# Patient Record
Sex: FEMALE | Race: WHITE | NOT HISPANIC OR LATINO | Employment: OTHER | URBAN - METROPOLITAN AREA
[De-identification: names, ages, dates, MRNs, and addresses within clinical notes are randomized per-mention and may not be internally consistent; named-entity substitution may affect disease eponyms.]

---

## 2017-01-04 ENCOUNTER — ANTICOAGULATION THERAPY VISIT (OUTPATIENT)
Dept: ANTICOAGULATION | Facility: CLINIC | Age: 62
End: 2017-01-04
Payer: COMMERCIAL

## 2017-01-04 DIAGNOSIS — Z79.01 LONG-TERM (CURRENT) USE OF ANTICOAGULANTS: Primary | ICD-10-CM

## 2017-01-04 DIAGNOSIS — Z95.2 H/O AORTIC VALVE REPLACEMENT: ICD-10-CM

## 2017-01-04 DIAGNOSIS — Z95.2 S/P AORTIC VALVE REPLACEMENT: ICD-10-CM

## 2017-01-04 LAB — INR POINT OF CARE: 2.1 (ref 0.86–1.14)

## 2017-01-04 PROCEDURE — 36416 COLLJ CAPILLARY BLOOD SPEC: CPT

## 2017-01-04 PROCEDURE — 99207 ZZC NO CHARGE NURSE ONLY: CPT

## 2017-01-04 PROCEDURE — 85610 PROTHROMBIN TIME: CPT | Mod: QW

## 2017-01-04 RX ORDER — WARFARIN SODIUM 5 MG/1
TABLET ORAL
Qty: 90 TABLET | Refills: 1 | Status: SHIPPED | OUTPATIENT
Start: 2017-01-04 | End: 2017-07-24

## 2017-01-04 NOTE — PROGRESS NOTES
ANTICOAGULATION FOLLOW-UP CLINIC VISIT    Patient Name:  Annabella Bolanos  Date:  1/4/2017  Contact Type:  Face to Face    SUBJECTIVE:     Patient Findings     Positives No Problem Findings           OBJECTIVE    INR PROTIME   Date Value Ref Range Status   01/04/2017 2.1* 0.86 - 1.14 Final       ASSESSMENT / PLAN  INR assessment THER    Recheck INR In: 6 WEEKS    INR Location Clinic      Anticoagulation Summary as of 1/4/2017     INR goal 1.8-2.5   Selected INR 2.1 (1/4/2017)   Maintenance plan 5 mg (5 mg x 1) every day   Full instructions 5 mg every day   Weekly total 35 mg   No change documented Erica Stinson RN   Plan last modified Erica Stinson RN (5/11/2016)   Next INR check 2/15/2017   Priority INR   Target end date     Indications   Long-term (current) use of anticoagulants [Z79.01] [Z79.01]  H/O aortic valve replacement [Z95.2]         Anticoagulation Episode Summary     INR check location     Preferred lab     Send INR reminders to [facility]    Comments       Anticoagulation Care Providers     Provider Role Specialty Phone number    Brook Echavarria MD [facility] Internal Medicine [phone]            See the Encounter Report to view Anticoagulation Flowsheet and Dosing Calendar (Go to Encounters tab in chart review, and find the Anticoagulation Therapy Visit)    Dosage adjustment made based on physician directed care plan.    Erica Stinson RN

## 2017-02-14 ENCOUNTER — TRANSFERRED RECORDS (OUTPATIENT)
Dept: SURGERY | Facility: CLINIC | Age: 62
End: 2017-02-14

## 2017-02-15 ENCOUNTER — ANTICOAGULATION THERAPY VISIT (OUTPATIENT)
Dept: ANTICOAGULATION | Facility: CLINIC | Age: 62
End: 2017-02-15
Payer: COMMERCIAL

## 2017-02-15 DIAGNOSIS — Z95.2 H/O AORTIC VALVE REPLACEMENT: ICD-10-CM

## 2017-02-15 DIAGNOSIS — Z79.01 LONG-TERM (CURRENT) USE OF ANTICOAGULANTS: ICD-10-CM

## 2017-02-15 LAB — INR POINT OF CARE: 1.7 (ref 0.86–1.14)

## 2017-02-15 PROCEDURE — 99207 ZZC NO CHARGE NURSE ONLY: CPT

## 2017-02-15 PROCEDURE — 85610 PROTHROMBIN TIME: CPT | Mod: QW

## 2017-02-15 PROCEDURE — 36416 COLLJ CAPILLARY BLOOD SPEC: CPT

## 2017-02-15 NOTE — MR AVS SNAPSHOT
Annabella TEMPLE Luis Miguel   2/15/2017 8:15 AM   Anticoagulation Therapy Visit    Description:  61 year old female   Provider[facility]  [facility]   Department:  [facility]           INR as of 2/15/2017     Today's INR 1.7!      Anticoagulation Summary as of 2/15/2017     INR goal 1.8-2.5   Today's INR 1.7!   Full instructions 2/15: 10 mg; Otherwise 5 mg every day   Next INR check 3/8/2017    Indications   Long-term (current) use of anticoagulants [Z79.01] [Z79.01]  H/O aortic valve replacement [Z95.2]         Your next Anticoagulation Clinic appointment(s)     Mar 08, 2017  8:15 AM CST   [facility]ticoagulation Visit with [facility]   [facility] ([facility])    [address]  [facility] [address] [phone]              Contact Numbers     [facility] Phone Numbers:  Anticoagulation Clinic Appointments : [phone]  Anticoagulation Nurse: [phone]         February 2017 Details    Sun Mon Tue Wed Thu Fri Sat        1               2               3               4                 5               6               7               8               9               10               11                 12               13               14               15      10 mg   See details      16      5 mg         17      5 mg         18      5 mg           19      5 mg         20      5 mg         21      5 mg         22      5 mg         23      5 mg         24      5 mg         25      5 mg           26      5 mg         27      5 mg         28      5 mg              Date Details   02/15 This INR check               How to take your warfarin dose     To take:  5 mg Take 1 of the 5 mg tablets.    To take:  10 mg Take 2 of the 5 mg tablets.           March 2017 Details    Sun Mon Tue Wed Thu Fri Sat        1      5 mg         2      5 mg         3      5 mg         4      5 mg           5      5 mg         6      5 mg         7      5 mg         8             9               10               11                 12               13               14               15               16               17               18                 19               20               21               22               23               24               25                 26               27               28               29               30               31                 Date Details   No additional details    Date of next INR:  3/8/2017         How to take your warfarin dose     To take:  5 mg Take 1 of the 5 mg tablets.

## 2017-02-15 NOTE — PROGRESS NOTES
ANTICOAGULATION FOLLOW-UP CLINIC VISIT    Patient Name:  Annabella Bolanos  Date:  2/15/2017  Contact Type:  Face to Face    SUBJECTIVE:     Patient Findings     Positives Unexplained INR or factor level change           OBJECTIVE    INR Protime   Date Value Ref Range Status   02/15/2017 1.7 (A) 0.86 - 1.14 Final       ASSESSMENT / PLAN  INR assessment SUB    Recheck INR In: 3 WEEKS    INR Location Clinic      Anticoagulation Summary as of 2/15/2017     INR goal 1.8-2.5   Today's INR 1.7!   Maintenance plan 5 mg (5 mg x 1) every day   Full instructions 2/15: 10 mg; Otherwise 5 mg every day   Weekly total 35 mg   Plan last modified Erica Stinson RN (5/11/2016)   Next INR check 3/8/2017   Priority INR   Target end date     Indications   Long-term (current) use of anticoagulants [Z79.01] [Z79.01]  H/O aortic valve replacement [Z95.2]         Anticoagulation Episode Summary     INR check location     Preferred lab     Send INR reminders to [facility]    Comments       Anticoagulation Care Providers     Provider Role Specialty Phone number    Brook Echavarria MD [facility] Internal Medicine [phone]            See the Encounter Report to view Anticoagulation Flowsheet and Dosing Calendar (Go to Encounters tab in chart review, and find the Anticoagulation Therapy Visit)    Dosage adjustment made based on physician directed care plan.    Erica Stinson RN

## 2017-03-08 ENCOUNTER — ANTICOAGULATION THERAPY VISIT (OUTPATIENT)
Dept: ANTICOAGULATION | Facility: CLINIC | Age: 62
End: 2017-03-08
Payer: COMMERCIAL

## 2017-03-08 DIAGNOSIS — Z79.01 LONG-TERM (CURRENT) USE OF ANTICOAGULANTS: ICD-10-CM

## 2017-03-08 DIAGNOSIS — Z95.2 H/O AORTIC VALVE REPLACEMENT: ICD-10-CM

## 2017-03-08 LAB — INR POINT OF CARE: 1.8 (ref 0.86–1.14)

## 2017-03-08 PROCEDURE — 99207 ZZC NO CHARGE NURSE ONLY: CPT

## 2017-03-08 PROCEDURE — 85610 PROTHROMBIN TIME: CPT | Mod: QW

## 2017-03-08 PROCEDURE — 36416 COLLJ CAPILLARY BLOOD SPEC: CPT

## 2017-03-08 NOTE — MR AVS SNAPSHOT
Annabella TEMPLE Luis Miguel   3/8/2017 8:15 AM   Anticoagulation Therapy Visit    Description:  61 year old female   Provider[facility]  [facility]   Department:  [facility]           INR as of 3/8/2017     Today's INR 1.8      Anticoagulation Summary as of 3/8/2017     INR goal 1.8-2.5   Today's INR 1.8   Full instructions 5 mg every day   Next INR check 4/19/2017    Indications   Long-term (current) use of anticoagulants [Z79.01] [Z79.01]  H/O aortic valve replacement [Z95.2]         Your next Anticoagulation Clinic appointment(s)     Apr 19, 2017  8:15 AM CDT   [facility]ticoagulation Visit with [facility]   [facility]    [address]  [facility] [address] [phone]              Contact Numbers     [facility] Phone Numbers:  Anticoagulation Clinic Appointments : [phone]  Anticoagulation Nurse: [phone]         March 2017 Details    Sun Mon Tue Wed Thu Fri Sat        1               2               3               4                 5               6               7               8      5 mg   See details      9      5 mg         10      5 mg         11      5 mg           12      5 mg         13      5 mg         14      5 mg         15      5 mg         16      5 mg         17      5 mg         18      5 mg           19      5 mg         20      5 mg         21      5 mg         22      5 mg         23      5 mg         24      5 mg         25      5 mg           26      5 mg         27      5 mg         28      5 mg         29      5 mg         30      5 mg         31      5 mg           Date Details   03/08 This INR check               How to take your warfarin dose     To take:  5 mg Take 1 of the 5 mg tablets.           April 2017 Details    Sun Mon Tue Wed Thu Fri Sat           1      5 mg           2      5 mg         3      5 mg         4      5 mg         5      5 mg         6      5 mg         7      5 mg          8      5 mg           9      5 mg         10      5 mg         11      5 mg         12      5 mg         13      5 mg         14      5 mg         15      5 mg           16      5 mg         17      5 mg         18      5 mg         19            20               21               22                 23               24               25               26               27               28               29                 30                      Date Details   No additional details    Date of next INR:  4/19/2017         How to take your warfarin dose     To take:  5 mg Take 1 of the 5 mg tablets.

## 2017-03-08 NOTE — PROGRESS NOTES
ANTICOAGULATION FOLLOW-UP CLINIC VISIT    Patient Name:  Annabella Bolanos  Date:  3/8/2017  Contact Type:  Face to Face    SUBJECTIVE:     Patient Findings     Positives Change in diet/appetite (Pt will try to cut back on her vitamin K foods)           OBJECTIVE    INR Protime   Date Value Ref Range Status   03/08/2017 1.8 (A) 0.86 - 1.14 Final       ASSESSMENT / PLAN  INR assessment THER    Recheck INR In: 6 WEEKS    INR Location Clinic      Anticoagulation Summary as of 3/8/2017     INR goal 1.8-2.5   Today's INR 1.8   Maintenance plan 5 mg (5 mg x 1) every day   Full instructions 5 mg every day   Weekly total 35 mg   No change documented Erica Stinson RN   Plan last modified Erica Stinson RN (5/11/2016)   Next INR check 4/19/2017   Priority INR   Target end date     Indications   Long-term (current) use of anticoagulants [Z79.01] [Z79.01]  H/O aortic valve replacement [Z95.2]         Anticoagulation Episode Summary     INR check location     Preferred lab     Send INR reminders to [facility]    Comments       Anticoagulation Care Providers     Provider Role Specialty Phone number    Brook Echavarria MD Responsible Internal Medicine [phone]            See the Encounter Report to view Anticoagulation Flowsheet and Dosing Calendar (Go to Encounters tab in chart review, and find the Anticoagulation Therapy Visit)    Dosage adjustment made based on physician directed care plan.    Erica Stinson RN

## 2017-04-19 ENCOUNTER — ANTICOAGULATION THERAPY VISIT (OUTPATIENT)
Dept: ANTICOAGULATION | Facility: CLINIC | Age: 62
End: 2017-04-19
Payer: COMMERCIAL

## 2017-04-19 DIAGNOSIS — Z95.2 H/O AORTIC VALVE REPLACEMENT: ICD-10-CM

## 2017-04-19 DIAGNOSIS — Z79.01 LONG-TERM (CURRENT) USE OF ANTICOAGULANTS: ICD-10-CM

## 2017-04-19 LAB — INR POINT OF CARE: 2.2 (ref 0.86–1.14)

## 2017-04-19 PROCEDURE — 36416 COLLJ CAPILLARY BLOOD SPEC: CPT

## 2017-04-19 PROCEDURE — 99207 ZZC NO CHARGE NURSE ONLY: CPT

## 2017-04-19 PROCEDURE — 85610 PROTHROMBIN TIME: CPT | Mod: QW

## 2017-04-19 NOTE — PROGRESS NOTES
ANTICOAGULATION FOLLOW-UP CLINIC VISIT    Patient Name:  Annabella Bolanos  Date:  4/19/2017  Contact Type:  Face to Face    SUBJECTIVE:     Patient Findings     Positives No Problem Findings           OBJECTIVE    INR Protime   Date Value Ref Range Status   04/19/2017 2.2 (A) 0.86 - 1.14 Final       ASSESSMENT / PLAN  INR assessment THER    Recheck INR In: 6 WEEKS    INR Location Clinic      Anticoagulation Summary as of 4/19/2017     INR goal 1.8-2.5   Today's INR 2.2   Maintenance plan 5 mg (5 mg x 1) every day   Full instructions 5 mg every day   Weekly total 35 mg   No change documented Erica Stinson RN   Plan last modified Erica Stinson RN (5/11/2016)   Next INR check 5/31/2017   Priority INR   Target end date     Indications   Long-term (current) use of anticoagulants [Z79.01] [Z79.01]  H/O aortic valve replacement [Z95.2]         Anticoagulation Episode Summary     INR check location     Preferred lab     Send INR reminders to [facility]    Comments       Anticoagulation Care Providers     Provider Role Specialty Phone number    Brook Echavarria MD Responsible Internal Medicine [phone]            See the Encounter Report to view Anticoagulation Flowsheet and Dosing Calendar (Go to Encounters tab in chart review, and find the Anticoagulation Therapy Visit)    Dosage adjustment made based on physician directed care plan.    Erica Stinson RN

## 2017-04-19 NOTE — MR AVS SNAPSHOT
Annabella TEMPLE Luis Miguel   4/19/2017 8:15 AM   Anticoagulation Therapy Visit    Description:  61 year old female   Provider[facility]  [facility]   Department:  [facility]           INR as of 4/19/2017     Today's INR 2.2      Anticoagulation Summary as of 4/19/2017     INR goal 1.8-2.5   Today's INR 2.2   Full instructions 5 mg every day   Next INR check 5/31/2017    Indications   Long-term (current) use of anticoagulants [Z79.01] [Z79.01]  H/O aortic valve replacement [Z95.2]         Your next Anticoagulation Clinic appointment(s)     May 31, 2017  8:15 AM CDT   [facility] with [facility]   [facility]    [address] [facility] [address]  [facility] [address] [phone]              Contact Numbers     [facility] Phone Numbers:  Anticoagulation Clinic Appointments : [phone]  Anticoagulation Nurse: [phone]         April 2017 Details    Sun Mon Tue Wed Thu Fri Sat           1                 2               3               4               5               6               7               8                 9               10               11               12               13               14               15                 16               17               18               19      5 mg   See details      20      5 mg         21      5 mg         22      5 mg           23      5 mg         24      5 mg         25      5 mg         26      5 mg         27      5 mg         28      5 mg         29      5 mg           30      5 mg                Date Details   04/19 This INR check               How to take your warfarin dose     To take:  5 mg Take 1 of the 5 mg tablets.           May 2017 Details    Sun Mon Tue Wed Thu Fri Sat      1      5 mg         2      5 mg         3      5 mg         4      5 mg         5      5 mg         6      5 mg           7      5 mg         8      5 mg         9      5 mg         10      5 mg          11      5 mg         12      5 mg         13      5 mg           14      5 mg         15      5 mg         16      5 mg         17      5 mg         18      5 mg         19      5 mg         20      5 mg           21      5 mg         22      5 mg         23      5 mg         24      5 mg         25      5 mg         26      5 mg         27      5 mg           28      5 mg         29      5 mg         30      5 mg         31                Date Details   No additional details    Date of next INR:  5/31/2017         How to take your warfarin dose     To take:  5 mg Take 1 of the 5 mg tablets.

## 2017-05-22 ENCOUNTER — OFFICE VISIT (OUTPATIENT)
Dept: CARDIOLOGY | Facility: CLINIC | Age: 62
End: 2017-05-22
Attending: INTERNAL MEDICINE
Payer: COMMERCIAL

## 2017-05-22 VITALS
HEIGHT: 64 IN | BODY MASS INDEX: 21.51 KG/M2 | SYSTOLIC BLOOD PRESSURE: 124 MMHG | WEIGHT: 126 LBS | HEART RATE: 60 BPM | DIASTOLIC BLOOD PRESSURE: 84 MMHG

## 2017-05-22 DIAGNOSIS — I10 BENIGN ESSENTIAL HYPERTENSION: ICD-10-CM

## 2017-05-22 DIAGNOSIS — Z95.2 HISTORY OF MECHANICAL AORTIC VALVE REPLACEMENT: Primary | ICD-10-CM

## 2017-05-22 DIAGNOSIS — I10 ESSENTIAL HYPERTENSION, BENIGN: ICD-10-CM

## 2017-05-22 DIAGNOSIS — I10 ESSENTIAL HYPERTENSION: ICD-10-CM

## 2017-05-22 PROCEDURE — 99214 OFFICE O/P EST MOD 30 MIN: CPT | Performed by: INTERNAL MEDICINE

## 2017-05-22 RX ORDER — CARVEDILOL 12.5 MG/1
12.5 TABLET ORAL 2 TIMES DAILY WITH MEALS
Qty: 180 TABLET | Refills: 3 | Status: SHIPPED | OUTPATIENT
Start: 2017-05-22 | End: 2017-05-22

## 2017-05-22 RX ORDER — LISINOPRIL 10 MG/1
TABLET ORAL
Qty: 135 TABLET | Refills: 3 | Status: SHIPPED | OUTPATIENT
Start: 2017-05-22 | End: 2017-09-07

## 2017-05-22 RX ORDER — CARVEDILOL 12.5 MG/1
12.5 TABLET ORAL 2 TIMES DAILY WITH MEALS
Qty: 30 TABLET | Refills: 1 | Status: SHIPPED | OUTPATIENT
Start: 2017-05-22 | End: 2017-08-07

## 2017-05-22 NOTE — LETTER
5/22/2017    Brook Echavarria MD  [address] [place] [address]    RE: Annabella Bolanos       Dear Colleague,    I had the pleasure of seeing Annabella Bolanos in the [facility].    Ms. Bolanos is a very pleasant 61-year-old female with a history of right-sided ductal breast cancer, status post chemo and radiation therapy, bicuspid aortic valve stenosis with mechanical aortic valve replacement and conduit due to ascending aortic aneurysm performed in 2011.  She is here for a followup visit.        We did perform an echocardiogram last year to assess LV function as well as valve function.  She does have a history of Adriamycin use in 2014 with no evidence of cardiotoxicity.  Ms. Bolanos also has a history of high blood pressure and is on antihypertensive therapy.  Over the past year, she has not complained of any cardiac issues, specifically denying any fluttering or palpitations, lightheadedness or chest pain.  She is, however, suffering from significant arthritis in hands and knees, somewhat affecting the quality of her life.  She is still able to work but has difficulty especially at nighttime when getting up to urinate with a lot of stiffness in the knees as well as hands.  She is taking Extra Strength Tylenol at night with some partial relief.  She is wondering if there is any other treatment for her significant arthritis.        Last year we did increase her lisinopril a little bit due to elevated blood pressures.  This has seemingly been effective in reducing her blood pressures to within goal.      PHYSICAL EXAMINATION:   VITAL SIGNS:  On exam today, her blood pressure is 124/84, pulse is 60, weight 126 with a body mass index of 21.   CARDIOVASCULAR:  Tones are regular today, suggesting a normal sinus rhythm.  She has audible click with soft systolic murmur from her mechanical aortic valve.  I do not appreciate a gallop or rub.   LUNGS:  Clear posteriorly.    EXTREMITIES:  She has strong and symmetric pulses in the upper and lower extremities without peripheral edema.     Outpatient Encounter Prescriptions as of 5/22/2017   Medication Sig Dispense Refill     lisinopril (PRINIVIL/ZESTRIL) 10 MG tablet Take 1 1/2 tabs (15 mg) by mouth daily 135 tablet 3     [DISCONTINUED] carvedilol (COREG) 12.5 MG tablet Take 1 tablet (12.5 mg) by mouth 2 times daily (with meals) 30 tablet 1     [DISCONTINUED] warfarin (COUMADIN) 5 MG tablet Take 1 tablet (5 mg) daily, or as instructed. 90 tablet 1     Cholecalciferol (VITAMIN D3 PO) Take by mouth once a week        zolpidem (AMBIEN) 10 MG tablet Take 1 tablet (10 mg) by mouth nightly as needed for sleep (Patient taking differently: Take 5 mg by mouth nightly as needed for sleep ) 30 tablet 0     ASPIRIN EC PO Take 81 mg by mouth daily       anastrozole (ARIMIDEX) 1 MG tablet Take by mouth daily 30 tablet      acetaminophen (TYLENOL) 325 MG tablet Take 1-2 tablets (325-650 mg) by mouth every 6 hours as needed for mild pain 250 tablet 11     Calcium Carbonate (CALCIUM 500 PO) Take 600 mg by mouth daily        [DISCONTINUED] carvedilol (COREG) 12.5 MG tablet Take 1 tablet (12.5 mg) by mouth 2 times daily (with meals) 180 tablet 3     [DISCONTINUED] carvedilol (COREG) 12.5 MG tablet Take 1 tablet (12.5 mg) by mouth 2 times daily (with meals) 180 tablet 3     [DISCONTINUED] lisinopril (PRINIVIL,ZESTRIL) 10 MG tablet Take 1 1/2 tabs (15 mg) by mouth daily 135 tablet 3     [DISCONTINUED] fluticasone (FLONASE) 50 MCG/ACT nasal spray Spray 1-2 sprays into both nostrils daily 48 g 2     albuterol (PROAIR HFA, PROVENTIL HFA, VENTOLIN HFA) 108 (90 BASE) MCG/ACT inhaler Inhale 2 puffs into the lungs every 6 hours as needed for shortness of breath / dyspnea or wheezing Reported on 5/22/2017 3 Inhaler 1     No facility-administered encounter medications on file as of 5/22/2017.       In summary, Ms. Bolanos is a very pleasant 61-year-old female with a  history of right-sided breast cancer, status post chemotherapy and radiation therapy without evidence of cardiotoxicity.  She has bicuspid aortic stenosis and ascending aortic aneurysm with mechanical aortic valve replacement and conduit in 2011, hypertension that is well controlled.  She is currently asymptomatic from a cardiac perspective; however, suffering from significant arthritis.  She is concerned that Arimidex may be contributing to worsened arthritis.  She has a lot of difficulties, especially at nighttime.  She is wondering if there is anything that she can take to reduce her arthritic pain.        KI have recommended that she follow up with her primary care provider for further guidance on this.  I did talk to her about occasional ibuprofen use.  This will increase risk of bleeding, but used rarely may help on her bad days.  Also, we talked about possibly changing Arimidex to another hormone modulation therapy since there is increased risk for arthritis with chronic use.  Tamoxifen was also considered initially, but felt to maybe increase bleeding risks related to warfarin.  We can certainly adjust warfarin as needed if this is a better option for her considering the potential side effects of Arimidex.        I will plan to schedule an echocardiogram for followup and monitoring of her valve and LV function with her next visit next year.  Please feel free to contact me with any questions you have in regards to her care.     Again, thank you for allowing me to participate in the care of your patient.      Sincerely,    Cecilia Guan,      [facility]    cc:      Lucille Felder MD    [facility]   [address]   [place] [address]

## 2017-05-22 NOTE — PROGRESS NOTES
HISTORY OF PRESENT ILLNESS:  Ms. Bolanos is a very pleasant 61-year-old female with a history of right-sided ductal breast cancer, status post chemo and radiation therapy, bicuspid aortic valve stenosis with mechanical aortic valve replacement and conduit due to ascending aortic aneurysm performed in 2011.  She is here for a followup visit.        We did perform an echocardiogram last year to assess LV function as well as valve function.  She does have a history of Adriamycin use in 2014 with no evidence of cardiotoxicity.  Ms. Bolanos also has a history of high blood pressure and is on antihypertensive therapy.  Over the past year, she has not complained of any cardiac issues, specifically denying any fluttering or palpitations, lightheadedness or chest pain.  She is, however, suffering from significant arthritis in hands and knees, somewhat affecting the quality of her life.  She is still able to work but has difficulty especially at nighttime when getting up to urinate with a lot of stiffness in the knees as well as hands.  She is taking Extra Strength Tylenol at night with some partial relief.  She is wondering if there is any other treatment for her significant arthritis.        Last year we did increase her lisinopril a little bit due to elevated blood pressures.  This has seemingly been effective in reducing her blood pressures to within goal.      PHYSICAL EXAMINATION:   VITAL SIGNS:  On exam today, her blood pressure is 124/84, pulse is 60, weight 126 with a body mass index of 21.   CARDIOVASCULAR:  Tones are regular today, suggesting a normal sinus rhythm.  She has audible click with soft systolic murmur from her mechanical aortic valve.  I do not appreciate a gallop or rub.   LUNGS:  Clear posteriorly.   EXTREMITIES:  She has strong and symmetric pulses in the upper and lower extremities without peripheral edema.      In summary, Ms. Bolanos is a very pleasant 61-year-old female with a history of right-sided  breast cancer, status post chemotherapy and radiation therapy without evidence of cardiotoxicity.  She has bicuspid aortic stenosis and ascending aortic aneurysm with mechanical aortic valve replacement and conduit in , hypertension that is well controlled.  She is currently asymptomatic from a cardiac perspective; however, suffering from significant arthritis.  She is concerned that Arimidex may be contributing to worsened arthritis.  She has a lot of difficulties, especially at nighttime.  She is wondering if there is anything that she can take to reduce her arthritic pain.        KI have recommended that she follow up with her primary care provider for further guidance on this.  I did talk to her about occasional ibuprofen use.  This will increase risk of bleeding, but used rarely may help on her bad days.  Also, we talked about possibly changing Arimidex to another hormone modulation therapy since there is increased risk for arthritis with chronic use.  Tamoxifen was also considered initially, but felt to maybe increase bleeding risks related to warfarin.  We can certainly adjust warfarin as needed if this is a better option for her considering the potential side effects of Arimidex.        I will plan to schedule an echocardiogram for followup and monitoring of her valve and LV function with her next visit next year.  Please feel free to contact me with any questions you have in regards to her care.      cc:      Brook Echavarria MD    [facility]   [address]   [place] [address]        Lucille Felder MD    [facility]   [address]   [place] [address]         KHADIJAH HENSON DO             D: 2017 09:21   T: 2017 13:59   MT: [email or web address]      Name:     VAL AGEE   MRN:      [id number]        Account:      [id number]   :      1955           Service Date: 2017      Document: [id number]

## 2017-05-22 NOTE — MR AVS SNAPSHOT
After Visit Summary   5/22/2017    Annabella Bolanos    MRN: [id number]           Patient Information     Date Of Birth          1955        Visit Information        Provider Department      5/22/2017 8:45 AM Cecilia Guan DO [facility]        Today's Diagnoses     History of mechanical aortic valve replacement    -  1    Benign essential hypertension        Essential hypertension        Essential hypertension, benign           Follow-ups after your visit        Additional Services     Follow-Up with Cardiologist                 Your next 10 appointments already scheduled     May 31, 2017  8:15 AM CDT   [facility]    [address]  [facility] [id number] [phone]              Future tests that were ordered for you today     Open Future Orders        Priority Expected Expires Ordered    Echocardiogram Routine 5/22/2018 6/26/2018 5/22/2017    Follow-Up with Cardiologist Routine 5/22/2018 10/4/2018 5/22/2017            Who to contact     If you have questions or need follow up information about today's clinic visit or your schedule please contact [facility] directly at [phone].  Normal or non-critical lab and imaging results will be communicated to you by MyChart, letter or phone within 4 business days after the clinic has received the results. If you do not hear from us within 7 days, please contact the clinic through MyChart or phone. If you have a critical or abnormal lab result, we will notify you by phone as soon as possible.  Submit refill requests through [employer] or call your pharmacy and they will forward the refill request to us. Please allow 3 business days for your refill to be completed.          Additional Information About Your Visit        MyChart Information      "[employer] gives you secure access to your electronic health record. If you see a primary care provider, you can also send messages to your care team and make appointments. If you have questions, please call your primary care clinic.  If you do not have a primary care provider, please call [phone] and they will assist you.        Care EveryWhere ID     This is your Care EveryWhere ID. This could be used by other organizations to access your [facility] medical records  [id number]        Your Vitals Were     Pulse Height BMI (Body Mass Index)             60 1.626 m (5' 4\") 21.63 kg/m2          Blood Pressure from Last 3 Encounters:   05/22/17 124/84   07/20/16 150/89   07/14/16 125/78    Weight from Last 3 Encounters:   05/22/17 57.2 kg (126 lb)   07/20/16 55.8 kg (123 lb)   07/14/16 56.2 kg (123 lb 12.8 oz)              We Performed the Following     Follow-Up with Cardiologist          Today's Medication Changes          These changes are accurate as of: 5/22/17  9:29 AM.  If you have any questions, ask your nurse or doctor.               Start taking these medicines.        Dose/Directions    carvedilol 12.5 MG tablet   Commonly known as:  COREG   Used for:  Essential hypertension   Started by:  Cecilia Guan DO        Dose:  12.5 mg   Take 1 tablet (12.5 mg) by mouth 2 times daily (with meals)   Quantity:  30 tablet   Refills:  1         These medicines have changed or have updated prescriptions.        Dose/Directions    zolpidem 10 MG tablet   Commonly known as:  AMBIEN   This may have changed:  how much to take   Used for:  Insomnia, unspecified type        Dose:  10 mg   Take 1 tablet (10 mg) by mouth nightly as needed for sleep   Quantity:  30 tablet   Refills:  0            Where to get your medicines      These medications were sent to [facility] - [address] [address]     Phone:  [phone]     lisinopril 10 MG " tablet         These medications were sent to [facility] [place], [place] - [address] [facility]  [address] [facility] [address]     Phone:  [phone]     carvedilol 12.5 MG tablet                Primary Care Provider Office Phone # Fax #    Brook Echavarria [phone]       [facility] [address]  [facility] [address]        Thank you!     Thank you for choosing [facility] [place]  for your care. Our goal is always to provide you with excellent care. Hearing back from our patients is one way we can continue to improve our services. Please take a few minutes to complete the written survey that you may receive in the mail after your visit with us. Thank you!             Your Updated Medication List - Protect others around you: Learn how to safely use, store and throw away your medicines at www.disposemymeds.org.          This list is accurate as of: 5/22/17  9:29 AM.  Always use your most recent med list.                   Brand Name Dispense Instructions for use    albuterol 108 (90 BASE) MCG/ACT Inhaler    PROAIR HFA/PROVENTIL HFA/VENTOLIN HFA    3 Inhaler    Inhale 2 puffs into the lungs every 6 hours as needed for shortness of breath / dyspnea or wheezing Reported on 5/22/2017       anastrozole 1 MG tablet    ARIMIDEX    30 tablet    Take by mouth daily       ASPIRIN EC PO      Take 81 mg by mouth daily       CALCIUM 500 PO      Take 600 mg by mouth daily       carvedilol 12.5 MG tablet    COREG    30 tablet    Take 1 tablet (12.5 mg) by mouth 2 times daily (with meals)       lisinopril 10 MG tablet    PRINIVIL/ZESTRIL    135 tablet    Take 1 1/2 tabs (15 mg) by mouth daily       TYLENOL 325 MG tablet   Generic drug:  acetaminophen     250 tablet    Take 1-2 tablets (325-650 mg) by mouth every 6 hours as needed for mild pain       VITAMIN D3 PO      Take by mouth daily       warfarin 5 MG  tablet    COUMADIN    90 tablet    Take 1 tablet (5 mg) daily, or as instructed.       zolpidem 10 MG tablet    AMBIEN    30 tablet    Take 1 tablet (10 mg) by mouth nightly as needed for sleep

## 2017-05-31 ENCOUNTER — ANTICOAGULATION THERAPY VISIT (OUTPATIENT)
Dept: ANTICOAGULATION | Facility: CLINIC | Age: 62
End: 2017-05-31
Payer: COMMERCIAL

## 2017-05-31 DIAGNOSIS — Z95.2 H/O AORTIC VALVE REPLACEMENT: ICD-10-CM

## 2017-05-31 DIAGNOSIS — Z79.01 LONG-TERM (CURRENT) USE OF ANTICOAGULANTS: ICD-10-CM

## 2017-05-31 LAB — INR POINT OF CARE: 2.5 (ref 0.86–1.14)

## 2017-05-31 PROCEDURE — 99207 ZZC NO CHARGE NURSE ONLY: CPT

## 2017-05-31 PROCEDURE — 85610 PROTHROMBIN TIME: CPT | Mod: QW

## 2017-05-31 PROCEDURE — 36416 COLLJ CAPILLARY BLOOD SPEC: CPT

## 2017-05-31 NOTE — MR AVS SNAPSHOT
Annabella TEMPLE Luis Miguel   5/31/2017 8:15 AM   Anticoagulation Therapy Visit    Description:  61 year old female   Provider:  [facility]   Department:  [facility]           INR as of 5/31/2017     Today's INR 2.5      Anticoagulation Summary as of 5/31/2017     INR goal 1.8-2.5   Today's INR 2.5   Full instructions 5 mg every day   Next INR check 7/11/2017    Indications   Long-term (current) use of anticoagulants [Z79.01] [Z79.01]  H/O aortic valve replacement [Z95.2]         Your next Anticoagulation Clinic appointment(s)     Jul 11, 2017  8:30 AM CDT   Anticoagulation Visit with [facility]   [facility]    [address] [facility] [address]  [facility] [address] [phone]              Contact Numbers     [facility] Phone Numbers:  Anticoagulation Clinic Appointments : [phone]  Anticoagulation Nurse: [phone]         May 2017 Details    Sun Mon Tue Wed Thu Fri Sat      1               2               3               4               5               6                 7               8               9               10               11               12               13                 14               15               16               17               18               19               20                 21               22               23               24               25               26               27                 28               29               30               31      5 mg   See details          Date Details   05/31 This INR check               How to take your warfarin dose     To take:  5 mg Take 1 of the 5 mg tablets.           June 2017 Details    Sun Mon Tue Wed Thu Fri Sat         1      5 mg         2      5 mg         3      5 mg           4      5 mg         5      5 mg         6      5 mg         7      5 mg         8      5 mg         9      5 mg         10      5 mg           11      5 mg         12       5 mg         13      5 mg         14      5 mg         15      5 mg         16      5 mg         17      5 mg           18      5 mg         19      5 mg         20      5 mg         21      5 mg         22      5 mg         23      5 mg         24      5 mg           25      5 mg         26      5 mg         27      5 mg         28      5 mg         29      5 mg         30      5 mg           Date Details   No additional details            How to take your warfarin dose     To take:  5 mg Take 1 of the 5 mg tablets.           July 2017 Details    Sun Mon Tue Wed Thu Fri Sat           1      5 mg           2      5 mg         3      5 mg         4      5 mg         5      5 mg         6      5 mg         7      5 mg         8      5 mg           9      5 mg         10      5 mg         11            12               13               14               15                 16               17               18               19               20               21               22                 23               24               25               26               27               28               29                 30               31                     Date Details   No additional details    Date of next INR:  7/11/2017         How to take your warfarin dose     To take:  5 mg Take 1 of the 5 mg tablets.

## 2017-05-31 NOTE — PROGRESS NOTES
ANTICOAGULATION FOLLOW-UP CLINIC VISIT    Patient Name:  Annabella Bolanos  Date:  5/31/2017  Contact Type:  Face to Face    SUBJECTIVE:     Patient Findings     Positives No Problem Findings           OBJECTIVE    INR Protime   Date Value Ref Range Status   05/31/2017 2.5 (A) 0.86 - 1.14 Final       ASSESSMENT / PLAN  INR assessment THER    Recheck INR In: 6 WEEKS    INR Location Clinic      Anticoagulation Summary as of 5/31/2017     INR goal 1.8-2.5   Today's INR 2.5   Maintenance plan 5 mg (5 mg x 1) every day   Full instructions 5 mg every day   Weekly total 35 mg   No change documented Erica Stinson RN   Plan last modified Erica Stinson RN (5/11/2016)   Next INR check 7/11/2017   Priority INR   Target end date     Indications   Long-term (current) use of anticoagulants [Z79.01] [Z79.01]  H/O aortic valve replacement [Z95.2]         Anticoagulation Episode Summary     INR check location     Preferred lab     Send INR reminders to [facility]    Comments       Anticoagulation Care Providers     Provider Role Specialty Phone number    Brook Echavarria MD Responsible Internal Medicine [phone]            See the Encounter Report to view Anticoagulation Flowsheet and Dosing Calendar (Go to Encounters tab in chart review, and find the Anticoagulation Therapy Visit)    Dosage adjustment made based on physician directed care plan.    Erica Stinson RN

## 2017-07-11 ENCOUNTER — ANTICOAGULATION THERAPY VISIT (OUTPATIENT)
Dept: ANTICOAGULATION | Facility: CLINIC | Age: 62
End: 2017-07-11
Payer: COMMERCIAL

## 2017-07-11 DIAGNOSIS — Z79.01 LONG-TERM (CURRENT) USE OF ANTICOAGULANTS: ICD-10-CM

## 2017-07-11 DIAGNOSIS — Z95.2 H/O AORTIC VALVE REPLACEMENT: ICD-10-CM

## 2017-07-11 LAB — INR POINT OF CARE: 2.1 (ref 0.86–1.14)

## 2017-07-11 PROCEDURE — 36416 COLLJ CAPILLARY BLOOD SPEC: CPT

## 2017-07-11 PROCEDURE — 85610 PROTHROMBIN TIME: CPT | Mod: QW

## 2017-07-11 PROCEDURE — 99207 ZZC NO CHARGE NURSE ONLY: CPT

## 2017-07-11 NOTE — MR AVS SNAPSHOT
Annabella TEMPLE Luis Miguel   7/11/2017 8:30 AM   Anticoagulation Therapy Visit    Description:  61 year old female   Provider[facility]  [facility]   Department:  [facility]           INR as of 7/11/2017     Today's INR 2.1      Anticoagulation Summary as of 7/11/2017     INR goal 1.8-2.5   Today's INR 2.1   Full instructions 5 mg every day   Next INR check 8/22/2017    Indications   Long-term (current) use of anticoagulants [Z79.01] [Z79.01]  H/O aortic valve replacement [Z95.2]         Your next Anticoagulation Clinic appointment(s)     Aug 22, 2017  7:45 AM CDT   [facility]    [address]  [facility] [address] [phone]              Contact Numbers     [facility] Phone Numbers:  Anticoagulation Clinic Appointments : [phone]  Anticoagulation Nurse: [phone]         July 2017 Details    Sun Mon Tue Wed Thu Fri Sat           1                 2               3               4               5               6               7               8                 9               10               11      5 mg   See details      12      5 mg         13      5 mg         14      5 mg         15      5 mg           16      5 mg         17      5 mg         18      5 mg         19      5 mg         20      5 mg         21      5 mg         22      5 mg           23      5 mg         24      5 mg         25      5 mg         26      5 mg         27      5 mg         28      5 mg         29      5 mg           30      5 mg         31      5 mg               Date Details   07/11 This INR check               How to take your warfarin dose     To take:  5 mg Take 1 of the 5 mg tablets.           August 2017 Details    Sun Mon Tue Wed Thu Fri Sat       1      5 mg         2      5 mg         3      5 mg         4      5 mg         5      5 mg           6      5 mg         7      5 mg         8       5 mg         9      5 mg         10      5 mg         11      5 mg         12      5 mg           13      5 mg         14      5 mg         15      5 mg         16      5 mg         17      5 mg         18      5 mg         19      5 mg           20      5 mg         21      5 mg         22            23               24               25               26                 27               28               29               30               31                  Date Details   No additional details    Date of next INR:  8/22/2017         How to take your warfarin dose     To take:  5 mg Take 1 of the 5 mg tablets.

## 2017-07-11 NOTE — PROGRESS NOTES
ANTICOAGULATION FOLLOW-UP CLINIC VISIT    Patient Name:  Annabella Bolanos  Date:  7/11/2017  Contact Type:  Face to Face    SUBJECTIVE:        OBJECTIVE    INR Protime   Date Value Ref Range Status   07/11/2017 2.1 (A) 0.86 - 1.14 Final       ASSESSMENT / PLAN  INR assessment THER    Recheck INR In: 6 WEEKS    INR Location Clinic      Anticoagulation Summary as of 7/11/2017     INR goal 1.8-2.5   Today's INR 2.1   Maintenance plan 5 mg (5 mg x 1) every day   Full instructions 5 mg every day   Weekly total 35 mg   No change documented Deborah Aguilera RN   Plan last modified Erica Stinson RN (5/11/2016)   Next INR check 8/22/2017   Priority INR   Target end date     Indications   Long-term (current) use of anticoagulants [Z79.01] [Z79.01]  H/O aortic valve replacement [Z95.2]         Anticoagulation Episode Summary     INR check location     Preferred lab     Send INR reminders to [facility]    Comments       Anticoagulation Care Providers     Provider Role Specialty Phone number    Brook Echavarria MD [facility] Internal Medicine [phone]            See the Encounter Report to view Anticoagulation Flowsheet and Dosing Calendar (Go to Encounters tab in chart review, and find the Anticoagulation Therapy Visit)    Dosage adjustment made based on physician directed care plan.    Deborah Aguilera, RN

## 2017-07-24 ENCOUNTER — OFFICE VISIT (OUTPATIENT)
Dept: INTERNAL MEDICINE | Facility: CLINIC | Age: 62
End: 2017-07-24
Payer: COMMERCIAL

## 2017-07-24 VITALS
OXYGEN SATURATION: 99 % | TEMPERATURE: 98 F | BODY MASS INDEX: 21.56 KG/M2 | SYSTOLIC BLOOD PRESSURE: 136 MMHG | HEART RATE: 64 BPM | DIASTOLIC BLOOD PRESSURE: 80 MMHG | RESPIRATION RATE: 14 BRPM | WEIGHT: 126.3 LBS | HEIGHT: 64 IN

## 2017-07-24 DIAGNOSIS — Z00.00 ROUTINE GENERAL MEDICAL EXAMINATION AT A HEALTH CARE FACILITY: Primary | ICD-10-CM

## 2017-07-24 DIAGNOSIS — C50.911 MALIGNANT NEOPLASM OF RIGHT FEMALE BREAST, UNSPECIFIED ESTROGEN RECEPTOR STATUS, UNSPECIFIED SITE OF BREAST (H): ICD-10-CM

## 2017-07-24 DIAGNOSIS — D12.6 ADENOMATOUS POLYP OF COLON, UNSPECIFIED PART OF COLON: ICD-10-CM

## 2017-07-24 DIAGNOSIS — B00.1 COLD SORE: ICD-10-CM

## 2017-07-24 DIAGNOSIS — H10.022 PINK EYE DISEASE OF LEFT EYE: ICD-10-CM

## 2017-07-24 DIAGNOSIS — I10 HTN, GOAL BELOW 140/90: ICD-10-CM

## 2017-07-24 DIAGNOSIS — E55.9 VITAMIN D DEFICIENCY: ICD-10-CM

## 2017-07-24 DIAGNOSIS — L30.1 DYSHIDROTIC ECZEMA: ICD-10-CM

## 2017-07-24 DIAGNOSIS — Z95.2 S/P AORTIC VALVE REPLACEMENT: ICD-10-CM

## 2017-07-24 DIAGNOSIS — G47.00 INSOMNIA, UNSPECIFIED TYPE: ICD-10-CM

## 2017-07-24 DIAGNOSIS — Z95.2 H/O AORTIC VALVE REPLACEMENT: ICD-10-CM

## 2017-07-24 DIAGNOSIS — N81.11 CYSTOCELE, MIDLINE: ICD-10-CM

## 2017-07-24 LAB
ALBUMIN SERPL-MCNC: 3.6 G/DL (ref 3.4–5)
ALP SERPL-CCNC: 86 U/L (ref 40–150)
ALT SERPL W P-5'-P-CCNC: 23 U/L (ref 0–50)
ANION GAP SERPL CALCULATED.3IONS-SCNC: 4 MMOL/L (ref 3–14)
AST SERPL W P-5'-P-CCNC: 19 U/L (ref 0–45)
BILIRUB SERPL-MCNC: 0.6 MG/DL (ref 0.2–1.3)
BUN SERPL-MCNC: 20 MG/DL (ref 7–30)
CALCIUM SERPL-MCNC: 9 MG/DL (ref 8.5–10.1)
CHLORIDE SERPL-SCNC: 106 MMOL/L (ref 94–109)
CHOLEST SERPL-MCNC: 199 MG/DL
CO2 SERPL-SCNC: 30 MMOL/L (ref 20–32)
CREAT SERPL-MCNC: 0.73 MG/DL (ref 0.52–1.04)
CREAT UR-MCNC: 57 MG/DL
DEPRECATED CALCIDIOL+CALCIFEROL SERPL-MC: 54 UG/L (ref 20–75)
ERYTHROCYTE [DISTWIDTH] IN BLOOD BY AUTOMATED COUNT: 11.3 % (ref 10–15)
GFR SERPL CREATININE-BSD FRML MDRD: 81 ML/MIN/1.7M2
GLUCOSE SERPL-MCNC: 101 MG/DL (ref 70–99)
HCT VFR BLD AUTO: 40.7 % (ref 35–47)
HDLC SERPL-MCNC: 60 MG/DL
HGB BLD-MCNC: 13.3 G/DL (ref 11.7–15.7)
LDLC SERPL CALC-MCNC: 117 MG/DL
MCH RBC QN AUTO: 30.9 PG (ref 26.5–33)
MCHC RBC AUTO-ENTMCNC: 32.7 G/DL (ref 31.5–36.5)
MCV RBC AUTO: 95 FL (ref 78–100)
MICROALBUMIN UR-MCNC: 5 MG/L
MICROALBUMIN/CREAT UR: 9.19 MG/G CR (ref 0–25)
NONHDLC SERPL-MCNC: 139 MG/DL
PLATELET # BLD AUTO: 204 10E9/L (ref 150–450)
POTASSIUM SERPL-SCNC: 4.4 MMOL/L (ref 3.4–5.3)
PROT SERPL-MCNC: 7.1 G/DL (ref 6.8–8.8)
RBC # BLD AUTO: 4.3 10E12/L (ref 3.8–5.2)
SODIUM SERPL-SCNC: 140 MMOL/L (ref 133–144)
T4 FREE SERPL-MCNC: 1.16 NG/DL (ref 0.76–1.46)
TRIGL SERPL-MCNC: 112 MG/DL
TSH SERPL DL<=0.005 MIU/L-ACNC: 0.12 MU/L (ref 0.4–4)
WBC # BLD AUTO: 5 10E9/L (ref 4–11)

## 2017-07-24 PROCEDURE — 84443 ASSAY THYROID STIM HORMONE: CPT | Performed by: INTERNAL MEDICINE

## 2017-07-24 PROCEDURE — 36415 COLL VENOUS BLD VENIPUNCTURE: CPT | Performed by: INTERNAL MEDICINE

## 2017-07-24 PROCEDURE — 99213 OFFICE O/P EST LOW 20 MIN: CPT | Mod: 25 | Performed by: INTERNAL MEDICINE

## 2017-07-24 PROCEDURE — 82306 VITAMIN D 25 HYDROXY: CPT | Performed by: INTERNAL MEDICINE

## 2017-07-24 PROCEDURE — 85027 COMPLETE CBC AUTOMATED: CPT | Performed by: INTERNAL MEDICINE

## 2017-07-24 PROCEDURE — 99396 PREV VISIT EST AGE 40-64: CPT | Performed by: INTERNAL MEDICINE

## 2017-07-24 PROCEDURE — 82043 UR ALBUMIN QUANTITATIVE: CPT | Performed by: INTERNAL MEDICINE

## 2017-07-24 PROCEDURE — 84439 ASSAY OF FREE THYROXINE: CPT | Performed by: INTERNAL MEDICINE

## 2017-07-24 PROCEDURE — 80061 LIPID PANEL: CPT | Performed by: INTERNAL MEDICINE

## 2017-07-24 PROCEDURE — 80053 COMPREHEN METABOLIC PANEL: CPT | Performed by: INTERNAL MEDICINE

## 2017-07-24 RX ORDER — ZOLPIDEM TARTRATE 5 MG/1
5-10 TABLET ORAL
Qty: 60 TABLET | Refills: 0 | Status: SHIPPED | OUTPATIENT
Start: 2017-07-24 | End: 2018-07-26

## 2017-07-24 RX ORDER — ZOLPIDEM TARTRATE 10 MG/1
10 TABLET ORAL
Qty: 30 TABLET | Refills: 0 | Status: CANCELLED | OUTPATIENT
Start: 2017-07-24

## 2017-07-24 RX ORDER — TRIAMCINOLONE ACETONIDE OINTMENT USP, 0.05% 0.5 MG/G
OINTMENT TOPICAL 2 TIMES DAILY PRN
Qty: 17 G | Refills: 0 | Status: SHIPPED | OUTPATIENT
Start: 2017-07-24 | End: 2018-09-27

## 2017-07-24 RX ORDER — VALACYCLOVIR HYDROCHLORIDE 1 G/1
2000 TABLET, FILM COATED ORAL 2 TIMES DAILY
Qty: 4 TABLET | Refills: 3 | Status: SHIPPED | OUTPATIENT
Start: 2017-07-24 | End: 2018-07-26

## 2017-07-24 NOTE — MR AVS SNAPSHOT
After Visit Summary   7/24/2017    Annabella Bolanos    MRN: [id number]           Patient Information     Date Of Birth          1955        Visit Information        Provider Department      7/24/2017 7:20 AM Brook Echavarria MD [facility]        Today's Diagnoses     Routine general medical examination at a health care facility    -  1    Insomnia, unspecified type        Cold sore        Dyshidrotic eczema        Pink eye disease of left eye        Vitamin D deficiency        HTN, goal below 140/90        Malignant neoplasm of right female breast, unspecified estrogen receptor status, unspecified site of breast (H)        H/O aortic valve replacement        Adenomatous polyp of colon, unspecified part of colon          Care Instructions    Plan:  1. Triamcinolone cream- twice a day to the fingers rash as needed for max 2 weeks  2. Valtrex 2 grams and 12 h later again 2 grams for total of 4 grams per episode. Start it as soon as you feel the cold sore is coming on  3. Ofloxacin 1 drop 4 times a day to the L eye  4. Continue the other meds, same doses for now.  5. Labs today     Preventive Health Recommendations  Female Ages 50 - 64    Yearly exam: See your health care provider every year in order to  o Review health changes.   o Discuss preventive care.    o Review your medicines if your doctor has prescribed any.      Get a Pap test every three years (unless you have an abnormal result and your provider advises testing more often).    If you get Pap tests with HPV test, you only need to test every 5 years, unless you have an abnormal result.     You do not need a Pap test if your uterus was removed (hysterectomy) and you have not had cancer.    You should be tested each year for STDs (sexually transmitted diseases) if you're at risk.     Have a mammogram every 1 to 2 years.    Have a colonoscopy at age 50, or have a yearly FIT test (stool test). These exams screen for  "colon cancer.      Have a cholesterol test every 5 years, or more often if advised.    Have a diabetes test (fasting glucose) every three years. If you are at risk for diabetes, you should have this test more often.     If you are at risk for osteoporosis (brittle bone disease), think about having a bone density scan (DEXA).    Shots: Get a flu shot each year. Get a tetanus shot every 10 years.    Nutrition:     Eat at least 5 servings of fruits and vegetables each day.    Eat whole-grain bread, whole-wheat pasta and brown rice instead of white grains and rice.    Talk to your provider about Calcium and Vitamin D.     Lifestyle    Exercise at least 150 minutes a week (30 minutes a day, 5 days a week). This will help you control your weight and prevent disease.    Limit alcohol to one drink per day.    No smoking.     Wear sunscreen to prevent skin cancer.     See your dentist every six months for an exam and cleaning.    See your eye doctor every 1 to 2 years.        HEALTH INSURANCE AND YOUR OUT-OF-POCKET COSTS    How is the physical visit different from an office visit ?    A physical visit is a routine check-up or yearly physical exam. This is sometimes called \"preventive care\".  ( for example, you might have a clinic exam every year or a mammogram every other year). These visits are meant to prevent health problems. They do not include tests or treatments for specific medical issues.     An office visit is a clinic visit to check on a symptoms or to treat a specific concern. This concern may be new or ongoing. Your provider (care team) might order tests or prescribe treatments.     Can I have these services at the same time ?    Yes. If you come in for a physical exam, your provider will want to  talk about any symptoms you are having. This way, we may catch small problems before they become more serious - and you won't have to make another trip to the clinic.     If there is not enough time to talk about your " "symptoms, your provider will ask you to come back.    If you are treated for a medical issue during a physical exam, we must bill your plan for both services. This is a rule set by insurance companies.     If I receive both services, what are my out-of-pocket costs ?    Some plans will pay for both services. Others ( like Medicare) will not. You will need to pay for any service that your plan won't cover.     Even if your plan covers both services, you may still have out-of-pocket costs. Examples:    -- your plan may offer free physical exams. But you may owe a co-pay and other fees for services received as part of an office visit.   -- you plan may require two co-pays. If you have concerns about the second co-pay, please contact your insurance plan.    To find out what your total costs will be, you will need to call your insurance plan. Ask:    -- what does my plan cover ? Find out if your physical visit was covered. What if you also had testing or treatment for a medical concern ?    -- how much do I need to pay ? Ask about co-pays, co-insurance, your deductible and any other out-of-pocket costs.     -- are there limits on what my plan will pay for ? There may be limits on office visits, physical exams and routine tests ( such mammograms, PSA tests and colonoscopies).    About Your Out-Of-Pocket Costs    Out-of-Pocket costs are charges that are not covered by your insurance plan. You will need to pay for them yourself. They may include:    -- Services that your plan will not pay for. Please call your insurance plan to find out what it will cover.     -- A deductible. This is a fixed amount that you pay each year before insurance will pay for services. When you have paid the full amount, then you have \"met\" your deductible. After that, your plan will pay for part or all of your care.     -- Co-pays (co-payments). A co-pay is the amount you must pay at the time of service. It is a flat fee, decided by your insurance " "plan. Your fee may be different for wellness visits and office visits. Your plan will not cover this fee. The fee will not count toward your deductible.    -- Co-insurance. You may need to pay a percent of the costs for all services you receive. This is called co-insurance. After your clinic visit, your plan will bill you for your share of the cost. This amount may count toward your deductible.     Copyright @ [facility]. All rights reserved. [employer] 55340 - REV 11/12  -------------    Be aware that if you had a regular OBGYN appointment in the last 12 months that it might have been submitted to your insurance as the annual physical exam. Most of the insurances do not cover 2 annual exams in a year.                  Follow-ups after your visit        Your next 10 appointments already scheduled     Aug 03, 2017 10:15 AM CDT   MA SCREENING BILATERAL W/ SHABNAM with RHBCMA2   [facility] ([facility])    [address], [address]  [facility] [address] [phone]           Do not use any powder, lotion or deodorant under your arms or on your breast. If you do, we will ask you to remove it before your exam.  Wear comfortable, two-piece clothing.  If you have any allergies, tell your care team.  Bring any previous mammograms from other facilities or have them mailed to the breast center.  Three-dimensional (3D) mammograms are available at [place] locations in [facility], and [place]. [facility] locations include [place] and [facility] in [place].   Benefits of 3D mammograms include: - Improved rate of cancer detection - Decreases your chance of having to go back for more tests, which means fewer: - \"False-positive\" results (This means that there is an abnormal area but it isn't cancer.) - Invasive testing procedures, such as a biopsy or surgery - Can provide clearer images of the breast if you have dense " breast tissue. 3D mammography is an optional exam that anyone can have with a 2D mammogram. It doesn't replace or take the place of a 2D mammogram. 2D mammograms remain an effective screening test for all women.  Not all insurance companies cover the cost of a 3D mammogram. Check with your insurance.            Aug 22, 2017  7:45 AM CDT   Anticoagulation Visit with [facility]   [facility]    [address]  [facility] [address] [phone]              Who to contact     If you have questions or need follow up information about today's clinic visit or your schedule please contact [facility] directly at [phone].  Normal or non-critical lab and imaging results will be communicated to you by [employer], letter or phone within 4 business days after the clinic has received the results. If you do not hear from us within 7 days, please contact the clinic through [employer] or phone. If you have a critical or abnormal lab result, we will notify you by phone as soon as possible.  Submit refill requests through [employer] or call your pharmacy and they will forward the refill request to us. Please allow 3 business days for your refill to be completed.          Additional Information About Your Visit        [employer] Information     [employer] gives you secure access to your electronic health record. If you see a primary care provider, you can also send messages to your care team and make appointments. If you have questions, please call your primary care clinic.  If you do not have a primary care provider, please call [phone] and they will assist you.        Care EveryWhere ID     This is your Care EveryWhere ID. This could be used by other organizations to access your [place] medical records  [id number]        Your Vitals Were     Pulse Temperature Respirations Height Pulse Oximetry Breastfeeding?    64 98  F (36.7  C) (Oral) 14  "5' 4\" (1.626 m) 99% No    BMI (Body Mass Index)                   21.68 kg/m2            Blood Pressure from Last 3 Encounters:   07/24/17 136/80   05/22/17 124/84   07/20/16 150/89    Weight from Last 3 Encounters:   07/24/17 126 lb 4.8 oz (57.3 kg)   05/22/17 126 lb (57.2 kg)   07/20/16 123 lb (55.8 kg)              We Performed the Following     Albumin Random Urine Quantitative     CBC with platelets     Comprehensive metabolic panel     Lipid panel reflex to direct LDL     TSH with free T4 reflex     Vitamin D Deficiency          Today's Medication Changes          These changes are accurate as of: 7/24/17  8:02 AM.  If you have any questions, ask your nurse or doctor.               Start taking these medicines.        Dose/Directions    ofloxacin (patient own, no charge) 0.3 % ophthalmic drops   Commonly known as:  OCUFLOX   Used for:  Pink eye disease of left eye   Started by:  Brook Echavarria MD        Dose:  1 drop   Place 1 drop Into the left eye 4 times daily   Quantity:  5 mL   Refills:  0       triamcinolone acetonide 0.05 % Oint   Used for:  Dyshidrotic eczema   Started by:  Brook Echavarria MD        Externally apply topically 2 times daily as needed   Quantity:  17 g   Refills:  0       valACYclovir 1000 mg tablet   Commonly known as:  VALTREX   Used for:  Cold sore   Started by:  Brook Echavarria MD        Dose:  2000 mg   Take 2 tablets (2,000 mg) by mouth 2 times daily   Quantity:  4 tablet   Refills:  3         These medicines have changed or have updated prescriptions.        Dose/Directions    * zolpidem 10 MG tablet   Commonly known as:  AMBIEN   This may have changed:  how much to take   Used for:  Insomnia, unspecified type   Changed by:  Brook Echavarria MD        Dose:  10 mg   Take 1 tablet (10 mg) by mouth nightly as needed for sleep   Quantity:  30 tablet   Refills:  0       * zolpidem 5 MG tablet   Commonly known as:  AMBIEN "   This may have changed:  You were already taking a medication with the same name, and this prescription was added. Make sure you understand how and when to take each.   Used for:  Insomnia, unspecified type   Changed by:  Brook Echavarria MD        Dose:  5-10 mg   Take 1-2 tablets (5-10 mg) by mouth nightly as needed for sleep   Quantity:  60 tablet   Refills:  0       * Notice:  This list has 2 medication(s) that are the same as other medications prescribed for you. Read the directions carefully, and ask your doctor or other care provider to review them with you.         Where to get your medicines      These medications were sent to [facility] [place] - [address] [facility]  [address] [facility] [address]     Phone:  [phone]     triamcinolone acetonide 0.05 % Oint    valACYclovir 1000 mg tablet         Some of these will need a paper prescription and others can be bought over the counter.  Ask your nurse if you have questions.     Bring a paper prescription for each of these medications     ofloxacin (patient own, no charge) 0.3 % ophthalmic drops    zolpidem 5 MG tablet                Primary Care Provider Office Phone # Fax #    Brook Echavarria -[phone]       [facility] [address] [facility] [address]        Equal Access to Services     CHRIS NAVARRO AH: Hadii aad ku hadasho Soomaali, waaxda luqadaha, qaybta kaalmada adeegyada, waxay myain hayholly tate. So [facility] [phone].    ATENCIÓN: Si habla español, tiene a gamez disposición servicios gratuitos de asistencia lingüística. Llame al [phone].    We comply with applicable federal civil rights laws and [place] laws. We do not discriminate on the basis of race, color, national origin, age, disability sex, sexual orientation or gender identity.            Thank you!     Thank you for choosing [facility]  for  your care. Our goal is always to provide you with excellent care. Hearing back from our patients is one way we can continue to improve our services. Please take a few minutes to complete the written survey that you may receive in the mail after your visit with us. Thank you!             Your Updated Medication List - Protect others around you: Learn how to safely use, store and throw away your medicines at www.disposemymeds.org.          This list is accurate as of: 7/24/17  8:02 AM.  Always use your most recent med list.                   Brand Name Dispense Instructions for use Diagnosis    albuterol 108 (90 BASE) MCG/ACT Inhaler    PROAIR HFA/PROVENTIL HFA/VENTOLIN HFA    3 Inhaler    Inhale 2 puffs into the lungs every 6 hours as needed for shortness of breath / dyspnea or wheezing Reported on 5/22/2017        anastrozole 1 MG tablet    ARIMIDEX    30 tablet    Take by mouth daily        ASPIRIN EC PO      Take 81 mg by mouth daily        CALCIUM 500 PO      Take 600 mg by mouth daily        carvedilol 12.5 MG tablet    COREG    30 tablet    Take 1 tablet (12.5 mg) by mouth 2 times daily (with meals)    Essential hypertension       lisinopril 10 MG tablet    PRINIVIL/ZESTRIL    135 tablet    Take 1 1/2 tabs (15 mg) by mouth daily    Essential hypertension, benign       ofloxacin (patient own, no charge) 0.3 % ophthalmic drops    OCUFLOX    5 mL    Place 1 drop Into the left eye 4 times daily    Pink eye disease of left eye       triamcinolone acetonide 0.05 % Oint     17 g    Externally apply topically 2 times daily as needed    Dyshidrotic eczema       TYLENOL 325 MG tablet   Generic drug:  acetaminophen     250 tablet    Take 1-2 tablets (325-650 mg) by mouth every 6 hours as needed for mild pain        valACYclovir 1000 mg tablet    VALTREX    4 tablet    Take 2 tablets (2,000 mg) by mouth 2 times daily    Cold sore       VITAMIN D3 PO      Take by mouth once a week        warfarin 5 MG tablet    COUMADIN     90 tablet    Take 1 tablet (5 mg) daily, or as instructed.    S/P aortic valve replacement       * zolpidem 10 MG tablet    AMBIEN    30 tablet    Take 1 tablet (10 mg) by mouth nightly as needed for sleep    Insomnia, unspecified type       * zolpidem 5 MG tablet    AMBIEN    60 tablet    Take 1-2 tablets (5-10 mg) by mouth nightly as needed for sleep    Insomnia, unspecified type       * Notice:  This list has 2 medication(s) that are the same as other medications prescribed for you. Read the directions carefully, and ask your doctor or other care provider to review them with you.

## 2017-07-24 NOTE — NURSING NOTE
"Chief Complaint   Patient presents with     Physical       Initial /80 (BP Location: Right leg, Patient Position: Sitting, Cuff Size: Adult Large)  Pulse 64  Temp 98  F (36.7  C) (Oral)  Resp 14  Ht 5' 4\" (1.626 m)  Wt 126 lb 4.8 oz (57.3 kg)  SpO2 99%  Breastfeeding? No  BMI 21.68 kg/m2 Estimated body mass index is 21.68 kg/(m^2) as calculated from the following:    Height as of this encounter: 5' 4\" (1.626 m).    Weight as of this encounter: 126 lb 4.8 oz (57.3 kg).  Medication Reconciliation: complete   Sunshine HARDIN      "

## 2017-07-24 NOTE — PROGRESS NOTES
Dr Jacskon's note    Patient's instructions / PLAN:                                                        Plan:  1. Triamcinolone cream- twice a day to the fingers rash as needed for max 2 weeks  2. Valtrex 2 grams and 12 h later again 2 grams for total of 4 grams per episode. Start it as soon as you feel the cold sore is coming on  3. Ofloxacin 1 drop 4 times a day to the L eye  4. Continue the other meds, same doses for now.          ASSESSMENT & PLAN:                                                          (Z00.00) Routine general medical examination at a health care facility  (primary encounter diagnosis)  Comment:   Plan: Comprehensive metabolic panel, CBC with         platelets, Lipid panel reflex to direct LDL,         Vitamin D Deficiency, TSH with free T4 reflex,         Albumin Random Urine Quantitative            (C50.911) Malignant neoplasm of right female breast, unspecified estrogen receptor status, unspecified site of breast (H)  Comment: Doing well  Plan: Follow-up with oncology    (G47.00) Insomnia, unspecified type  Comment: Chronic  Plan: zolpidem (AMBIEN) 5 MG tablet        When necessary    (B00.1) Cold sore  Comment: We discussed about the new meds, advantages and potential side effects. The patient will read also the info from the pharmacy and call back if questions.   Plan: valACYclovir (VALTREX) 1000 mg tablet            (L30.1) Dyshidrotic eczema  Comment: We discussed about the new meds, advantages and potential side effects. The patient will read also the info from the pharmacy and call back if questions.   Plan: triamcinolone acetonide 0.05 % OINT            (H10.022) Pink eye disease of left eye  Comment:   Plan: ofloxacin, patient own, no charge, (OCUFLOX)         0.3 % ophthalmic drops            (E55.9) Vitamin D deficiency  Comment:   Plan: Vitamin D Deficiency            (I10) HTN, goal below 140/90  Comment:   Plan: Comprehensive metabolic panel, CBC with         platelets,  Lipid panel reflex to direct LDL,         Vitamin D Deficiency, TSH with free T4 reflex,         Albumin Random Urine Quantitative            (Z95.2) H/O aortic valve replacement  Comment:   Plan: Continue Coumadin    (D12.6) Adenomatous polyp of colon, unspecified part of colon  Comment:   Plan: Colonoscopy 2020    (N81.11) Cystocele, midline  Comment:   Plan: I advised her to see OB/GYN       Chief Complaint:                                                        Annual exam  Herpes lesion  Left pinkeye  Vitamin D  Bladder    SUBJECTIVE:                                                    History of present illness     Herpes lesion recurrent on the tip of the nose. Zovirax cream prescribed 9 y ago. The herpes came back. She wants refill. We talked about Valtrex tabs. She thinks it is easier to take.     Rash on the 4th fingers bilat. Start with blisters and then pill.  In the past the dermatologist prescribed a cream, but she does not remember which one.    L pink eye x 3-4 days. No pain. Some crusts in am     Vit D was high last year - needs to be rechecked     Sometimes doesn't empty bladder completely    Chronic insomnia.  She takes the medications when necessary.  She needs refills    ROS:   General: Negative for fever, chills, major weight changes, fatigue  Skin: As above  Eyes: Negative for blurred or double vision  ENT/mouth: Negative for sinuses discomfort, earache, sore throat  Respiratory: Negative for cough, wheezes, chronic lung disease  Cardiovascular: Negative for rest or exertional chest pain, shortness of breath, palpitations, leg edema,   Gastrointestinal: Negative for vomiting, abdominal pain, heartburn, blood in stool, diarrhea, constipation  Genitourinary: Negative for urinary frequency, blood in urine, history of kidney stones  Female: Negative for abnormal vaginal bleeding, vaginal discharge  Neuro: Negative for headaches, numbness, tingling, weakness in arms or legs, history of seizure,  recent syncope  Psychiatry: Negative for depression, anxiety, suicidal thoughts  Endo: Negative for known thyroid disease, diabetes.  Hemato/Lymph: Negative for nodes, easy bleeding, history of DVT, blood transfusion  Musculoskeletal: Negative for joint swelling, back pain      PMHx: - reviewed  Past Medical History:   Diagnosis Date     Adenomatous colon polyp 2015     Aortic stenosis 11    bicuspid AV with severe stenosis - 2011 mechanical AVR with 23 mm [employer] Yordan AV conduit, composite graft placement     Arthritis     knees and hands     Bicuspid aortic valve      Breast cancer (H) 2014    R breast     H/O aortic valve replacement     St Yordan     Heart murmur     Valve repalcement .     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    AAA repair with av23 mm tube graft     PONV (postoperative nausea and vomiting)     n/v morphine vs anesthesia, vomiting x24 hrs     Thrombosis of leg     after  of daughter     Uncomplicated asthma        PSHx: reviewed  Past Surgical History:   Procedure Laterality Date     BIOPSY NODE SENTINEL Right 9/10/2014    Procedure: BIOPSY NODE SENTINEL;  Surgeon: Ila Romano MD;  Location: [facility] OR     CARDIAC SURGERY  2011    aortic valve replacement     ENT SURGERY      tonsillectomy     HRW PORT A CATH       INSERT PORT VASCULAR ACCESS Left 10/22/2014    Procedure: INSERT PORT VASCULAR ACCESS;  Surgeon: Ila Romano MD;  Location: [facility] OR     LUMPECTOMY BREAST WITH SEED LOCALIZATION Right 9/10/2014    Procedure: LUMPECTOMY BREAST WITH SEED LOCALIZATION;  Surgeon: Ila Romano MD;  Location: [facility] OR     ORTHOPEDIC SURGERY      shoulder, thumb surgery     REMOVE PORT VASCULAR ACCESS Left 2015    Procedure: REMOVE PORT VASCULAR ACCESS;  Surgeon: Ila Romano MD;  Location: [facility] OR     REPAIR ANEURYSM  "ASCENDING AORTA  6/23/2011    Procedure:REPAIR ANEURYSM ASCENDING AORTA; Medial Sternotomy, Aortic Valve Replacement, ([employer]. Y[employer] HP Valved Graft, size 23 mm) on pump oxygenation, intraoperative transesophageal cardiogram; Surgeon:JOHN PAUL ULLOA; Location:[facility]     REPLACE VALVE AORTIC  6/23/11    bicuspid AV with severe stenosis - 6/2011 mechanical AVR with 23 mm St Yordan AV conduit, composite graft placement        Soc Hx: No daily alcohol, no smoking  Social History     Social History     Marital status: Single     Spouse name: N/A     Number of children: N/A     Years of education: N/A     Occupational History     Not on file.     Social History Main Topics     Smoking status: Never Smoker     Smokeless tobacco: Never Used     Alcohol use Yes      Comment: 2 drinks per week -      Drug use: No     Sexual activity: Not Currently     Other Topics Concern     Caffeine Concern Yes     1-2 cups caffeine per day     Sleep Concern No     Stress Concern No     Weight Concern No     Special Diet Yes     low fat daily , low red meats     Back Care No     Exercise Yes     walks daily/ 3x a week exercise class     Social History Narrative        Fam Hx: reviewed  Family History   Problem Relation Age of Onset     Hypertension Mother      Thyroid Disease Mother      \"Toxic thyroid\"     Prostate Cancer Father 70     CANCER Father 80     Lung cancer, Not caused from smoking     CEREBROVASCULAR DISEASE Father 80     Hypertension Father      Cardiovascular Brother      Cardiovascular Son      Puentes-Parkinsons White Syndrome     Cardiovascular Daughter      Heart murmur     Breast Cancer Paternal Aunt 80     Cardiovascular Paternal Aunt      Arthritis Brother 40     RA     CANCER Maternal Grandfather      Colon Cancer No family hx of          Screening: reviewed    All: reviewed    Meds: reviewed  Current Outpatient Prescriptions   Medication Sig Dispense Refill     lisinopril (PRINIVIL/ZESTRIL) 10 MG tablet " "Take 1 1/2 tabs (15 mg) by mouth daily 135 tablet 3     carvedilol (COREG) 12.5 MG tablet Take 1 tablet (12.5 mg) by mouth 2 times daily (with meals) 30 tablet 1     warfarin (COUMADIN) 5 MG tablet Take 1 tablet (5 mg) daily, or as instructed. 90 tablet 1     Cholecalciferol (VITAMIN D3 PO) Take by mouth once a week        zolpidem (AMBIEN) 10 MG tablet Take 1 tablet (10 mg) by mouth nightly as needed for sleep (Patient taking differently: Take 5 mg by mouth nightly as needed for sleep ) 30 tablet 0     ASPIRIN EC PO Take 81 mg by mouth daily       anastrozole (ARIMIDEX) 1 MG tablet Take by mouth daily 30 tablet      acetaminophen (TYLENOL) 325 MG tablet Take 1-2 tablets (325-650 mg) by mouth every 6 hours as needed for mild pain 250 tablet 11     Calcium Carbonate (CALCIUM 500 PO) Take 600 mg by mouth daily        albuterol (PROAIR HFA, PROVENTIL HFA, VENTOLIN HFA) 108 (90 BASE) MCG/ACT inhaler Inhale 2 puffs into the lungs every 6 hours as needed for shortness of breath / dyspnea or wheezing Reported on 5/22/2017 3 Inhaler 1       OBJECTIVE:                                                    Physical Exam :  Blood pressure 136/80, pulse 64, temperature 98  F (36.7  C), temperature source Oral, resp. rate 14, height 5' 4\" (1.626 m), weight 126 lb 4.8 oz (57.3 kg), SpO2 99 %, not currently breastfeeding.     NAD, appears comfortable  Skin clear, no rashes  HEENT: PERRLA, EOMI, anicteric sclera, pink conjunctiva, external ears appear normal, bilateral tympanic membranes clinically normal, oropharynx normal color.  Neck: supple, no JVD, no thyroidmegaly  Lymph nodes non palpable in the cervical, supraclavicular axillaries, inguinal areas  Chest: clear to auscultation with good respiratory effort  Cardiac: S1S2, RRR, loud aortic clinic  Abdomen: soft, not tender, not distended, audible bowel sound, no hepatosplenomegaly, no palpable masses, no abdominal bruits  Extremities: no cyanosis, clubbing or edema.   Neuro: A, " Ox3, no focal signs.  Breast exam in supine and erect position: Right breast is status post lumpectomy no skin changes, no tenderness or nodes on palpation. Nipples are erect, no skin lesions, no discharge on pressure.    Pelvic exam: Normal external genitals, normal appearing perineum, normal appearing urethra,  vaginal mucosa pink, no discharge, she has a cystocele with Valsalva        Brook Jackson MD  Internal Medicine        SUBJECTIVE:   CC: Annabella Bolanos is an 61 year old woman who presents for preventive health visit.     Healthy Habits:    Do you get at least three servings of calcium containing foods daily (dairy, green leafy vegetables, etc.)? yes    Amount of exercise or daily activities, outside of work: 4-5 day(s) per week    Problems taking medications regularly No    Medication side effects: No    Have you had an eye exam in the past two years? yes    Do you see a dentist twice per year? yes    Do you have sleep apnea, excessive snoring or daytime drowsiness?no            Today's PHQ-2 Score:   PHQ-2 ( 1999 Pfizer) 7/24/2017 7/14/2016   Q1: Little interest or pleasure in doing things 0 0   Q2: Feeling down, depressed or hopeless 0 0   PHQ-2 Score 0 0         Abuse: Current or Past(Physical, Sexual or Emotional)- No  Do you feel safe in your environment - Yes  Social History   Substance Use Topics     Smoking status: Never Smoker     Smokeless tobacco: Never Used     Alcohol use Yes      Comment: 2 drinks per week -      The patient does not drink >3 drinks per day nor >7 drinks per week.    Reviewed orders with patient.  Reviewed health maintenance and updated orders accordingly - Yes          Pertinent mammograms are reviewed under the imaging tab.  History of abnormal Pap smear:     Reviewed and updated as needed this visit by clinical staff  Tobacco  Allergies  Meds  Med Hx  Surg Hx  Fam Hx  Soc Hx        Reviewed and updated as needed this visit by Provider          COUNSELING:             "reports that she has never smoked. She has never used smokeless tobacco.    Estimated body mass index is 21.68 kg/(m^2) as calculated from the following:    Height as of this encounter: 5' 4\" (1.626 m).    Weight as of this encounter: 126 lb 4.8 oz (57.3 kg).         Counseling Resources:  ATP IV Guidelines  Pooled Cohorts Equation Calculator  Breast Cancer Risk Calculator  FRAX Risk Assessment  ICSI Preventive Guidelines  Dietary Guidelines for Americans, 2010  USDA's MyPlate  ASA Prophylaxis  Lung CA Screening    Brook Echavarria MD  [facility]  "

## 2017-07-24 NOTE — PATIENT INSTRUCTIONS
Plan:  1. Triamcinolone cream- twice a day to the fingers rash as needed for max 2 weeks  2. Valtrex 2 grams and 12 h later again 2 grams for total of 4 grams per episode. Start it as soon as you feel the cold sore is coming on  3. Ofloxacin 1 drop 4 times a day to the L eye  4. Continue the other meds, same doses for now.  5. Labs today     Preventive Health Recommendations  Female Ages 50 - 64    Yearly exam: See your health care provider every year in order to  o Review health changes.   o Discuss preventive care.    o Review your medicines if your doctor has prescribed any.      Get a Pap test every three years (unless you have an abnormal result and your provider advises testing more often).    If you get Pap tests with HPV test, you only need to test every 5 years, unless you have an abnormal result.     You do not need a Pap test if your uterus was removed (hysterectomy) and you have not had cancer.    You should be tested each year for STDs (sexually transmitted diseases) if you're at risk.     Have a mammogram every 1 to 2 years.    Have a colonoscopy at age 50, or have a yearly FIT test (stool test). These exams screen for colon cancer.      Have a cholesterol test every 5 years, or more often if advised.    Have a diabetes test (fasting glucose) every three years. If you are at risk for diabetes, you should have this test more often.     If you are at risk for osteoporosis (brittle bone disease), think about having a bone density scan (DEXA).    Shots: Get a flu shot each year. Get a tetanus shot every 10 years.    Nutrition:     Eat at least 5 servings of fruits and vegetables each day.    Eat whole-grain bread, whole-wheat pasta and brown rice instead of white grains and rice.    Talk to your provider about Calcium and Vitamin D.     Lifestyle    Exercise at least 150 minutes a week (30 minutes a day, 5 days a week). This will help you control your weight and prevent disease.    Limit alcohol to one  "drink per day.    No smoking.     Wear sunscreen to prevent skin cancer.     See your dentist every six months for an exam and cleaning.    See your eye doctor every 1 to 2 years.        HEALTH INSURANCE AND YOUR OUT-OF-POCKET COSTS    How is the physical visit different from an office visit ?    A physical visit is a routine check-up or yearly physical exam. This is sometimes called \"preventive care\".  ( for example, you might have a clinic exam every year or a mammogram every other year). These visits are meant to prevent health problems. They do not include tests or treatments for specific medical issues.     An office visit is a clinic visit to check on a symptoms or to treat a specific concern. This concern may be new or ongoing. Your provider (care team) might order tests or prescribe treatments.     Can I have these services at the same time ?    Yes. If you come in for a physical exam, your provider will want to  talk about any symptoms you are having. This way, we may catch small problems before they become more serious - and you won't have to make another trip to the clinic.     If there is not enough time to talk about your symptoms, your provider will ask you to come back.    If you are treated for a medical issue during a physical exam, we must bill your plan for both services. This is a rule set by insurance companies.     If I receive both services, what are my out-of-pocket costs ?    Some plans will pay for both services. Others ( like Medicare) will not. You will need to pay for any service that your plan won't cover.     Even if your plan covers both services, you may still have out-of-pocket costs. Examples:    -- your plan may offer free physical exams. But you may owe a co-pay and other fees for services received as part of an office visit.   -- you plan may require two co-pays. If you have concerns about the second co-pay, please contact your insurance plan.    To find out what your total " "costs will be, you will need to call your insurance plan. Ask:    -- what does my plan cover ? Find out if your physical visit was covered. What if you also had testing or treatment for a medical concern ?    -- how much do I need to pay ? Ask about co-pays, co-insurance, your deductible and any other out-of-pocket costs.     -- are there limits on what my plan will pay for ? There may be limits on office visits, physical exams and routine tests ( such mammograms, PSA tests and colonoscopies).    About Your Out-Of-Pocket Costs    Out-of-Pocket costs are charges that are not covered by your insurance plan. You will need to pay for them yourself. They may include:    -- Services that your plan will not pay for. Please call your insurance plan to find out what it will cover.     -- A deductible. This is a fixed amount that you pay each year before insurance will pay for services. When you have paid the full amount, then you have \"met\" your deductible. After that, your plan will pay for part or all of your care.     -- Co-pays (co-payments). A co-pay is the amount you must pay at the time of service. It is a flat fee, decided by your insurance plan. Your fee may be different for wellness visits and office visits. Your plan will not cover this fee. The fee will not count toward your deductible.    -- Co-insurance. You may need to pay a percent of the costs for all services you receive. This is called co-insurance. After your clinic visit, your plan will bill you for your share of the cost. This amount may count toward your deductible.     Copyright @ [employer]. All rights reserved. [place] 8[address] - REV 11/12  -------------    Be aware that if you had a regular OBGYN appointment in the last 12 months that it might have been submitted to your insurance as the annual physical exam. Most of the insurances do not cover 2 annual exams in a year.          "

## 2017-07-24 NOTE — TELEPHONE ENCOUNTER
Patient reports changing pharmacies due to change in insurance. Patient reports having 2 weeks left of medications but would need refill before next INR appointment.  Routed to INR.

## 2017-07-25 ENCOUNTER — MYC MEDICAL ADVICE (OUTPATIENT)
Dept: INTERNAL MEDICINE | Facility: CLINIC | Age: 62
End: 2017-07-25

## 2017-07-25 DIAGNOSIS — R79.89 ABNORMAL TSH: ICD-10-CM

## 2017-07-25 DIAGNOSIS — R73.9 BLOOD SUGAR INCREASED: Primary | ICD-10-CM

## 2017-07-26 RX ORDER — WARFARIN SODIUM 5 MG/1
TABLET ORAL
Qty: 90 TABLET | Refills: 1 | Status: SHIPPED | OUTPATIENT
Start: 2017-07-26 | End: 2018-01-15

## 2017-07-26 NOTE — TELEPHONE ENCOUNTER
Warfarin  Prescription approved per [facility] Refill Protocol.     Last Written Prescription Date: 1/4/17  Last Fill Qty: 90, # refills: 1    Last Office Visit with [facility], [facility] or [facility] prescribing provider: 7/24/17         Lab Results   Component Value Date    INR 2.1 (A) 07/11/2017    INR 2.5 (A) 05/31/2017    INR 2.2 (A) 04/19/2017    INR 1.8 (A) 03/08/2017    INR 1.7 (A) 02/15/2017

## 2017-08-03 ENCOUNTER — HOSPITAL ENCOUNTER (OUTPATIENT)
Dept: MAMMOGRAPHY | Facility: CLINIC | Age: 62
Discharge: HOME | End: 2017-08-03
Attending: INTERNAL MEDICINE | Admitting: INTERNAL MEDICINE
Payer: COMMERCIAL

## 2017-08-03 DIAGNOSIS — Z12.31 VISIT FOR SCREENING MAMMOGRAM: ICD-10-CM

## 2017-08-03 PROCEDURE — G0202 SCR MAMMO BI INCL CAD: HCPCS

## 2017-08-03 PROCEDURE — 77063 BREAST TOMOSYNTHESIS BI: CPT

## 2017-08-07 DIAGNOSIS — I10 ESSENTIAL HYPERTENSION: ICD-10-CM

## 2017-08-07 RX ORDER — CARVEDILOL 12.5 MG/1
12.5 TABLET ORAL 2 TIMES DAILY WITH MEALS
Qty: 180 TABLET | Refills: 3 | Status: SHIPPED | OUTPATIENT
Start: 2017-08-07 | End: 2018-07-20

## 2017-08-07 NOTE — TELEPHONE ENCOUNTER
Received refill request for carvedilol 12.5 mg bid. Pt states her insurance plan recently changed. She was last seen by  on 5/22/17, no medication changes on that ov. Contacted patient to let her know medication was e-scripted.

## 2017-08-11 ENCOUNTER — TRANSFERRED RECORDS (OUTPATIENT)
Dept: SURGERY | Facility: CLINIC | Age: 62
End: 2017-08-11

## 2017-08-11 ENCOUNTER — TRANSFERRED RECORDS (OUTPATIENT)
Dept: HEALTH INFORMATION MANAGEMENT | Facility: CLINIC | Age: 62
End: 2017-08-11

## 2017-08-22 ENCOUNTER — ANTICOAGULATION THERAPY VISIT (OUTPATIENT)
Dept: ANTICOAGULATION | Facility: CLINIC | Age: 62
End: 2017-08-22
Payer: COMMERCIAL

## 2017-08-22 DIAGNOSIS — Z95.2 H/O AORTIC VALVE REPLACEMENT: ICD-10-CM

## 2017-08-22 DIAGNOSIS — Z79.01 LONG-TERM (CURRENT) USE OF ANTICOAGULANTS: ICD-10-CM

## 2017-08-22 LAB — INR POINT OF CARE: 1.6 (ref 0.86–1.14)

## 2017-08-22 PROCEDURE — 36416 COLLJ CAPILLARY BLOOD SPEC: CPT

## 2017-08-22 PROCEDURE — 85610 PROTHROMBIN TIME: CPT | Mod: QW

## 2017-08-22 PROCEDURE — 99207 ZZC NO CHARGE NURSE ONLY: CPT

## 2017-08-22 NOTE — MR AVS SNAPSHOT
Annabella WINDY Bolanos   8/22/2017 7:45 AM   Anticoagulation Therapy Visit    Description:  61 year old female   Provider:  [facility]   Department:  [facility]           INR as of 8/22/2017     Today's INR 1.6!      Anticoagulation Summary as of 8/22/2017     INR goal 1.8-2.5   Today's INR 1.6!   Full instructions 8/22: 7.5 mg; Otherwise 5 mg every day   Next INR check 9/5/2017    Indications   Long-term (current) use of anticoagulants [Z79.01] [Z79.01]  H/O aortic valve replacement [Z95.2]         Your next Anticoagulation Clinic appointment(s)     Sep 05, 2017  7:45 AM CDT   Anticoagulation Visit with [facility]   [facility]    [address] [facility] [address]  [facility] [address] [phone]              Contact Numbers     [facility] Phone Numbers:  Anticoagulation Clinic Appointments : [phone]  Anticoagulation Nurse: [phone]         August 2017 Details    Sun Mon Tue Wed Thu Fri Sat       1               2               3               4               5                 6               7               8               9               10               11               12                 13               14               15               16               17               18               19                 20               21               22      7.5 mg   See details      23      5 mg         24      5 mg         25      5 mg         26      5 mg           27      5 mg         28      5 mg         29      5 mg         30      5 mg         31      5 mg            Date Details   08/22 This INR check               How to take your warfarin dose     To take:  5 mg Take 1 of the 5 mg tablets.    To take:  7.5 mg Take 1.5 of the 5 mg tablets.           September 2017 Details    Sun Mon Tue Wed Thu Fri Sat          1      5 mg         2      5 mg           3      5 mg         4      5 mg         5            6                7               8               9                 10               11               12               13               14               15               16                 17               18               19               20               21               22               23                 24               25               26               27               28               29               30                Date Details   No additional details    Date of next INR:  9/5/2017         How to take your warfarin dose     To take:  5 mg Take 1 of the 5 mg tablets.

## 2017-08-22 NOTE — PROGRESS NOTES
ANTICOAGULATION FOLLOW-UP CLINIC VISIT    Patient Name:  Annabella Bolanos  Date:  8/22/2017  Contact Type:  Face to Face    SUBJECTIVE:     Patient Findings     Positives Unexplained INR or factor level change           OBJECTIVE    INR Protime   Date Value Ref Range Status   08/22/2017 1.6 (A) 0.86 - 1.14 Final       ASSESSMENT / PLAN  INR assessment SUB    Recheck INR In: 2 WEEKS    INR Location Clinic      Anticoagulation Summary as of 8/22/2017     INR goal 1.8-2.5   Today's INR 1.6!   Maintenance plan 5 mg (5 mg x 1) every day   Full instructions 8/22: 7.5 mg; Otherwise 5 mg every day   Weekly total 35 mg   Plan last modified Erica Stinson RN (5/11/2016)   Next INR check 9/5/2017   Priority INR   Target end date     Indications   Long-term (current) use of anticoagulants [Z79.01] [Z79.01]  H/O aortic valve replacement [Z95.2]         Anticoagulation Episode Summary     INR check location     Preferred lab     Send INR reminders to [facility]    Comments       Anticoagulation Care Providers     Provider Role Specialty Phone number    Brook Echavarria MD [facility] Internal Medicine [phone]            See the Encounter Report to view Anticoagulation Flowsheet and Dosing Calendar (Go to Encounters tab in chart review, and find the Anticoagulation Therapy Visit)    Dosage adjustment made based on physician directed care plan.    Deborah Aguilera RN

## 2017-09-05 ENCOUNTER — ANTICOAGULATION THERAPY VISIT (OUTPATIENT)
Dept: ANTICOAGULATION | Facility: CLINIC | Age: 62
End: 2017-09-05
Payer: COMMERCIAL

## 2017-09-05 DIAGNOSIS — Z95.2 H/O AORTIC VALVE REPLACEMENT: ICD-10-CM

## 2017-09-05 DIAGNOSIS — Z79.01 LONG-TERM (CURRENT) USE OF ANTICOAGULANTS: ICD-10-CM

## 2017-09-05 LAB — INR POINT OF CARE: 1.7 (ref 0.86–1.14)

## 2017-09-05 PROCEDURE — 36416 COLLJ CAPILLARY BLOOD SPEC: CPT

## 2017-09-05 PROCEDURE — 85610 PROTHROMBIN TIME: CPT | Mod: QW

## 2017-09-05 PROCEDURE — 99207 ZZC NO CHARGE NURSE ONLY: CPT

## 2017-09-05 NOTE — MR AVS SNAPSHOT
Annabella WINDY Bolanos   9/5/2017 7:45 AM   Anticoagulation Therapy Visit    Description:  62 year old female   Provider[facility]  [facility]   Department:  [facility]           INR as of 9/5/2017     Today's INR 1.7!      Anticoagulation Summary as of 9/5/2017     INR goal 1.8-2.5   Today's INR 1.7!   Full instructions 5 mg every day   Next INR check 9/26/2017    Indications   Long-term (current) use of anticoagulants [Z79.01] [Z79.01]  H/O aortic valve replacement [Z95.2]         Your next Anticoagulation Clinic appointment(s)     Sep 26, 2017  8:15 AM CDT   [facility] with [facility]   [facility]    [address]  [facility] [address] [phone]              Contact Numbers     [facility] Phone Numbers:  Anticoagulation Clinic Appointments : [phone]  Anticoagulation Nurse: [phone]         September 2017 Details    Sun Mon Tue Wed Thu Fri Sat          1               2                 3               4               5      5 mg   See details      6      5 mg         7      5 mg         8      5 mg         9      5 mg           10      5 mg         11      5 mg         12      5 mg         13      5 mg         14      5 mg         15      5 mg         16      5 mg           17      5 mg         18      5 mg         19      5 mg         20      5 mg         21      5 mg         22      5 mg         23      5 mg           24      5 mg         25      5 mg         26            27               28               29               30                Date Details   09/05 This INR check       Date of next INR:  9/26/2017         How to take your warfarin dose     To take:  5 mg Take 1 of the 5 mg tablets.

## 2017-09-05 NOTE — PROGRESS NOTES
ANTICOAGULATION FOLLOW-UP CLINIC VISIT    Patient Name:  Annabella Bolanos  Date:  9/5/2017  Contact Type:  Face to Face    SUBJECTIVE:     Patient Findings     Positives No Problem Findings           OBJECTIVE    INR Protime   Date Value Ref Range Status   09/05/2017 1.7 (A) 0.86 - 1.14 Final       ASSESSMENT / PLAN  INR assessment THER    Recheck INR In: 3 WEEKS    INR Location Clinic      Anticoagulation Summary as of 9/5/2017     INR goal 1.8-2.5   Today's INR 1.7!   Maintenance plan 5 mg (5 mg x 1) every day   Full instructions 5 mg every day   Weekly total 35 mg   No change documented Deborah Aguilera, RN   Plan last modified Erica Stinson RN (5/11/2016)   Next INR check 9/26/2017   Priority INR   Target end date     Indications   Long-term (current) use of anticoagulants [Z79.01] [Z79.01]  H/O aortic valve replacement [Z95.2]         Anticoagulation Episode Summary     INR check location     Preferred lab     Send INR reminders to [facility]    Comments       Anticoagulation Care Providers     Provider Role Specialty Phone number    Brook Echavarria MD Responsible Internal Medicine [phone]            See the Encounter Report to view Anticoagulation Flowsheet and Dosing Calendar (Go to Encounters tab in chart review, and find the Anticoagulation Therapy Visit)    Dosage adjustment made based on physician directed care plan.    Deborah Aguilera, RN

## 2017-09-07 DIAGNOSIS — I10 ESSENTIAL HYPERTENSION, BENIGN: ICD-10-CM

## 2017-09-07 RX ORDER — LISINOPRIL 10 MG/1
TABLET ORAL
Qty: 135 TABLET | Refills: 3 | Status: SHIPPED | OUTPATIENT
Start: 2017-09-07 | End: 2018-07-20

## 2017-09-07 NOTE — TELEPHONE ENCOUNTER
Pt called and left message requesting her lisinopril script for 15 mg daily be sent to [facility] specialty pharmacy as her insurance has changed.

## 2017-09-26 ENCOUNTER — ANTICOAGULATION THERAPY VISIT (OUTPATIENT)
Dept: ANTICOAGULATION | Facility: CLINIC | Age: 62
End: 2017-09-26
Payer: COMMERCIAL

## 2017-09-26 DIAGNOSIS — Z95.2 H/O AORTIC VALVE REPLACEMENT: ICD-10-CM

## 2017-09-26 DIAGNOSIS — Z79.01 LONG-TERM (CURRENT) USE OF ANTICOAGULANTS: ICD-10-CM

## 2017-09-26 LAB — INR POINT OF CARE: 1.9 (ref 0.86–1.14)

## 2017-09-26 PROCEDURE — 85610 PROTHROMBIN TIME: CPT | Mod: QW

## 2017-09-26 PROCEDURE — 36416 COLLJ CAPILLARY BLOOD SPEC: CPT

## 2017-09-26 PROCEDURE — 99207 ZZC NO CHARGE NURSE ONLY: CPT

## 2017-09-26 NOTE — MR AVS SNAPSHOT
Annabella TEMPLE Luis Miguel   9/26/2017 8:15 AM   Anticoagulation Therapy Visit    Description:  62 year old female   Provider[facility]  [facility]   Department:  [facility]           INR as of 9/26/2017     Today's INR 1.9      Anticoagulation Summary as of 9/26/2017     INR goal 1.8-2.5   Today's INR 1.9   Full instructions 5 mg every day   Next INR check 11/7/2017    Indications   Long-term (current) use of anticoagulants [Z79.01] [Z79.01]  H/O aortic valve replacement [Z95.2]         Your next Anticoagulation Clinic appointment(s)     Nov 07, 2017  8:15 AM CST   [facility]    [address]  [facility] [address] [phone]              Contact Numbers     [facility] Phone Numbers:  Anticoagulation Clinic Appointments : [phone]  Anticoagulation Nurse: [phone]         September 2017 Details    Sun Mon Tue Wed Thu Fri Sat          1               2                 3               4               5               6               7               8               9                 10               11               12               13               14               15               16                 17               18               19               20               21               22               23                 24               25               26      5 mg   See details      27      5 mg         28      5 mg         29      5 mg         30      5 mg          Date Details   09/26 This INR check               How to take your warfarin dose     To take:  5 mg Take 1 of the 5 mg tablets.           October 2017 Details    Sun Mon Tue Wed Thu Fri Sat     1      5 mg         2      5 mg         3      5 mg         4      5 mg         5      5 mg         6      5 mg         7      5 mg           8      5 mg         9      5 mg         10      5 mg         11      5 mg         12       5 mg         13      5 mg         14      5 mg           15      5 mg         16      5 mg         17      5 mg         18      5 mg         19      5 mg         20      5 mg         21      5 mg           22      5 mg         23      5 mg         24      5 mg         25      5 mg         26      5 mg         27      5 mg         28      5 mg           29      5 mg         30      5 mg         31      5 mg              Date Details   No additional details            How to take your warfarin dose     To take:  5 mg Take 1 of the 5 mg tablets.           November 2017 Details    Sun Mon Tue Wed Thu Fri Sat        1      5 mg         2      5 mg         3      5 mg         4      5 mg           5      5 mg         6      5 mg         7            8               9               10               11                 12               13               14               15               16               17               18                 19               20               21               22               23               24               25                 26               27               28               29               30                  Date Details   No additional details    Date of next INR:  11/7/2017         How to take your warfarin dose     To take:  5 mg Take 1 of the 5 mg tablets.

## 2017-09-26 NOTE — PROGRESS NOTES
ANTICOAGULATION FOLLOW-UP CLINIC VISIT    Patient Name:  Annabella Bolanos  Date:  9/26/2017  Contact Type:  Face to Face    SUBJECTIVE:     Patient Findings     Positives No Problem Findings           OBJECTIVE    INR Protime   Date Value Ref Range Status   09/26/2017 1.9 (A) 0.86 - 1.14 Final       ASSESSMENT / PLAN  INR assessment THER    Recheck INR In: 6 WEEKS    INR Location Clinic      Anticoagulation Summary as of 9/26/2017     INR goal 1.8-2.5   Today's INR 1.9   Maintenance plan 5 mg (5 mg x 1) every day   Full instructions 5 mg every day   Weekly total 35 mg   No change documented Deborah Aguilera, RN   Plan last modified Erica Stinson RN (5/11/2016)   Next INR check 11/7/2017   Priority INR   Target end date     Indications   Long-term (current) use of anticoagulants [Z79.01] [Z79.01]  H/O aortic valve replacement [Z95.2]         Anticoagulation Episode Summary     INR check location     Preferred lab     Send INR reminders to [facility]    Comments       Anticoagulation Care Providers     Provider Role Specialty Phone number    Brook Echavarria MD [facility] Internal Medicine [phone]            See the Encounter Report to view Anticoagulation Flowsheet and Dosing Calendar (Go to Encounters tab in chart review, and find the Anticoagulation Therapy Visit)    Dosage adjustment made based on physician directed care plan.    Deborah Aguilera, RN

## 2017-10-13 ENCOUNTER — ANTICOAGULATION THERAPY VISIT (OUTPATIENT)
Dept: ANTICOAGULATION | Facility: CLINIC | Age: 62
End: 2017-10-13
Payer: COMMERCIAL

## 2017-10-13 DIAGNOSIS — Z79.01 LONG-TERM (CURRENT) USE OF ANTICOAGULANTS: ICD-10-CM

## 2017-10-13 DIAGNOSIS — Z95.2 H/O AORTIC VALVE REPLACEMENT: ICD-10-CM

## 2017-10-13 LAB — INR POINT OF CARE: 2 (ref 0.86–1.14)

## 2017-10-13 PROCEDURE — 36416 COLLJ CAPILLARY BLOOD SPEC: CPT

## 2017-10-13 PROCEDURE — 99207 ZZC NO CHARGE NURSE ONLY: CPT

## 2017-10-13 PROCEDURE — 85610 PROTHROMBIN TIME: CPT | Mod: QW

## 2017-10-13 NOTE — MR AVS SNAPSHOT
Annabella Bolanos   10/13/2017 8:30 AM   Anticoagulation Therapy Visit    Description:  62 year old female   Provider[facility]   Department:  [facility]           INR as of 10/13/2017     Today's INR 2.0      Anticoagulation Summary as of 10/13/2017     INR goal 1.8-2.5   Today's INR 2.0   Full instructions 5 mg every day   Next INR check 11/22/2017    Indications   Long-term (current) use of anticoagulants [Z79.01] [Z79.01]  H/O aortic valve replacement [Z95.2]         Your next Anticoagulation Clinic appointment(s)     Nov 22, 2017  8:15 AM CST   [facility] Visit with [facility]   [facility]    [address]  [facility] [address] [phone]              Contact Numbers     [facility] Phone Numbers:  Anticoagulation Clinic Appointments : [phone]  Anticoagulation Nurse: [phone]         October 2017 Details    Sun Mon Tue Wed Thu Fri Sat     1               2               3               4               5               6               7                 8               9               10               11               12               13      5 mg   See details      14      5 mg           15      5 mg         16      5 mg         17      5 mg         18      5 mg         19      5 mg         20      5 mg         21      5 mg           22      5 mg         23      5 mg         24      5 mg         25      5 mg         26      5 mg         27      5 mg         28      5 mg           29      5 mg         30      5 mg         31      5 mg              Date Details   10/13 This INR check               How to take your warfarin dose     To take:  5 mg Take 1 of the 5 mg tablets.           November 2017 Details    Sun Mon Tue Wed Thu Fri Sat        1      5 mg         2      5 mg         3      5 mg         4      5 mg           5      5 mg         6      5 mg         7      5 mg         8      5  mg         9      5 mg         10      5 mg         11      5 mg           12      5 mg         13      5 mg         14      5 mg         15      5 mg         16      5 mg         17      5 mg         18      5 mg           19      5 mg         20      5 mg         21      5 mg         22            23               24               25                 26               27               28               29               30                  Date Details   No additional details    Date of next INR:  11/22/2017         How to take your warfarin dose     To take:  5 mg Take 1 of the 5 mg tablets.

## 2017-10-13 NOTE — PROGRESS NOTES
ANTICOAGULATION FOLLOW-UP CLINIC VISIT    Patient Name:  Annabella Bolanos  Date:  10/13/2017  Contact Type:  Face to Face    SUBJECTIVE:     Patient Findings     Positives Antibiotic use or infection (Doxycycline started on 10/10/17 for 7 days.)           OBJECTIVE    INR Protime   Date Value Ref Range Status   10/13/2017 2.0 (A) 0.86 - 1.14 Final       ASSESSMENT / PLAN  INR assessment THER    Recheck INR In: 6 WEEKS    INR Location Clinic      Anticoagulation Summary as of 10/13/2017     INR goal 1.8-2.5   Today's INR 2.0   Maintenance plan 5 mg (5 mg x 1) every day   Full instructions 5 mg every day   Weekly total 35 mg   No change documented Erica Stinson RN   Plan last modified Erica Stinson RN (5/11/2016)   Next INR check 11/22/2017   Priority INR   Target end date     Indications   Long-term (current) use of anticoagulants [Z79.01] [Z79.01]  H/O aortic valve replacement [Z95.2]         Anticoagulation Episode Summary     INR check location     Preferred lab     Send INR reminders to [facility]    Comments       Anticoagulation Care Providers     Provider Role Specialty Phone number    Brook Echavarria MD [facility] Internal Medicine [phone]            See the Encounter Report to view Anticoagulation Flowsheet and Dosing Calendar (Go to Encounters tab in chart review, and find the Anticoagulation Therapy Visit)    Dosage adjustment made based on physician directed care plan.    Erica Stinson RN

## 2017-11-10 DIAGNOSIS — R73.9 BLOOD SUGAR INCREASED: ICD-10-CM

## 2017-11-10 DIAGNOSIS — R79.89 ABNORMAL TSH: ICD-10-CM

## 2017-11-10 LAB — HBA1C MFR BLD: 5.3 % (ref 4.3–6)

## 2017-11-10 PROCEDURE — 36415 COLL VENOUS BLD VENIPUNCTURE: CPT | Performed by: INTERNAL MEDICINE

## 2017-11-10 PROCEDURE — 84443 ASSAY THYROID STIM HORMONE: CPT | Performed by: INTERNAL MEDICINE

## 2017-11-10 PROCEDURE — 83036 HEMOGLOBIN GLYCOSYLATED A1C: CPT | Performed by: INTERNAL MEDICINE

## 2017-11-10 PROCEDURE — 84439 ASSAY OF FREE THYROXINE: CPT | Performed by: INTERNAL MEDICINE

## 2017-11-11 LAB
T4 FREE SERPL-MCNC: 1.1 NG/DL (ref 0.76–1.46)
TSH SERPL DL<=0.005 MIU/L-ACNC: 0.12 MU/L (ref 0.4–4)

## 2017-11-14 DIAGNOSIS — E05.90 SUBCLINICAL HYPERTHYROIDISM: Primary | ICD-10-CM

## 2017-11-22 ENCOUNTER — ANTICOAGULATION THERAPY VISIT (OUTPATIENT)
Dept: ANTICOAGULATION | Facility: CLINIC | Age: 62
End: 2017-11-22
Payer: COMMERCIAL

## 2017-11-22 ENCOUNTER — TELEPHONE (OUTPATIENT)
Dept: INTERNAL MEDICINE | Facility: CLINIC | Age: 62
End: 2017-11-22

## 2017-11-22 DIAGNOSIS — Z79.01 LONG-TERM (CURRENT) USE OF ANTICOAGULANTS: ICD-10-CM

## 2017-11-22 DIAGNOSIS — Z95.2 H/O AORTIC VALVE REPLACEMENT: ICD-10-CM

## 2017-11-22 DIAGNOSIS — Z95.2 H/O AORTIC VALVE REPLACEMENT: Primary | ICD-10-CM

## 2017-11-22 LAB — INR POINT OF CARE: 1.7 (ref 0.86–1.14)

## 2017-11-22 PROCEDURE — 85610 PROTHROMBIN TIME: CPT | Mod: QW

## 2017-11-22 PROCEDURE — 36416 COLLJ CAPILLARY BLOOD SPEC: CPT

## 2017-11-22 PROCEDURE — 99207 ZZC NO CHARGE NURSE ONLY: CPT

## 2017-11-22 NOTE — TELEPHONE ENCOUNTER
For insurance purposes, an INR referral is needed.  Please sign order and route message back to [facility].  Erica Stinson RN

## 2017-11-22 NOTE — PROGRESS NOTES
ANTICOAGULATION FOLLOW-UP CLINIC VISIT    Patient Name:  Annabella Bolanos  Date:  11/22/2017  Contact Type:  Face to Face    SUBJECTIVE:     Patient Findings     Positives No Problem Findings (increased dose since pt would like to try to eat a little more greens in her diet.)           OBJECTIVE    INR Protime   Date Value Ref Range Status   11/22/2017 1.7 (A) 0.86 - 1.14 Final       ASSESSMENT / PLAN  INR assessment THER    Recheck INR In: 6 WEEKS    INR Location Clinic      Anticoagulation Summary as of 11/22/2017     INR goal 1.8-2.5   Today's INR 1.7!   Maintenance plan 7.5 mg (5 mg x 1.5) on Wed; 5 mg (5 mg x 1) all other days   Full instructions 7.5 mg on Wed; 5 mg all other days   Weekly total 37.5 mg   Plan last modified Erica Stinson RN (11/22/2017)   Next INR check 1/3/2018   Priority INR   Target end date     Indications   Long-term (current) use of anticoagulants [Z79.01] [Z79.01]  H/O aortic valve replacement [Z95.2]         Anticoagulation Episode Summary     INR check location     Preferred lab     Send [facility]R reminders to [facility]    Comments       Anticoagulation Care Providers     Provider Role Specialty Phone number    Brook Echavarria MD [facility] Internal Medicine [phone]            See the Encounter Report to view Anticoagulation Flowsheet and Dosing Calendar (Go to Encounters tab in chart review, and find the Anticoagulation Therapy Visit)    Dosage adjustment made based on physician directed care plan.    Erica Stinson RN

## 2017-11-22 NOTE — MR AVS SNAPSHOT
Annabella TEMPLE Luis Miguel   11/22/2017 8:15 AM   Anticoagulation Therapy Visit    Description:  62 year old female   Provider[facility]  [facility]   Department:  [facility]           INR as of 11/22/2017     Today's INR 1.7!      Anticoagulation Summary as of 11/22/2017     INR goal 1.8-2.5   Today's INR 1.7!   Full instructions 7.5 mg on Wed; 5 mg all other days   Next INR check 1/3/2018    Indications   Long-term (current) use of anticoagulants [Z79.01] [Z79.01]  H/O aortic valve replacement [Z95.2]         Your next Anticoagulation Clinic appointment(s)     Jan 03, 2018  8:15 AM CST   [facility] with [facility]   [facility]    [address] [facility] [address]  [facility] [address] [phone]              Contact Numbers     [facility] Phone Numbers:  Anticoagulation Clinic Appointments : [phone]  Anticoagulation Nurse: [phone]         November 2017 Details    Sun Mon Tue Wed Thu Fri Sat        1               2               3               4                 5               6               7               8               9               10               11                 12               13               14               15               16               17               18                 19               20               21               22      7.5 mg   See details      23      5 mg         24      5 mg         25      5 mg           26      5 mg         27      5 mg         28      5 mg         29      7.5 mg         30      5 mg            Date Details   11/22 This INR check               How to take your warfarin dose     To take:  5 mg Take 1 of the 5 mg tablets.    To take:  7.5 mg Take 1.5 of the 5 mg tablets.           December 2017 Details    Sun Mon Tue Wed Thu Fri Sat          1      5 mg         2      5 mg           3      5 mg         4      5 mg         5      5 mg         6      7.5 mg         7       5 mg         8      5 mg         9      5 mg           10      5 mg         11      5 mg         12      5 mg         13      7.5 mg         14      5 mg         15      5 mg         16      5 mg           17      5 mg         18      5 mg         19      5 mg         20      7.5 mg         21      5 mg         22      5 mg         23      5 mg           24      5 mg         25      5 mg         26      5 mg         27      7.5 mg         28      5 mg         29      5 mg         30      5 mg           31      5 mg                Date Details   No additional details            How to take your warfarin dose     To take:  5 mg Take 1 of the 5 mg tablets.    To take:  7.5 mg Take 1.5 of the 5 mg tablets.           January 2018 Details    Sun Mon Tue Wed Thu Fri Sat      1      5 mg         2      5 mg         3            4               5               6                 7               8               9               10               11               12               13                 14               15               16               17               18               19               20                 21               22               23               24               25               26               27                 28               29               30               31                   Date Details   No additional details    Date of next INR:  1/3/2018         How to take your warfarin dose     To take:  5 mg Take 1 of the 5 mg tablets.    To take:  7.5 mg Take 1.5 of the 5 mg tablets.

## 2017-12-06 ENCOUNTER — OFFICE VISIT (OUTPATIENT)
Dept: ENDOCRINOLOGY | Facility: CLINIC | Age: 62
End: 2017-12-06
Payer: COMMERCIAL

## 2017-12-06 VITALS
WEIGHT: 127.1 LBS | BODY MASS INDEX: 21.7 KG/M2 | DIASTOLIC BLOOD PRESSURE: 80 MMHG | OXYGEN SATURATION: 98 % | SYSTOLIC BLOOD PRESSURE: 120 MMHG | HEART RATE: 83 BPM | TEMPERATURE: 98.6 F | HEIGHT: 64 IN

## 2017-12-06 DIAGNOSIS — E05.90 SUBCLINICAL HYPERTHYROIDISM: Primary | ICD-10-CM

## 2017-12-06 DIAGNOSIS — M85.80 OSTEOPENIA, UNSPECIFIED LOCATION: Chronic | ICD-10-CM

## 2017-12-06 DIAGNOSIS — Z95.2 H/O AORTIC VALVE REPLACEMENT: ICD-10-CM

## 2017-12-06 DIAGNOSIS — I10 ESSENTIAL HYPERTENSION: ICD-10-CM

## 2017-12-06 LAB — T3FREE SERPL-MCNC: 3.2 PG/ML (ref 2.3–4.2)

## 2017-12-06 PROCEDURE — 99244 OFF/OP CNSLTJ NEW/EST MOD 40: CPT | Performed by: INTERNAL MEDICINE

## 2017-12-06 PROCEDURE — 84439 ASSAY OF FREE THYROXINE: CPT | Performed by: INTERNAL MEDICINE

## 2017-12-06 PROCEDURE — 99000 SPECIMEN HANDLING OFFICE-LAB: CPT | Performed by: INTERNAL MEDICINE

## 2017-12-06 PROCEDURE — 84445 ASSAY OF TSI GLOBULIN: CPT | Mod: 90 | Performed by: INTERNAL MEDICINE

## 2017-12-06 PROCEDURE — 84481 FREE ASSAY (FT-3): CPT | Performed by: INTERNAL MEDICINE

## 2017-12-06 PROCEDURE — 84443 ASSAY THYROID STIM HORMONE: CPT | Performed by: INTERNAL MEDICINE

## 2017-12-06 PROCEDURE — 36415 COLL VENOUS BLD VENIPUNCTURE: CPT | Performed by: INTERNAL MEDICINE

## 2017-12-06 NOTE — NURSING NOTE
"Chief Complaint   Patient presents with     Referral     Dr Jackson subclinical hyperthyroidims        Initial /80 (BP Location: Left arm, Patient Position: Chair, Cuff Size: Adult Regular)  Pulse 83  Temp 98.6  F (37  C) (Oral)  Ht 1.626 m (5' 4\")  Wt 57.7 kg (127 lb 1.6 oz)  SpO2 98%  BMI 21.82 kg/m2 Estimated body mass index is 21.82 kg/(m^2) as calculated from the following:    Height as of this encounter: 1.626 m (5' 4\").    Weight as of this encounter: 57.7 kg (127 lb 1.6 oz).  Medication Reconciliation: complete     ENDOCRINOLOGY INTAKE FORM    Patient Name:  Annabella Bolanos  :  1955    Is patient Diabetic?   No  Does patient have non-diabetic or other endocrine issues?  Yes: subclinical hyperthyroidism     Vitals: /80 (BP Location: Left arm, Patient Position: Chair, Cuff Size: Adult Regular)  Pulse 83  Temp 98.6  F (37  C) (Oral)  Ht 1.626 m (5' 4\")  Wt 57.7 kg (127 lb 1.6 oz)  SpO2 98%  BMI 21.82 kg/m2  BMI= Body mass index is 21.82 kg/(m^2).    Flu vaccine: Yes 10/28/17  Pneumonia vaccine:  Yes: 13 /10-16-15    Smoking and Alcohol use:  Social History   Substance Use Topics     Smoking status: Never Smoker     Smokeless tobacco: Never Used     Alcohol use Yes      Comment: 2 drinks per week -        Staff Signature:  Valencia Woody CMA ([facility])        "

## 2017-12-06 NOTE — PROGRESS NOTES
Name: Annabella Bolanos  Seen at the request of Brook Echavarria for subclinical Hyperthyroidism.    HPI:  Annabella Boalnos is a 62 year old female who presents for the evaluation of subclinical Hyperthyroidism.  Noted since 2016. Plan was for continue to monitor.  Never been on medications.  No h/o arrhythmia  No h/o osteoporosis-- has osteopenia. Not on treatment.    H/o breast cancer and h/o aortic valve replacement and is on long term anticoagulation.  Breast cancer diagnosed in  s/p lumpectomy and chemo and radiation. Took radiation 5 days a week for 1 month.    Feeling good.  Has good energy.  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  H/o arthritis.    Weight is stable.  Eating healthy.    History of radiation exposure: YES. As above  History of thyroid dysfunction: not to her knowledge  Palpitations:  No  Changes to hair or skin: No  Diarrhea/Constipation:No  Changes in menses: s/p menopause  Changes in vision:No  Diplopia/Blurryness:No  Dysphagia or Shortness of breath:No  Tremors:No  Difficulty sleeping:Yes: most of the time  Changes in weight: No  Lithium/Amiodarone: No  URI: No  CT Scans:No  Mother had hyperthyroidism s/p DOE and was on levothyroxine.    PMH/PSH:  Past Medical History:   Diagnosis Date     Adenomatous colon polyp 2015     Aortic stenosis 11    bicuspid AV with severe stenosis - 2011 mechanical AVR with 23 mm St Yordan AV conduit, composite graft placement     Arthritis     knees and hands     Bicuspid aortic valve      Breast cancer (H) 2014    R breast     H/O aortic valve replacement     St Yordan     Heart murmur     Valve repalcement .     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    AAA repair with av23 mm tube graft     PONV (postoperative nausea and vomiting)     n/v morphine vs anesthesia, vomiting x24 hrs     Thrombosis of leg     after  of daughter  "    Uncomplicated asthma      Past Surgical History:   Procedure Laterality Date     BIOPSY NODE SENTINEL Right 9/10/2014    Procedure: BIOPSY NODE SENTINEL;  Surgeon: Ila Romano MD;  Location: [facility] OR     CARDIAC SURGERY  6/2011    aortic valve replacement     ENT SURGERY      tonsillectomy     HRW PORT A CATH       INSERT PORT VASCULAR ACCESS Left 10/22/2014    Procedure: INSERT PORT VASCULAR ACCESS;  Surgeon: Ila Romano MD;  Location: [facility] OR     LUMPECTOMY BREAST WITH SEED LOCALIZATION Right 9/10/2014    Procedure: LUMPECTOMY BREAST WITH SEED LOCALIZATION;  Surgeon: Ila Romano MD;  Location: [facility] OR     ORTHOPEDIC SURGERY      shoulder, thumb surgery     REMOVE PORT VASCULAR ACCESS Left 4/8/2015    Procedure: REMOVE PORT VASCULAR ACCESS;  Surgeon: Ila Romano MD;  Location: [facility] OR     REPAIR ANEURYSM ASCENDING AORTA  6/23/2011    Procedure:REPAIR ANEURYSM ASCENDING AORTA; Medial Sternotomy, Aortic Valve Replacement, (St. Yordan [employer] HP Valved Graft, size 23 mm) on pump oxygenation, intraoperative transesophageal cardiogram; Surgeon:JOHN PAUL ULLOA; Location:[facility]     REPLACE VALVE AORTIC  6/23/11    bicuspid AV with severe stenosis - 6/2011 mechanical AVR with 23 mm St Yordan AV conduit, composite graft placement     Family Hx:  Family History   Problem Relation Age of Onset     Hypertension Mother      Thyroid Disease Mother      \"Toxic thyroid\"     Prostate Cancer Father 70     CANCER Father 80     Lung cancer, Not caused from smoking     CEREBROVASCULAR DISEASE Father 80     Hypertension Father      Cardiovascular Brother      Cardiovascular Son      Puentes-Parkinsons White Syndrome     Cardiovascular Daughter      Heart murmur     Breast Cancer Paternal Aunt 80     Cardiovascular Paternal Aunt      Arthritis Brother 40     RA     CANCER Maternal Grandfather      Colon Cancer No family hx of      Thyroid disease: as above          Social Hx:  Social " "History     Social History     Marital status: Single     Spouse name: N/A     Number of children: N/A     Years of education: N/A     Occupational History     Not on file.     Social History Main Topics     Smoking status: Never Smoker     Smokeless tobacco: Never Used     Alcohol use Yes      Comment: 2 drinks per week -      Drug use: No     Sexual activity: Not Currently     Other Topics Concern     Caffeine Concern Yes     1-2 cups caffeine per day     Sleep Concern No     Stress Concern No     Weight Concern No     Special Diet Yes     low fat daily , low red meats     Back Care No     Exercise Yes     walks daily/ 3x a week exercise class     Social History Narrative          MEDICATIONS:  has a current medication list which includes the following prescription(s): lisinopril, carvedilol, warfarin, zolpidem, cholecalciferol, aspirin, anastrozole, acetaminophen, calcium-magnesium-vitamin d, valacyclovir, triamcinolone acetonide, and albuterol.    ROS   ROS: 10 point ROS neg other than the symptoms noted above in the HPI.    Physical Exam   VS: /80 (BP Location: Left arm, Patient Position: Chair, Cuff Size: Adult Regular)  Pulse 83  Temp 98.6  F (37  C) (Oral)  Ht 1.626 m (5' 4\")  Wt 57.7 kg (127 lb 1.6 oz)  SpO2 98%  BMI 21.82 kg/m2  GENERAL: AXOX3, NAD, well dressed, answering questions appropriately, appears stated age.  HEENT: No exopthalmous, no proptosis, EOMI, no lig lag, no retraction  NECK: Thyroid normal in size, non tender, no nodules were palpated.  CV: RRR, no rubs, gallops, no murmurs  LUNGS: CTAB, no wheezes, rales, or ronchi  ABDOMEN: +BS  EXTREMITIES: no edema, +pulses, no rashes, no lesions, no pretibial edema  NEUROLOGY: CN grossly intact, + DTR upper and lower extremity, no tremors  MSK: grossly intact  SKIN: no rashes, no lesions    LABS:  TFTs:  ENDO THYROID LABS-[facility] Latest Ref Rng & Units 11/10/2017 7/24/2017   TSH 0.40 - 4.00 mU/L 0.12 (L) 0.12 (L)   T4 FREE 0.76 - 1.46 ng/dL " 1.10 1.16     ENDO THYROID LABS-[facility] Latest Ref Rng & Units 7/14/2016   TSH 0.40 - 4.00 mU/L 0.24 (L)   T4 FREE 0.76 - 1.46 ng/dL 1.22       CBC:  Lab Results   Component Value Date    WBC 5.0 07/24/2017     Lab Results   Component Value Date    RBC 4.30 07/24/2017     Lab Results   Component Value Date    HGB 13.3 07/24/2017     Lab Results   Component Value Date    HCT 40.7 07/24/2017     No components found for: MCT  Lab Results   Component Value Date    MCV 95 07/24/2017     Lab Results   Component Value Date    MCH 30.9 07/24/2017     Lab Results   Component Value Date    MCHC 32.7 07/24/2017     Lab Results   Component Value Date    RDW 11.3 07/24/2017     Lab Results   Component Value Date     07/24/2017         LFTs:  Liver Function Studies -   Recent Labs   Lab Test  07/24/17   0812   PROTTOTAL  7.1   ALBUMIN  3.6   BILITOTAL  0.6   ALKPHOS  86   AST  19   ALT  23         All pertinent notes, labs, and images personally reviewed by me.     A/P  Ms.Carol WINDY Bolanos is a 62 year old here for the evaluation of hyperthyroidism.    Subclinical hyperthyroidism:  Differential for hyperthyroidism includes:  Graves' disease, thyroiditis, or autonomous hyperfunctioning nodule.  An uptake and scan of her thyroid gland will help differentiate the diagnosis.  In Graves' disease her uptake will be homogeneous and increased, in thyroiditis her uptake will be low, and in an autonomous hyperfunctioing nodule the uptake will be increased in a focal area.    TSH remained suppressed since 2016 and along with normal free T4  Clinically looks euthyroid  Her risk factors include history of osteopenia (not on treatment) and history of aortic wall replacement.  I discussed subclinical hyperthyroidism in general as well as indications for treatment  Given history of osteopenia and other risk factor treatment can be considered but before that we'll screen for Graves' disease and would also get thyroid ultrasound (history of  radiation for breast cancer)  Based on that can consider NM thyroid scan.  Follow-up after above.    Thyroid-stimulating immunoglobulins (TSI) can be detected in the majority of patients (77.8%) with Graves  disease.  It can be used to predict relapse or remission when using PTU/methimazole or radioiodine.  These assays have also been advocated for use in patients with subclinical Graves  hyperthyroidism or patients with unilateral ophthalmopathy.    Thyrotoxicosis associated with subacute thyroiditis is usually mild and transient.  The   patient lacks the physical findings of long-standing thyrotoxicosis. The goiter is typically  painful and low or absent 131I uptake.  Usually the erythrocyte sedimentation rate (ESR) and CRP are greatly elevated, and the leukocyte count may also be increased. Antibody titers are low or negative.    Plan: US Thyroid, T4 free, T3 Free, TSH, Thyroid         Follow-up:  4 weeks    Merle Baxter MD  Endocrinology  [facility]  CC: Brook Echavarria     More than 50% of face to face time spent with Ms. Bolanos on counseling / coordinating her care.    All questions were answered.  The patient indicates understanding of the above issues and agrees with the plan set forth.     Addendum to above note and clinic visit:    Labs reviewed.    See result note/telephone encounter.

## 2017-12-06 NOTE — MR AVS SNAPSHOT
After Visit Summary   2017    Annabella Bolanos    MRN: [id number]           Patient Information     Date Of Birth          1955        Visit Information        Provider Department      2017 9:30 AM Merle Baxter MD [facility]        Today's Diagnoses     Subclinical hyperthyroidism    -  1    Osteopenia, unspecified location        H/O aortic valve replacement        Essential hypertension          Care Instructions    [facility] & [place] locations   Dr Baxter, Endocrinology Department      [facility]   [address]  [place] [address]  Appointment [phone]  Fax: [phone]  [place]: Monday and Tuesday         [facility]   [address]  [place] [address]  Appointment [phone]  Fax: [phone]  [place]: Wednesday and Thursday            Labs today  Follow up with radiology for thyroid ultrasound  Follow up in 4 weeks     [facility] radiology scheduleing  [phone]   [facility] Radiology scheduling  [phone]     Please call and schedule the recommended test as discussed in clinic visit. These are the numbers to call.              Follow-ups after your visit        Your next 10 appointments already scheduled     2018  8:15 AM CST   Anticoagulation Visit with [facility] ANTICOAGULATION CLINIC   [facility] ([facility])    [address]  [facility] [address] [phone]              Future tests that were ordered for you today     Open Future Orders        Priority Expected Expires Ordered    US Thyroid Routine  2018            Who to contact     If you have questions or need follow up information about today's clinic visit or your schedule please contact [facility] directly at [phone].  Normal or non-critical lab and imaging  "results will be communicated to you by MyChart, letter or phone within 4 business days after the clinic has received the results. If you do not hear from us within 7 days, please contact the clinic through jslyhl or phone. If you have a critical or abnormal lab result, we will notify you by phone as soon as possible.  Submit refill requests through jslyhl or call your pharmacy and they will forward the refill request to us. Please allow 3 business days for your refill to be completed.          Additional Information About Your Visit        jslyhl Information     jslyhl gives you secure access to your electronic health record. If you see a primary care provider, you can also send messages to your care team and make appointments. If you have questions, please call your primary care clinic.  If you do not have a primary care provider, please call [phone] and they will assist you.        Care EveryWhere ID     This is your Care EveryWhere ID. This could be used by other organizations to access your [place] medical records  [id number]        Your Vitals Were     Pulse Temperature Height Pulse Oximetry BMI (Body Mass Index)       83 98.6  F (37  C) (Oral) 1.626 m (5' 4\") 98% 21.82 kg/m2        Blood Pressure from Last 3 Encounters:   12/06/17 120/80   07/24/17 136/80   05/22/17 124/84    Weight from Last 3 Encounters:   12/06/17 57.7 kg (127 lb 1.6 oz)   07/24/17 57.3 kg (126 lb 4.8 oz)   05/22/17 57.2 kg (126 lb)              We Performed the Following     T3 Free     T4 free     Thyroid stimulating immunoglobulin     TSH          Today's Medication Changes          These changes are accurate as of: 12/6/17 10:08 AM.  If you have any questions, ask your nurse or doctor.               These medicines have changed or have updated prescriptions.        Dose/Directions    zolpidem 5 MG tablet   Commonly known as:  AMBIEN   This may have changed:  Another medication with the same name was removed. Continue taking " this medication, and follow the directions you see here.   Used for:  Insomnia, unspecified type   Changed by:  Brook Echavarria MD        Dose:  5-10 mg   Take 1-2 tablets (5-10 mg) by mouth nightly as needed for sleep   Quantity:  60 tablet   Refills:  0                Primary Care Provider Office Phone # Fax #    Brook Echavarria [phone]       [address] [facility] [address]        Equal Access to Services     [facility]: Hadii aad ku hadasho Soomaali, waaxda luqadaha, qaybta kaalmada adeegyada, waxay idiin hayaan adeeg kheliel romero . So [facility] [phone].    ATENCIÓN: Si habla español, tiene a gamez disposición servicios gratuitos de asistencia lingüística. [facility] [phone].    We comply with applicable federal civil rights laws and [place] laws. We do not discriminate on the basis of race, color, national origin, age, disability, sex, sexual orientation, or gender identity.            Thank you!     Thank you for choosing [facility]  for your care. Our goal is always to provide you with excellent care. Hearing back from our patients is one way we can continue to improve our services. Please take a few minutes to complete the written survey that you may receive in the mail after your visit with us. Thank you!             Your Updated Medication List - Protect others around you: Learn how to safely use, store and throw away your medicines at www.disposemymeds.org.          This list is accurate as of: 12/6/17 10:08 AM.  Always use your most recent med list.                   Brand Name Dispense Instructions for use Diagnosis    albuterol 108 (90 BASE) MCG/ACT Inhaler    PROAIR HFA/PROVENTIL HFA/VENTOLIN HFA    3 Inhaler    Inhale 2 puffs into the lungs every 6 hours as needed for shortness of breath / dyspnea or wheezing Reported on 5/22/2017        anastrozole 1 MG tablet    ARIMIDEX    30 tablet    Take by mouth daily         ASPIRIN EC PO      Take 81 mg by mouth daily        CALCIUM 500 PO      Take 600 mg by mouth daily        carvedilol 12.5 MG tablet    COREG    180 tablet    Take 1 tablet (12.5 mg) by mouth 2 times daily (with meals)    Essential hypertension       lisinopril 10 MG tablet    PRINIVIL/ZESTRIL    135 tablet    Take 1 1/2 tabs (15 mg) by mouth daily    Essential hypertension, benign       triamcinolone acetonide 0.05 % Oint     17 g    Externally apply topically 2 times daily as needed    Dyshidrotic eczema       TYLENOL 325 MG tablet   Generic drug:  acetaminophen     250 tablet    Take 1-2 tablets (325-650 mg) by mouth every 6 hours as needed for mild pain        valACYclovir 1000 mg tablet    VALTREX    4 tablet    Take 2 tablets (2,000 mg) by mouth 2 times daily    Cold sore       VITAMIN D3 PO      Take by mouth once a week        warfarin 5 MG tablet    COUMADIN    90 tablet    Take 1 tablet (5 mg) daily, or as instructed.    S/P aortic valve replacement       zolpidem 5 MG tablet    AMBIEN    60 tablet    Take 1-2 tablets (5-10 mg) by mouth nightly as needed for sleep    Insomnia, unspecified type

## 2017-12-06 NOTE — PATIENT INSTRUCTIONS
[facility]   Dr Baxter, Endocrinology Department      [facility]   [address]  [place] [address]  Appointment [phone]  Fax: [phone]  [place]: Monday and Tuesday         [facility]   [address]  [place] [address]  Appointment [phone]  Fax: [phone]  [place]: Wednesday and Thursday            Labs today  Follow up with radiology for thyroid ultrasound  Follow up in 4 weeks     [facility] radiology scheduleing  [phone]   [facility] Radiology scheduling  [phone]     Please call and schedule the recommended test as discussed in clinic visit. These are the numbers to call.

## 2017-12-07 LAB
T4 FREE SERPL-MCNC: 1.16 NG/DL (ref 0.76–1.46)
TSH SERPL DL<=0.005 MIU/L-ACNC: 0.18 MU/L (ref 0.4–4)

## 2017-12-08 LAB — TSI SER-ACNC: <1 TSI INDEX

## 2017-12-15 ENCOUNTER — HOSPITAL ENCOUNTER (OUTPATIENT)
Dept: ULTRASOUND IMAGING | Facility: CLINIC | Age: 62
Discharge: HOME | End: 2017-12-15
Attending: INTERNAL MEDICINE | Admitting: INTERNAL MEDICINE
Payer: COMMERCIAL

## 2017-12-15 ENCOUNTER — ANTICOAGULATION THERAPY VISIT (OUTPATIENT)
Dept: ANTICOAGULATION | Facility: CLINIC | Age: 62
End: 2017-12-15
Payer: COMMERCIAL

## 2017-12-15 DIAGNOSIS — Z95.2 H/O AORTIC VALVE REPLACEMENT: ICD-10-CM

## 2017-12-15 DIAGNOSIS — Z79.01 LONG-TERM (CURRENT) USE OF ANTICOAGULANTS: ICD-10-CM

## 2017-12-15 DIAGNOSIS — E05.90 SUBCLINICAL HYPERTHYROIDISM: ICD-10-CM

## 2017-12-15 PROCEDURE — 76536 US EXAM OF HEAD AND NECK: CPT

## 2017-12-15 PROCEDURE — 99207 ZZC NO CHARGE NURSE ONLY: CPT | Performed by: INTERNAL MEDICINE

## 2017-12-15 NOTE — MR AVS SNAPSHOT
Annabella TEMPLE Luis Miguel   12/15/2017   Anticoagulation Therapy Visit    Description:  62 year old female   Provider:  Brook Echavarria MD   Department:  Ri Anti Coagulation           INR as of 12/15/2017     Today's INR No new INR was available at the time of this encounter.      Anticoagulation Summary as of 12/15/2017     INR goal 1.8-2.5   Today's INR No new INR was available at the time of this encounter.   Full instructions 7.5 mg on Wed; 5 mg all other days   Next INR check 1/3/2018    Indications   Long-term (current) use of anticoagulants [Z79.01] [Z79.01]  H/O aortic valve replacement [Z95.2]         Your next Anticoagulation Clinic appointment(s)     Jan 03, 2018  8:15 AM CST   Anticoagulation Visit with [facility]ULATION CLINIC   [facility]    [address]  [facility] [address] [phone]              Contact Numbers     [facility] Phone Numbers:  Anticoagulation Clinic Appointments : [phone]  Anticoagulation Nurse: [phone]         December 2017 Details    Sun Mon Tue Wed Thu Fri Sat          1               2                 3               4               5               6               7               8               9                 10               11               12               13               14               15      5 mg   See details      16      5 mg           17      5 mg         18      5 mg         19      5 mg         20      7.5 mg         21      5 mg         22      5 mg         23      5 mg           24      5 mg         25      5 mg         26      5 mg         27      7.5 mg         28      5 mg         29      5 mg         30      5 mg           31      5 mg                Date Details   12/15 This INR check               How to take your warfarin dose     To take:  5 mg Take 1 of the 5 mg tablets.    To take:  7.5 mg Take 1.5 of the 5 mg tablets.           January 2018 Details    Sun Mon  Tue Wed Thu Fri Sat      1      5 mg         2      5 mg         3            4               5               6                 7               8               9               10               11               12               13                 14               15               16               17               18               19               20                 21               22               23               24               25               26               27                 28               29               30               31                   Date Details   No additional details    Date of next INR:  1/3/2018         How to take your warfarin dose     To take:  5 mg Take 1 of the 5 mg tablets.    To take:  7.5 mg Take 1.5 of the 5 mg tablets.

## 2017-12-15 NOTE — PROGRESS NOTES
ANTICOAGULATION FOLLOW-UP CLINIC VISIT    Patient Name:  Annabella Bolanos  Date:  12/15/2017  Contact Type:  Telephone/ discussed dosing with patient via phone.    SUBJECTIVE:     Patient Findings     Positives Change in diet/appetite (Pt ate more green veggies on Wed, Th and will continue to do so over the next couple days.), Med error (pt accidently took 15 mg on Wednesday instead of 7.5.  She then skipped her dose on Thursday after realizing the error.)           OBJECTIVE    INR Protime   Date Value Ref Range Status   11/22/2017 1.7 (A) 0.86 - 1.14 Final       ASSESSMENT / PLAN  No question data found.  Anticoagulation Summary as of 12/15/2017     INR goal 1.8-2.5   Today's INR No new INR was available at the time of this encounter.   Maintenance plan 7.5 mg (5 mg x 1.5) on Wed; 5 mg (5 mg x 1) all other days   Full instructions 7.5 mg on Wed; 5 mg all other days   Weekly total 37.5 mg   Plan last modified Erica Stinson RN (11/22/2017)   Next INR check 1/3/2018   Priority INR   Target end date     Indications   Long-term (current) use of anticoagulants [Z79.01] [Z79.01]  H/O aortic valve replacement [Z95.2]         Anticoagulation Episode Summary     INR check location     Preferred lab     Send INR reminders to [facility]    Comments       Anticoagulation Care Providers     Provider Role Specialty Phone number    Brook Echavarria MD [facility] Internal Medicine [phone]            See the Encounter Report to view Anticoagulation Flowsheet and Dosing Calendar (Go to Encounters tab in chart review, and find the Anticoagulation Therapy Visit)    Dosage adjustment made based on physician directed care plan.    Erica Stinson RN

## 2017-12-22 ENCOUNTER — TELEPHONE (OUTPATIENT)
Dept: INTERNAL MEDICINE | Facility: CLINIC | Age: 62
End: 2017-12-22

## 2017-12-22 NOTE — TELEPHONE ENCOUNTER
Pt calls, asks if thyroid u/s results are available. Informed pt Dr. Baxter has not entered any results yet. Offered to send message to her to review next week, pt states she has an appt next week and will wait until then.

## 2017-12-28 ENCOUNTER — OFFICE VISIT (OUTPATIENT)
Dept: ENDOCRINOLOGY | Facility: CLINIC | Age: 62
End: 2017-12-28
Payer: COMMERCIAL

## 2017-12-28 VITALS
HEIGHT: 64 IN | DIASTOLIC BLOOD PRESSURE: 80 MMHG | WEIGHT: 129 LBS | BODY MASS INDEX: 22.02 KG/M2 | OXYGEN SATURATION: 99 % | TEMPERATURE: 97.9 F | HEART RATE: 75 BPM | SYSTOLIC BLOOD PRESSURE: 140 MMHG

## 2017-12-28 DIAGNOSIS — E05.90 SUBCLINICAL HYPERTHYROIDISM: Primary | ICD-10-CM

## 2017-12-28 PROCEDURE — 99214 OFFICE O/P EST MOD 30 MIN: CPT | Performed by: INTERNAL MEDICINE

## 2017-12-28 NOTE — PATIENT INSTRUCTIONS
[facility]   Dr Baxter, Endocrinology Department      [facility]   [address]  [place] [address]  Appointment [phone]  Fax: [phone]  [place]: Monday and Tuesday         [place] [address]  [place] [address]  Appointment [phone]  Fax: [phone]  [place]: Wednesday and Thursday            Plan to continue to monitor thyroid labs  Labs in 3 months ( thyroid labs)- 2018  Please make a lab appointment for blood work and follow up clinic appointment in 1 week after that to discuss results.    Watch for symptoms that we discussed and in that case get labs sooner.

## 2017-12-28 NOTE — NURSING NOTE
"Chief Complaint   Patient presents with     RECHECK     follow up hyperthyroidism        Initial /80 (BP Location: Right arm, Patient Position: Chair, Cuff Size: Adult Regular)  Pulse 75  Temp 97.9  F (36.6  C) (Oral)  Ht 1.626 m (5' 4\")  Wt 58.5 kg (129 lb)  SpO2 99%  BMI 22.14 kg/m2 Estimated body mass index is 22.14 kg/(m^2) as calculated from the following:    Height as of this encounter: 1.626 m (5' 4\").    Weight as of this encounter: 58.5 kg (129 lb).  Medication Reconciliation: complete     ENDOCRINOLOGY INTAKE FORM    Patient Name:  Annabella Bolanos  :  1955    Is patient Diabetic?   No  Does patient have non-diabetic or other endocrine issues?  Yes: hyperthyroidism     Vitals: /80 (BP Location: Right arm, Patient Position: Chair, Cuff Size: Adult Regular)  Pulse 75  Temp 97.9  F (36.6  C) (Oral)  Ht 1.626 m (5' 4\")  Wt 58.5 kg (129 lb)  SpO2 99%  BMI 22.14 kg/m2  BMI= Body mass index is 22.14 kg/(m^2).    Flu vaccine:  Yes: 10/28/17  Pneumonia vaccine:  Yes: -10/16/15    Smoking and Alcohol use:  Social History   Substance Use Topics     Smoking status: Never Smoker     Smokeless tobacco: Never Used     Alcohol use Yes      Comment: 2 drinks per week -      Staff Signature:  Valencia Woody CMA ([facility])        "

## 2017-12-28 NOTE — MR AVS SNAPSHOT
After Visit Summary   2017    Annabella Bolanos    MRN: [id number]           Patient Information     Date Of Birth          1955        Visit Information        Provider Department      2017 11:00 AM Merle Baxter MD [facility]        Today's Diagnoses     Subclinical hyperthyroidism    -  1      Care Instructions    [facility] & [place] locations   Dr Baxter, Endocrinology Department      [facility]   [address]  [place] [address]  Appointment [phone]  Fax: [phone]  [place]: Monday and Tuesday         [facility]   [address]  [place] [address]  Appointment [phone]  Fax: [phone]  [place]: Wednesday and Thursday            Plan to continue to monitor thyroid labs  Labs in 3 months ( thyroid labs)- 2018  Please make a lab appointment for blood work and follow up clinic appointment in 1 week after that to discuss results.    Watch for symptoms that we discussed and in that case get labs sooner.            Follow-ups after your visit        Your next 10 appointments already scheduled     2018  8:15 AM CST   Anticoagulation Visit with [facility]   [facility] ([facility])    [address]  [facility] [address] [phone]              Future tests that were ordered for you today     Open Future Orders        Priority Expected Expires Ordered    T3 Free Routine  10/24/2018 2017    T4 free Routine  10/24/2018 2017    TSH Routine  10/24/2018 2017            Who to contact     If you have questions or need follow up information about today's clinic visit or your schedule please contact [facility] directly at [phone].  Normal or non-critical lab and imaging results will be communicated to you by Shad  "letter or phone within 4 business days after the clinic has received the results. If you do not hear from us within 7 days, please contact the clinic through [employer] or phone. If you have a critical or abnormal lab result, we will notify you by phone as soon as possible.  Submit refill requests through [employer] or call your pharmacy and they will forward the refill request to us. Please allow 3 business days for your refill to be completed.          Additional Information About Your Visit        [employer] Information     [employer] gives you secure access to your electronic health record. If you see a primary care provider, you can also send messages to your care team and make appointments. If you have questions, please call your primary care clinic.  If you do not have a primary care provider, please call [phone] and they will assist you.        Care EveryWhere ID     This is your Care EveryWhere ID. This could be used by other organizations to access your [place] medical records  [id number]        Your Vitals Were     Pulse Temperature Height Pulse Oximetry BMI (Body Mass Index)       75 97.9  F (36.6  C) (Oral) 1.626 m (5' 4\") 99% 22.14 kg/m2        Blood Pressure from Last 3 Encounters:   12/28/17 140/80   12/06/17 120/80   07/24/17 136/80    Weight from Last 3 Encounters:   12/28/17 58.5 kg (129 lb)   12/06/17 57.7 kg (127 lb 1.6 oz)   07/24/17 57.3 kg (126 lb 4.8 oz)               Primary Care Provider Office Phone # Fax #    Brook Marla Echavarria [phone]       [address] [facility] [address]        Equal Access to Services     [facility]: Hadii gaye Nolen, waaxda luqadaha, qaybta kaallaura muro . So [facility] [phone].    ATENCIÓN: Si habla español, tiene a gamez disposición servicios gratuitos de asistencia lingüística. Llame al [phone].    We comply with applicable federal civil rights laws and [place] " laws. We do not discriminate on the basis of race, color, national origin, age, disability, sex, sexual orientation, or gender identity.            Thank you!     Thank you for choosing [facility]  for your care. Our goal is always to provide you with excellent care. Hearing back from our patients is one way we can continue to improve our services. Please take a few minutes to complete the written survey that you may receive in the mail after your visit with us. Thank you!             Your Updated Medication List - Protect others around you: Learn how to safely use, store and throw away your medicines at www.disposemymeds.org.          This list is accurate as of: 12/28/17 11:34 AM.  Always use your most recent med list.                   Brand Name Dispense Instructions for use Diagnosis    albuterol 108 (90 BASE) MCG/ACT Inhaler    PROAIR HFA/PROVENTIL HFA/VENTOLIN HFA    3 Inhaler    Inhale 2 puffs into the lungs every 6 hours as needed for shortness of breath / dyspnea or wheezing Reported on 5/22/2017        anastrozole 1 MG tablet    ARIMIDEX    30 tablet    Take by mouth daily        ASPIRIN EC PO      Take 81 mg by mouth daily        CALCIUM 500 PO      Take 600 mg by mouth daily        carvedilol 12.5 MG tablet    COREG    180 tablet    Take 1 tablet (12.5 mg) by mouth 2 times daily (with meals)    Essential hypertension       lisinopril 10 MG tablet    PRINIVIL/ZESTRIL    135 tablet    Take 1 1/2 tabs (15 mg) by mouth daily    Essential hypertension, benign       triamcinolone acetonide 0.05 % Oint     17 g    Externally apply topically 2 times daily as needed    Dyshidrotic eczema       TYLENOL 325 MG tablet   Generic drug:  acetaminophen     250 tablet    Take 1-2 tablets (325-650 mg) by mouth every 6 hours as needed for mild pain        valACYclovir 1000 mg tablet    VALTREX    4 tablet    Take 2 tablets (2,000 mg) by mouth 2 times daily    Cold sore       VITAMIN D3 PO      Take by  mouth once a week        warfarin 5 MG tablet    COUMADIN    90 tablet    Take 1 tablet (5 mg) daily, or as instructed.    S/P aortic valve replacement       zolpidem 5 MG tablet    AMBIEN    60 tablet    Take 1-2 tablets (5-10 mg) by mouth nightly as needed for sleep    Insomnia, unspecified type

## 2017-12-28 NOTE — PROGRESS NOTES
Name: Annabella Bolanos  Seen at the request of Brook Echavarria for subclinical Hyperthyroidism.    HPI:  Annabella Bolanos is a 62 year old female who presents for the evaluation of subclinical Hyperthyroidism.  Noted since 7/2016. Plan was for continue to monitor.  Never been on medications.  No h/o arrhythmia  No h/o osteoporosis-- has osteopenia. Not on treatment.  TSI neg  US thyroid ( h/o radiation)- no discrete nodules.      H/o breast cancer and h/o aortic valve replacement and is on long term anticoagulation.  Breast cancer diagnosed in 2014 s/p lumpectomy and chemo and radiation. Took radiation 5 days a week for 1 month.    Feeling good.  Has good energy.  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  H/o arthritis.    Weight is stable.  Eating healthy.    History of radiation exposure: YES. As above  History of thyroid dysfunction: not to her knowledge. No FH of thyroid cancer.  Palpitations:  No  Changes to hair or skin: No  Diarrhea/Constipation:No  Changes in menses: s/p menopause  Changes in vision:No  Diplopia/Blurryness:No  Dysphagia or Shortness of breath:No  Tremors:No  Difficulty sleeping:Yes: most of the time  Changes in weight: No  Lithium/Amiodarone: No  URI: No  CT Scans:No  Mother had hyperthyroidism s/p DOE and was on levothyroxine.    PMH/PSH:  Past Medical History:   Diagnosis Date     Adenomatous colon polyp 8/2015     Aortic stenosis 6/23/11    bicuspid AV with severe stenosis - 6/2011 mechanical AVR with 23 mm St Yordan AV conduit, composite graft placement     Arthritis     knees and hands     Bicuspid aortic valve      Breast cancer (H) 7/2014    R breast     H/O aortic valve replacement     St Yordan     Heart murmur     Valve repalcement 2011.     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    AAA repair with av23 mm tube graft     PONV (postoperative nausea and vomiting)     n/v  "morphine vs anesthesia, vomiting x24 hrs     Subclinical hyperthyroidism 2017     Thrombosis of leg     after  of daughter     Uncomplicated asthma      Past Surgical History:   Procedure Laterality Date     BIOPSY NODE SENTINEL Right 9/10/2014    Procedure: BIOPSY NODE SENTINEL;  Surgeon: Ila Romano MD;  Location: [facility] OR     CARDIAC SURGERY  2011    aortic valve replacement     ENT SURGERY      tonsillectomy     HRW PORT A CATH       INSERT PORT VASCULAR ACCESS Left 10/22/2014    Procedure: INSERT PORT VASCULAR ACCESS;  Surgeon: Ila Romano MD;  Location: [facility] OR     LUMPECTOMY BREAST WITH SEED LOCALIZATION Right 9/10/2014    Procedure: LUMPECTOMY BREAST WITH SEED LOCALIZATION;  Surgeon: Ila Romano MD;  Location: [facility] OR     ORTHOPEDIC SURGERY      shoulder, thumb surgery     REMOVE PORT VASCULAR ACCESS Left 2015    Procedure: REMOVE PORT VASCULAR ACCESS;  Surgeon: Ila Romano MD;  Location: [facility] OR     REPAIR ANEURYSM ASCENDING AORTA  2011    Procedure:REPAIR ANEURYSM ASCENDING AORTA; Medial Sternotomy, Aortic Valve Replacement, (St. Yordan [employer] HP Valved Graft, size 23 mm) on pump oxygenation, intraoperative transesophageal cardiogram; Surgeon:JOHN PAUL ULLOA; Location:[facility] OR     REPLACE VALVE AORTIC  11    bicuspid AV with severe stenosis - 2011 mechanical AVR with 23 mm St Yordan AV conduit, composite graft placement     Family Hx:  Family History   Problem Relation Age of Onset     Hypertension Mother      Thyroid Disease Mother      \"Toxic thyroid\"     Prostate Cancer Father 70     CANCER Father 80     Lung cancer, Not caused from smoking     CEREBROVASCULAR DISEASE Father 80     Hypertension Father      Cardiovascular Brother      Cardiovascular Son      Puentes-Parkinsons White Syndrome     Cardiovascular Daughter      Heart murmur     Breast Cancer Paternal Aunt 80     Cardiovascular Paternal Aunt      Arthritis Brother 40    " " RA     CANCER Maternal Grandfather      Colon Cancer No family hx of      Thyroid disease: as above          Social Hx:  Social History     Social History     Marital status: Single     Spouse name: N/A     Number of children: N/A     Years of education: N/A     Occupational History     Not on file.     Social History Main Topics     Smoking status: Never Smoker     Smokeless tobacco: Never Used     Alcohol use Yes      Comment: 2 drinks per week -      Drug use: No     Sexual activity: Not Currently     Other Topics Concern     Caffeine Concern Yes     1-2 cups caffeine per day     Sleep Concern No     Stress Concern No     Weight Concern No     Special Diet Yes     low fat daily , low red meats     Back Care No     Exercise Yes     walks daily/ 3x a week exercise class     Social History Narrative          MEDICATIONS:  has a current medication list which includes the following prescription(s): lisinopril, carvedilol, warfarin, zolpidem, cholecalciferol, aspirin, anastrozole, acetaminophen, calcium-magnesium-vitamin d, valacyclovir, triamcinolone acetonide, and albuterol.    ROS   ROS: 10 point ROS neg other than the symptoms noted above in the HPI.    Physical Exam   VS: /80 (BP Location: Right arm, Patient Position: Chair, Cuff Size: Adult Regular)  Pulse 75  Temp 97.9  F (36.6  C) (Oral)  Ht 1.626 m (5' 4\")  Wt 58.5 kg (129 lb)  SpO2 99%  BMI 22.14 kg/m2  GENERAL: AXOX3, NAD, well dressed, answering questions appropriately, appears stated age.  HEENT: No exopthalmous, no proptosis, EOMI, no lig lag, no retraction  NECK: Thyroid normal in size, non tender, no nodules were palpated.  CV: RRR  LUNGS: CTAB  ABDOMEN: +BS  NEUROLOGY: CN grossly intact, no tremors  PSYCH: normal affect and mood      LABS:  TFTs:  ENDO THYROID LABS-[facility] Latest Ref Rng & Units 12/6/2017   TSH 0.40 - 4.00 mU/L 0.18 (L)   T4 FREE 0.76 - 1.46 ng/dL 1.16   FREE T3 2.3 - 4.2 pg/mL 3.2   THYROID STIM IMMUNOG <=1.3 TSI index <1.0 "     ENDO THYROID LABS-[facility] Latest Ref Rng & Units 11/10/2017 7/24/2017   TSH 0.40 - 4.00 mU/L 0.12 (L) 0.12 (L)   T4 FREE 0.76 - 1.46 ng/dL 1.10 1.16     ENDO THYROID LABS-[facility] Latest Ref Rng & Units 7/14/2016   TSH 0.40 - 4.00 mU/L 0.24 (L)   T4 FREE 0.76 - 1.46 ng/dL 1.22       CBC:  Lab Results   Component Value Date    WBC 5.0 07/24/2017     Lab Results   Component Value Date    RBC 4.30 07/24/2017     Lab Results   Component Value Date    HGB 13.3 07/24/2017     Lab Results   Component Value Date    HCT 40.7 07/24/2017     No components found for: MCT  Lab Results   Component Value Date    MCV 95 07/24/2017     Lab Results   Component Value Date    MCH 30.9 07/24/2017     Lab Results   Component Value Date    MCHC 32.7 07/24/2017     Lab Results   Component Value Date    RDW 11.3 07/24/2017     Lab Results   Component Value Date     07/24/2017         LFTs:  Liver Function Studies -   Recent Labs   Lab Test  07/24/17   0812   PROTTOTAL  7.1   ALBUMIN  3.6   BILITOTAL  0.6   ALKPHOS  86   AST  19   ALT  23     ULTRASOUND THYROID December 15, 2017 9:24 AM   HISTORY: Subclinical hyperthyroidism.   FINDINGS: Thyroid ultrasound demonstrates an enlarged thyroid gland. The right lobe measures 5.4 x 2.0 x 1.8 cm. The left lobe measures 3.8 x 1.6 x 1.3 cm. The isthmus is normal in thickness. Thyroid parenchyma is heterogeneous in echotexture. Increased color Doppler flow is noted throughout the thyroid gland. Thyroid nodules as follows: Right Lobe: None. Isthmus: None. Left Lobe: None.   IMPRESSION: Enlarged heterogeneous thyroid gland without evidence of a discrete thyroid nodule. Increased color Doppler flow suggests underlying thyroiditis. Correlate with thyroid function test and nuclear medicine thyroid scan as needed. MALLORIE VALLADARES MD      All pertinent notes, labs, and images personally reviewed by me.     A/P  Ms.Carol WINDY Bolanos is a 62 year old here for the evaluation of hyperthyroidism.    Subclinical  hyperthyroidism:   TSH remained suppressed since 2016 and along with normal free T4  No h/o arrhythmia  No h/o osteoporosis-- has osteopenia. Not on treatment.  TSI neg  US thyroid ( h/o radiation)- no discrete nodules.  Clinically looks euthyroid  No major complaints  Clinically looks euthyroid  I discussed subclinical hyperthyroidism in general with pt.  Plan to continue to monitor as there are no major risk factors and clinically looks euthyroid  Discussed s/s of hyperthyroidism to watch for and gets labs sooner in that case.  She will have her DEXA scan done with oncology and she will get the records in next visit.    Follow-up:  6 months    Merle Baxter MD  Endocrinology  [facility]  CC: Brook Echavarria     More than 50% of face to face time spent with Ms. Bolanos on counseling / coordinating her care.    All questions were answered.  The patient indicates understanding of the above issues and agrees with the plan set forth.

## 2018-01-03 ENCOUNTER — ANTICOAGULATION THERAPY VISIT (OUTPATIENT)
Dept: ANTICOAGULATION | Facility: CLINIC | Age: 63
End: 2018-01-03
Payer: COMMERCIAL

## 2018-01-03 DIAGNOSIS — Z79.01 LONG-TERM (CURRENT) USE OF ANTICOAGULANTS: ICD-10-CM

## 2018-01-03 DIAGNOSIS — Z95.2 H/O AORTIC VALVE REPLACEMENT: ICD-10-CM

## 2018-01-03 LAB — INR POINT OF CARE: 2.5 (ref 0.86–1.14)

## 2018-01-03 PROCEDURE — 36416 COLLJ CAPILLARY BLOOD SPEC: CPT

## 2018-01-03 PROCEDURE — 85610 PROTHROMBIN TIME: CPT | Mod: QW

## 2018-01-03 PROCEDURE — 99207 ZZC NO CHARGE NURSE ONLY: CPT

## 2018-01-03 NOTE — PROGRESS NOTES
ANTICOAGULATION FOLLOW-UP CLINIC VISIT    Patient Name:  Annabella Bolanos  Date:  1/3/2018  Contact Type:  Face to Face    SUBJECTIVE:     Patient Findings     Positives No Problem Findings           OBJECTIVE    INR Protime   Date Value Ref Range Status   01/03/2018 2.5 (A) 0.86 - 1.14 Final       ASSESSMENT / PLAN  INR assessment THER    Recheck INR In: 6 WEEKS    INR Location Clinic      Anticoagulation Summary as of 1/3/2018     INR goal 1.8-2.5   Today's INR 2.5   Maintenance plan 7.5 mg (5 mg x 1.5) on Wed; 5 mg (5 mg x 1) all other days   Full instructions 7.5 mg on Wed; 5 mg all other days   Weekly total 37.5 mg   Plan last modified Erica Stinson RN (11/22/2017)   Next INR check 2/14/2018   Priority INR   Target end date     Indications   Long-term (current) use of anticoagulants [Z79.01] [Z79.01]  H/O aortic valve replacement [Z95.2]         Anticoagulation Episode Summary     INR check location     Preferred lab     Send INR reminders to [facility]    Comments       Anticoagulation Care Providers     Provider Role Specialty Phone number    Brook Echavarria MD [facility] Internal Medicine [phone]            See the Encounter Report to view Anticoagulation Flowsheet and Dosing Calendar (Go to Encounters tab in chart review, and find the Anticoagulation Therapy Visit)    Dosage adjustment made based on physician directed care plan.    Erica Stinson RN

## 2018-01-03 NOTE — MR AVS SNAPSHOT
Annabella TEMPLE Luis Miguel   1/3/2018 8:15 AM   Anticoagulation Therapy Visit    Description:  62 year old female   Provider:  [facility]   Department:  [facility] Anti Coagulation           INR as of 1/3/2018     Today's INR 2.5      Anticoagulation Summary as of 1/3/2018     INR goal 1.8-2.5   Today's INR 2.5   Full instructions 7.5 mg on Wed; 5 mg all other days   Next INR check 2/14/2018    Indications   Long-term (current) use of anticoagulants [Z79.01] [Z79.01]  H/O aortic valve replacement [Z95.2]         Your next Anticoagulation Clinic appointment(s)     Feb 14, 2018  8:15 AM CST   Anticoagulation Visit with [facility]   [facility]    [address] [facility] [address]  [facility] [address] [phone]              Contact Numbers     [facility] Phone Numbers:  Anticoagulation Clinic Appointments : [phone]  Anticoagulation Nurse: [phone]         January 2018 Details    Sun Mon Tue Wed Thu Fri Sat      1               2               3      7.5 mg   See details      4      5 mg         5      5 mg         6      5 mg           7      5 mg         8      5 mg         9      5 mg         10      7.5 mg         11      5 mg         12      5 mg         13      5 mg           14      5 mg         15      5 mg         16      5 mg         17      7.5 mg         18      5 mg         19      5 mg         20      5 mg           21      5 mg         22      5 mg         23      5 mg         24      7.5 mg         25      5 mg         26      5 mg         27      5 mg           28      5 mg         29      5 mg         30      5 mg         31      7.5 mg             Date Details   01/03 This INR check               How to take your warfarin dose     To take:  5 mg Take 1 of the 5 mg tablets.    To take:  7.5 mg Take 1.5 of the 5 mg tablets.           February 2018 Details    Sun Mon Tue Wed Thu Fri Sat         1      5 mg         2      5 mg          3      5 mg           4      5 mg         5      5 mg         6      5 mg         7      7.5 mg         8      5 mg         9      5 mg         10      5 mg           11      5 mg         12      5 mg         13      5 mg         14            15               16               17                 18               19               20               21               22               23               24                 25               26               27               28                   Date Details   No additional details    Date of next INR:  2/14/2018         How to take your warfarin dose     To take:  5 mg Take 1 of the 5 mg tablets.    To take:  7.5 mg Take 1.5 of the 5 mg tablets.

## 2018-01-15 DIAGNOSIS — Z95.2 S/P AORTIC VALVE REPLACEMENT: ICD-10-CM

## 2018-01-15 RX ORDER — WARFARIN SODIUM 5 MG/1
TABLET ORAL
Qty: 100 TABLET | Refills: 1 | Status: SHIPPED | OUTPATIENT
Start: 2018-01-15 | End: 2018-07-20

## 2018-01-15 NOTE — TELEPHONE ENCOUNTER
"Requested Prescriptions   Pending Prescriptions Disp Refills     JANTOVEN 5 MG tablet [Pharmacy Med Name: JANTOVEN TAB 5MG] 90 tablet 1     Sig: TAKE 1 TABLET DAILY OR AS  INSTRUCTED    Vitamin K Antagonists Failed    1/15/2018  3:28 AM       Failed - INR is within goal in the past 6 weeks    Confirm INR is within goal in the past 6 weeks.     Recent Labs   Lab Test 01/03/18   INR  2.5*                      Passed - Recent or future visit with authorizing provider's specialty    Patient had office visit in the last year or has a visit in the next 30 days with authorizing provider.  See \"Patient Info\" tab in inbasket, or \"Choose Columns\" in Meds & Orders section of the refill encounter.              Passed - Patient is 18 years of age or older       Passed - Patient is not pregnant       Passed - No positive pregnancy on file in past 12 months        Pt is managed by the INR clinic.  Last o/v 7/24/17.  Prescription approved per [facility] Refill Protocol.  Erica Stinson RN    "

## 2018-02-09 ENCOUNTER — TRANSFERRED RECORDS (OUTPATIENT)
Dept: SURGERY | Facility: CLINIC | Age: 63
End: 2018-02-09

## 2018-02-09 ENCOUNTER — TRANSFERRED RECORDS (OUTPATIENT)
Dept: HEALTH INFORMATION MANAGEMENT | Facility: CLINIC | Age: 63
End: 2018-02-09

## 2018-02-14 ENCOUNTER — ANTICOAGULATION THERAPY VISIT (OUTPATIENT)
Dept: ANTICOAGULATION | Facility: CLINIC | Age: 63
End: 2018-02-14
Payer: COMMERCIAL

## 2018-02-14 DIAGNOSIS — Z95.2 H/O AORTIC VALVE REPLACEMENT: ICD-10-CM

## 2018-02-14 DIAGNOSIS — Z79.01 LONG-TERM (CURRENT) USE OF ANTICOAGULANTS: ICD-10-CM

## 2018-02-14 LAB — INR POINT OF CARE: 2.8 (ref 0.86–1.14)

## 2018-02-14 PROCEDURE — 36416 COLLJ CAPILLARY BLOOD SPEC: CPT

## 2018-02-14 PROCEDURE — 85610 PROTHROMBIN TIME: CPT | Mod: QW

## 2018-02-14 PROCEDURE — 99207 ZZC NO CHARGE NURSE ONLY: CPT

## 2018-02-14 NOTE — MR AVS SNAPSHOT
Annabella ETMPLE Luis Miguel   2/14/2018 8:15 AM   Anticoagulation Therapy Visit    Description:  62 year old female   Provider:  [facility]   Department:  [facility] Anti Coagulation           INR as of 2/14/2018     Today's INR 2.8!      Anticoagulation Summary as of 2/14/2018     INR goal 1.8-2.5   Today's INR 2.8!   Full instructions 7.5 mg on Wed; 5 mg all other days   Next INR check 3/28/2018    Indications   Long-term (current) use of anticoagulants [Z79.01] [Z79.01]  H/O aortic valve replacement [Z95.2]         Your next Anticoagulation Clinic appointment(s)     Mar 28, 2018  8:15 AM CDT   [facility] with [facility]    [address] [facility] [address]  [facility] [address] [phone]              Contact Numbers     [facility] Phone Numbers:  Anticoagulation Clinic Appointments : [phone]  Anticoagulation Nurse: [phone]         February 2018 Details    Sun Mon Tue Wed Thu Fri Sat         1               2               3                 4               5               6               7               8               9               10                 11               12               13               14      7.5 mg   See details      15      5 mg         16      5 mg         17      5 mg           18      5 mg         19      5 mg         20      5 mg         21      7.5 mg         22      5 mg         23      5 mg         24      5 mg           25      5 mg         26      5 mg         27      5 mg         28      7.5 mg             Date Details   02/14 This INR check               How to take your warfarin dose     To take:  5 mg Take 1 of the 5 mg tablets.    To take:  7.5 mg Take 1.5 of the 5 mg tablets.           March 2018 Details    Sun Mon Tue Wed Thu Fri Sat         1      5 mg         2      5 mg         3      5 mg           4      5 mg         5      5 mg         6      5 mg         7      7.5 mg          8      5 mg         9      5 mg         10      5 mg           11      5 mg         12      5 mg         13      5 mg         14      7.5 mg         15      5 mg         16      5 mg         17      5 mg           18      5 mg         19      5 mg         20      5 mg         21      7.5 mg         22      5 mg         23      5 mg         24      5 mg           25      5 mg         26      5 mg         27      5 mg         28            29               30               31                Date Details   No additional details    Date of next INR:  3/28/2018         How to take your warfarin dose     To take:  5 mg Take 1 of the 5 mg tablets.    To take:  7.5 mg Take 1.5 of the 5 mg tablets.

## 2018-02-14 NOTE — PROGRESS NOTES
ANTICOAGULATION FOLLOW-UP CLINIC VISIT    Patient Name:  Annabella Bolanos  Date:  2/14/2018  Contact Type:  Face to Face    SUBJECTIVE:     Patient Findings     Positives Change in diet/appetite (Pt may not have had as much vitamin K foods recently.  She would prefer to increase her green veggies instead of change the warfarin dose.), Med error (Pt accidently took 5 mg on Wednesday last week and 7.5 mg on Friday.)           OBJECTIVE    INR Protime   Date Value Ref Range Status   02/14/2018 2.8 (A) 0.86 - 1.14 Final       ASSESSMENT / PLAN  INR assessment SUPRA    Recheck INR In: 6 WEEKS    INR Location Clinic      Anticoagulation Summary as of 2/14/2018     INR goal 1.8-2.5   Today's INR 2.8!   Maintenance plan 7.5 mg (5 mg x 1.5) on Wed; 5 mg (5 mg x 1) all other days   Full instructions 7.5 mg on Wed; 5 mg all other days   Weekly total 37.5 mg   Plan last modified Erica Stinson RN (11/22/2017)   Next INR check    Priority INR   Target end date     Indications   Long-term (current) use of anticoagulants [Z79.01] [Z79.01]  H/O aortic valve replacement [Z95.2]         Anticoagulation Episode Summary     INR check location     Preferred lab     Send INR reminders to [facility]    Comments       Anticoagulation Care Providers     Provider Role Specialty Phone number    Brook Echavarria MD [facility] Internal Medicine [phone]            See the Encounter Report to view Anticoagulation Flowsheet and Dosing Calendar (Go to Encounters tab in chart review, and find the Anticoagulation Therapy Visit)    Dosage adjustment made based on physician directed care plan.    Erica Stinson RN

## 2018-03-18 ENCOUNTER — NURSE TRIAGE (OUTPATIENT)
Dept: NURSING | Facility: CLINIC | Age: 63
End: 2018-03-18

## 2018-03-18 NOTE — TELEPHONE ENCOUNTER
"  Reason for Disposition    [1] Fever > 101 F (38.3 C) AND [2] age > 60     \"How soon do you need Tamiflu if you have the flu\"?  Manuel developed a cough on Friday, yesterday she developed body aches, headache, fatigue and fever.    Discussed that Tamiflu should be given within 48 hours after first signs of symptoms.  Although manuel does understand recommendation to be seen today in , she seems unsure if she will go in.    Additional Information    Negative: Severe difficulty breathing (e.g., struggling for each breath, speaks in single words)    Negative: Bluish lips, tongue, or face now    Negative: Shock suspected (e.g., cold/pale/clammy skin, too weak to stand, low BP, rapid pulse)    Negative: Sounds like a life-threatening emergency to the triager    Negative: Severe sore throat    Negative: [1] Doesn't match the criteria for Influenza AND [2] sounds like a cold    Negative: Influenza vaccine reaction is suspected    Negative: Chest pain  (Exception: MILD central chest pain, present only when coughing)    Negative: [1] Headache AND [2] stiff neck (can't touch chin to chest)    Negative: Fever > 104 F (40 C)    Negative: [1] Difficulty breathing AND [2] not severe AND [3] not from stuffy nose (e.g., not relieved by cleaning out the nose)    Negative: Patient sounds very sick or weak to the triager    Protocols used: INFLUENZA - SEASONAL-ADULT-      Maddy Chand RN  [facility]      "

## 2018-03-19 ENCOUNTER — TELEPHONE (OUTPATIENT)
Dept: INTERNAL MEDICINE | Facility: CLINIC | Age: 63
End: 2018-03-19

## 2018-03-19 ENCOUNTER — E-VISIT (OUTPATIENT)
Dept: INTERNAL MEDICINE | Facility: CLINIC | Age: 63
End: 2018-03-19
Payer: COMMERCIAL

## 2018-03-19 DIAGNOSIS — Z20.828 EXPOSURE TO THE FLU: ICD-10-CM

## 2018-03-19 DIAGNOSIS — B34.9 VIRAL SYNDROME: Primary | ICD-10-CM

## 2018-03-19 PROCEDURE — 99444 ZZC PHYSICIAN ONLINE EVALUATION & MANAGEMENT SERVICE: CPT | Performed by: INTERNAL MEDICINE

## 2018-03-19 RX ORDER — OSELTAMIVIR PHOSPHATE 75 MG/1
75 CAPSULE ORAL 2 TIMES DAILY
Qty: 10 CAPSULE | Refills: 0 | Status: SHIPPED | OUTPATIENT
Start: 2018-03-19 | End: 2018-06-05

## 2018-03-19 NOTE — TELEPHONE ENCOUNTER
Annabella Bolanos is a 62 year old female who calls with what she believes may be influenza and requests Tamiflu rx.    NURSING ASSESSMENT:  Description:  Fever, chills, HA, productive cough of yellow phlegm.  Initially had body aches, gone now.  Temp 100.2-101.8  Onset/duration:  3/17/18 afternoon  Precip. factors:  Teaches   Associated symptoms:  none  Improves/worsens symptoms:  Tylenol  Pain scale (0-10)   0/10  LMP/preg/breast feeding:  NA  Last exam/Treatment:  7/24/17  Allergies:   Allergies   Allergen Reactions     Morphine Sulfate Nausea and Vomiting       MEDICATIONS:   Taking medication(s) as prescribed? Yes  Taking over the counter medication(s?) Yes  Any medication side effects? No significant side effects    Any barriers to taking medication(s) as prescribed?  No  Medication(s) improving/managing symptoms?  below  Medication reconciliation completed: Tylenol lowers fever temporarily      NURSING PLAN: Routed to provider Yes    RECOMMENDED DISPOSITION:  Home care advice - rest, fluids, alternate acetaminophen and ibuprofen.  Will comply with recommendation: Yes  If further questions/concerns or if symptoms do not improve, worsen or new symptoms develop, call your PCP or [facility] as soon as possible.      Guideline used:  Telephone Triage Protocols for Nurses, Third Edition, Wendy Baca RN

## 2018-03-28 ENCOUNTER — ANTICOAGULATION THERAPY VISIT (OUTPATIENT)
Dept: ANTICOAGULATION | Facility: CLINIC | Age: 63
End: 2018-03-28
Payer: COMMERCIAL

## 2018-03-28 DIAGNOSIS — Z79.01 LONG-TERM (CURRENT) USE OF ANTICOAGULANTS: ICD-10-CM

## 2018-03-28 DIAGNOSIS — Z95.2 H/O AORTIC VALVE REPLACEMENT: ICD-10-CM

## 2018-03-28 LAB — INR POINT OF CARE: 2.4 (ref 0.86–1.14)

## 2018-03-28 PROCEDURE — 85610 PROTHROMBIN TIME: CPT | Mod: QW

## 2018-03-28 PROCEDURE — 99207 ZZC NO CHARGE NURSE ONLY: CPT

## 2018-03-28 PROCEDURE — 36416 COLLJ CAPILLARY BLOOD SPEC: CPT

## 2018-03-28 NOTE — PROGRESS NOTES
ANTICOAGULATION FOLLOW-UP CLINIC VISIT    Patient Name:  Annabella Bolanos  Date:  3/28/2018  Contact Type:  Face to Face    SUBJECTIVE:     Patient Findings     Positives Change in medications (Pt reports taking Tamiflu d/t having the flu, pt states last dose was 3/23/18), No Problem Findings           OBJECTIVE    INR Protime   Date Value Ref Range Status   03/28/2018 2.4 (A) 0.86 - 1.14 Final       ASSESSMENT / PLAN  INR assessment THER    Recheck INR In: 6 WEEKS    INR Location Clinic      Anticoagulation Summary as of 3/28/2018     INR goal 1.8-2.5   Today's INR 2.4   Maintenance plan 7.5 mg (5 mg x 1.5) on Wed; 5 mg (5 mg x 1) all other days   Full instructions 7.5 mg on Wed; 5 mg all other days   Weekly total 37.5 mg   No change documented Deborah Aguilera RN   Plan last modified Erica Stinson RN (11/22/2017)   Next INR check 5/9/2018   Priority INR   Target end date     Indications   Long-term (current) use of anticoagulants [Z79.01] [Z79.01]  H/O aortic valve replacement [Z95.2]         Anticoagulation Episode Summary     INR check location     Preferred lab     Send INR reminders to [facility]    Comments       Anticoagulation Care Providers     Provider Role Specialty Phone number    Brook Echavarria MD [facility] Internal Medicine [phone]            See the Encounter Report to view Anticoagulation Flowsheet and Dosing Calendar (Go to Encounters tab in chart review, and find the Anticoagulation Therapy Visit)    Dosage adjustment made based on physician directed care plan.    Deborah Aguilera, RN

## 2018-03-28 NOTE — MR AVS SNAPSHOT
Annabella TEMPLE Luis Miguel   3/28/2018 8:15 AM   Anticoagulation Therapy Visit    Description:  62 year old female   Provider:  [facility]   Department:  [facility]           INR as of 3/28/2018     Today's INR 2.4      Anticoagulation Summary as of 3/28/2018     INR goal 1.8-2.5   Today's INR 2.4   Full instructions 7.5 mg on Wed; 5 mg all other days   Next INR check 5/9/2018    Indications   Long-term (current) use of anticoagulants [Z79.01] [Z79.01]  H/O aortic valve replacement [Z95.2]         Your next Anticoagulation Clinic appointment(s)     May 09, 2018  8:15 AM CDT   [facility]ticoagulation Visit with [facility]   [facility]    [address] [facility] [address]  [facility] [address] [phone]              Contact Numbers     [facility] Phone Numbers:  Anticoagulation Clinic Appointments : [phone]  Anticoagulation Nurse: [phone]         March 2018 Details    Sun Mon Tue Wed Thu Fri Sat         1               2               3                 4               5               6               7               8               9               10                 11               12               13               14               15               16               17                 18               19               20               21               22               23               24                 25               26               27               28      7.5 mg   See details      29      5 mg         30      5 mg         31      5 mg          Date Details   03/28 This INR check               How to take your warfarin dose     To take:  5 mg Take 1 of the 5 mg tablets.    To take:  7.5 mg Take 1.5 of the 5 mg tablets.           April 2018 Details    Sun Mon Tue Wed Thu Fri Sat     1      5 mg         2      5 mg         3      5 mg         4      7.5 mg         5      5 mg         6      5 mg         7      5 mg           8       5 mg         9      5 mg         10      5 mg         11      7.5 mg         12      5 mg         13      5 mg         14      5 mg           15      5 mg         16      5 mg         17      5 mg         18      7.5 mg         19      5 mg         20      5 mg         21      5 mg           22      5 mg         23      5 mg         24      5 mg         25      7.5 mg         26      5 mg         27      5 mg         28      5 mg           29      5 mg         30      5 mg               Date Details   No additional details            How to take your warfarin dose     To take:  5 mg Take 1 of the 5 mg tablets.    To take:  7.5 mg Take 1.5 of the 5 mg tablets.           May 2018 Details    Sun Mon Tue Wed Thu Fri Sat       1      5 mg         2      7.5 mg         3      5 mg         4      5 mg         5      5 mg           6      5 mg         7      5 mg         8      5 mg         9            10               11               12                 13               14               15               16               17               18               19                 20               21               22               23               24               25               26                 27               28               29               30               31                  Date Details   No additional details    Date of next INR:  5/9/2018         How to take your warfarin dose     To take:  5 mg Take 1 of the 5 mg tablets.    To take:  7.5 mg Take 1.5 of the 5 mg tablets.

## 2018-05-09 ENCOUNTER — ANTICOAGULATION THERAPY VISIT (OUTPATIENT)
Dept: ANTICOAGULATION | Facility: CLINIC | Age: 63
End: 2018-05-09
Payer: COMMERCIAL

## 2018-05-09 DIAGNOSIS — Z95.2 H/O AORTIC VALVE REPLACEMENT: ICD-10-CM

## 2018-05-09 DIAGNOSIS — Z79.01 LONG-TERM (CURRENT) USE OF ANTICOAGULANTS: ICD-10-CM

## 2018-05-09 LAB — INR POINT OF CARE: 2.3 (ref 0.86–1.14)

## 2018-05-09 PROCEDURE — 85610 PROTHROMBIN TIME: CPT | Mod: QW

## 2018-05-09 PROCEDURE — 36416 COLLJ CAPILLARY BLOOD SPEC: CPT

## 2018-05-09 PROCEDURE — 99207 ZZC NO CHARGE NURSE ONLY: CPT

## 2018-05-09 NOTE — MR AVS SNAPSHOT
Annabella TEMPLE Luis Miguel   5/9/2018 8:15 AM   Anticoagulation Therapy Visit    Description:  62 year old female   Provider[facility]  [facility]   Department:  [facility]           INR as of 5/9/2018     Today's INR 2.3      Anticoagulation Summary as of 5/9/2018     INR goal 1.8-2.5   Today's INR 2.3   Full instructions 7.5 mg on Wed; 5 mg all other days   Next INR check 6/21/2018    Indications   Long-term (current) use of anticoagulants [Z79.01] [Z79.01]  H/O aortic valve replacement [Z95.2]         Your next Anticoagulation Clinic appointment(s)     Jun 21, 2018  8:00 AM CDT   [facility] Visit with [facility]   [facility]    [address] [facility] [address]  [facility] [address] [phone]              Contact Numbers     [facility] Phone Numbers:  Anticoagulation Clinic Appointments : [phone]  Anticoagulation Nurse: [phone]         May 2018 Details    Sun Mon Tue Wed Thu Fri Sat       1               2               3               4               5                 6               7               8               9      7.5 mg   See details      10      5 mg         11      5 mg         12      5 mg           13      5 mg         14      5 mg         15      5 mg         16      7.5 mg         17      5 mg         18      5 mg         19      5 mg           20      5 mg         21      5 mg         22      5 mg         23      7.5 mg         24      5 mg         25      5 mg         26      5 mg           27      5 mg         28      5 mg         29      5 mg         30      7.5 mg         31      5 mg            Date Details   05/09 This INR check               How to take your warfarin dose     To take:  5 mg Take 1 of the 5 mg tablets.    To take:  7.5 mg Take 1.5 of the 5 mg tablets.           June 2018 Details    Sun Mon Tue Wed Thu Fri Sat          1      5 mg         2      5 mg           3      5 mg         4       5 mg         5      5 mg         6      7.5 mg         7      5 mg         8      5 mg         9      5 mg           10      5 mg         11      5 mg         12      5 mg         13      7.5 mg         14      5 mg         15      5 mg         16      5 mg           17      5 mg         18      5 mg         19      5 mg         20      7.5 mg         21            22               23                 24               25               26               27               28               29               30                Date Details   No additional details    Date of next INR:  6/21/2018         How to take your warfarin dose     To take:  5 mg Take 1 of the 5 mg tablets.    To take:  7.5 mg Take 1.5 of the 5 mg tablets.

## 2018-05-09 NOTE — PROGRESS NOTES
ANTICOAGULATION FOLLOW-UP CLINIC VISIT    Patient Name:  Annabella Bolanos  Date:  5/9/2018  Contact Type:  Face to Face    SUBJECTIVE:     Patient Findings     Positives Change in medications (pt has been using biofreeze and aspercream for knee pain.), Change in diet/appetite (eating more spinach due to E-coli outbreak with luda lettuce.), Activity level change (pt has been more active with yard work.  Knees have been hurting more.)           OBJECTIVE    INR Protime   Date Value Ref Range Status   05/09/2018 2.3 (A) 0.86 - 1.14 Final       ASSESSMENT / PLAN  INR assessment THER    Recheck INR In: 6 WEEKS    INR Location Clinic      Anticoagulation Summary as of 5/9/2018     INR goal 1.8-2.5   Today's INR 2.3   Maintenance plan 7.5 mg (5 mg x 1.5) on Wed; 5 mg (5 mg x 1) all other days   Full instructions 7.5 mg on Wed; 5 mg all other days   Weekly total 37.5 mg   No change documented Erica Stinson RN   Plan last modified Erica Stinson RN (11/22/2017)   Next INR check 6/21/2018   Priority INR   Target end date     Indications   Long-term (current) use of anticoagulants [Z79.01] [Z79.01]  H/O aortic valve replacement [Z95.2]         Anticoagulation Episode Summary     INR check location     Preferred lab     Send INR reminders to [facility]    Comments       Anticoagulation Care Providers     Provider Role Specialty Phone number    Brook Echavarria MD [facility] Internal Medicine [phone]            See the Encounter Report to view Anticoagulation Flowsheet and Dosing Calendar (Go to Encounters tab in chart review, and find the Anticoagulation Therapy Visit)    Dosage adjustment made based on physician directed care plan.    Erica Stinson RN

## 2018-05-21 ENCOUNTER — TELEPHONE (OUTPATIENT)
Dept: CARDIOLOGY | Facility: CLINIC | Age: 63
End: 2018-05-21

## 2018-05-21 NOTE — TELEPHONE ENCOUNTER
Received a VM from pt stating she needs a prior authorization for her echo this year per her new insurance company. Called to Imaging PA team, spoke with Laina. Per Laina, PA team will start working on this and a financial counselor should call pt prior to the appointment if they are unable to obtain the PA. Called back to pt and communicated this her, pt verbalized understanding. Pt stated no further questions at this time.

## 2018-05-22 PROBLEM — Q23.81 AORTIC STENOSIS WITH BICUSPID VALVE: Status: ACTIVE | Noted: 2018-05-22

## 2018-05-22 PROBLEM — Z98.890 H/O AORTIC ANEURYSM REPAIR: Status: ACTIVE | Noted: 2018-05-22

## 2018-05-22 PROBLEM — Q23.0 AORTIC STENOSIS WITH BICUSPID VALVE: Status: ACTIVE | Noted: 2018-05-22

## 2018-05-22 PROBLEM — I35.0 AORTIC STENOSIS WITH BICUSPID VALVE: Status: ACTIVE | Noted: 2018-05-22

## 2018-05-22 PROBLEM — Z86.79 H/O AORTIC ANEURYSM REPAIR: Status: ACTIVE | Noted: 2018-05-22

## 2018-05-24 NOTE — TELEPHONE ENCOUNTER
Received call from Laina Aguilera stating that the prior auth for this pt's Echo has been denied. Then spoke to pt about the denial and she provided her case #[id number] and the number to do a reconsideration for her Echo prior authorization. Spoke with a nursing representative and was able to get pt's Echo PA approved.     Prior Authorization #[id number] (valid May 23- July 22, 2018)     Spoke with pt about the approval and transferred her to scheduling to get her appt and Echo rescheduled.

## 2018-06-05 ENCOUNTER — OFFICE VISIT (OUTPATIENT)
Dept: INTERNAL MEDICINE | Facility: CLINIC | Age: 63
End: 2018-06-05
Payer: COMMERCIAL

## 2018-06-05 VITALS
HEIGHT: 64 IN | SYSTOLIC BLOOD PRESSURE: 130 MMHG | DIASTOLIC BLOOD PRESSURE: 88 MMHG | BODY MASS INDEX: 21.51 KG/M2 | TEMPERATURE: 98.4 F | HEART RATE: 74 BPM | RESPIRATION RATE: 16 BRPM | OXYGEN SATURATION: 98 % | WEIGHT: 126 LBS
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
## 2018-06-05 DIAGNOSIS — E05.90 SUBCLINICAL HYPERTHYROIDISM: ICD-10-CM

## 2018-06-05 LAB — T3FREE SERPL-MCNC: 3.4 PG/ML (ref 2.3–4.2)

## 2018-06-05 PROCEDURE — 84439 ASSAY OF FREE THYROXINE: CPT | Performed by: PHYSICIAN ASSISTANT

## 2018-06-05 PROCEDURE — 36415 COLL VENOUS BLD VENIPUNCTURE: CPT | Performed by: PHYSICIAN ASSISTANT

## 2018-06-05 PROCEDURE — 84481 FREE ASSAY (FT-3): CPT | Performed by: PHYSICIAN ASSISTANT

## 2018-06-05 PROCEDURE — 99213 OFFICE O/P EST LOW 20 MIN: CPT | Performed by: PHYSICIAN ASSISTANT

## 2018-06-05 PROCEDURE — 84443 ASSAY THYROID STIM HORMONE: CPT | Performed by: PHYSICIAN ASSISTANT

## 2018-06-05 PROCEDURE — 86618 LYME DISEASE ANTIBODY: CPT | Performed by: PHYSICIAN ASSISTANT

## 2018-06-05 NOTE — PROGRESS NOTES
"  SUBJECTIVE:   Annabella Bolanos is a 62 year old female who presents to clinic today for the following health issues:    Rash x 4 days from tick bite back of right upper leg noted 2 weeks ago.  (BP's at home last night 110/72 & 125/79.)    Patient is here in clinic due to a tick bite. She reports that about 2 weeks ago she found a tick dug in to her right thigh, she was able to remove the tick and is insure if it was a wood or deer tick. She has not had fevers, chills or body aches. She has not had new arthritic symptoms. She is unsure how long the tick was attached. She came to be checked for Lyme's because over the last 2 weeks she has developed a small area of redness surrounding the bite site. No other skin rashes or lesions.   -------------------------------------    Problem list and histories reviewed & adjusted, as indicated.  Additional history: as documented    BP Readings from Last 3 Encounters:   06/05/18 130/88   12/28/17 140/80   12/06/17 120/80    Wt Readings from Last 3 Encounters:   06/05/18 126 lb (57.2 kg)   12/28/17 129 lb (58.5 kg)   12/06/17 127 lb 1.6 oz (57.7 kg)         Reviewed and updated as needed this visit by clinical staff  Tobacco  Allergies  Meds  Med Hx  Surg Hx  Fam Hx  Soc Hx      Reviewed and updated as needed this visit by Provider       ROS:  Constitutional, HEENT, cardiovascular, pulmonary, gi and gu systems are negative, except as otherwise noted.    OBJECTIVE:     /88 (BP Location: Left arm, Patient Position: Chair, Cuff Size: Adult Regular)  Pulse 74  Temp 98.4  F (36.9  C) (Oral)  Resp 16  Ht 5' 4\" (1.626 m)  Wt 126 lb (57.2 kg)  SpO2 98%  Breastfeeding? No  BMI 21.63 kg/m2  Body mass index is 21.63 kg/(m^2).  GENERAL: healthy, alert and no distress  MS: no gross musculoskeletal defects noted, no edema  SKIN: there is a small red area where tick was removed from the posterior upper right leg, there is a small amount of erythema surrounding the bite site. No " evidence of retained tick, no drainage or streaking redness.     Diagnostic Test Results:  Lyme testing-pending    ASSESSMENT/PLAN:       ICD-10-CM    1. Tick bite, initial encounter W57.XXXA Lyme Disease Janett with reflex to WB Serum   2. Subclinical hyperthyroidism E05.90 T3 Free     T4 free     TSH       Skin changes are most consistent with local inflammation. I will go ahead and check labs for Lyme's and have her follow up if any new or worsening symptoms.   See Patient Instructions    Pia Rebolledo PA-C  [facility]

## 2018-06-05 NOTE — PATIENT INSTRUCTIONS
Tick Bite (No Antibiotics)    You have been bitten by a tick. Ticks are small arachnids that feed on the blood of rodents, rabbits, birds, deer, dogs, and people. A tick bite may cause a reaction like a spider bite. You may have redness, itching, and slight swelling at the site. Sometimes you may have no reaction where the tick bit you.  Most tick bites are harmless. But some ticks carry diseases, such as Lyme disease or [place] Mountain spotted fever. These can be passed to people at the time of the bite. Lyme disease is of greatest concern. Right now you have no symptoms of Lyme disease or other serious reaction to the bite. It is important to watch for the warning signs, which could appear weeks to months after the tick bite.  Home care  The following will help you care for your bite at home:    If itching is a problem, avoid tight clothing and anything that heats up your skin. This includes hot showers or baths and direct sunlight. This will tend to make the itching worse.    An ice pack will reduce local areas of redness and itching. Make your own ice pack by putting ice cubes in a zip-top plastic bag and wrapping it in a thin towel. Creams containing benzocaine will reduce itching. These creams are available over the counter.    You can use an antihistamine with diphenhydramine if your doctor did not give you another antihistamine. This medicine may be used to reduce itching if large areas of the skin are involved. It is available at drugstores and groceries. If symptoms continue, talk with your doctor or pharmacist about over-the-counter medicines.  Follow-up care  Follow up with your healthcare provider, or as advised.  When to seek medical advice  Call your healthcare provider right away if any of these occur:  Signs of local infection. Watch for these during the next few days.    Increasing redness around the bite site    Increased pain or swelling    Fever over 100.4 F (38.0 C), or as directed by your  healthcare provider    Fluid draining from the bite area  Signs of tick-related disease. Watch for these during the next few weeks to months.    Circular, red, ring-like rash appears at the bite area within 1 to 3 weeks    A non-itchy red rash develops on your wrists or ankles and spreads. It may become purple (petechiae).    Red eyes    Tiredness, fever or chills, nausea or vomiting    Neck pain or stiffness, headache, or confusion    Muscle or bone aches    Irregular or rapid heartbeat    Joint pain or swelling, especially in the knee    Numbness, tingling, or weakness in the arms or legs    Weakness on one side of the face  Date Last Reviewed: 10/1/2016    4299-5959 [employer]. [address] [place] [address], [place] [address]. All rights reserved. This information is not intended as a substitute for professional medical care. Always follow your healthcare professional's instructions.

## 2018-06-05 NOTE — MR AVS SNAPSHOT
After Visit Summary   6/5/2018    Annabella Bolanos    MRN: [id number]           Patient Information     Date Of Birth          1955        Visit Information        Provider Department      6/5/2018 9:00 AM Pia Rebolledo PA-C [facility]        Today's Diagnoses     Tick bite, initial encounter    -  1      Care Instructions      Tick Bite (No Antibiotics)    You have been bitten by a tick. Ticks are small arachnids that feed on the blood of rodents, rabbits, birds, deer, dogs, and people. A tick bite may cause a reaction like a spider bite. You may have redness, itching, and slight swelling at the site. Sometimes you may have no reaction where the tick bit you.  Most tick bites are harmless. But some ticks carry diseases, such as Lyme disease or Jose Miguel Mountain spotted fever. These can be passed to people at the time of the bite. Lyme disease is of greatest concern. Right now you have no symptoms of Lyme disease or other serious reaction to the bite. It is important to watch for the warning signs, which could appear weeks to months after the tick bite.  Home care  The following will help you care for your bite at home:    If itching is a problem, avoid tight clothing and anything that heats up your skin. This includes hot showers or baths and direct sunlight. This will tend to make the itching worse.    An ice pack will reduce local areas of redness and itching. Make your own ice pack by putting ice cubes in a zip-top plastic bag and wrapping it in a thin towel. Creams containing benzocaine will reduce itching. These creams are available over the counter.    You can use an antihistamine with diphenhydramine if your doctor did not give you another antihistamine. This medicine may be used to reduce itching if large areas of the skin are involved. It is available at drugstores and groceries. If symptoms continue, talk with your doctor or pharmacist about over-the-counter  medicines.  Follow-up care  Follow up with your healthcare provider, or as advised.  When to seek medical advice  Call your healthcare provider right away if any of these occur:  Signs of local infection. Watch for these during the next few days.    Increasing redness around the bite site    Increased pain or swelling    Fever over 100.4 F (38.0 C), or as directed by your healthcare provider    Fluid draining from the bite area  Signs of tick-related disease. Watch for these during the next few weeks to months.    Circular, red, ring-like rash appears at the bite area within 1 to 3 weeks    A non-itchy red rash develops on your wrists or ankles and spreads. It may become purple (petechiae).    Red eyes    Tiredness, fever or chills, nausea or vomiting    Neck pain or stiffness, headache, or confusion    Muscle or bone aches    Irregular or rapid heartbeat    Joint pain or swelling, especially in the knee    Numbness, tingling, or weakness in the arms or legs    Weakness on one side of the face  Date Last Reviewed: 10/1/2016    1226-6850 [employer]. [address] [place] [address]. All rights reserved. This information is not intended as a substitute for professional medical care. Always follow your healthcare professional's instructions.                Follow-ups after your visit        Follow-up notes from your care team     Return if symptoms worsen or fail to improve.      Your next 10 appointments already scheduled     Remi 15, 2018  7:30 AM CDT   Ech Complete with [id number]   [facility] ([facility])    [address] [address] [address] [phone]           1. Please bring or wear a comfortable two-piece outfit. 2. You may eat, drink and take your normal medicines. 3. For any questions that cannot be answered, please contact the ordering physician ***Please check-in at the [place] Registration Office located in  [address] in the [facility] building. When you are finished registering, please go to [address] and have a seat. The technician will call your name for the test.            Jun 21, 2018  8:00 AM CDT   Anticoagulation Visit with [facility]   [facility]    [address]  [facility] [id number] [phone]            Jun 21, 2018 11:45 AM CDT   Return Visit with Cecilia Guan DO   [facility] ([facility])    [address] [place] [address] [address]  [facility] [address] [phone] OPT 2              Who to contact     If you have questions or need follow up information about today's clinic visit or your schedule please contact [facility] directly at [phone].  Normal or non-critical lab and imaging results will be communicated to you by MyChart, letter or phone within 4 business days after the clinic has received the results. If you do not hear from us within 7 days, please contact the clinic through MyChart or phone. If you have a critical or abnormal lab result, we will notify you by phone as soon as possible.  Submit refill requests through [employer] or call your pharmacy and they will forward the refill request to us. Please allow 3 business days for your refill to be completed.          Additional Information About Your Visit        MyChart Information     [employer] gives you secure access to your electronic health record. If you see a primary care provider, you can also send messages to your care team and make appointments. If you have questions, please call your primary care clinic.  If you do not have a primary care provider, please call [phone] and they will assist you.        Care EveryWhere ID     This is your Care EveryWhere ID. This could be used by other organizations to access your [place] medical records  [id number]        Your Vitals  "Were     Pulse Temperature Respirations Height Pulse Oximetry Breastfeeding?    74 98.4  F (36.9  C) (Oral) 16 5' 4\" (1.626 m) 98% No    BMI (Body Mass Index)                   21.63 kg/m2            Blood Pressure from Last 3 Encounters:   06/05/18 130/88   12/28/17 140/80   12/06/17 120/80    Weight from Last 3 Encounters:   06/05/18 126 lb (57.2 kg)   12/28/17 129 lb (58.5 kg)   12/06/17 127 lb 1.6 oz (57.7 kg)              We Performed the Following     Lyme Disease Janett with reflex to WB Serum        Primary Care Provider Office Phone # Fax #    Brook Echavarria [phone]       [address]  [facility] [address]        Equal Access to Services     [facility]: Hadii aad ku hadasho Soomaali, waaxda luqadaha, qaybta kaalmada adeegyada, laura roquein hayaan francesca romero . So [facility] [phone].    ATENCIÓN: Si habla español, tiene a gamez disposición servicios gratuitos de asistencia lingüística. Llame al [phone].    We comply with applicable federal civil rights laws and [place] laws. We do not discriminate on the basis of race, color, national origin, age, disability, sex, sexual orientation, or gender identity.            Thank you!     Thank you for choosing [facility]  for your care. Our goal is always to provide you with excellent care. Hearing back from our patients is one way we can continue to improve our services. Please take a few minutes to complete the written survey that you may receive in the mail after your visit with us. Thank you!             Your Updated Medication List - Protect others around you: Learn how to safely use, store and throw away your medicines at www.disposemymeds.org.          This list is accurate as of 6/5/18  9:12 AM.  Always use your most recent med list.                   Brand Name Dispense Instructions for use Diagnosis    anastrozole 1 MG tablet    ARIMIDEX    30 tablet    Take by mouth daily        " ASPIRIN EC PO      Take 81 mg by mouth daily        CALCIUM 500 PO      Take 600 mg by mouth daily        carvedilol 12.5 MG tablet    COREG    180 tablet    Take 1 tablet (12.5 mg) by mouth 2 times daily (with meals)    Essential hypertension       lisinopril 10 MG tablet    PRINIVIL/ZESTRIL    135 tablet    Take 1 1/2 tabs (15 mg) by mouth daily    Essential hypertension, benign       triamcinolone acetonide 0.05 % Oint     17 g    Externally apply topically 2 times daily as needed    Dyshidrotic eczema       TYLENOL 325 MG tablet   Generic drug:  acetaminophen     250 tablet    Take 1-2 tablets (325-650 mg) by mouth every 6 hours as needed for mild pain        valACYclovir 1000 mg tablet    VALTREX    4 tablet    Take 2 tablets (2,000 mg) by mouth 2 times daily    Cold sore       VITAMIN D3 PO      Take by mouth once a week        warfarin 5 MG tablet    JANTOVEN    100 tablet    Take one tablet (5 mg) daily except one and a half tablets (7.5 mg) on Wednesdays or as directed by the INR Clinic.    S/P aortic valve replacement       zolpidem 5 MG tablet    AMBIEN    60 tablet    Take 1-2 tablets (5-10 mg) by mouth nightly as needed for sleep    Insomnia, unspecified type

## 2018-06-06 LAB
B BURGDOR IGG+IGM SER QL: 0.1 (ref 0–0.89)
T4 FREE SERPL-MCNC: 1.27 NG/DL (ref 0.76–1.46)
TSH SERPL DL<=0.005 MIU/L-ACNC: 0.05 MU/L (ref 0.4–4)

## 2018-06-15 ENCOUNTER — HOSPITAL ENCOUNTER (OUTPATIENT)
Dept: CARDIOLOGY | Facility: CLINIC | Age: 63
Discharge: HOME | End: 2018-06-15
Admitting: INTERNAL MEDICINE
Payer: COMMERCIAL

## 2018-06-15 DIAGNOSIS — Z95.2 HISTORY OF MECHANICAL AORTIC VALVE REPLACEMENT: ICD-10-CM

## 2018-06-15 DIAGNOSIS — I10 ESSENTIAL HYPERTENSION, BENIGN: ICD-10-CM

## 2018-06-15 DIAGNOSIS — I10 ESSENTIAL HYPERTENSION: ICD-10-CM

## 2018-06-15 DIAGNOSIS — I10 BENIGN ESSENTIAL HYPERTENSION: ICD-10-CM

## 2018-06-15 PROCEDURE — 93306 TTE W/DOPPLER COMPLETE: CPT | Mod: 26 | Performed by: INTERNAL MEDICINE

## 2018-06-15 PROCEDURE — 93306 TTE W/DOPPLER COMPLETE: CPT

## 2018-06-28 ENCOUNTER — ANTICOAGULATION THERAPY VISIT (OUTPATIENT)
Dept: ANTICOAGULATION | Facility: CLINIC | Age: 63
End: 2018-06-28
Payer: COMMERCIAL

## 2018-06-28 ENCOUNTER — OFFICE VISIT (OUTPATIENT)
Dept: CARDIOLOGY | Facility: CLINIC | Age: 63
End: 2018-06-28
Payer: COMMERCIAL

## 2018-06-28 VITALS
HEIGHT: 64 IN | BODY MASS INDEX: 21.66 KG/M2 | DIASTOLIC BLOOD PRESSURE: 88 MMHG | HEART RATE: 60 BPM | WEIGHT: 126.9 LBS | SYSTOLIC BLOOD PRESSURE: 144 MMHG

## 2018-06-28 DIAGNOSIS — I10 ESSENTIAL HYPERTENSION: ICD-10-CM

## 2018-06-28 DIAGNOSIS — I10 ESSENTIAL HYPERTENSION, BENIGN: ICD-10-CM

## 2018-06-28 DIAGNOSIS — Z79.01 LONG-TERM (CURRENT) USE OF ANTICOAGULANTS: ICD-10-CM

## 2018-06-28 DIAGNOSIS — Z95.2 HISTORY OF MECHANICAL AORTIC VALVE REPLACEMENT: ICD-10-CM

## 2018-06-28 DIAGNOSIS — Z95.2 H/O AORTIC VALVE REPLACEMENT: ICD-10-CM

## 2018-06-28 DIAGNOSIS — I10 BENIGN ESSENTIAL HYPERTENSION: ICD-10-CM

## 2018-06-28 LAB — INR POINT OF CARE: 1.9 (ref 0.86–1.14)

## 2018-06-28 PROCEDURE — 85610 PROTHROMBIN TIME: CPT | Mod: QW

## 2018-06-28 PROCEDURE — 99214 OFFICE O/P EST MOD 30 MIN: CPT | Performed by: INTERNAL MEDICINE

## 2018-06-28 PROCEDURE — 99207 ZZC NO CHARGE NURSE ONLY: CPT

## 2018-06-28 PROCEDURE — 36416 COLLJ CAPILLARY BLOOD SPEC: CPT

## 2018-06-28 NOTE — MR AVS SNAPSHOT
Annabella TEMPLE Luis Miguel   6/28/2018 7:30 AM   Anticoagulation Therapy Visit    Description:  62 year old female   Provider:  [facility]   Department:  [facility] Anti Coagulation           INR as of 6/28/2018     Today's INR 1.9      Anticoagulation Summary as of 6/28/2018     INR goal 1.8-2.5   Today's INR 1.9   Full warfarin instructions 7.5 mg on Wed; 5 mg all other days   Next INR check 8/9/2018    Indications   Long-term (current) use of anticoagulants [Z79.01] [Z79.01]  H/O aortic valve replacement [Z95.2]         Your next Anticoagulation Clinic appointment(s)     Aug 09, 2018  8:15 AM CDT   Anticoagulation Visit with [facility]   [facility]    [address] [facility] [address]  [facility] [address] [phone]              Contact Numbers     [facility] Phone Numbers:  Anticoagulation Clinic Appointments : [phone]  Anticoagulation Nurse: [phone]         June 2018 Details    Sun Mon Tue Wed Thu Fri Sat          1               2                 3               4               5               6               7               8               9                 10               11               12               13               14               15               16                 17               18               19               20               21               22               23                 24               25               26               27               28      5 mg   See details      29      5 mg         30      5 mg          Date Details   06/28 This INR check               How to take your warfarin dose     To take:  5 mg Take 1 of the 5 mg tablets.           July 2018 Details    Sun Mon Tue Wed Thu Fri Sat     1      5 mg         2      5 mg         3      5 mg         4      7.5 mg         5      5 mg         6      5 mg         7      5 mg           8      5 mg         9      5 mg         10      5 mg         11      7.5  mg         12      5 mg         13      5 mg         14      5 mg           15      5 mg         16      5 mg         17      5 mg         18      7.5 mg         19      5 mg         20      5 mg         21      5 mg           22      5 mg         23      5 mg         24      5 mg         25      7.5 mg         26      5 mg         27      5 mg         28      5 mg           29      5 mg         30      5 mg         31      5 mg              Date Details   No additional details            How to take your warfarin dose     To take:  5 mg Take 1 of the 5 mg tablets.    To take:  7.5 mg Take 1.5 of the 5 mg tablets.           August 2018 Details    Sun Mon Tue Wed Thu Fri Sat        1      7.5 mg         2      5 mg         3      5 mg         4      5 mg           5      5 mg         6      5 mg         7      5 mg         8      7.5 mg         9            10               11                 12               13               14               15               16               17               18                 19               20               21               22               23               24               25                 26               27               28               29               30               31                 Date Details   No additional details    Date of next INR:  8/9/2018         How to take your warfarin dose     To take:  5 mg Take 1 of the 5 mg tablets.    To take:  7.5 mg Take 1.5 of the 5 mg tablets.

## 2018-06-28 NOTE — LETTER
6/28/2018      Brook Echavarria MD  [address] [facility] [address]      RE: Annabella Bolanos       Dear Colleague,    I had the pleasure of seeing Annabella Bolanos in the [facility].    Service Date: 06/28/2018      REFERRING PHYSICIAN:  Dr. Echavarria.      HISTORY OF PRESENT ILLNESS:  Ms. Bolanos is a very pleasant 62-year-old female with a history of bicuspid aortic stenosis, status post mechanical mitral valve replacement in 2011.  She did have an ascending aortic aneurysm with a conduit replacement as well.  She is here for annual followup visit.  She also has a history of Adriamycin exposure back in 2014 due to breast cancer.  She underwent both chemotherapy and radiation therapy and is on hormonal modulation currently.  Her last note with her oncologist suggests remission, and she does continue with the hormonal modulation without difficulty.  She did have some elevated blood pressures recently and therefore decided to monitor this on her own.  She also brought in her blood pressure monitor today to check its accuracy.  It seems she does have an element of white-coat hypertension.  When she came in for her echocardiogram, her blood pressure was elevated and her blood pressure today was a little bit elevated, but when she checks it at home it typically runs between 110-120 systolic over 70s-80s diastolic and never higher than this.  She is feeling well, exercising on a regular basis and she has no symptoms.  Her echocardiogram she had some questions about.  It suggested moderate concentric left ventricular hypertrophy, but when I actually look at the measurements of the septal wall and posterior wall, these are actually within normal limits, so I think this may have been an error on the reader's part.  The LV systolic function is normal with an EF of 60%-65%.  Her RV function is normal.  She does have mild to moderate tricuspid insufficiency; however, again her  RV function and pulmonary pressures are estimated normal.  Her mechanical valve appears well seated.  She has had trace to mild aortic regurgitation since replacement.  This is stable and her gradients look stable as well.  I think she felt more comfortable after we have reviewed this today.      PHYSICAL EXAMINATION:   VITAL SIGNS:  Her blood pressure again is mildly elevated at 144/88, pulse is 60, weight 126 and body mass index of 21.   NECK:  Carotid upstrokes seem brisk without bruit.   CARDIOVASCULAR:  Tones demonstrate a systolic ejection murmur with an audible crisp click of her mechanical valve.  I do not appreciate a gallop or rub.   LUNGS:  Clear posteriorly.   EXTREMITIES:  She has no peripheral edema.        Lastly, she is being followed by Endocrinology for hyperthyroidism.  She does not seem to be symptomatic at this time, but her TSH does continue to drop.  Last checked on  was 0.05.  She does have followup with this.      ASSESSMENT AND PLAN:  In summary, Ms. Bolanos is a very pleasant 62-year-old female with a history of bicuspid aortic stenosis, status post mechanical mitral valve replacement and ascending aortic conduit.  She is stable from a cardiac perspective and asymptomatic.  Her echocardiogram shows stable function of her valve and heart function.  I am going to review the concentric hypertrophy.  I do believe the measurements are within normal range and do not see evidence of this.  I will be happy to continue to follow her on an annual basis or as needed.  Please feel free to contact me with any questions you have in regards to her care.      cc:   Brook Echavarria MD    [facility]    [address] [place], [address]    [place]  [address]         KHADIJAH HENSON DO             D: 2018   T: 2018   MT: RADHA      Name:     VAL BOLANOS   MRN:      [id number]        Account:      [id number]   :      1955           Service Date: 2018       Document: [id number]         Outpatient Encounter Prescriptions as of 6/28/2018   Medication Sig Dispense Refill     acetaminophen (TYLENOL) 325 MG tablet Take 1-2 tablets (325-650 mg) by mouth every 6 hours as needed for mild pain 250 tablet 11     anastrozole (ARIMIDEX) 1 MG tablet Take by mouth daily 30 tablet      ASPIRIN EC PO Take 81 mg by mouth daily       Calcium Carbonate (CALCIUM 500 PO) Take 600 mg by mouth daily        carvedilol (COREG) 12.5 MG tablet Take 1 tablet (12.5 mg) by mouth 2 times daily (with meals) 180 tablet 3     Cholecalciferol (VITAMIN D3 PO) Take by mouth every other day        lisinopril (PRINIVIL/ZESTRIL) 10 MG tablet Take 1 1/2 tabs (15 mg) by mouth daily 135 tablet 3     valACYclovir (VALTREX) 1000 mg tablet Take 2 tablets (2,000 mg) by mouth 2 times daily (Patient taking differently: Take 2,000 mg by mouth as needed ) 4 tablet 3     warfarin (JANTOVEN) 5 MG tablet Take one tablet (5 mg) daily except one and a half tablets (7.5 mg) on Wednesdays or as directed by the INR Clinic. 100 tablet 1     zolpidem (AMBIEN) 5 MG tablet Take 1-2 tablets (5-10 mg) by mouth nightly as needed for sleep 60 tablet 0     triamcinolone acetonide 0.05 % OINT Externally apply topically 2 times daily as needed (Patient not taking: Reported on 6/28/2018) 17 g 0     No facility-administered encounter medications on file as of 6/28/2018.        Again, thank you for allowing me to participate in the care of your patient.      Sincerely,    Cecilia Guan,      [facility]

## 2018-06-28 NOTE — PROGRESS NOTES
Service Date: 06/28/2018      REFERRING PHYSICIAN:  Dr. Echavarria.      HISTORY OF PRESENT ILLNESS:  Ms. Bolanos is a very pleasant 62-year-old female with a history of bicuspid aortic stenosis, status post mechanical mitral valve replacement in 2011.  She did have an ascending aortic aneurysm with a conduit replacement as well.  She is here for annual followup visit.  She also has a history of Adriamycin exposure back in 2014 due to breast cancer.  She underwent both chemotherapy and radiation therapy and is on hormonal modulation currently.  Her last note with her oncologist suggests remission, and she does continue with the hormonal modulation without difficulty.  She did have some elevated blood pressures recently and therefore decided to monitor this on her own.  She also brought in her blood pressure monitor today to check its accuracy.  It seems she does have an element of white-coat hypertension.  When she came in for her echocardiogram, her blood pressure was elevated and her blood pressure today was a little bit elevated, but when she checks it at home it typically runs between 110-120 systolic over 70s-80s diastolic and never higher than this.  She is feeling well, exercising on a regular basis and she has no symptoms.  Her echocardiogram she had some questions about.  It suggested moderate concentric left ventricular hypertrophy, but when I actually look at the measurements of the septal wall and posterior wall, these are actually within normal limits, so I think this may have been an error on the reader's part.  The LV systolic function is normal with an EF of 60%-65%.  Her RV function is normal.  She does have mild to moderate tricuspid insufficiency; however, again her RV function and pulmonary pressures are estimated normal.  Her mechanical valve appears well seated.  She has had trace to mild aortic regurgitation since replacement.  This is stable and her gradients look stable as well.  I think  she felt more comfortable after we have reviewed this today.      PHYSICAL EXAMINATION:   VITAL SIGNS:  Her blood pressure again is mildly elevated at 144/88, pulse is 60, weight 126 and body mass index of 21.   NECK:  Carotid upstrokes seem brisk without bruit.   CARDIOVASCULAR:  Tones demonstrate a systolic ejection murmur with an audible crisp click of her mechanical valve.  I do not appreciate a gallop or rub.   LUNGS:  Clear posteriorly.   EXTREMITIES:  She has no peripheral edema.        Lastly, she is being followed by Endocrinology for hyperthyroidism.  She does not seem to be symptomatic at this time, but her TSH does continue to drop.  Last checked on  was 0.05.  She does have followup with this.      ASSESSMENT AND PLAN:  In summary, Ms. Bolanos is a very pleasant 62-year-old female with a history of bicuspid aortic stenosis, status post mechanical mitral valve replacement and ascending aortic conduit.  She is stable from a cardiac perspective and asymptomatic.  Her echocardiogram shows stable function of her valve and heart function.  I am going to review the concentric hypertrophy.  I do believe the measurements are within normal range and do not see evidence of this.  I will be happy to continue to follow her on an annual basis or as needed.  Please feel free to contact me with any questions you have in regards to her care.      cc:   Brook Echavarria MD    [facility]    [address], [address]    [place] [address]         KHADIJAH HENSON DO             D: 2018   T: 2018   MT: RADHA      Name:     VAL BOLANOS   MRN:      [id number]        Account:      [id number]   :      1955           Service Date: 2018      Document: [id number]

## 2018-06-28 NOTE — MR AVS SNAPSHOT
After Visit Summary   6/28/2018    Annablela Bolanos    MRN: [id number]           Patient Information     Date Of Birth          1955        Visit Information        Provider Department      6/28/2018 8:45 AM Cecilia Guan DO [facility]        Today's Diagnoses     Benign essential hypertension        Essential hypertension        Essential hypertension, benign        History of mechanical aortic valve replacement           Follow-ups after your visit        Your next 10 appointments already scheduled     Jul 19, 2018  1:00 PM CDT   Return Visit with Merle Baxter MD   [facility] ([facility])    [address] [facility] [address]  [facility] [id number] [phone]            Jul 23, 2018 10:00 AM CDT   DX HIP/PELVIS/SPINE with RIDX1   [facility] ([facility])    [address]  [address]  [facility] [address]              Please do not take any of the following 24 hours prior to the day of your exam: vitamins, calcium tablets, antacids.  If possible, please wear clothes without metal (snaps, zippers). A sweatsuit works well.            Jul 26, 2018  8:00 AM CDT   PHYSICAL with Brook Echavarria MD   [facility] ([facility])    [address] [facility] [id number] [phone]            Aug 06, 2018  1:00 PM CDT   MA SCREENING BILATERAL W/ SHABNAM with RHBCMA2   [facility] ([facility])    [address]  [facility] [id number] [phone]           Three-dimensional (3D) mammograms are available at [facility] in [place], [place], [place], [place], [facility], [place], [place], and [place]. -Health locations include [place] and [facility] in [place]. Benefits of 3D mammograms include: - Improved rate of cancer  "detection - Decreases your chance of having to go back for more tests, which means fewer: - \"False-positive\" results (This means that there is an abnormal area but it isn't cancer.) - Invasive testing procedures, such as a biopsy or surgery - Can provide clearer images of the breast if you have dense breast tissue. 3D mammography is an optional exam that anyone can have with a 2D mammogram. It doesn't replace or take the place of a 2D mammogram. 2D mammograms remain an effective screening test for all women.  Not all insurance companies cover the cost of a 3D mammogram. Check with your insurance.            Aug 09, 2018  8:15 AM CDT   Anticoagulation Visit with [facility]   [facility]    [address] [facility] [address]  [facility] [address] [phone]              Who to contact     If you have questions or need follow up information about today's clinic visit or your schedule please contact [facility] directly at [phone].  Normal or non-critical lab and imaging results will be communicated to you by MyChart, letter or phone within 4 business days after the clinic has received the results. If you do not hear from us within 7 days, please contact the clinic through [employer] or phone. If you have a critical or abnormal lab result, we will notify you by phone as soon as possible.  Submit refill requests through [employer] or call your pharmacy and they will forward the refill request to us. Please allow 3 business days for your refill to be completed.          Additional Information About Your Visit        [employer] Information     [employer] gives you secure access to your electronic health record. If you see a primary care provider, you can also send messages to your care team and make appointments. If you have questions, please call your primary care clinic.  If you do not have a primary care provider, please " "call [phone] and they will assist you.        Care EveryWhere ID     This is your Care EveryWhere ID. This could be used by other organizations to access your [place] medical records  [id number]        Your Vitals Were     Pulse Height BMI (Body Mass Index)             60 1.626 m (5' 4\") 21.78 kg/m2          Blood Pressure from Last 3 Encounters:   06/28/18 144/88   06/05/18 130/88   12/28/17 140/80    Weight from Last 3 Encounters:   06/28/18 57.6 kg (126 lb 14.4 oz)   06/05/18 57.2 kg (126 lb)   12/28/17 58.5 kg (129 lb)              We Performed the Following     Follow-Up with Cardiologist          Today's Medication Changes          These changes are accurate as of 6/28/18  9:20 AM.  If you have any questions, ask your nurse or doctor.               These medicines have changed or have updated prescriptions.        Dose/Directions    valACYclovir 1000 mg tablet   Commonly known as:  VALTREX   This may have changed:    - when to take this  - reasons to take this   Used for:  Cold sore        Dose:  2000 mg   Take 2 tablets (2,000 mg) by mouth 2 times daily   Quantity:  4 tablet   Refills:  3                Primary Care Provider Office Phone # Fax #    Brook Marla Echavarria [phone]       [address] [facility] [address]        Equal Access to Services     CHRIS NAVARRO AH: Hadii gaye simpsono Sorosalie, waaxda luqadaha, qaybta kaalmada eneida, laura romero . So [facility] [phone].    ATENCIÓN: Si habla español, tiene a gamez disposición servicios gratuitos de asistencia lingüística. Angel al [phone].    We comply with applicable federal civil rights laws and [place] laws. We do not discriminate on the basis of race, color, national origin, age, disability, sex, sexual orientation, or gender identity.            Thank you!     Thank you for choosing [facility]  for your care. Our goal is always " to provide you with excellent care. Hearing back from our patients is one way we can continue to improve our services. Please take a few minutes to complete the written survey that you may receive in the mail after your visit with us. Thank you!             Your Updated Medication List - Protect others around you: Learn how to safely use, store and throw away your medicines at www.disposemymeds.org.          This list is accurate as of 6/28/18  9:20 AM.  Always use your most recent med list.                   Brand Name Dispense Instructions for use Diagnosis    anastrozole 1 MG tablet    ARIMIDEX    30 tablet    Take by mouth daily        ASPIRIN EC PO      Take 81 mg by mouth daily        CALCIUM 500 PO      Take 600 mg by mouth daily        carvedilol 12.5 MG tablet    COREG    180 tablet    Take 1 tablet (12.5 mg) by mouth 2 times daily (with meals)    Essential hypertension       lisinopril 10 MG tablet    PRINIVIL/ZESTRIL    135 tablet    Take 1 1/2 tabs (15 mg) by mouth daily    Essential hypertension, benign       triamcinolone acetonide 0.05 % Oint     17 g    Externally apply topically 2 times daily as needed    Dyshidrotic eczema       TYLENOL 325 MG tablet   Generic drug:  acetaminophen     250 tablet    Take 1-2 tablets (325-650 mg) by mouth every 6 hours as needed for mild pain        valACYclovir 1000 mg tablet    VALTREX    4 tablet    Take 2 tablets (2,000 mg) by mouth 2 times daily    Cold sore       VITAMIN D3 PO      Take by mouth every other day        warfarin 5 MG tablet    JANTOVEN    100 tablet    Take one tablet (5 mg) daily except one and a half tablets (7.5 mg) on Wednesdays or as directed by the INR Clinic.    S/P aortic valve replacement       zolpidem 5 MG tablet    AMBIEN    60 tablet    Take 1-2 tablets (5-10 mg) by mouth nightly as needed for sleep    Insomnia, unspecified type

## 2018-06-28 NOTE — LETTER
6/28/2018    Brook Echavarria MD  [address] [facility] [address]    RE: Annabella Bolanos       Dear Colleague,    I had the pleasure of seeing Annabella Bolanos in the [facility].    HPI and Plan:   See dictation    No orders of the defined types were placed in this encounter.      No orders of the defined types were placed in this encounter.      There are no discontinued medications.      Encounter Diagnoses   Name Primary?     Benign essential hypertension      Essential hypertension      Essential hypertension, benign      History of mechanical aortic valve replacement        CURRENT MEDICATIONS:  Current Outpatient Prescriptions   Medication Sig Dispense Refill     acetaminophen (TYLENOL) 325 MG tablet Take 1-2 tablets (325-650 mg) by mouth every 6 hours as needed for mild pain 250 tablet 11     anastrozole (ARIMIDEX) 1 MG tablet Take by mouth daily 30 tablet      ASPIRIN EC PO Take 81 mg by mouth daily       Calcium Carbonate (CALCIUM 500 PO) Take 600 mg by mouth daily        carvedilol (COREG) 12.5 MG tablet Take 1 tablet (12.5 mg) by mouth 2 times daily (with meals) 180 tablet 3     Cholecalciferol (VITAMIN D3 PO) Take by mouth every other day        lisinopril (PRINIVIL/ZESTRIL) 10 MG tablet Take 1 1/2 tabs (15 mg) by mouth daily 135 tablet 3     valACYclovir (VALTREX) 1000 mg tablet Take 2 tablets (2,000 mg) by mouth 2 times daily (Patient taking differently: Take 2,000 mg by mouth as needed ) 4 tablet 3     warfarin (JANTOVEN) 5 MG tablet Take one tablet (5 mg) daily except one and a half tablets (7.5 mg) on Wednesdays or as directed by the INR Clinic. 100 tablet 1     zolpidem (AMBIEN) 5 MG tablet Take 1-2 tablets (5-10 mg) by mouth nightly as needed for sleep 60 tablet 0     triamcinolone acetonide 0.05 % OINT Externally apply topically 2 times daily as needed (Patient not taking: Reported on 6/28/2018) 17 g 0       ALLERGIES     Allergies   Allergen  Reactions     Morphine Sulfate Nausea and Vomiting       PAST MEDICAL HISTORY:  Past Medical History:   Diagnosis Date     Adenomatous colon polyp 2015     Aortic stenosis 11    bicuspid AV with severe stenosis - 2011 mechanical AVR with 23 mm St Yordan AV conduit, composite graft placement     Arthritis     knees and hands     Bicuspid aortic valve      Breast cancer (H) 2014    R breast     H/O aortic valve replacement     St Yordan     Heart murmur     Valve repalcement .     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    AAA repair with av23 mm tube graft     PONV (postoperative nausea and vomiting)     n/v morphine vs anesthesia, vomiting x24 hrs     Subclinical hyperthyroidism 2017     Thrombosis of leg     after  of daughter     Uncomplicated asthma        PAST SURGICAL HISTORY:  Past Surgical History:   Procedure Laterality Date     BIOPSY NODE SENTINEL Right 9/10/2014    Procedure: BIOPSY NODE SENTINEL;  Surgeon: Ila Romano MD;  Location: [facility] OR     CARDIAC SURGERY  2011    aortic valve replacement     ENT SURGERY      tonsillectomy     HRW PORT A CATH       INSERT PORT VASCULAR ACCESS Left 10/22/2014    Procedure: INSERT PORT VASCULAR ACCESS;  Surgeon: Ila Rmoano MD;  Location: [facility] OR     LUMPECTOMY BREAST WITH SEED LOCALIZATION Right 9/10/2014    Procedure: LUMPECTOMY BREAST WITH SEED LOCALIZATION;  Surgeon: Ila Romano MD;  Location: [facility] OR     ORTHOPEDIC SURGERY      shoulder, thumb surgery     REMOVE PORT VASCULAR ACCESS Left 2015    Procedure: REMOVE PORT VASCULAR ACCESS;  Surgeon: Ila Romano MD;  Location: [facility] OR     REPAIR ANEURYSM ASCENDING AORTA  2011    Procedure:REPAIR ANEURYSM ASCENDING AORTA; Medial Sternotomy, Aortic Valve Replacement, ([employer] Yordan [employer] Masters HP Valved Graft, size 23  "mm) on pump oxygenation, intraoperative transesophageal cardiogram; Surgeon:JOHN PAUL ULLOA; [facility]     REPLACE VALVE AORTIC  6/23/11    bicuspid AV with severe stenosis - 6/2011 mechanical AVR with 23 mm St Yordan AV conduit, composite graft placement       FAMILY HISTORY:  Family History   Problem Relation Age of Onset     Hypertension Mother      Thyroid Disease Mother      \"Toxic thyroid\"     Prostate Cancer Father 70     Cancer Father 80     Lung cancer, Not caused from smoking     Cerebrovascular Disease Father 80     Hypertension Father      Cardiovascular Brother      half brother      Cardiovascular Son      Puentes-Parkinsons White Syndrome     Cardiovascular Daughter      Heart murmur     Breast Cancer Paternal Aunt 80     Cardiovascular Paternal Aunt      Arthritis Brother 40     RA - half brother      Cancer Maternal Grandfather      Colon Cancer No family hx of        SOCIAL HISTORY:  Social History     Social History     Marital status: Single     Spouse name: N/A     Number of children: N/A     Years of education: N/A     Social History Main Topics     Smoking status: Never Smoker     Smokeless tobacco: Never Used     Alcohol use Yes      Comment: 2 drinks per week -      Drug use: No     Sexual activity: Not Currently     Other Topics Concern     Caffeine Concern Yes     1-2 cups caffeine per day     Sleep Concern No     Stress Concern No     Weight Concern No     Special Diet Yes     low fat daily , low red meats     Back Care No     Exercise Yes     walks daily/ 3x a week exercise class     Social History Narrative       Review of Systems:  Skin:  Negative       Eyes:  Positive for glasses    ENT:  Positive for postnasal drainage;hearing loss minimal hearing loss ,    Respiratory:  Negative       Cardiovascular:  Negative      Gastroenterology: Negative      Genitourinary:  Positive for nocturia 1-2 times per pm -  prolapsed bladder   Musculoskeletal:  Positive for arthritis;joint " "pain;joint stiffness;foot pain hands, knees, and big toe on both feet -  and now shoulders , bunions and arthritis    Neurologic:  Negative   rare   Psychiatric:  Positive for sleep disturbances off and on sleep issues   Heme/Lymph/Imm:  Positive for allergies;easy bruising RX allergy ,  on Jantoven   Endocrine:  Positive for thyroid disorder seeing endrocrinologist     Physical Exam:  Vitals: /88 (BP Location: Right arm, Patient Position: Sitting, Cuff Size: Adult Regular)  Pulse 60  Ht 1.626 m (5' 4\")  Wt 57.6 kg (126 lb 14.4 oz)  BMI 21.78 kg/m2    Constitutional:  cooperative, alert and oriented, well developed, well nourished, in no acute distress        Skin:  warm and dry to the touch     exfoliation of finger tips with mild acrocyanosis    Head:  normocephalic        Eyes:  pupils equal and round        Lymph:      ENT:  no pallor or cyanosis        Neck:  JVP normal        Respiratory:  clear to auscultation;normal symmetry         Cardiac: regular rhythm     crisp prosthetic valve sounds grade 2;systolic ejection murmur     audible click  pulses full and equal                                        GI:  abdomen soft;no bruits        Extremities and Muscular Skeletal:  no edema         swollen joints and mild deformities to phalanges related to arthritic changes    Neurological:  no gross motor deficits        Psych:  Alert and Oriented x 3          CC  Cecilia Guan DO  [address]  [place] [address]                    Thank you for allowing me to participate in the care of your patient.      Sincerely,     Cecilia Guan DO     [facility]    cc:   Cecilia Guan DO  [address]  [place] [address]        "

## 2018-06-28 NOTE — PROGRESS NOTES
ANTICOAGULATION FOLLOW-UP CLINIC VISIT    Patient Name:  Annabella Bolanos  Date:  6/28/2018  Contact Type:  Face to Face    SUBJECTIVE:     Patient Findings     Positives No Problem Findings           OBJECTIVE    INR Protime   Date Value Ref Range Status   06/28/2018 1.9 (A) 0.86 - 1.14 Final       ASSESSMENT / PLAN  INR assessment THER    Recheck INR In: 6 WEEKS    INR Location Clinic      Anticoagulation Summary as of 6/28/2018     INR goal 1.8-2.5   Today's INR 1.9   Warfarin maintenance plan 7.5 mg (5 mg x 1.5) on Wed; 5 mg (5 mg x 1) all other days   Full warfarin instructions 7.5 mg on Wed; 5 mg all other days   Weekly warfarin total 37.5 mg   No change documented Deborah Aguilera RN   Plan last modified Erica Stinson RN (11/22/2017)   Next INR check 8/9/2018   Priority INR   Target end date     Indications   Long-term (current) use of anticoagulants [Z79.01] [Z79.01]  H/O aortic valve replacement [Z95.2]         Anticoagulation Episode Summary     INR check location     Preferred lab     Send INR reminders to [facility]    Comments       Anticoagulation Care Providers     Provider Role Specialty Phone number    Brook Echavarria MD [facility] Internal Medicine [phone]            See the Encounter Report to view Anticoagulation Flowsheet and Dosing Calendar (Go to Encounters tab in chart review, and find the Anticoagulation Therapy Visit)    Dosage adjustment made based on physician directed care plan.    Deborah Aguilera, RN

## 2018-06-28 NOTE — PROGRESS NOTES
HPI and Plan:   See dictation    No orders of the defined types were placed in this encounter.      No orders of the defined types were placed in this encounter.      There are no discontinued medications.      Encounter Diagnoses   Name Primary?     Benign essential hypertension      Essential hypertension      Essential hypertension, benign      History of mechanical aortic valve replacement        CURRENT MEDICATIONS:  Current Outpatient Prescriptions   Medication Sig Dispense Refill     acetaminophen (TYLENOL) 325 MG tablet Take 1-2 tablets (325-650 mg) by mouth every 6 hours as needed for mild pain 250 tablet 11     anastrozole (ARIMIDEX) 1 MG tablet Take by mouth daily 30 tablet      ASPIRIN EC PO Take 81 mg by mouth daily       Calcium Carbonate (CALCIUM 500 PO) Take 600 mg by mouth daily        carvedilol (COREG) 12.5 MG tablet Take 1 tablet (12.5 mg) by mouth 2 times daily (with meals) 180 tablet 3     Cholecalciferol (VITAMIN D3 PO) Take by mouth every other day        lisinopril (PRINIVIL/ZESTRIL) 10 MG tablet Take 1 1/2 tabs (15 mg) by mouth daily 135 tablet 3     valACYclovir (VALTREX) 1000 mg tablet Take 2 tablets (2,000 mg) by mouth 2 times daily (Patient taking differently: Take 2,000 mg by mouth as needed ) 4 tablet 3     warfarin (JANTOVEN) 5 MG tablet Take one tablet (5 mg) daily except one and a half tablets (7.5 mg) on Wednesdays or as directed by the INR Clinic. 100 tablet 1     zolpidem (AMBIEN) 5 MG tablet Take 1-2 tablets (5-10 mg) by mouth nightly as needed for sleep 60 tablet 0     triamcinolone acetonide 0.05 % OINT Externally apply topically 2 times daily as needed (Patient not taking: Reported on 6/28/2018) 17 g 0       ALLERGIES     Allergies   Allergen Reactions     Morphine Sulfate Nausea and Vomiting       PAST MEDICAL HISTORY:  Past Medical History:   Diagnosis Date     Adenomatous colon polyp 8/2015     Aortic stenosis 6/23/11    bicuspid AV with severe stenosis - 6/2011  mechanical AVR with 23 mm St Yordan AV conduit, composite graft placement     Arthritis     knees and hands     Bicuspid aortic valve      Breast cancer (H) 2014    R breast     H/O aortic valve replacement     St Yordan     Heart murmur     Valve repalcement .     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    AAA repair with av23 mm tube graft     PONV (postoperative nausea and vomiting)     n/v morphine vs anesthesia, vomiting x24 hrs     Subclinical hyperthyroidism 2017     Thrombosis of leg     after  of daughter     Uncomplicated asthma        PAST SURGICAL HISTORY:  Past Surgical History:   Procedure Laterality Date     BIOPSY NODE SENTINEL Right 9/10/2014    Procedure: BIOPSY NODE SENTINEL;  Surgeon: Ila Romano MD;  Location: [facility] OR     CARDIAC SURGERY  2011    aortic valve replacement     ENT SURGERY      tonsillectomy     HRW PORT A CATH       INSERT PORT VASCULAR ACCESS Left 10/22/2014    Procedure: INSERT PORT VASCULAR ACCESS;  Surgeon: Ila Romano MD;  Location: [facility] OR     LUMPECTOMY BREAST WITH SEED LOCALIZATION Right 9/10/2014    Procedure: LUMPECTOMY BREAST WITH SEED LOCALIZATION;  Surgeon: Ila Romano MD;  Location: [facility] OR     ORTHOPEDIC SURGERY      shoulder, thumb surgery     REMOVE PORT VASCULAR ACCESS Left 2015    Procedure: REMOVE PORT VASCULAR ACCESS;  Surgeon: Ila Romano MD;  Location: [facility] OR     REPAIR ANEURYSM ASCENDING AORTA  2011    Procedure:REPAIR ANEURYSM ASCENDING AORTA; Medial Sternotomy, Aortic Valve Replacement, ([employer]. Y[employer] Medical Masters HP Valved Graft, size 23 mm) on pump oxygenation, intraoperative transesophageal cardiogram; Surgeon:JOHN PAUL ULLOA; [facility]     REPLACE VALVE AORTIC  11    bicuspid AV with severe stenosis - 2011 mechanical AVR with 23 mm St Yordan  "AV conduit, composite graft placement       FAMILY HISTORY:  Family History   Problem Relation Age of Onset     Hypertension Mother      Thyroid Disease Mother      \"Toxic thyroid\"     Prostate Cancer Father 70     Cancer Father 80     Lung cancer, Not caused from smoking     Cerebrovascular Disease Father 80     Hypertension Father      Cardiovascular Brother      half brother      Cardiovascular Son      Puentes-Parkinsons White Syndrome     Cardiovascular Daughter      Heart murmur     Breast Cancer Paternal Aunt 80     Cardiovascular Paternal Aunt      Arthritis Brother 40     RA - half brother      Cancer Maternal Grandfather      Colon Cancer No family hx of        SOCIAL HISTORY:  Social History     Social History     Marital status: Single     Spouse name: N/A     Number of children: N/A     Years of education: N/A     Social History Main Topics     Smoking status: Never Smoker     Smokeless tobacco: Never Used     Alcohol use Yes      Comment: 2 drinks per week -      Drug use: No     Sexual activity: Not Currently     Other Topics Concern     Caffeine Concern Yes     1-2 cups caffeine per day     Sleep Concern No     Stress Concern No     Weight Concern No     Special Diet Yes     low fat daily , low red meats     Back Care No     Exercise Yes     walks daily/ 3x a week exercise class     Social History Narrative       Review of Systems:  Skin:  Negative       Eyes:  Positive for glasses    ENT:  Positive for postnasal drainage;hearing loss minimal hearing loss ,    Respiratory:  Negative       Cardiovascular:  Negative      Gastroenterology: Negative      Genitourinary:  Positive for nocturia 1-2 times per pm -  prolapsed bladder   Musculoskeletal:  Positive for arthritis;joint pain;joint stiffness;foot pain hands, knees, and big toe on both feet -  and now shoulders , bunions and arthritis    Neurologic:  Negative   rare   Psychiatric:  Positive for sleep disturbances off and on sleep issues " "  Heme/Lymph/Imm:  Positive for allergies;easy bruising RX allergy ,  on Jantoven   Endocrine:  Positive for thyroid disorder seeing endrocrinologist     Physical Exam:  Vitals: /88 (BP Location: Right arm, Patient Position: Sitting, Cuff Size: Adult Regular)  Pulse 60  Ht 1.626 m (5' 4\")  Wt 57.6 kg (126 lb 14.4 oz)  BMI 21.78 kg/m2    Constitutional:  cooperative, alert and oriented, well developed, well nourished, in no acute distress        Skin:  warm and dry to the touch     exfoliation of finger tips with mild acrocyanosis    Head:  normocephalic        Eyes:  pupils equal and round        Lymph:      ENT:  no pallor or cyanosis        Neck:  JVP normal        Respiratory:  clear to auscultation;normal symmetry         Cardiac: regular rhythm     crisp prosthetic valve sounds grade 2;systolic ejection murmur     audible click  pulses full and equal                                        GI:  abdomen soft;no bruits        Extremities and Muscular Skeletal:  no edema         swollen joints and mild deformities to phalanges related to arthritic changes    Neurological:  no gross motor deficits        Psych:  Alert and Oriented x 3          CC  Cecilia Darlene Guan, DO  [address]  [place] [address]                  "

## 2018-07-03 DIAGNOSIS — Z95.2 S/P AORTIC VALVE REPLACEMENT: ICD-10-CM

## 2018-07-05 RX ORDER — WARFARIN SODIUM 5 MG/1
TABLET ORAL
Qty: 100 TABLET | Refills: 1 | Status: SHIPPED | OUTPATIENT
Start: 2018-07-05 | End: 2018-09-27

## 2018-07-05 NOTE — TELEPHONE ENCOUNTER
"Requested Prescriptions   Pending Prescriptions Disp Refills     JANTOVEN 5 MG tablet [Pharmacy Med Name: JANTOVEN TAB 5MG] 100 tablet 1    Last Written Prescription Date:  Not on meds list  Last Fill Quantity: n/a,  # refills: n/a   Last office visit: 6/5/2018 with prescribing provider:     Future Office Visit:   Next 5 appointments (look out 90 days)     Jul 19, 2018  1:00 PM CDT   [facility]   [facility]    [address]  [facility] [address]   [phone]            Jul 26, 2018  8:00 AM CDT   PHYSICAL with Brook Echavarria MD   [facility]    [address]  [facility] [id number] [phone]                Sig: FOR DIRECTIONS ON HOW TO   TAKE THIS MEDICINE, READ   THE ENCLOSED MEDICATION    INFORMATION FORM    Vitamin K Antagonists Failed    7/3/2018  7:08 PM       Failed - INR is within goal in the past 6 weeks    Confirm INR is within goal in the past 6 weeks.     Recent Labs   Lab Test 06/28/18   INR  1.9*                      Passed - Recent (12 mo) or future (30 days) visit within the authorizing provider's specialty    Patient had office visit in the last 12 months or has a visit in the next 30 days with authorizing provider or within the authorizing provider's specialty.  See \"Patient Info\" tab in inbasket, or \"Choose Columns\" in Meds & Orders section of the refill encounter.           Passed - Patient is 18 years of age or older       Passed - Patient is not pregnant       Passed - No positive pregnancy on file in past 12 months        "

## 2018-07-05 NOTE — TELEPHONE ENCOUNTER
Warfarin 5 mg      Last Written Prescription Date:  1/15/18  Last Fill Quantity: 100,   # refills: 1  Last Office Visit: 7/24/17  Prescription approved per [facility] Refill Protocol.  Deborah Aguilera RN

## 2018-07-19 ENCOUNTER — OFFICE VISIT (OUTPATIENT)
Dept: ENDOCRINOLOGY | Facility: CLINIC | Age: 63
End: 2018-07-19
Payer: COMMERCIAL

## 2018-07-19 VITALS
HEART RATE: 82 BPM | WEIGHT: 124.6 LBS | OXYGEN SATURATION: 97 % | SYSTOLIC BLOOD PRESSURE: 120 MMHG | BODY MASS INDEX: 21.27 KG/M2 | HEIGHT: 64 IN | TEMPERATURE: 98.4 F | DIASTOLIC BLOOD PRESSURE: 80 MMHG | RESPIRATION RATE: 16 BRPM

## 2018-07-19 DIAGNOSIS — E05.90 SUBCLINICAL HYPERTHYROIDISM: Primary | ICD-10-CM

## 2018-07-19 PROCEDURE — 99214 OFFICE O/P EST MOD 30 MIN: CPT | Performed by: INTERNAL MEDICINE

## 2018-07-19 RX ORDER — METHIMAZOLE 5 MG/1
5 TABLET ORAL DAILY
Qty: 30 TABLET | Refills: 4 | Status: SHIPPED | OUTPATIENT
Start: 2018-07-19 | End: 2018-09-27

## 2018-07-19 NOTE — PATIENT INSTRUCTIONS
[facility]   Dr Baxter, Endocrinology Department      [facility]   [address]  [place] [address]  Appointment [phone]  Fax: [phone]  [place]: Monday and Tuesday         [facility]   [address]  [place] [address]  Appointment [phone]  Fax: [phone]  [place]: Wednesday and Thursday            Start methimazole 5 mg/day  Labs in 4-6 weeks  Please make a lab appointment for blood work and follow up clinic appointment in 1 week after that to discuss results.    Discussed possible side effects of methimazole including liver dysfunction and agranulocytosis. Discussed to watch for s/s like jaundice, abdominal pain and skin rash. In case of prolonged unresolving fever, sore throat and infections, advise to seek medical care.    Call if:     Signs or symptoms of infection. These include a fever of 100.5 degrees or higher, chills, severe sore throat, ear or sinus pain, cough, increased sputum or change in color, painful urination, mouth sores.   Severe nausea or vomiting.   Joint pain or swelling.   Not able to eat.   Unusual bruising or bleeding.   Dark urine or yellow skin or eyes.

## 2018-07-19 NOTE — PROGRESS NOTES
Name: Annabella Bolanos  Seen for follow-up of subclinical hyperthyroidism.    HPI:  Annabella Bolanos is a 62 year old female who presents for the evaluation of subclinical Hyperthyroidism.  Noted since 7/2016. Plan was for continue to monitor.  Never been on medications.  No h/o arrhythmia  No h/o osteoporosis-- has osteopenia. Not on treatment.  TSI neg  US thyroid ( h/o radiation)- no discrete nodules.      H/o breast cancer and h/o aortic valve replacement and is on long term anticoagulation.  Breast cancer diagnosed in 2014 s/p lumpectomy and chemo and radiation. Took radiation 5 days a week for 1 month.  DEXA scan showing osteopenia.  She is also on aromatase inhibitor as a part of treatment for breast cancer.  She is not on medication for osteoporosis/osteopenia at this time.      Feeling good.  Has good energy.  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  H/o arthritis.    Weight is stable.  Eating healthy.    History of radiation exposure: YES. As above  History of thyroid dysfunction: not to her knowledge. No FH of thyroid cancer.  Palpitations:  No  Changes to hair or skin: No  Diarrhea/Constipation:No  Changes in menses: s/p menopause  Changes in vision:No  Diplopia/Blurryness:No  Dysphagia or Shortness of breath:No  Tremors:No  Difficulty sleeping:Yes: most of the time  Changes in weight: No  Lithium/Amiodarone: No  URI: No  CT Scans:No  Mother had hyperthyroidism s/p DOE and was on levothyroxine.    PMH/PSH:  Past Medical History:   Diagnosis Date     Adenomatous colon polyp 8/2015     Aortic stenosis 6/23/11    bicuspid AV with severe stenosis - 6/2011 mechanical AVR with 23 mm St Yordan AV conduit, composite graft placement     Arthritis     knees and hands     Bicuspid aortic valve      Breast cancer (H) 7/2014    R breast     H/O aortic valve replacement     St Yordan     Heart murmur     Valve repalcement 2011.     History of blood transfusion     Unsure with Aortic valve replacement     HTN, goal below 140/90      Hx of repair of  "dissecting aneurysm of ascending thoracic aorta 11    AAA repair with av23 mm tube graft     PONV (postoperative nausea and vomiting)     n/v morphine vs anesthesia, vomiting x24 hrs     Subclinical hyperthyroidism 2017     Thrombosis of leg     after  of daughter     Uncomplicated asthma      Past Surgical History:   Procedure Laterality Date     BIOPSY NODE SENTINEL Right 9/10/2014    Procedure: BIOPSY NODE SENTINEL;  Surgeon: Ila Romano MD;  Location: [facility] OR     CARDIAC SURGERY  2011    aortic valve replacement     ENT SURGERY      tonsillectomy     HRW PORT A CATH       INSERT PORT VASCULAR ACCESS Left 10/22/2014    Procedure: INSERT PORT VASCULAR ACCESS;  Surgeon: Ila Romano MD;  Location: [facility] OR     LUMPECTOMY BREAST WITH SEED LOCALIZATION Right 9/10/2014    Procedure: LUMPECTOMY BREAST WITH SEED LOCALIZATION;  Surgeon: Ila Romano MD;  Location: [facility] OR     ORTHOPEDIC SURGERY      shoulder, thumb surgery     REMOVE PORT VASCULAR ACCESS Left 2015    Procedure: REMOVE PORT VASCULAR ACCESS;  Surgeon: lIa Romano MD;  Location: [facility] OR     REPAIR ANEURYSM ASCENDING AORTA  2011    Procedure:REPAIR ANEURYSM ASCENDING AORTA; Medial Sternotomy, Aortic Valve Replacement, (St. Yordan [employer] HP Valved Graft, size 23 mm) on pump oxygenation, intraoperative transesophageal cardiogram; Surgeon:JOHN PAUL ULLOA; Location:[facility] OR     REPLACE VALVE AORTIC  11    bicuspid AV with severe stenosis - 2011 mechanical AVR with 23 mm St Yordan AV conduit, composite graft placement     Family Hx:  Family History   Problem Relation Age of Onset     Hypertension Mother      Thyroid Disease Mother      \"Toxic thyroid\"     Prostate Cancer Father 70     Cancer Father 80     Lung cancer, Not caused from smoking     Cerebrovascular Disease Father 80     Hypertension Father      Cardiovascular Brother      half brother      Cardiovascular Son      " "Puentes-Parkinsons White Syndrome     Cardiovascular Daughter      Heart murmur     Breast Cancer Paternal Aunt 80     Cardiovascular Paternal Aunt      Arthritis Brother 40     RA - half brother      Cancer Maternal Grandfather      Colon Cancer No family hx of      Thyroid disease: as above          Social Hx:  Social History     Social History     Marital status: Single     Spouse name: N/A     Number of children: N/A     Years of education: N/A     Occupational History     Not on file.     Social History Main Topics     Smoking status: Never Smoker     Smokeless tobacco: Never Used     Alcohol use Yes      Comment: 2 drinks per week -      Drug use: No     Sexual activity: Not Currently     Other Topics Concern     Caffeine Concern Yes     1-2 cups caffeine per day     Sleep Concern No     Stress Concern No     Weight Concern No     Special Diet Yes     low fat daily , low red meats     Back Care No     Exercise Yes     walks daily/ 3x a week exercise class     Social History Narrative          MEDICATIONS:  has a current medication list which includes the following prescription(s): acetaminophen, anastrozole, aspirin, calcium-magnesium-vitamin d, carvedilol, cholecalciferol, jantoven, lisinopril, methimazole, valacyclovir, zolpidem, triamcinolone acetonide, and warfarin.    ROS   ROS: 10 point ROS neg other than the symptoms noted above in the HPI.    Physical Exam   VS: /80 (BP Location: Right arm, Patient Position: Chair, Cuff Size: Adult Large)  Pulse 82  Temp 98.4  F (36.9  C) (Oral)  Resp 16  Ht 1.626 m (5' 4\")  Wt 56.5 kg (124 lb 9.6 oz)  SpO2 97%  BMI 21.39 kg/m2  GENERAL: AXOX3, NAD, well dressed, answering questions appropriately, appears stated age.  HEENT: No exopthalmous, no proptosis, EOMI, no lig lag, no retraction  NECK: Thyroid normal in size, non tender, no nodules were palpated.  CV: RRR  LUNGS: CTAB  ABDOMEN: +BS  NEUROLOGY: CN grossly intact, no tremors  PSYCH: normal affect and " mood      LABS:  TFTs:  ENDO THYROID LABS-[facility] Latest Ref Rng & Units 6/5/2018   TSH 0.40 - 4.00 mU/L 0.05 (L)   T4 FREE 0.76 - 1.46 ng/dL 1.27   FREE T3 2.3 - 4.2 pg/mL 3.4     ENDO THYROID LABS-[facility] Latest Ref Rng & Units 12/6/2017   TSH 0.40 - 4.00 mU/L 0.18 (L)   T4 FREE 0.76 - 1.46 ng/dL 1.16   FREE T3 2.3 - 4.2 pg/mL 3.2   THYROID STIM IMMUNOG <=1.3 TSI index <1.0     ENDO THYROID LABS-[facility] Latest Ref Rng & Units 11/10/2017 7/24/2017   TSH 0.40 - 4.00 mU/L 0.12 (L) 0.12 (L)   T4 FREE 0.76 - 1.46 ng/dL 1.10 1.16     ENDO THYROID LABS-[facility] Latest Ref Rng & Units 7/14/2016   TSH 0.40 - 4.00 mU/L 0.24 (L)   T4 FREE 0.76 - 1.46 ng/dL 1.22       CBC:  Lab Results   Component Value Date    WBC 5.0 07/24/2017     Lab Results   Component Value Date    RBC 4.30 07/24/2017     Lab Results   Component Value Date    HGB 13.3 07/24/2017     Lab Results   Component Value Date    HCT 40.7 07/24/2017     No components found for: MCT  Lab Results   Component Value Date    MCV 95 07/24/2017     Lab Results   Component Value Date    MCH 30.9 07/24/2017     Lab Results   Component Value Date    MCHC 32.7 07/24/2017     Lab Results   Component Value Date    RDW 11.3 07/24/2017     Lab Results   Component Value Date     07/24/2017         LFTs:  Liver Function Studies -   Recent Labs   Lab Test  07/24/17   0812   PROTTOTAL  7.1   ALBUMIN  3.6   BILITOTAL  0.6   ALKPHOS  86   AST  19   ALT  23     ULTRASOUND THYROID December 15, 2017 9:24 AM   HISTORY: Subclinical hyperthyroidism.   FINDINGS: Thyroid ultrasound demonstrates an enlarged thyroid gland. The right lobe measures 5.4 x 2.0 x 1.8 cm. The left lobe measures 3.8 x 1.6 x 1.3 cm. The isthmus is normal in thickness. Thyroid parenchyma is heterogeneous in echotexture. Increased color Doppler flow is noted throughout the thyroid gland. Thyroid nodules as follows: Right Lobe: None. Isthmus: None. Left Lobe: None.   IMPRESSION: Enlarged heterogeneous thyroid gland without  evidence of a discrete thyroid nodule. Increased color Doppler flow suggests underlying thyroiditis. Correlate with thyroid function test and nuclear medicine thyroid scan as needed. MALLORIE VALLADARES MD      All pertinent notes, labs, and images personally reviewed by me.     A/P  Ms.Carol WINDY Bolanos is a 62 year old here for the evaluation of hyperthyroidism.    Subclinical hyperthyroidism:   TSH remained suppressed since 2016 and along with normal free T4  No h/o arrhythmia  No h/o osteoporosis-- has osteopenia. Not on treatment.  She is on aromatase inhibitor for breast cancer.  TSI neg  US thyroid ( h/o radiation)- no discrete nodules.  Clinically looks euthyroid.  No major complaints.  I discussed subclinical hyperthyroidism in general with pt.    As there is further decline in TSH along with risk for low bone density with aromatase inhibitor (with with prior history of osteopenia) I recommend starting methimazole 5 mg per day.  Labs in 4-6 weeks.    Off note she is on anticoagulant and may need dose adjustment to get INR at target levels ( h/o aortic valve replacement)  Baseline LFT and WBC normal.  She will have her DEXA scan done with oncology and she will get the records in next visit.    Discussed indications, risks and benefits of all medications prescribed, and answered questions to patient's satisfaction.  The patient indicates understanding of these issues and agrees with the plan.    Discussed possible side effects of methimazole including liver dysfunction and agranulocytosis. Discussed to watch for s/s like jaundice, abdominal pain and skin rash. In case of prolonged unresolving fever, sore throat and infections, advise to seek medical care.    Call if:     Signs or symptoms of infection. These include a fever of 100.5 degrees or higher, chills, severe sore throat, ear or sinus pain, cough, increased sputum or change in color, painful urination, mouth sores.   Severe nausea or vomiting.   Joint pain or  swelling.   Not able to eat.   Unusual bruising or bleeding.   Dark urine or yellow skin or eyes.      Follow-up:  4-6 weeks    Merle Baxter MD  Endocrinology  [facility]  CC: Brook Echavarria     More than 50% of face to face time spent with Ms. Bolanos on counseling / coordinating her care.    All questions were answered.  The patient indicates understanding of the above issues and agrees with the plan set forth.

## 2018-07-19 NOTE — MR AVS SNAPSHOT
After Visit Summary   2018    Annabella Bolanos    MRN: [id number]           Patient Information     Date Of Birth          1955        Visit Information        Provider Department      2018 1:00 PM Merle Baxter MD [facility]        Today's Diagnoses     Subclinical hyperthyroidism    -  1      Care Instructions    [facility] & [place] locations   Dr Baxter, Endocrinology Department      [facility]   [address]  [place] [address]  Appointment [phone]  Fax: [phone]  [place]: Monday and Tuesday         [facility]   [address]  [place] [address]  Appointment [phone]  Fax: [phone]  [place]: Wednesday and Thursday            Start methimazole 5 mg/day  Labs in 4-6 weeks  Please make a lab appointment for blood work and follow up clinic appointment in 1 week after that to discuss results.    Discussed possible side effects of methimazole including liver dysfunction and agranulocytosis. Discussed to watch for s/s like jaundice, abdominal pain and skin rash. In case of prolonged unresolving fever, sore throat and infections, advise to seek medical care.    Call if:     Signs or symptoms of infection. These include a fever of 100.5 degrees or higher, chills, severe sore throat, ear or sinus pain, cough, increased sputum or change in color, painful urination, mouth sores.   Severe nausea or vomiting.   Joint pain or swelling.   Not able to eat.   Unusual bruising or bleeding.   Dark urine or yellow skin or eyes.              Follow-ups after your visit        Your next 10 appointments already scheduled     2018 10:00 AM CDT   DX HIP/PELVIS/SPINE with RIDX1   [facility] ([facility])    [address]  [address]  [facility] [address] [phone]           Please do not  "take any of the following 24 hours prior to the day of your exam: vitamins, calcium tablets, antacids.  If possible, please wear clothes without metal (snaps, zippers). A sweatsuit works well.            Jul 26, 2018  8:00 AM CDT   PHYSICAL with Brook Echavarria MD   [facility]    [address]  [facility] [id number] [phone]            Aug 06, 2018  1:00 PM CDT   MA SCREENING BILATERAL W/ SHABNAM with [id number]   [facility] ([facility])    [address]  [facility] [id number] [phone]           Three-dimensional (3D) mammograms are available at [place] locations in [place], [place], [place], [place], [facility], [place], [place], and [place]. [facility] locations include [place] and [facility] [place] in [place]. Benefits of 3D mammograms include: - Improved rate of cancer detection - Decreases your chance of having to go back for more tests, which means fewer: - \"False-positive\" results (This means that there is an abnormal area but it isn't cancer.) - Invasive testing procedures, such as a biopsy or surgery - Can provide clearer images of the breast if you have dense breast tissue. 3D mammography is an optional exam that anyone can have with a 2D mammogram. It doesn't replace or take the place of a 2D mammogram. 2D mammograms remain an effective screening test for all women.  Not all insurance companies cover the cost of a 3D mammogram. Check with your insurance.            Aug 09, 2018  8:15 AM CDT   Anticoagulation Visit with [facility]   [facility]    [address]  [facility] [id number] [phone]              Future tests that were ordered for you today     Open Future Orders        Priority Expected Expires Ordered    T4 free Routine  5/15/2019 7/19/2018    T3 Free Routine  " "5/15/2019 7/19/2018    TSH Routine  5/15/2019 7/19/2018    WBC count Routine  5/15/2019 7/19/2018    Hepatic panel Routine  5/15/2019 7/19/2018            Who to contact     If you have questions or need follow up information about today's clinic visit or your schedule please contact [facility] directly at [phone].  Normal or non-critical lab and imaging results will be communicated to you by Birdbackhart, letter or phone within 4 business days after the clinic has received the results. If you do not hear from us within 7 days, please contact the clinic through [employer] or phone. If you have a critical or abnormal lab result, we will notify you by phone as soon as possible.  Submit refill requests through [employer] or call your pharmacy and they will forward the refill request to us. Please allow 3 business days for your refill to be completed.          Additional Information About Your Visit        Birdback[employer]Coolio Information     [employer] gives you secure access to your electronic health record. If you see a primary care provider, you can also send messages to your care team and make appointments. If you have questions, please call your primary care clinic.  If you do not have a primary care provider, please call [phone] and they will assist you.        Care EveryWhere ID     This is your Care EveryWhere ID. This could be used by other organizations to access your [place] medical records  [id number]        Your Vitals Were     Pulse Temperature Respirations Height Pulse Oximetry BMI (Body Mass Index)    82 98.4  F (36.9  C) (Oral) 16 1.626 m (5' 4\") 97% 21.39 kg/m2       Blood Pressure from Last 3 Encounters:   07/19/18 120/80   06/28/18 144/88   06/05/18 130/88    Weight from Last 3 Encounters:   07/19/18 56.5 kg (124 lb 9.6 oz)   06/28/18 57.6 kg (126 lb 14.4 oz)   06/05/18 57.2 kg (126 lb)                 Today's Medication Changes          These changes are accurate as of 7/19/18  1:28 PM.  If you " have any questions, ask your nurse or doctor.               Start taking these medicines.        Dose/Directions    methimazole 5 MG tablet   Commonly known as:  TAPAZOLE   Used for:  Subclinical hyperthyroidism   Started by:  Merle Baxter MD        Dose:  5 mg   Take 1 tablet (5 mg) by mouth daily   Quantity:  30 tablet   Refills:  4         These medicines have changed or have updated prescriptions.        Dose/Directions    valACYclovir 1000 mg tablet   Commonly known as:  VALTREX   This may have changed:    - when to take this  - reasons to take this   Used for:  Cold sore        Dose:  2000 mg   Take 2 tablets (2,000 mg) by mouth 2 times daily   Quantity:  4 tablet   Refills:  3            Where to get your medicines      These medications were sent to [facility] [place], [place] - [address]  [address] [address]     Phone:  [phone]     methimazole 5 MG tablet                Primary Care Provider Office Phone # Fax #    Brook Echavarria [phone]       [address]  [facility] [address]        Equal Access to Services     [facility] : Hadii gaye roberts hadasho Sorosalie, waaxda luqadaha, qaybta kaalmada adechristian, laura tate. So [facility] [phone].    ATENCIÓN: Si habla español, tiene a gamez disposición servicios gratuitos de asistencia lingüística. [facility] [phone].    We comply with applicable federal civil rights laws and [place] laws. We do not discriminate on the basis of race, color, national origin, age, disability, sex, sexual orientation, or gender identity.            Thank you!     Thank you for choosing [facility]  for your care. Our goal is always to provide you with excellent care. Hearing back from our patients is one way we can continue to improve our services. Please take a few minutes to complete the written survey that you may receive in the  mail after your visit with us. Thank you!             Your Updated Medication List - Protect others around you: Learn how to safely use, store and throw away your medicines at www.disposemymeds.org.          This list is accurate as of 7/19/18  1:28 PM.  Always use your most recent med list.                   Brand Name Dispense Instructions for use Diagnosis    anastrozole 1 MG tablet    ARIMIDEX    30 tablet    Take by mouth daily        ASPIRIN EC PO      Take 81 mg by mouth daily        CALCIUM 500 PO      Take 600 mg by mouth daily        carvedilol 12.5 MG tablet    COREG    180 tablet    Take 1 tablet (12.5 mg) by mouth 2 times daily (with meals)    Essential hypertension       lisinopril 10 MG tablet    PRINIVIL/ZESTRIL    135 tablet    Take 1 1/2 tabs (15 mg) by mouth daily    Essential hypertension, benign       methimazole 5 MG tablet    TAPAZOLE    30 tablet    Take 1 tablet (5 mg) by mouth daily    Subclinical hyperthyroidism       triamcinolone acetonide 0.05 % Oint     17 g    Externally apply topically 2 times daily as needed    Dyshidrotic eczema       TYLENOL 325 MG tablet   Generic drug:  acetaminophen     250 tablet    Take 1-2 tablets (325-650 mg) by mouth every 6 hours as needed for mild pain        valACYclovir 1000 mg tablet    VALTREX    4 tablet    Take 2 tablets (2,000 mg) by mouth 2 times daily    Cold sore       VITAMIN D3 PO      Take by mouth every other day        * warfarin 5 MG tablet    JANTOVEN    100 tablet    Take one tablet (5 mg) daily except one and a half tablets (7.5 mg) on Wednesdays or as directed by the INR Clinic.    S/P aortic valve replacement       * JANTOVEN 5 MG tablet   Generic drug:  warfarin     100 tablet    FOR DIRECTIONS ON HOW TO   TAKE THIS MEDICINE, READ   THE ENCLOSED MEDICATION    INFORMATION FORM    S/P aortic valve replacement       zolpidem 5 MG tablet    AMBIEN    60 tablet    Take 1-2 tablets (5-10 mg) by mouth nightly as needed for sleep     Insomnia, unspecified type       * Notice:  This list has 2 medication(s) that are the same as other medications prescribed for you. Read the directions carefully, and ask your doctor or other care provider to review them with you.

## 2018-07-19 NOTE — LETTER
7/19/2018         RE: Annabella Bolanos  [address] [place] [address]        Dear Colleague,    Thank you for referring your patient, Annabella Bolanos, to the [facility]. Please see a copy of my visit note below.    Name: Annabella Bolanos  Seen for follow-up of subclinical hyperthyroidism.    HPI:  Annabella Bolanos is a 62 year old female who presents for the evaluation of subclinical Hyperthyroidism.  Noted since 7/2016. Plan was for continue to monitor.  Never been on medications.  No h/o arrhythmia  No h/o osteoporosis-- has osteopenia. Not on treatment.  TSI neg  US thyroid ( h/o radiation)- no discrete nodules.      H/o breast cancer and h/o aortic valve replacement and is on long term anticoagulation.  Breast cancer diagnosed in 2014 s/p lumpectomy and chemo and radiation. Took radiation 5 days a week for 1 month.  DEXA scan showing osteopenia.  She is also on aromatase inhibitor as a part of treatment for breast cancer.  She is not on medication for osteoporosis/osteopenia at this time.      Feeling good.  Has good energy.  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  H/o arthritis.    Weight is stable.  Eating healthy.    History of radiation exposure: YES. As above  History of thyroid dysfunction: not to her knowledge. No FH of thyroid cancer.  Palpitations:  No  Changes to hair or skin: No  Diarrhea/Constipation:No  Changes in menses: s/p menopause  Changes in vision:No  Diplopia/Blurryness:No  Dysphagia or Shortness of breath:No  Tremors:No  Difficulty sleeping:Yes: most of the time  Changes in weight: No  Lithium/Amiodarone: No  URI: No  CT Scans:No  Mother had hyperthyroidism s/p DOE and was on levothyroxine.    PMH/PSH:  Past Medical History:   Diagnosis Date     Adenomatous colon polyp 8/2015     Aortic stenosis 6/23/11    bicuspid AV with severe stenosis - 6/2011 mechanical AVR with 23 mm St Yordan AV conduit, composite graft placement     Arthritis     knees and hands     Bicuspid aortic valve      Breast  "cancer (H) 2014    R breast     H/O aortic valve replacement     St Yordan     Heart murmur     Valve repalcement .     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    AAA repair with av23 mm tube graft     PONV (postoperative nausea and vomiting)     n/v morphine vs anesthesia, vomiting x24 hrs     Subclinical hyperthyroidism 2017     Thrombosis of leg     after  of daughter     Uncomplicated asthma      Past Surgical History:   Procedure Laterality Date     BIOPSY NODE SENTINEL Right 9/10/2014    Procedure: BIOPSY NODE SENTINEL;  Surgeon: Ila Romano MD;  Location: [facility] OR     CARDIAC SURGERY  2011    aortic valve replacement     ENT SURGERY      tonsillectomy     HRW PORT A CATH       INSERT PORT VASCULAR ACCESS Left 10/22/2014    Procedure: INSERT PORT VASCULAR ACCESS;  Surgeon: Ila Romano MD;  Location: [facility] OR     LUMPECTOMY BREAST WITH SEED LOCALIZATION Right 9/10/2014    Procedure: LUMPECTOMY BREAST WITH SEED LOCALIZATION;  Surgeon: Ila Romano MD;  Location: [facility] OR     ORTHOPEDIC SURGERY      shoulder, thumb surgery     REMOVE PORT VASCULAR ACCESS Left 2015    Procedure: REMOVE PORT VASCULAR ACCESS;  Surgeon: Ila Romano MD;  Location: [facility] OR     REPAIR ANEURYSM ASCENDING AORTA  2011    Procedure:REPAIR ANEURYSM ASCENDING AORTA; Medial Sternotomy, Aortic Valve Replacement, ([employer]. Yordan [employer] HP Valved Graft, size 23 mm) on pump oxygenation, intraoperative transesophageal cardiogram; Surgeon:JOHN PAUL ULLOA; Location:[facility] OR     REPLACE VALVE AORTIC  11    bicuspid AV with severe stenosis - 2011 mechanical AVR with 23 mm St Yordan AV conduit, composite graft placement     Family Hx:  Family History   Problem Relation Age of Onset     Hypertension Mother      Thyroid Disease Mother      \"Toxic " "thyroid\"     Prostate Cancer Father 70     Cancer Father 80     Lung cancer, Not caused from smoking     Cerebrovascular Disease Father 80     Hypertension Father      Cardiovascular Brother      half brother      Cardiovascular Son      Puentes-Parkinsons White Syndrome     Cardiovascular Daughter      Heart murmur     Breast Cancer Paternal Aunt 80     Cardiovascular Paternal Aunt      Arthritis Brother 40     RA - half brother      Cancer Maternal Grandfather      Colon Cancer No family hx of      Thyroid disease: as above          Social Hx:  Social History     Social History     Marital status: Single     Spouse name: N/A     Number of children: N/A     Years of education: N/A     Occupational History     Not on file.     Social History Main Topics     Smoking status: Never Smoker     Smokeless tobacco: Never Used     Alcohol use Yes      Comment: 2 drinks per week -      Drug use: No     Sexual activity: Not Currently     Other Topics Concern     Caffeine Concern Yes     1-2 cups caffeine per day     Sleep Concern No     Stress Concern No     Weight Concern No     Special Diet Yes     low fat daily , low red meats     Back Care No     Exercise Yes     walks daily/ 3x a week exercise class     Social History Narrative          MEDICATIONS:  has a current medication list which includes the following prescription(s): acetaminophen, anastrozole, aspirin, calcium-magnesium-vitamin d, carvedilol, cholecalciferol, jantoven, lisinopril, methimazole, valacyclovir, zolpidem, triamcinolone acetonide, and warfarin.    ROS   ROS: 10 point ROS neg other than the symptoms noted above in the HPI.    Physical Exam   VS: /80 (BP Location: Right arm, Patient Position: Chair, Cuff Size: Adult Large)  Pulse 82  Temp 98.4  F (36.9  C) (Oral)  Resp 16  Ht 1.626 m (5' 4\")  Wt 56.5 kg (124 lb 9.6 oz)  SpO2 97%  BMI 21.39 kg/m2  GENERAL: AXOX3, NAD, well dressed, answering questions appropriately, appears stated age.  HEENT: " No exopthalmous, no proptosis, EOMI, no lig lag, no retraction  NECK: Thyroid normal in size, non tender, no nodules were palpated.  CV: RRR  LUNGS: CTAB  ABDOMEN: +BS  NEUROLOGY: CN grossly intact, no tremors  PSYCH: normal affect and mood      LABS:  [facility]:  [facility]   TSH 0.40 - 4.00 mU/L 0.05 (L)   T4 FREE 0.76 - 1.46 ng/dL 1.27   FREE T3 2.3 - 4.2 pg/mL 3.4     END[facility] THYROID LABS[facility] Latest Ref Rng & Units 12/6/2017   TSH 0.40 - 4.00 mU/L 0.18 (L)   T4 FREE 0.76 - 1.46 ng/dL 1.16   FREE T3 2.3 - 4.2 pg/mL 3.2   THYROID STIM IMMUNOG <=1.3 TSI index <1.0     ENDO THYROID LABS-[facility] Latest Ref Rng & Units 11/10/2017 7/24/2017   TSH 0.40 - 4.00 mU/L 0.12 (L) 0.12 (L)   T4 FREE 0.76 - 1.46 ng/dL 1.10 1.16     ENDO THYROID LABS-[facility] Latest Ref Rng & Units 7/14/2016   TSH 0.40 - 4.00 mU/L 0.24 (L)   T4 FREE 0.76 - 1.46 ng/dL 1.22       CBC:  Lab Results   Component Value Date    WBC 5.0 07/24/2017     Lab Results   Component Value Date    RBC 4.30 07/24/2017     Lab Results   Component Value Date    HGB 13.3 07/24/2017     Lab Results   Component Value Date    HCT 40.7 07/24/2017     No components found for: MCT  Lab Results   Component Value Date    MCV 95 07/24/2017     Lab Results   Component Value Date    MCH 30.9 07/24/2017     Lab Results   Component Value Date    MCHC 32.7 07/24/2017     Lab Results   Component Value Date    RDW 11.3 07/24/2017     Lab Results   Component Value Date     07/24/2017         LFTs:  Liver Function Studies -   Recent Labs   Lab Test  07/24/17   0812   PROTTOTAL  7.1   ALBUMIN  3.6   BILITOTAL  0.6   ALKPHOS  86   AST  19   ALT  23     ULTRASOUND THYROID December 15, 2017 9:24 AM   HISTORY: Subclinical hyperthyroidism.   FINDINGS: Thyroid ultrasound demonstrates an enlarged thyroid gland. The right lobe measures 5.4 x 2.0 x 1.8 cm. The left lobe measures 3.8 x 1.6 x 1.3 cm. The isthmus is normal in thickness. Thyroid parenchyma is  heterogeneous in echotexture. Increased color Doppler flow is noted throughout the thyroid gland. Thyroid nodules as follows: Right Lobe: None. Isthmus: None. Left Lobe: None.   IMPRESSION: Enlarged heterogeneous thyroid gland without evidence of a discrete thyroid nodule. Increased color Doppler flow suggests underlying thyroiditis. Correlate with thyroid function test and nuclear medicine thyroid scan as needed. MALLORIE VALLADARES MD      All pertinent notes, labs, and images personally reviewed by me.     A/P  Ms.Carol WINDY Bolanos is a 62 year old here for the evaluation of hyperthyroidism.    Subclinical hyperthyroidism:   TSH remained suppressed since 2016 and along with normal free T4  No h/o arrhythmia  No h/o osteoporosis-- has osteopenia. Not on treatment.  She is on aromatase inhibitor for breast cancer.  TSI neg  US thyroid ( h/o radiation)- no discrete nodules.  Clinically looks euthyroid.  No major complaints.  I discussed subclinical hyperthyroidism in general with pt.    As there is further decline in TSH along with risk for low bone density with aromatase inhibitor (with with prior history of osteopenia) I recommend starting methimazole 5 mg per day.  Labs in 4-6 weeks.    Off note she is on anticoagulant and may need dose adjustment to get INR at target levels ( h/o aortic valve replacement)  Baseline LFT and WBC normal.  She will have her DEXA scan done with oncology and she will get the records in next visit.    Discussed indications, risks and benefits of all medications prescribed, and answered questions to patient's satisfaction.  The patient indicates understanding of these issues and agrees with the plan.    Discussed possible side effects of methimazole including liver dysfunction and agranulocytosis. Discussed to watch for s/s like jaundice, abdominal pain and skin rash. In case of prolonged unresolving fever, sore throat and infections, advise to seek medical care.    Call if:     Signs or symptoms of  infection. These include a fever of 100.5 degrees or higher, chills, severe sore throat, ear or sinus pain, cough, increased sputum or change in color, painful urination, mouth sores.   Severe nausea or vomiting.   Joint pain or swelling.   Not able to eat.   Unusual bruising or bleeding.   Dark urine or yellow skin or eyes.      Follow-up:  4-6 weeks    Merle Baxter MD  [facility]  CC: Brook Echavarria     More than 50% of face to face time spent with Ms. Bolanos on counseling / coordinating her care.    All questions were answered.  The patient indicates understanding of the above issues and agrees with the plan set forth.           Again, thank you for allowing me to participate in the care of your patient.        Sincerely,        Merle Baxter MD

## 2018-07-20 DIAGNOSIS — Z95.2 S/P AORTIC VALVE REPLACEMENT: ICD-10-CM

## 2018-07-20 DIAGNOSIS — I10 ESSENTIAL HYPERTENSION, BENIGN: ICD-10-CM

## 2018-07-20 DIAGNOSIS — I10 ESSENTIAL HYPERTENSION: ICD-10-CM

## 2018-07-20 RX ORDER — CARVEDILOL 12.5 MG/1
12.5 TABLET ORAL 2 TIMES DAILY WITH MEALS
Qty: 180 TABLET | Refills: 3 | Status: SHIPPED | OUTPATIENT
Start: 2018-07-20 | End: 2019-06-27

## 2018-07-20 RX ORDER — LISINOPRIL 10 MG/1
TABLET ORAL
Qty: 135 TABLET | Refills: 3 | Status: SHIPPED | OUTPATIENT
Start: 2018-07-20 | End: 2019-05-20

## 2018-07-20 RX ORDER — WARFARIN SODIUM 5 MG/1
TABLET ORAL
Qty: 100 TABLET | Refills: 1 | Status: SHIPPED | OUTPATIENT
Start: 2018-07-20 | End: 2019-01-09

## 2018-07-20 NOTE — TELEPHONE ENCOUNTER
Patient requests refill of warfarin.    Next 5 appointments (look out 90 days)     Jul 26, 2018  8:00 AM CDT   PHYSICAL with Brook Echavarria MD   [facility]    [address]  [facility] [place] [id number] [phone]            Sep 27, 2018  8:00 AM CDT   Return Visit with Merle Baxter MD   [facility] ([facility]    [address]  [place] [address] [phone]                Warfarin dosing is managed by INR Clinic.  Prescription approved per [facility] Refill Protocol.  Erica Stinson RN

## 2018-07-20 NOTE — TELEPHONE ENCOUNTER
Received VM from pt stating that her insurance has changed and she has a new mail order pharmacy, requested that her prescriptions be sent there. Pt seen in clinic by Dr. Guan on 6/28/18 and is follow up annually. Called back and spoke with pt, confirmed pharmacy, rx sent for carvedilol and lisinopril.

## 2018-07-23 ENCOUNTER — RADIANT APPOINTMENT (OUTPATIENT)
Dept: BONE DENSITY | Facility: CLINIC | Age: 63
End: 2018-07-23
Payer: COMMERCIAL

## 2018-07-23 DIAGNOSIS — M85.88 OSTEOPENIA OF SPINE: ICD-10-CM

## 2018-07-23 PROCEDURE — 77080 DXA BONE DENSITY AXIAL: CPT | Performed by: INTERNAL MEDICINE

## 2018-07-26 ENCOUNTER — ANTICOAGULATION THERAPY VISIT (OUTPATIENT)
Dept: ANTICOAGULATION | Facility: CLINIC | Age: 63
End: 2018-07-26
Payer: COMMERCIAL

## 2018-07-26 ENCOUNTER — OFFICE VISIT (OUTPATIENT)
Dept: INTERNAL MEDICINE | Facility: CLINIC | Age: 63
End: 2018-07-26
Payer: COMMERCIAL

## 2018-07-26 VITALS
DIASTOLIC BLOOD PRESSURE: 75 MMHG | SYSTOLIC BLOOD PRESSURE: 120 MMHG | HEIGHT: 64 IN | RESPIRATION RATE: 22 BRPM | TEMPERATURE: 98.5 F | HEART RATE: 76 BPM | OXYGEN SATURATION: 98 % | BODY MASS INDEX: 21.02 KG/M2 | WEIGHT: 123.1 LBS

## 2018-07-26 DIAGNOSIS — G47.00 INSOMNIA, UNSPECIFIED TYPE: ICD-10-CM

## 2018-07-26 DIAGNOSIS — Z17.0 MALIGNANT NEOPLASM OF UPPER-OUTER QUADRANT OF RIGHT BREAST IN FEMALE, ESTROGEN RECEPTOR POSITIVE (H): ICD-10-CM

## 2018-07-26 DIAGNOSIS — Z23 NEED FOR VACCINATION: ICD-10-CM

## 2018-07-26 DIAGNOSIS — Z86.79 H/O AORTIC ANEURYSM REPAIR: ICD-10-CM

## 2018-07-26 DIAGNOSIS — Z00.00 ROUTINE GENERAL MEDICAL EXAMINATION AT A HEALTH CARE FACILITY: Primary | ICD-10-CM

## 2018-07-26 DIAGNOSIS — Z95.2 H/O AORTIC VALVE REPLACEMENT: ICD-10-CM

## 2018-07-26 DIAGNOSIS — D12.6 ADENOMATOUS POLYP OF COLON, UNSPECIFIED PART OF COLON: ICD-10-CM

## 2018-07-26 DIAGNOSIS — B00.1 COLD SORE: ICD-10-CM

## 2018-07-26 DIAGNOSIS — C50.411 MALIGNANT NEOPLASM OF UPPER-OUTER QUADRANT OF RIGHT BREAST IN FEMALE, ESTROGEN RECEPTOR POSITIVE (H): ICD-10-CM

## 2018-07-26 DIAGNOSIS — E67.3 HIGH VITAMIN D LEVEL: ICD-10-CM

## 2018-07-26 DIAGNOSIS — M85.80 OSTEOPENIA, UNSPECIFIED LOCATION: Chronic | ICD-10-CM

## 2018-07-26 DIAGNOSIS — Z98.890 H/O AORTIC ANEURYSM REPAIR: ICD-10-CM

## 2018-07-26 DIAGNOSIS — E05.90 SUBCLINICAL HYPERTHYROIDISM: ICD-10-CM

## 2018-07-26 DIAGNOSIS — Z79.01 LONG-TERM (CURRENT) USE OF ANTICOAGULANTS: ICD-10-CM

## 2018-07-26 LAB
ALBUMIN SERPL-MCNC: 3.7 G/DL (ref 3.4–5)
ALP SERPL-CCNC: 86 U/L (ref 40–150)
ALT SERPL W P-5'-P-CCNC: 21 U/L (ref 0–50)
ANION GAP SERPL CALCULATED.3IONS-SCNC: <1 MMOL/L (ref 3–14)
AST SERPL W P-5'-P-CCNC: 15 U/L (ref 0–45)
BILIRUB SERPL-MCNC: 0.8 MG/DL (ref 0.2–1.3)
BUN SERPL-MCNC: 18 MG/DL (ref 7–30)
CALCIUM SERPL-MCNC: 9.5 MG/DL (ref 8.5–10.1)
CHLORIDE SERPL-SCNC: 106 MMOL/L (ref 94–109)
CHOLEST SERPL-MCNC: 199 MG/DL
CO2 SERPL-SCNC: 35 MMOL/L (ref 20–32)
CREAT SERPL-MCNC: 0.66 MG/DL (ref 0.52–1.04)
ERYTHROCYTE [DISTWIDTH] IN BLOOD BY AUTOMATED COUNT: 11.3 % (ref 10–15)
GFR SERPL CREATININE-BSD FRML MDRD: >90 ML/MIN/1.7M2
GLUCOSE SERPL-MCNC: 98 MG/DL (ref 70–99)
HCT VFR BLD AUTO: 42.4 % (ref 35–47)
HDLC SERPL-MCNC: 57 MG/DL
HGB BLD-MCNC: 13.8 G/DL (ref 11.7–15.7)
INR POINT OF CARE: 2.2 (ref 0.86–1.14)
LDLC SERPL CALC-MCNC: 126 MG/DL
MCH RBC QN AUTO: 30.9 PG (ref 26.5–33)
MCHC RBC AUTO-ENTMCNC: 32.5 G/DL (ref 31.5–36.5)
MCV RBC AUTO: 95 FL (ref 78–100)
NONHDLC SERPL-MCNC: 142 MG/DL
PLATELET # BLD AUTO: 234 10E9/L (ref 150–450)
POTASSIUM SERPL-SCNC: 4.7 MMOL/L (ref 3.4–5.3)
PROT SERPL-MCNC: 7.2 G/DL (ref 6.8–8.8)
PTH-INTACT SERPL-MCNC: 26 PG/ML (ref 18–80)
RBC # BLD AUTO: 4.47 10E12/L (ref 3.8–5.2)
SODIUM SERPL-SCNC: 141 MMOL/L (ref 133–144)
TRIGL SERPL-MCNC: 81 MG/DL
WBC # BLD AUTO: 5.3 10E9/L (ref 4–11)

## 2018-07-26 PROCEDURE — 83970 ASSAY OF PARATHORMONE: CPT | Performed by: INTERNAL MEDICINE

## 2018-07-26 PROCEDURE — 80053 COMPREHEN METABOLIC PANEL: CPT | Performed by: INTERNAL MEDICINE

## 2018-07-26 PROCEDURE — 36416 COLLJ CAPILLARY BLOOD SPEC: CPT

## 2018-07-26 PROCEDURE — 85027 COMPLETE CBC AUTOMATED: CPT | Performed by: INTERNAL MEDICINE

## 2018-07-26 PROCEDURE — 90732 PPSV23 VACC 2 YRS+ SUBQ/IM: CPT | Performed by: INTERNAL MEDICINE

## 2018-07-26 PROCEDURE — 90471 IMMUNIZATION ADMIN: CPT | Performed by: INTERNAL MEDICINE

## 2018-07-26 PROCEDURE — 82306 VITAMIN D 25 HYDROXY: CPT | Performed by: INTERNAL MEDICINE

## 2018-07-26 PROCEDURE — 80061 LIPID PANEL: CPT | Performed by: INTERNAL MEDICINE

## 2018-07-26 PROCEDURE — 99207 ZZC NO CHARGE NURSE ONLY: CPT

## 2018-07-26 PROCEDURE — 99396 PREV VISIT EST AGE 40-64: CPT | Performed by: INTERNAL MEDICINE

## 2018-07-26 PROCEDURE — 85610 PROTHROMBIN TIME: CPT | Mod: QW

## 2018-07-26 RX ORDER — ZOLPIDEM TARTRATE 5 MG/1
5-10 TABLET ORAL
Qty: 60 TABLET | Refills: 0 | Status: SHIPPED | OUTPATIENT
Start: 2018-07-26 | End: 2018-09-27

## 2018-07-26 RX ORDER — VALACYCLOVIR HYDROCHLORIDE 1 G/1
2000 TABLET, FILM COATED ORAL 2 TIMES DAILY
Qty: 4 TABLET | Refills: 3 | Status: SHIPPED | OUTPATIENT
Start: 2018-07-26 | End: 2018-08-23

## 2018-07-26 NOTE — PATIENT INSTRUCTIONS
Plan:  1. Labs today   2. Continue same meds, same doses for now   3. The following vaccines are recommended for you.   Some insurances cover better if you have these vaccines at the pharmacy:  -- Pneumovax 23 ( different pneumonia vaccine )  -- Shingerix vaccine - the newest vaccine for shingles       Preventive Health Recommendations  Female Ages 50 - 64    Yearly exam: See your health care provider every year in order to  o Review health changes.   o Discuss preventive care.    o Review your medicines if your doctor has prescribed any.      Get a Pap test every three years (unless you have an abnormal result and your provider advises testing more often).    If you get Pap tests with HPV test, you only need to test every 5 years, unless you have an abnormal result.     You do not need a Pap test if your uterus was removed (hysterectomy) and you have not had cancer.    You should be tested each year for STDs (sexually transmitted diseases) if you're at risk.     Have a mammogram every 1 to 2 years.    Have a colonoscopy at age 50, or have a yearly FIT test (stool test). These exams screen for colon cancer.      Have a cholesterol test every 5 years, or more often if advised.    Have a diabetes test (fasting glucose) every three years. If you are at risk for diabetes, you should have this test more often.     If you are at risk for osteoporosis (brittle bone disease), think about having a bone density scan (DEXA).    Shots: Get a flu shot each year. Get a tetanus shot every 10 years.    Nutrition:     Eat at least 5 servings of fruits and vegetables each day.    Eat whole-grain bread, whole-wheat pasta and brown rice instead of white grains and rice.    Get adequate Calcium and Vitamin D.     Lifestyle    Exercise at least 150 minutes a week (30 minutes a day, 5 days a week). This will help you control your weight and prevent disease.    Limit alcohol to one drink per day.    No smoking.     Wear sunscreen to  prevent skin cancer.     See your dentist every six months for an exam and cleaning.    See your eye doctor every 1 to 2 years.

## 2018-07-26 NOTE — PROGRESS NOTES
ANTICOAGULATION FOLLOW-UP CLINIC VISIT    Patient Name:  Annabella Bolanos  Date:  7/26/2018  Contact Type:  Face to Face    SUBJECTIVE:     Patient Findings     Positives Change in medications (pt was started on Tapazole one week ago (can decrease INR))           OBJECTIVE    INR Protime   Date Value Ref Range Status   07/26/2018 2.2 (A) 0.86 - 1.14 Final       ASSESSMENT / PLAN  INR assessment THER    Recheck INR In: 6 WEEKS    INR Location Clinic      Anticoagulation Summary as of 7/26/2018     INR goal 1.8-2.5   Today's INR 2.2   Warfarin maintenance plan 7.5 mg (5 mg x 1.5) on Wed; 5 mg (5 mg x 1) all other days   Full warfarin instructions 7.5 mg on Wed; 5 mg all other days   Weekly warfarin total 37.5 mg   No change documented Erica Stinson RN   Plan last modified Erica Stinson RN (11/22/2017)   Next INR check 9/6/2018   Priority INR   Target end date     Indications   Long-term (current) use of anticoagulants [Z79.01] [Z79.01]  H/O aortic valve replacement [Z95.2]         Anticoagulation Episode Summary     INR check location     Preferred lab     Send INR reminders to [facility]    Comments       Anticoagulation Care Providers     Provider Role Specialty Phone number    Brook Echavarria MD [facility] Internal Medicine [phone]            See the Encounter Report to view Anticoagulation Flowsheet and Dosing Calendar (Go to Encounters tab in chart review, and find the Anticoagulation Therapy Visit)    Dosage adjustment made based on physician directed care plan.    Erica Stinson RN

## 2018-07-26 NOTE — NURSING NOTE
Screening Questionnaire for Adult Immunization    Are you sick today?   No   Do you have allergies to medications, food, a vaccine component or latex?   No   Have you ever had a serious reaction after receiving a vaccination?   No   Do you have a long-term health problem with heart disease, lung disease, asthma, kidney disease, metabolic disease (e.g. diabetes), anemia, or other blood disorder?   No   Do you have cancer, leukemia, HIV/AIDS, or any other immune system problem?   No   In the past 3 months, have you taken medications that affect  your immune system, such as prednisone, other steroids, or anticancer drugs; drugs for the treatment of rheumatoid arthritis, Crohn s disease, or psoriasis; or have you had radiation treatments?   No   Have you had a seizure, or a brain or other nervous system problem?   No   During the past year, have you received a transfusion of blood or blood     products, or been given immune (gamma) globulin or antiviral drug?   No   For women: Are you pregnant or is there a chance you could become        pregnant during the next month?   No   Have you received any vaccinations in the past 4 weeks?   No     Immunization questionnaire answers were all negative.        Per orders of Dr. Jackson, injection of Pneumovax 23 given by Irsih Díaz. Patient instructed to remain in clinic for 15 minutes afterwards, and to report any adverse reaction to me immediately.       Screening performed by Irish Díaz on 7/26/2018 at 8:44 AM.    Prior to injection verified patient identity using patient's name and date of birth.  Due to injection administration, patient instructed to remain in clinic for 15 minutes  afterwards, and to report any adverse reaction to me immediately.

## 2018-07-26 NOTE — MR AVS SNAPSHOT
After Visit Summary   7/26/2018    Annabella Bolanos    MRN: [id number]           Patient Information     Date Of Birth          1955        Visit Information        Provider Department      7/26/2018 8:00 AM [facility]        Today's Diagnoses     Routine general medical examination at a health care facility    -  1    Malignant neoplasm of upper-outer quadrant of right breast in female, estrogen receptor positive (H)        H/O aortic aneurysm repair        Osteopenia, unspecified location        Adenomatous polyp of colon,needs colonoscopy 2020        Subclinical hyperthyroidism        High vitamin D level        Cold sore        Insomnia, unspecified type          Care Instructions    Plan:  1. Labs today   2. Continue same meds, same doses for now   3. The following vaccines are recommended for you.   Some insurances cover better if you have these vaccines at the pharmacy:  -- Pneumovax 23 ( different pneumonia vaccine )  -- Shingerix vaccine - the newest vaccine for shingles       Preventive Health Recommendations  Female Ages 50 - 64    Yearly exam: See your health care provider every year in order to  o Review health changes.   o Discuss preventive care.    o Review your medicines if your doctor has prescribed any.      Get a Pap test every three years (unless you have an abnormal result and your provider advises testing more often).    If you get Pap tests with HPV test, you only need to test every 5 years, unless you have an abnormal result.     You do not need a Pap test if your uterus was removed (hysterectomy) and you have not had cancer.    You should be tested each year for STDs (sexually transmitted diseases) if you're at risk.     Have a mammogram every 1 to 2 years.    Have a colonoscopy at age 50, or have a yearly FIT test (stool test). These exams screen for colon cancer.      Have a cholesterol test every 5 years, or more often if  "advised.    Have a diabetes test (fasting glucose) every three years. If you are at risk for diabetes, you should have this test more often.     If you are at risk for osteoporosis (brittle bone disease), think about having a bone density scan (DEXA).    Shots: Get a flu shot each year. Get a tetanus shot every 10 years.    Nutrition:     Eat at least 5 servings of fruits and vegetables each day.    Eat whole-grain bread, whole-wheat pasta and brown rice instead of white grains and rice.    Get adequate Calcium and Vitamin D.     Lifestyle    Exercise at least 150 minutes a week (30 minutes a day, 5 days a week). This will help you control your weight and prevent disease.    Limit alcohol to one drink per day.    No smoking.     Wear sunscreen to prevent skin cancer.     See your dentist every six months for an exam and cleaning.    See your eye doctor every 1 to 2 years.            Follow-ups after your visit        Your next 10 appointments already scheduled     Jul 26, 2018  8:45 AM CDT   Anticoagulation Visit with [facility]   [facility] ([facility])    [address]  [facility] [address]   [phone]            Aug 06, 2018  1:00 PM CDT   MA SCREENING BILATERAL W/ SHABNAM with RHBCMA2   [facility] ([facility])    [address]  [facility] [address]   [phone]           Three-dimensional (3D) mammograms are available at [facility] in [place], [facility], [facility], [place], [place], and [place]. -Health locations include [place] and [facility] in [place]. Benefits of 3D mammograms include: - Improved rate of cancer detection - Decreases your chance of having to go back for more tests, which means fewer: - \"False-positive\" results (This means that there is an abnormal area but it isn't cancer.) - Invasive testing procedures, such as a biopsy " or surgery - Can provide clearer images of the breast if you have dense breast tissue. 3D mammography is an optional exam that anyone can have with a 2D mammogram. It doesn't replace or take the place of a 2D mammogram. 2D mammograms remain an effective screening test for all women.  Not all insurance companies cover the cost of a 3D mammogram. Check with your insurance.            Aug 09, 2018  8:15 AM CDT   Anticoagulation Visit with [facility] CL[facility]   [facility]    [address]  [facility] [id number] [phone]            Aug 24, 2018  8:15 AM CDT   LAB with [facility] LAB   [facility] ([facility])    [address]  [facility] [id number] [phone]           Please do not eat 10-12 hours before your appointment if you are coming in fasting for labs on lipids, cholesterol, or glucose (sugar). This does not apply to pregnant women. Water, hot tea and black coffee (with nothing added) are okay. Do not drink other fluids, diet soda or chew gum.            Sep 27, 2018  8:00 AM CDT   Return Visit with Merle Baxter MD   [facility] ([facility])    [address]  [facility] [address] [phone]              Who to contact     If you have questions or need follow up information about today's clinic visit or your schedule please contact [facility] directly at [phone].  Normal or non-critical lab and imaging results will be communicated to you by MyChart, letter or phone within 4 business days after the clinic has received the results. If you do not hear from us within 7 days, please contact the clinic through [employer] or phone. If you have a critical or abnormal lab result, we will notify you by phone as soon as possible.  Submit refill requests through [employer] or call your pharmacy and they will forward the refill  "request to us. Please allow 3 business days for your refill to be completed.          Additional Information About Your Visit        Mybandstockhart Information     [employer] gives you secure access to your electronic health record. If you see a primary care provider, you can also send messages to your care team and make appointments. If you have questions, please call your primary care clinic.  If you do not have a primary care provider, please call [phone] and they will assist you.        Care EveryWhere ID     This is your Care EveryWhere ID. This could be used by other organizations to access your [facility] medical records  [id number]        Your Vitals Were     Pulse Temperature Respirations Height Pulse Oximetry BMI (Body Mass Index)    76 98.5  F (36.9  C) (Oral) 22 5' 4\" (1.626 m) 98% 21.13 kg/m2       Blood Pressure from Last 3 Encounters:   07/26/18 140/86   07/19/18 120/80   06/28/18 144/88    Weight from Last 3 Encounters:   07/26/18 123 lb 1.6 oz (55.8 kg)   07/19/18 124 lb 9.6 oz (56.5 kg)   06/28/18 126 lb 14.4 oz (57.6 kg)              We Performed the Following     CBC with platelets     Comprehensive metabolic panel     Lipid panel reflex to direct LDL Fasting     Parathyroid Hormone Intact     Vitamin D Deficiency          Today's Medication Changes          These changes are accurate as of 7/26/18  8:27 AM.  If you have any questions, ask your nurse or doctor.               These medicines have changed or have updated prescriptions.        Dose/Directions    valACYclovir 1000 mg tablet   Commonly known as:  VALTREX   This may have changed:    - when to take this  - reasons to take this   Used for:  Cold sore        Dose:  2000 mg   Take 2 tablets (2,000 mg) by mouth 2 times daily   Quantity:  4 tablet   Refills:  3            Where to get your medicines      These medications were sent to [facility] - [place], [place] - [address] [facility]  [address], [place] [address]     Phone:  [phone]     valACYclovir 1000 mg tablet         Some of these will need a paper prescription and others can be bought over the counter.  Ask your nurse if you have questions.     Bring a paper prescription for each of these medications     zolpidem 5 MG tablet                Primary Care Provider Office Phone # Fax #    Brook Echavarria [phone]       [address]  [facility] [address]        Equal Access to Services     CHRIS NAVARRO : Hadii aad ku hadasho Soomaali, waaxda luqadaha, qaybta kaalmada adeegyada, waxrome roquein hayaan francesca romero . So [facility] [phone].    ATENCIÓN: Si habla español, tiene a gamez disposición servicios gratuitos de asistencia lingüística. [facility] [phone].    We comply with applicable federal civil rights laws and [place] laws. We do not discriminate on the basis of race, color, national origin, age, disability, sex, sexual orientation, or gender identity.            Thank you!     Thank you for choosing [facility]  for your care. Our goal is always to provide you with excellent care. Hearing back from our patients is one way we can continue to improve our services. Please take a few minutes to complete the written survey that you may receive in the mail after your visit with us. Thank you!             Your Updated Medication List - Protect others around you: Learn how to safely use, store and throw away your medicines at www.disposemymeds.org.          This list is accurate as of 7/26/18  8:27 AM.  Always use your most recent med list.                   Brand Name Dispense Instructions for use Diagnosis    anastrozole 1 MG tablet    ARIMIDEX    30 tablet    Take by mouth daily        ASPIRIN EC PO      Take 81 mg by mouth daily        CALCIUM 500 PO      Take 600 mg by mouth daily        carvedilol 12.5 MG tablet    COREG    180 tablet    Take 1 tablet (12.5 mg) by mouth  2 times daily (with meals)    Essential hypertension       * JANTOVEN 5 MG tablet   Generic drug:  warfarin     100 tablet    FOR DIRECTIONS ON HOW TO   TAKE THIS MEDICINE, READ   THE ENCLOSED MEDICATION    INFORMATION FORM    S/P aortic valve replacement       * warfarin 5 MG tablet    JANTOVEN    100 tablet    Take one tablet (5 mg) daily except one and a half tablets (7.5 mg) on Wednesdays or as directed by the INR Clinic.    S/P aortic valve replacement       lisinopril 10 MG tablet    PRINIVIL/ZESTRIL    135 tablet    Take 1 1/2 tabs (15 mg) by mouth daily    Essential hypertension, benign       methimazole 5 MG tablet    TAPAZOLE    30 tablet    Take 1 tablet (5 mg) by mouth daily    Subclinical hyperthyroidism       triamcinolone acetonide 0.05 % Oint     17 g    Externally apply topically 2 times daily as needed    Dyshidrotic eczema       TYLENOL 325 MG tablet   Generic drug:  acetaminophen     250 tablet    Take 1-2 tablets (325-650 mg) by mouth every 6 hours as needed for mild pain        valACYclovir 1000 mg tablet    VALTREX    4 tablet    Take 2 tablets (2,000 mg) by mouth 2 times daily    Cold sore       VITAMIN D3 PO      Take by mouth every other day        zolpidem 5 MG tablet    AMBIEN    60 tablet    Take 1-2 tablets (5-10 mg) by mouth nightly as needed for sleep    Insomnia, unspecified type       * Notice:  This list has 2 medication(s) that are the same as other medications prescribed for you. Read the directions carefully, and ask your doctor or other care provider to review them with you.

## 2018-07-26 NOTE — PROGRESS NOTES
Dr Jackson's note    Patient's instructions / PLAN:                                                        Plan:  1. Labs today   2. Continue same meds, same doses for now   3. The following vaccines are recommended for you.   Some insurances cover better if you have these vaccines at the pharmacy:  -- Pneumovax 23 ( different pneumonia vaccine )  -- Shingerix vaccine - the newest vaccine for shingles       ASSESSMENT & PLAN:                                                      (Z00.00) Routine general medical examination at a health care facility  (primary encounter diagnosis)  Comment:   Plan: CBC with platelets, Comprehensive metabolic         panel, Parathyroid Hormone Intact, Lipid panel         reflex to direct LDL Fasting, CANCELED: TSH         with free T4 reflex            (C50.411,  Z17.0) Malignant neoplasm of upper-outer quadrant of right breast in female, estrogen receptor positive (H)  Comment: Stable  Plan: Follow-up with oncology    (Z98.890,  Z86.79) H/O aortic aneurysm repair  Comment: Stable  Plan: Follow-up with cardiology    (M85.80) Osteopenia, unspecified location  Comment:   Plan: Parathyroid Hormone Intact            (D12.6) Adenomatous polyp of colon,needs colonoscopy 2020  Comment:   Plan:     (E05.90) Subclinical hyperthyroidism  Comment:   Plan: Follow-up with Endocrinology    (E67.3) High vitamin D level  Comment:   Plan: Vitamin D Deficiency            (B00.1) Cold sore  Comment:   Plan: valACYclovir (VALTREX) 1000 mg tablet            (G47.00) Insomnia, unspecified type  Comment:   Plan: zolpidem (AMBIEN) 5 MG tablet               Chief Complaint:                                                        Annual exam    SUBJECTIVE:                                                    History of present illness     OA  -- knees   -- interested in medical marijuana   -- can't take nsaids       ROS:   General: Negative for fever, chills, major weight changes, fatigue  Skin: Negative for  rashes, abnormal spots  Eyes: Negative for blurred or double vision  ENT/mouth: Negative for sinuses discomfort, earache, sore throat  Respiratory: Negative for cough, wheezes, chronic lung disease  Cardiovascular: Negative for rest or exertional chest pain, shortness of breath, palpitations, leg edema,   Gastrointestinal: Negative for vomiting, abdominal pain, heartburn, blood in stool, diarrhea, constipation  Genitourinary: Negative for urinary frequency, blood in urine, history of kidney stones  Female: Negative for abnormal vaginal bleeding, vaginal discharge  Neuro: Negative for headaches, numbness, tingling, weakness in arms or legs, history of seizure, recent syncope  Psychiatry: Negative for depression, anxiety, suicidal thoughts  Endo: Negative for known thyroid disease, diabetes.  Hemato/Lymph: Negative for nodes, easy bleeding, history of DVT, blood transfusion  Musculoskeletal: Negative for joint swelling, back pain      PMHx: - reviewed  Past Medical History:   Diagnosis Date     Adenomatous colon polyp 2015     Aortic stenosis 11    bicuspid AV with severe stenosis - 2011 mechanical AVR with 23 mm St Yordan AV conduit, composite graft placement     Arthritis     knees and hands     Bicuspid aortic valve      Breast cancer (H) 2014    R breast     H/O aortic valve replacement     St Yordan     Heart murmur     Valve repalcement .     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    AAA repair with av23 mm tube graft     PONV (postoperative nausea and vomiting)     n/v morphine vs anesthesia, vomiting x24 hrs     Subclinical hyperthyroidism 2017     Thrombosis of leg     after  of daughter     Uncomplicated asthma        PSHx: reviewed  Past Surgical History:   Procedure Laterality Date     BIOPSY NODE SENTINEL Right 9/10/2014    Procedure: BIOPSY NODE  SENTINEL;  Surgeon: Ila Romano MD;  Location: [facility] OR     CARDIAC SURGERY  6/2011    aortic valve replacement     ENT SURGERY      tonsillectomy     HRW PORT A CATH       INSERT PORT VASCULAR ACCESS Left 10/22/2014    Procedure: INSERT PORT VASCULAR ACCESS;  Surgeon: Ila Romano MD;  Location: [facility] OR     LUMPECTOMY BREAST WITH SEED LOCALIZATION Right 9/10/2014    Procedure: LUMPECTOMY BREAST WITH SEED LOCALIZATION;  Surgeon: Ila Romano MD;  Location: [facility] OR     ORTHOPEDIC SURGERY      shoulder, thumb surgery     REMOVE PORT VASCULAR ACCESS Left 4/8/2015    Procedure: REMOVE PORT VASCULAR ACCESS;  Surgeon: Ila Romano MD;  Location: [facility] OR     REPAIR ANEURYSM ASCENDING AORTA  6/23/2011    Procedure:REPAIR ANEURYSM ASCENDING AORTA; Medial Sternotomy, Aortic Valve Replacement, ([employer]. Yordan [employer] HP Valved Graft, size 23 mm) on pump oxygenation, intraoperative transesophageal cardiogram; Surgeon:JOHN PAUL ULLOA; Location:[facility] OR     REPLACE VALVE AORTIC  6/23/11    bicuspid AV with severe stenosis - 6/2011 mechanical AVR with 23 mm St Yordan AV conduit, composite graft placement        Soc Hx: No daily alcohol, no smoking  Social History     Social History     Marital status: Single     Spouse name: N/A     Number of children: N/A     Years of education: N/A     Occupational History     Not on file.     Social History Main Topics     Smoking status: Never Smoker     Smokeless tobacco: Never Used     Alcohol use Yes      Comment: 2 drinks per week -      Drug use: No     Sexual activity: Not Currently     Other Topics Concern     Caffeine Concern Yes     1-2 cups caffeine per day     Sleep Concern No     Stress Concern No     Weight Concern No     Special Diet Yes     low fat daily , low red meats     Back Care No     Exercise Yes     walks daily/ 3x a week exercise class     Social History Narrative        Fam Hx: reviewed  Family History   Problem Relation Age of  "Onset     Hypertension Mother      Thyroid Disease Mother      \"Toxic thyroid\"     Prostate Cancer Father 70     Cancer Father 80     Lung cancer, Not caused from smoking     Cerebrovascular Disease Father 80     Hypertension Father      Cardiovascular Brother      half brother      Cardiovascular Son      Puentes-Parkinsons White Syndrome     Cardiovascular Daughter      Heart murmur     Breast Cancer Paternal Aunt 80     Cardiovascular Paternal Aunt      Arthritis Brother 40     RA - half brother      Cancer Maternal Grandfather      Colon Cancer No family hx of          Screening: reviewed      All: reviewed    Meds: reviewed  Current Outpatient Prescriptions   Medication Sig Dispense Refill     acetaminophen (TYLENOL) 325 MG tablet Take 1-2 tablets (325-650 mg) by mouth every 6 hours as needed for mild pain 250 tablet 11     anastrozole (ARIMIDEX) 1 MG tablet Take by mouth daily 30 tablet      ASPIRIN EC PO Take 81 mg by mouth daily       Calcium Carbonate (CALCIUM 500 PO) Take 600 mg by mouth daily        carvedilol (COREG) 12.5 MG tablet Take 1 tablet (12.5 mg) by mouth 2 times daily (with meals) 180 tablet 3     Cholecalciferol (VITAMIN D3 PO) Take by mouth every other day        JANTOVEN 5 MG tablet FOR DIRECTIONS ON HOW TO   TAKE THIS MEDICINE, READ   THE ENCLOSED MEDICATION    INFORMATION FORM 100 tablet 1     lisinopril (PRINIVIL/ZESTRIL) 10 MG tablet Take 1 1/2 tabs (15 mg) by mouth daily 135 tablet 3     methimazole (TAPAZOLE) 5 MG tablet Take 1 tablet (5 mg) by mouth daily 30 tablet 4     triamcinolone acetonide 0.05 % OINT Externally apply topically 2 times daily as needed 17 g 0     valACYclovir (VALTREX) 1000 mg tablet Take 2 tablets (2,000 mg) by mouth 2 times daily (Patient taking differently: Take 2,000 mg by mouth as needed ) 4 tablet 3     warfarin (JANTOVEN) 5 MG tablet Take one tablet (5 mg) daily except one and a half tablets (7.5 mg) on Wednesdays or as directed by the INR Clinic. 100 tablet " "1     zolpidem (AMBIEN) 5 MG tablet Take 1-2 tablets (5-10 mg) by mouth nightly as needed for sleep 60 tablet 0       OBJECTIVE:                                                    Physical Exam :  Blood pressure 140/86, pulse 76, temperature 98.5  F (36.9  C), temperature source Oral, resp. rate 22, height 5' 4\" (1.626 m), weight 123 lb 1.6 oz (55.8 kg), SpO2 98 %, not currently breastfeeding.     NAD, appears comfortable  Skin clear, no rashes  HEENT: PERRLA, EOMI, anicteric sclera, pink conjunctiva, external ears appear normal, bilateral tympanic membranes clinically normal, oropharynx normal color.  Neck: supple, no JVD,  no thyroidmegaly  Lymph nodes non palpable in the cervical, supraclavicular axillaries, inguinal areas  Chest: clear to auscultation with good respiratory effort  Cardiac: S1S2, RRR, Ao click  Abdomen: soft, not tender, not distended, audible bowel sound, no hepatosplenomegaly, no palpable masses, no abdominal bruits  Extremities: no cyanosis, clubbing or edema.   Neuro: A, Ox3, no focal signs.  Breast exam in supine and erect position: they are symmetrical, postsurgical scar on the right breast, no skin changes, no tenderness or nodes on palpation. Nipples are erect, no skin lesions, no discharge on pressure.    Pelvic exam: deferred, no symptoms, no hx of abnormal pap         Brook Jackson MD  Internal Medicine          SUBJECTIVE:   CC: Annabella Bolanos is an 62 year old woman who presents for preventive health visit.     Answers for HPI/ROS submitted by the patient on 7/26/2018   Annual Exam:  Getting at least 3 servings of Calcium per day:: Yes  Bi-annual eye exam:: Yes  Dental care twice a year:: Yes  Sleep apnea or symptoms of sleep apnea:: None  Diet:: Low salt, Carbohydrate counting  Frequency of exercise:: 4-5 days/week  Taking medications regularly:: Yes  Medication side effects:: Other  Additional concerns today:: YES  PHQ-2 Score: 0  Duration of exercise:: 30-45 minutes        She has " "questions about her arthritis pain.    Today's PHQ-2 Score:   PHQ-2 ( 1999 Pf[employer]) 7/26/2018 7/26/2018   Q1: Little interest or pleasure in doing things 0 0   Q2: Feeling down, depressed or hopeless 0 0   PHQ-2 Score 0 0   Q1: Little interest or pleasure in doing things Not at all -   Q2: Feeling down, depressed or hopeless Not at all -   PHQ-2 Score 0 -       Abuse: Current or Past(Physical, Sexual or Emotional)- No  Do you feel safe in your environment - Yes    Social History   Substance Use Topics     Smoking status: Never Smoker     Smokeless tobacco: Never Used     Alcohol use Yes      Comment: 2 drinks per week -      If you drink alcohol do you typically have >3 drinks per day or >7 drinks per week? Yes - AUDIT SCORE:     No flowsheet data found.                     Reviewed orders with patient.  Reviewed health maintenance and updated orders accordingly - Yes        Mammogram is scheduled for 8/6/18.    Pertinent mammograms are reviewed under the imaging tab.  History of abnormal Pap smear: NO - age 30- 65 PAP every 3 years recommended  PAP / HPV Latest Ref Rng & Units 7/14/2016   PAP - NIL   HPV 16 DNA NEG Negative   HPV 18 DNA NEG Negative   OTHER HR HPV NEG Negative     Reviewed and updated as needed this visit by clinical staff  Tobacco  Allergies  Meds  Med Hx  Surg Hx  Fam Hx  Soc Hx        Reviewed and updated as needed this visit by Provider            ROS:      OBJECTIVE:   /86 (BP Location: Right arm, Patient Position: Sitting, Cuff Size: Adult Regular)  Pulse 76  Temp 98.5  F (36.9  C) (Oral)  Resp 22  Ht 5' 4\" (1.626 m)  Wt 123 lb 1.6 oz (55.8 kg)  SpO2 98%  BMI 21.13 kg/m2    COUNSELING:   Reviewed preventive health counseling, as reflected in patient instructions       Regular exercise       Healthy diet/nutrition    BP Readings from Last 1 Encounters:   07/26/18 140/86     Estimated body mass index is 21.13 kg/(m^2) as calculated from the following:    Height as of this " "encounter: 5' 4\" (1.626 m).    Weight as of this encounter: 123 lb 1.6 oz (55.8 kg).           reports that she has never smoked. She has never used smokeless tobacco.      Counseling Resources:  ATP IV Guidelines  Pooled Cohorts Equation Calculator  Breast Cancer Risk Calculator  FRAX Risk Assessment  ICSI Preventive Guidelines  Dietary Guidelines for Americans, 2010  USDA's MyPlate  ASA Prophylaxis  Lung CA Screening    Brook Echavarria MD  [facility]  "

## 2018-07-26 NOTE — MR AVS SNAPSHOT
Annabella TEMPLE Luis Miguel   7/26/2018 8:45 AM   Anticoagulation Therapy Visit    Description:  62 year old female   Provider:  [facility]   Department:  [facility]           INR as of 7/26/2018     Today's INR 2.2      Anticoagulation Summary as of 7/26/2018     INR goal 1.8-2.5   Today's INR 2.2   Full warfarin instructions 7.5 mg on Wed; 5 mg all other days   Next INR check 9/6/2018    Indications   Long-term (current) use of anticoagulants [Z79.01] [Z79.01]  H/O aortic valve replacement [Z95.2]         Your next Anticoagulation Clinic appointment(s)     Sep 06, 2018  8:00 AM CDT   [facility]    [address] [facility] [address]  [facility] [address] [phone]              Contact Numbers     [facility] Phone Numbers:  Anticoagulation Clinic Appointments : [phone]  Anticoagulation Nurse: [phone]         July 2018 Details    Sun Mon Tue Wed Thu Fri Sat     1               2               3               4               5               6               7                 8               9               10               11               12               13               14                 15               16               17               18               19               20               21                 22               23               24               25               26      5 mg   See details      27      5 mg         28      5 mg           29      5 mg         30      5 mg         31      5 mg              Date Details   07/26 This INR check               How to take your warfarin dose     To take:  5 mg Take 1 of the 5 mg tablets.           August 2018 Details    Sun Mon Tue Wed Thu Fri Sat        1      7.5 mg         2      5 mg         3      5 mg         4      5 mg           5      5 mg         6      5 mg         7      5 mg         8      7.5 mg         9      5 mg          10      5 mg         11      5 mg           12      5 mg         13      5 mg         14      5 mg         15      7.5 mg         16      5 mg         17      5 mg         18      5 mg           19      5 mg         20      5 mg         21      5 mg         22      7.5 mg         23      5 mg         24      5 mg         25      5 mg           26      5 mg         27      5 mg         28      5 mg         29      7.5 mg         30      5 mg         31      5 mg           Date Details   No additional details            How to take your warfarin dose     To take:  5 mg Take 1 of the 5 mg tablets.    To take:  7.5 mg Take 1.5 of the 5 mg tablets.           September 2018 Details    Sun Mon Tue Wed Thu Fri Sat           1      5 mg           2      5 mg         3      5 mg         4      5 mg         5      7.5 mg         6            7               8                 9               10               11               12               13               14               15                 16               17               18               19               20               21               22                 23               24               25               26               27               28               29                 30                      Date Details   No additional details    Date of next INR:  9/6/2018         How to take your warfarin dose     To take:  5 mg Take 1 of the 5 mg tablets.    To take:  7.5 mg Take 1.5 of the 5 mg tablets.

## 2018-07-27 LAB — DEPRECATED CALCIDIOL+CALCIFEROL SERPL-MC: 55 UG/L (ref 20–75)

## 2018-08-06 ENCOUNTER — HOSPITAL ENCOUNTER (OUTPATIENT)
Dept: MAMMOGRAPHY | Facility: CLINIC | Age: 63
Discharge: HOME | End: 2018-08-06
Attending: INTERNAL MEDICINE | Admitting: INTERNAL MEDICINE
Payer: COMMERCIAL

## 2018-08-06 DIAGNOSIS — Z12.31 VISIT FOR SCREENING MAMMOGRAM: ICD-10-CM

## 2018-08-06 PROCEDURE — 77063 BREAST TOMOSYNTHESIS BI: CPT

## 2018-08-10 ENCOUNTER — TRANSFERRED RECORDS (OUTPATIENT)
Dept: HEALTH INFORMATION MANAGEMENT | Facility: CLINIC | Age: 63
End: 2018-08-10

## 2018-08-10 ENCOUNTER — TRANSFERRED RECORDS (OUTPATIENT)
Dept: SURGERY | Facility: CLINIC | Age: 63
End: 2018-08-10

## 2018-08-23 DIAGNOSIS — B00.1 COLD SORE: ICD-10-CM

## 2018-08-23 RX ORDER — VALACYCLOVIR HYDROCHLORIDE 1 G/1
2000 TABLET, FILM COATED ORAL 2 TIMES DAILY
Qty: 4 TABLET | Refills: 3 | Status: SHIPPED | OUTPATIENT
Start: 2018-08-23 | End: 2019-07-18

## 2018-08-23 NOTE — TELEPHONE ENCOUNTER
Patient seen last month, wanted Valacyclovir rx sent to local pharmacy, not mail order pharmacy.  Sent new rx to preferred pharmacy.  GLENDA Baca R.N.

## 2018-09-06 ENCOUNTER — ANTICOAGULATION THERAPY VISIT (OUTPATIENT)
Dept: ANTICOAGULATION | Facility: CLINIC | Age: 63
End: 2018-09-06
Payer: COMMERCIAL

## 2018-09-06 DIAGNOSIS — Z79.01 LONG-TERM (CURRENT) USE OF ANTICOAGULANTS: ICD-10-CM

## 2018-09-06 DIAGNOSIS — E05.90 SUBCLINICAL HYPERTHYROIDISM: ICD-10-CM

## 2018-09-06 DIAGNOSIS — Z95.2 H/O AORTIC VALVE REPLACEMENT: ICD-10-CM

## 2018-09-06 LAB
ALBUMIN SERPL-MCNC: 3.6 G/DL (ref 3.4–5)
ALP SERPL-CCNC: 87 U/L (ref 40–150)
ALT SERPL W P-5'-P-CCNC: 21 U/L (ref 0–50)
AST SERPL W P-5'-P-CCNC: 10 U/L (ref 0–45)
BILIRUB DIRECT SERPL-MCNC: 0.1 MG/DL (ref 0–0.2)
BILIRUB SERPL-MCNC: 0.6 MG/DL (ref 0.2–1.3)
INR POINT OF CARE: 2 (ref 0.86–1.14)
PROT SERPL-MCNC: 6.8 G/DL (ref 6.8–8.8)
T3FREE SERPL-MCNC: 2.8 PG/ML (ref 2.3–4.2)
T4 FREE SERPL-MCNC: 0.89 NG/DL (ref 0.76–1.46)
TSH SERPL DL<=0.005 MIU/L-ACNC: 1.1 MU/L (ref 0.4–4)
WBC # BLD AUTO: 5.5 10E9/L (ref 4–11)

## 2018-09-06 PROCEDURE — 85048 AUTOMATED LEUKOCYTE COUNT: CPT | Performed by: INTERNAL MEDICINE

## 2018-09-06 PROCEDURE — 84439 ASSAY OF FREE THYROXINE: CPT | Performed by: INTERNAL MEDICINE

## 2018-09-06 PROCEDURE — 84481 FREE ASSAY (FT-3): CPT | Performed by: INTERNAL MEDICINE

## 2018-09-06 PROCEDURE — 36416 COLLJ CAPILLARY BLOOD SPEC: CPT

## 2018-09-06 PROCEDURE — 80076 HEPATIC FUNCTION PANEL: CPT | Performed by: INTERNAL MEDICINE

## 2018-09-06 PROCEDURE — 84443 ASSAY THYROID STIM HORMONE: CPT | Performed by: INTERNAL MEDICINE

## 2018-09-06 PROCEDURE — 85610 PROTHROMBIN TIME: CPT | Mod: QW

## 2018-09-06 PROCEDURE — 99207 ZZC NO CHARGE NURSE ONLY: CPT

## 2018-09-06 NOTE — PROGRESS NOTES
ANTICOAGULATION FOLLOW-UP CLINIC VISIT    Patient Name:  Annabella Bolanos  Date:  9/6/2018  Contact Type:  Face to Face    SUBJECTIVE:     Patient Findings     Positives No Problem Findings           OBJECTIVE    INR Protime   Date Value Ref Range Status   09/06/2018 2.0 (A) 0.86 - 1.14 Final       ASSESSMENT / PLAN  INR assessment THER    Recheck INR In: 6 WEEKS    INR Location Clinic      Anticoagulation Summary as of 9/6/2018     INR goal 1.8-2.5   Today's INR 2.0   Warfarin maintenance plan 7.5 mg (5 mg x 1.5) on Wed; 5 mg (5 mg x 1) all other days   Full warfarin instructions 7.5 mg on Wed; 5 mg all other days   Weekly warfarin total 37.5 mg   No change documented Deborah Aguilera RN   Plan last modified Erica Stinson RN (11/22/2017)   Next INR check 10/18/2018   Priority INR   Target end date     Indications   Long-term (current) use of anticoagulants [Z79.01] [Z79.01]  H/O aortic valve replacement [Z95.2]         Anticoagulation Episode Summary     INR check location     Preferred lab     Send INR reminders to [facility]    Comments       Anticoagulation Care Providers     Provider Role Specialty Phone number    Brook Echavarria MD [facility] Internal Medicine [phone]            See the Encounter Report to view Anticoagulation Flowsheet and Dosing Calendar (Go to Encounters tab in chart review, and find the Anticoagulation Therapy Visit)    Dosage adjustment made based on physician directed care plan.    Deborah Aguilera, RN

## 2018-09-06 NOTE — MR AVS SNAPSHOT
Annabella TEMPLE Luis Miguel   9/6/2018 8:00 AM   Anticoagulation Therapy Visit    Description:  63 year old female   Provider[facility]  [facility]   Department:  [facility]           INR as of 9/6/2018     Today's INR 2.0      Anticoagulation Summary as of 9/6/2018     INR goal 1.8-2.5   Today's INR 2.0   Full warfarin instructions 7.5 mg on Wed; 5 mg all other days   Next INR check 10/18/2018    Indications   Long-term (current) use of anticoagulants [Z79.01] [Z79.01]  H/O aortic valve replacement [Z95.2]         Your next Anticoagulation Clinic appointment(s)     Oct 17, 2018  8:15 AM CDT   [facility]agulation Visit with [facility]   [facility] ([facility]    [address] [facility] [address]  [facility] [address] [phone]              Contact Numbers     [facility] Phone Numbers:  Anticoagulation Clinic Appointments : [phone]  Anticoagulation Nurse: [phone]         September 2018 Details    Sun Mon Tue Wed Thu Fri Sat           1                 2               3               4               5               6      5 mg   See details      7      5 mg         8      5 mg           9      5 mg         10      5 mg         11      5 mg         12      7.5 mg         13      5 mg         14      5 mg         15      5 mg           16      5 mg         17      5 mg         18      5 mg         19      7.5 mg         20      5 mg         21      5 mg         22      5 mg           23      5 mg         24      5 mg         25      5 mg         26      7.5 mg         27      5 mg         28      5 mg         29      5 mg           30      5 mg                Date Details   09/06 This INR check               How to take your warfarin dose     To take:  5 mg Take 1 of the 5 mg tablets.    To take:  7.5 mg Take 1.5 of the 5 mg tablets.           October 2018 Details    Sun Mon Tue Wed Thu Fri Sat      1      5 mg         2      5 mg         3      7.5 mg          4      5 mg         5      5 mg         6      5 mg           7      5 mg         8      5 mg         9      5 mg         10      7.5 mg         11      5 mg         12      5 mg         13      5 mg           14      5 mg         15      5 mg         16      5 mg         17      7.5 mg         18            19               20                 21               22               23               24               25               26               27                 28               29               30               31                   Date Details   No additional details    Date of next INR:  10/18/2018         How to take your warfarin dose     To take:  5 mg Take 1 of the 5 mg tablets.    To take:  7.5 mg Take 1.5 of the 5 mg tablets.

## 2018-09-27 ENCOUNTER — OFFICE VISIT (OUTPATIENT)
Dept: ENDOCRINOLOGY | Facility: CLINIC | Age: 63
End: 2018-09-27
Payer: COMMERCIAL

## 2018-09-27 VITALS
SYSTOLIC BLOOD PRESSURE: 144 MMHG | HEIGHT: 64 IN | DIASTOLIC BLOOD PRESSURE: 86 MMHG | BODY MASS INDEX: 21.61 KG/M2 | WEIGHT: 126.6 LBS | HEART RATE: 79 BPM | OXYGEN SATURATION: 99 % | TEMPERATURE: 98 F

## 2018-09-27 DIAGNOSIS — C50.911 MALIGNANT NEOPLASM OF RIGHT FEMALE BREAST, UNSPECIFIED ESTROGEN RECEPTOR STATUS, UNSPECIFIED SITE OF BREAST (H): Primary | ICD-10-CM

## 2018-09-27 DIAGNOSIS — M85.80 OSTEOPENIA, UNSPECIFIED LOCATION: Chronic | ICD-10-CM

## 2018-09-27 DIAGNOSIS — E05.90 SUBCLINICAL HYPERTHYROIDISM: ICD-10-CM

## 2018-09-27 PROCEDURE — 99214 OFFICE O/P EST MOD 30 MIN: CPT | Performed by: INTERNAL MEDICINE

## 2018-09-27 RX ORDER — ZOLPIDEM TARTRATE 5 MG/1
2.5 TABLET ORAL
Qty: 60 TABLET | Refills: 0 | COMMUNITY
End: 2019-07-18

## 2018-09-27 RX ORDER — METHIMAZOLE 5 MG/1
2.5 TABLET ORAL DAILY
Qty: 30 TABLET | Refills: 1 | Status: SHIPPED | OUTPATIENT
Start: 2018-09-27 | End: 2019-02-20

## 2018-09-27 NOTE — PROGRESS NOTES
Name: Annabella Bolanos  Seen for follow-up of subclinical hyperthyroidism.    HPI:  Annabella Bolanos is a 63 year old female who presents for the evaluation of subclinical Hyperthyroidism.  Noted since 7/2016. Plan was for continue to monitor.  Never been on medications.  No h/o arrhythmia  No h/o osteoporosis-- has osteopenia. Not on treatment.  TSI neg  US thyroid ( h/o radiation)- no discrete nodules.      H/o breast cancer and h/o aortic valve replacement and is on long term anticoagulation.  Breast cancer diagnosed in 2014 s/p lumpectomy and chemo and radiation. Took radiation 5 days a week for 1 month.  DEXA 2018 scan showing osteopenia and appears stable.  She is not on medication for osteoporosis/osteopenia at this time.   She is also on aromatase inhibitor as a part of treatment for breast cancer.     Feeling good.  Has good energy.  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  H/o arthritis.    Weight is stable.  Wt Readings from Last 2 Encounters:   09/27/18 57.4 kg (126 lb 9.6 oz)   07/26/18 55.8 kg (123 lb 1.6 oz)       Eating healthy.    History of radiation exposure: YES. As above  History of thyroid dysfunction: not to her knowledge. No FH of thyroid cancer.  Palpitations:  No  Changes to hair or skin: No  Diarrhea/Constipation:No  Changes in menses: s/p menopause  Changes in vision:No  Diplopia/Blurryness:No  Dysphagia or Shortness of breath:No  Tremors:No  Difficulty sleeping:Yes: most of the time  Changes in weight: No  Lithium/Amiodarone: No  URI: No  CT Scans:No  Mother had hyperthyroidism s/p DOE and was on levothyroxine.    PMH/PSH:  Past Medical History:   Diagnosis Date     Adenomatous colon polyp 8/2015     Aortic stenosis 6/23/11    bicuspid AV with severe stenosis - 6/2011 mechanical AVR with 23 mm St Yordan AV conduit, composite graft placement     Arthritis     knees and hands     Bicuspid aortic valve      Breast cancer (H) 7/2014    R breast     H/O aortic valve replacement     St Yordan     Heart murmur     Valve  "repalcement .     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    AAA repair with av23 mm tube graft     PONV (postoperative nausea and vomiting)     n/v morphine vs anesthesia, vomiting x24 hrs     Subclinical hyperthyroidism 2017     Thrombosis of leg     after  of daughter     Uncomplicated asthma      Past Surgical History:   Procedure Laterality Date     BIOPSY NODE SENTINEL Right 9/10/2014    Procedure: BIOPSY NODE SENTINEL;  Surgeon: Ila Romano MD;  Location: [facility] OR     CARDIAC SURGERY  2011    aortic valve replacement     ENT SURGERY      tonsillectomy     HRW PORT A CATH       INSERT PORT VASCULAR ACCESS Left 10/22/2014    Procedure: INSERT PORT VASCULAR ACCESS;  Surgeon: Ila Romano MD;  Location: [facility] OR     LUMPECTOMY BREAST WITH SEED LOCALIZATION Right 9/10/2014    Procedure: LUMPECTOMY BREAST WITH SEED LOCALIZATION;  Surgeon: Ila Romano MD;  Location: [facility] OR     ORTHOPEDIC SURGERY      shoulder, thumb surgery     REMOVE PORT VASCULAR ACCESS Left 2015    Procedure: REMOVE PORT VASCULAR ACCESS;  Surgeon: Ila Romano MD;  Location: [facility] OR     REPAIR ANEURYSM ASCENDING AORTA  2011    Procedure:REPAIR ANEURYSM ASCENDING AORTA; Medial Sternotomy, Aortic Valve Replacement, ([employer]. Y[employer] HP Valved Graft, size 23 mm) on pump oxygenation, intraoperative transesophageal cardiogram; Surgeon:JOHN PAUL ULLOA; Location:[facility]     REPLACE VALVE AORTIC  11    bicuspid AV with severe stenosis - 2011 mechanical AVR with 23 mm St Yordan AV conduit, composite graft placement     Family Hx:  Family History   Problem Relation Age of Onset     Hypertension Mother      Thyroid Disease Mother      \"Toxic thyroid\"     Prostate Cancer Father 70     Cancer Father 80     Lung cancer, Not caused from smoking     " "Cerebrovascular Disease Father 80     Hypertension Father      Cardiovascular Brother      half brother      Cardiovascular Son      Puentes-Parkinsons White Syndrome     Cardiovascular Daughter      Heart murmur     Breast Cancer Paternal Aunt 80     Cardiovascular Paternal Aunt      Arthritis Brother 40     RA - half brother      Cancer Maternal Grandfather      Colon Cancer No family hx of      Thyroid disease: as above          Social Hx:  Social History     Social History     Marital status: Single     Spouse name: N/A     Number of children: N/A     Years of education: N/A     Occupational History     Not on file.     Social History Main Topics     Smoking status: Never Smoker     Smokeless tobacco: Never Used     Alcohol use Yes      Comment: 2 drinks per week -      Drug use: No     Sexual activity: Not Currently     Other Topics Concern     Caffeine Concern Yes     1-2 cups caffeine per day     Sleep Concern No     Stress Concern No     Weight Concern No     Special Diet Yes     low fat daily , low red meats     Back Care No     Exercise Yes     walks daily/ 3x a week exercise class     Social History Narrative          MEDICATIONS:  has a current medication list which includes the following prescription(s): acetaminophen, anastrozole, aspirin, calcium-magnesium-vitamin d, carvedilol, cholecalciferol, lisinopril, methimazole, valacyclovir, warfarin, and zolpidem.    ROS   ROS: 10 point ROS neg other than the symptoms noted above in the HPI.    Physical Exam   VS: /86 (BP Location: Left arm, Patient Position: Chair, Cuff Size: Adult Regular)  Pulse 79  Temp 98  F (36.7  C) (Oral)  Ht 1.626 m (5' 4\")  Wt 57.4 kg (126 lb 9.6 oz)  SpO2 99%  Breastfeeding? No  BMI 21.73 kg/m2  GENERAL: AXOX3, NAD, well dressed, answering questions appropriately, appears stated age.  HEENT: No exopthalmous, no proptosis, EOMI, no lig lag, no retraction  NECK: Thyroid normal in size, non tender, no nodules were " palpated.  CV: RRR  LUNGS: CTAB  ABDOMEN: +BS  NEUROLOGY: CN grossly intact, no tremors  PSYCH: normal affect and mood      LABS:  TFTs:  ENDO THYROID LABS-[facility] Latest Ref Rng & Units 9/6/2018   TSH 0.40 - 4.00 mU/L 1.10   T4 FREE 0.76 - 1.46 ng/dL 0.89   FREE T3 2.3 - 4.2 pg/mL 2.8     ENDO THYROID LABS-[facility] Latest Ref Rng & Units 6/5/2018   TSH 0.40 - 4.00 mU/L 0.05 (L)   T4 FREE 0.76 - 1.46 ng/dL 1.27   FREE T3 2.3 - 4.2 pg/mL 3.4     ENDO THYROID LABS-[facility] Latest Ref Rng & Units 12/6/2017   TSH 0.40 - 4.00 mU/L 0.18 (L)   T4 FREE 0.76 - 1.46 ng/dL 1.16   FREE T3 2.3 - 4.2 pg/mL 3.2   THYROID STIM IMMUNOG <=1.3 TSI index <1.0     ENDO THYROID LABS-[facility] Latest Ref Rng & Units 11/10/2017 7/24/2017   TSH 0.40 - 4.00 mU/L 0.12 (L) 0.12 (L)   T4 FREE 0.76 - 1.46 ng/dL 1.10 1.16     ENDO THYROID LABS-[facility] Latest Ref Rng & Units 7/14/2016   TSH 0.40 - 4.00 mU/L 0.24 (L)   T4 FREE 0.76 - 1.46 ng/dL 1.22       CBC:  Lab Results   Component Value Date    WBC 5.0 07/24/2017     Lab Results   Component Value Date    RBC 4.30 07/24/2017     Lab Results   Component Value Date    HGB 13.3 07/24/2017     Lab Results   Component Value Date    HCT 40.7 07/24/2017     No components found for: MCT  Lab Results   Component Value Date    MCV 95 07/24/2017     Lab Results   Component Value Date    MCH 30.9 07/24/2017     Lab Results   Component Value Date    MCHC 32.7 07/24/2017     Lab Results   Component Value Date    RDW 11.3 07/24/2017     Lab Results   Component Value Date     07/24/2017         LFTs:  Liver Function Studies -   Recent Labs   Lab Test  07/24/17   0812   PROTTOTAL  7.1   ALBUMIN  3.6   BILITOTAL  0.6   ALKPHOS  86   AST  19   ALT  23     ULTRASOUND THYROID December 15, 2017 9:24 AM   HISTORY: Subclinical hyperthyroidism.   FINDINGS: Thyroid ultrasound demonstrates an enlarged thyroid gland. The right lobe measures 5.4 x 2.0 x 1.8 cm. The left lobe measures 3.8 x 1.6 x 1.3 cm. The isthmus is normal in thickness.  Thyroid parenchyma is heterogeneous in echotexture. Increased color Doppler flow is noted throughout the thyroid gland. Thyroid nodules as follows: Right Lobe: None. Isthmus: None. Left Lobe: None.   IMPRESSION: Enlarged heterogeneous thyroid gland without evidence of a discrete thyroid nodule. Increased color Doppler flow suggests underlying thyroiditis. Correlate with thyroid function test and nuclear medicine thyroid scan as needed.    All pertinent notes, labs, and images personally reviewed by me.     A/P  Ms.Carol WINDY Bolanos is a 62 year old here for the evaluation of hyperthyroidism.    Subclinical hyperthyroidism (TSI neg):   TSH remained suppressed since 2016 and along with normal free T4  No h/o arrhythmia  No h/o osteoporosis-- has osteopenia. Not on treatment.  She is on aromatase inhibitor for breast cancer.  TSI neg  US thyroid ( h/o radiation)- no discrete nodules.  Clinically looks euthyroid.  No major complaints.  As there is further decline in TSH along with risk for low bone density with aromatase inhibitor (with with prior history of osteopenia) was started on methimazole 5 mg 7/2018  F/u labs WNL.  Plan:  Decrease methimazole to 2.5 mg per day (9/27/2018)  Labs in 6 weeks.  Please make a lab appointment for blood work and follow up clinic appointment in 1 week after that to discuss results.    Off note she is on anticoagulant and may need dose adjustment to get INR at target levels ( h/o aortic valve replacement)  Baseline LFT and WBC normal.  Discussed diagnosis, pathophysiology, management and treatment options of condition with pt.    Discussed indications, risks and benefits of all medications prescribed, and answered questions to patient's satisfaction.  The patient indicates understanding of these issues and agrees with the plan.    Discussed possible side effects of methimazole including liver dysfunction and agranulocytosis. Discussed to watch for s/s like jaundice, abdominal pain and skin  rash. In case of prolonged unresolving fever, sore throat and infections, advise to seek medical care.    Call if:     Signs or symptoms of infection. These include a fever of 100.5 degrees or higher, chills, severe sore throat, ear or sinus pain, cough, increased sputum or change in color, painful urination, mouth sores.   Severe nausea or vomiting.   Joint pain or swelling.   Not able to eat.   Unusual bruising or bleeding.   Dark urine or yellow skin or eyes.      Follow-up:  4-6 weeks    Merle Baxter MD  Endocrinology  [facility]  CC: Brook Echavarria     More than 50% of face to face time spent with Ms. Bolanos on counseling / coordinating her care.    All questions were answered.  The patient indicates understanding of the above issues and agrees with the plan set forth.

## 2018-09-27 NOTE — LETTER
9/27/2018         RE: Annabella Bolanos  [address] [place] [address]        Dear Colleague,    Thank you for referring your patient, Annabella Bolanos, to the [facility]. Please see a copy of my visit note below.    Name: Annabella Bolanos  Seen for follow-up of subclinical hyperthyroidism.    HPI:  Annabella Bolanos is a 63 year old female who presents for the evaluation of subclinical Hyperthyroidism.  Noted since 7/2016. Plan was for continue to monitor.  Never been on medications.  No h/o arrhythmia  No h/o osteoporosis-- has osteopenia. Not on treatment.  TSI neg  US thyroid ( h/o radiation)- no discrete nodules.      H/o breast cancer and h/o aortic valve replacement and is on long term anticoagulation.  Breast cancer diagnosed in 2014 s/p lumpectomy and chemo and radiation. Took radiation 5 days a week for 1 month.  DEXA 2018 scan showing osteopenia and appears stable.  She is not on medication for osteoporosis/osteopenia at this time.   She is also on aromatase inhibitor as a part of treatment for breast cancer.     Feeling good.  Has good energy.  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  H/o arthritis.    Weight is stable.  Wt Readings from Last 2 Encounters:   09/27/18 57.4 kg (126 lb 9.6 oz)   07/26/18 55.8 kg (123 lb 1.6 oz)       Eating healthy.    History of radiation exposure: YES. As above  History of thyroid dysfunction: not to her knowledge. No FH of thyroid cancer.  Palpitations:  No  Changes to hair or skin: No  Diarrhea/Constipation:No  Changes in menses: s/p menopause  Changes in vision:No  Diplopia/Blurryness:No  Dysphagia or Shortness of breath:No  Tremors:No  Difficulty sleeping:Yes: most of the time  Changes in weight: No  Lithium/Amiodarone: No  URI: No  CT Scans:No  Mother had hyperthyroidism s/p DOE and was on levothyroxine.    PMH/PSH:  Past Medical History:   Diagnosis Date     Adenomatous colon polyp 8/2015     Aortic stenosis 6/23/11    bicuspid AV with severe stenosis - 6/2011 mechanical  AVR with 23 mm St Yordan AV conduit, composite graft placement     Arthritis     knees and hands     Bicuspid aortic valve      Breast cancer (H) 2014    R breast     H/O aortic valve replacement     St Yordan     Heart murmur     Valve repalcement .     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    AAA repair with av23 mm tube graft     PONV (postoperative nausea and vomiting)     n/v morphine vs anesthesia, vomiting x24 hrs     Subclinical hyperthyroidism 2017     Thrombosis of leg     after  of daughter     Uncomplicated asthma      Past Surgical History:   Procedure Laterality Date     BIOPSY NODE SENTINEL Right 9/10/2014    Procedure: BIOPSY NODE SENTINEL;  Surgeon: Ila Romano MD;  Location: [facility] OR     CARDIAC SURGERY  2011    aortic valve replacement     ENT SURGERY      tonsillectomy     HRW PORT A CATH       INSERT PORT VASCULAR ACCESS Left 10/22/2014    Procedure: INSERT PORT VASCULAR ACCESS;  Surgeon: Ila Romano MD;  Location: [facility] OR     LUMPECTOMY BREAST WITH SEED LOCALIZATION Right 9/10/2014    Procedure: LUMPECTOMY BREAST WITH SEED LOCALIZATION;  Surgeon: Ila Romano MD;  Location: [facility] OR     ORTHOPEDIC SURGERY      shoulder, thumb surgery     REMOVE PORT VASCULAR ACCESS Left 2015    Procedure: REMOVE PORT VASCULAR ACCESS;  Surgeon: Ila Romano MD;  Location: [facility] OR     REPAIR ANEURYSM ASCENDING AORTA  2011    Procedure:REPAIR ANEURYSM ASCENDING AORTA; Medial Sternotomy, Aortic Valve Replacement, (St. Yordan [employer] HP Valved Graft, size 23 mm) on pump oxygenation, intraoperative transesophageal cardiogram; Surgeon:JOHN PAUL ULLOA; Location:[facility] OR     REPLACE VALVE AORTIC  11    bicuspid AV with severe stenosis - 2011 mechanical AVR with 23 mm St Yordan AV conduit, composite graft placement  "    Family Hx:  Family History   Problem Relation Age of Onset     Hypertension Mother      Thyroid Disease Mother      \"Toxic thyroid\"     Prostate Cancer Father 70     Cancer Father 80     Lung cancer, Not caused from smoking     Cerebrovascular Disease Father 80     Hypertension Father      Cardiovascular Brother      half brother      Cardiovascular Son      Puentes-Parkinsons White Syndrome     Cardiovascular Daughter      Heart murmur     Breast Cancer Paternal Aunt 80     Cardiovascular Paternal Aunt      Arthritis Brother 40     RA - half brother      Cancer Maternal Grandfather      Colon Cancer No family hx of      Thyroid disease: as above          Social Hx:  Social History     Social History     Marital status: Single     Spouse name: N/A     Number of children: N/A     Years of education: N/A     Occupational History     Not on file.     Social History Main Topics     Smoking status: Never Smoker     Smokeless tobacco: Never Used     Alcohol use Yes      Comment: 2 drinks per week -      Drug use: No     Sexual activity: Not Currently     Other Topics Concern     Caffeine Concern Yes     1-2 cups caffeine per day     Sleep Concern No     Stress Concern No     Weight Concern No     Special Diet Yes     low fat daily , low red meats     Back Care No     Exercise Yes     walks daily/ 3x a week exercise class     Social History Narrative          MEDICATIONS:  has a current medication list which includes the following prescription(s): acetaminophen, anastrozole, aspirin, calcium-magnesium-vitamin d, carvedilol, cholecalciferol, lisinopril, methimazole, valacyclovir, warfarin, and zolpidem.    ROS   ROS: 10 point ROS neg other than the symptoms noted above in the HPI.    Physical Exam   VS: /86 (BP Location: Left arm, Patient Position: Chair, Cuff Size: Adult Regular)  Pulse 79  Temp 98  F (36.7  C) (Oral)  Ht 1.626 m (5' 4\")  Wt 57.4 kg (126 lb 9.6 oz)  SpO2 99%  Breastfeeding? No  BMI 21.73 " kg/m2  GENERAL: AXOX3, NAD, well dressed, answering questions appropriately, appears stated age.  HEENT: No exopthalmous, no proptosis, EOMI, no lig lag, no retraction  NECK: Thyroid normal in size, non tender, no nodules were palpated.  CV: RRR  LUNGS: CTAB  ABDOMEN: +BS  NEUROLOGY: CN grossly intact, no tremors  PSYCH: normal affect and mood      LABS:  TFTs:  ENDO THYROID LABS-[facility] Latest Ref Rng & Units 9/6/2018   TSH 0.40 - 4.00 mU/L 1.10   T4 FREE 0.76 - 1.46 ng/dL 0.89   FREE T3 2.3 - 4.2 pg/mL 2.8     ENDO THYROID LABS-[facility] Latest Ref Rng & Units 6/5/2018   TSH 0.40 - 4.00 mU/L 0.05 (L)   T4 FREE 0.76 - 1.46 ng/dL 1.27   FREE T3 2.3 - 4.2 pg/mL 3.4     ENDO THYROID LABS-[facility] Latest Ref Rng & Units 12/6/2017   TSH 0.40 - 4.00 mU/L 0.18 (L)   T4 FREE 0.76 - 1.46 ng/dL 1.16   FREE T3 2.3 - 4.2 pg/mL 3.2   THYROID STIM IMMUNOG <=1.3 TSI index <1.0     ENDO THYROID LABS-[facility] Latest Ref Rng & Units 11/10/2017 7/24/2017   TSH 0.40 - 4.00 mU/L 0.12 (L) 0.12 (L)   T4 FREE 0.76 - 1.46 ng/dL 1.10 1.16     ENDO THYROID LABS-[facility] Latest Ref Rng & Units 7/14/2016   TSH 0.40 - 4.00 mU/L 0.24 (L)   T4 FREE 0.76 - 1.46 ng/dL 1.22       CBC:  Lab Results   Component Value Date    WBC 5.0 07/24/2017     Lab Results   Component Value Date    RBC 4.30 07/24/2017     Lab Results   Component Value Date    HGB 13.3 07/24/2017     Lab Results   Component Value Date    HCT 40.7 07/24/2017     No components found for: MCT  Lab Results   Component Value Date    MCV 95 07/24/2017     Lab Results   Component Value Date    MCH 30.9 07/24/2017     Lab Results   Component Value Date    MCHC 32.7 07/24/2017     Lab Results   Component Value Date    RDW 11.3 07/24/2017     Lab Results   Component Value Date     07/24/2017         LFTs:  Liver Function Studies -   Recent Labs   Lab Test  07/24/17   0812   PROTTOTAL  7.1   ALBUMIN  3.6   BILITOTAL  0.6   ALKPHOS  86   AST  19   ALT  23     ULTRASOUND THYROID December 15, 2017 9:24 AM    HISTORY: Subclinical hyperthyroidism.   FINDINGS: Thyroid ultrasound demonstrates an enlarged thyroid gland. The right lobe measures 5.4 x 2.0 x 1.8 cm. The left lobe measures 3.8 x 1.6 x 1.3 cm. The isthmus is normal in thickness. Thyroid parenchyma is heterogeneous in echotexture. Increased color Doppler flow is noted throughout the thyroid gland. Thyroid nodules as follows: Right Lobe: None. Isthmus: None. Left Lobe: None.   IMPRESSION: Enlarged heterogeneous thyroid gland without evidence of a discrete thyroid nodule. Increased color Doppler flow suggests underlying thyroiditis. Correlate with thyroid function test and nuclear medicine thyroid scan as needed.    All pertinent notes, labs, and images personally reviewed by me.     A/P  Ms.Carol WINDY Bolanos is a 62 year old here for the evaluation of hyperthyroidism.    Subclinical hyperthyroidism (TSI neg):   TSH remained suppressed since 2016 and along with normal free T4  No h/o arrhythmia  No h/o osteoporosis-- has osteopenia. Not on treatment.  She is on aromatase inhibitor for breast cancer.  TSI neg  US thyroid ( h/o radiation)- no discrete nodules.  Clinically looks euthyroid.  No major complaints.  As there is further decline in TSH along with risk for low bone density with aromatase inhibitor (with with prior history of osteopenia) was started on methimazole 5 mg 7/2018  F/u labs WNL.  Plan:  Decrease methimazole to 2.5 mg per day (9/27/2018)  Labs in 6 weeks.  Please make a lab appointment for blood work and follow up clinic appointment in 1 week after that to discuss results.    Off note she is on anticoagulant and may need dose adjustment to get INR at target levels ( h/o aortic valve replacement)  Baseline LFT and WBC normal.  Discussed diagnosis, pathophysiology, management and treatment options of condition with pt.    Discussed indications, risks and benefits of all medications prescribed, and answered questions to patient's satisfaction.  The  patient indicates understanding of these issues and agrees with the plan.    Discussed possible side effects of methimazole including liver dysfunction and agranulocytosis. Discussed to watch for s/s like jaundice, abdominal pain and skin rash. In case of prolonged unresolving fever, sore throat and infections, advise to seek medical care.    Call if:     Signs or symptoms of infection. These include a fever of 100.5 degrees or higher, chills, severe sore throat, ear or sinus pain, cough, increased sputum or change in color, painful urination, mouth sores.   Severe nausea or vomiting.   Joint pain or swelling.   Not able to eat.   Unusual bruising or bleeding.   Dark urine or yellow skin or eyes.      Follow-up:  4-6 weeks    Merle Baxter MD  [facility]  CC: Brook Echavarria     More than 50% of face to face time spent with Ms. Bolanos on counseling / coordinating her care.    All questions were answered.  The patient indicates understanding of the above issues and agrees with the plan set forth.           Again, thank you for allowing me to participate in the care of your patient.        Sincerely,        Merle Baxter MD

## 2018-09-27 NOTE — PATIENT INSTRUCTIONS
[facility]   Dr Baxter, Endocrinology Department      [facility]   [address]  [place] [address]  Appointment [phone]  Fax: [phone]  [place]: Monday and Tuesday         [facility]   [address]  [place] [address]  Appointment [phone]  Fax: [phone]  [place]: Wednesday and Thursday            Decrease methimazole 2.5 mg/day  Labs in 6 weeks.  Please make a lab appointment for blood work and follow up clinic appointment in 1 week after that to discuss results.    Discussed possible side effects of methimazole including liver dysfunction and agranulocytosis. Discussed to watch for s/s like jaundice, abdominal pain and skin rash. In case of prolonged unresolving fever, sore throat and infections, advise to seek medical care.    Call if:     Signs or symptoms of infection. These include a fever of 100.5 degrees or higher, chills, severe sore throat, ear or sinus pain, cough, increased sputum or change in color, painful urination, mouth sores.   Severe nausea or vomiting.   Joint pain or swelling.   Not able to eat.   Unusual bruising or bleeding.   Dark urine or yellow skin or eyes.

## 2018-09-27 NOTE — NURSING NOTE
"ENDOCRINOLOGY INTAKE FORM    Patient Name:  Annabella Bolanos  :  1955    Is patient Diabetic?   No  Does patient have non-diabetic or other endocrine issues?  Yes: subclinical hyperthyroidism, osteopenia    Vitals: /86 (BP Location: Left arm, Patient Position: Chair, Cuff Size: Adult Regular)  Pulse 79  Temp 98  F (36.7  C) (Oral)  Ht 1.626 m (5' 4\")  Wt 57.4 kg (126 lb 9.6 oz)  SpO2 99%  Breastfeeding? No  BMI 21.73 kg/m2  BMI= Body mass index is 21.73 kg/(m^2).    Flu vaccine:  No  Pneumonia vaccine:  Yes: both    Smoking and Alcohol use:  Social History   Substance Use Topics     Smoking status: Never Smoker     Smokeless tobacco: Never Used     Alcohol use Yes      Comment: 2 drinks per week -        Georgia Haney CMA    "

## 2018-09-27 NOTE — MR AVS SNAPSHOT
After Visit Summary   2018    Annabella Bolanos    MRN: [id number]           Patient Information     Date Of Birth          1955        Visit Information        Provider Department      2018 8:00 AM Merle Baxter MD [facility]        Today's Diagnoses     Malignant neoplasm of right female breast, unspecified estrogen receptor status, unspecified site of breast (H)    -  1    Subclinical hyperthyroidism        Osteopenia, unspecified location          Care Instructions    [facility]   Dr Baxter, Endocrinology Department      [facility]   [address]  [place] [address]  Appointment [phone]  Fax: [phone]  [place]: Monday and Tuesday         [place] [address] [facility] [address]  [place] [address]  Appointment [phone]  Fax: [phone]  [place]: Wednesday and Thursday            Decrease methimazole 2.5 mg/day  Labs in 6 weeks.  Please make a lab appointment for blood work and follow up clinic appointment in 1 week after that to discuss results.    Discussed possible side effects of methimazole including liver dysfunction and agranulocytosis. Discussed to watch for s/s like jaundice, abdominal pain and skin rash. In case of prolonged unresolving fever, sore throat and infections, advise to seek medical care.    Call if:     Signs or symptoms of infection. These include a fever of 100.5 degrees or higher, chills, severe sore throat, ear or sinus pain, cough, increased sputum or change in color, painful urination, mouth sores.   Severe nausea or vomiting.   Joint pain or swelling.   Not able to eat.   Unusual bruising or bleeding.   Dark urine or yellow skin or eyes.              Follow-ups after your visit        Your next 10 appointments already scheduled     Oct 17, 2018  8:15 AM CDT   Anticoagulation Visit with [place]  "[facility] CLINIC   [facility]    [address]  [facility] [address] [phone]              Future tests that were ordered for you today     Open Future Orders        Priority Expected Expires Ordered    Hepatic panel Routine  7/24/2019 9/27/2018    T3 Free Routine  7/24/2019 9/27/2018    T4 free Routine  7/24/2019 9/27/2018    TSH Routine  7/24/2019 9/27/2018    WBC count Routine  7/24/2019 9/27/2018            Who to contact     If you have questions or need follow up information about today's clinic visit or your schedule please contact [facility] directly at [phone].  Normal or non-critical lab and imaging results will be communicated to you by Scard[employer]art, letter or phone within 4 business days after the clinic has received the results. If you do not hear from us within 7 days, please contact the clinic through [employer] or phone. If you have a critical or abnormal lab result, we will notify you by phone as soon as possible.  Submit refill requests through [employer] or call your pharmacy and they will forward the refill request to us. Please allow 3 business days for your refill to be completed.          Additional Information About Your Visit        [employer] Information     [employer] gives you secure access to your electronic health record. If you see a primary care provider, you can also send messages to your care team and make appointments. If you have questions, please call your primary care clinic.  If you do not have a primary care provider, please call [phone] and they will assist you.        Care EveryWhere ID     This is your Care EveryWhere ID. This could be used by other organizations to access your [facility] medical records  [id number]        Your Vitals Were     Pulse Temperature Height Pulse Oximetry Breastfeeding? BMI (Body Mass Index)    79 98  F (36.7  C) (Oral) 1.626 m (5' 4\") 99% No 21.73 kg/m2       " Blood Pressure from Last 3 Encounters:   09/27/18 144/86   07/26/18 120/75   07/19/18 120/80    Weight from Last 3 Encounters:   09/27/18 57.4 kg (126 lb 9.6 oz)   07/26/18 55.8 kg (123 lb 1.6 oz)   07/19/18 56.5 kg (124 lb 9.6 oz)                 Today's Medication Changes          These changes are accurate as of 9/27/18  8:18 AM.  If you have any questions, ask your nurse or doctor.               These medicines have changed or have updated prescriptions.        Dose/Directions    AMBIEN 5 MG tablet   This may have changed:  how much to take   Generic drug:  zolpidem   Changed by:  Merle Baxter MD        Dose:  2.5 mg   Take 0.5 tablets (2.5 mg) by mouth nightly as needed for sleep   Quantity:  60 tablet   Refills:  0       cholecalciferol 1000 UNIT tablet   Commonly known as:  vitamin D3   This may have changed:    - medication strength  - how much to take  - when to take this   Changed by:  Merle Baxter MD        Dose:  1000 Units   Take 1 tablet (1,000 Units) by mouth daily   Quantity:  30 tablet   Refills:  0       methimazole 5 MG tablet   Commonly known as:  TAPAZOLE   This may have changed:  how much to take   Used for:  Subclinical hyperthyroidism   Changed by:  Merle Baxter MD        Dose:  2.5 mg   Take 0.5 tablets (2.5 mg) by mouth daily   Quantity:  30 tablet   Refills:  1       warfarin 5 MG tablet   Commonly known as:  JANTOVEN   This may have changed:  Another medication with the same name was removed. Continue taking this medication, and follow the directions you see here.   Used for:  S/P aortic valve replacement   Changed by:  Merle Baxter MD        Take one tablet (5 mg) daily except one and a half tablets (7.5 mg) on Wednesdays or as directed by the INR Clinic.   Quantity:  100 tablet   Refills:  1         Stop taking these medicines if you haven't already. Please contact your care team if you have questions.     triamcinolone acetonide 0.05 % Oint   Stopped  by:  Merle Baxter MD                Where to get your medicines      These medications were sent to [facility] [place], [place] - [address] [address]  [address] [address] [address]     Phone:  [phone]     methimazole 5 MG tablet                Primary Care Provider Office Phone # Fax #    Brook Marla Echavarria -[phone]       [address] [facility] [address]        Equal Access to Services     NADIA NAVARRO : Hadii aad ku hadasho Soomaali, waaxda luqadaha, qaybta kaalmada adeegyada, waxay idiin hayaan adeeg kharash lakim . So [facility] [phone].    ATENCIÓN: Si habla español, tiene a gamez disposición servicios gratuitos de asistencia lingüística. Ll[facility] [phone].    We comply with applicable federal civil rights laws and [place] laws. We do not discriminate on the basis of race, color, national origin, age, disability, sex, sexual orientation, or gender identity.            Thank you!     Thank you for choosing [facility]  for your care. Our goal is always to provide you with excellent care. Hearing back from our patients is one way we can continue to improve our services. Please take a few minutes to complete the written survey that you may receive in the mail after your visit with us. Thank you!             Your Updated Medication List - Protect others around you: Learn how to safely use, store and throw away your medicines at www.disposemymeds.org.          This list is accurate as of 9/27/18  8:18 AM.  Always use your most recent med list.                   Brand Name Dispense Instructions for use Diagnosis    AMBIEN 5 MG tablet   Generic drug:  zolpidem     60 tablet    Take 0.5 tablets (2.5 mg) by mouth nightly as needed for sleep        anastrozole 1 MG tablet    ARIMIDEX    30 tablet    Take by mouth daily        ASPIRIN EC PO      Take 81 mg by mouth daily        CALCIUM 500 PO      Take 600 mg by  mouth daily        carvedilol 12.5 MG tablet    COREG    180 tablet    Take 1 tablet (12.5 mg) by mouth 2 times daily (with meals)    Essential hypertension       cholecalciferol 1000 UNIT tablet    vitamin D3    30 tablet    Take 1 tablet (1,000 Units) by mouth daily        lisinopril 10 MG tablet    PRINIVIL/ZESTRIL    135 tablet    Take 1 1/2 tabs (15 mg) by mouth daily    Essential hypertension, benign       methimazole 5 MG tablet    TAPAZOLE    30 tablet    Take 0.5 tablets (2.5 mg) by mouth daily    Subclinical hyperthyroidism       TYLENOL 325 MG tablet   Generic drug:  acetaminophen     250 tablet    Take 1-2 tablets (325-650 mg) by mouth every 6 hours as needed for mild pain        valACYclovir 1000 mg tablet    VALTREX    4 tablet    Take 2 tablets (2,000 mg) by mouth 2 times daily    Cold sore       warfarin 5 MG tablet    JANTOVEN    100 tablet    Take one tablet (5 mg) daily except one and a half tablets (7.5 mg) on Wednesdays or as directed by the INR Clinic.    S/P aortic valve replacement

## 2018-10-17 ENCOUNTER — ANTICOAGULATION THERAPY VISIT (OUTPATIENT)
Dept: ANTICOAGULATION | Facility: CLINIC | Age: 63
End: 2018-10-17
Payer: COMMERCIAL

## 2018-10-17 DIAGNOSIS — Z95.2 H/O AORTIC VALVE REPLACEMENT: ICD-10-CM

## 2018-10-17 LAB — INR POINT OF CARE: 1.8 (ref 0.86–1.14)

## 2018-10-17 PROCEDURE — 85610 PROTHROMBIN TIME: CPT | Mod: QW

## 2018-10-17 PROCEDURE — 99207 ZZC NO CHARGE NURSE ONLY: CPT

## 2018-10-17 PROCEDURE — 36416 COLLJ CAPILLARY BLOOD SPEC: CPT

## 2018-10-17 NOTE — MR AVS SNAPSHOT
Annabella WINDY Bolanos   10/17/2018 8:15 AM   Anticoagulation Therapy Visit    Description:  63 year old female   Provider[facility]  [facility]   Department:  [facility]           INR as of 10/17/2018     Today's INR 1.8      Anticoagulation Summary as of 10/17/2018     INR goal 1.8-2.5   Today's INR 1.8   Full warfarin instructions 7.5 mg on Wed; 5 mg all other days   Next INR check 11/28/2018    Indications   Long-term (current) use of anticoagulants [Z79.01] [Z79.01]  H/O aortic valve replacement [Z95.2]         Your next Anticoagulation Clinic appointment(s)     Nov 28, 2018  8:15 AM CST   [facility]agulation Visit with [facility]   [facility] ([facility]    [address] [facility] [address]  [facility] [address] [phone]              Contact Numbers     [facility] Phone Numbers:  Anticoagulation Clinic Appointments : [phone]  Anticoagulation Nurse: [phone]         October 2018 Details    Sun Mon Tue Wed Thu Fri Sat      1               2               3               4               5               6                 7               8               9               10               11               12               13                 14               15               16               17      7.5 mg   See details      18      5 mg         19      5 mg         20      5 mg           21      5 mg         22      5 mg         23      5 mg         24      7.5 mg         25      5 mg         26      5 mg         27      5 mg           28      5 mg         29      5 mg         30      5 mg         31      7.5 mg             Date Details   10/17 This INR check               How to take your warfarin dose     To take:  5 mg Take 1 of the 5 mg tablets.    To take:  7.5 mg Take 1.5 of the 5 mg tablets.           November 2018 Details    Sun Mon Tue Wed Thu Fri Sat         1      5 mg         2      5 mg         3      5 mg           4      5 mg          5      5 mg         6      5 mg         7      7.5 mg         8      5 mg         9      5 mg         10      5 mg           11      5 mg         12      5 mg         13      5 mg         14      7.5 mg         15      5 mg         16      5 mg         17      5 mg           18      5 mg         19      5 mg         20      5 mg         21      7.5 mg         22      5 mg         23      5 mg         24      5 mg           25      5 mg         26      5 mg         27      5 mg         28            29               30                 Date Details   No additional details    Date of next INR:  11/28/2018         How to take your warfarin dose     To take:  5 mg Take 1 of the 5 mg tablets.    To take:  7.5 mg Take 1.5 of the 5 mg tablets.

## 2018-10-17 NOTE — PROGRESS NOTES
ANTICOAGULATION FOLLOW-UP CLINIC VISIT    Patient Name:  Annabella Bolanos  Date:  10/17/2018  Contact Type:  Face to Face    SUBJECTIVE:     Patient Findings     Positives Change in medications (Methimazole dose decreased.  Methimazole can decrease warfarin effectiveness, so INR may increase since she is not taking as much of the Methimazole.)           OBJECTIVE    INR Protime   Date Value Ref Range Status   10/17/2018 1.8 (A) 0.86 - 1.14 Final       ASSESSMENT / PLAN  INR assessment THER    Recheck INR In: 6 WEEKS    INR Location Clinic      Anticoagulation Summary as of 10/17/2018     INR goal 1.8-2.5   Today's INR 1.8   Warfarin maintenance plan 7.5 mg (5 mg x 1.5) on Wed; 5 mg (5 mg x 1) all other days   Full warfarin instructions 7.5 mg on Wed; 5 mg all other days   Weekly warfarin total 37.5 mg   No change documented Erica Stinson RN   Plan last modified Erica Stinson RN (11/22/2017)   Next INR check 11/28/2018   Priority INR   Target end date     Indications   Long-term (current) use of anticoagulants [Z79.01] [Z79.01]  H/O aortic valve replacement [Z95.2]         Anticoagulation Episode Summary     INR check location     Preferred lab     Send INR reminders to [facility]    Comments       Anticoagulation Care Providers     Provider Role Specialty Phone number    Brook Echavarria MD [facility] Internal Medicine [phone]            See the Encounter Report to view Anticoagulation Flowsheet and Dosing Calendar (Go to Encounters tab in chart review, and find the Anticoagulation Therapy Visit)    Dosage adjustment made based on physician directed care plan.    Erica Stinson RN

## 2018-11-08 DIAGNOSIS — E05.90 SUBCLINICAL HYPERTHYROIDISM: ICD-10-CM

## 2018-11-08 LAB
ALBUMIN SERPL-MCNC: 3.6 G/DL (ref 3.4–5)
ALP SERPL-CCNC: 91 U/L (ref 40–150)
ALT SERPL W P-5'-P-CCNC: 21 U/L (ref 0–50)
AST SERPL W P-5'-P-CCNC: 19 U/L (ref 0–45)
BILIRUB DIRECT SERPL-MCNC: 0.1 MG/DL (ref 0–0.2)
BILIRUB SERPL-MCNC: 0.4 MG/DL (ref 0.2–1.3)
PROT SERPL-MCNC: 7.2 G/DL (ref 6.8–8.8)
T3FREE SERPL-MCNC: 2.9 PG/ML (ref 2.3–4.2)
T4 FREE SERPL-MCNC: 1.02 NG/DL (ref 0.76–1.46)
TSH SERPL DL<=0.005 MIU/L-ACNC: 1.02 MU/L (ref 0.4–4)
WBC # BLD AUTO: 4.7 10E9/L (ref 4–11)

## 2018-11-08 PROCEDURE — 84481 FREE ASSAY (FT-3): CPT | Performed by: INTERNAL MEDICINE

## 2018-11-08 PROCEDURE — 85048 AUTOMATED LEUKOCYTE COUNT: CPT | Performed by: INTERNAL MEDICINE

## 2018-11-08 PROCEDURE — 84443 ASSAY THYROID STIM HORMONE: CPT | Performed by: INTERNAL MEDICINE

## 2018-11-08 PROCEDURE — 80076 HEPATIC FUNCTION PANEL: CPT | Performed by: INTERNAL MEDICINE

## 2018-11-08 PROCEDURE — 36415 COLL VENOUS BLD VENIPUNCTURE: CPT | Performed by: INTERNAL MEDICINE

## 2018-11-08 PROCEDURE — 84439 ASSAY OF FREE THYROXINE: CPT | Performed by: INTERNAL MEDICINE

## 2018-11-13 ENCOUNTER — TELEPHONE (OUTPATIENT)
Dept: ENDOCRINOLOGY | Facility: CLINIC | Age: 63
End: 2018-11-13

## 2018-11-13 NOTE — TELEPHONE ENCOUNTER
As long as patient is feeling fine-okay to cancel appointment.  Please put that spot on hold.  Please inform patient.

## 2018-11-13 NOTE — TELEPHONE ENCOUNTER
"Patient has an appt scheduled to come in for endocrinology appt this Thursday 11/15 at 9:00 a.m.  She would like to cancel it as she doesn't see the need to keep it.  She just had her labs done, received results and all are \"within acceptable range\".  Asking if she really needs to keep appt?  If MD insists on an appt then patient would like to know if a phone visit could be arranged instead.  GLENDA Baca R.N.    "

## 2018-11-28 ENCOUNTER — TELEPHONE (OUTPATIENT)
Dept: ANTICOAGULATION | Facility: CLINIC | Age: 63
End: 2018-11-28

## 2018-11-28 ENCOUNTER — ANTICOAGULATION THERAPY VISIT (OUTPATIENT)
Dept: ANTICOAGULATION | Facility: CLINIC | Age: 63
End: 2018-11-28
Payer: COMMERCIAL

## 2018-11-28 DIAGNOSIS — Z95.2 H/O AORTIC VALVE REPLACEMENT: ICD-10-CM

## 2018-11-28 DIAGNOSIS — Z95.2 H/O AORTIC VALVE REPLACEMENT: Primary | ICD-10-CM

## 2018-11-28 LAB — INR POINT OF CARE: 1.7 (ref 0.86–1.14)

## 2018-11-28 PROCEDURE — 99207 ZZC NO CHARGE NURSE ONLY: CPT

## 2018-11-28 PROCEDURE — 85610 PROTHROMBIN TIME: CPT | Mod: QW

## 2018-11-28 PROCEDURE — 36416 COLLJ CAPILLARY BLOOD SPEC: CPT

## 2018-11-28 NOTE — PROGRESS NOTES
ANTICOAGULATION FOLLOW-UP CLINIC VISIT    Patient Name:  Annabella Bolanos  Date:  11/28/2018  Contact Type:  Face to Face    SUBJECTIVE:     Patient Findings     Positives Unexplained INR or factor level change    Comments Pt denies any changes or missed warfarin doses since last INR visit. Pt denies any bleeding or bruising problems.              OBJECTIVE    INR Protime   Date Value Ref Range Status   11/28/2018 1.7 (A) 0.86 - 1.14 Final       ASSESSMENT / PLAN  INR assessment SUB    Recheck INR In: 2 WEEKS    INR Location Clinic      Anticoagulation Summary as of 11/28/2018     INR goal 1.8-2.5   Today's INR 1.7!   Warfarin maintenance plan 7.5 mg (5 mg x 1.5) on Wed; 5 mg (5 mg x 1) all other days   Full warfarin instructions 11/29: 7.5 mg; Otherwise 7.5 mg on Wed; 5 mg all other days   Weekly warfarin total 37.5 mg   Plan last modified Erica Stinson RN (11/22/2017)   Next INR check 12/12/2018   Priority INR   Target end date     Indications   Long-term (current) use of anticoagulants [Z79.01] [Z79.01]  H/O aortic valve replacement [Z95.2]         Anticoagulation Episode Summary     INR check location     Preferred lab     Send INR reminders to [facility]    Comments       Anticoagulation Care Providers     Provider Role Specialty Phone number    Brook Echavarria MD [facility] Internal Medicine [phone]            See the Encounter Report to view Anticoagulation Flowsheet and Dosing Calendar (Go to Encounters tab in chart review, and find the Anticoagulation Therapy Visit)    Dosage adjustment made based on physician directed care plan.    Deborah Aguilera RN

## 2018-11-28 NOTE — MR AVS SNAPSHOT
Annabella TEMPLE Luis Miguel   11/28/2018 8:15 AM   Anticoagulation Therapy Visit    Description:  63 year old female   Provider[facility]   Department:  [facility]           INR as of 11/28/2018     Today's INR 1.7!      Anticoagulation Summary as of 11/28/2018     INR goal 1.8-2.5   Today's INR 1.7!   Full warfarin instructions 11/29: 7.5 mg; Otherwise 7.5 mg on Wed; 5 mg all other days   Next INR check 12/12/2018    Indications   Long-term (current) use of anticoagulants [Z79.01] [Z79.01]  H/O aortic valve replacement [Z95.2]         Your next Anticoagulation Clinic appointment(s)     Dec 12, 2018  8:00 AM CST   [facility] with [facility]   [facility]    [address] [facility] [address]  [facility] [id number] [phone]              Contact Numbers     [facility] Phone Numbers:  Anticoagulation Clinic Appointments : [phone]  Anticoagulation Nurse: [phone]         November 2018 Details    Sun Mon Tue Wed Thu Fri Sat         1               2               3                 4               5               6               7               8               9               10                 11               12               13               14               15               16               17                 18               19               20               21               22               23               24                 25               26               27               28      7.5 mg   See details      29      7.5 mg         30      5 mg           Date Details   11/28 This INR check               How to take your warfarin dose     To take:  5 mg Take 1 of the 5 mg tablets.    To take:  7.5 mg Take 1.5 of the 5 mg tablets.           December 2018 Details    Sun Mon Tue Wed Thu Fri Sat           1      5 mg           2      5 mg         3      5 mg         4      5 mg         5      7.5 mg         6      5 mg          7      5 mg         8      5 mg           9      5 mg         10      5 mg         11      5 mg         12            13               14               15                 16               17               18               19               20               21               22                 23               24               25               26               27               28               29                 30               31                     Date Details   No additional details    Date of next INR:  12/12/2018         How to take your warfarin dose     To take:  5 mg Take 1 of the 5 mg tablets.    To take:  7.5 mg Take 1.5 of the 5 mg tablets.

## 2018-11-28 NOTE — TELEPHONE ENCOUNTER
For insurance purposes, an annual INR referral is required. Please complete and sign the order then route back to [facility]. Deborah Aguilera RN

## 2018-12-12 ENCOUNTER — ANTICOAGULATION THERAPY VISIT (OUTPATIENT)
Dept: ANTICOAGULATION | Facility: CLINIC | Age: 63
End: 2018-12-12
Payer: COMMERCIAL

## 2018-12-12 DIAGNOSIS — Z95.2 H/O AORTIC VALVE REPLACEMENT: ICD-10-CM

## 2018-12-12 LAB — INR POINT OF CARE: 1.9 (ref 0.86–1.14)

## 2018-12-12 PROCEDURE — 85610 PROTHROMBIN TIME: CPT | Mod: QW

## 2018-12-12 PROCEDURE — 99207 ZZC NO CHARGE NURSE ONLY: CPT

## 2018-12-12 PROCEDURE — 36416 COLLJ CAPILLARY BLOOD SPEC: CPT

## 2018-12-12 NOTE — PROGRESS NOTES
ANTICOAGULATION FOLLOW-UP CLINIC VISIT    Patient Name:  Annabella Bolanos  Date:  2018  Contact Type:  Face to Face    SUBJECTIVE:     Patient Findings     Positives:   Change in diet/appetite (Patient has been cutting back on greens.  Increased maintenance dose slightly so patient is able to try eating more greens)           OBJECTIVE    INR Protime   Date Value Ref Range Status   2018 1.9 (A) 0.86 - 1.14 Final       ASSESSMENT / PLAN  INR assessment THER    Recheck INR In: 3 WEEKS    INR Location Clinic      Anticoagulation Summary  As of 2018    INR goal:   1.8-2.5   TTR:   75.4 % (2.6 y)   INR used for dosin.9 (2018)   Warfarin maintenance plan:   7.5 mg (5 mg x 1.5) every Wed, Sat; 5 mg (5 mg x 1) all other days   Full warfarin instructions:   7.5 mg every Wed, Sat; 5 mg all other days   Weekly warfarin total:   40 mg   Plan last modified:   Erica Stinson RN (2018)   Next INR check:   2019   Priority:   INR   Target end date:       Indications    Long-term (current) use of anticoagulants [Z79.01] [Z79.01]  H/O aortic valve replacement [Z95.2]             Anticoagulation Episode Summary     INR check location:       Preferred lab:       Send INR reminders to:   [facility]    Comments:         Anticoagulation Care Providers     Provider Role Specialty Phone number    Brook Echavarria MD [facility] Internal Medicine [phone]            See the Encounter Report to view Anticoagulation Flowsheet and Dosing Calendar (Go to Encounters tab in chart review, and find the Anticoagulation Therapy Visit)    Dosage adjustment made based on physician directed care plan.    Erica Stinson RN

## 2019-01-02 ENCOUNTER — ANTICOAGULATION THERAPY VISIT (OUTPATIENT)
Dept: ANTICOAGULATION | Facility: CLINIC | Age: 64
End: 2019-01-02
Payer: COMMERCIAL

## 2019-01-02 DIAGNOSIS — Z95.2 H/O AORTIC VALVE REPLACEMENT: ICD-10-CM

## 2019-01-02 LAB — INR POINT OF CARE: 2.1 (ref 0.86–1.14)

## 2019-01-02 PROCEDURE — 85610 PROTHROMBIN TIME: CPT | Mod: QW

## 2019-01-02 PROCEDURE — 36416 COLLJ CAPILLARY BLOOD SPEC: CPT

## 2019-01-02 PROCEDURE — 99207 ZZC NO CHARGE NURSE ONLY: CPT

## 2019-01-02 NOTE — PROGRESS NOTES
ANTICOAGULATION FOLLOW-UP CLINIC VISIT    Patient Name:  Annabella Bolanos  Date:  2019  Contact Type:  Face to Face    SUBJECTIVE:     Patient Findings     Positives:   No Problem Findings    Comments:   Patient denies any changes in diet or medications since last INR visit.  Denies any bleeding or bruising problems.             OBJECTIVE    INR Protime   Date Value Ref Range Status   2019 2.1 (A) 0.86 - 1.14 Final       ASSESSMENT / PLAN  INR assessment THER    Recheck INR In: 5 WEEKS    INR Location Clinic      Anticoagulation Summary  As of 2019    INR goal:   1.8-2.5   TTR:   75.9 % (2.7 y)   INR used for dosin.1 (2019)   Warfarin maintenance plan:   7.5 mg (5 mg x 1.5) every Wed, Sat; 5 mg (5 mg x 1) all other days   Full warfarin instructions:   7.5 mg every Wed, Sat; 5 mg all other days   Weekly warfarin total:   40 mg   No change documented:   Erica Stinson RN   Plan last modified:   Erica Stinson RN (2018)   Next INR check:   2019   Priority:   INR   Target end date:       Indications    Long-term (current) use of anticoagulants [Z79.01] [Z79.01]  H/O aortic valve replacement [Z95.2]             Anticoagulation Episode Summary     INR check location:       Preferred lab:       Send INR reminders to:   [facility]    Comments:         Anticoagulation Care Providers     Provider Role Specialty Phone number    Brook Echavarria MD [facility] Internal Medicine [phone]            See the Encounter Report to view Anticoagulation Flowsheet and Dosing Calendar (Go to Encounters tab in chart review, and find the Anticoagulation Therapy Visit)    Dosage adjustment made based on physician directed care plan.    Erica Stinson RN

## 2019-01-08 DIAGNOSIS — Z95.2 S/P AORTIC VALVE REPLACEMENT: ICD-10-CM

## 2019-01-08 NOTE — TELEPHONE ENCOUNTER
"Requested Prescriptions   Pending Prescriptions Disp Refills     warfarin (JANTOVEN) 5 MG tablet  Last Written Prescription Date:  7/20/18  Last Fill Quantity: 100,  # refills: 1   Last office visit: 7/26/2018 with prescribing provider:  Renetta   Future Office Visit:     100 tablet 1     Sig: Take one tablet (5 mg) daily except one and a half tablets (7.5 mg) on Wednesdays or as directed by the INR Clinic.    Vitamin K Antagonists Failed - 1/8/2019  1:08 PM       Failed - INR is within goal in the past 6 weeks    Confirm INR is within goal in the past 6 weeks.     Recent Labs   Lab Test 01/02/19   INR 2.1*                      Passed - Recent (12 mo) or future (30 days) visit within the authorizing provider's specialty    Patient had office visit in the last 12 months or has a visit in the next 30 days with authorizing provider or within the authorizing provider's specialty.  See \"Patient Info\" tab in inbasket, or \"Choose Columns\" in Meds & Orders section of the refill encounter.             Passed - Medication is active on med list       Passed - Patient is 18 years of age or older       Passed - Patient is not pregnant       Passed - No positive pregnancy on file in past 12 months          "

## 2019-01-09 RX ORDER — WARFARIN SODIUM 5 MG/1
TABLET ORAL
Qty: 100 TABLET | Refills: 1 | Status: SHIPPED | OUTPATIENT
Start: 2019-01-09 | End: 2019-06-30

## 2019-02-06 ENCOUNTER — ANTICOAGULATION THERAPY VISIT (OUTPATIENT)
Dept: ANTICOAGULATION | Facility: CLINIC | Age: 64
End: 2019-02-06
Payer: COMMERCIAL

## 2019-02-06 DIAGNOSIS — Z95.2 H/O AORTIC VALVE REPLACEMENT: ICD-10-CM

## 2019-02-06 LAB — INR POINT OF CARE: 2.3 (ref 0.86–1.14)

## 2019-02-06 PROCEDURE — 99207 ZZC NO CHARGE NURSE ONLY: CPT

## 2019-02-06 PROCEDURE — 36416 COLLJ CAPILLARY BLOOD SPEC: CPT

## 2019-02-06 PROCEDURE — 85610 PROTHROMBIN TIME: CPT | Mod: QW

## 2019-02-06 NOTE — PROGRESS NOTES
ANTICOAGULATION FOLLOW-UP CLINIC VISIT    Patient Name:  Annabella Bolanos  Date:  2019  Contact Type:  Face to Face    SUBJECTIVE:     Patient Findings     Positives:   No Problem Findings           OBJECTIVE    INR Protime   Date Value Ref Range Status   2019 2.3 (A) 0.86 - 1.14 Final       ASSESSMENT / PLAN  INR assessment THER    Recheck INR In: 6 WEEKS    INR Location Clinic      Anticoagulation Summary  As of 2019    INR goal:   1.8-2.5   TTR:   76.7 % (2.8 y)   INR used for dosin.3 (2019)   Warfarin maintenance plan:   7.5 mg (5 mg x 1.5) every Wed, Sat; 5 mg (5 mg x 1) all other days   Full warfarin instructions:   7.5 mg every Wed, Sat; 5 mg all other days   Weekly warfarin total:   40 mg   No change documented:   Erica Stinson RN   Plan last modified:   Erica Stinson RN (2018)   Next INR check:   3/20/2019   Priority:   INR   Target end date:       Indications    Long-term (current) use of anticoagulants [Z79.01] [Z79.01]  H/O aortic valve replacement [Z95.2]             Anticoagulation Episode Summary     INR check location:       Preferred lab:       Send INR reminders to:   [facility]    Comments:         Anticoagulation Care Providers     Provider Role Specialty Phone number    Brook Echavarria MD [facility] Internal Medicine [phone]            See the Encounter Report to view Anticoagulation Flowsheet and Dosing Calendar (Go to Encounters tab in chart review, and find the Anticoagulation Therapy Visit)    Dosage adjustment made based on physician directed care plan.    Erica Stinson RN

## 2019-02-12 ENCOUNTER — TRANSFERRED RECORDS (OUTPATIENT)
Dept: SURGERY | Facility: CLINIC | Age: 64
End: 2019-02-12

## 2019-02-12 ENCOUNTER — TRANSFERRED RECORDS (OUTPATIENT)
Dept: HEALTH INFORMATION MANAGEMENT | Facility: CLINIC | Age: 64
End: 2019-02-12

## 2019-02-18 DIAGNOSIS — E05.90 SUBCLINICAL HYPERTHYROIDISM: ICD-10-CM

## 2019-02-18 NOTE — LETTER
[facility]  [address], [address]  [place], [place]  [address]                                            TEL:[phone]  FAX:[phone]      Annabella Bolanos  [address] [place] [address]      February 20, 2019    Dear Annabella       This letter is being sent on behalf of Dr. Baxter. It is to remind you that your provider expected you to return for a follow up clinic visit. Please make a lab only appointment, and then follow up with a clinic visit one week afterwards to review those results. If this is not done, it may result in your provider not being able to refill your medications.     You may call our office at [phone] ([place]) or [phone] ([place]) to schedule an appointment. You may also see Valentina Jackson in [place] at [phone]    If you have already scheduled these appointments, please disregard this notice.      Sincerely,    [facility],  Dr. Merle Baxter

## 2019-02-18 NOTE — TELEPHONE ENCOUNTER
"Requested Prescriptions   Pending Prescriptions Disp Refills     methimazole (TAPAZOLE) 5 MG tablet    Last Written Prescription Date:  9/27/2018  Last Fill Quantity: 30,  # refills: 1   Last office visit: 9/27/2018 with prescribing provider: Brook Echavarria     Future Office Visit:     30 tablet 1     Sig: Take 0.5 tablets (2.5 mg) by mouth daily    Anti-Thyroid Agents Protocol Failed - 2/18/2019  8:08 AM       Failed - Refills for this medication group require provider approval       Passed - CBC is on file within the past 12 months    Recent Labs   Lab Test 11/08/18  0803  07/26/18  0909   WBC 4.7   < > 5.3   RBC  --   --  4.47   HGB  --   --  13.8   HCT  --   --  42.4   PLT  --   --  234    < > = values in this interval not displayed.                Passed - Recent (12 mo) or future (30 days) visit within the authorizing provider's specialty    Patient had office visit in the last 12 months or has a visit in the next 30 days with authorizing provider or within the authorizing provider's specialty.  See \"Patient Info\" tab in inbasket, or \"Choose Columns\" in Meds & Orders section of the refill encounter.             Passed - Medication is active on the patient's med list       Passed - Patient is age 18 or older       Passed - Normal TSH in past 12 months    Recent Labs   Lab Test 11/08/18  0803   TSH 1.02             Passed - No active pregnancy on record       Passed - No positive pregnancy test in past 12 months          "

## 2019-02-20 RX ORDER — METHIMAZOLE 5 MG/1
2.5 TABLET ORAL DAILY
Qty: 30 TABLET | Refills: 1 | Status: SHIPPED | OUTPATIENT
Start: 2019-02-20 | End: 2019-05-16

## 2019-02-27 ENCOUNTER — TELEPHONE (OUTPATIENT)
Dept: ENDOCRINOLOGY | Facility: CLINIC | Age: 64
End: 2019-02-27

## 2019-02-27 DIAGNOSIS — E05.90 SUBCLINICAL HYPERTHYROIDISM: Primary | ICD-10-CM

## 2019-02-27 NOTE — TELEPHONE ENCOUNTER
Reason for Call: Request for an order or referral:    Order or referral being requested: Labs    Date needed: before my next appointment    Has the patient been seen by the PCP for this problem? YES    Additional comments: Pt has lab appt 05/06/2019 @8:15 am and appt with Sahil 08/16/2019 @8:30am    Phone number Patient can be reached at:  Cell number on file:    Telephone Information:   Mobile [phone]       Best Time:  Any    Can we leave a detailed message on this number?  Not Applicable    Call taken on 2/27/2019 at 8:58 AM by Alvin Washington

## 2019-03-20 ENCOUNTER — ANTICOAGULATION THERAPY VISIT (OUTPATIENT)
Dept: ANTICOAGULATION | Facility: CLINIC | Age: 64
End: 2019-03-20
Payer: COMMERCIAL

## 2019-03-20 DIAGNOSIS — Z95.2 H/O AORTIC VALVE REPLACEMENT: ICD-10-CM

## 2019-03-20 LAB — INR POINT OF CARE: 2.3 (ref 0.86–1.14)

## 2019-03-20 PROCEDURE — 99207 ZZC NO CHARGE NURSE ONLY: CPT

## 2019-03-20 PROCEDURE — 36416 COLLJ CAPILLARY BLOOD SPEC: CPT

## 2019-03-20 PROCEDURE — 85610 PROTHROMBIN TIME: CPT | Mod: QW

## 2019-03-20 NOTE — PROGRESS NOTES
ANTICOAGULATION FOLLOW-UP CLINIC VISIT    Patient Name:  Annabella Bolanos  Date:  3/20/2019  Contact Type:  Face to Face    SUBJECTIVE:     Patient Findings     Comments:   The patient was assessed for diet, medication, and activity level changes, missed or extra doses, bruising or bleeding, with no problem findings.             OBJECTIVE    INR Protime   Date Value Ref Range Status   2019 2.3 (A) 0.86 - 1.14 Final       ASSESSMENT / PLAN  INR assessment THER    Recheck INR In: 6 WEEKS    INR Location Clinic      Anticoagulation Summary  As of 3/20/2019    INR goal:   1.8-2.5   TTR:   77.7 % (2.9 y)   INR used for dosin.3 (3/20/2019)   Warfarin maintenance plan:   7.5 mg (5 mg x 1.5) every Wed, Sat; 5 mg (5 mg x 1) all other days   Full warfarin instructions:   7.5 mg every Wed, Sat; 5 mg all other days   Weekly warfarin total:   40 mg   No change documented:   Erica Stinson RN   Plan last modified:   Erica Stinson RN (2018)   Next INR check:   2019   Priority:   INR   Target end date:       Indications    Long-term (current) use of anticoagulants [Z79.01] [Z79.01]  H/O aortic valve replacement [Z95.2]             Anticoagulation Episode Summary     INR check location:       Preferred lab:       Send INR reminders to:   [facility]    Comments:         Anticoagulation Care Providers     Provider Role Specialty Phone number    Brook Echavarria MD [facility] Internal Medicine [phone]            See the Encounter Report to view Anticoagulation Flowsheet and Dosing Calendar (Go to Encounters tab in chart review, and find the Anticoagulation Therapy Visit)    Dosage adjustment made based on physician directed care plan.    Erica Stinson RN

## 2019-04-30 LAB — INR PPP: 2.3 (ref 0.8–1.1)

## 2019-05-01 ENCOUNTER — TELEPHONE (OUTPATIENT)
Dept: ANTICOAGULATION | Facility: CLINIC | Age: 64
End: 2019-05-01

## 2019-05-01 ENCOUNTER — ANTICOAGULATION THERAPY VISIT (OUTPATIENT)
Dept: ANTICOAGULATION | Facility: CLINIC | Age: 64
End: 2019-05-01

## 2019-05-01 DIAGNOSIS — Z95.2 H/O AORTIC VALVE REPLACEMENT: ICD-10-CM

## 2019-05-01 PROCEDURE — 99207 ZZC NO CHARGE NURSE ONLY: CPT | Performed by: INTERNAL MEDICINE

## 2019-05-01 NOTE — PROGRESS NOTES
ANTICOAGULATION FOLLOW-UP CLINIC VISIT    Patient Name:  Annabella Bolanos  Date:  2019  Contact Type:  Telephone/ discussed dosing with patient    SUBJECTIVE:     Patient Findings     Positives:   Other complaints    Comments:   She was seen at the [facility] yesterday due to right calf pain and swelling.  Patient was not given a definitive diagnosis.  She either has a superficial blood clot or an infection.  She was advised by  to use warm compresses for 1-2 days and if this did not help, she was given an antibiotic to try.  She will call INR clinic with update since the antibiotic she was prescribed can increase the INR.       INR yesterday was 2.3 which was stable in comparison to her other INRs.  Her current goal is 1.8-2.5 as recommended by Dr. Guan (cardiology).  If this is a blood clot it makes me question if her goal range should be higher.  Telephone encounter also sent to PCP as an FYI at this time.             OBJECTIVE    INR   Date Value Ref Range Status   2019 2.3 (A) 0.8 - 1.1 Final       ASSESSMENT / PLAN  INR assessment THER    Recheck INR In: 6 WEEKS will determine next INR check based on symptoms over the next couple days.  May need to check sooner if INR goal range is adjusted or if antibotics are started.   INR Location Outside lab      Anticoagulation Summary  As of 2019    INR goal:   1.8-2.5   TTR:   78.5 % (3 y)   INR used for dosin.3 (2019)   Warfarin maintenance plan:   7.5 mg (5 mg x 1.5) every Wed, Sat; 5 mg (5 mg x 1) all other days   Full warfarin instructions:   7.5 mg every Wed, Sat; 5 mg all other days   Weekly warfarin total:   40 mg   Plan last modified:   Erica Stinson RN (2018)   Next INR check:   2019   Priority:   INR   Target end date:       Indications    Long-term (current) use of anticoagulants [Z79.01] [Z79.01]  H/O aortic valve replacement [Z95.2]             Anticoagulation Episode Summary     INR check location:        Preferred lab:       Send INR reminders to:   [facility]    Comments:         Anticoagulation Care Providers     Provider Role Specialty Phone number    Brook Echavarria MD [facility] Internal Medicine [phone]            See the Encounter Report to view Anticoagulation Flowsheet and Dosing Calendar (Go to Encounters tab in chart review, and find the Anticoagulation Therapy Visit)    Dosage adjustment made based on physician directed care plan.    Erica Stinson RN

## 2019-05-01 NOTE — TELEPHONE ENCOUNTER
Spoke with patient.  She was seen at the [facility] yesterday due to right calf pain and swelling.  Patient was not given a definitive diagnosis.  She either has a superficial blood clot or an infection.  She was advised by  to use warm compresses for 1-2 days and if this did not help, she was given an antibiotic to try.  She will call INR clinic with update since the antibiotic she was prescribed can increase the INR.      INR yesterday was 2.3 which was stable in comparison to her other INRs.  Her current goal is 1.8-2.5 as recommended by Dr. Guan (cardiology).  If this is a blood clot it makes me question if her goal range should be higher.  Advised patient to continue same warfarin dose at this time (5 mg daily except 7.5 mg Wed, Sat) and call with update on Th or Fri.  Patient agreed with plan of care.      Message sent to PCP as an FYI.  Please keep this encounter open so updates can be made on this.  Erica Stinson RN

## 2019-05-03 ENCOUNTER — ANTICOAGULATION THERAPY VISIT (OUTPATIENT)
Dept: ANTICOAGULATION | Facility: CLINIC | Age: 64
End: 2019-05-03
Payer: COMMERCIAL

## 2019-05-03 DIAGNOSIS — Z95.2 H/O AORTIC VALVE REPLACEMENT: ICD-10-CM

## 2019-05-03 PROCEDURE — 99207 ZZC NO CHARGE NURSE ONLY: CPT | Performed by: INTERNAL MEDICINE

## 2019-05-03 NOTE — PROGRESS NOTES
ANTICOAGULATION FOLLOW-UP CLINIC VISIT    Patient Name:  Annabella Bolanos  Date:  5/3/2019  Contact Type:  Telephone/ discussed warfarin dosing and next INR check with patient.    SUBJECTIVE:     Patient Findings     Positives:   Change in medications (starting Keflex today for 5 days (can increase INR))    Comments:   She continues to have tenderness in the right calf and the lump has not gone away.  She used warm compresses and heating pad about 7 times in the last 2 days.  She will start Keflex today and will take this for 5 days.  Keflex can increase INR.  Advised patient to lower her warfarin dose from 7.5 mg to 5 mg tomorrow and increase her vitamin K intake since she enjoys eating green veggies.            OBJECTIVE    INR   Date Value Ref Range Status   04/30/2019 2.3 (A) 0.8 - 1.1 Final       ASSESSMENT / PLAN  INR assessment THER    Recheck INR In: 1 WEEK    INR Location Outside lab      Anticoagulation Summary  As of 5/3/2019    INR goal:   1.8-2.5   TTR:   78.5 % (3 y)   INR used for dosing:   No new INR was available at the time of this encounter.   Warfarin maintenance plan:   7.5 mg (5 mg x 1.5) every Wed, Sat; 5 mg (5 mg x 1) all other days   Full warfarin instructions:   5/4: 5 mg; Otherwise 7.5 mg every Wed, Sat; 5 mg all other days   Weekly warfarin total:   40 mg   Plan last modified:   Erica Stinson RN (12/12/2018)   Next INR check:   5/10/2019   Priority:   INR   Target end date:       Indications    Long-term (current) use of anticoagulants [Z79.01] [Z79.01]  H/O aortic valve replacement [Z95.2]             Anticoagulation Episode Summary     INR check location:       Preferred lab:       Send INR reminders to:   [facility]    Comments:         Anticoagulation Care Providers     Provider Role Specialty Phone number    Brook Echavarria MD [facility] Internal Medicine [phone]            See the Encounter Report to view Anticoagulation Flowsheet and Dosing Calendar (Go to Encounters  tab in chart review, and find the Anticoagulation Therapy Visit)    Dosage adjustment made based on physician directed care plan.    Erica Stinson RN

## 2019-05-03 NOTE — TELEPHONE ENCOUNTER
Patient calling with update on symptoms.  She continues to have tenderness in the right calf and the lump has not gone away.  She used warm compresses and heating pad about 7 times in the last 2 days.  She will start Keflex today and will take this for 5 days.  Keflex can increase INR.  Advised patient to lower her warfarin dose from 7.5 mg to 5 mg tomorrow and increase her vitamin K intake since she enjoys eating green veggies.  Appointment scheduled on 5/10 for INR check.    Message sent to PCP as an JETHRO.    Erica Stinson RN

## 2019-05-10 ENCOUNTER — ANTICOAGULATION THERAPY VISIT (OUTPATIENT)
Dept: ANTICOAGULATION | Facility: CLINIC | Age: 64
End: 2019-05-10
Payer: COMMERCIAL

## 2019-05-10 DIAGNOSIS — Z95.2 H/O AORTIC VALVE REPLACEMENT: ICD-10-CM

## 2019-05-10 LAB — INR POINT OF CARE: 2.3 (ref 0.86–1.14)

## 2019-05-10 PROCEDURE — 99207 ZZC NO CHARGE NURSE ONLY: CPT

## 2019-05-10 PROCEDURE — 85610 PROTHROMBIN TIME: CPT | Mod: QW

## 2019-05-10 PROCEDURE — 36416 COLLJ CAPILLARY BLOOD SPEC: CPT

## 2019-05-10 NOTE — PROGRESS NOTES
ANTICOAGULATION FOLLOW-UP CLINIC VISIT    Patient Name:  Annabella Bolanos  Date:  5/10/2019  Contact Type:  Face to Face    SUBJECTIVE:  Patient Findings     Positives:   Change in medications (Keflex was completed on Wednesday this week.)    Comments:   The patient was assessed for diet, medication, and activity level changes, missed or extra doses, bruising or bleeding, with no problem findings.  Patient reports that she can still feel a lump on her leg, but the pain has improved significantly.  Advised follow up with PCP in the next couple weeks if lump does not resolve, or sooner follow up if pain returns.          Clinical Outcomes     Negatives:   Major bleeding event, Thromboembolic event, Anticoagulation-related hospital admission, Anticoagulation-related ED visit, Anticoagulation-related fatality    Comments:   The patient was assessed for diet, medication, and activity level changes, missed or extra doses, bruising or bleeding, with no problem findings.  Patient reports that she can still feel a lump on her leg, but the pain has improved significantly.  Advised follow up with PCP in the next couple weeks if lump does not resolve, or sooner follow up if pain returns.             OBJECTIVE    INR Protime   Date Value Ref Range Status   05/10/2019 2.3 (A) 0.86 - 1.14 Final       ASSESSMENT / PLAN  INR assessment THER    Recheck INR In: 6 WEEKS    INR Location Clinic      Anticoagulation Summary  As of 5/10/2019    INR goal:   1.8-2.5   TTR:   78.7 % (3 y)   INR used for dosin.3 (5/10/2019)   Warfarin maintenance plan:   7.5 mg (5 mg x 1.5) every Wed, Sat; 5 mg (5 mg x 1) all other days   Full warfarin instructions:   7.5 mg every Wed, Sat; 5 mg all other days   Weekly warfarin total:   40 mg   No change documented:   Erica Stinson RN   Plan last modified:   Erica Stinson RN (2018)   Next INR check:   2019   Priority:   INR   Target end date:       Indications    Long-term (current) use of  anticoagulants [Z79.01] [Z79.01]  H/O aortic valve replacement [Z95.2]             Anticoagulation Episode Summary     INR check location:       Preferred lab:       Send INR reminders to:   [facility]    Comments:         Anticoagulation Care Providers     Provider Role Specialty Phone number    Brook Echavarria MD [facility] Internal Medicine [phone]            See the Encounter Report to view Anticoagulation Flowsheet and Dosing Calendar (Go to Encounters tab in chart review, and find the Anticoagulation Therapy Visit)    Dosage adjustment made based on physician directed care plan.    Erica Stinson RN

## 2019-05-13 DIAGNOSIS — E05.90 SUBCLINICAL HYPERTHYROIDISM: ICD-10-CM

## 2019-05-13 LAB
ALBUMIN SERPL-MCNC: 3.4 G/DL (ref 3.4–5)
ALP SERPL-CCNC: 71 U/L (ref 40–150)
ALT SERPL W P-5'-P-CCNC: 19 U/L (ref 0–50)
AST SERPL W P-5'-P-CCNC: 17 U/L (ref 0–45)
BILIRUB DIRECT SERPL-MCNC: 0.1 MG/DL (ref 0–0.2)
BILIRUB SERPL-MCNC: 0.4 MG/DL (ref 0.2–1.3)
PROT SERPL-MCNC: 6.8 G/DL (ref 6.8–8.8)
T3FREE SERPL-MCNC: 2.7 PG/ML (ref 2.3–4.2)
T4 FREE SERPL-MCNC: 1.05 NG/DL (ref 0.76–1.46)
TSH SERPL DL<=0.005 MIU/L-ACNC: 0.94 MU/L (ref 0.4–4)
WBC # BLD AUTO: 4.1 10E9/L (ref 4–11)

## 2019-05-13 PROCEDURE — 80076 HEPATIC FUNCTION PANEL: CPT | Performed by: INTERNAL MEDICINE

## 2019-05-13 PROCEDURE — 85048 AUTOMATED LEUKOCYTE COUNT: CPT | Performed by: INTERNAL MEDICINE

## 2019-05-13 PROCEDURE — 36415 COLL VENOUS BLD VENIPUNCTURE: CPT | Performed by: INTERNAL MEDICINE

## 2019-05-13 PROCEDURE — 84439 ASSAY OF FREE THYROXINE: CPT | Performed by: INTERNAL MEDICINE

## 2019-05-13 PROCEDURE — 84443 ASSAY THYROID STIM HORMONE: CPT | Performed by: INTERNAL MEDICINE

## 2019-05-13 PROCEDURE — 84481 FREE ASSAY (FT-3): CPT | Performed by: INTERNAL MEDICINE

## 2019-05-16 ENCOUNTER — OFFICE VISIT (OUTPATIENT)
Dept: ENDOCRINOLOGY | Facility: CLINIC | Age: 64
End: 2019-05-16
Payer: COMMERCIAL

## 2019-05-16 VITALS
DIASTOLIC BLOOD PRESSURE: 88 MMHG | BODY MASS INDEX: 22.49 KG/M2 | SYSTOLIC BLOOD PRESSURE: 109 MMHG | TEMPERATURE: 98.8 F | RESPIRATION RATE: 22 BRPM | WEIGHT: 131 LBS | HEART RATE: 71 BPM | OXYGEN SATURATION: 95 %

## 2019-05-16 DIAGNOSIS — E05.90 SUBCLINICAL HYPERTHYROIDISM: ICD-10-CM

## 2019-05-16 PROCEDURE — 99214 OFFICE O/P EST MOD 30 MIN: CPT | Performed by: INTERNAL MEDICINE

## 2019-05-16 RX ORDER — METHIMAZOLE 5 MG/1
TABLET ORAL
Qty: 45 TABLET | Refills: 0 | Status: SHIPPED | OUTPATIENT
Start: 2019-05-16 | End: 2019-06-25

## 2019-05-16 NOTE — LETTER
5/16/2019         RE: Annabella Bolanos  [address] [place] [address]        Dear Colleague,    Thank you for referring your patient, Annabella Bolanos, to the [facility]. Please see a copy of my visit note below.    Name: Annabella Bolanos  Seen for follow-up of subclinical hyperthyroidism.    HPI:  Annabella Bolanos is a 63 year old female who presents for the evaluation of subclinical Hyperthyroidism.  H/o breast cancer and h/o aortic valve replacement and is on long term anticoagulation.  Breast cancer diagnosed in 2014 s/p lumpectomy and chemo and radiation. Took radiation 5 days a week for 1 month.  DEXA 2018 scan showing osteopenia and appears stable.  She is not on medication for osteoporosis/osteopenia at this time.   She is also on aromatase inhibitor as a part of treatment for breast cancer.     Subclinical hyperthyroidism noted since 7/2016. Plan was for continue to monitor.  Never been on medications.  No h/o arrhythmia  No h/o osteoporosis-- has osteopenia. Not on treatment.  TSI neg  US thyroid ( h/o radiation)- no discrete nodules.  As there was further decline in TSH along with risk for low bone density with aromatase inhibitor (with with prior history of osteopenia) as well as complex cardiac history she was started on methimazole 5 mg 7/2018.  Currently taking methimazole 2.5 mg/day. On this dose X 9/2018.  F/u labs are WNL  LFT and CBC stable.    Since last clinic visit she was started on Fosamax by Dr. Felder for osteoporosis/osteopenia.  She is taking Fosamax on a weekly basis since February 2019.    Feeling good.  Has good energy.  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  H/o arthritis.  Weight is stable.  Wt Readings from Last 2 Encounters:   05/16/19 59.4 kg (131 lb)   09/27/18 57.4 kg (126 lb 9.6 oz)       Eating healthy.    History of radiation exposure: YES. As above  History of thyroid dysfunction: not to her knowledge. No FH of thyroid cancer.  Palpitations:  No  Changes to hair or skin:  No  Diarrhea/Constipation:No  Changes in menses: s/p menopause  Changes in vision:No  Diplopia/Blurryness:No  Dysphagia or Shortness of breath:No  Tremors:No  Difficulty sleeping:Yes: most of the time  Changes in weight: No  Lithium/Amiodarone: No  URI: No  CT Scans:No  Mother had hyperthyroidism s/p DOE and was on levothyroxine.    PMH/PSH:  Past Medical History:   Diagnosis Date     Adenomatous colon polyp 2015     Aortic stenosis 11    bicuspid AV with severe stenosis - 2011 mechanical AVR with 23 mm St Yordan AV conduit, composite graft placement     Arthritis     knees and hands     Bicuspid aortic valve      Breast cancer (H) 2014    R breast     H/O aortic valve replacement     St Yordan     Heart murmur     Valve repalcement .     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    AAA repair with av23 mm tube graft     PONV (postoperative nausea and vomiting)     n/v morphine vs anesthesia, vomiting x24 hrs     Subclinical hyperthyroidism 2017     Thrombosis of leg     after  of daughter     Uncomplicated asthma      Past Surgical History:   Procedure Laterality Date     BIOPSY NODE SENTINEL Right 9/10/2014    Procedure: BIOPSY NODE SENTINEL;  Surgeon: Ila Romano MD;  Location: [facility] OR     CARDIAC SURGERY  2011    aortic valve replacement     ENT SURGERY      tonsillectomy     HRW PORT A CATH       INSERT PORT VASCULAR ACCESS Left 10/22/2014    Procedure: INSERT PORT VASCULAR ACCESS;  Surgeon: Ila Romano MD;  Location: [facility] OR     LUMPECTOMY BREAST WITH SEED LOCALIZATION Right 9/10/2014    Procedure: LUMPECTOMY BREAST WITH SEED LOCALIZATION;  Surgeon: Ila Romano MD;  Location: [facility] OR     ORTHOPEDIC SURGERY      shoulder, thumb surgery     REMOVE PORT VASCULAR ACCESS Left 2015    Procedure: REMOVE PORT VASCULAR ACCESS;   "Surgeon: Ila Romano MD;  Location: [facility] OR     REPAIR ANEURYSM ASCENDING AORTA  6/23/2011    Procedure:REPAIR ANEURYSM ASCENDING AORTA; Medial Sternotomy, Aortic Valve Replacement, (St. Yordan [employer] HP Valved Graft, size 23 mm) on pump oxygenation, intraoperative transesophageal cardiogram; Surgeon:JOHN PAUL ULLOA; Location:[facility] OR     REPLACE VALVE AORTIC  6/23/11    bicuspid AV with severe stenosis - 6/2011 mechanical AVR with 23 mm St Yordan AV conduit, composite graft placement     Family Hx:  Family History   Problem Relation Age of Onset     Hypertension Mother      Thyroid Disease Mother         \"Toxic thyroid\"     Prostate Cancer Father 70     Cancer Father 80        Lung cancer, Not caused from smoking     Cerebrovascular Disease Father 80     Hypertension Father      Cardiovascular Brother         half brother      Cardiovascular Son         Puentes-Parkinsons White Syndrome     Cardiovascular Daughter         Heart murmur     Breast Cancer Paternal Aunt 80     Cardiovascular Paternal Aunt      Arthritis Brother 40        RA - half brother      Cancer Maternal Grandfather      Colon Cancer No family hx of      Thyroid disease: as above          Social Hx:  Social History     Socioeconomic History     Marital status: Single     Spouse name: Not on file     Number of children: Not on file     Years of education: Not on file     Highest education level: Not on file   Occupational History     Not on file   Social Needs     Financial resource strain: Not on file     Food insecurity:     Worry: Not on file     Inability: Not on file     Transportation needs:     Medical: Not on file     Non-medical: Not on file   Tobacco Use     Smoking status: Never Smoker     Smokeless tobacco: Never Used   Substance and Sexual Activity     Alcohol use: Yes     Comment: 2 drinks per week -      Drug use: No     Sexual activity: Not Currently   Lifestyle     Physical activity:     Days per week: Not on file "     Minutes per session: Not on file     Stress: Not on file   Relationships     Social connections:     Talks on phone: Not on file     Gets together: Not on file     Attends Voodoo service: Not on file     Active member of club or organization: Not on file     Attends meetings of clubs or organizations: Not on file     Relationship status: Not on file     Intimate partner violence:     Fear of current or ex partner: Not on file     Emotionally abused: Not on file     Physically abused: Not on file     Forced sexual activity: Not on file   Other Topics Concern     Parent/sibling w/ CABG, MI or angioplasty before 65F 55M? Not Asked      Service Not Asked     Blood Transfusions Not Asked     Caffeine Concern Yes     Comment: 1-2 cups caffeine per day     Occupational Exposure Not Asked     Hobby Hazards Not Asked     Sleep Concern No     Stress Concern No     Weight Concern No     Special Diet Yes     Comment: low fat daily , low red meats     Back Care No     Exercise Yes     Comment: walks daily/ 3x a week exercise class     Bike Helmet Not Asked     Seat Belt Not Asked     Self-Exams Not Asked   Social History Narrative     Not on file          MEDICATIONS:  has a current medication list which includes the following prescription(s): anastrozole, aspirin, calcium-magnesium-vitamin d, carvedilol, vitamin d3, lisinopril, methimazole, valacyclovir, warfarin, zolpidem, and acetaminophen.    ROS   ROS: 10 point ROS neg other than the symptoms noted above in the HPI.    Physical Exam   VS: /88   Pulse 71   Temp 98.8  F (37.1  C) (Oral)   Wt 59.4 kg (131 lb)   SpO2 95%   BMI 22.49 kg/m     GENERAL: AXOX3, NAD, well dressed, answering questions appropriately, appears stated age.  HEENT: No exopthalmous, no proptosis, EOMI, no lig lag, no retraction  NECK: Thyroid normal in size, non tender, no nodules were palpated.  CV: RRR  LUNGS: CTAB  ABDOMEN: +BS  NEUROLOGY: CN grossly intact, no tremors  PSYCH:  normal affect and mood      LABS:  [facility]  [facility] 5/13/2019 11/8/2018   TSH 0.40 - 4.00 mU/L 0.94 1.02   T4 FREE 0.76 - 1.46 ng/dL 1.05 1.02   FREE T3 2.3 - 4.2 pg/mL 2.7 2.9     ENDO THYROID LABS[facility] Latest Ref Rng & Units 9/6/2018   TSH 0.40 - 4.00 mU/L 1.10   T4 FREE 0.76 - 1.46 ng/dL 0.89   FREE T3 2.3 - 4.2 pg/mL 2.8     [facility] Units 6/5/2018   TSH 0.40 - 4.00 mU/L 0.05 (L)   T4 FREE 0.76 - 1.46 ng/dL 1.27   FREE T3 2.3 - 4.2 pg/mL 3.4     [facility] Latest Ref [facility] Units 12/6/2017   TSH 0.40 - 4.00 mU/L 0.18 (L)   T4 FREE 0.76 - 1.46 ng/dL 1.16   FREE T3 2.3 - 4.2 pg/mL 3.2   THYROID STIM IMMUNOG <=1.3 TSI index <1.0     [facility] Latest Ref R[facility] Units 11/10/2017 7/24/2017   TSH 0.40 - 4.00 mU/L 0.12 (L) 0.12 (L)   T4 FREE 0.76 - 1.46 ng/dL 1.10 1.16     [facility] Latest Ref [facility] Units 7/14/2016   TSH 0.40 - 4.00 mU/L 0.24 (L)   T4 FREE 0.76 - 1.46 ng/dL 1.22       CBC:  WBC   Date Value Ref Range Status   05/13/2019 4.1 4.0 - 11.0 10e9/L Final         LFTs:  Liver Function Studies -   Recent Labs   Lab Test 05/13/19  0822   PROTTOTAL 6.8   ALBUMIN 3.4   BILITOTAL 0.4   ALKPHOS 71   AST 17   ALT 19     ULTRASOUND THYROID December 15, 2017 9:24 AM   HISTORY: Subclinical hyperthyroidism.   FINDINGS: Thyroid ultrasound demonstrates an enlarged thyroid gland. The right lobe measures 5.4 x 2.0 x 1.8 cm. The left lobe measures 3.8 x 1.6 x 1.3 cm. The isthmus is normal in thickness. Thyroid parenchyma is heterogeneous in echotexture. Increased color Doppler flow is noted throughout the thyroid gland. Thyroid nodules as follows: Right Lobe: None. Isthmus: None. Left Lobe: None.   IMPRESSION: Enlarged heterogeneous thyroid gland without evidence of a discrete thyroid nodule. Increased color Doppler flow suggests underlying thyroiditis. Correlate with thyroid function test and nuclear medicine thyroid scan as  needed.    All pertinent notes, labs, and images personally reviewed by me.     A/P  Ms.Carol WINDY Bolanos is a 63 year old here for the evaluation of hyperthyroidism.    Subclinical hyperthyroidism (TSI neg):   TSH remained suppressed since 2016 and along with normal free T4  No h/o arrhythmia  No h/o osteoporosis-- has osteopenia. Not on treatment.  She is on aromatase inhibitor for breast cancer.  TSI neg  US thyroid ( h/o radiation)- no discrete nodules.  Clinically looks euthyroid.  No major complaints.  As there is further decline in TSH along with risk for low bone density with aromatase inhibitor (with with prior history of osteopenia) was started on methimazole 5 mg 7/2018  Currently taking methimazole 2.5 mg/day.  She is on this dose since September 2018.  F/u labs WNL.  Plan:  Decrease methimazole to 2.5 mg on alternate days (5/16/2019)  Labs in 6 weeks.  Clinic visit/phone visit after that.    Off note she is on anticoagulant and may need dose adjustment to get INR at target levels ( h/o aortic valve replacement)  Baseline LFT and WBC normal.  Discussed diagnosis, pathophysiology, management and treatment options of condition with pt.    Discussed indications, risks and benefits of all medications prescribed, and answered questions to patient's satisfaction.  The patient indicates understanding of these issues and agrees with the plan.    Discussed possible side effects of methimazole including liver dysfunction and agranulocytosis. Discussed to watch for s/s like jaundice, abdominal pain and skin rash. In case of prolonged unresolving fever, sore throat and infections, advise to seek medical care.    Call if:     Signs or symptoms of infection. These include a fever of 100.5 degrees or higher, chills, severe sore throat, ear or sinus pain, cough, increased sputum or change in color, painful urination, mouth sores.   Severe nausea or vomiting.   Joint pain or swelling.   Not able to eat.   Unusual bruising or  bleeding.   Dark urine or yellow skin or eyes.      Follow-up:  6 weeks phone/clinic visit.    Merle Baxter MD  Endocrinology  [facility]  CC: Brook Echavarria     More than 50% of face to face time spent with Ms. Bolanos on counseling / coordinating her care.    All questions were answered.  The patient indicates understanding of the above issues and agrees with the plan set forth.     Addendum to above note and clinic visit:    Labs reviewed.    See result note/telephone encounter.                Again, thank you for allowing me to participate in the care of your patient.        Sincerely,        Merle Baxter MD

## 2019-05-16 NOTE — PATIENT INSTRUCTIONS
[facility]   Dr Baxter, Endocrinology Department      [facility]   [address] [address]  Appointment [phone]  Fax: [phone]   Monday and Tuesday         [facility]   [address] [place] [address]  Appointment [phone]  Fax: [phone]  Wednesday and Thursday         Decrease dose of methiamzole.  Take 2.5 mg on alternate days  Labs in 5-6 weeks  Please make a lab appointment for blood work and follow up clinic appointment in 1 week after that to discuss results.  Or phone visit.    Discussed possible side effects of methimazole including liver dysfunction and agranulocytosis. Discussed to watch for s/s like jaundice, abdominal pain and skin rash. In case of prolonged unresolving fever, sore throat and infections, advise to seek medical care.    Call if:     Signs or symptoms of infection. These include a fever of 100.5 degrees or higher, chills, severe sore throat, ear or sinus pain, cough, increased sputum or change in color, painful urination, mouth sores.   Severe nausea or vomiting.   Joint pain or swelling.   Not able to eat.   Unusual bruising or bleeding.   Dark urine or yellow skin or eyes.

## 2019-05-16 NOTE — PROGRESS NOTES
Name: Annabella Bolanos  Seen for follow-up of subclinical hyperthyroidism.    HPI:  Annabella Bolanos is a 63 year old female who presents for the evaluation of subclinical Hyperthyroidism.  H/o breast cancer and h/o aortic valve replacement and is on long term anticoagulation.  Breast cancer diagnosed in 2014 s/p lumpectomy and chemo and radiation. Took radiation 5 days a week for 1 month.  DEXA 2018 scan showing osteopenia and appears stable.  She is not on medication for osteoporosis/osteopenia at this time.   She is also on aromatase inhibitor as a part of treatment for breast cancer.     Subclinical hyperthyroidism noted since 7/2016. Plan was for continue to monitor.  Never been on medications.  No h/o arrhythmia  No h/o osteoporosis-- has osteopenia. Not on treatment.  TSI neg  US thyroid ( h/o radiation)- no discrete nodules.  As there was further decline in TSH along with risk for low bone density with aromatase inhibitor (with with prior history of osteopenia) as well as complex cardiac history she was started on methimazole 5 mg 7/2018.  Currently taking methimazole 2.5 mg/day. On this dose X 9/2018.  F/u labs are WNL  LFT and CBC stable.    Since last clinic visit she was started on Fosamax by Dr. Felder for osteoporosis/osteopenia.  She is taking Fosamax on a weekly basis since February 2019.    Feeling good.  Has good energy.  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  H/o arthritis.  Weight is stable.  Wt Readings from Last 2 Encounters:   05/16/19 59.4 kg (131 lb)   09/27/18 57.4 kg (126 lb 9.6 oz)       Eating healthy.    History of radiation exposure: YES. As above  History of thyroid dysfunction: not to her knowledge. No FH of thyroid cancer.  Palpitations:  No  Changes to hair or skin: No  Diarrhea/Constipation:No  Changes in menses: s/p menopause  Changes in vision:No  Diplopia/Blurryness:No  Dysphagia or Shortness of breath:No  Tremors:No  Difficulty sleeping:Yes: most of the time  Changes in weight: No  Lithium/Amiodarone:  No  URI: No  CT Scans:No  Mother had hyperthyroidism s/p DOE and was on levothyroxine.    PMH/PSH:  Past Medical History:   Diagnosis Date     Adenomatous colon polyp 2015     Aortic stenosis 11    bicuspid AV with severe stenosis - 2011 mechanical AVR with 23 mm [employer] Yordan AV conduit, composite graft placement     Arthritis     knees and hands     Bicuspid aortic valve      Breast cancer (H) 2014    R breast     H/O aortic valve replacement     St Yordan     Heart murmur     Valve repalcement .     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    AAA repair with av23 mm tube graft     PONV (postoperative nausea and vomiting)     n/v morphine vs anesthesia, vomiting x24 hrs     Subclinical hyperthyroidism 2017     Thrombosis of leg     after  of daughter     Uncomplicated asthma      Past Surgical History:   Procedure Laterality Date     BIOPSY NODE SENTINEL Right 9/10/2014    Procedure: BIOPSY NODE SENTINEL;  Surgeon: Ila Romano MD;  Location: [facility] OR     CARDIAC SURGERY  2011    aortic valve replacement     ENT SURGERY      tonsillectomy     HRW PORT A CATH       INSERT PORT VASCULAR ACCESS Left 10/22/2014    Procedure: INSERT PORT VASCULAR ACCESS;  Surgeon: Ila Romano MD;  Location: [facility] OR     LUMPECTOMY BREAST WITH SEED LOCALIZATION Right 9/10/2014    Procedure: LUMPECTOMY BREAST WITH SEED LOCALIZATION;  Surgeon: Ila Romano MD;  Location: [facility] OR     ORTHOPEDIC SURGERY      shoulder, thumb surgery     REMOVE PORT VASCULAR ACCESS Left 2015    Procedure: REMOVE PORT VASCULAR ACCESS;  Surgeon: Ila Romano MD;  Location: [facility] OR     REPAIR ANEURYSM ASCENDING AORTA  2011    Procedure:REPAIR ANEURYSM ASCENDING AORTA; Medial Sternotomy, Aortic Valve Replacement, ([employer]. Y[employer] HP Valved Graft, size 23 mm) on  "pump oxygenation, intraoperative transesophageal cardiogram; Surgeon:JOHN PAUL ULLOA; Location:[facility] OR     REPLACE VALVE AORTIC  6/23/11    bicuspid AV with severe stenosis - 6/2011 mechanical AVR with 23 mm St Yordan AV conduit, composite graft placement     Family Hx:  Family History   Problem Relation Age of Onset     Hypertension Mother      Thyroid Disease Mother         \"Toxic thyroid\"     Prostate Cancer Father 70     Cancer Father 80        Lung cancer, Not caused from smoking     Cerebrovascular Disease Father 80     Hypertension Father      Cardiovascular Brother         half brother      Cardiovascular Son         Puentes-Parkinsons White Syndrome     Cardiovascular Daughter         Heart murmur     Breast Cancer Paternal Aunt 80     Cardiovascular Paternal Aunt      Arthritis Brother 40        RA - half brother      Cancer Maternal Grandfather      Colon Cancer No family hx of      Thyroid disease: as above          Social Hx:  Social History     Socioeconomic History     Marital status: Single     Spouse name: Not on file     Number of children: Not on file     Years of education: Not on file     Highest education level: Not on file   Occupational History     Not on file   Social Needs     Financial resource strain: Not on file     Food insecurity:     Worry: Not on file     Inability: Not on file     Transportation needs:     Medical: Not on file     Non-medical: Not on file   Tobacco Use     Smoking status: Never Smoker     Smokeless tobacco: Never Used   Substance and Sexual Activity     Alcohol use: Yes     Comment: 2 drinks per week -      Drug use: No     Sexual activity: Not Currently   Lifestyle     Physical activity:     Days per week: Not on file     Minutes per session: Not on file     Stress: Not on file   Relationships     Social connections:     Talks on phone: Not on file     Gets together: Not on file     Attends Jewish service: Not on file     Active member of club or organization: " Not on file     Attends meetings of clubs or organizations: Not on file     Relationship status: Not on file     Intimate partner violence:     Fear of current or ex partner: Not on file     Emotionally abused: Not on file     Physically abused: Not on file     Forced sexual activity: Not on file   Other Topics Concern     Parent/sibling w/ CABG, MI or angioplasty before 65F 55M? Not Asked      Service Not Asked     Blood Transfusions Not Asked     Caffeine Concern Yes     Comment: 1-2 cups caffeine per day     Occupational Exposure Not Asked     Hobby Hazards Not Asked     Sleep Concern No     Stress Concern No     Weight Concern No     Special Diet Yes     Comment: low fat daily , low red meats     Back Care No     Exercise Yes     Comment: walks daily/ 3x a week exercise class     Bike Helmet Not Asked     Seat Belt Not Asked     Self-Exams Not Asked   Social History Narrative     Not on file          MEDICATIONS:  has a current medication list which includes the following prescription(s): anastrozole, aspirin, calcium-magnesium-vitamin d, carvedilol, vitamin d3, lisinopril, methimazole, valacyclovir, warfarin, zolpidem, and acetaminophen.    ROS   ROS: 10 point ROS neg other than the symptoms noted above in the HPI.    Physical Exam   VS: /88   Pulse 71   Temp 98.8  F (37.1  C) (Oral)   Wt 59.4 kg (131 lb)   SpO2 95%   BMI 22.49 kg/m    GENERAL: AXOX3, NAD, well dressed, answering questions appropriately, appears stated age.  HEENT: No exopthalmous, no proptosis, EOMI, no lig lag, no retraction  NECK: Thyroid normal in size, non tender, no nodules were palpated.  CV: RRR  LUNGS: CTAB  ABDOMEN: +BS  NEUROLOGY: CN grossly intact, no tremors  PSYCH: normal affect and mood      LABS:  TFTs:  ENDO THYROID LABS-[facility] Latest Ref Rng & Units 5/13/2019 11/8/2018   TSH 0.40 - 4.00 mU/L 0.94 1.02   T4 FREE 0.76 - 1.46 ng/dL 1.05 1.02   FREE T3 2.3 - 4.2 pg/mL 2.7 2.9     ENDO THYROID LABS-[facility] Latest Ref  Rng & Units 9/6/2018   TSH 0.40 - 4.00 mU/L 1.10   T4 FREE 0.76 - 1.46 ng/dL 0.89   FREE T3 2.3 - 4.2 pg/mL 2.8     ENDO THYROID LABS-[facility] Latest Ref Rng & Units 6/5/2018   TSH 0.40 - 4.00 mU/L 0.05 (L)   T4 FREE 0.76 - 1.46 ng/dL 1.27   FREE T3 2.3 - 4.2 pg/mL 3.4     ENDO THYROID LABS-[facility] Latest Ref Rng & Units 12/6/2017   TSH 0.40 - 4.00 mU/L 0.18 (L)   T4 FREE 0.76 - 1.46 ng/dL 1.16   FREE T3 2.3 - 4.2 pg/mL 3.2   THYROID STIM IMMUNOG <=1.3 TSI index <1.0     ENDO THYROID LABS-[facility] Latest Ref Rng & Units 11/10/2017 7/24/2017   TSH 0.40 - 4.00 mU/L 0.12 (L) 0.12 (L)   T4 FREE 0.76 - 1.46 ng/dL 1.10 1.16     ENDO THYROID LABS-[facility] Latest Ref Rng & Units 7/14/2016   TSH 0.40 - 4.00 mU/L 0.24 (L)   T4 FREE 0.76 - 1.46 ng/dL 1.22       CBC:  WBC   Date Value Ref Range Status   05/13/2019 4.1 4.0 - 11.0 10e9/L Final         LFTs:  Liver Function Studies -   Recent Labs   Lab Test 05/13/19  0822   PROTTOTAL 6.8   ALBUMIN 3.4   BILITOTAL 0.4   ALKPHOS 71   AST 17   ALT 19     ULTRASOUND THYROID December 15, 2017 9:24 AM   HISTORY: Subclinical hyperthyroidism.   FINDINGS: Thyroid ultrasound demonstrates an enlarged thyroid gland. The right lobe measures 5.4 x 2.0 x 1.8 cm. The left lobe measures 3.8 x 1.6 x 1.3 cm. The isthmus is normal in thickness. Thyroid parenchyma is heterogeneous in echotexture. Increased color Doppler flow is noted throughout the thyroid gland. Thyroid nodules as follows: Right Lobe: None. Isthmus: None. Left Lobe: None.   IMPRESSION: Enlarged heterogeneous thyroid gland without evidence of a discrete thyroid nodule. Increased color Doppler flow suggests underlying thyroiditis. Correlate with thyroid function test and nuclear medicine thyroid scan as needed.    All pertinent notes, labs, and images personally reviewed by me.     A/P  Ms.Carol WINDY Bolanos is a 63 year old here for the evaluation of hyperthyroidism.    Subclinical hyperthyroidism (TSI neg):   TSH remained suppressed since 2016 and along  with normal free T4  No h/o arrhythmia  No h/o osteoporosis-- has osteopenia. Not on treatment.  She is on aromatase inhibitor for breast cancer.  TSI neg  US thyroid ( h/o radiation)- no discrete nodules.  Clinically looks euthyroid.  No major complaints.  As there is further decline in TSH along with risk for low bone density with aromatase inhibitor (with with prior history of osteopenia) was started on methimazole 5 mg 7/2018  Currently taking methimazole 2.5 mg/day.  She is on this dose since September 2018.  F/u labs WNL.  Plan:  Decrease methimazole to 2.5 mg on alternate days (5/16/2019)  Labs in 6 weeks.  Clinic visit/phone visit after that.    Off note she is on anticoagulant and may need dose adjustment to get INR at target levels ( h/o aortic valve replacement)  Baseline LFT and WBC normal.  Discussed diagnosis, pathophysiology, management and treatment options of condition with pt.    Discussed indications, risks and benefits of all medications prescribed, and answered questions to patient's satisfaction.  The patient indicates understanding of these issues and agrees with the plan.    Discussed possible side effects of methimazole including liver dysfunction and agranulocytosis. Discussed to watch for s/s like jaundice, abdominal pain and skin rash. In case of prolonged unresolving fever, sore throat and infections, advise to seek medical care.    Call if:     Signs or symptoms of infection. These include a fever of 100.5 degrees or higher, chills, severe sore throat, ear or sinus pain, cough, increased sputum or change in color, painful urination, mouth sores.   Severe nausea or vomiting.   Joint pain or swelling.   Not able to eat.   Unusual bruising or bleeding.   Dark urine or yellow skin or eyes.      Follow-up:  6 weeks phone/clinic visit.    Merle Baxter MD  Endocrinology  [facility]  CC: Brook Echavarria     More than 50% of face to face time spent with Ms.  Luis Miguel on counseling / coordinating her care.    All questions were answered.  The patient indicates understanding of the above issues and agrees with the plan set forth.     Addendum to above note and clinic visit:    Labs reviewed.    See result note/telephone encounter.

## 2019-05-17 ENCOUNTER — TELEPHONE (OUTPATIENT)
Dept: CARDIOLOGY | Facility: CLINIC | Age: 64
End: 2019-05-17

## 2019-05-17 NOTE — TELEPHONE ENCOUNTER
Pt called with concerns about her blood pressures the past few days. Pt has not made any med changes with her carvedilol or lisinopril. Pt reported that yesterday when she was at her endocrinologists her BP was 109/88 HR 71. On Wednesday pt was feeling dizzy and thought her BP might be low so she checked it and it was 168/96 in the morning before taking her pills and then a half hour later it was 164/94, pt said she took it again later that day and it was 150s/90s. 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 Pt then called to discuss this.     This was reviewed with Dr. Guan along with pt's medications and Dr. Guan would like pt to see an ZOË next week and for the pt to track her BPs over the weekend and bring the log in with the appt. Scheduled to see Tracie BEDOYA on 5/20/19.

## 2019-05-20 ENCOUNTER — OFFICE VISIT (OUTPATIENT)
Dept: CARDIOLOGY | Facility: CLINIC | Age: 64
End: 2019-05-20
Payer: COMMERCIAL

## 2019-05-20 VITALS
SYSTOLIC BLOOD PRESSURE: 154 MMHG | HEIGHT: 64 IN | BODY MASS INDEX: 22.04 KG/M2 | DIASTOLIC BLOOD PRESSURE: 94 MMHG | HEART RATE: 64 BPM | WEIGHT: 129.1 LBS

## 2019-05-20 DIAGNOSIS — I10 ESSENTIAL HYPERTENSION, BENIGN: ICD-10-CM

## 2019-05-20 DIAGNOSIS — Z95.2 HISTORY OF MECHANICAL AORTIC VALVE REPLACEMENT: ICD-10-CM

## 2019-05-20 DIAGNOSIS — R09.89 LABILE BLOOD PRESSURE: Primary | ICD-10-CM

## 2019-05-20 LAB
ANION GAP SERPL CALCULATED.3IONS-SCNC: 2 MMOL/L (ref 3–14)
BUN SERPL-MCNC: 21 MG/DL (ref 7–30)
CALCIUM SERPL-MCNC: 8.5 MG/DL (ref 8.5–10.1)
CHLORIDE SERPL-SCNC: 105 MMOL/L (ref 94–109)
CO2 SERPL-SCNC: 31 MMOL/L (ref 20–32)
CREAT SERPL-MCNC: 0.66 MG/DL (ref 0.52–1.04)
GFR SERPL CREATININE-BSD FRML MDRD: >90 ML/MIN/{1.73_M2}
GLUCOSE SERPL-MCNC: 85 MG/DL (ref 70–99)
POTASSIUM SERPL-SCNC: 4.2 MMOL/L (ref 3.4–5.3)
SODIUM SERPL-SCNC: 138 MMOL/L (ref 133–144)

## 2019-05-20 PROCEDURE — 80048 BASIC METABOLIC PNL TOTAL CA: CPT | Performed by: PHYSICIAN ASSISTANT

## 2019-05-20 PROCEDURE — 99214 OFFICE O/P EST MOD 30 MIN: CPT | Performed by: PHYSICIAN ASSISTANT

## 2019-05-20 PROCEDURE — 36415 COLL VENOUS BLD VENIPUNCTURE: CPT | Performed by: PHYSICIAN ASSISTANT

## 2019-05-20 RX ORDER — ALENDRONATE SODIUM 70 MG/1
70 TABLET ORAL
COMMUNITY

## 2019-05-20 RX ORDER — LISINOPRIL 10 MG/1
10 TABLET ORAL 2 TIMES DAILY
Qty: 180 TABLET | Refills: 3 | Status: SHIPPED | OUTPATIENT
Start: 2019-05-20 | End: 2019-07-24

## 2019-05-20 ASSESSMENT — MIFFLIN-ST. JEOR: SCORE: 1125.59

## 2019-05-20 NOTE — LETTER
5/20/2019    Brook Echavarria MD  [address] [facility] [address]    RE: Annabella Bolanos       Dear Colleague,    I had the pleasure of seeing Annabella Bolanos in the [facility].    Please see separate dictation for HPI, PHYSICAL EXAM AND IMPRESSION/PLAN.    CURRENT MEDICATIONS:  Current Outpatient Medications   Medication Sig Dispense Refill     acetaminophen (TYLENOL) 325 MG tablet Take 1-2 tablets (325-650 mg) by mouth every 6 hours as needed for mild pain 250 tablet 11     alendronate (FOSAMAX) 70 MG tablet Take 70 mg by mouth every 7 days       anastrozole (ARIMIDEX) 1 MG tablet Take by mouth daily 30 tablet      ASPIRIN EC PO Take 81 mg by mouth daily       Calcium Carbonate (CALCIUM 500 PO) Take 600 mg by mouth daily        carvedilol (COREG) 12.5 MG tablet Take 1 tablet (12.5 mg) by mouth 2 times daily (with meals) 180 tablet 3     cholecalciferol (VITAMIN D3) 1000 UNIT tablet Take 1 tablet (1,000 Units) by mouth daily 30 tablet      lisinopril (PRINIVIL/ZESTRIL) 10 MG tablet Take 1 1/2 tabs (15 mg) by mouth daily 135 tablet 3     methimazole (TAPAZOLE) 5 MG tablet Take 2.5 mg every other day 45 tablet 0     valACYclovir (VALTREX) 1000 mg tablet Take 2 tablets (2,000 mg) by mouth 2 times daily 4 tablet 3     warfarin (JANTOVEN) 5 MG tablet Take one tablet (5 mg) daily except one and a half tablets (7.5 mg) on Wednesdays or as directed by the INR Clinic. 100 tablet 1     zolpidem (AMBIEN) 5 MG tablet Take 0.5 tablets (2.5 mg) by mouth nightly as needed for sleep 60 tablet 0       ALLERGIES:     Allergies   Allergen Reactions     Morphine Sulfate Nausea and Vomiting       PAST MEDICAL HISTORY:  Past Medical History:   Diagnosis Date     Adenomatous colon polyp 8/2015     Aortic stenosis 6/23/11    bicuspid AV with severe stenosis - 6/2011 mechanical AVR with 23 mm St Yordan AV conduit, composite graft placement     Arthritis     knees and hands     Bicuspid aortic  valve      Breast cancer (H) 2014    R breast     H/O aortic valve replacement     St Yordan     Heart murmur     Valve repalcement .     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    AAA repair with av23 mm tube graft     PONV (postoperative nausea and vomiting)     n/v morphine vs anesthesia, vomiting x24 hrs     Subclinical hyperthyroidism 2017     Thrombosis of leg     after  of daughter     Uncomplicated asthma        PAST SURGICAL HISTORY:  Past Surgical History:   Procedure Laterality Date     BIOPSY NODE SENTINEL Right 9/10/2014    Procedure: BIOPSY NODE SENTINEL;  Surgeon: Ila Romano MD;  Location: [facility] OR     CARDIAC SURGERY  2011    aortic valve replacement     ENT SURGERY      tonsillectomy     HRW PORT A CATH       INSERT PORT VASCULAR ACCESS Left 10/22/2014    Procedure: INSERT PORT VASCULAR ACCESS;  Surgeon: Ila Romano MD;  Location: [facility] OR     LUMPECTOMY BREAST WITH SEED LOCALIZATION Right 9/10/2014    Procedure: LUMPECTOMY BREAST WITH SEED LOCALIZATION;  Surgeon: Ila Romano MD;  Location: [facility] OR     ORTHOPEDIC SURGERY      shoulder, thumb surgery     REMOVE PORT VASCULAR ACCESS Left 2015    Procedure: REMOVE PORT VASCULAR ACCESS;  Surgeon: Ila Romano MD;  Location: [facility] OR     REPAIR ANEURYSM ASCENDING AORTA  2011    Procedure:REPAIR ANEURYSM ASCENDING AORTA; Medial Sternotomy, Aortic Valve Replacement, ([employer]. Yordan Medical Masters HP Valved Graft, size 23 mm) on pump oxygenation, intraoperative transesophageal cardiogram; Surgeon:JOHN PAUL ULLOA; Location:[facility]     REPLACE VALVE AORTIC  11    bicuspid AV with severe stenosis - 2011 mechanical AVR with 23 mm St Yordan AV conduit, composite graft placement       SOCIAL HISTORY:  Social History     Socioeconomic History     Marital status:  "Single     Spouse name: None     Number of children: None     Years of education: None     Highest education level: None   Occupational History     None   Social Needs     Financial resource strain: None     Food insecurity:     Worry: None     Inability: None     Transportation needs:     Medical: None     Non-medical: None   Tobacco Use     Smoking status: Never Smoker     Smokeless tobacco: Never Used   Substance and Sexual Activity     Alcohol use: Yes     Comment: 2 drinks per week -      Drug use: No     Sexual activity: Not Currently   Lifestyle     Physical activity:     Days per week: None     Minutes per session: None     Stress: None   Relationships     Social connections:     Talks on phone: None     Gets together: None     Attends Latter day service: None     Active member of club or organization: None     Attends meetings of clubs or organizations: None     Relationship status: None     Intimate partner violence:     Fear of current or ex partner: None     Emotionally abused: None     Physically abused: None     Forced sexual activity: None   Other Topics Concern     Parent/sibling w/ CABG, MI or angioplasty before 65F 55M? Not Asked      Service Not Asked     Blood Transfusions Not Asked     Caffeine Concern Yes     Comment: 1-2 cups caffeine per day     Occupational Exposure Not Asked     Hobby Hazards Not Asked     Sleep Concern No     Stress Concern No     Weight Concern No     Special Diet Yes     Comment: low fat daily , low red meats     Back Care No     Exercise Yes     Comment: walks daily/ 3x a week exercise class     Bike Helmet Not Asked     Seat Belt Not Asked     Self-Exams Not Asked   Social History Narrative     None       FAMILY HISTORY:  Family History   Problem Relation Age of Onset     Hypertension Mother      Thyroid Disease Mother         \"Toxic thyroid\"     Prostate Cancer Father 70     Cancer Father 80        Lung cancer, Not caused from smoking     Cerebrovascular " Disease Father 80     Hypertension Father      Cardiovascular Brother         half brother      Cardiovascular Son         Puentes-Parkinsons White Syndrome     Cardiovascular Daughter         Heart murmur     Breast Cancer Paternal Aunt 80     Cardiovascular Paternal Aunt      Arthritis Brother 40        RA - half brother      Cancer Maternal Grandfather      Colon Cancer No family hx of        Review of Systems:  Skin:  Negative       Eyes:  Positive for glasses    ENT:  Positive for hearing loss    Respiratory:  Negative       Cardiovascular:    dizziness;Positive for    Gastroenterology: Negative      Genitourinary:  Positive for nocturia    Musculoskeletal:  Positive for arthritis;joint pain;joint stiffness;foot pain    Neurologic:  Negative      Psychiatric:  Positive for sleep disturbances    Heme/Lymph/Imm:  Negative      Endocrine:  Negative         Reviewed. Remainder of the note dictated.    Tracie Parra PA-C    Service Date: 05/20/2019      HISTORY OF PRESENT ILLNESS:  Ms. Bolanos is a pleasant 63-year-old female who presents to the office today with concerns of labile blood pressure readings/hypertension.      Her cardiovascular history includes a congenital bicuspid aortic valve and an associated ascending aortic aneurysm.  She underwent mechanical aortic valve replacement with a 23 mm St. Yordan valve and placement of a 23 mm tube conduit graft with coronary reimplantation in 2011.  She did not require coronary artery bypass grafting.  She also has a history of breast cancer (2014) and did receive chemotherapy with Adriamycin and Cytoxan at that time.  She has followed closely with Dr. Guan over the past several years.  Her last echo was about 1 year ago.  Her EF was preserved.  She had mild to moderate tricuspid regurgitation.  The mechanical valve was working normally.  She was noted to have trace to mild aortic regurgitation.  This was similar as compared to 2016.  Her other pertinent medical  history includes hyperthyroidism.  She is followed by an endocrinologist and states that she is currently being very slowly weaned off of her methimazole.      She was recently in to see a physician for a non-cardiac reason and it was noted that her blood pressure was quite elevated.  Shortly after this, she experienced an episode of significant dizziness.  The patient states that she felt like she was intoxicated.  She did not a syncopal episode.  Due to this, she decided to follow her blood pressure a bit closer.  It was noted to be quite elevated with a systolic BP into the 190 range.  Interestingly, she was actually in to see her endocrinologist last week and her blood pressure was 109/88.  She has been following her blood pressure closely at home.  Typically in the morning and early afternoon her blood pressure is quite elevated with systolic blood pressures in the 150s up to 170s, then later in the afternoon her systolic blood pressure can dip as low as the 100s to 110s.  She has not had any recent changes to her antihypertensive therapy.  The only medication change recently is that she is weaning off of her methimazole.  She denies any recent changes to her diet.  She denies any new supplements.  The significant dizziness that she was experiencing last week resolved about 3 days ago and has not recurred.      CURRENT CARDIAC MEDICATIONS:   1.  Aspirin 81 mg daily.   2.  Carvedilol 12.5 mg b.i.d.   3.  Lisinopril 10 mg 1-1/2 tabs daily.   4.  Coumadin as directed.      The remainder of her medications, allergies and review of systems were reviewed and as are documented separately.      PHYSICAL EXAMINATION:   GENERAL:  The patient is a pleasant 63-year-old female who appears her stated age.  She is in no apparent distress.   VITAL SIGNS:  Her blood pressure is 154/94, pulse is 64, weight is 129 pounds which is stable.   PULMONARY:  Breathing is nonlabored and lungs are clear to auscultation.   CARDIAC:   Reveals a regular rate and rhythm.  She has a crisp mechanical click.   NEUROLOGIC:  Alert and oriented.      Her TSH was checked on the 13th of May.  This was 0.94.  Her free T3 and T4 were within normal limits.      ASSESSMENT AND PLAN:  Ms. Bolanos is a pleasant 63-year-old female with a history of bicuspid aortic valve and associated aortic aneurysm status post mechanical aortic valve replacement and conduit placement in  who presented with concerns over labile blood pressure/HTN.  I don't see any clear cause for this.  In reviewing the chart, it does appear that her blood pressure has been somewhat up and down previously.  For the time being, I suggested she change her lisinopril from 15 mg in the a.m. to 10 mg twice a day.  I discussed with her that the prior dizziness that she was experiencing may have been coincidental as her blood pressure is in the similar range now and she is not having symptoms.  I am going to send her to the lab to have a basic metabolic panel today.      I will plan to see her back in the Cardiology Clinic in about 2-3 weeks for a recheck on her blood pressure.  Of course, I encouraged her to contact me sooner with questions or concerns.         TRACIE ALMANZA PA-C             D: 2019   T: 2019   MT: ROSLYN      Name:     VAL BOLANOS   MRN:      [id number]        Account:      [id number]   :      1955           Service Date: 2019      Document: [id number]        Thank you for allowing me to participate in the care of your patient.      Sincerely,     Tracie Almanza PA-C     [facility]    cc:   No referring provider defined for this encounter.

## 2019-05-20 NOTE — PROGRESS NOTES
Service Date: 05/20/2019      HISTORY OF PRESENT ILLNESS:  Ms. Bolanos is a pleasant 63-year-old female who presents to the office today with concerns of labile blood pressure readings/hypertension.      Her cardiovascular history includes a congenital bicuspid aortic valve and an associated ascending aortic aneurysm.  She underwent mechanical aortic valve replacement with a 23 mm St. Yordan valve and placement of a 23 mm tube conduit graft with coronary reimplantation in 2011.  She did not require coronary artery bypass grafting.  She also has a history of breast cancer (2014) and did receive chemotherapy with Adriamycin and Cytoxan at that time.  She has followed closely with Dr. Guan over the past several years.  Her last echo was about 1 year ago.  Her EF was preserved.  She had mild to moderate tricuspid regurgitation.  The mechanical valve was working normally.  She was noted to have trace to mild aortic regurgitation.  This was similar as compared to 2016.  Her other pertinent medical history includes hyperthyroidism.  She is followed by an endocrinologist and states that she is currently being very slowly weaned off of her methimazole.      She was recently in to see a physician for a non-cardiac reason and it was noted that her blood pressure was quite elevated.  Shortly after this, she experienced an episode of significant dizziness.  The patient states that she felt like she was intoxicated.  She did not a syncopal episode.  Due to this, she decided to follow her blood pressure a bit closer.  It was noted to be quite elevated with a systolic BP into the 190 range.  Interestingly, she was actually in to see her endocrinologist last week and her blood pressure was 109/88.  She has been following her blood pressure closely at home.  Typically in the morning and early afternoon her blood pressure is quite elevated with systolic blood pressures in the 150s up to 170s, then later in the afternoon her systolic  blood pressure can dip as low as the 100s to 110s.  She has not had any recent changes to her antihypertensive therapy.  The only medication change recently is that she is weaning off of her methimazole.  She denies any recent changes to her diet.  She denies any new supplements.  The significant dizziness that she was experiencing last week resolved about 3 days ago and has not recurred.      CURRENT CARDIAC MEDICATIONS:   1.  Aspirin 81 mg daily.   2.  Carvedilol 12.5 mg b.i.d.   3.  Lisinopril 10 mg 1-1/2 tabs daily.   4.  Coumadin as directed.      The remainder of her medications, allergies and review of systems were reviewed and as are documented separately.      PHYSICAL EXAMINATION:   GENERAL:  The patient is a pleasant 63-year-old female who appears her stated age.  She is in no apparent distress.   VITAL SIGNS:  Her blood pressure is 154/94, pulse is 64, weight is 129 pounds which is stable.   PULMONARY:  Breathing is nonlabored and lungs are clear to auscultation.   CARDIAC:  Reveals a regular rate and rhythm.  She has a crisp mechanical click.   NEUROLOGIC:  Alert and oriented.      Her TSH was checked on the 13th of May.  This was 0.94.  Her free T3 and T4 were within normal limits.      ASSESSMENT AND PLAN:  Ms. Bolanos is a pleasant 63-year-old female with a history of bicuspid aortic valve and associated aortic aneurysm status post mechanical aortic valve replacement and conduit placement in 2011 who presented with concerns over labile blood pressure/HTN.  I don't see any clear cause for this.  In reviewing the chart, it does appear that her blood pressure has been somewhat up and down previously.  For the time being, I suggested she change her lisinopril from 15 mg in the a.m. to 10 mg twice a day.  I discussed with her that the prior dizziness that she was experiencing may have been coincidental as her blood pressure is in the similar range now and she is not having symptoms.  I am going to send her  to the lab to have a basic metabolic panel today.      I will plan to see her back in the Cardiology Clinic in about 2-3 weeks for a recheck on her blood pressure.  Of course, I encouraged her to contact me sooner with questions or concerns.         MICHOACANO ALMANZA PA-C             D: 2019   T: 2019   MT: ROSLYN      Name:     VAL AGEE   MRN:      [id number]        Account:      [id number]   :      1955           Service Date: 2019      Document: [id number]

## 2019-05-20 NOTE — PATIENT INSTRUCTIONS
Thank you for your [facility] visit today. Your provider has recommended the following:  Medication Changes:  Increase the lisinopril to 10mg twice daily  Recommendations:  Blood work today  Follow-up:  See Tracie BEDOYA for cardiology follow up in 2 weeks.    Reminder:  Please bring in your current medication list or your medication, over the counter supplements and vitamin bottles as we will review these at each office visit.

## 2019-05-20 NOTE — PROGRESS NOTES
Please see separate dictation for HPI, PHYSICAL EXAM AND IMPRESSION/PLAN.    CURRENT MEDICATIONS:  Current Outpatient Medications   Medication Sig Dispense Refill     acetaminophen (TYLENOL) 325 MG tablet Take 1-2 tablets (325-650 mg) by mouth every 6 hours as needed for mild pain 250 tablet 11     alendronate (FOSAMAX) 70 MG tablet Take 70 mg by mouth every 7 days       anastrozole (ARIMIDEX) 1 MG tablet Take by mouth daily 30 tablet      ASPIRIN EC PO Take 81 mg by mouth daily       Calcium Carbonate (CALCIUM 500 PO) Take 600 mg by mouth daily        carvedilol (COREG) 12.5 MG tablet Take 1 tablet (12.5 mg) by mouth 2 times daily (with meals) 180 tablet 3     cholecalciferol (VITAMIN D3) 1000 UNIT tablet Take 1 tablet (1,000 Units) by mouth daily 30 tablet      lisinopril (PRINIVIL/ZESTRIL) 10 MG tablet Take 1 1/2 tabs (15 mg) by mouth daily 135 tablet 3     methimazole (TAPAZOLE) 5 MG tablet Take 2.5 mg every other day 45 tablet 0     valACYclovir (VALTREX) 1000 mg tablet Take 2 tablets (2,000 mg) by mouth 2 times daily 4 tablet 3     warfarin (JANTOVEN) 5 MG tablet Take one tablet (5 mg) daily except one and a half tablets (7.5 mg) on Wednesdays or as directed by the INR Clinic. 100 tablet 1     zolpidem (AMBIEN) 5 MG tablet Take 0.5 tablets (2.5 mg) by mouth nightly as needed for sleep 60 tablet 0       ALLERGIES:     Allergies   Allergen Reactions     Morphine Sulfate Nausea and Vomiting       PAST MEDICAL HISTORY:  Past Medical History:   Diagnosis Date     Adenomatous colon polyp 8/2015     Aortic stenosis 6/23/11    bicuspid AV with severe stenosis - 6/2011 mechanical AVR with 23 mm St Yordan AV conduit, composite graft placement     Arthritis     knees and hands     Bicuspid aortic valve      Breast cancer (H) 7/2014    R breast     H/O aortic valve replacement     St Yordan     Heart murmur     Valve repalcement 2011.     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    AAA repair with av23 mm tube graft     PONV (postoperative nausea and vomiting)     n/v morphine vs anesthesia, vomiting x24 hrs     Subclinical hyperthyroidism 2017     Thrombosis of leg     after  of daughter     Uncomplicated asthma        PAST SURGICAL HISTORY:  Past Surgical History:   Procedure Laterality Date     BIOPSY NODE SENTINEL Right 9/10/2014    Procedure: BIOPSY NODE SENTINEL;  Surgeon: Ila Romano MD;  Location: [facility]     CARDIAC SURGERY  2011    aortic valve replacement     ENT SURGERY      tonsillectomy     HRW PORT A CATH       INSERT PORT VASCULAR ACCESS Left 10/22/2014    Procedure: INSERT PORT VASCULAR ACCESS;  Surgeon: Ila Romano MD;  Location: [facility]     LUMPECTOMY BREAST WITH SEED LOCALIZATION Right 9/10/2014    Procedure: LUMPECTOMY BREAST WITH SEED LOCALIZATION;  Surgeon: Ila Romano MD;  Location: [facility]     ORTHOPEDIC SURGERY      shoulder, thumb surgery     REMOVE PORT VASCULAR ACCESS Left 2015    Procedure: REMOVE PORT VASCULAR ACCESS;  Surgeon: Ila Romano MD;  Location: [facility] OR     REPAIR ANEURYSM ASCENDING AORTA  2011    Procedure:REPAIR ANEURYSM ASCENDING AORTA; Medial Sternotomy, Aortic Valve Replacement, ([employer]. Yordan Medical Masters HP Valved Graft, size 23 mm) on pump oxygenation, intraoperative transesophageal cardiogram; Surgeon:JOHN PAUL ULLOA; Location:[facility]     REPLACE VALVE AORTIC  11    bicuspid AV with severe stenosis - 2011 mechanical AVR with 23 mm St Yordan AV conduit, composite graft placement       SOCIAL HISTORY:  Social History     Socioeconomic History     Marital status: Single     Spouse name: None     Number of children: None     Years of education: None     Highest education level: None   Occupational History     None   Social Needs     Financial resource strain: None     Food insecurity:      "Worry: None     Inability: None     Transportation needs:     Medical: None     Non-medical: None   Tobacco Use     Smoking status: Never Smoker     Smokeless tobacco: Never Used   Substance and Sexual Activity     Alcohol use: Yes     Comment: 2 drinks per week -      Drug use: No     Sexual activity: Not Currently   Lifestyle     Physical activity:     Days per week: None     Minutes per session: None     Stress: None   Relationships     Social connections:     Talks on phone: None     Gets together: None     Attends [employer] service: None     Active member of club or organization: None     Attends meetings of clubs or organizations: None     Relationship status: None     Intimate partner violence:     Fear of current or ex partner: None     Emotionally abused: None     Physically abused: None     Forced sexual activity: None   Other Topics Concern     Parent/sibling w/ CABG, MI or angioplasty before 65F 55M? Not Asked      Service Not Asked     Blood Transfusions Not Asked     Caffeine Concern Yes     Comment: 1-2 cups caffeine per day     Occupational Exposure Not Asked     Hobby Hazards Not Asked     Sleep Concern No     Stress Concern No     Weight Concern No     Special Diet Yes     Comment: low fat daily , low red meats     Back Care No     Exercise Yes     Comment: walks daily/ 3x a week exercise class     Bike Helmet Not Asked     Seat Belt Not Asked     Self-Exams Not Asked   Social History Narrative     None       FAMILY HISTORY:  Family History   Problem Relation Age of Onset     Hypertension Mother      Thyroid Disease Mother         \"Toxic thyroid\"     Prostate Cancer Father 70     Cancer Father 80        Lung cancer, Not caused from smoking     Cerebrovascular Disease Father 80     Hypertension Father      Cardiovascular Brother         half brother      Cardiovascular Son         Puentes-Parkinsons White Syndrome     Cardiovascular Daughter         Heart murmur     Breast Cancer Paternal Aunt " 80     Cardiovascular Paternal Aunt      Arthritis Brother 40        RA - half brother      Cancer Maternal Grandfather      Colon Cancer No family hx of        Review of Systems:  Skin:  Negative       Eyes:  Positive for glasses    ENT:  Positive for hearing loss    Respiratory:  Negative       Cardiovascular:    dizziness;Positive for    Gastroenterology: Negative      Genitourinary:  Positive for nocturia    Musculoskeletal:  Positive for arthritis;joint pain;joint stiffness;foot pain    Neurologic:  Negative      Psychiatric:  Positive for sleep disturbances    Heme/Lymph/Imm:  Negative      Endocrine:  Negative         Reviewed. Remainder of the note dictated.    Tracie Parra PA-C

## 2019-05-20 NOTE — LETTER
5/20/2019      Brook Echavarria MD  [address] [facility] [address]      RE: Annabella Bolanos       Dear Colleague,    I had the pleasure of seeing Annabella Bolanos in the [facility].    Service Date: 05/20/2019      HISTORY OF PRESENT ILLNESS:  Ms. Bolanos is a pleasant 63-year-old female who presents to the office today with concerns of labile blood pressure readings/hypertension.      Her cardiovascular history includes a congenital bicuspid aortic valve and an associated ascending aortic aneurysm.  She underwent mechanical aortic valve replacement with a 23 mm St. Yordan valve and placement of a 23 mm tube conduit graft with coronary reimplantation in 2011.  She did not require coronary artery bypass grafting.  She also has a history of breast cancer (2014) and did receive chemotherapy with Adriamycin and Cytoxan at that time.  She has followed closely with Dr. Guan over the past several years.  Her last echo was about 1 year ago.  Her EF was preserved.  She had mild to moderate tricuspid regurgitation.  The mechanical valve was working normally.  She was noted to have trace to mild aortic regurgitation.  This was similar as compared to 2016.  Her other pertinent medical history includes hyperthyroidism.  She is followed by an endocrinologist and states that she is currently being very slowly weaned off of her methimazole.      She was recently in to see a physician for a non-cardiac reason and it was noted that her blood pressure was quite elevated.  Shortly after this, she experienced an episode of significant dizziness.  The patient states that she felt like she was intoxicated.  She did not a syncopal episode.  Due to this, she decided to follow her blood pressure a bit closer.  It was noted to be quite elevated with a systolic BP into the 190 range.  Interestingly, she was actually in to see her endocrinologist last week and her blood pressure was 109/88.  She has  been following her blood pressure closely at home.  Typically in the morning and early afternoon her blood pressure is quite elevated with systolic blood pressures in the 150s up to 170s, then later in the afternoon her systolic blood pressure can dip as low as the 100s to 110s.  She has not had any recent changes to her antihypertensive therapy.  The only medication change recently is that she is weaning off of her methimazole.  She denies any recent changes to her diet.  She denies any new supplements.  The significant dizziness that she was experiencing last week resolved about 3 days ago and has not recurred.      CURRENT CARDIAC MEDICATIONS:   1.  Aspirin 81 mg daily.   2.  Carvedilol 12.5 mg b.i.d.   3.  Lisinopril 10 mg 1-1/2 tabs daily.   4.  Coumadin as directed.      The remainder of her medications, allergies and review of systems were reviewed and as are documented separately.      PHYSICAL EXAMINATION:   GENERAL:  The patient is a pleasant 63-year-old female who appears her stated age.  She is in no apparent distress.   VITAL SIGNS:  Her blood pressure is 154/94, pulse is 64, weight is 129 pounds which is stable.   PULMONARY:  Breathing is nonlabored and lungs are clear to auscultation.   CARDIAC:  Reveals a regular rate and rhythm.  She has a crisp mechanical click.   NEUROLOGIC:  Alert and oriented.      Her TSH was checked on the 13th of May.  This was 0.94.  Her free T3 and T4 were within normal limits.      ASSESSMENT AND PLAN:  Ms. Bolanos is a pleasant 63-year-old female with a history of bicuspid aortic valve and associated aortic aneurysm status post mechanical aortic valve replacement and conduit placement in 2011 who presented with concerns over labile blood pressure/HTN.  I don't see any clear cause for this.  In reviewing the chart, it does appear that her blood pressure has been somewhat up and down previously.  For the time being, I suggested she change her lisinopril from 15 mg in the a.m.  to 10 mg twice a day.  I discussed with her that the prior dizziness that she was experiencing may have been coincidental as her blood pressure is in the similar range now and she is not having symptoms.  I am going to send her to the lab to have a basic metabolic panel today.      I will plan to see her back in the Cardiology Clinic in about 2-3 weeks for a recheck on her blood pressure.  Of course, I encouraged her to contact me sooner with questions or concerns.         MICHOACANO ALMANZA PA-C             D: 2019   T: 2019   MT: ROSLYN      Name:     VAL AGEE   MRN:      [id number]        Account:      [id number]   :      1955           Service Date: 2019      Document: [id number]           Outpatient Encounter Medications as of 2019   Medication Sig Dispense Refill     acetaminophen (TYLENOL) 325 MG tablet Take 1-2 tablets (325-650 mg) by mouth every 6 hours as needed for mild pain 250 tablet 11     alendronate (FOSAMAX) 70 MG tablet Take 70 mg by mouth every 7 days       anastrozole (ARIMIDEX) 1 MG tablet Take by mouth daily 30 tablet      ASPIRIN EC PO Take 81 mg by mouth daily       Calcium Carbonate (CALCIUM 500 PO) Take 600 mg by mouth daily        carvedilol (COREG) 12.5 MG tablet Take 1 tablet (12.5 mg) by mouth 2 times daily (with meals) 180 tablet 3     cholecalciferol (VITAMIN D3) 1000 UNIT tablet Take 1 tablet (1,000 Units) by mouth daily 30 tablet      lisinopril (PRINIVIL/ZESTRIL) 10 MG tablet Take 1 tablet (10 mg) by mouth 2 times daily Increased dose. Hold on file. 180 tablet 3     methimazole (TAPAZOLE) 5 MG tablet Take 2.5 mg every other day 45 tablet 0     valACYclovir (VALTREX) 1000 mg tablet Take 2 tablets (2,000 mg) by mouth 2 times daily 4 tablet 3     warfarin (JANTOVEN) 5 MG tablet Take one tablet (5 mg) daily except one and a half tablets (7.5 mg) on  or as directed by the INR Clinic. 100 tablet 1     zolpidem (AMBIEN) 5 MG tablet Take 0.5  tablets (2.5 mg) by mouth nightly as needed for sleep 60 tablet 0     [DISCONTINUED] lisinopril (PRINIVIL/ZESTRIL) 10 MG tablet Take 1 1/2 tabs (15 mg) by mouth daily 135 tablet 3     No facility-administered encounter medications on file as of 5/20/2019.      Again, thank you for allowing me to participate in the care of your patient.      Sincerely,    Tracie Parra PA-C     [facility]

## 2019-05-22 ENCOUNTER — TELEPHONE (OUTPATIENT)
Dept: ENDOCRINOLOGY | Facility: CLINIC | Age: 64
End: 2019-05-22

## 2019-05-22 NOTE — TELEPHONE ENCOUNTER
"Received fax from pharmacy asking for direction clarification on Methimazole 5mg tabs.    States previous directions were 1/2 tab daily but received prescription on 5-16-19 with directions take 2.5mg every other day.    Per last office visit dictation 5-16-19 from Dr. Baxter:    \"Currently taking methimazole 2.5 mg/day.  She is on this dose since September 2018.  F/u labs WNL.  Plan:  Decrease methimazole to 2.5 mg on alternate days (5/16/2019)  Labs in 6 weeks.\"    Spoke with Miles at pharmacy and verified directions should state take 2.5mg every other day.  "

## 2019-06-06 ENCOUNTER — OFFICE VISIT (OUTPATIENT)
Dept: CARDIOLOGY | Facility: CLINIC | Age: 64
End: 2019-06-06
Attending: PHYSICIAN ASSISTANT
Payer: COMMERCIAL

## 2019-06-06 VITALS
WEIGHT: 128.1 LBS | HEIGHT: 64 IN | HEART RATE: 60 BPM | SYSTOLIC BLOOD PRESSURE: 150 MMHG | OXYGEN SATURATION: 98 % | DIASTOLIC BLOOD PRESSURE: 86 MMHG | BODY MASS INDEX: 21.87 KG/M2

## 2019-06-06 DIAGNOSIS — Z95.2 HISTORY OF MECHANICAL AORTIC VALVE REPLACEMENT: ICD-10-CM

## 2019-06-06 DIAGNOSIS — I10 ESSENTIAL HYPERTENSION, BENIGN: Primary | ICD-10-CM

## 2019-06-06 PROCEDURE — 99214 OFFICE O/P EST MOD 30 MIN: CPT | Performed by: PHYSICIAN ASSISTANT

## 2019-06-06 ASSESSMENT — MIFFLIN-ST. JEOR: SCORE: 1121.31

## 2019-06-06 NOTE — PROGRESS NOTES
"Service Date: 06/06/2019      HISTORY OF PRESENT ILLNESS:  Ms. Bolanos is a pleasant 63-year-old female who presents to the office today for followup on her blood pressure.  She presented to my office just over 2 weeks ago with concerns of labile blood pressure readings and hypertension.      Her cardiovascular history includes congenital bicuspid aortic valve along with the associated ascending aortic aneurysm.  She underwent mechanical aortic valve replacement with a 23 mm St. Yordan valve and placement of a 23 mm tube conduit with coronary artery reimplantation in 2011.  She did not require bypass grafting at that time.  She also has a history of breast cancer which was first diagnosed in 2014.  She did receive chemotherapy with Adriamycin and Cytoxan at that time.  Again, she follows closely with Dr. Guan.  She also has a history of hyperthyroidism and as I mentioned in my last note her thyroid levels have been normal and her endocrinologist has been slowly weaning her off of her methimazole.      Again, she was recently in to see a physician for a noncardiac reason and it was noted that her blood pressure was quite elevated.  A few days after this, she started experiencing an episode of significant dizziness which she describes as feeling \"intoxicated.\"  She did not have a syncopal episode.  Due to this, she decided to follow her blood pressure closely and she was noted to have elevated systolic readings into the 190 range.  The dizziness resolved on its own within a couple of days.  She did have followup with her endocrinologist and her blood pressure was noted to be low normal at that time.  Due to concerns about the labile blood pressure and the hypertension, she set up an office visit with me.  At that time, she had brought in a log of her blood pressures.  Her a.m. blood pressures were typically in the 150s to 170s, then her afternoon blood pressures were in the 100s to 110s.  At that office visit, I changed " her from 15 mg of lisinopril in the a.m. to 10 mg twice a day.  I asked her to follow up in the office today.      Since her last office visit, she states that overall she has been feeling great.  She has not had any recurrent symptoms similar to what she had been experiencing with the dizziness.  She has continued to follow her blood pressure closely.  Her a.m. readings still are somewhat elevated in the 140s to 150s.  Her afternoon/evening blood pressures continue to be in the 100s to 110s.  Occasionally, she has dipped as low as the 80s systolically.  At that time she did feel a bit of lightheadedness.  She denies any chest discomfort, orthopnea or PND.  She is not having any lower extremity edema.  Again, she has not had any other changes in her health or medications aside from the weaning off of the methimazole as detailed above.      CURRENT CARDIAC MEDICATIONS:   1.  Aspirin 81 mg daily.   2.  Carvedilol 12.5 mg b.i.d.   3.  Lisinopril 10 mg b.i.d.   4.  Coumadin as directed.      The remainder of her medications, allergies and review of systems were reviewed and as are documented separately.      PHYSICAL EXAMINATION:   GENERAL:  The patient is a pleasant 63-year-old female who appears her stated age.  She is in no apparent distress.   VITAL SIGNS:  Her blood pressure is 150/86, pulse is 60, weight is 128 pounds which is stable.     PULMONARY:  Her breathing is nonlabored and lungs are clear to auscultation.   CARDIAC:  Reveals a regular rate and rhythm.  She has a mechanical click.  She does have a 2/6 systolic murmur.   EXTREMITIES:  Lower extremities show no evidence of edema.   NEUROLOGIC:  Alert and oriented.      Again, we recently rechecked a basic metabolic panel and this was within normal limits.      ASSESSMENT AND PLAN:  Ms. Bolanos is a pleasant 63-year-old female with a history of a bicuspid aortic valve and associated aortic aneurysm, status post mechanical aortic valve replacement and conduit  placement in , who also has a history of breast cancer with chemotherapy treatment who recently presented with concerns for labile blood pressure/hypertension.  With the adjustment in her lisinopril,  she is not having as high of readings.  Unfortunately, she continues to have somewhat elevated a.m. readings, but later in the day, her blood pressure is actually quite low.  We talked about potentially increasing her evening dose of lisinopril a bit to see if we could get better a.m. coverage, but I am a bit concerned with the lower readings she is having later in the day.  For the time being, we have decided that we will just continue her medication as is.  She will continue to monitor her blood pressure, both in the a.m. and the p.m. occasionally.  She is already scheduled for followup with Dr. Guan in approximately 6 weeks.  She can reassess how her blood pressures are doing at that time.  Of course, I encouraged her to contact us sooner with any questions or concerns.         MICHOACANO ALMANZA PA-C             D: 2019   T: 2019   MT: ROSLYN      Name:     VAL AGEE   MRN:      [id number]        Account:      [id number]   :      1955           Service Date: 2019      Document: [id number]

## 2019-06-06 NOTE — LETTER
"6/6/2019      Brook Echavarria MD  [address] [facility] [address]      RE: Annabella Bolanos       Dear Colleague,    I had the pleasure of seeing Annabella Bolanos in the [facility].    Service Date: 06/06/2019      HISTORY OF PRESENT ILLNESS:  Ms. Bolanos is a pleasant 63-year-old female who presents to the office today for followup on her blood pressure.  She presented to my office just over 2 weeks ago with concerns of labile blood pressure readings and hypertension.      Her cardiovascular history includes congenital bicuspid aortic valve along with the associated ascending aortic aneurysm.  She underwent mechanical aortic valve replacement with a 23 mm St. Yordan valve and placement of a 23 mm tube conduit with coronary artery reimplantation in 2011.  She did not require bypass grafting at that time.  She also has a history of breast cancer which was first diagnosed in 2014.  She did receive chemotherapy with Adriamycin and Cytoxan at that time.  Again, she follows closely with Dr. Guan.  She also has a history of hyperthyroidism and as I mentioned in my last note her thyroid levels have been normal and her endocrinologist has been slowly weaning her off of her methimazole.      Again, she was recently in to see a physician for a noncardiac reason and it was noted that her blood pressure was quite elevated.  A few days after this, she started experiencing an episode of significant dizziness which she describes as feeling \"intoxicated.\"  She did not have a syncopal episode.  Due to this, she decided to follow her blood pressure closely and she was noted to have elevated systolic readings into the 190 range.  The dizziness resolved on its own within a couple of days.  She did have followup with her endocrinologist and her blood pressure was noted to be low normal at that time.  Due to concerns about the labile blood pressure and the hypertension, she set up an office " visit with me.  At that time, she had brought in a log of her blood pressures.  Her a.m. blood pressures were typically in the 150s to 170s, then her afternoon blood pressures were in the 100s to 110s.  At that office visit, I changed her from 15 mg of lisinopril in the a.m. to 10 mg twice a day.  I asked her to follow up in the office today.      Since her last office visit, she states that overall she has been feeling great.  She has not had any recurrent symptoms similar to what she had been experiencing with the dizziness.  She has continued to follow her blood pressure closely.  Her a.m. readings still are somewhat elevated in the 140s to 150s.  Her afternoon/evening blood pressures continue to be in the 100s to 110s.  Occasionally, she has dipped as low as the 80s systolically.  At that time she did feel a bit of lightheadedness.  She denies any chest discomfort, orthopnea or PND.  She is not having any lower extremity edema.  Again, she has not had any other changes in her health or medications aside from the weaning off of the methimazole as detailed above.      CURRENT CARDIAC MEDICATIONS:   1.  Aspirin 81 mg daily.   2.  Carvedilol 12.5 mg b.i.d.   3.  Lisinopril 10 mg b.i.d.   4.  Coumadin as directed.      The remainder of her medications, allergies and review of systems were reviewed and as are documented separately.      PHYSICAL EXAMINATION:   GENERAL:  The patient is a pleasant 63-year-old female who appears her stated age.  She is in no apparent distress.   VITAL SIGNS:  Her blood pressure is 150/86, pulse is 60, weight is 128 pounds which is stable.     PULMONARY:  Her breathing is nonlabored and lungs are clear to auscultation.   CARDIAC:  Reveals a regular rate and rhythm.  She has a mechanical click.  She does have a 2/6 systolic murmur.   EXTREMITIES:  Lower extremities show no evidence of edema.   NEUROLOGIC:  Alert and oriented.      Again, we recently rechecked a basic metabolic panel and  this was within normal limits.      ASSESSMENT AND PLAN:  Ms. Bolanos is a pleasant 63-year-old female with a history of a bicuspid aortic valve and associated aortic aneurysm, status post mechanical aortic valve replacement and conduit placement in , who also has a history of breast cancer with chemotherapy treatment who recently presented with concerns for labile blood pressure/hypertension.  With the adjustment in her lisinopril,  she is not having as high of readings.  Unfortunately, she continues to have somewhat elevated a.m. readings, but later in the day, her blood pressure is actually quite low.  We talked about potentially increasing her evening dose of lisinopril a bit to see if we could get better a.m. coverage, but I am a bit concerned with the lower readings she is having later in the day.  For the time being, we have decided that we will just continue her medication as is.  She will continue to monitor her blood pressure, both in the a.m. and the p.m. occasionally.  She is already scheduled for followup with Dr. Guan in approximately 6 weeks.  She can reassess how her blood pressures are doing at that time.  Of course, I encouraged her to contact us sooner with any questions or concerns.         MICHOACANO ALMANZA PA-C             D: 2019   T: 2019   MT: ROSLYN      Name:     VAL BOLANOS   MRN:      [id number]        Account:      [id number]   :      1955           Service Date: 2019      Document: [id number]         Outpatient Encounter Medications as of 2019   Medication Sig Dispense Refill     acetaminophen (TYLENOL) 325 MG tablet Take 1-2 tablets (325-650 mg) by mouth every 6 hours as needed for mild pain 250 tablet 11     alendronate (FOSAMAX) 70 MG tablet Take 70 mg by mouth every 7 days       anastrozole (ARIMIDEX) 1 MG tablet Take by mouth daily 30 tablet      ASPIRIN EC PO Take 81 mg by mouth daily       Calcium Carbonate (CALCIUM 500 PO) Take 600 mg by  mouth daily        carvedilol (COREG) 12.5 MG tablet Take 1 tablet (12.5 mg) by mouth 2 times daily (with meals) 180 tablet 3     cholecalciferol (VITAMIN D3) 1000 UNIT tablet Take 1 tablet (1,000 Units) by mouth daily 30 tablet      lisinopril (PRINIVIL/ZESTRIL) 10 MG tablet Take 1 tablet (10 mg) by mouth 2 times daily Increased dose. Hold on file. 180 tablet 3     methimazole (TAPAZOLE) 5 MG tablet Take 2.5 mg every other day 45 tablet 0     warfarin (JANTOVEN) 5 MG tablet Take one tablet (5 mg) daily except one and a half tablets (7.5 mg) on Wednesdays or as directed by the INR Clinic. 100 tablet 1     valACYclovir (VALTREX) 1000 mg tablet Take 2 tablets (2,000 mg) by mouth 2 times daily (Patient not taking: Reported on 6/6/2019) 4 tablet 3     zolpidem (AMBIEN) 5 MG tablet Take 0.5 tablets (2.5 mg) by mouth nightly as needed for sleep 60 tablet 0     No facility-administered encounter medications on file as of 6/6/2019.              Again, thank you for allowing me to participate in the care of your patient.      Sincerely,    Tracie Parra PA-C     [facility]

## 2019-06-06 NOTE — LETTER
6/6/2019    Brook Ecahvarria MD  [address] [facility] [address]    RE: Annabella Bolanos       Dear Colleague,    I had the pleasure of seeing Annabella Bolanos in the [facility].    Please see separate dictation for HPI, PHYSICAL EXAM AND IMPRESSION/PLAN.    CURRENT MEDICATIONS:  Current Outpatient Medications   Medication Sig Dispense Refill     acetaminophen (TYLENOL) 325 MG tablet Take 1-2 tablets (325-650 mg) by mouth every 6 hours as needed for mild pain 250 tablet 11     alendronate (FOSAMAX) 70 MG tablet Take 70 mg by mouth every 7 days       anastrozole (ARIMIDEX) 1 MG tablet Take by mouth daily 30 tablet      ASPIRIN EC PO Take 81 mg by mouth daily       Calcium Carbonate (CALCIUM 500 PO) Take 600 mg by mouth daily        carvedilol (COREG) 12.5 MG tablet Take 1 tablet (12.5 mg) by mouth 2 times daily (with meals) 180 tablet 3     cholecalciferol (VITAMIN D3) 1000 UNIT tablet Take 1 tablet (1,000 Units) by mouth daily 30 tablet      lisinopril (PRINIVIL/ZESTRIL) 10 MG tablet Take 1 tablet (10 mg) by mouth 2 times daily Increased dose. Hold on file. 180 tablet 3     methimazole (TAPAZOLE) 5 MG tablet Take 2.5 mg every other day 45 tablet 0     warfarin (JANTOVEN) 5 MG tablet Take one tablet (5 mg) daily except one and a half tablets (7.5 mg) on Wednesdays or as directed by the INR Clinic. 100 tablet 1     valACYclovir (VALTREX) 1000 mg tablet Take 2 tablets (2,000 mg) by mouth 2 times daily (Patient not taking: Reported on 6/6/2019) 4 tablet 3     zolpidem (AMBIEN) 5 MG tablet Take 0.5 tablets (2.5 mg) by mouth nightly as needed for sleep 60 tablet 0       ALLERGIES:     Allergies   Allergen Reactions     Morphine Sulfate Nausea and Vomiting       PAST MEDICAL HISTORY:  Past Medical History:   Diagnosis Date     Adenomatous colon polyp 8/2015     Aortic stenosis 6/23/11    bicuspid AV with severe stenosis - 6/2011 mechanical AVR with 23 mm St Yordan AV conduit,  composite graft placement     Arthritis     knees and hands     Bicuspid aortic valve      Breast cancer (H) 2014    R breast     H/O aortic valve replacement     St Yordan     Heart murmur     Valve repalcement .     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    AAA repair with av23 mm tube graft     PONV (postoperative nausea and vomiting)     n/v morphine vs anesthesia, vomiting x24 hrs     Subclinical hyperthyroidism 2017     Thrombosis of leg     after  of daughter     Uncomplicated asthma        PAST SURGICAL HISTORY:  Past Surgical History:   Procedure Laterality Date     BIOPSY NODE SENTINEL Right 9/10/2014    Procedure: BIOPSY NODE SENTINEL;  Surgeon: Ila Romano MD;  Location: [facility] OR     CARDIAC SURGERY  2011    aortic valve replacement     ENT SURGERY      tonsillectomy     HRW PORT A CATH       INSERT PORT VASCULAR ACCESS Left 10/22/2014    Procedure: INSERT PORT VASCULAR ACCESS;  Surgeon: Ila Romano MD;  Location: [facility] OR     LUMPECTOMY BREAST WITH SEED LOCALIZATION Right 9/10/2014    Procedure: LUMPECTOMY BREAST WITH SEED LOCALIZATION;  Surgeon: Ila Romano MD;  Location: [facility] OR     ORTHOPEDIC SURGERY      shoulder, thumb surgery     REMOVE PORT VASCULAR ACCESS Left 2015    Procedure: REMOVE PORT VASCULAR ACCESS;  Surgeon: Ila Romano MD;  Location: [facility] OR     REPAIR ANEURYSM ASCENDING AORTA  2011    Procedure:REPAIR ANEURYSM ASCENDING AORTA; Medial Sternotomy, Aortic Valve Replacement, ([employer] Y[employer] Medical Masters HP Valved Graft, size 23 mm) on pump oxygenation, intraoperative transesophageal cardiogram; Surgeon:JOHN PAUL ULLOA; Location:[facility]     REPLACE VALVE AORTIC  11    bicuspid AV with severe stenosis - 2011 mechanical AVR with 23 mm St Yordan AV conduit, composite graft placement  "      SOCIAL HISTORY:  Social History     Socioeconomic History     Marital status: Single     Spouse name: None     Number of children: None     Years of education: None     Highest education level: None   Occupational History     None   Social Needs     Financial resource strain: None     Food insecurity:     Worry: None     Inability: None     Transportation needs:     Medical: None     Non-medical: None   Tobacco Use     Smoking status: Never Smoker     Smokeless tobacco: Never Used   Substance and Sexual Activity     Alcohol use: Yes     Comment: 2 drinks per week -      Drug use: No     Sexual activity: Not Currently   Lifestyle     Physical activity:     Days per week: None     Minutes per session: None     Stress: None   Relationships     Social connections:     Talks on phone: None     Gets together: None     Attends [employer] service: None     Active member of club or organization: None     Attends meetings of clubs or organizations: None     Relationship status: None     Intimate partner violence:     Fear of current or ex partner: None     Emotionally abused: None     Physically abused: None     Forced sexual activity: None   Other Topics Concern     Parent/sibling w/ CABG, MI or angioplasty before 65F 55M? Not Asked      Service Not Asked     Blood Transfusions Not Asked     Caffeine Concern Yes     Comment: 1-2 cups caffeine per day     Occupational Exposure Not Asked     Hobby Hazards Not Asked     Sleep Concern No     Stress Concern No     Weight Concern No     Special Diet Yes     Comment: low fat daily , low red meats     Back Care No     Exercise Yes     Comment: walks daily/ 3x a week exercise class     Bike Helmet Not Asked     Seat Belt Not Asked     Self-Exams Not Asked   Social History Narrative     None       FAMILY HISTORY:  Family History   Problem Relation Age of Onset     Hypertension Mother      Thyroid Disease Mother         \"Toxic thyroid\"     Prostate Cancer Father 70     " Cancer Father 80        Lung cancer, Not caused from smoking     Cerebrovascular Disease Father 80     Hypertension Father      Cardiovascular Brother         half brother      Cardiovascular Son         Puentes-Parkinsons White Syndrome     Cardiovascular Daughter         Heart murmur     Breast Cancer Paternal Aunt 80     Cardiovascular Paternal Aunt      Arthritis Brother 40        RA - half brother      Cancer Maternal Grandfather      Colon Cancer No family hx of        Review of Systems:  Skin:  Negative       Eyes:  Positive for glasses    ENT:  Negative      Respiratory:  Negative       Cardiovascular:  Negative for;palpitations;edema;chest pain;fatigue lightheadedness;Positive for    Gastroenterology: Negative      Genitourinary:  Negative      Musculoskeletal:  Negative      Neurologic:  Positive for headaches occsional   Psychiatric:  Negative      Heme/Lymph/Imm:  Negative      Endocrine:  Negative         Reviewed. Remainder of the note dictated.    Tracie Parra PA-C      Service Date: 06/06/2019      HISTORY OF PRESENT ILLNESS:  Ms. Bolanos is a pleasant 63-year-old female who presents to the office today for followup on her blood pressure.  She presented to my office just over 2 weeks ago with concerns of labile blood pressure readings and hypertension.      Her cardiovascular history includes congenital bicuspid aortic valve along with the associated ascending aortic aneurysm.  She underwent mechanical aortic valve replacement with a 23 mm St. Yordan valve and placement of a 23 mm tube conduit with coronary artery reimplantation in 2011.  She did not require bypass grafting at that time.  She also has a history of breast cancer which was first diagnosed in 2014.  She did receive chemotherapy with Adriamycin and Cytoxan at that time.  Again, she follows closely with Dr. Guan.  She also has a history of hyperthyroidism and as I mentioned in my last note her thyroid levels have been normal and her  "endocrinologist has been slowly weaning her off of her methimazole.      Again, she was recently in to see a physician for a noncardiac reason and it was noted that her blood pressure was quite elevated.  A few days after this, she started experiencing an episode of significant dizziness which she describes as feeling \"intoxicated.\"  She did not have a syncopal episode.  Due to this, she decided to follow her blood pressure closely and she was noted to have elevated systolic readings into the 190 range.  The dizziness resolved on its own within a couple of days.  She did have followup with her endocrinologist and her blood pressure was noted to be low normal at that time.  Due to concerns about the labile blood pressure and the hypertension, she set up an office visit with me.  At that time, she had brought in a log of her blood pressures.  Her a.m. blood pressures were typically in the 150s to 170s, then her afternoon blood pressures were in the 100s to 110s.  At that office visit, I changed her from 15 mg of lisinopril in the a.m. to 10 mg twice a day.  I asked her to follow up in the office today.      Since her last office visit, she states that overall she has been feeling great.  She has not had any recurrent symptoms similar to what she had been experiencing with the dizziness.  She has continued to follow her blood pressure closely.  Her a.m. readings still are somewhat elevated in the 140s to 150s.  Her afternoon/evening blood pressures continue to be in the 100s to 110s.  Occasionally, she has dipped as low as the 80s systolically.  At that time she did feel a bit of lightheadedness.  She denies any chest discomfort, orthopnea or PND.  She is not having any lower extremity edema.  Again, she has not had any other changes in her health or medications aside from the weaning off of the methimazole as detailed above.      CURRENT CARDIAC MEDICATIONS:   1.  Aspirin 81 mg daily.   2.  Carvedilol 12.5 mg b.i.d. "   3.  Lisinopril 10 mg b.i.d.   4.  Coumadin as directed.      The remainder of her medications, allergies and review of systems were reviewed and as are documented separately.      PHYSICAL EXAMINATION:   GENERAL:  The patient is a pleasant 63-year-old female who appears her stated age.  She is in no apparent distress.   VITAL SIGNS:  Her blood pressure is 150/86, pulse is 60, weight is 128 pounds which is stable.     PULMONARY:  Her breathing is nonlabored and lungs are clear to auscultation.   CARDIAC:  Reveals a regular rate and rhythm.  She has a mechanical click.  She does have a 2/6 systolic murmur.   EXTREMITIES:  Lower extremities show no evidence of edema.   NEUROLOGIC:  Alert and oriented.      Again, we recently rechecked a basic metabolic panel and this was within normal limits.      ASSESSMENT AND PLAN:  Ms. Bolanos is a pleasant 63-year-old female with a history of a bicuspid aortic valve and associated aortic aneurysm, status post mechanical aortic valve replacement and conduit placement in 2011, who also has a history of breast cancer with chemotherapy treatment who recently presented with concerns for labile blood pressure/hypertension.  With the adjustment in her lisinopril,  she is not having as high of readings.  Unfortunately, she continues to have somewhat elevated a.m. readings, but later in the day, her blood pressure is actually quite low.  We talked about potentially increasing her evening dose of lisinopril a bit to see if we could get better a.m. coverage, but I am a bit concerned with the lower readings she is having later in the day.  For the time being, we have decided that we will just continue her medication as is.  She will continue to monitor her blood pressure, both in the a.m. and the p.m. occasionally.  She is already scheduled for followup with Dr. Guan in approximately 6 weeks.  She can reassess how her blood pressures are doing at that time.  Of course, I encouraged her to  contact us sooner with any questions or concerns.         TRACIE ALMANZA PA-C             D: 2019   T: 2019   MT: ROSLYN      Name:     VAL AGEE   MRN:      [id number]        Account:      [id number]   :      1955           Service Date: 2019      Document: [id number]       Thank you for allowing me to participate in the care of your patient.      Sincerely,     Tracie Almanza PA-C     [facility]    cc:   Tracie Almanza PA-C  [facility]  [address] [place] [address]

## 2019-06-06 NOTE — PROGRESS NOTES
Please see separate dictation for HPI, PHYSICAL EXAM AND IMPRESSION/PLAN.    CURRENT MEDICATIONS:  Current Outpatient Medications   Medication Sig Dispense Refill     acetaminophen (TYLENOL) 325 MG tablet Take 1-2 tablets (325-650 mg) by mouth every 6 hours as needed for mild pain 250 tablet 11     alendronate (FOSAMAX) 70 MG tablet Take 70 mg by mouth every 7 days       anastrozole (ARIMIDEX) 1 MG tablet Take by mouth daily 30 tablet      ASPIRIN EC PO Take 81 mg by mouth daily       Calcium Carbonate (CALCIUM 500 PO) Take 600 mg by mouth daily        carvedilol (COREG) 12.5 MG tablet Take 1 tablet (12.5 mg) by mouth 2 times daily (with meals) 180 tablet 3     cholecalciferol (VITAMIN D3) 1000 UNIT tablet Take 1 tablet (1,000 Units) by mouth daily 30 tablet      lisinopril (PRINIVIL/ZESTRIL) 10 MG tablet Take 1 tablet (10 mg) by mouth 2 times daily Increased dose. Hold on file. 180 tablet 3     methimazole (TAPAZOLE) 5 MG tablet Take 2.5 mg every other day 45 tablet 0     warfarin (JANTOVEN) 5 MG tablet Take one tablet (5 mg) daily except one and a half tablets (7.5 mg) on Wednesdays or as directed by the INR Clinic. 100 tablet 1     valACYclovir (VALTREX) 1000 mg tablet Take 2 tablets (2,000 mg) by mouth 2 times daily (Patient not taking: Reported on 6/6/2019) 4 tablet 3     zolpidem (AMBIEN) 5 MG tablet Take 0.5 tablets (2.5 mg) by mouth nightly as needed for sleep 60 tablet 0       ALLERGIES:     Allergies   Allergen Reactions     Morphine Sulfate Nausea and Vomiting       PAST MEDICAL HISTORY:  Past Medical History:   Diagnosis Date     Adenomatous colon polyp 8/2015     Aortic stenosis 6/23/11    bicuspid AV with severe stenosis - 6/2011 mechanical AVR with 23 mm St Yordan AV conduit, composite graft placement     Arthritis     knees and hands     Bicuspid aortic valve      Breast cancer (H) 7/2014    R breast     H/O aortic valve replacement     St Yordan     Heart murmur     Valve repalcement 2011.     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 11    AAA repair with av23 mm tube graft     PONV (postoperative nausea and vomiting)     n/v morphine vs anesthesia, vomiting x24 hrs     Subclinical hyperthyroidism 2017     Thrombosis of leg     after  of daughter     Uncomplicated asthma        PAST SURGICAL HISTORY:  Past Surgical History:   Procedure Laterality Date     BIOPSY NODE SENTINEL Right 9/10/2014    Procedure: BIOPSY NODE SENTINEL;  Surgeon: Ila Romano MD;  Location: [facility] OR     CARDIAC SURGERY  2011    aortic valve replacement     ENT SURGERY      tonsillectomy     HRW PORT A CATH       INSERT PORT VASCULAR ACCESS Left 10/22/2014    Procedure: INSERT PORT VASCULAR ACCESS;  Surgeon: Ila Romano MD;  Location: [facility] OR     LUMPECTOMY BREAST WITH SEED LOCALIZATION Right 9/10/2014    Procedure: LUMPECTOMY BREAST WITH SEED LOCALIZATION;  Surgeon: Ila Romano MD;  Location: [facility] OR     ORTHOPEDIC SURGERY      shoulder, thumb surgery     REMOVE PORT VASCULAR ACCESS Left 2015    Procedure: REMOVE PORT VASCULAR ACCESS;  Surgeon: Ila Romano MD;  Location: [facility] OR     REPAIR ANEURYSM ASCENDING AORTA  2011    Procedure:REPAIR ANEURYSM ASCENDING AORTA; Medial Sternotomy, Aortic Valve Replacement, ([employer]. Yordan Medical Masters HP Valved Graft, size 23 mm) on pump oxygenation, intraoperative transesophageal cardiogram; Surgeon:JOHN PAUL ULLOA; Location:[facility]     REPLACE VALVE AORTIC  11    bicuspid AV with severe stenosis - 2011 mechanical AVR with 23 mm St Yordan AV conduit, composite graft placement       SOCIAL HISTORY:  Social History     Socioeconomic History     Marital status: Single     Spouse name: None     Number of children: None     Years of education: None     Highest education level: None   Occupational History     None  "  Social Needs     Financial resource strain: None     Food insecurity:     Worry: None     Inability: None     Transportation needs:     Medical: None     Non-medical: None   Tobacco Use     Smoking status: Never Smoker     Smokeless tobacco: Never Used   Substance and Sexual Activity     Alcohol use: Yes     Comment: 2 drinks per week -      Drug use: No     Sexual activity: Not Currently   Lifestyle     Physical activity:     Days per week: None     Minutes per session: None     Stress: None   Relationships     Social connections:     Talks on phone: None     Gets together: None     Attends [employer] service: None     Active member of club or organization: None     Attends meetings of clubs or organizations: None     Relationship status: None     Intimate partner violence:     Fear of current or ex partner: None     Emotionally abused: None     Physically abused: None     Forced sexual activity: None   Other Topics Concern     Parent/sibling w/ CABG, MI or angioplasty before 65F 55M? Not Asked      Service Not Asked     Blood Transfusions Not Asked     Caffeine Concern Yes     Comment: 1-2 cups caffeine per day     Occupational Exposure Not Asked     Hobby Hazards Not Asked     Sleep Concern No     Stress Concern No     Weight Concern No     Special Diet Yes     Comment: low fat daily , low red meats     Back Care No     Exercise Yes     Comment: walks daily/ 3x a week exercise class     Bike Helmet Not Asked     Seat Belt Not Asked     Self-Exams Not Asked   Social History Narrative     None       FAMILY HISTORY:  Family History   Problem Relation Age of Onset     Hypertension Mother      Thyroid Disease Mother         \"Toxic thyroid\"     Prostate Cancer Father 70     Cancer Father 80        Lung cancer, Not caused from smoking     Cerebrovascular Disease Father 80     Hypertension Father      Cardiovascular Brother         half brother      Cardiovascular Son         Puentes-Parkinsons White Syndrome     " Cardiovascular Daughter         Heart murmur     Breast Cancer Paternal Aunt 80     Cardiovascular Paternal Aunt      Arthritis Brother 40        RA - half brother      Cancer Maternal Grandfather      Colon Cancer No family hx of        Review of Systems:  Skin:  Negative       Eyes:  Positive for glasses    ENT:  Negative      Respiratory:  Negative       Cardiovascular:  Negative for;palpitations;edema;chest pain;fatigue lightheadedness;Positive for    Gastroenterology: Negative      Genitourinary:  Negative      Musculoskeletal:  Negative      Neurologic:  Positive for headaches occsional   Psychiatric:  Negative      Heme/Lymph/Imm:  Negative      Endocrine:  Negative         Reviewed. Remainder of the note dictated.    Tracie Parra PA-C

## 2019-06-21 ENCOUNTER — ANTICOAGULATION THERAPY VISIT (OUTPATIENT)
Dept: ANTICOAGULATION | Facility: CLINIC | Age: 64
End: 2019-06-21
Payer: COMMERCIAL

## 2019-06-21 DIAGNOSIS — Z95.2 H/O AORTIC VALVE REPLACEMENT: ICD-10-CM

## 2019-06-21 DIAGNOSIS — Z79.01 LONG TERM CURRENT USE OF ANTICOAGULANT THERAPY: ICD-10-CM

## 2019-06-21 LAB — INR POINT OF CARE: 2.6 (ref 0.86–1.14)

## 2019-06-21 PROCEDURE — 36416 COLLJ CAPILLARY BLOOD SPEC: CPT

## 2019-06-21 PROCEDURE — 85610 PROTHROMBIN TIME: CPT | Mod: QW

## 2019-06-21 PROCEDURE — 99207 ZZC NO CHARGE NURSE ONLY: CPT

## 2019-06-21 NOTE — PROGRESS NOTES
ANTICOAGULATION FOLLOW-UP CLINIC VISIT    Patient Name:  Annabella Bolanos  Date:  2019  Contact Type:  Face to Face    SUBJECTIVE:  Patient Findings     Comments:   The patient was assessed for diet, medication, and activity level changes, missed or extra doses, bruising or bleeding, with no problem findings.  Patient plans to increase greens to lower INR.  Also plans to discuss goal range with cardiology at her visit in July to see if she should be 2.0-3.0 instead of 1.8-2.5.          Clinical Outcomes     Negatives:   Major bleeding event, Thromboembolic event, Anticoagulation-related hospital admission, Anticoagulation-related ED visit, Anticoagulation-related fatality    Comments:   The patient was assessed for diet, medication, and activity level changes, missed or extra doses, bruising or bleeding, with no problem findings.  Patient plans to increase greens to lower INR.  Also plans to discuss goal range with cardiology at her visit in July to see if she should be 2.0-3.0 instead of 1.8-2.5.             OBJECTIVE    INR Protime   Date Value Ref Range Status   2019 2.6 (A) 0.86 - 1.14 Final       ASSESSMENT / PLAN  INR assessment THER    Recheck INR In: 6 WEEKS    INR Location Clinic      Anticoagulation Summary  As of 2019    INR goal:   1.8-2.5   TTR:   78.3 % (3.1 y)   INR used for dosin.6! (2019)   Warfarin maintenance plan:   7.5 mg (5 mg x 1.5) every Wed, Sat; 5 mg (5 mg x 1) all other days   Full warfarin instructions:   7.5 mg every Wed, Sat; 5 mg all other days   Weekly warfarin total:   40 mg   No change documented:   Erica Stinson RN   Plan last modified:   Erica Stinson RN (2018)   Next INR check:   2019   Priority:   INR   Target end date:       Indications    Long-term (current) use of anticoagulants [Z79.01] [Z79.01]  H/O aortic valve replacement [Z95.2]             Anticoagulation Episode Summary     INR check location:       Preferred lab:       Send INR  reminders to:   FRANCISCO FALCON    Comments:         Anticoagulation Care Providers     Provider Role Specialty Phone number    Brook Echavarria MD [facility] Internal Medicine [phone]            See the Encounter Report to view Anticoagulation Flowsheet and Dosing Calendar (Go to Encounters tab in chart review, and find the Anticoagulation Therapy Visit)    Dosage adjustment made based on physician directed care plan.    Erica Stinson RN

## 2019-06-24 DIAGNOSIS — E05.90 SUBCLINICAL HYPERTHYROIDISM: ICD-10-CM

## 2019-06-24 NOTE — TELEPHONE ENCOUNTER
"Requested Prescriptions   Pending Prescriptions Disp Refills     methimazole (TAPAZOLE) 5 MG tablet 45 tablet 0     Sig: Take 2.5 mg every other day       Anti-Thyroid Agents Protocol Failed - 6/24/2019  9:33 AM        Failed - Refills for this medication group require provider approval        Passed - CBC is on file within the past 12 months     Recent Labs   Lab Test 05/13/19  0822  07/26/18  0909   WBC 4.1   < > 5.3   RBC  --   --  4.47   HGB  --   --  13.8   HCT  --   --  42.4   PLT  --   --  234    < > = values in this interval not displayed.                 Passed - Recent (12 mo) or future (30 days) visit within the authorizing provider's specialty     Patient had office visit in the last 12 months or has a visit in the next 30 days with authorizing provider or within the authorizing provider's specialty.  See \"Patient Info\" tab in inbasket, or \"Choose Columns\" in Meds & Orders section of the refill encounter.              Passed - Medication is active on the patient's med list        Passed - Patient is age 18 or older        Passed - Normal TSH in past 12 months     Recent Labs   Lab Test 05/13/19  0822   TSH 0.94              Passed - No active pregnancy on record        Passed - No positive pregnancy test in past 12 months          "

## 2019-06-25 RX ORDER — METHIMAZOLE 5 MG/1
TABLET ORAL
Qty: 45 TABLET | Refills: 0 | Status: SHIPPED | OUTPATIENT
Start: 2019-06-25 | End: 2019-06-27

## 2019-06-27 ENCOUNTER — TELEPHONE (OUTPATIENT)
Dept: ENDOCRINOLOGY | Facility: CLINIC | Age: 64
End: 2019-06-27

## 2019-06-27 DIAGNOSIS — I10 ESSENTIAL HYPERTENSION: ICD-10-CM

## 2019-06-27 DIAGNOSIS — E05.90 SUBCLINICAL HYPERTHYROIDISM: ICD-10-CM

## 2019-06-27 LAB
ALBUMIN SERPL-MCNC: 3.4 G/DL (ref 3.4–5)
ALP SERPL-CCNC: 66 U/L (ref 40–150)
ALT SERPL W P-5'-P-CCNC: 23 U/L (ref 0–50)
AST SERPL W P-5'-P-CCNC: 18 U/L (ref 0–45)
BILIRUB DIRECT SERPL-MCNC: 0.1 MG/DL (ref 0–0.2)
BILIRUB SERPL-MCNC: 0.6 MG/DL (ref 0.2–1.3)
PROT SERPL-MCNC: 6.6 G/DL (ref 6.8–8.8)
T3FREE SERPL-MCNC: 2.9 PG/ML (ref 2.3–4.2)
T4 FREE SERPL-MCNC: 1.2 NG/DL (ref 0.76–1.46)
TSH SERPL DL<=0.005 MIU/L-ACNC: 0.32 MU/L (ref 0.4–4)
WBC # BLD AUTO: 4.7 10E9/L (ref 4–11)

## 2019-06-27 PROCEDURE — 84439 ASSAY OF FREE THYROXINE: CPT | Performed by: INTERNAL MEDICINE

## 2019-06-27 PROCEDURE — 85048 AUTOMATED LEUKOCYTE COUNT: CPT | Performed by: INTERNAL MEDICINE

## 2019-06-27 PROCEDURE — 80076 HEPATIC FUNCTION PANEL: CPT | Performed by: INTERNAL MEDICINE

## 2019-06-27 PROCEDURE — 84481 FREE ASSAY (FT-3): CPT | Performed by: INTERNAL MEDICINE

## 2019-06-27 PROCEDURE — 84443 ASSAY THYROID STIM HORMONE: CPT | Performed by: INTERNAL MEDICINE

## 2019-06-27 PROCEDURE — 36415 COLL VENOUS BLD VENIPUNCTURE: CPT | Performed by: INTERNAL MEDICINE

## 2019-06-27 RX ORDER — CARVEDILOL 12.5 MG/1
12.5 TABLET ORAL 2 TIMES DAILY WITH MEALS
Qty: 180 TABLET | Refills: 3 | Status: SHIPPED | OUTPATIENT
Start: 2019-06-27 | End: 2019-07-24

## 2019-06-27 RX ORDER — METHIMAZOLE 5 MG/1
2.5 TABLET ORAL DAILY
Qty: 45 TABLET | Refills: 0
Start: 2019-06-27 | End: 2019-07-22

## 2019-06-27 NOTE — TELEPHONE ENCOUNTER
ENDO THYROID LABS-[facility] Latest Ref Rng & Units 6/27/2019 5/13/2019   TSH 0.40 - 4.00 mU/L 0.32 (L) 0.94   T4 FREE 0.76 - 1.46 ng/dL 1.20 1.05   FREE T3 2.3 - 4.2 pg/mL  2.7   LFT and white blood cell count appears stable  Last clinic visit May 2019  History of hypothyroidism and last clinic visit methimazole dose was decreased to 2.5 mg on alternate days  Based on labs recommend to increase methimazole to 2.5 mg/day (6/27/2019)  Labs in 6 weeks  Please make a lab appointment for blood work and follow up clinic appointment in 1 week after that to discuss results.    Please inform patient.

## 2019-06-27 NOTE — TELEPHONE ENCOUNTER
Left a voicemail asking patient to call the clinic back. Need to inform patient of message below.

## 2019-06-30 DIAGNOSIS — Z95.2 S/P AORTIC VALVE REPLACEMENT: ICD-10-CM

## 2019-07-01 RX ORDER — WARFARIN SODIUM 5 MG/1
TABLET ORAL
Qty: 100 TABLET | Refills: 0 | Status: SHIPPED | OUTPATIENT
Start: 2019-07-01 | End: 2019-09-25

## 2019-07-01 NOTE — TELEPHONE ENCOUNTER
"Warfarin 5 mg      Last Written Prescription Date:  1/19/19  Last Fill Quantity: 100,   # refills: 1  Last Office Visit: 7/26/18  Future Office visit:    Next 5 appointments (look out 90 days)    Jul 18, 2019  8:00 AM CDT  PHYSICAL with Brook Echavarria MD  [facility] ([facility]) [address] [place]  [facility] [id number] [phone]   Jul 24, 2019 10:45 AM CDT  Return Visit with Cecilia Guan DO  [facility] ([facility]) [address]  [facility] [id number] [phone]   Sep 05, 2019  9:00 AM CDT  Return Visit with Merle Baxter MD  [facility] ([facility]) [address]  [facility] [address] [phone]         Requested Prescriptions   Pending Prescriptions Disp Refills     warfarin (COUMADIN) 5 MG tablet [Pharmacy Med Name: WARFARIN SOD 5MG TABLETS (PEACH)] 100 tablet 0     Sig: TAKE ONE TABLET BY MOUTH DAILY EXCEPT ONE AND ONE-HALF TABLETS ON WEDNESDAYS OR AS DIRECTED BY THE INR CLINIC       Vitamin K Antagonists Failed - 6/30/2019  8:02 AM        Failed - INR is within goal in the past 6 weeks     Confirm INR is within goal in the past 6 weeks.     Recent Labs   Lab Test 06/21/19   INR 2.6*                       Passed - Recent (12 mo) or future (30 days) visit within the authorizing provider's specialty     Patient had office visit in the last 12 months or has a visit in the next 30 days with authorizing provider or within the authorizing provider's specialty.  See \"Patient Info\" tab in inbasket, or \"Choose Columns\" in Meds & Orders section of the refill encounter.              Passed - Medication is active on med list        Passed - Patient is 18 years of age or older        Passed - Patient is not pregnant        Passed - No positive pregnancy on file in past 12 months        Prescription approved per [facility] " Refill Protocol.

## 2019-07-15 ASSESSMENT — ENCOUNTER SYMPTOMS
JOINT SWELLING: 1
BREAST MASS: 0
ARTHRALGIAS: 1

## 2019-07-18 ENCOUNTER — OFFICE VISIT (OUTPATIENT)
Dept: INTERNAL MEDICINE | Facility: CLINIC | Age: 64
End: 2019-07-18
Payer: COMMERCIAL

## 2019-07-18 ENCOUNTER — TELEPHONE (OUTPATIENT)
Dept: INTERNAL MEDICINE | Facility: CLINIC | Age: 64
End: 2019-07-18

## 2019-07-18 VITALS
TEMPERATURE: 98.4 F | HEIGHT: 64 IN | BODY MASS INDEX: 21.61 KG/M2 | HEART RATE: 69 BPM | WEIGHT: 126.6 LBS | RESPIRATION RATE: 18 BRPM | DIASTOLIC BLOOD PRESSURE: 89 MMHG | SYSTOLIC BLOOD PRESSURE: 137 MMHG | OXYGEN SATURATION: 99 %

## 2019-07-18 DIAGNOSIS — I10 HTN, GOAL BELOW 140/90: ICD-10-CM

## 2019-07-18 DIAGNOSIS — B00.1 COLD SORE: ICD-10-CM

## 2019-07-18 DIAGNOSIS — E05.90 SUBCLINICAL HYPERTHYROIDISM: ICD-10-CM

## 2019-07-18 DIAGNOSIS — C50.911 MALIGNANT NEOPLASM OF RIGHT FEMALE BREAST, UNSPECIFIED ESTROGEN RECEPTOR STATUS, UNSPECIFIED SITE OF BREAST (H): ICD-10-CM

## 2019-07-18 DIAGNOSIS — Z95.2 H/O AORTIC VALVE REPLACEMENT: ICD-10-CM

## 2019-07-18 DIAGNOSIS — G47.00 INSOMNIA, UNSPECIFIED TYPE: ICD-10-CM

## 2019-07-18 DIAGNOSIS — D12.6 ADENOMATOUS POLYP OF COLON, UNSPECIFIED PART OF COLON: ICD-10-CM

## 2019-07-18 DIAGNOSIS — Z00.00 ROUTINE GENERAL MEDICAL EXAMINATION AT A HEALTH CARE FACILITY: Primary | ICD-10-CM

## 2019-07-18 LAB
ERYTHROCYTE [DISTWIDTH] IN BLOOD BY AUTOMATED COUNT: 11.8 % (ref 10–15)
HCT VFR BLD AUTO: 42.4 % (ref 35–47)
HGB BLD-MCNC: 13.7 G/DL (ref 11.7–15.7)
MCH RBC QN AUTO: 30.6 PG (ref 26.5–33)
MCHC RBC AUTO-ENTMCNC: 32.3 G/DL (ref 31.5–36.5)
MCV RBC AUTO: 95 FL (ref 78–100)
PLATELET # BLD AUTO: 225 10E9/L (ref 150–450)
RBC # BLD AUTO: 4.47 10E12/L (ref 3.8–5.2)
WBC # BLD AUTO: 5.1 10E9/L (ref 4–11)

## 2019-07-18 PROCEDURE — 85027 COMPLETE CBC AUTOMATED: CPT | Performed by: INTERNAL MEDICINE

## 2019-07-18 PROCEDURE — 80061 LIPID PANEL: CPT | Performed by: INTERNAL MEDICINE

## 2019-07-18 PROCEDURE — 80053 COMPREHEN METABOLIC PANEL: CPT | Performed by: INTERNAL MEDICINE

## 2019-07-18 PROCEDURE — 36415 COLL VENOUS BLD VENIPUNCTURE: CPT | Performed by: INTERNAL MEDICINE

## 2019-07-18 PROCEDURE — G0145 SCR C/V CYTO,THINLAYER,RESCR: HCPCS | Performed by: INTERNAL MEDICINE

## 2019-07-18 PROCEDURE — 87624 HPV HI-RISK TYP POOLED RSLT: CPT | Performed by: INTERNAL MEDICINE

## 2019-07-18 PROCEDURE — 82043 UR ALBUMIN QUANTITATIVE: CPT | Performed by: INTERNAL MEDICINE

## 2019-07-18 PROCEDURE — 99396 PREV VISIT EST AGE 40-64: CPT | Performed by: INTERNAL MEDICINE

## 2019-07-18 RX ORDER — VALACYCLOVIR HYDROCHLORIDE 1 G/1
2000 TABLET, FILM COATED ORAL 2 TIMES DAILY
Qty: 4 TABLET | Refills: 3 | Status: SHIPPED | OUTPATIENT
Start: 2019-07-18 | End: 2020-08-18

## 2019-07-18 RX ORDER — ZOLPIDEM TARTRATE 5 MG/1
2.5 TABLET ORAL
Qty: 60 TABLET | Refills: 0 | Status: SHIPPED | OUTPATIENT
Start: 2019-07-18 | End: 2020-08-18

## 2019-07-18 RX ORDER — ACYCLOVIR 50 MG/G
CREAM TOPICAL
Qty: 15 G | Refills: 1 | Status: CANCELLED | OUTPATIENT
Start: 2019-07-18

## 2019-07-18 RX ORDER — ACYCLOVIR 50 MG/G
CREAM TOPICAL
Qty: 15 G | Refills: 1 | Status: SHIPPED | OUTPATIENT
Start: 2019-07-18 | End: 2020-08-18

## 2019-07-18 ASSESSMENT — ENCOUNTER SYMPTOMS
ARTHRALGIAS: 1
BREAST MASS: 0
JOINT SWELLING: 1

## 2019-07-18 ASSESSMENT — MIFFLIN-ST. JEOR: SCORE: 1114.25

## 2019-07-18 NOTE — NURSING NOTE
"BP (!) 154/91 (BP Location: Right arm, Patient Position: Sitting, Cuff Size: Adult Regular)   Pulse 69   Temp 98.4  F (36.9  C) (Oral)   Resp 18   Ht 1.626 m (5' 4\")   Wt 57.4 kg (126 lb 9.6 oz)   SpO2 99%   BMI 21.73 kg/m    Patient here for a physical and is fasting.  "

## 2019-07-18 NOTE — PROGRESS NOTES
Dr Jackson's note      ASSESSMENT & PLAN:                                                      (Z00.00) Routine general medical examination at a health care facility  (primary encounter diagnosis)  Comment:   Plan: Comprehensive metabolic panel, CBC with         platelets, Lipid panel reflex to direct LDL         Fasting, Albumin Random Urine Quantitative with        Creat Ratio, Pap imaged thin layer screen with         HPV - recommended age 30 - 65 years (select HPV        order below), HPV High Risk Types DNA Cervical            (I10) HTN, goal below 140/90  Comment: Controlled    Plan: Comprehensive metabolic panel, CBC with         platelets, Lipid panel reflex to direct LDL         Fasting, Albumin Random Urine Quantitative with        Creat Ratio            (E05.90) Subclinical hyperthyroidism  Comment: stable   Plan: follow-up w endocrinology    (D12.6) Adenomatous polyp of colon,needs colonoscopy 2020  Comment:   Plan:     (Z95.2) H/O aortic valve replacement  Comment:   Plan: follow-up w cardiology    (C50.911) R breast Ca, 2014 s/p chemo, Rx and lumpectomy  (H)  Comment:   Plan: follow-up with oncology    (G47.00) Insomnia, unspecified type  Comment: stable   Plan: zolpidem (AMBIEN) 5 MG tablet        Continue same meds, same doses for now     (B00.1) Cold sore  Comment: stable   Plan: valACYclovir (VALTREX) 1000 mg tablet,         acyclovir (ZOVIRAX) 5 % external cream        Continue same meds, same doses for now        Chief Complaint:                                                      Annual exam  Follow up chronic medical problems      SUBJECTIVE:                                                    History of present illness     We reviewed the chronic medical problems as above.   I reviewed the recent tests results in Epic     Insomnia  --  Zolpidem refill needed  --  She cuts them in half and takes them as little as possible    Cold sores  --  Valtrex refill needed  --  She does not use that  often and only takes that when she's really sore or swollen  --  She uses ointment first before taking the pill     H/O aortic valve replacement  --  LOV cardio, , BP was high,   --  Cardio increased lisinopril  --  Next cardio appt next week    Hyperthyroidism   --  She sees Dr. Morris   --  Thyroid blood test in early Aug.     Breast Cancer  --  Hx of R breast Ca s/p lumpectomy   --   Diagnosed   --  She sees Dr. Felder   --  R. Side had lumpectomy, radiation therapy, and chemo done    ROS:   See below    This document serves as a record of the services and decisions personally performed and made by Renetta Doe MD. It was created on her behalf by Genevieve Cunha, a trained medical scribe. The creation of this document is based on the provider's statements to the medical scribe.  Genevieve Cunha 2019 8:30 AM     PMHx: - reviewed  Past Medical History:   Diagnosis Date     Adenomatous colon polyp 2015     Aortic stenosis 11    bicuspid AV with severe stenosis - 2011 mechanical AVR with 23 mm St Yordan AV conduit, composite graft placement     Arthritis     knees and hands     Bicuspid aortic valve      Breast cancer (H) 2014    R breast     H/O aortic valve replacement     St Yordan     Heart murmur     Valve repalcement .     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    AAA repair with av23 mm tube graft     PONV (postoperative nausea and vomiting)     n/v morphine vs anesthesia, vomiting x24 hrs     Subclinical hyperthyroidism 2017     Thrombosis of leg     after  of daughter     Uncomplicated asthma        PSHx: reviewed  Past Surgical History:   Procedure Laterality Date     BIOPSY NODE SENTINEL Right 9/10/2014    Procedure: BIOPSY NODE SENTINEL;  Surgeon: Ila Romano MD;  Location: [facility] OR     CARDIAC SURGERY  2011     aortic valve replacement     ENT SURGERY      tonsillectomy     HRW PORT A CATH       INSERT PORT VASCULAR ACCESS Left 10/22/2014    Procedure: INSERT PORT VASCULAR ACCESS;  Surgeon: Ila Romano MD;  Location: [facility] OR     LUMPECTOMY BREAST WITH SEED LOCALIZATION Right 9/10/2014    Procedure: LUMPECTOMY BREAST WITH SEED LOCALIZATION;  Surgeon: Ila Romano MD;  Location: [facility] OR     ORTHOPEDIC SURGERY      shoulder, thumb surgery     REMOVE PORT VASCULAR ACCESS Left 4/8/2015    Procedure: REMOVE PORT VASCULAR ACCESS;  Surgeon: Ila Romano MD;  Location: [facility] OR     REPAIR ANEURYSM ASCENDING AORTA  6/23/2011    Procedure:REPAIR ANEURYSM ASCENDING AORTA; Medial Sternotomy, Aortic Valve Replacement, (St. Yordan [employer] HP Valved Graft, size 23 mm) on pump oxygenation, intraoperative transesophageal cardiogram; Surgeon:JOHN PAUL ULLOA; Location:[facility] OR     REPLACE VALVE AORTIC  6/23/11    bicuspid AV with severe stenosis - 6/2011 mechanical AVR with 23 mm St Yordan AV conduit, composite graft placement        Soc Hx: No daily alcohol, no smoking  Social History     Socioeconomic History     Marital status:      Spouse name: Not on file     Number of children: Not on file     Years of education: Not on file     Highest education level: Not on file   Occupational History     Not on file   Social Needs     Financial resource strain: Not on file     Food insecurity:     Worry: Not on file     Inability: Not on file     Transportation needs:     Medical: Not on file     Non-medical: Not on file   Tobacco Use     Smoking status: Never Smoker     Smokeless tobacco: Never Used   Substance and Sexual Activity     Alcohol use: Yes     Comment: 2 drinks per week -      Drug use: No     Sexual activity: Not Currently   Lifestyle     Physical activity:     Days per week: Not on file     Minutes per session: Not on file     Stress: Not on file   Relationships     Social connections:     " Talks on phone: Not on file     Gets together: Not on file     Attends [employer] service: Not on file     Active member of club or organization: Not on file     Attends meetings of clubs or organizations: Not on file     Relationship status: Not on file     Intimate partner violence:     Fear of current or ex partner: Not on file     Emotionally abused: Not on file     Physically abused: Not on file     Forced sexual activity: Not on file   Other Topics Concern     Parent/sibling w/ CABG, MI or angioplasty before 65F 55M? Not Asked      Service Not Asked     Blood Transfusions Not Asked     Caffeine Concern Yes     Comment: 1-2 cups caffeine per day     Occupational Exposure Not Asked     Hobby Hazards Not Asked     Sleep Concern No     Stress Concern No     Weight Concern No     Special Diet Yes     Comment: low fat daily , low red meats     Back Care No     Exercise Yes     Comment: walks daily/ 3x a week exercise class     Bike Helmet Not Asked     Seat Belt Not Asked     Self-Exams Not Asked   Social History Narrative     Not on file        Fam Hx: reviewed  Family History   Problem Relation Age of Onset     Hypertension Mother      Thyroid Disease Mother         \"Toxic thyroid\"     Prostate Cancer Father 70     Cancer Father 80        Lung cancer, Not caused from smoking     Cerebrovascular Disease Father 80     Hypertension Father      Cardiovascular Brother         half brother      Cardiovascular Son         Puentes-Parkinsons White Syndrome     Cardiovascular Daughter         Heart murmur     Breast Cancer Paternal Aunt 80     Cardiovascular Paternal Aunt      Arthritis Brother 40        RA - half brother      Cancer Maternal Grandfather      Colon Cancer No family hx of      Screening: reviewed  All: reviewed  Meds: reviewed  Current Outpatient Medications   Medication Sig Dispense Refill     acetaminophen (TYLENOL) 325 MG tablet Take 1-2 tablets (325-650 mg) by mouth every 6 hours as needed for " "mild pain 250 tablet 11     alendronate (FOSAMAX) 70 MG tablet Take 70 mg by mouth every 7 days       anastrozole (ARIMIDEX) 1 MG tablet Take by mouth daily 30 tablet      ASPIRIN EC PO Take 81 mg by mouth daily       Calcium Carbonate (CALCIUM 500 PO) Take 600 mg by mouth daily        carvedilol (COREG) 12.5 MG tablet Take 1 tablet (12.5 mg) by mouth 2 times daily (with meals) 180 tablet 3     cholecalciferol (VITAMIN D3) 1000 UNIT tablet Take 1 tablet (1,000 Units) by mouth daily 30 tablet      lisinopril (PRINIVIL/ZESTRIL) 10 MG tablet Take 1 tablet (10 mg) by mouth 2 times daily Increased dose. Hold on file. 180 tablet 3     methimazole (TAPAZOLE) 5 MG tablet Take 0.5 tablets (2.5 mg) by mouth daily 45 tablet 0     valACYclovir (VALTREX) 1000 mg tablet Take 2 tablets (2,000 mg) by mouth 2 times daily 4 tablet 3     warfarin (COUMADIN) 5 MG tablet TAKE ONE TABLET BY MOUTH DAILY EXCEPT ONE AND ONE-HALF TABLETS ON WEDNESDAYS AND SATURDAYS OR AS DIRECTED BY THE INR CLINIC 100 tablet 0     zolpidem (AMBIEN) 5 MG tablet Take 0.5 tablets (2.5 mg) by mouth nightly as needed for sleep 60 tablet 0       OBJECTIVE:                                                    Physical Exam :  /89 (BP Location: Left arm, Patient Position: Sitting, Cuff Size: Adult Regular)   Pulse 69   Temp 98.4  F (36.9  C) (Oral)   Resp 18   Ht 1.626 m (5' 4\")   Wt 57.4 kg (126 lb 9.6 oz)   SpO2 99%   BMI 21.73 kg/m       NAD, appears comfortable  Skin clear, no rashes  HEENT: PERRLA, EOMI, anicteric sclera, pink conjunctiva, external ears appear normal, bilateral tympanic membranes clinically normal, oropharynx normal color.  Neck: supple, no JVD,  no thyroidmegaly  Lymph nodes non palpable in the cervical, supraclavicular axillaries, inguinal areas  Chest: clear to auscultation with good respiratory effort  Cardiac: S1S2, RRR, no mgr appreciated Aortic valve click  Abdomen: soft, not tender, not distended, audible bowel sound, no " hepatosplenomegaly, no palpable masses, no abdominal bruits  Extremities: no cyanosis, clubbing or edema. legs varicose veins  Neuro: A, Ox3, no focal signs.  Breast exam in supine and erect position: they are symmetrical, no skin changes, no tenderness or nodes on palpation. Nipples are erect, no skin lesions, no discharge on pressure.  Breast: s/p  R lumpectomy       Pelvic exam: Normal external genitals, normal appearing perineum, normal appearing urethra,  vaginal mucosa pink, no discharge, Cervix appears normal, Pap smear obtained. On bimanual exam, I did not feel any uterus or ovarian masses, and she denies any tenderness. Cystocele present         The information in this document, created by the medical scribe for me, accurately reflects the services I personally performed and the decisions made by me. I have reviewed and approved this document for accuracy prior to leaving the patient care area.  July 18, 2019 8:55 AM     Brook Jackson MD  Internal Medicine        SUBJECTIVE:   CC: Annabella Bolanos is an 63 year old woman who presents for preventive health visit.   Patient is here for a physical and is fasting.  Healthy Habits:     Getting at least 3 servings of Calcium per day:  Yes    Bi-annual eye exam:  Yes    Dental care twice a year:  Yes    Sleep apnea or symptoms of sleep apnea:  None    Diet:  Low salt    Frequency of exercise:  4-5 days/week    Duration of exercise:  45-60 minutes    Taking medications regularly:  Yes    Medication side effects:  None    PHQ-2 Total Score: 0    Today's PHQ-2 Score:   PHQ-2 ( 1999 [employer]) 7/15/2019   Q1: Little interest or pleasure in doing things 0   Q2: Feeling down, depressed or hopeless 0   PHQ-2 Score 0   Q1: Little interest or pleasure in doing things Not at all   Q2: Feeling down, depressed or hopeless Not at all   PHQ-2 Score 0       Abuse: Current or Past(Physical, Sexual or Emotional)- No  Do you feel safe in your environment? Yes    Social History  "    Tobacco Use     Smoking status: Never Smoker     Smokeless tobacco: Never Used   Substance Use Topics     Alcohol use: Yes     Comment: 2 drinks per week -        Alcohol Use 7/15/2019   Prescreen: >3 drinks/day or >7 drinks/week? No   Prescreen: >3 drinks/day or >7 drinks/week? -       Reviewed orders with patient.  Reviewed health maintenance and updated orders accordingly - Yes  Labs reviewed in EPIC        Pertinent mammograms are reviewed under the imaging tab.  History of abnormal Pap smear:   PAP / HPV Latest Ref Rng & Units 7/14/2016   PAP - NIL   HPV 16 DNA NEG Negative   HPV 18 DNA NEG Negative   OTHER HR HPV NEG Negative     Reviewed and updated as needed this visit by clinical staff  Tobacco  Allergies  Meds  Med Hx  Surg Hx  Fam Hx  Soc Hx        Reviewed and updated as needed this visit by Provider          Review of Systems   HENT: Positive for hearing loss.    Breasts:  Negative for tenderness, breast mass and discharge.   Genitourinary: Negative for pelvic pain, vaginal bleeding and vaginal discharge.   Musculoskeletal: Positive for arthralgias and joint swelling.       COUNSELING:  Reviewed preventive health counseling, as reflected in patient instructions       Regular exercise       Healthy diet/nutrition    Estimated body mass index is 21.73 kg/m  as calculated from the following:    Height as of this encounter: 1.626 m (5' 4\").    Weight as of this encounter: 57.4 kg (126 lb 9.6 oz).         reports that she has never smoked. She has never used smokeless tobacco.      Counseling Resources:  ATP IV Guidelines  Pooled Cohorts Equation Calculator  Breast Cancer Risk Calculator  FRAX Risk Assessment  ICSI Preventive Guidelines  Dietary Guidelines for Americans, 2010  USDA's MyPlate  ASA Prophylaxis  Lung CA Screening    Brook Echavarria MD  [facility]  "
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
## 2019-07-19 LAB
ALBUMIN SERPL-MCNC: 3.6 G/DL (ref 3.4–5)
ALP SERPL-CCNC: 65 U/L (ref 40–150)
ALT SERPL W P-5'-P-CCNC: 24 U/L (ref 0–50)
ANION GAP SERPL CALCULATED.3IONS-SCNC: 6 MMOL/L (ref 3–14)
AST SERPL W P-5'-P-CCNC: 17 U/L (ref 0–45)
BILIRUB SERPL-MCNC: 0.8 MG/DL (ref 0.2–1.3)
BUN SERPL-MCNC: 17 MG/DL (ref 7–30)
CALCIUM SERPL-MCNC: 8.7 MG/DL (ref 8.5–10.1)
CHLORIDE SERPL-SCNC: 107 MMOL/L (ref 94–109)
CHOLEST SERPL-MCNC: 209 MG/DL
CO2 SERPL-SCNC: 29 MMOL/L (ref 20–32)
CREAT SERPL-MCNC: 0.68 MG/DL (ref 0.52–1.04)
CREAT UR-MCNC: 30 MG/DL
GFR SERPL CREATININE-BSD FRML MDRD: >90 ML/MIN/{1.73_M2}
GLUCOSE SERPL-MCNC: 95 MG/DL (ref 70–99)
HDLC SERPL-MCNC: 63 MG/DL
LDLC SERPL CALC-MCNC: 128 MG/DL
MICROALBUMIN UR-MCNC: <5 MG/L
MICROALBUMIN/CREAT UR: NORMAL MG/G CR (ref 0–25)
NONHDLC SERPL-MCNC: 146 MG/DL
POTASSIUM SERPL-SCNC: 4.2 MMOL/L (ref 3.4–5.3)
PROT SERPL-MCNC: 6.8 G/DL (ref 6.8–8.8)
SODIUM SERPL-SCNC: 142 MMOL/L (ref 133–144)
TRIGL SERPL-MCNC: 88 MG/DL

## 2019-07-19 NOTE — TELEPHONE ENCOUNTER
Call back to patient. Left detailed message requesting patient call insurance and ask about alternatives or pay out of pocket. Requested patient call back if there is an alternative she would like to try.

## 2019-07-19 NOTE — TELEPHONE ENCOUNTER
Patient called back, ok to order alternative medication that is covered by insurance. Please call back to advise what medication will be covered.

## 2019-07-19 NOTE — TELEPHONE ENCOUNTER
Left message for patient to call back. No consent to communicate on file for the number listed.

## 2019-07-19 NOTE — TELEPHONE ENCOUNTER
Routing refill request to provider for review/approval because:  Needs approval by provider

## 2019-07-22 LAB
COPATH REPORT: NORMAL
PAP: NORMAL

## 2019-07-22 RX ORDER — METHIMAZOLE 5 MG/1
2.5 TABLET ORAL DAILY
Qty: 45 TABLET | Refills: 0 | Status: SHIPPED | OUTPATIENT
Start: 2019-07-22 | End: 2019-09-05

## 2019-07-24 ENCOUNTER — OFFICE VISIT (OUTPATIENT)
Dept: CARDIOLOGY | Facility: CLINIC | Age: 64
End: 2019-07-24
Payer: COMMERCIAL

## 2019-07-24 VITALS
HEIGHT: 64 IN | DIASTOLIC BLOOD PRESSURE: 80 MMHG | HEART RATE: 57 BPM | WEIGHT: 126 LBS | BODY MASS INDEX: 21.51 KG/M2 | OXYGEN SATURATION: 98 % | SYSTOLIC BLOOD PRESSURE: 138 MMHG

## 2019-07-24 DIAGNOSIS — I10 ESSENTIAL HYPERTENSION: ICD-10-CM

## 2019-07-24 DIAGNOSIS — I10 ESSENTIAL HYPERTENSION, BENIGN: ICD-10-CM

## 2019-07-24 LAB
FINAL DIAGNOSIS: NORMAL
HPV HR 12 DNA CVX QL NAA+PROBE: NEGATIVE
HPV16 DNA SPEC QL NAA+PROBE: NEGATIVE
HPV18 DNA SPEC QL NAA+PROBE: NEGATIVE
SPECIMEN DESCRIPTION: NORMAL
SPECIMEN SOURCE CVX/VAG CYTO: NORMAL

## 2019-07-24 PROCEDURE — 99213 OFFICE O/P EST LOW 20 MIN: CPT | Performed by: INTERNAL MEDICINE

## 2019-07-24 RX ORDER — CARVEDILOL 12.5 MG/1
12.5 TABLET ORAL 2 TIMES DAILY WITH MEALS
Qty: 180 TABLET | Refills: 3 | Status: SHIPPED | OUTPATIENT
Start: 2019-07-24 | End: 2020-03-26

## 2019-07-24 RX ORDER — LISINOPRIL 10 MG/1
10 TABLET ORAL 2 TIMES DAILY
Qty: 180 TABLET | Refills: 3 | Status: SHIPPED | OUTPATIENT
Start: 2019-07-24 | End: 2020-03-26

## 2019-07-24 ASSESSMENT — MIFFLIN-ST. JEOR: SCORE: 1111.53

## 2019-07-24 NOTE — LETTER
7/24/2019      Brook Echavarria MD  [address] [facility] [address]      RE: Annabella Bolanos       Dear Colleague,    I had the pleasure of seeing Annabella Bolanos in the [facility].    Service Date: 07/24/2019      REFERRING PHYSICIAN:  Dr. Echavarria.      HISTORY OF PRESENT ILLNESS:  Ms. Bolanos is a very pleasant 63-year-old female with a history of congenital bicuspid aortic valve associated with ascending aortic aneurysm.  She also has high blood pressure.  She had a mechanical aortic valve replacement with a 23 mm St. Yordan valve and a 23 mm tube conduit with reimplantation of her coronaries in 2011.  Most recently, she has been having difficulty with her blood pressure and it may have been associated with the high salt intake around the time; however, we did increase her lisinopril from 10 mg daily to twice daily with improvement in her blood pressures.  She is very cognizant of her diet.  She had a couple of questions today in regards to her goals for INR and whether or not aspirin was needed in addition to Coumadin.  Given that she has a mechanical valve in association with the tube conduit, it is recommended to continue with a baby aspirin along with Coumadin therapy with an INR goal of 2.0 to 3.0 with this combination to prevent thromboembolism.  She tells me that the INR clinic is telling her that her INR goal has been set at 1.7-2.5.  I think this may be a little bit low for her given the above.  Nevertheless, she is doing well.  Her blood pressures are much better now with the higher dose of lisinopril.  She is not experiencing any chest pain or difficulty breathing.        PHYSICAL EXAMINATION:   VITAL SIGNS:  Blood pressure today was 138/80, pulse of 57, weight 126.  She is down a few pounds from last year.  Body mass index of 21.   NECK:  Carotid upstrokes are brisk without bruit.   CARDIOVASCULAR:  Tones are regular with crisp valve tones.  I do not  appreciate a murmur, gallop or rub.     LUNGS:  Her lungs are clear posteriorly.   EXTREMITIES:  She has no peripheral edema.        Her last echocardiogram last year suggested a stable valve function with normal gradients, stable LV and RV function and no other valvular issues.      SUMMARY:  Ms. Bolanos is a very pleasant 63-year-old female with a history of bicuspid aortic stenosis status post 23 mm St. Yordan mechanical aortic valve and tube conduit with reimplantation of her coronaries.  She is stable from a cardiac perspective.  Blood pressures are much better on a higher dose of lisinopril and I did renew her prescriptions today.  I would recommend her INR goal levels should be between 2 and 3 along with a baby aspirin to prevent thromboembolism.  We talked about the frequency of monitoring of her valve.  I think that every 2-3 years is appropriate.  She would like to space this to 3 years and so I will recommend repeating an echo in about 2 years as long as she remains asymptomatic.  Also, we talked about referring just back to her primary care provider at this point and I am happy to see her as needed now unless certainly any cardiac issues are of concern to her primary.  Please feel free to contact me with any questions you have in regards to her care.      cc:   Brook Echavarria MD   [facility]    [address]    [address]   [place]  [address]         KHADIJAH HENSON DO             D: 2019   T: 2019   MT: ROSLYN      Name:     VAL BOLANOS   MRN:      [id number]        Account:      [id number]   :      1955           Service Date: 2019      Document: [id number]         Outpatient Encounter Medications as of 2019   Medication Sig Dispense Refill     acetaminophen (TYLENOL) 325 MG tablet Take 1-2 tablets (325-650 mg) by mouth every 6 hours as needed for mild pain 250 tablet 11     acyclovir (ZOVIRAX) 5 % external cream Apply topically 5 times  daily (Patient taking differently: Apply topically as needed ) 15 g 1     alendronate (FOSAMAX) 70 MG tablet Take 70 mg by mouth every 7 days       anastrozole (ARIMIDEX) 1 MG tablet Take by mouth daily 30 tablet      ASPIRIN EC PO Take 81 mg by mouth daily       Calcium Carbonate (CALCIUM 500 PO) Take 600 mg by mouth daily        carvedilol (COREG) 12.5 MG tablet Take 1 tablet (12.5 mg) by mouth 2 times daily (with meals) 180 tablet 3     cholecalciferol (VITAMIN D3) 1000 UNIT tablet Take 1 tablet (1,000 Units) by mouth daily 30 tablet      lisinopril (PRINIVIL/ZESTRIL) 10 MG tablet Take 1 tablet (10 mg) by mouth 2 times daily Increased dose. Hold on file. 180 tablet 3     methimazole (TAPAZOLE) 5 MG tablet Take 0.5 tablets (2.5 mg) by mouth daily 45 tablet 0     valACYclovir (VALTREX) 1000 mg tablet Take 2 tablets (2,000 mg) by mouth 2 times daily (Patient taking differently: Take 2,000 mg by mouth as needed ) 4 tablet 3     warfarin (COUMADIN) 5 MG tablet TAKE ONE TABLET BY MOUTH DAILY EXCEPT ONE AND ONE-HALF TABLETS ON WEDNESDAYS AND SATURDAYS OR AS DIRECTED BY THE INR CLINIC 100 tablet 0     zolpidem (AMBIEN) 5 MG tablet Take 0.5 tablets (2.5 mg) by mouth nightly as needed for sleep 60 tablet 0     [DISCONTINUED] carvedilol (COREG) 12.5 MG tablet Take 1 tablet (12.5 mg) by mouth 2 times daily (with meals) 180 tablet 3     [DISCONTINUED] lisinopril (PRINIVIL/ZESTRIL) 10 MG tablet Take 1 tablet (10 mg) by mouth 2 times daily Increased dose. Hold on file. 180 tablet 3     No facility-administered encounter medications on file as of 7/24/2019.        Again, thank you for allowing me to participate in the care of your patient.      Sincerely,    Cecilia Guan, DO     [facility]

## 2019-07-24 NOTE — LETTER
7/24/2019    Brook Echavarria MD  [address] [facility] [address]    RE: Annabella Bolanos       Dear Colleague,    I had the pleasure of seeing Annabella Bolanos in the [facility].    HPI and Plan:   See dictation    No orders of the defined types were placed in this encounter.      Orders Placed This Encounter   Medications     lisinopril (PRINIVIL/ZESTRIL) 10 MG tablet     Sig: Take 1 tablet (10 mg) by mouth 2 times daily Increased dose. Hold on file.     Dispense:  180 tablet     Refill:  3     carvedilol (COREG) 12.5 MG tablet     Sig: Take 1 tablet (12.5 mg) by mouth 2 times daily (with meals)     Dispense:  180 tablet     Refill:  3       Medications Discontinued During This Encounter   Medication Reason     lisinopril (PRINIVIL/ZESTRIL) 10 MG tablet Reorder     carvedilol (COREG) 12.5 MG tablet Reorder         Encounter Diagnoses   Name Primary?     Essential hypertension, benign      Essential hypertension        CURRENT MEDICATIONS:  Current Outpatient Medications   Medication Sig Dispense Refill     acetaminophen (TYLENOL) 325 MG tablet Take 1-2 tablets (325-650 mg) by mouth every 6 hours as needed for mild pain 250 tablet 11     acyclovir (ZOVIRAX) 5 % external cream Apply topically 5 times daily (Patient taking differently: Apply topically as needed ) 15 g 1     alendronate (FOSAMAX) 70 MG tablet Take 70 mg by mouth every 7 days       anastrozole (ARIMIDEX) 1 MG tablet Take by mouth daily 30 tablet      ASPIRIN EC PO Take 81 mg by mouth daily       Calcium Carbonate (CALCIUM 500 PO) Take 600 mg by mouth daily        carvedilol (COREG) 12.5 MG tablet Take 1 tablet (12.5 mg) by mouth 2 times daily (with meals) 180 tablet 3     cholecalciferol (VITAMIN D3) 1000 UNIT tablet Take 1 tablet (1,000 Units) by mouth daily 30 tablet      lisinopril (PRINIVIL/ZESTRIL) 10 MG tablet Take 1 tablet (10 mg) by mouth 2 times daily Increased dose. Hold on file. 180 tablet 3      methimazole (TAPAZOLE) 5 MG tablet Take 0.5 tablets (2.5 mg) by mouth daily 45 tablet 0     valACYclovir (VALTREX) 1000 mg tablet Take 2 tablets (2,000 mg) by mouth 2 times daily (Patient taking differently: Take 2,000 mg by mouth as needed ) 4 tablet 3     warfarin (COUMADIN) 5 MG tablet TAKE ONE TABLET BY MOUTH DAILY EXCEPT ONE AND ONE-HALF TABLETS ON  AND  OR AS DIRECTED BY THE INR CLINIC 100 tablet 0     zolpidem (AMBIEN) 5 MG tablet Take 0.5 tablets (2.5 mg) by mouth nightly as needed for sleep 60 tablet 0       ALLERGIES     Allergies   Allergen Reactions     Morphine Sulfate Nausea and Vomiting       PAST MEDICAL HISTORY:  Past Medical History:   Diagnosis Date     Adenomatous colon polyp 2015     Aortic stenosis 11    bicuspid AV with severe stenosis - 2011 mechanical AVR with 23 mm St Yordan AV conduit, composite graft placement     Arthritis     knees and hands     Bicuspid aortic valve      Breast cancer (H) 2014    R breast     H/O aortic valve replacement     St Yordan     Heart murmur     Valve repalcement .     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    AAA repair with av23 mm tube graft     PONV (postoperative nausea and vomiting)     n/v morphine vs anesthesia, vomiting x24 hrs     Subclinical hyperthyroidism 2017     Thrombosis of leg     after  of daughter     Uncomplicated asthma        PAST SURGICAL HISTORY:  Past Surgical History:   Procedure Laterality Date     BIOPSY NODE SENTINEL Right 9/10/2014    Procedure: BIOPSY NODE SENTINEL;  Surgeon: Ila Romano MD;  Location: [facility] OR     CARDIAC SURGERY  2011    aortic valve replacement     ENT SURGERY      tonsillectomy     HRW PORT A CATH       INSERT PORT VASCULAR ACCESS Left 10/22/2014    Procedure: INSERT PORT VASCULAR ACCESS;  Surgeon: Ila Romano MD;  " Location: [facility] OR     LUMPECTOMY BREAST WITH SEED LOCALIZATION Right 9/10/2014    Procedure: LUMPECTOMY BREAST WITH SEED LOCALIZATION;  Surgeon: Ila Romano MD;  Location: [facility] OR     ORTHOPEDIC SURGERY      shoulder, thumb surgery     REMOVE PORT VASCULAR ACCESS Left 4/8/2015    Procedure: REMOVE PORT VASCULAR ACCESS;  Surgeon: Ila Romano MD;  Location: [facility] OR     REPAIR ANEURYSM ASCENDING AORTA  6/23/2011    Procedure:REPAIR ANEURYSM ASCENDING AORTA; Medial Sternotomy, Aortic Valve Replacement, (St. Yordan Medical Masters HP Valved Graft, size 23 mm) on pump oxygenation, intraoperative transesophageal cardiogram; Surgeon:JOHN PAUL ULLOA; Location:[facility] OR     REPLACE VALVE AORTIC  6/23/11    bicuspid AV with severe stenosis - 6/2011 mechanical AVR with 23 mm St Yordan AV conduit, composite graft placement       FAMILY HISTORY:  Family History   Problem Relation Age of Onset     Hypertension Mother      Thyroid Disease Mother         \"Toxic thyroid\"     Prostate Cancer Father 70     Cancer Father 80        Lung cancer, Not caused from smoking     Cerebrovascular Disease Father 80     Hypertension Father      Cardiovascular Brother         half brother      Cardiovascular Son         Puentes-Parkinsons White Syndrome     Cardiovascular Daughter         Heart murmur     Breast Cancer Paternal Aunt 80     Cardiovascular Paternal Aunt      Arthritis Brother 40        RA - half brother      Cancer Maternal Grandfather      Colon Cancer No family hx of        SOCIAL HISTORY:  Social History     Socioeconomic History     Marital status:      Spouse name: Not on file     Number of children: Not on file     Years of education: Not on file     Highest education level: Not on file   Occupational History     Not on file   Social Needs     Financial resource strain: Not on file     Food insecurity:     Worry: Not on file     Inability: Not on file     Transportation needs:     Medical: Not on file    "  Non-medical: Not on file   Tobacco Use     Smoking status: Never Smoker     Smokeless tobacco: Never Used   Substance and Sexual Activity     Alcohol use: Yes     Comment: 2 drinks per week -      Drug use: No     Sexual activity: Not Currently   Lifestyle     Physical activity:     Days per week: Not on file     Minutes per session: Not on file     Stress: Not on file   Relationships     Social connections:     Talks on phone: Not on file     Gets together: Not on file     Attends [employer] service: Not on file     Active member of club or organization: Not on file     Attends meetings of clubs or organizations: Not on file     Relationship status: Not on file     Intimate partner violence:     Fear of current or ex partner: Not on file     Emotionally abused: Not on file     Physically abused: Not on file     Forced sexual activity: Not on file   Other Topics Concern     Parent/sibling w/ CABG, MI or angioplasty before 65F 55M? Not Asked      Service Not Asked     Blood Transfusions Not Asked     Caffeine Concern Yes     Comment: 1-2 cups caffeine per day     Occupational Exposure Not Asked     Hobby Hazards Not Asked     Sleep Concern No     Stress Concern No     Weight Concern No     Special Diet Yes     Comment: low fat daily , low red meats     Back Care No     Exercise Yes     Comment: walks daily/ 3x a week exercise class     Bike Helmet Not Asked     Seat Belt Not Asked     Self-Exams Not Asked   Social History Narrative     Not on file       Review of Systems:  Skin:  Negative       Eyes:  Positive for glasses    ENT:  Positive for postnasal drainage    Respiratory:  Negative       Cardiovascular:  Negative      Gastroenterology: Negative      Genitourinary:  Positive for urinary frequency 3-4 times per pm -  prolapsed bladder   Musculoskeletal:  Positive for arthritis    Neurologic:  Positive for numbness or tingling of hands little bit in fingers   Psychiatric:  Negative      Heme/Lymph/Imm:   "Positive for easy bruising    Endocrine:  Negative        Physical Exam:  Vitals: /80 (BP Location: Right arm, Patient Position: Sitting, Cuff Size: Adult Regular)   Pulse 57   Ht 1.626 m (5' 4\")   Wt 57.2 kg (126 lb)   SpO2 98%   BMI 21.63 kg/m       Constitutional:  cooperative, alert and oriented, well developed, well nourished, in no acute distress        Skin:  warm and dry to the touch     exfoliation of finger tips with mild acrocyanosis    Head:  normocephalic        Eyes:  pupils equal and round        Lymph:      ENT:  no pallor or cyanosis        Neck:  JVP normal        Respiratory:  clear to auscultation;normal symmetry         Cardiac: regular rhythm     crisp prosthetic valve sounds grade 2;systolic ejection murmur     audible click  pulses full and equal                                        GI:  abdomen soft;no bruits        Extremities and Muscular Skeletal:  no edema         swollen joints and mild deformities to phalanges related to arthritic changes    Neurological:  no gross motor deficits        Psych:  Alert and Oriented x 3          CC  Cecilia Guan DO  [address]  [place] [id number]                    Thank you for allowing me to participate in the care of your patient.      Sincerely,     Cecilia Guan DO     [facility]    cc:   Cecilia Guan DO  [address]  [place] [id number]        "

## 2019-07-24 NOTE — PROGRESS NOTES
HPI and Plan:   See dictation    No orders of the defined types were placed in this encounter.      Orders Placed This Encounter   Medications     lisinopril (PRINIVIL/ZESTRIL) 10 MG tablet     Sig: Take 1 tablet (10 mg) by mouth 2 times daily Increased dose. Hold on file.     Dispense:  180 tablet     Refill:  3     carvedilol (COREG) 12.5 MG tablet     Sig: Take 1 tablet (12.5 mg) by mouth 2 times daily (with meals)     Dispense:  180 tablet     Refill:  3       Medications Discontinued During This Encounter   Medication Reason     lisinopril (PRINIVIL/ZESTRIL) 10 MG tablet Reorder     carvedilol (COREG) 12.5 MG tablet Reorder         Encounter Diagnoses   Name Primary?     Essential hypertension, benign      Essential hypertension        CURRENT MEDICATIONS:  Current Outpatient Medications   Medication Sig Dispense Refill     acetaminophen (TYLENOL) 325 MG tablet Take 1-2 tablets (325-650 mg) by mouth every 6 hours as needed for mild pain 250 tablet 11     acyclovir (ZOVIRAX) 5 % external cream Apply topically 5 times daily (Patient taking differently: Apply topically as needed ) 15 g 1     alendronate (FOSAMAX) 70 MG tablet Take 70 mg by mouth every 7 days       anastrozole (ARIMIDEX) 1 MG tablet Take by mouth daily 30 tablet      ASPIRIN EC PO Take 81 mg by mouth daily       Calcium Carbonate (CALCIUM 500 PO) Take 600 mg by mouth daily        carvedilol (COREG) 12.5 MG tablet Take 1 tablet (12.5 mg) by mouth 2 times daily (with meals) 180 tablet 3     cholecalciferol (VITAMIN D3) 1000 UNIT tablet Take 1 tablet (1,000 Units) by mouth daily 30 tablet      lisinopril (PRINIVIL/ZESTRIL) 10 MG tablet Take 1 tablet (10 mg) by mouth 2 times daily Increased dose. Hold on file. 180 tablet 3     methimazole (TAPAZOLE) 5 MG tablet Take 0.5 tablets (2.5 mg) by mouth daily 45 tablet 0     valACYclovir (VALTREX) 1000 mg tablet Take 2 tablets (2,000 mg) by mouth 2 times daily (Patient taking differently: Take 2,000 mg by  mouth as needed ) 4 tablet 3     warfarin (COUMADIN) 5 MG tablet TAKE ONE TABLET BY MOUTH DAILY EXCEPT ONE AND ONE-HALF TABLETS ON  AND  OR AS DIRECTED BY THE INR CLINIC 100 tablet 0     zolpidem (AMBIEN) 5 MG tablet Take 0.5 tablets (2.5 mg) by mouth nightly as needed for sleep 60 tablet 0       ALLERGIES     Allergies   Allergen Reactions     Morphine Sulfate Nausea and Vomiting       PAST MEDICAL HISTORY:  Past Medical History:   Diagnosis Date     Adenomatous colon polyp 2015     Aortic stenosis 11    bicuspid AV with severe stenosis - 2011 mechanical AVR with 23 mm St Yordan AV conduit, composite graft placement     Arthritis     knees and hands     Bicuspid aortic valve      Breast cancer (H) 2014    R breast     H/O aortic valve replacement     St Yordan     Heart murmur     Valve repalcement .     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    AAA repair with av23 mm tube graft     PONV (postoperative nausea and vomiting)     n/v morphine vs anesthesia, vomiting x24 hrs     Subclinical hyperthyroidism 2017     Thrombosis of leg     after  of daughter     Uncomplicated asthma        PAST SURGICAL HISTORY:  Past Surgical History:   Procedure Laterality Date     BIOPSY NODE SENTINEL Right 9/10/2014    Procedure: BIOPSY NODE SENTINEL;  Surgeon: Ila Romano MD;  Location: [facility] OR     CARDIAC SURGERY  2011    aortic valve replacement     ENT SURGERY      tonsillectomy     HRW PORT A CATH       INSERT PORT VASCULAR ACCESS Left 10/22/2014    Procedure: INSERT PORT VASCULAR ACCESS;  Surgeon: Ila Romano MD;  Location: [facility] OR     LUMPECTOMY BREAST WITH SEED LOCALIZATION Right 9/10/2014    Procedure: LUMPECTOMY BREAST WITH SEED LOCALIZATION;  Surgeon: Ila Romano MD;  Location: [facility] OR     ORTHOPEDIC SURGERY       "shoulder, thumb surgery     REMOVE PORT VASCULAR ACCESS Left 4/8/2015    Procedure: REMOVE PORT VASCULAR ACCESS;  Surgeon: Ila Romano MD;  Location: [facility] OR     REPAIR ANEURYSM ASCENDING AORTA  6/23/2011    Procedure:REPAIR ANEURYSM ASCENDING AORTA; Medial Sternotomy, Aortic Valve Replacement, (St. Yordan Medical Masters HP Valved Graft, size 23 mm) on pump oxygenation, intraoperative transesophageal cardiogram; Surgeon:JOHN PAUL ULLOA; Location:[facility] OR     REPLACE VALVE AORTIC  6/23/11    bicuspid AV with severe stenosis - 6/2011 mechanical AVR with 23 mm St Yordan AV conduit, composite graft placement       FAMILY HISTORY:  Family History   Problem Relation Age of Onset     Hypertension Mother      Thyroid Disease Mother         \"Toxic thyroid\"     Prostate Cancer Father 70     Cancer Father 80        Lung cancer, Not caused from smoking     Cerebrovascular Disease Father 80     Hypertension Father      Cardiovascular Brother         half brother      Cardiovascular Son         Puentes-Parkinsons White Syndrome     Cardiovascular Daughter         Heart murmur     Breast Cancer Paternal Aunt 80     Cardiovascular Paternal Aunt      Arthritis Brother 40        RA - half brother      Cancer Maternal Grandfather      Colon Cancer No family hx of        SOCIAL HISTORY:  Social History     Socioeconomic History     Marital status:      Spouse name: Not on file     Number of children: Not on file     Years of education: Not on file     Highest education level: Not on file   Occupational History     Not on file   Social Needs     Financial resource strain: Not on file     Food insecurity:     Worry: Not on file     Inability: Not on file     Transportation needs:     Medical: Not on file     Non-medical: Not on file   Tobacco Use     Smoking status: Never Smoker     Smokeless tobacco: Never Used   Substance and Sexual Activity     Alcohol use: Yes     Comment: 2 drinks per week -      Drug use: No     " "Sexual activity: Not Currently   Lifestyle     Physical activity:     Days per week: Not on file     Minutes per session: Not on file     Stress: Not on file   Relationships     Social connections:     Talks on phone: Not on file     Gets together: Not on file     Attends Pentecostalism service: Not on file     Active member of club or organization: Not on file     Attends meetings of clubs or organizations: Not on file     Relationship status: Not on file     Intimate partner violence:     Fear of current or ex partner: Not on file     Emotionally abused: Not on file     Physically abused: Not on file     Forced sexual activity: Not on file   Other Topics Concern     Parent/sibling w/ CABG, MI or angioplasty before 65F 55M? Not Asked      Service Not Asked     Blood Transfusions Not Asked     Caffeine Concern Yes     Comment: 1-2 cups caffeine per day     Occupational Exposure Not Asked     Hobby Hazards Not Asked     Sleep Concern No     Stress Concern No     Weight Concern No     Special Diet Yes     Comment: low fat daily , low red meats     Back Care No     Exercise Yes     Comment: walks daily/ 3x a week exercise class     Bike Helmet Not Asked     Seat Belt Not Asked     Self-Exams Not Asked   Social History Narrative     Not on file       Review of Systems:  Skin:  Negative       Eyes:  Positive for glasses    ENT:  Positive for postnasal drainage    Respiratory:  Negative       Cardiovascular:  Negative      Gastroenterology: Negative      Genitourinary:  Positive for urinary frequency 3-4 times per pm -  prolapsed bladder   Musculoskeletal:  Positive for arthritis    Neurologic:  Positive for numbness or tingling of hands little bit in fingers   Psychiatric:  Negative      Heme/Lymph/Imm:  Positive for easy bruising    Endocrine:  Negative        Physical Exam:  Vitals: /80 (BP Location: Right arm, Patient Position: Sitting, Cuff Size: Adult Regular)   Pulse 57   Ht 1.626 m (5' 4\")   Wt 57.2 kg " (126 lb)   SpO2 98%   BMI 21.63 kg/m      Constitutional:  cooperative, alert and oriented, well developed, well nourished, in no acute distress        Skin:  warm and dry to the touch     exfoliation of finger tips with mild acrocyanosis    Head:  normocephalic        Eyes:  pupils equal and round        Lymph:      ENT:  no pallor or cyanosis        Neck:  JVP normal        Respiratory:  clear to auscultation;normal symmetry         Cardiac: regular rhythm     crisp prosthetic valve sounds grade 2;systolic ejection murmur     audible click  pulses full and equal                                        GI:  abdomen soft;no bruits        Extremities and Muscular Skeletal:  no edema         swollen joints and mild deformities to phalanges related to arthritic changes    Neurological:  no gross motor deficits        Psych:  Alert and Oriented x 3          CC  Ceciliachayito Guan,   [address]  [place] [id number]

## 2019-07-24 NOTE — PROGRESS NOTES
Service Date: 07/24/2019      REFERRING PHYSICIAN:  Dr. Echavarria.      HISTORY OF PRESENT ILLNESS:  Ms. Bolanos is a very pleasant 63-year-old female with a history of congenital bicuspid aortic valve associated with ascending aortic aneurysm.  She also has high blood pressure.  She had a mechanical aortic valve replacement with a 23 mm St. Yordan valve and a 23 mm tube conduit with reimplantation of her coronaries in 2011.  Most recently, she has been having difficulty with her blood pressure and it may have been associated with the high salt intake around the time; however, we did increase her lisinopril from 10 mg daily to twice daily with improvement in her blood pressures.  She is very cognizant of her diet.  She had a couple of questions today in regards to her goals for INR and whether or not aspirin was needed in addition to Coumadin.  Given that she has a mechanical valve in association with the tube conduit, it is recommended to continue with a baby aspirin along with Coumadin therapy with an INR goal of 2.0 to 3.0 with this combination to prevent thromboembolism.  She tells me that the INR clinic is telling her that her INR goal has been set at 1.7-2.5.  I think this may be a little bit low for her given the above.  Nevertheless, she is doing well.  Her blood pressures are much better now with the higher dose of lisinopril.  She is not experiencing any chest pain or difficulty breathing.        PHYSICAL EXAMINATION:   VITAL SIGNS:  Blood pressure today was 138/80, pulse of 57, weight 126.  She is down a few pounds from last year.  Body mass index of 21.   NECK:  Carotid upstrokes are brisk without bruit.   CARDIOVASCULAR:  Tones are regular with crisp valve tones.  I do not appreciate a murmur, gallop or rub.     LUNGS:  Her lungs are clear posteriorly.   EXTREMITIES:  She has no peripheral edema.        Her last echocardiogram last year suggested a stable valve function with normal gradients, stable LV  and RV function and no other valvular issues.      SUMMARY:  Ms. Bolanos is a very pleasant 63-year-old female with a history of bicuspid aortic stenosis status post 23 mm St. Yordan mechanical aortic valve and tube conduit with reimplantation of her coronaries.  She is stable from a cardiac perspective.  Blood pressures are much better on a higher dose of lisinopril and I did renew her prescriptions today.  I would recommend her INR goal levels should be between 2 and 3 along with a baby aspirin to prevent thromboembolism.  We talked about the frequency of monitoring of her valve.  I think that every 2-3 years is appropriate.  She would like to space this to 3 years and so I will recommend repeating an echo in about 2 years as long as she remains asymptomatic.  Also, we talked about referring just back to her primary care provider at this point and I am happy to see her as needed now unless certainly any cardiac issues are of concern to her primary.  Please feel free to contact me with any questions you have in regards to her care.      cc:   Brook Echavarria MD   [facility]    [address]    [address]   [place]  [address]         KHADIJAH HENSON DO             D: 2019   T: 2019   MT: ROSLYN      Name:     VAL BOLANOS   MRN:      [id number]        Account:      [id number]   :      1955           Service Date: 2019      Document: [id number]

## 2019-07-25 DIAGNOSIS — E05.90 SUBCLINICAL HYPERTHYROIDISM: ICD-10-CM

## 2019-07-25 RX ORDER — METHIMAZOLE 5 MG/1
2.5 TABLET ORAL DAILY
Qty: 45 TABLET | Refills: 0 | Status: CANCELLED | OUTPATIENT
Start: 2019-07-25

## 2019-07-25 NOTE — TELEPHONE ENCOUNTER
Please see message below. Per office visit note 5/16/19:  Decrease methimazole to 2.5 mg on alternate days     Please clarify directions for methimazole and what patient should be taking on alternate day of 2.5mg.

## 2019-07-25 NOTE — TELEPHONE ENCOUNTER
Patient called stating her insurance won't refill the medication, stating it's too soon.    2.5 mg every other day called in on 06/25/19 #45    2.5 mg every day called in on 06/27/19 #45    Does she have enough?    Georgia Haney, LASHAWN  [facility]  [place]

## 2019-07-25 NOTE — TELEPHONE ENCOUNTER
See 6/27/19 note-  Based on labs recommend to increase methimazole to 2.5 mg/day (6/27/2019)  Labs in 6 weeks  Rx sent earlier 7/2019  Check with pt and pharmacy if she needs medication.  Please pend the orders with correct quantity, select pharmacy and then send for signature. Also associate with correct diagnosis.    Thank you.    Merle Baxter

## 2019-08-02 ENCOUNTER — TELEPHONE (OUTPATIENT)
Dept: INTERNAL MEDICINE | Facility: CLINIC | Age: 64
End: 2019-08-02

## 2019-08-02 ENCOUNTER — ANTICOAGULATION THERAPY VISIT (OUTPATIENT)
Dept: ANTICOAGULATION | Facility: CLINIC | Age: 64
End: 2019-08-02
Payer: COMMERCIAL

## 2019-08-02 DIAGNOSIS — Z95.2 H/O AORTIC VALVE REPLACEMENT: ICD-10-CM

## 2019-08-02 DIAGNOSIS — Z79.01 LONG TERM CURRENT USE OF ANTICOAGULANT THERAPY: ICD-10-CM

## 2019-08-02 DIAGNOSIS — Z95.2 H/O AORTIC VALVE REPLACEMENT: Primary | ICD-10-CM

## 2019-08-02 LAB — INR POINT OF CARE: 1.9 (ref 0.86–1.14)

## 2019-08-02 PROCEDURE — 85610 PROTHROMBIN TIME: CPT | Mod: QW

## 2019-08-02 PROCEDURE — 99207 ZZC NO CHARGE NURSE ONLY: CPT

## 2019-08-02 PROCEDURE — 36416 COLLJ CAPILLARY BLOOD SPEC: CPT

## 2019-08-02 NOTE — PROGRESS NOTES
ANTICOAGULATION FOLLOW-UP CLINIC VISIT    Patient Name:  Annabella Bolanos  Date:  2019  Contact Type:  Face to Face    SUBJECTIVE:  Patient Findings     Comments:   Patient discussed warfarin range with cardiology to try to figure out why she has a goal of 1.8-2.5 and not 2-3.  Will send message to primary MD to confirm range.  The patient was assessed for diet, medication, and activity level changes, missed or extra doses, bruising or bleeding, with no problem findings.  Patient denies any identifiable changes that caused the sub therapeutic INR.  Maintenance dose was increased slightly today to try to get INR closer to 2.5.             Clinical Outcomes     Negatives:   Major bleeding event, Thromboembolic event, Anticoagulation-related hospital admission, Anticoagulation-related ED visit, Anticoagulation-related fatality    Comments:   Patient discussed warfarin range with cardiology to try to figure out why she has a goal of 1.8-2.5 and not 2-3.  Will send message to primary MD to confirm range.  The patient was assessed for diet, medication, and activity level changes, missed or extra doses, bruising or bleeding, with no problem findings.  Patient denies any identifiable changes that caused the sub therapeutic INR.  Maintenance dose was increased slightly today to try to get INR closer to 2.5.                OBJECTIVE    INR Protime   Date Value Ref Range Status   2019 1.9 (A) 0.86 - 1.14 Final       ASSESSMENT / PLAN  INR assessment THER    Recheck INR In: 5 WEEKS    INR Location Clinic      Anticoagulation Summary  As of 2019    INR goal:   1.8-2.5   TTR:   78.5 % (3.3 y)   INR used for dosin.9 (2019)   Warfarin maintenance plan:   7.5 mg (5 mg x 1.5) every Mon, Wed, Sat; 5 mg (5 mg x 1) all other days   Full warfarin instructions:   7.5 mg every Mon, Wed, Sat; 5 mg all other days   Weekly warfarin total:   42.5 mg   Plan last modified:   Erica Stinson RN (2019)   Next INR check:    9/5/2019   Priority:   INR   Target end date:       Indications    Long-term (current) use of anticoagulants [Z79.01] [Z79.01]  H/O aortic valve replacement [Z95.2]             Anticoagulation Episode Summary     INR check location:       Preferred lab:       Send INR reminders to:   FRANCISCO ABADS    Comments:         Anticoagulation Care Providers     Provider Role Specialty Phone number    Brook Echavarria MD [facility] Internal Medicine [phone]            See the Encounter Report to view Anticoagulation Flowsheet and Dosing Calendar (Go to Encounters tab in chart review, and find the Anticoagulation Therapy Visit)    Dosage adjustment made based on physician directed care plan.    Erica Stinson RN

## 2019-08-02 NOTE — TELEPHONE ENCOUNTER
Patient currently has an INR goal of 1.8-2.5, but I'm uncertain when this was changed.  Previously when she had care at other facilities she had a goal range of 2.0-3.0.  Patient discussed this with cardiology at her visit on 7/24/19 and cardiology recommended that the goal range be 2.0-3.0.  Patient denies having any bleeding problems in the past.  INR clinic referral pended with goal range of 2.0-3.0.  Please sign if you agree with the change in goal range.  Erica Stinson RN

## 2019-08-07 ENCOUNTER — HOSPITAL ENCOUNTER (OUTPATIENT)
Dept: MAMMOGRAPHY | Facility: CLINIC | Age: 64
Discharge: HOME | End: 2019-08-07
Attending: INTERNAL MEDICINE | Admitting: INTERNAL MEDICINE
Payer: COMMERCIAL

## 2019-08-07 DIAGNOSIS — Z12.31 VISIT FOR SCREENING MAMMOGRAM: ICD-10-CM

## 2019-08-07 PROCEDURE — 77063 BREAST TOMOSYNTHESIS BI: CPT

## 2019-08-08 DIAGNOSIS — E05.90 SUBCLINICAL HYPERTHYROIDISM: ICD-10-CM

## 2019-08-08 LAB
ALBUMIN SERPL-MCNC: 3.3 G/DL (ref 3.4–5)
ALP SERPL-CCNC: 64 U/L (ref 40–150)
ALT SERPL W P-5'-P-CCNC: 24 U/L (ref 0–50)
AST SERPL W P-5'-P-CCNC: 16 U/L (ref 0–45)
BILIRUB DIRECT SERPL-MCNC: 0.1 MG/DL (ref 0–0.2)
BILIRUB SERPL-MCNC: 0.5 MG/DL (ref 0.2–1.3)
PROT SERPL-MCNC: 6.7 G/DL (ref 6.8–8.8)
T3FREE SERPL-MCNC: 2.6 PG/ML (ref 2.3–4.2)
T4 FREE SERPL-MCNC: 1.02 NG/DL (ref 0.76–1.46)
TSH SERPL DL<=0.005 MIU/L-ACNC: 0.68 MU/L (ref 0.4–4)
WBC # BLD AUTO: 4.3 10E9/L (ref 4–11)

## 2019-08-08 PROCEDURE — 84439 ASSAY OF FREE THYROXINE: CPT | Performed by: INTERNAL MEDICINE

## 2019-08-08 PROCEDURE — 84443 ASSAY THYROID STIM HORMONE: CPT | Performed by: INTERNAL MEDICINE

## 2019-08-08 PROCEDURE — 36415 COLL VENOUS BLD VENIPUNCTURE: CPT | Performed by: INTERNAL MEDICINE

## 2019-08-08 PROCEDURE — 85048 AUTOMATED LEUKOCYTE COUNT: CPT | Performed by: INTERNAL MEDICINE

## 2019-08-08 PROCEDURE — 80076 HEPATIC FUNCTION PANEL: CPT | Performed by: INTERNAL MEDICINE

## 2019-08-08 PROCEDURE — 84481 FREE ASSAY (FT-3): CPT | Performed by: INTERNAL MEDICINE

## 2019-08-14 ENCOUNTER — TRANSFERRED RECORDS (OUTPATIENT)
Dept: SURGERY | Facility: CLINIC | Age: 64
End: 2019-08-14

## 2019-08-14 ENCOUNTER — TRANSFERRED RECORDS (OUTPATIENT)
Dept: HEALTH INFORMATION MANAGEMENT | Facility: CLINIC | Age: 64
End: 2019-08-14

## 2019-08-22 ENCOUNTER — TELEPHONE (OUTPATIENT)
Dept: INTERNAL MEDICINE | Facility: CLINIC | Age: 64
End: 2019-08-22

## 2019-08-22 NOTE — TELEPHONE ENCOUNTER
Prior Authorization Retail Medication Request    Medication/Dose: Acyclovir 5% cream  ICD code (if different than what is on RX):    Previously Tried and Failed:    Rationale:  Product/Service not covered    Insurance Name:  [facility] [employer]  Insurance ID:  [id number]       Pharmacy Information (if different than what is on RX)  Name:  [facility]  Phone:  [phone]    Patient states that the cream seems to work best for her and would like to try for a prior authorization.    Thank you!  Erica Majano PhT  [facility] Float Department  On behalf of [facility]

## 2019-08-29 NOTE — TELEPHONE ENCOUNTER
Central Prior Authorization Team   Phone: [phone]      PA Initiation    Medication: Acyclovir 5% cream  Insurance Company: [employer] [place] - Phone [phone] Fax [phone]  Pharmacy Filling the Rx: [facility] [place], [place] - [address] [facility]  Filling Pharmacy Phone: [phone]  Filling Pharmacy Fax:    Start Date: 8/29/2019

## 2019-09-03 NOTE — TELEPHONE ENCOUNTER
Call to patient. Advised. States she tried the tablets and the blister was still there so she used some of the left over cream. She will try the tablets again and let us know if she feels it is not effective.

## 2019-09-05 ENCOUNTER — OFFICE VISIT (OUTPATIENT)
Dept: ENDOCRINOLOGY | Facility: CLINIC | Age: 64
End: 2019-09-05
Payer: COMMERCIAL

## 2019-09-05 ENCOUNTER — ANTICOAGULATION THERAPY VISIT (OUTPATIENT)
Dept: ANTICOAGULATION | Facility: CLINIC | Age: 64
End: 2019-09-05
Payer: COMMERCIAL

## 2019-09-05 VITALS
DIASTOLIC BLOOD PRESSURE: 100 MMHG | SYSTOLIC BLOOD PRESSURE: 170 MMHG | RESPIRATION RATE: 16 BRPM | HEART RATE: 52 BPM | TEMPERATURE: 98.4 F | WEIGHT: 129.1 LBS | HEIGHT: 64 IN | OXYGEN SATURATION: 98 % | BODY MASS INDEX: 22.04 KG/M2

## 2019-09-05 DIAGNOSIS — Z95.2 H/O AORTIC VALVE REPLACEMENT: ICD-10-CM

## 2019-09-05 DIAGNOSIS — Z79.01 LONG TERM CURRENT USE OF ANTICOAGULANT THERAPY: ICD-10-CM

## 2019-09-05 DIAGNOSIS — E05.90 SUBCLINICAL HYPERTHYROIDISM: Primary | ICD-10-CM

## 2019-09-05 LAB — INR POINT OF CARE: 2.6 (ref 0.86–1.14)

## 2019-09-05 PROCEDURE — 36416 COLLJ CAPILLARY BLOOD SPEC: CPT

## 2019-09-05 PROCEDURE — 85610 PROTHROMBIN TIME: CPT | Mod: QW

## 2019-09-05 PROCEDURE — 99207 ZZC NO CHARGE NURSE ONLY: CPT

## 2019-09-05 PROCEDURE — 99213 OFFICE O/P EST LOW 20 MIN: CPT | Performed by: INTERNAL MEDICINE

## 2019-09-05 RX ORDER — METHIMAZOLE 5 MG/1
2.5 TABLET ORAL DAILY
Qty: 45 TABLET | Refills: 2 | Status: SHIPPED | OUTPATIENT
Start: 2019-09-05 | End: 2020-03-23

## 2019-09-05 ASSESSMENT — MIFFLIN-ST. JEOR: SCORE: 1120.59

## 2019-09-05 NOTE — PROGRESS NOTES
Name: Annabella Bolanos  Seen for follow-up of subclinical hyperthyroidism.    HPI:  Annabella Bolanos is a 63 year old female who presents for the evaluation of subclinical Hyperthyroidism.  H/o breast cancer and h/o aortic valve replacement and is on long term anticoagulation.  Breast cancer diagnosed in 2014 s/p lumpectomy and chemo and radiation. Took radiation 5 days a week for 1 month.  DEXA 2018 scan showing osteopenia and appears stable.  She is not on medication for osteoporosis/osteopenia at this time.  She is also on aromatase inhibitor as a part of treatment for breast cancer.     Subclinical hyperthyroidism noted since 7/2016. Plan was for continue to monitor.  Never been on medications.  No h/o arrhythmia  No h/o osteoporosis-- has osteopenia. Not on treatment.  TSI neg  US thyroid ( h/o radiation)- no discrete nodules.  As there was further decline in TSH along with risk for low bone density with aromatase inhibitor (with with prior history of osteopenia) as well as complex cardiac history she was started on methimazole 5 mg 7/2018.  Currently taking methimazole 2.5 mg/day.   F/u labs are WNL  LFT and CBC stable.    Since last clinic visit she was started on Fosamax by Dr. Felder for osteoporosis/osteopenia.  She is taking Fosamax on a weekly basis since February 2019.    Feeling good.  Has good energy.  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  H/o arthritis.  Weight is stable.  Wt Readings from Last 2 Encounters:   09/05/19 58.6 kg (129 lb 1.6 oz)   07/24/19 57.2 kg (126 lb)       Eating healthy.    History of radiation exposure: YES. As above  History of thyroid dysfunction: not to her knowledge. No FH of thyroid cancer.  Palpitations:  No  Changes to hair or skin: No  Diarrhea/Constipation:No  Changes in menses: s/p menopause  Changes in vision:No  Diplopia/Blurryness:No  Dysphagia or Shortness of breath:No  Tremors:No  Difficulty sleeping:Yes: most of the time  Changes in weight: No  Lithium/Amiodarone: No  URI: No  CT  Scans:No  Mother had hyperthyroidism s/p DOE and was on levothyroxine.    PMH/PSH:  Past Medical History:   Diagnosis Date     Adenomatous colon polyp 2015     Aortic stenosis 11    bicuspid AV with severe stenosis - 2011 mechanical AVR with 23 mm St Yordan AV conduit, composite graft placement     Arthritis     knees and hands     Bicuspid aortic valve      Breast cancer (H) 2014    R breast     H/O aortic valve replacement     St Yordan     Heart murmur     Valve repalcement .     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    AAA repair with av23 mm tube graft     PONV (postoperative nausea and vomiting)     n/v morphine vs anesthesia, vomiting x24 hrs     Subclinical hyperthyroidism 2017     Thrombosis of leg     after  of daughter     Uncomplicated asthma      Past Surgical History:   Procedure Laterality Date     BIOPSY NODE SENTINEL Right 9/10/2014    Procedure: BIOPSY NODE SENTINEL;  Surgeon: Ila Romano MD;  Location: [facility] OR     CARDIAC SURGERY  2011    aortic valve replacement     ENT SURGERY      tonsillectomy     HRW PORT A CATH       INSERT PORT VASCULAR ACCESS Left 10/22/2014    Procedure: INSERT PORT VASCULAR ACCESS;  Surgeon: Ila Romano MD;  Location: [facility] OR     LUMPECTOMY BREAST WITH SEED LOCALIZATION Right 9/10/2014    Procedure: LUMPECTOMY BREAST WITH SEED LOCALIZATION;  Surgeon: Ila Romano MD;  Location: [facility] OR     ORTHOPEDIC SURGERY      shoulder, thumb surgery     REMOVE PORT VASCULAR ACCESS Left 2015    Procedure: REMOVE PORT VASCULAR ACCESS;  Surgeon: Ila Romano MD;  Location: [facility] OR     REPAIR ANEURYSM ASCENDING AORTA  2011    Procedure:REPAIR ANEURYSM ASCENDING AORTA; Medial Sternotomy, Aortic Valve Replacement, (St. Yordan Medical Masters HP Valved Graft, size 23 mm) on pump oxygenation,  "intraoperative transesophageal cardiogram; Surgeon:JOHN PAUL ULLOA; Location:[facility] OR     REPLACE VALVE AORTIC  6/23/11    bicuspid AV with severe stenosis - 6/2011 mechanical AVR with 23 mm St Yordan AV conduit, composite graft placement     Family Hx:  Family History   Problem Relation Age of Onset     Hypertension Mother      Thyroid Disease Mother         \"Toxic thyroid\"     Prostate Cancer Father 70     Cancer Father 80        Lung cancer, Not caused from smoking     Cerebrovascular Disease Father 80     Hypertension Father      Cardiovascular Brother         half brother      Cardiovascular Son         Puentes-Parkinsons White Syndrome     Cardiovascular Daughter         Heart murmur     Breast Cancer Paternal Aunt 80     Cardiovascular Paternal Aunt      Arthritis Brother 40        RA - half brother      Cancer Maternal Grandfather      Colon Cancer No family hx of      Thyroid disease: as above          Social Hx:  Social History     Socioeconomic History     Marital status:      Spouse name: Not on file     Number of children: Not on file     Years of education: Not on file     Highest education level: Not on file   Occupational History     Not on file   Social Needs     Financial resource strain: Not on file     Food insecurity:     Worry: Not on file     Inability: Not on file     Transportation needs:     Medical: Not on file     Non-medical: Not on file   Tobacco Use     Smoking status: Never Smoker     Smokeless tobacco: Never Used   Substance and Sexual Activity     Alcohol use: Yes     Comment: 2 drinks per week -      Drug use: No     Sexual activity: Not Currently   Lifestyle     Physical activity:     Days per week: Not on file     Minutes per session: Not on file     Stress: Not on file   Relationships     Social connections:     Talks on phone: Not on file     Gets together: Not on file     Attends Christian service: Not on file     Active member of club or organization: Not on file     " "Attends meetings of clubs or organizations: Not on file     Relationship status: Not on file     Intimate partner violence:     Fear of current or ex partner: Not on file     Emotionally abused: Not on file     Physically abused: Not on file     Forced sexual activity: Not on file   Other Topics Concern     Parent/sibling w/ CABG, MI or angioplasty before 65F 55M? Not Asked      Service Not Asked     Blood Transfusions Not Asked     Caffeine Concern Yes     Comment: 1-2 cups caffeine per day     Occupational Exposure Not Asked     Hobby Hazards Not Asked     Sleep Concern No     Stress Concern No     Weight Concern No     Special Diet Yes     Comment: low fat daily , low red meats     Back Care No     Exercise Yes     Comment: walks daily/ 3x a week exercise class     Bike Helmet Not Asked     Seat Belt Not Asked     Self-Exams Not Asked   Social History Narrative     Not on file          MEDICATIONS:  has a current medication list which includes the following prescription(s): acetaminophen, acyclovir, alendronate, anastrozole, aspirin, calcium-magnesium-vitamin d, carvedilol, vitamin d3, lisinopril, methimazole, valacyclovir, warfarin, and zolpidem.    ROS   ROS: 10 point ROS neg other than the symptoms noted above in the HPI.    Physical Exam   VS: BP (!) 165/90 (BP Location: Right arm, Patient Position: Chair, Cuff Size: Adult Regular)   Pulse 52   Temp 98.4  F (36.9  C) (Oral)   Resp 16   Ht 1.626 m (5' 4\")   Wt 58.6 kg (129 lb 1.6 oz)   LMP  (LMP Unknown)   SpO2 98%   Breastfeeding? No   BMI 22.16 kg/m    GENERAL: AXOX3, NAD, well dressed, answering questions appropriately, appears stated age.  HEENT: No exopthalmous, no proptosis, EOMI, no lig lag, no retraction  NECK: Thyroid normal in size, non tender, no nodules were palpated.  CV: RRR  LUNGS: CTAB  ABDOMEN: +BS  NEUROLOGY: CN grossly intact, no tremors  PSYCH: normal affect and mood      LABS:  TFTs:  ENDO THYROID LABS-[facility] Latest Ref Rng & " Units 8/8/2019 6/27/2019   TSH 0.40 - 4.00 mU/L 0.68 0.32 (L)   T4 FREE 0.76 - 1.46 ng/dL 1.02 1.20   FREE T3 2.3 - 4.2 pg/mL 2.6 2.9     ENDO THYROID LABS-[facility] Latest Ref Rng & Units 5/13/2019 11/8/2018   TSH 0.40 - 4.00 mU/L 0.94 1.02   T4 FREE 0.76 - 1.46 ng/dL 1.05 1.02   FREE T3 2.3 - 4.2 pg/mL 2.7 2.9     [facility] & Units 9/6/2018   TSH 0.40 - 4.00 mU/L 1.10   T4 FREE 0.76 - 1.46 ng/dL 0.89   FREE T3 2.3 - 4.2 pg/mL 2.8     [facility] Latest Ref R[facility] & Units 6/5/2018   TSH 0.40 - 4.00 mU/L 0.05 (L)   T4 FREE 0.76 - 1.46 ng/dL 1.27   FREE T3 2.3 - 4.2 pg/mL 3.4     [facility] Latest Ref Rng & Units 12/6/2017   TSH 0.40 - 4.00 mU/L 0.18 (L)   T4 FREE 0.76 - 1.46 ng/dL 1.16   FREE T3 2.3 - 4.2 pg/mL 3.2   THYROID STIM IMMUNOG <=1.3 TSI index <1.0     [facility] Latest Ref Rng & Units 11/10/2017 7/24/2017   TSH 0.40 - 4.00 mU/L 0.12 (L) 0.12 (L)   T4 FREE 0.76 - 1.46 ng/dL 1.10 1.16     [facility] Latest Ref Rng & Units 7/14/2016   TSH 0.40 - 4.00 mU/L 0.24 (L)   T4 FREE 0.76 - 1.46 ng/dL 1.22       CBC:  WBC   Date Value Ref Range Status   08/08/2019 4.3 4.0 - 11.0 10e9/L Final         LFTs:  Liver Function Studies -   Recent Labs   Lab Test 05/13/19  0822   PROTTOTAL 6.8   ALBUMIN 3.4   BILITOTAL 0.4   ALKPHOS 71   AST 17   ALT 19     ULTRASOUND THYROID December 15, 2017 9:24 AM   HISTORY: Subclinical hyperthyroidism.   FINDINGS: Thyroid ultrasound demonstrates an enlarged thyroid gland. The right lobe measures 5.4 x 2.0 x 1.8 cm. The left lobe measures 3.8 x 1.6 x 1.3 cm. The isthmus is normal in thickness. Thyroid parenchyma is heterogeneous in echotexture. Increased color Doppler flow is noted throughout the thyroid gland. Thyroid nodules as follows: Right Lobe: None. Isthmus: None. Left Lobe: None.   IMPRESSION: Enlarged heterogeneous thyroid gland without evidence of a discrete thyroid nodule. Increased color Doppler flow suggests  underlying thyroiditis. Correlate with thyroid function test and nuclear medicine thyroid scan as needed.    All pertinent notes, labs, and images personally reviewed by me.     A/P  Ms.Annabella Bolanos is a 63 year old here for the evaluation of hyperthyroidism.    Subclinical hyperthyroidism (TSI neg):   TSH remained suppressed since 2016 and along with normal free T4  No h/o arrhythmia  No h/o osteoporosis-- has osteopenia. Not on treatment.  She is on aromatase inhibitor for breast cancer.  TSI neg  US thyroid ( h/o radiation)- no discrete nodules.  Clinically looks euthyroid.  No major complaints.  As there is further decline in TSH along with risk for low bone density with aromatase inhibitor (with with prior history of osteopenia) was started on methimazole 5 mg 7/2018  Currently taking methimazole 2.5 mg/day.   Recent labs WNL.  Plan:  Continue methimazole 2.5 mg/day  Labs in 6 months.  Clinic visit/phone visit after that.    Off note she is on anticoagulant and may need dose adjustment to get INR at target levels ( h/o aortic valve replacement)  Baseline LFT and WBC normal.  Discussed diagnosis, pathophysiology, management and treatment options of condition with ptMp [place] to follow up with Primary Care provider regarding elevated blood pressure.    Discussed indications, risks and benefits of all medications prescribed, and answered questions to patient's satisfaction.  The patient indicates understanding of these issues and agrees with the plan.    Discussed possible side effects of methimazole including liver dysfunction and agranulocytosis. Discussed to watch for s/s like jaundice, abdominal pain and skin rash. In case of prolonged unresolving fever, sore throat and infections, advise to seek medical care.    Call if:     Signs or symptoms of infection. These include a fever of 100.5 degrees or higher, chills, severe sore throat, ear or sinus pain, cough, increased sputum or change in color, painful  urination, mouth sores.   Severe nausea or vomiting.   Joint pain or swelling.   Not able to eat.   Unusual bruising or bleeding.   Dark urine or yellow skin or eyes.      Follow-up:  6 months phone/clinic visit.    Merle Baxter MD  Endocrinology  [facility]  CC: Brook Echavarria     More than 50% of face to face time spent with Ms. Bolanos on counseling / coordinating her care.    All questions were answered.  The patient indicates understanding of the above issues and agrees with the plan set forth.     Addendum to above note and clinic visit:    Labs reviewed.    See result note/telephone encounter.

## 2019-09-05 NOTE — LETTER
9/5/2019         RE: Annabella Bolanos  [address] [place] [address]        Dear Colleague,    Thank you for referring your patient, Annabella Bolanos, to the [facility]. Please see a copy of my visit note below.    Name: Annabella Bolanos  Seen for follow-up of subclinical hyperthyroidism.    HPI:  Annabella Bolanos is a 63 year old female who presents for the evaluation of subclinical Hyperthyroidism.  H/o breast cancer and h/o aortic valve replacement and is on long term anticoagulation.  Breast cancer diagnosed in 2014 s/p lumpectomy and chemo and radiation. Took radiation 5 days a week for 1 month.  DEXA 2018 scan showing osteopenia and appears stable.  She is not on medication for osteoporosis/osteopenia at this time.  She is also on aromatase inhibitor as a part of treatment for breast cancer.     Subclinical hyperthyroidism noted since 7/2016. Plan was for continue to monitor.  Never been on medications.  No h/o arrhythmia  No h/o osteoporosis-- has osteopenia. Not on treatment.  TSI neg  US thyroid ( h/o radiation)- no discrete nodules.  As there was further decline in TSH along with risk for low bone density with aromatase inhibitor (with with prior history of osteopenia) as well as complex cardiac history she was started on methimazole 5 mg 7/2018.  Currently taking methimazole 2.5 mg/day.   F/u labs are WNL  LFT and CBC stable.    Since last clinic visit she was started on Fosamax by Dr. Felder for osteoporosis/osteopenia.  She is taking Fosamax on a weekly basis since February 2019.    Feeling good.  Has good energy.  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  H/o arthritis.  Weight is stable.  Wt Readings from Last 2 Encounters:   09/05/19 58.6 kg (129 lb 1.6 oz)   07/24/19 57.2 kg (126 lb)       Eating healthy.    History of radiation exposure: YES. As above  History of thyroid dysfunction: not to her knowledge. No FH of thyroid cancer.  Palpitations:  No  Changes to hair or skin:  No  Diarrhea/Constipation:No  Changes in menses: s/p menopause  Changes in vision:No  Diplopia/Blurryness:No  Dysphagia or Shortness of breath:No  Tremors:No  Difficulty sleeping:Yes: most of the time  Changes in weight: No  Lithium/Amiodarone: No  URI: No  CT Scans:No  Mother had hyperthyroidism s/p DOE and was on levothyroxine.    PMH/PSH:  Past Medical History:   Diagnosis Date     Adenomatous colon polyp 2015     Aortic stenosis 11    bicuspid AV with severe stenosis - 2011 mechanical AVR with 23 mm St Yordan AV conduit, composite graft placement     Arthritis     knees and hands     Bicuspid aortic valve      Breast cancer (H) 2014    R breast     H/O aortic valve replacement     St Yordan     Heart murmur     Valve repalcement .     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    AAA repair with av23 mm tube graft     PONV (postoperative nausea and vomiting)     n/v morphine vs anesthesia, vomiting x24 hrs     Subclinical hyperthyroidism 2017     Thrombosis of leg     after  of daughter     Uncomplicated asthma      Past Surgical History:   Procedure Laterality Date     BIOPSY NODE SENTINEL Right 9/10/2014    Procedure: BIOPSY NODE SENTINEL;  Surgeon: Ila Romano MD;  Location: [facility] OR     CARDIAC SURGERY  2011    aortic valve replacement     ENT SURGERY      tonsillectomy     HRW PORT A CATH       INSERT PORT VASCULAR ACCESS Left 10/22/2014    Procedure: INSERT PORT VASCULAR ACCESS;  Surgeon: Ila Romano MD;  Location: [facility] OR     LUMPECTOMY BREAST WITH SEED LOCALIZATION Right 9/10/2014    Procedure: LUMPECTOMY BREAST WITH SEED LOCALIZATION;  Surgeon: Ila Romano MD;  Location: [facility] OR     ORTHOPEDIC SURGERY      shoulder, thumb surgery     REMOVE PORT VASCULAR ACCESS Left 2015    Procedure: REMOVE PORT VASCULAR ACCESS;   "Surgeon: Ila Romano MD;  Location: [facility] OR     REPAIR ANEURYSM ASCENDING AORTA  6/23/2011    Procedure:REPAIR ANEURYSM ASCENDING AORTA; Medial Sternotomy, Aortic Valve Replacement, (St. Yordan [employer] HP Valved Graft, size 23 mm) on pump oxygenation, intraoperative transesophageal cardiogram; Surgeon:JOHN PAUL ULLOA; Location:[facility] OR     REPLACE VALVE AORTIC  6/23/11    bicuspid AV with severe stenosis - 6/2011 mechanical AVR with 23 mm St Yordan AV conduit, composite graft placement     Family Hx:  Family History   Problem Relation Age of Onset     Hypertension Mother      Thyroid Disease Mother         \"Toxic thyroid\"     Prostate Cancer Father 70     Cancer Father 80        Lung cancer, Not caused from smoking     Cerebrovascular Disease Father 80     Hypertension Father      Cardiovascular Brother         half brother      Cardiovascular Son         Puentes-Parkinsons White Syndrome     Cardiovascular Daughter         Heart murmur     Breast Cancer Paternal Aunt 80     Cardiovascular Paternal Aunt      Arthritis Brother 40        RA - half brother      Cancer Maternal Grandfather      Colon Cancer No family hx of      Thyroid disease: as above          Social Hx:  Social History     Socioeconomic History     Marital status:      Spouse name: Not on file     Number of children: Not on file     Years of education: Not on file     Highest education level: Not on file   Occupational History     Not on file   Social Needs     Financial resource strain: Not on file     Food insecurity:     Worry: Not on file     Inability: Not on file     Transportation needs:     Medical: Not on file     Non-medical: Not on file   Tobacco Use     Smoking status: Never Smoker     Smokeless tobacco: Never Used   Substance and Sexual Activity     Alcohol use: Yes     Comment: 2 drinks per week -      Drug use: No     Sexual activity: Not Currently   Lifestyle     Physical activity:     Days per week: Not on " "file     Minutes per session: Not on file     Stress: Not on file   Relationships     Social connections:     Talks on phone: Not on file     Gets together: Not on file     Attends [employer] service: Not on file     Active member of club or organization: Not on file     Attends meetings of clubs or organizations: Not on file     Relationship status: Not on file     Intimate partner violence:     Fear of current or ex partner: Not on file     Emotionally abused: Not on file     Physically abused: Not on file     Forced sexual activity: Not on file   Other Topics Concern     Parent/sibling w/ CABG, MI or angioplasty before 65F 55M? Not Asked      Service Not Asked     Blood Transfusions Not Asked     Caffeine Concern Yes     Comment: 1-2 cups caffeine per day     Occupational Exposure Not Asked     Hobby Hazards Not Asked     Sleep Concern No     Stress Concern No     Weight Concern No     Special Diet Yes     Comment: low fat daily , low red meats     Back Care No     Exercise Yes     Comment: walks daily/ 3x a week exercise class     Bike Helmet Not Asked     Seat Belt Not Asked     Self-Exams Not Asked   Social History Narrative     Not on file          MEDICATIONS:  has a current medication list which includes the following prescription(s): acetaminophen, acyclovir, alendronate, anastrozole, aspirin, calcium-magnesium-vitamin d, carvedilol, vitamin d3, lisinopril, methimazole, valacyclovir, warfarin, and zolpidem.    ROS   ROS: 10 point ROS neg other than the symptoms noted above in the HPI.    Physical Exam   VS: BP (!) 165/90 (BP Location: Right arm, Patient Position: Chair, Cuff Size: Adult Regular)   Pulse 52   Temp 98.4  F (36.9  C) (Oral)   Resp 16   Ht 1.626 m (5' 4\")   Wt 58.6 kg (129 lb 1.6 oz)   LMP  (LMP Unknown)   SpO2 98%   Breastfeeding? No   BMI 22.16 kg/m     GENERAL: AXOX3, NAD, well dressed, answering questions appropriately, appears stated age.  HEENT: No exopthalmous, no " proptosis, EOMI, no lig lag, no retraction  NECK: Thyroid normal in size, non tender, no nodules were palpated.  CV: RRR  LUNGS: CTAB  ABDOMEN: +BS  NEUROLOGY: CN grossly intact, no tremors  PSYCH: normal affect and mood      LABS:  TFTs:  ENDO THYROID LABS-[facility] Latest Ref Rng & Units 8/8/2019 6/27/2019   TSH 0.40 - 4.00 mU/L 0.68 0.32 (L)   T4 FREE 0.76 - 1.46 ng/dL 1.02 1.20   FREE T3 2.3 - 4.2 pg/mL 2.6 2.9     ENDO THYROID LABS-[facility] Latest Ref Rng & Units 5/13/2019 11/8/2018   TSH 0.40 - 4.00 mU/L 0.94 1.02   T4 FREE 0.76 - 1.46 ng/dL 1.05 1.02   FREE T3 2.3 - 4.2 pg/mL 2.7 2.9     ENDO THYROID LABS-[facility] Latest Ref Rng & Units 9/6/2018   TSH 0.40 - 4.00 mU/L 1.10   T4 FREE 0.76 - 1.46 ng/dL 0.89   FREE T3 2.3 - 4.2 pg/mL 2.8     ENDO THYROID LABS-[facility] Latest Ref Rng & Units 6/5/2018   TSH 0.40 - 4.00 mU/L 0.05 (L)   T4 FREE 0.76 - 1.46 ng/dL 1.27   FREE T3 2.3 - 4.2 pg/mL 3.4     ENDO THYROID LABS-[facility] Latest Ref Rng & Units 12/6/2017   TSH 0.40 - 4.00 mU/L 0.18 (L)   T4 FREE 0.76 - 1.46 ng/dL 1.16   FREE T3 2.3 - 4.2 pg/mL 3.2   THYROID STIM IMMUNOG <=1.3 TSI index <1.0     ENDO THYROID LABS-[facility] Latest Ref Rng & Units 11/10/2017 7/24/2017   TSH 0.40 - 4.00 mU/L 0.12 (L) 0.12 (L)   T4 FREE 0.76 - 1.46 ng/dL 1.10 1.16     ENDO THYROID LABS-[facility] Latest Ref Rng & Units 7/14/2016   TSH 0.40 - 4.00 mU/L 0.24 (L)   T4 FREE 0.76 - 1.46 ng/dL 1.22       CBC:  WBC   Date Value Ref Range Status   08/08/2019 4.3 4.0 - 11.0 10e9/L Final         LFTs:  Liver Function Studies -   Recent Labs   Lab Test 05/13/19  0822   PROTTOTAL 6.8   ALBUMIN 3.4   BILITOTAL 0.4   ALKPHOS 71   AST 17   ALT 19     ULTRASOUND THYROID December 15, 2017 9:24 AM   HISTORY: Subclinical hyperthyroidism.   FINDINGS: Thyroid ultrasound demonstrates an enlarged thyroid gland. The right lobe measures 5.4 x 2.0 x 1.8 cm. The left lobe measures 3.8 x 1.6 x 1.3 cm. The isthmus is normal in thickness. Thyroid parenchyma is heterogeneous in echotexture. Increased  color Doppler flow is noted throughout the thyroid gland. Thyroid nodules as follows: Right Lobe: None. Isthmus: None. Left Lobe: None.   IMPRESSION: Enlarged heterogeneous thyroid gland without evidence of a discrete thyroid nodule. Increased color Doppler flow suggests underlying thyroiditis. Correlate with thyroid function test and nuclear medicine thyroid scan as needed.    All pertinent notes, labs, and images personally reviewed by me.     A/P  Ms.Carol WINDY Bolanos is a 63 year old here for the evaluation of hyperthyroidism.    Subclinical hyperthyroidism (TSI neg):   TSH remained suppressed since 2016 and along with normal free T4  No h/o arrhythmia  No h/o osteoporosis-- has osteopenia. Not on treatment.  She is on aromatase inhibitor for breast cancer.  TSI neg  US thyroid ( h/o radiation)- no discrete nodules.  Clinically looks euthyroid.  No major complaints.  As there is further decline in TSH along with risk for low bone density with aromatase inhibitor (with with prior history of osteopenia) was started on methimazole 5 mg 7/2018  Currently taking methimazole 2.5 mg/day.   Recent labs WNL.  Plan:  Continue methimazole 2.5 mg/day  Labs in 6 months.  Clinic visit/phone visit after that.    Off note she is on anticoagulant and may need dose adjustment to get INR at target levels ( h/o aortic valve replacement)  Baseline LFT and WBC normal.  Discussed diagnosis, pathophysiology, management and treatment options of condition with claritza Winchester to follow up with Primary Care provider regarding elevated blood pressure.    Discussed indications, risks and benefits of all medications prescribed, and answered questions to patient's satisfaction.  The patient indicates understanding of these issues and agrees with the plan.    Discussed possible side effects of methimazole including liver dysfunction and agranulocytosis. Discussed to watch for s/s like jaundice, abdominal pain and skin rash. In case of prolonged  unresolving fever, sore throat and infections, advise to seek medical care.    Call if:     Signs or symptoms of infection. These include a fever of 100.5 degrees or higher, chills, severe sore throat, ear or sinus pain, cough, increased sputum or change in color, painful urination, mouth sores.   Severe nausea or vomiting.   Joint pain or swelling.   Not able to eat.   Unusual bruising or bleeding.   Dark urine or yellow skin or eyes.      Follow-up:  6 months phone/clinic visit.    Merle Baxter MD  Endocrinology  [facility]  CC: Brook Echavarria     More than 50% of face to face time spent with Ms. Bolanos on counseling / coordinating her care.    All questions were answered.  The patient indicates understanding of the above issues and agrees with the plan set forth.     Addendum to above note and clinic visit:    Labs reviewed.    See result note/telephone encounter.                Again, thank you for allowing me to participate in the care of your patient.        Sincerely,        Merle Baxter MD

## 2019-09-05 NOTE — PATIENT INSTRUCTIONS
[facility] & [place] locations   Dr Baxter, Endocrinology Department      [facility]   [address]  [place] [address]  Appointment [phone]  Fax: [phone]  [place]: Monday and Tuesday         [facility]   [address]  [place] [address]  Appointment [phone]  Fax: [phone]  [place]: Wednesday and Thursday            Please check the cost coverage and copay with insurance before recommended tests, services and medications (especially if new medications are prescribed).     If ordered, please get blood work done 1 week prior to your next appointment so they will be available to Dr. Baxter at your visit.    Continue methimazole 2.5 mg/day  Labs in 6 months  Please make a lab appointment for blood work and follow up clinic appointment in 1 week after that to discuss results.    Discussed possible side effects of methimazole including liver dysfunction and agranulocytosis. Discussed to watch for s/s like jaundice, abdominal pain and skin rash. In case of prolonged unresolving fever, sore throat and infections, advise to seek medical care.    Call if:     Signs or symptoms of infection. These include a fever of 100.5 degrees or higher, chills, severe sore throat, ear or sinus pain, cough, increased sputum or change in color, painful urination, mouth sores.   Severe nausea or vomiting.   Joint pain or swelling.   Not able to eat.   Unusual bruising or bleeding.   Dark urine or yellow skin or eyes.

## 2019-09-05 NOTE — PROGRESS NOTES
ANTICOAGULATION FOLLOW-UP CLINIC VISIT    Patient Name:  Annabella Bolanos  Date:  2019  Contact Type:  Face to Face    SUBJECTIVE:  Patient Findings     Comments:   The patient was assessed for diet, medication, and activity level changes, missed or extra doses, bruising or bleeding, with no problem findings.          Clinical Outcomes     Negatives:   Major bleeding event, Thromboembolic event, Anticoagulation-related hospital admission, Anticoagulation-related ED visit, Anticoagulation-related fatality    Comments:   The patient was assessed for diet, medication, and activity level changes, missed or extra doses, bruising or bleeding, with no problem findings.             OBJECTIVE    INR Protime   Date Value Ref Range Status   2019 2.6 (A) 0.86 - 1.14 Final       ASSESSMENT / PLAN  INR assessment THER    Recheck INR In: 6 WEEKS    INR Location Clinic      Anticoagulation Summary  As of 2019    INR goal:   2.0-3.0   TTR:   78.7 % (3.4 y)   Prior goal:   1.8-2.5   INR used for dosin.6! (2019)   Warfarin maintenance plan:   7.5 mg (5 mg x 1.5) every Mon, Wed, Sat; 5 mg (5 mg x 1) all other days   Full warfarin instructions:   7.5 mg every Mon, Wed, Sat; 5 mg all other days   Weekly warfarin total:   42.5 mg   No change documented:   Deborah Aguilera RN   Plan last modified:   Erica Stinson RN (2019)   Next INR check:   10/16/2019   Priority:   INR   Target end date:       Indications    Long-term (current) use of anticoagulants [Z79.01] [Z79.01]  H/O aortic valve replacement [Z95.2]             Anticoagulation Episode Summary     INR check location:       Preferred lab:       Send INR reminders to:   [facility]    Comments:         Anticoagulation Care Providers     Provider Role Specialty Phone number    Brook Echavarria MD [facility] Internal Medicine [phone]            See the Encounter Report to view Anticoagulation Flowsheet and Dosing Calendar (Go  to Encounters tab in chart review, and find the Anticoagulation Therapy Visit)    Dosage adjustment made based on physician directed care plan.    Deborah Aguilera RN

## 2019-09-25 DIAGNOSIS — Z95.2 S/P AORTIC VALVE REPLACEMENT: ICD-10-CM

## 2019-09-25 RX ORDER — WARFARIN SODIUM 5 MG/1
TABLET ORAL
Qty: 105 TABLET | Refills: 0 | Status: SHIPPED | OUTPATIENT
Start: 2019-09-25 | End: 2019-12-18

## 2019-09-25 NOTE — TELEPHONE ENCOUNTER
"Requested Prescriptions   Pending Prescriptions Disp Refills     warfarin ANTICOAGULANT (COUMADIN) 5 MG tablet  Last Written Prescription Date:  07/18/19  Last Fill Quantity: 100,  # refills: 0   Last office visit: 7/18/2019 with prescribing provider:  07/18/19   Future Office Visit:     100 tablet 0     Sig: TAKE ONE TABLET BY MOUTH DAILY EXCEPT ONE AND ONE-HALF TABLETS ON WEDNESDAYS AND SATURDAYS OR AS DIRECTED BY THE INR CLINIC       Vitamin K Antagonists Failed - 9/25/2019 11:04 AM        Failed - INR is within goal in the past 6 weeks     Confirm INR is within goal in the past 6 weeks.     Recent Labs   Lab Test 09/05/19   INR 2.6*                       Failed - Medication is active on med list        Passed - Recent (12 mo) or future (30 days) visit within the authorizing provider's specialty     Patient had office visit in the last 12 months or has a visit in the next 30 days with authorizing provider or within the authorizing provider's specialty.  See \"Patient Info\" tab in inbasket, or \"Choose Columns\" in Meds & Orders section of the refill encounter.              Passed - Patient is 18 years of age or older        Passed - Patient is not pregnant        Passed - No positive pregnancy on file in past 12 months          "

## 2019-09-25 NOTE — TELEPHONE ENCOUNTER
Warfarin dosing is managed by INR Clinic.  Prescription approved per [facility] Refill Protocol.  Erica Stinson RN

## 2019-10-16 ENCOUNTER — ANTICOAGULATION THERAPY VISIT (OUTPATIENT)
Dept: ANTICOAGULATION | Facility: CLINIC | Age: 64
End: 2019-10-16
Payer: COMMERCIAL

## 2019-10-16 DIAGNOSIS — Z95.2 H/O AORTIC VALVE REPLACEMENT: ICD-10-CM

## 2019-10-16 DIAGNOSIS — Z79.01 LONG TERM CURRENT USE OF ANTICOAGULANT THERAPY: ICD-10-CM

## 2019-10-16 LAB — INR POINT OF CARE: 2.2 (ref 0.86–1.14)

## 2019-10-16 PROCEDURE — 99207 ZZC NO CHARGE NURSE ONLY: CPT

## 2019-10-16 PROCEDURE — 36416 COLLJ CAPILLARY BLOOD SPEC: CPT

## 2019-10-16 PROCEDURE — 85610 PROTHROMBIN TIME: CPT | Mod: QW

## 2019-10-16 NOTE — PROGRESS NOTES
ANTICOAGULATION FOLLOW-UP CLINIC VISIT    Patient Name:  Annabella Bolanos  Date:  10/16/2019  Contact Type:  Face to Face    SUBJECTIVE:  Patient Findings     Comments:   The patient was assessed for diet, medication, and activity level changes, missed or extra doses, bruising or bleeding, with no problem findings.          Clinical Outcomes     Negatives:   Major bleeding event, Thromboembolic event, Anticoagulation-related hospital admission, Anticoagulation-related ED visit, Anticoagulation-related fatality    Comments:   The patient was assessed for diet, medication, and activity level changes, missed or extra doses, bruising or bleeding, with no problem findings.             OBJECTIVE    INR Protime   Date Value Ref Range Status   10/16/2019 2.2 (A) 0.86 - 1.14 Final       ASSESSMENT / PLAN  INR assessment THER    Recheck INR In: 6 WEEKS    INR Location Clinic      Anticoagulation Summary  As of 10/16/2019    INR goal:   2.0-3.0   TTR:   79.4 % (3.5 y)   INR used for dosin.2 (10/16/2019)   Warfarin maintenance plan:   7.5 mg (5 mg x 1.5) every Mon, Wed, Sat; 5 mg (5 mg x 1) all other days   Full warfarin instructions:   7.5 mg every Mon, Wed, Sat; 5 mg all other days   Weekly warfarin total:   42.5 mg   No change documented:   Erica Stinson RN   Plan last modified:   Erica Stinson RN (2019)   Next INR check:   2019   Priority:   INR   Target end date:       Indications    Long-term (current) use of anticoagulants [Z79.01] [Z79.01]  H/O aortic valve replacement [Z95.2]             Anticoagulation Episode Summary     INR check location:       Preferred lab:       Send INR reminders to:   BRADLY BRANDI [facility]    Comments:         Anticoagulation Care Providers     Provider Role Specialty Phone number    Brook Echavarria MD Responsible Internal Medicine [phone]            See the Encounter Report to view Anticoagulation Flowsheet and Dosing Calendar (Go to Encounters tab in  chart review, and find the Anticoagulation Therapy Visit)    Dosage adjustment made based on physician directed care plan.    Erica Stinson RN

## 2019-11-26 ENCOUNTER — ANTICOAGULATION THERAPY VISIT (OUTPATIENT)
Dept: ANTICOAGULATION | Facility: CLINIC | Age: 64
End: 2019-11-26
Payer: COMMERCIAL

## 2019-11-26 DIAGNOSIS — Z95.2 H/O AORTIC VALVE REPLACEMENT: ICD-10-CM

## 2019-11-26 DIAGNOSIS — Z79.01 LONG TERM CURRENT USE OF ANTICOAGULANT THERAPY: ICD-10-CM

## 2019-11-26 LAB — INR POINT OF CARE: 2.4 (ref 0.86–1.14)

## 2019-11-26 PROCEDURE — 85610 PROTHROMBIN TIME: CPT | Mod: QW

## 2019-11-26 PROCEDURE — 99207 ZZC NO CHARGE NURSE ONLY: CPT

## 2019-11-26 PROCEDURE — 36416 COLLJ CAPILLARY BLOOD SPEC: CPT

## 2019-11-26 NOTE — PROGRESS NOTES
ANTICOAGULATION FOLLOW-UP CLINIC VISIT    Patient Name:  Annabella Bolanos  Date:  2019  Contact Type:  Face to Face    SUBJECTIVE:  Patient Findings     Comments:   The patient was assessed for diet, medication, and activity level changes, missed or extra doses, bruising or bleeding, with no problem findings.          Clinical Outcomes     Negatives:   Major bleeding event, Thromboembolic event, Anticoagulation-related hospital admission, Anticoagulation-related ED visit, Anticoagulation-related fatality    Comments:   The patient was assessed for diet, medication, and activity level changes, missed or extra doses, bruising or bleeding, with no problem findings.             OBJECTIVE    INR Protime   Date Value Ref Range Status   2019 2.4 (A) 0.86 - 1.14 Final       ASSESSMENT / PLAN  INR assessment THER    Recheck INR In: 6 WEEKS    INR Location Clinic      Anticoagulation Summary  As of 2019    INR goal:   2.0-3.0   TTR:   90.7 % (1 y)   INR used for dosin.4 (2019)   Warfarin maintenance plan:   7.5 mg (5 mg x 1.5) every Mon, Wed, Sat; 5 mg (5 mg x 1) all other days   Full warfarin instructions:   7.5 mg every Mon, Wed, Sat; 5 mg all other days   Weekly warfarin total:   42.5 mg   Plan last modified:   Erica Stinson RN (2019)   Next INR check:   2020   Priority:   Maintenance   Target end date:       Indications    Long-term (current) use of anticoagulants [Z79.01] [Z79.01]  H/O aortic valve replacement [Z95.2]             Anticoagulation Episode Summary     INR check location:       Preferred lab:       Send INR reminders to:   [facility]    Comments:         Anticoagulation Care Providers     Provider Role Specialty Phone number    Brook Echavarria MD [facility] Internal Medicine [phone]            See the Encounter Report to view Anticoagulation Flowsheet and Dosing Calendar (Go to Encounters tab in chart review, and find the  Anticoagulation Therapy Visit)    Dosage adjustment made based on physician directed care plan.    Deborah Aguilera RN

## 2019-12-18 DIAGNOSIS — Z95.2 S/P AORTIC VALVE REPLACEMENT: ICD-10-CM

## 2019-12-18 RX ORDER — WARFARIN SODIUM 5 MG/1
TABLET ORAL
Qty: 105 TABLET | Refills: 0 | Status: SHIPPED | OUTPATIENT
Start: 2019-12-18 | End: 2020-03-11

## 2019-12-18 NOTE — TELEPHONE ENCOUNTER
Dose verified:  7.5 mg every Mon, Wed, Sat; 5 mg all other days    Tracie Aguilera PharmD BCACP  Anticoagulation Clinical Pharmacist

## 2019-12-18 NOTE — TELEPHONE ENCOUNTER
"Requested Prescriptions   Pending Prescriptions Disp Refills     warfarin ANTICOAGULANT (COUMADIN) 5 MG tablet  Last Written Prescription Date:  9/25/19  Last Fill Quantity: 105,  # refills: 0   Last office visit: 7/18/2019 with prescribing provider:  Renetta   Future Office Visit:   105 tablet 0     Sig: TAKE ONE TABLET BY MOUTH DAILY EXCEPT ONE AND ONE-HALF TABLETS ON MONDAYS,WEDNESDAYS AND SATURDAYS OR AS DIRECTED BY THE INR CLINIC       Vitamin K Antagonists Failed - 12/18/2019  8:16 AM        Failed - INR is within goal in the past 6 weeks     Confirm INR is within goal in the past 6 weeks.     Recent Labs   Lab Test 11/26/19   INR 2.4*                       Passed - Recent (12 mo) or future (30 days) visit within the authorizing provider's specialty     Patient has had an office visit with the authorizing provider or a provider within the authorizing providers department within the previous 12 mos or has a future within next 30 days. See \"Patient Info\" tab in inbasket, or \"Choose Columns\" in Meds & Orders section of the refill encounter.              Passed - Medication is active on med list        Passed - Patient is 18 years of age or older        Passed - Patient is not pregnant        Passed - No positive pregnancy on file in past 12 months        "

## 2020-01-08 ENCOUNTER — ANTICOAGULATION THERAPY VISIT (OUTPATIENT)
Dept: ANTICOAGULATION | Facility: CLINIC | Age: 65
End: 2020-01-08
Payer: COMMERCIAL

## 2020-01-08 DIAGNOSIS — Z79.01 LONG TERM CURRENT USE OF ANTICOAGULANT THERAPY: ICD-10-CM

## 2020-01-08 DIAGNOSIS — Z95.2 H/O AORTIC VALVE REPLACEMENT: ICD-10-CM

## 2020-01-08 LAB — INR POINT OF CARE: 2.5 (ref 0.86–1.14)

## 2020-01-08 PROCEDURE — 99207 ZZC NO CHARGE NURSE ONLY: CPT

## 2020-01-08 PROCEDURE — 85610 PROTHROMBIN TIME: CPT | Mod: QW

## 2020-01-08 PROCEDURE — 36416 COLLJ CAPILLARY BLOOD SPEC: CPT

## 2020-02-19 ENCOUNTER — ANTICOAGULATION THERAPY VISIT (OUTPATIENT)
Dept: ANTICOAGULATION | Facility: CLINIC | Age: 65
End: 2020-02-19
Payer: COMMERCIAL

## 2020-02-19 DIAGNOSIS — Z79.01 LONG TERM CURRENT USE OF ANTICOAGULANT THERAPY: ICD-10-CM

## 2020-02-19 DIAGNOSIS — Z95.2 H/O AORTIC VALVE REPLACEMENT: ICD-10-CM

## 2020-02-19 LAB — INR POINT OF CARE: 2.9 (ref 0.86–1.14)

## 2020-02-19 PROCEDURE — 99207 ZZC NO CHARGE NURSE ONLY: CPT

## 2020-02-19 PROCEDURE — 85610 PROTHROMBIN TIME: CPT | Mod: QW

## 2020-02-19 PROCEDURE — 36416 COLLJ CAPILLARY BLOOD SPEC: CPT

## 2020-02-19 NOTE — PROGRESS NOTES
ANTICOAGULATION FOLLOW-UP CLINIC VISIT    Patient Name:  Annabella Bolanos  Date:  2020  Contact Type:  Face to Face    SUBJECTIVE:  Patient Findings     Comments:   The patient was assessed for diet, medication, and activity level changes, missed or extra doses, bruising or bleeding, with no problem findings.          Clinical Outcomes     Negatives:   Major bleeding event, Thromboembolic event, Anticoagulation-related hospital admission, Anticoagulation-related ED visit, Anticoagulation-related fatality    Comments:   The patient was assessed for diet, medication, and activity level changes, missed or extra doses, bruising or bleeding, with no problem findings.             OBJECTIVE    INR Protime   Date Value Ref Range Status   2020 2.9 (A) 0.86 - 1.14 Final       ASSESSMENT / PLAN  INR assessment THER    Recheck INR In: 6 WEEKS    INR Location Clinic      Anticoagulation Summary  As of 2020    INR goal:   2.0-3.0   TTR:   93.2 % (1 y)   INR used for dosin.9 (2020)   Warfarin maintenance plan:   7.5 mg (5 mg x 1.5) every Mon, Wed, Sat; 5 mg (5 mg x 1) all other days   Full warfarin instructions:   7.5 mg every Mon, Wed, Sat; 5 mg all other days   Weekly warfarin total:   42.5 mg   No change documented:   Erica Stinson RN   Plan last modified:   Erica Stinson RN (2019)   Next INR check:   2020   Priority:   Maintenance   Target end date:       Indications    Long-term (current) use of anticoagulants [Z79.01] [Z79.01]  H/O aortic valve replacement [Z95.2]             Anticoagulation Episode Summary     INR check location:       Preferred lab:       Send INR reminders to:   [facility]    Comments:         Anticoagulation Care Providers     Provider Role Specialty Phone number    Brook Echavarria MD Responsible Internal Medicine [phone]            See the Encounter Report to view Anticoagulation Flowsheet and Dosing Calendar (Go to Encounters tab in  chart review, and find the Anticoagulation Therapy Visit)    Dosage adjustment made based on physician directed care plan.    Erica Stinson RN

## 2020-03-01 ENCOUNTER — HEALTH MAINTENANCE LETTER (OUTPATIENT)
Age: 65
End: 2020-03-01

## 2020-03-11 ENCOUNTER — TRANSFERRED RECORDS (OUTPATIENT)
Dept: HEALTH INFORMATION MANAGEMENT | Facility: CLINIC | Age: 65
End: 2020-03-11

## 2020-03-11 DIAGNOSIS — Z95.2 S/P AORTIC VALVE REPLACEMENT: ICD-10-CM

## 2020-03-11 DIAGNOSIS — E05.90 SUBCLINICAL HYPERTHYROIDISM: ICD-10-CM

## 2020-03-11 LAB
T3FREE SERPL-MCNC: 3 PG/ML (ref 2.3–4.2)
WBC # BLD AUTO: 4.7 10E9/L (ref 4–11)

## 2020-03-11 PROCEDURE — 36415 COLL VENOUS BLD VENIPUNCTURE: CPT | Performed by: INTERNAL MEDICINE

## 2020-03-11 PROCEDURE — 85048 AUTOMATED LEUKOCYTE COUNT: CPT | Performed by: INTERNAL MEDICINE

## 2020-03-11 PROCEDURE — 84439 ASSAY OF FREE THYROXINE: CPT | Performed by: INTERNAL MEDICINE

## 2020-03-11 PROCEDURE — 84481 FREE ASSAY (FT-3): CPT | Performed by: INTERNAL MEDICINE

## 2020-03-11 PROCEDURE — 84443 ASSAY THYROID STIM HORMONE: CPT | Performed by: INTERNAL MEDICINE

## 2020-03-11 PROCEDURE — 80076 HEPATIC FUNCTION PANEL: CPT | Performed by: INTERNAL MEDICINE

## 2020-03-11 RX ORDER — WARFARIN SODIUM 5 MG/1
TABLET ORAL
Qty: 105 TABLET | Refills: 0 | Status: SHIPPED | OUTPATIENT
Start: 2020-03-11 | End: 2020-05-14

## 2020-03-11 NOTE — TELEPHONE ENCOUNTER
patient calling to request refill of warfarin.  Last office visit 7/18/19.  Warfarin dosing is managed by INR Clinic.  Prescription approved per [facility] Refill Protocol.  Erica Stinson RN

## 2020-03-12 LAB
ALBUMIN SERPL-MCNC: 3.5 G/DL (ref 3.4–5)
ALP SERPL-CCNC: 58 U/L (ref 40–150)
ALT SERPL W P-5'-P-CCNC: 22 U/L (ref 0–50)
AST SERPL W P-5'-P-CCNC: 12 U/L (ref 0–45)
BILIRUB DIRECT SERPL-MCNC: 0.1 MG/DL (ref 0–0.2)
BILIRUB SERPL-MCNC: 0.5 MG/DL (ref 0.2–1.3)
PROT SERPL-MCNC: 6.9 G/DL (ref 6.8–8.8)
T4 FREE SERPL-MCNC: 1.04 NG/DL (ref 0.76–1.46)
TSH SERPL DL<=0.005 MIU/L-ACNC: 1.05 MU/L (ref 0.4–4)

## 2020-03-23 ENCOUNTER — TELEPHONE (OUTPATIENT)
Dept: ENDOCRINOLOGY | Facility: CLINIC | Age: 65
End: 2020-03-23

## 2020-03-23 DIAGNOSIS — E05.90 SUBCLINICAL HYPERTHYROIDISM: ICD-10-CM

## 2020-03-23 RX ORDER — METHIMAZOLE 5 MG/1
2.5 TABLET ORAL DAILY
Qty: 45 TABLET | Refills: 2 | Status: SHIPPED | OUTPATIENT
Start: 2020-03-23 | End: 2020-09-23

## 2020-03-23 NOTE — TELEPHONE ENCOUNTER
ENDO THYROID LABS-[facility] Latest Ref Rng & Units 3/11/2020   TSH 0.40 - 4.00 mU/L 1.05   T4 FREE 0.76 - 1.46 ng/dL 1.04   FREE T3 2.3 - 4.2 pg/mL 3.0     Thyroid labs are in normal range.  History of hyperthyroidism on methimazole  His labs are in normal range including TFT, LFT and white blood cell count recommend to continue methimazole at current dose 2.5 mg/day.  Repeat labs in 4- 6 months.  Please make a lab appointment for blood work and follow up clinic appointment in 1 week after that to discuss results.    Please inform patient.      Endo staff- can you please check which pharmacy she prefers?  Please pend the orders with correct quantity, select pharmacy and then send for signature. Also associate with correct diagnosis.    Thank you.    Merle Baxter MD

## 2020-03-23 NOTE — TELEPHONE ENCOUNTER
Test Results  Who is calling?:  Pt  Who ordered the test:  Dr Baxter/Saleem  Type of test: Lab  Date of test:  3/11/2020  Where was the test performed:  unsure  What are your questions/concerns?:  Pt would like results of 3/11/20 labs. Pt would like to know next steps for follow up.   Pt also will have a new insurance too so if rx filled, she will need to give new pharmacy.     Please call pt at [phone].    Thank you.  norman    Okay to leave a detailed message?:  No

## 2020-03-26 ENCOUNTER — TELEPHONE (OUTPATIENT)
Dept: CARDIOLOGY | Facility: CLINIC | Age: 65
End: 2020-03-26

## 2020-03-26 DIAGNOSIS — I10 ESSENTIAL HYPERTENSION, BENIGN: ICD-10-CM

## 2020-03-26 DIAGNOSIS — I10 ESSENTIAL HYPERTENSION: ICD-10-CM

## 2020-03-26 RX ORDER — LISINOPRIL 10 MG/1
10 TABLET ORAL 2 TIMES DAILY
Qty: 180 TABLET | Refills: 1 | Status: SHIPPED | OUTPATIENT
Start: 2020-03-26 | End: 2020-08-18

## 2020-03-26 RX ORDER — CARVEDILOL 12.5 MG/1
12.5 TABLET ORAL 2 TIMES DAILY WITH MEALS
Qty: 180 TABLET | Refills: 1 | Status: SHIPPED | OUTPATIENT
Start: 2020-03-26 | End: 2020-08-18

## 2020-03-26 NOTE — TELEPHONE ENCOUNTER
Received call from pt requesting her prescriptions for carvedilol and lisinopril be sent to [facility]. Rx sent for both medications.    Pt also requested a letter from Dr. Guan stating she is high risk for COVID19 for her work, due to her hx of cardiac surgery and HTN. 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 Pt stated she would like to stay home from work at this time and use her medical leave that she has saved up. Advised can request letter from Dr. Guan, advised pt this may take some time as Dr. Guan is currently out of the office. Pt verbalized understanding and agreement with plan.     Addendum 3/30/20: Letter signed by Dr. Guan and e-mailed to pt. Original sent to scan. Called pt, no answer. Left VM advising letter has been sent and requested pt call back to Team 1 if she does not receive it.

## 2020-03-30 ENCOUNTER — MEDICAL CORRESPONDENCE (OUTPATIENT)
Dept: HEALTH INFORMATION MANAGEMENT | Facility: CLINIC | Age: 65
End: 2020-03-30

## 2020-03-31 DIAGNOSIS — Z79.01 LONG TERM CURRENT USE OF ANTICOAGULANT THERAPY: Primary | ICD-10-CM

## 2020-03-31 DIAGNOSIS — Z95.2 H/O AORTIC VALVE REPLACEMENT: ICD-10-CM

## 2020-04-01 ENCOUNTER — ANTICOAGULATION THERAPY VISIT (OUTPATIENT)
Dept: ANTICOAGULATION | Facility: CLINIC | Age: 65
End: 2020-04-01

## 2020-04-01 DIAGNOSIS — Z95.2 H/O AORTIC VALVE REPLACEMENT: ICD-10-CM

## 2020-04-01 DIAGNOSIS — Z79.01 LONG TERM CURRENT USE OF ANTICOAGULANT THERAPY: ICD-10-CM

## 2020-04-01 LAB
CAPILLARY BLOOD COLLECTION: NORMAL
INR PPP: 2.3 (ref 0.86–1.14)

## 2020-04-01 PROCEDURE — 99207 ZZC NO CHARGE NURSE ONLY: CPT | Performed by: INTERNAL MEDICINE

## 2020-04-01 PROCEDURE — 36416 COLLJ CAPILLARY BLOOD SPEC: CPT | Performed by: INTERNAL MEDICINE

## 2020-04-01 PROCEDURE — 85610 PROTHROMBIN TIME: CPT | Performed by: INTERNAL MEDICINE

## 2020-04-01 NOTE — PROGRESS NOTES
ANTICOAGULATION FOLLOW-UP CLINIC VISIT    Patient Name:  Annabella Bolanos  Date:  2020  Contact Type:  Telephone/ discussed with patient    SUBJECTIVE:  Patient Findings     Comments:   The patient was assessed for diet, medication, and activity level changes, missed or extra doses, bruising or bleeding, with no problem findings.          Clinical Outcomes     Negatives:   Major bleeding event, Thromboembolic event, Anticoagulation-related hospital admission, Anticoagulation-related ED visit, Anticoagulation-related fatality    Comments:   The patient was assessed for diet, medication, and activity level changes, missed or extra doses, bruising or bleeding, with no problem findings.             OBJECTIVE    INR   Date Value Ref Range Status   2020 2.30 (H) 0.86 - 1.14 Final     Comment:     This test is intended for monitoring Coumadin therapy.  Results are not   accurate in patients with prolonged INR due to factor deficiency.         ASSESSMENT / PLAN  INR assessment THER    Recheck INR In: 8 WEEKS    INR Location Clinic      Anticoagulation Summary  As of 2020    INR goal:   2.0-3.0   TTR:   93.2 % (1 y)   INR used for dosin.30 (2020)   Warfarin maintenance plan:   7.5 mg (5 mg x 1.5) every Mon, Wed, Sat; 5 mg (5 mg x 1) all other days   Full warfarin instructions:   7.5 mg every Mon, Wed, Sat; 5 mg all other days   Weekly warfarin total:   42.5 mg   No change documented:   Erica Stinson RN   Plan last modified:   Erica Stinson RN (2019)   Next INR check:   2020   Priority:   Maintenance   Target end date:       Indications    Long-term (current) use of anticoagulants [Z79.01] [Z79.01]  H/O aortic valve replacement [Z95.2]             Anticoagulation Episode Summary     INR check location:       Preferred lab:       [facility]    Comments:   20-ok to leave detailed message with dosing instructions if patient does not answer.       Anticoagulation Care Providers     Provider Role Specialty Phone number    Brook Echavarria MD Responsible Internal Medicine [phone]            See the Encounter Report to view Anticoagulation Flowsheet and Dosing Calendar (Go to Encounters tab in chart review, and find the Anticoagulation Therapy Visit)    Dosage adjustment made based on physician directed care plan.    Erica Stinson RN

## 2020-05-14 DIAGNOSIS — Z79.01 LONG TERM CURRENT USE OF ANTICOAGULANTS WITH INR GOAL OF 2.0-3.0: Primary | ICD-10-CM

## 2020-05-14 DIAGNOSIS — Z95.2 S/P AORTIC VALVE REPLACEMENT: ICD-10-CM

## 2020-05-14 RX ORDER — WARFARIN SODIUM 5 MG/1
TABLET ORAL
Qty: 105 TABLET | Refills: 1 | Status: SHIPPED | OUTPATIENT
Start: 2020-05-14 | End: 2020-11-05

## 2020-05-14 NOTE — TELEPHONE ENCOUNTER
Pending Prescriptions:                       Disp   Refills    warfarin ANTICOAGULANT (COUMADIN) 5 MG tab*105 ta*3        Sig: TAKE 1 TABLET DAILY EXCEPT ONE AND ONE-HALF TABLETS           ON MONDAY, WEDNESDAY AND SATURDAY OR AS DIRECTED           BY THE INR CLINIC

## 2020-05-14 NOTE — TELEPHONE ENCOUNTER
Last INR: 04/01/20=2.3  Next INR: 05/27/20    Prescription approved per [facility] Refill Protocol.    Marybeth Stock RN

## 2020-05-27 ENCOUNTER — TELEPHONE (OUTPATIENT)
Dept: ANTICOAGULATION | Facility: CLINIC | Age: 65
End: 2020-05-27

## 2020-05-27 ENCOUNTER — ANTICOAGULATION THERAPY VISIT (OUTPATIENT)
Dept: ANTICOAGULATION | Facility: CLINIC | Age: 65
End: 2020-05-27

## 2020-05-27 DIAGNOSIS — Z79.01 LONG TERM CURRENT USE OF ANTICOAGULANT THERAPY: ICD-10-CM

## 2020-05-27 DIAGNOSIS — Z95.2 H/O AORTIC VALVE REPLACEMENT: ICD-10-CM

## 2020-05-27 LAB
CAPILLARY BLOOD COLLECTION: NORMAL
INR PPP: 2.7 (ref 0.86–1.14)

## 2020-05-27 PROCEDURE — 36416 COLLJ CAPILLARY BLOOD SPEC: CPT | Performed by: INTERNAL MEDICINE

## 2020-05-27 PROCEDURE — 99207 ZZC NO CHARGE NURSE ONLY: CPT | Performed by: INTERNAL MEDICINE

## 2020-05-27 PROCEDURE — 85610 PROTHROMBIN TIME: CPT | Performed by: INTERNAL MEDICINE

## 2020-05-27 NOTE — TELEPHONE ENCOUNTER
Please review chart and advise if patient can go longer than the 6 week protocol for next INR check.  Patient has been feeling well and denies in changes in her health recently.  Erica Stinson RN

## 2020-05-27 NOTE — PROGRESS NOTES
ANTICOAGULATION FOLLOW-UP CLINIC VISIT    Patient Name:  Annabella Bolanos  Date:  2020  Contact Type:  Telephone/ discussed with patient    SUBJECTIVE:  Patient Findings     Comments:   The patient was assessed for diet, medication, and activity level changes, missed or extra doses, bruising or bleeding, with no problem findings.          Clinical Outcomes     Negatives:   Major bleeding event, Thromboembolic event, Anticoagulation-related hospital admission, Anticoagulation-related ED visit, Anticoagulation-related fatality    Comments:   The patient was assessed for diet, medication, and activity level changes, missed or extra doses, bruising or bleeding, with no problem findings.             OBJECTIVE    Recent labs: (last 7 days)     20  1336   INR 2.70*       ASSESSMENT / PLAN  INR assessment THER    Recheck INR In: 6 WEEKS    INR Location Clinic      Anticoagulation Summary  As of 2020    INR goal:   2.0-3.0   TTR:   93.2 % (1 y)   INR used for dosin.70 (2020)   Warfarin maintenance plan:   7.5 mg (5 mg x 1.5) every Mon, Wed, Sat; 5 mg (5 mg x 1) all other days   Full warfarin instructions:   7.5 mg every Mon, Wed, Sat; 5 mg all other days   Weekly warfarin total:   42.5 mg   No change documented:   Erica Stinson RN   Plan last modified:   Erica Stinson RN (2019)   Next INR check:   2020   Priority:   Maintenance   Target end date:       Indications    Long-term (current) use of anticoagulants [Z79.01] [Z79.01]  H/O aortic valve replacement [Z95.2]             Anticoagulation Episode Summary     INR check location:       Preferred lab:       Send INR reminders to:   [facility]    Comments:   20-ok to leave detailed message with dosing instructions if patient does not answer.      Anticoagulation Care Providers     Provider Role Specialty Phone number    Brook Echavarria MD [facility] Internal Medicine [phone]            See the  Encounter Report to view Anticoagulation Flowsheet and Dosing Calendar (Go to Encounters tab in chart review, and find the Anticoagulation Therapy Visit)    Dosage adjustment made based on physician directed care plan.    Erica Stinson RN          Addendum: See telephone note from 5/27. Recheck approved for 12 weeks.

## 2020-05-27 NOTE — TELEPHONE ENCOUNTER
Chart reviewed for appropriateness of extended INR interval.    TTR 93.2%    INR stable past 12 months +/- 0.1 from goal INR 2-3, with stable warfarin dose 42.5mg/week    [facility] INR   Latest Ref Rng & Units 0.86 - 1.14 0.86 - 1.14   5/27/2020 2.70 (H)    4/1/2020 2.30 (H)    2/19/2020  2.9 (A)   1/8/2020  2.5 (A)   11/26/2019  2.4 (A)   10/16/2019  2.2 (A)   9/5/2019  2.6 (A)   8/2/2019  1.9 (A)   6/21/2019  2.6 (A)   5/10/2019  2.3 (A)     Patient may extend INR interval up to 12 weeks if no S/S bleeding, clotting, illness or changes in medications that affect warfarin levels    Tracie Aguilera, PharmD BCACP  Anticoagulation Clinical Pharmacist

## 2020-08-12 ENCOUNTER — TELEPHONE (OUTPATIENT)
Dept: ANTICOAGULATION | Facility: CLINIC | Age: 65
End: 2020-08-12

## 2020-08-12 ENCOUNTER — OFFICE VISIT (OUTPATIENT)
Dept: URGENT CARE | Facility: URGENT CARE | Age: 65
End: 2020-08-12
Payer: COMMERCIAL

## 2020-08-12 ENCOUNTER — ANTICOAGULATION THERAPY VISIT (OUTPATIENT)
Dept: ANTICOAGULATION | Facility: CLINIC | Age: 65
End: 2020-08-12

## 2020-08-12 VITALS
HEART RATE: 69 BPM | OXYGEN SATURATION: 97 % | BODY MASS INDEX: 21.77 KG/M2 | SYSTOLIC BLOOD PRESSURE: 170 MMHG | WEIGHT: 126.8 LBS | DIASTOLIC BLOOD PRESSURE: 88 MMHG | RESPIRATION RATE: 12 BRPM | TEMPERATURE: 98.7 F

## 2020-08-12 DIAGNOSIS — Z79.01 LONG TERM CURRENT USE OF ANTICOAGULANT THERAPY: ICD-10-CM

## 2020-08-12 DIAGNOSIS — Z79.01 ANTICOAGULATED: ICD-10-CM

## 2020-08-12 DIAGNOSIS — Z95.2 H/O AORTIC VALVE REPLACEMENT: ICD-10-CM

## 2020-08-12 DIAGNOSIS — R39.9 UTI SYMPTOMS: Primary | ICD-10-CM

## 2020-08-12 DIAGNOSIS — N30.01 ACUTE CYSTITIS WITH HEMATURIA: ICD-10-CM

## 2020-08-12 DIAGNOSIS — R30.0 DYSURIA: ICD-10-CM

## 2020-08-12 DIAGNOSIS — R82.90 NONSPECIFIC FINDING ON EXAMINATION OF URINE: ICD-10-CM

## 2020-08-12 LAB
ALBUMIN UR-MCNC: >=300 MG/DL
APPEARANCE UR: ABNORMAL
BACTERIA #/AREA URNS HPF: ABNORMAL /HPF
BILIRUB UR QL STRIP: ABNORMAL
CAPILLARY BLOOD COLLECTION: NORMAL
COLOR UR AUTO: ABNORMAL
GLUCOSE UR STRIP-MCNC: NEGATIVE MG/DL
HGB UR QL STRIP: ABNORMAL
INR PPP: 2.7 (ref 0.86–1.14)
KETONES UR STRIP-MCNC: ABNORMAL MG/DL
LEUKOCYTE ESTERASE UR QL STRIP: ABNORMAL
NITRATE UR QL: POSITIVE
PH UR STRIP: 7 PH (ref 5–7)
RBC #/AREA URNS AUTO: >100 /HPF
SOURCE: ABNORMAL
SP GR UR STRIP: 1.01 (ref 1–1.03)
UROBILINOGEN UR STRIP-ACNC: 1 EU/DL (ref 0.2–1)
WBC #/AREA URNS AUTO: ABNORMAL /HPF

## 2020-08-12 PROCEDURE — 99207 ZZC NO CHARGE NURSE ONLY: CPT | Performed by: INTERNAL MEDICINE

## 2020-08-12 PROCEDURE — 99214 OFFICE O/P EST MOD 30 MIN: CPT | Performed by: NURSE PRACTITIONER

## 2020-08-12 PROCEDURE — 81001 URINALYSIS AUTO W/SCOPE: CPT | Performed by: NURSE PRACTITIONER

## 2020-08-12 PROCEDURE — 85610 PROTHROMBIN TIME: CPT | Performed by: NURSE PRACTITIONER

## 2020-08-12 PROCEDURE — 87186 SC STD MICRODIL/AGAR DIL: CPT | Performed by: NURSE PRACTITIONER

## 2020-08-12 PROCEDURE — 36416 COLLJ CAPILLARY BLOOD SPEC: CPT | Performed by: NURSE PRACTITIONER

## 2020-08-12 PROCEDURE — 87086 URINE CULTURE/COLONY COUNT: CPT | Performed by: NURSE PRACTITIONER

## 2020-08-12 PROCEDURE — 87088 URINE BACTERIA CULTURE: CPT | Performed by: NURSE PRACTITIONER

## 2020-08-12 RX ORDER — PHENAZOPYRIDINE HYDROCHLORIDE 100 MG/1
100 TABLET, FILM COATED ORAL 3 TIMES DAILY PRN
Qty: 6 TABLET | Refills: 0 | Status: SHIPPED | OUTPATIENT
Start: 2020-08-12 | End: 2020-09-11

## 2020-08-12 RX ORDER — CEFDINIR 300 MG/1
300 CAPSULE ORAL 2 TIMES DAILY
Qty: 14 CAPSULE | Refills: 0 | Status: SHIPPED | OUTPATIENT
Start: 2020-08-12 | End: 2020-08-19

## 2020-08-12 ASSESSMENT — ENCOUNTER SYMPTOMS
VOMITING: 0
HEMATURIA: 1
DYSURIA: 1
HEADACHES: 0
FEVER: 0
FREQUENCY: 1
BACK PAIN: 1
DIARRHEA: 0
ABDOMINAL PAIN: 0
DIZZINESS: 0
FLANK PAIN: 1
LIGHT-HEADEDNESS: 0
NAUSEA: 0

## 2020-08-12 NOTE — PROGRESS NOTES
Anticoagulation Management    Unable to reach Annabella today.    Today's INR result of 2.7 is therapeutic (goal INR of 2.0-3.0).  Result received from: Clinic Lab    Follow up required to discuss adjusting warfarin dose.  Patient was seen in UC and will be starting Omnicef today for UTI.  Was having hematuria.  Per [employer], concurrent use of CEFDINIR and WARFARIN may result in an increased risk of bleeding.  Would recommend patient lower her dose on Saturday this week to 5 mg (this would be a 6% dose change for the week) and have INR rechecked next week as already scheduled.    No dosing instructions given as I was not intending to adjust dose for this evening since patient has not started the Omnicef.      Anticoagulation clinic to follow up    Erica Stinson RN    .

## 2020-08-12 NOTE — Clinical Note
Patient in for hematuria and UTI symptoms. Was started on Omnicef twice a day for 7 days. INR was checked at time of visit.

## 2020-08-12 NOTE — TELEPHONE ENCOUNTER
Patient calling.  She noticed some blood in her urine today.  Has also had burning and frequency with urination.  Patient is on warfarin and last INR was therapeutic.  Recommended urgent care visit or an online or video visit if symptoms continue so urine and INR can be checked.  Erica Stinson RN

## 2020-08-12 NOTE — PROGRESS NOTES
SUBJECTIVE:   Annabella Bolanos is a 64 year old female presenting with a chief complaint of   Chief Complaint   Patient presents with     Urgent Care     Urinary Problem     Blood in urine, burning w/urination-burns ingeneral-Sx started today     INR Followup     Patient requesting early INR draw due to blood in urine        She is an established patient of [place].    UTI    Onset of symptoms was today.  Course of illness is worsening  Severity moderate  Current and associated symptoms dysuria, frequency, urgency, blood in urine and back pain  Treatment and measures tried None  Predisposing factors include none  Patient denies rigors, temperature > 101 degrees F., vomiting, vaginal discharge, vaginal odor and vaginal itching    Patient states knows she is a little dehydrated as she went hiking and kayaking over the last 2 days and forgot her water bottle both times so states hasn't drank enough water.     Review of Systems   Constitutional: Negative for fever.   Gastrointestinal: Negative for abdominal pain, diarrhea, nausea and vomiting.   Genitourinary: Positive for dysuria, flank pain, frequency, hematuria and urgency. Negative for vaginal discharge.   Musculoskeletal: Positive for back pain.   Neurological: Negative for dizziness, light-headedness and headaches.       Past Medical History:   Diagnosis Date     Adenomatous colon polyp 8/2015     Aortic stenosis 6/23/11    bicuspid AV with severe stenosis - 6/2011 mechanical AVR with 23 mm [employer] Yordan AV conduit, composite graft placement     Arthritis     knees and hands     Bicuspid aortic valve      Breast cancer (H) 7/2014    R breast     H/O aortic valve replacement     St Yordan     Heart murmur     Valve repalcement 2011.     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    AAA repair with av23 mm tube graft     PONV (postoperative nausea  "and vomiting)     n/v morphine vs anesthesia, vomiting x24 hrs     Subclinical hyperthyroidism 2017     Thrombosis of leg     after  of daughter     Uncomplicated asthma      Family History   Problem Relation Age of Onset     Hypertension Mother      Thyroid Disease Mother         \"Toxic thyroid\"     Prostate Cancer Father 70     Cancer Father 80        Lung cancer, Not caused from smoking     Cerebrovascular Disease Father 80     Hypertension Father      Cardiovascular Brother         half brother      Cardiovascular Son         Puentes-Parkinsons White Syndrome     Cardiovascular Daughter         Heart murmur     Breast Cancer Paternal Aunt 80     Cardiovascular Paternal Aunt      Arthritis Brother 40        RA - half brother      Cancer Maternal Grandfather      Colon Cancer No family hx of      Current Outpatient Medications   Medication Sig Dispense Refill     acetaminophen (TYLENOL) 325 MG tablet Take 1-2 tablets (325-650 mg) by mouth every 6 hours as needed for mild pain 250 tablet 11     alendronate (FOSAMAX) 70 MG tablet Take 70 mg by mouth every 7 days       ASPIRIN EC PO Take 81 mg by mouth daily       Calcium Carbonate (CALCIUM 500 PO) Take 600 mg by mouth daily        carvedilol (COREG) 12.5 MG tablet Take 1 tablet (12.5 mg) by mouth 2 times daily (with meals) 180 tablet 1     cefdinir (OMNICEF) 300 MG capsule Take 1 capsule (300 mg) by mouth 2 times daily for 7 days 14 capsule 0     cholecalciferol (VITAMIN D3) 1000 UNIT tablet Take 1 tablet (1,000 Units) by mouth daily 30 tablet      lisinopril (ZESTRIL) 10 MG tablet Take 1 tablet (10 mg) by mouth 2 times daily 180 tablet 1     methimazole (TAPAZOLE) 5 MG tablet Take 0.5 tablets (2.5 mg) by mouth daily 45 tablet 2     phenazopyridine (PYRIDIUM) 100 MG tablet Take 1 tablet (100 mg) by mouth 3 times daily as needed for urinary tract discomfort 6 tablet 0     warfarin ANTICOAGULANT (COUMADIN) 5 MG tablet TAKE 1 TABLET DAILY EXCEPT ONE AND ONE-HALF " TABLETS ON MONDAY, WEDNESDAY AND SATURDAY OR AS DIRECTED BY THE INR CLINIC 105 tablet 1     zolpidem (AMBIEN) 5 MG tablet Take 0.5 tablets (2.5 mg) by mouth nightly as needed for sleep 60 tablet 0     acyclovir (ZOVIRAX) 5 % external cream Apply topically 5 times daily (Patient not taking: Reported on 8/12/2020) 15 g 1     anastrozole (ARIMIDEX) 1 MG tablet Take by mouth daily 30 tablet      valACYclovir (VALTREX) 1000 mg tablet Take 2 tablets (2,000 mg) by mouth 2 times daily (Patient not taking: Reported on 8/12/2020) 4 tablet 3     Social History     Tobacco Use     Smoking status: Never Smoker     Smokeless tobacco: Never Used   Substance Use Topics     Alcohol use: Yes     Comment: 2 drinks per week -        OBJECTIVE  BP (!) 170/88 (BP Location: Right arm, Patient Position: Sitting, Cuff Size: Adult Regular)   Pulse 69   Temp 98.7  F (37.1  C) (Tympanic)   Resp 12   Wt 57.5 kg (126 lb 12.8 oz)   LMP  (LMP Unknown)   SpO2 97%   Breastfeeding No   BMI 21.77 kg/m      Physical Exam  Vitals signs and nursing note reviewed.   Constitutional:       Appearance: Normal appearance. She is well-developed and well-groomed.   Cardiovascular:      Rate and Rhythm: Normal rate and regular rhythm.      Heart sounds: Normal heart sounds.   Pulmonary:      Effort: Pulmonary effort is normal.      Breath sounds: Normal breath sounds and air entry.   Abdominal:      General: Bowel sounds are normal.      Palpations: Abdomen is soft.      Tenderness: There is no abdominal tenderness. There is no right CVA tenderness or left CVA tenderness.   Skin:     General: Skin is warm and dry.   Neurological:      Mental Status: She is alert and oriented to person, place, and time.      GCS: GCS eye subscore is 4. GCS verbal subscore is 5. GCS motor subscore is 6.   Psychiatric:         Behavior: Behavior is cooperative.         Labs:  Results for orders placed or performed in visit on 08/12/20 (from the past 24 hour(s))   *UA reflex  to Microscopic and Culture ([place] and [facility] (except [place] and [place])    Specimen: Midstream Urine   Result Value Ref Range    Color Urine Red     Appearance Urine Slightly Cloudy     Glucose Urine Negative NEG^Negative mg/dL    Bilirubin Urine Moderate (A) NEG^Negative    Ketones Urine Trace (A) NEG^Negative mg/dL    Specific Gravity Urine 1.015 1.003 - 1.035    Blood Urine Large (A) NEG^Negative    pH Urine 7.0 5.0 - 7.0 pH    Protein Albumin Urine >=300 (A) NEG^Negative mg/dL    Urobilinogen Urine 1.0 0.2 - 1.0 EU/dL    Nitrite Urine Positive (A) NEG^Negative    Leukocyte Esterase Urine Small (A) NEG^Negative    Source Midstream Urine    Urine Microscopic   Result Value Ref Range    WBC Urine 0 - 5 OTO5^0 - 5 /HPF    RBC Urine >100 (A) OTO2^O - 2 /HPF    Bacteria Urine Few (A) NEG^Negative /HPF   INR   Result Value Ref Range    INR 2.70 (H) 0.86 - 1.14   Capillary Blood Collection   Result Value Ref Range    Capillary Blood Collection Capillary collection performed           ASSESSMENT:      ICD-10-CM    1. UTI symptoms  R39.9 *UA reflex to Microscopic and Culture ([place] and [facility] (except [place] and [place])     Urine Microscopic     Capillary Blood Collection     CANCELED: *UA reflex to Microscopic and Culture ([place] and [facility] (except [place] and [place])   2. Nonspecific finding on examination of urine  R82.90 Urine Culture Aerobic Bacterial   3. Anticoagulated  Z79.01 INR   4. Acute cystitis with hematuria  N30.01 cefdinir (OMNICEF) 300 MG capsule   5. Dysuria  R30.0 phenazopyridine (PYRIDIUM) 100 MG tablet        Medical Decision Making:    Differential Diagnosis:  UTI: UTI, Dysuria, Pyelonephritis, Urethritis and Vaginitis    Serious Comorbid Conditions:  Adult:  HTN, Anticoagulated.    PLAN:  Discussed with patient that UA does show the start of an infection. Does also show some dehydration. Will start her on omnicef twice a day for 7 days. Will also do  "pyridium three times a day as needed. Additional symptomatic treatment recommendations discussed. Will check and INR today since she does have some blood in urine and has not checked INR in about 11 weeks. Education was added to AVS. Patient was agreeable to plan and verbalized understanding. Will also route note to anticoagulation team.     **update INR was 2.70, which is in between goal range of 2-3. Patient notified via mychart. Continue with plan of care given at time of appointment.     Followup:  Keep scheduled appointment with PCP next week.     Patient Instructions   Cefdinir twice a day for 7 days.   Pyridium three times a day as needed for urinary burning for the next 2 days.  Push fluids  Plenty of rest  Tylenol to help with pain      Patient Education     Bladder Infection, Female (Adult)    Urine is normally doesn't have any bacteria in it. But bacteria can get into the urinary tract from the skin around the rectum. Or they can travel in the blood from elsewhere in the body. Once they are in your urinary tract, they can cause infection in the urethra (urethritis), the bladder (cystitis), or the kidneys (pyelonephritis).  The most common place for an infection is in the bladder. This is called a bladder infection. This is one of the most common infections in women. Most bladder infections are easily treated. They are not serious unless the infection spreads to the kidney.  The phrases \"bladder infection,\" \"UTI,\" and \"cystitis\" are often used to describe the same thing. But they are not always the same. Cystitis is an inflammation of the bladder. The most common cause of cystitis is an infection.  Symptoms  The infection causes inflammation in the urethra and bladder. This causes many of the symptoms. The most common symptoms of a bladder infection are:    Pain or burning when urinating    Having to urinate more often than usual    Urgent need to urinate    Only a small amount of urine comes out    Blood " in urine    Abdominal discomfort. This is usually in the lower abdomen above the pubic bone.    Cloudy urine    Strong- or bad-smelling urine    Unable to urinate (urinary retention)    Unable to hold urine in (urinary incontinence)    Fever    Loss of appetite    Confusion (in older adults)  Causes  Bladder infections are not contagious. You can't get one from someone else, from a toilet seat, or from sharing a bath.  The most common cause of bladder infections is bacteria from the bowels. The bacteria get onto the skin around the opening of the urethra. From there, they can get into the urine and travel up to the bladder, causing inflammation and infection. This usually happens because of:    Wiping improperly after urinating. Always wipe from front to back.    Bowel incontinence    Pregnancy    Procedures such as having a catheter inserted    Older age    Not emptying your bladder. This can allow bacteria a chance to grow in your urine.    Dehydration    Constipation    Sex    Use of a diaphragm for birth control   Treatment  Bladder infections are diagnosed by a urine test. They are treated with antibiotics and usually clear up quickly without complications. Treatment helps prevent a more serious kidney infection.  Medicines  Medicines can help in the treatment of a bladder infection:    Take antibiotics until they are used up, even if you feel better. It is important to finish them to make sure the infection has cleared.    You can use acetaminophen or ibuprofen for pain, fever, or discomfort, unless another medicine was prescribed. If you have chronic liver or kidney disease, talk with your healthcare provider before using these medicines. Also talk with your provider if you've ever had a stomach ulcer or gastrointestinal bleeding, or are taking blood-thinner medicines.    If you are given phenazopydridine to reduce burning with urination, it will cause your urine to become a bright orange color. This can  stain clothing.  Care and prevention  These self-care steps can help prevent future infections:    Drink plenty of fluids to prevent dehydration and flush out your bladder. Do this unless you must restrict fluids for other health reasons, or your doctor told you not to.    Proper cleaning after going to the bathroom is important. Wipe from front to back after using the toilet to prevent the spread of bacteria.    Urinate more often. Don't try to hold urine in for a long time.    Wear loose-fitting clothes and cotton underwear. Avoid tight-fitting pants.    Improve your diet and prevent constipation. Eat more fresh fruit and vegetables, and fiber, and less junk and fatty foods.    Avoid sex until your symptoms are gone.    Avoid caffeine, alcohol, and spicy foods. These can irritate your bladder.    Urinate right after intercourse to flush out your bladder.    If you use birth control pills and have frequent bladder infections, discuss it with your doctor.  Follow-up care  Call your healthcare provider if all symptoms are not gone after 3 days of treatment. This is especially important if you have repeat infections.  If a culture was done, you will be told if your treatment needs to be changed. If directed, you can call to find out the results.  If X-rays were done, you will be told if the results will affect your treatment.  Call 911  Call 911 if any of the following occur:    Trouble breathing    Hard to wake up or confusion    Fainting or loss of consciousness    Rapid heart rate  When to seek medical advice  Call your healthcare provider right away if any of these occur:    Fever of 100.4 F (38.0 C) or higher, or as directed by your healthcare provider    Symptoms are not better by the third day of treatment    Back or belly (abdominal) pain that gets worse    Repeated vomiting, or unable to keep medicine down    Weakness or dizziness    Vaginal discharge    Pain, redness, or swelling in the outer vaginal area  (labia)  Date Last Reviewed: 10/1/2016    0761-2049 [employer]. [address] [place] [address], [place] [address]. All rights reserved. This information is not intended as a substitute for professional medical care. Always follow your healthcare professional's instructions.

## 2020-08-12 NOTE — PATIENT INSTRUCTIONS
"Cefdinir twice a day for 7 days.   Pyridium three times a day as needed for urinary burning for the next 2 days.  Push fluids  Plenty of rest  Tylenol to help with pain      Patient Education     Bladder Infection, Female (Adult)    Urine is normally doesn't have any bacteria in it. But bacteria can get into the urinary tract from the skin around the rectum. Or they can travel in the blood from elsewhere in the body. Once they are in your urinary tract, they can cause infection in the urethra (urethritis), the bladder (cystitis), or the kidneys (pyelonephritis).  The most common place for an infection is in the bladder. This is called a bladder infection. This is one of the most common infections in women. Most bladder infections are easily treated. They are not serious unless the infection spreads to the kidney.  The phrases \"bladder infection,\" \"UTI,\" and \"cystitis\" are often used to describe the same thing. But they are not always the same. Cystitis is an inflammation of the bladder. The most common cause of cystitis is an infection.  Symptoms  The infection causes inflammation in the urethra and bladder. This causes many of the symptoms. The most common symptoms of a bladder infection are:    Pain or burning when urinating    Having to urinate more often than usual    Urgent need to urinate    Only a small amount of urine comes out    Blood in urine    Abdominal discomfort. This is usually in the lower abdomen above the pubic bone.    Cloudy urine    Strong- or bad-smelling urine    Unable to urinate (urinary retention)    Unable to hold urine in (urinary incontinence)    Fever    Loss of appetite    Confusion (in older adults)  Causes  Bladder infections are not contagious. You can't get one from someone else, from a toilet seat, or from sharing a bath.  The most common cause of bladder infections is bacteria from the bowels. The bacteria get onto the skin around the opening of the urethra. From there, they can " get into the urine and travel up to the bladder, causing inflammation and infection. This usually happens because of:    Wiping improperly after urinating. Always wipe from front to back.    Bowel incontinence    Pregnancy    Procedures such as having a catheter inserted    Older age    Not emptying your bladder. This can allow bacteria a chance to grow in your urine.    Dehydration    Constipation    Sex    Use of a diaphragm for birth control   Treatment  Bladder infections are diagnosed by a urine test. They are treated with antibiotics and usually clear up quickly without complications. Treatment helps prevent a more serious kidney infection.  Medicines  Medicines can help in the treatment of a bladder infection:    Take antibiotics until they are used up, even if you feel better. It is important to finish them to make sure the infection has cleared.    You can use acetaminophen or ibuprofen for pain, fever, or discomfort, unless another medicine was prescribed. If you have chronic liver or kidney disease, talk with your healthcare provider before using these medicines. Also talk with your provider if you've ever had a stomach ulcer or gastrointestinal bleeding, or are taking blood-thinner medicines.    If you are given phenazopydridine to reduce burning with urination, it will cause your urine to become a bright orange color. This can stain clothing.  Care and prevention  These self-care steps can help prevent future infections:    Drink plenty of fluids to prevent dehydration and flush out your bladder. Do this unless you must restrict fluids for other health reasons, or your doctor told you not to.    Proper cleaning after going to the bathroom is important. Wipe from front to back after using the toilet to prevent the spread of bacteria.    Urinate more often. Don't try to hold urine in for a long time.    Wear loose-fitting clothes and cotton underwear. Avoid tight-fitting pants.    Improve your diet and  prevent constipation. Eat more fresh fruit and vegetables, and fiber, and less junk and fatty foods.    Avoid sex until your symptoms are gone.    Avoid caffeine, alcohol, and spicy foods. These can irritate your bladder.    Urinate right after intercourse to flush out your bladder.    If you use birth control pills and have frequent bladder infections, discuss it with your doctor.  Follow-up care  Call your healthcare provider if all symptoms are not gone after 3 days of treatment. This is especially important if you have repeat infections.  If a culture was done, you will be told if your treatment needs to be changed. If directed, you can call to find out the results.  If X-rays were done, you will be told if the results will affect your treatment.  Call 911  Call 911 if any of the following occur:    Trouble breathing    Hard to wake up or confusion    Fainting or loss of consciousness    Rapid heart rate  When to seek medical advice  Call your healthcare provider right away if any of these occur:    Fever of 100.4 F (38.0 C) or higher, or as directed by your healthcare provider    Symptoms are not better by the third day of treatment    Back or belly (abdominal) pain that gets worse    Repeated vomiting, or unable to keep medicine down    Weakness or dizziness    Vaginal discharge    Pain, redness, or swelling in the outer vaginal area (labia)  Date Last Reviewed: 10/1/2016    0491-0282 [employer]. [address] [place] [address], [place] [address]. All rights reserved. This information is not intended as a substitute for professional medical care. Always follow your healthcare professional's instructions.

## 2020-08-13 ENCOUNTER — TELEPHONE (OUTPATIENT)
Dept: ANTICOAGULATION | Facility: CLINIC | Age: 65
End: 2020-08-13

## 2020-08-13 DIAGNOSIS — Z79.01 LONG TERM CURRENT USE OF ANTICOAGULANT THERAPY: Primary | ICD-10-CM

## 2020-08-13 DIAGNOSIS — Z95.2 H/O AORTIC VALVE REPLACEMENT: ICD-10-CM

## 2020-08-13 NOTE — TELEPHONE ENCOUNTER
ANTICOAGULATION MANAGEMENT      Annabella WINDY Bolanos due for annual renewal of referral to anticoagulation monitoring. Order pended for your review and signature.      ANTICOAGULATION SUMMARY      Warfarin indication(s)     Heart Valve Replacement    Heart valve present?  Bioprosthetic AVR       Current goal range   INR: 2.0-3.0     Goal appropriate for indication? Yes, INR 2-3 appropriate for hx of DVT, PE, hypercoagulable state, Afib, LVAD, or bileaflet AVR without risk factors     Current duration of therapy Indefinite/long term therapy   Time in Therapeutic Range (TTR)  (Goal > 60%) 98.7 %       Office visit with referring provider's group within last year no on 7/18/2019, patient has appointment scheduled on 8/18/2020       Deborah Aguilera RN

## 2020-08-13 NOTE — PROGRESS NOTES
Patient called back [facility].   RN unable to take call at this time.     Please follow up with patient. She will be by her phone. [phone]    Juanita Holden RN, BSN, PHN

## 2020-08-13 NOTE — PROGRESS NOTES
ANTICOAGULATION FOLLOW-UP CLINIC VISIT    Patient Name:  Annabella Bolanos  Date:  2020  Contact Type:  Telephone/ Called patient, reports med change, UTI, denies any other changes. Orders discussed with patient, she agrees with plan. Patient already had INR appointment scheduled for 20, will keep this appointment since she is on an antibiotic.     SUBJECTIVE:  Patient Findings     Positives:   Signs/symptoms of bleeding (had blood in urine yesterday, no blood today), Change in medications (Patient was seen in  for UTI, began Omnicef 20 for UTI, this can increase INR.)    Comments:   The patient was assessed for diet, medication, and activity level changes, missed or extra doses, bruising or bleeding, with no problem findings.         Clinical Outcomes     Comments:   The patient was assessed for diet, medication, and activity level changes, missed or extra doses, bruising or bleeding, with no problem findings.            OBJECTIVE    Recent labs: (last 7 days)     20  1653   INR 2.70*       ASSESSMENT / PLAN  INR assessment THER    Recheck INR In: 1 WEEK    INR Location Outside lab      Anticoagulation Summary  As of 2020    INR goal:   2.0-3.0   TTR:   98.7 % (1 y)   INR used for dosin.70 (2020)   Warfarin maintenance plan:   7.5 mg (5 mg x 1.5) every Mon, Wed, Sat; 5 mg (5 mg x 1) all other days   Full warfarin instructions:   8/15: 5 mg; Otherwise 7.5 mg every Mon, Wed, Sat; 5 mg all other days   Weekly warfarin total:   42.5 mg   Plan last modified:   Erica Stinson RN (2019)   Next INR check:   2020   Priority:   Maintenance   Target end date:       Indications    Long-term (current) use of anticoagulants [Z79.01] [Z79.01]  H/O aortic valve replacement [Z95.2]             Anticoagulation Episode Summary     INR check location:       Preferred lab:       Send INR reminders to:   [facility]    Comments:   20-ok to leave detailed message with dosing  instructions if patient does not answer.      Anticoagulation Care Providers     Provider Role Specialty Phone number    Brook Echavarria MD [facility] Internal Medicine [phone]            See the Encounter Report to view Anticoagulation Flowsheet and Dosing Calendar (Go to Encounters tab in chart review, and find the Anticoagulation Therapy Visit)    Dosage adjustment made based on physician directed care plan.    Deborah Aguilera RN

## 2020-08-14 LAB
BACTERIA SPEC CULT: ABNORMAL
SPECIMEN SOURCE: ABNORMAL

## 2020-08-17 ASSESSMENT — ENCOUNTER SYMPTOMS
HEMATOCHEZIA: 0
ARTHRALGIAS: 1
NAUSEA: 0
ABDOMINAL PAIN: 0
WEAKNESS: 0
SORE THROAT: 0
MYALGIAS: 0
SHORTNESS OF BREATH: 0
NERVOUS/ANXIOUS: 0
HEADACHES: 0
BREAST MASS: 0
DYSURIA: 0
JOINT SWELLING: 0
FEVER: 0
DIARRHEA: 0
CONSTIPATION: 0
HEARTBURN: 0
PALPITATIONS: 0
COUGH: 0
DIZZINESS: 0
PARESTHESIAS: 0
CHILLS: 0
HEMATURIA: 0
EYE PAIN: 0
FREQUENCY: 1

## 2020-08-17 ASSESSMENT — PATIENT HEALTH QUESTIONNAIRE - PHQ9
10. IF YOU CHECKED OFF ANY PROBLEMS, HOW DIFFICULT HAVE THESE PROBLEMS MADE IT FOR YOU TO DO YOUR WORK, TAKE CARE OF THINGS AT HOME, OR GET ALONG WITH OTHER PEOPLE: NOT DIFFICULT AT ALL
SUM OF ALL RESPONSES TO PHQ QUESTIONS 1-9: 1
SUM OF ALL RESPONSES TO PHQ QUESTIONS 1-9: 1

## 2020-08-17 ASSESSMENT — ACTIVITIES OF DAILY LIVING (ADL): CURRENT_FUNCTION: NO ASSISTANCE NEEDED

## 2020-08-18 ENCOUNTER — ANTICOAGULATION THERAPY VISIT (OUTPATIENT)
Dept: ANTICOAGULATION | Facility: CLINIC | Age: 65
End: 2020-08-18

## 2020-08-18 ENCOUNTER — OFFICE VISIT (OUTPATIENT)
Dept: INTERNAL MEDICINE | Facility: CLINIC | Age: 65
End: 2020-08-18
Payer: COMMERCIAL

## 2020-08-18 VITALS
HEART RATE: 53 BPM | DIASTOLIC BLOOD PRESSURE: 89 MMHG | OXYGEN SATURATION: 97 % | RESPIRATION RATE: 16 BRPM | WEIGHT: 126 LBS | BODY MASS INDEX: 21.51 KG/M2 | SYSTOLIC BLOOD PRESSURE: 164 MMHG | HEIGHT: 64 IN

## 2020-08-18 DIAGNOSIS — B00.1 COLD SORE: ICD-10-CM

## 2020-08-18 DIAGNOSIS — Z00.00 ROUTINE GENERAL MEDICAL EXAMINATION AT A HEALTH CARE FACILITY: ICD-10-CM

## 2020-08-18 DIAGNOSIS — E05.90 SUBCLINICAL HYPERTHYROIDISM: ICD-10-CM

## 2020-08-18 DIAGNOSIS — I10 HTN, GOAL BELOW 140/90: ICD-10-CM

## 2020-08-18 DIAGNOSIS — C50.911 MALIGNANT NEOPLASM OF RIGHT FEMALE BREAST, UNSPECIFIED ESTROGEN RECEPTOR STATUS, UNSPECIFIED SITE OF BREAST (H): ICD-10-CM

## 2020-08-18 DIAGNOSIS — Z95.2 H/O AORTIC VALVE REPLACEMENT: ICD-10-CM

## 2020-08-18 DIAGNOSIS — D12.6 ADENOMATOUS POLYP OF COLON, UNSPECIFIED PART OF COLON: ICD-10-CM

## 2020-08-18 DIAGNOSIS — Z79.01 LONG TERM CURRENT USE OF ANTICOAGULANT THERAPY: ICD-10-CM

## 2020-08-18 DIAGNOSIS — G47.00 INSOMNIA, UNSPECIFIED TYPE: ICD-10-CM

## 2020-08-18 DIAGNOSIS — Z00.00 WELCOME TO MEDICARE PREVENTIVE VISIT: Primary | ICD-10-CM

## 2020-08-18 DIAGNOSIS — Z12.11 SPECIAL SCREENING FOR MALIGNANT NEOPLASMS, COLON: ICD-10-CM

## 2020-08-18 LAB
ERYTHROCYTE [DISTWIDTH] IN BLOOD BY AUTOMATED COUNT: 11.6 % (ref 10–15)
HCT VFR BLD AUTO: 41.9 % (ref 35–47)
HGB BLD-MCNC: 13.5 G/DL (ref 11.7–15.7)
INR PPP: 2 (ref 0.86–1.14)
MCH RBC QN AUTO: 31 PG (ref 26.5–33)
MCHC RBC AUTO-ENTMCNC: 32.2 G/DL (ref 31.5–36.5)
MCV RBC AUTO: 96 FL (ref 78–100)
PLATELET # BLD AUTO: 221 10E9/L (ref 150–450)
RBC # BLD AUTO: 4.35 10E12/L (ref 3.8–5.2)
WBC # BLD AUTO: 7.7 10E9/L (ref 4–11)

## 2020-08-18 PROCEDURE — G0402 INITIAL PREVENTIVE EXAM: HCPCS | Performed by: INTERNAL MEDICINE

## 2020-08-18 PROCEDURE — 80061 LIPID PANEL: CPT | Performed by: INTERNAL MEDICINE

## 2020-08-18 PROCEDURE — 85610 PROTHROMBIN TIME: CPT | Performed by: INTERNAL MEDICINE

## 2020-08-18 PROCEDURE — 99207 ZZC NO CHARGE NURSE ONLY: CPT | Performed by: INTERNAL MEDICINE

## 2020-08-18 PROCEDURE — 84443 ASSAY THYROID STIM HORMONE: CPT | Performed by: INTERNAL MEDICINE

## 2020-08-18 PROCEDURE — 99214 OFFICE O/P EST MOD 30 MIN: CPT | Mod: 25 | Performed by: INTERNAL MEDICINE

## 2020-08-18 PROCEDURE — 82043 UR ALBUMIN QUANTITATIVE: CPT | Performed by: INTERNAL MEDICINE

## 2020-08-18 PROCEDURE — 85027 COMPLETE CBC AUTOMATED: CPT | Performed by: INTERNAL MEDICINE

## 2020-08-18 PROCEDURE — 36415 COLL VENOUS BLD VENIPUNCTURE: CPT | Performed by: INTERNAL MEDICINE

## 2020-08-18 PROCEDURE — G0403 EKG FOR INITIAL PREVENT EXAM: HCPCS | Performed by: INTERNAL MEDICINE

## 2020-08-18 PROCEDURE — 80053 COMPREHEN METABOLIC PANEL: CPT | Performed by: INTERNAL MEDICINE

## 2020-08-18 RX ORDER — CARVEDILOL 12.5 MG/1
12.5 TABLET ORAL 2 TIMES DAILY WITH MEALS
Qty: 180 TABLET | Refills: 0 | Status: SHIPPED | OUTPATIENT
Start: 2020-08-18 | End: 2020-09-11

## 2020-08-18 RX ORDER — LISINOPRIL 10 MG/1
10 TABLET ORAL 2 TIMES DAILY
Qty: 180 TABLET | Refills: 0 | Status: SHIPPED | OUTPATIENT
Start: 2020-08-18 | End: 2020-09-11

## 2020-08-18 RX ORDER — ACYCLOVIR 50 MG/G
CREAM TOPICAL
Qty: 15 G | Refills: 1 | Status: SHIPPED | OUTPATIENT
Start: 2020-08-18 | End: 2020-09-23

## 2020-08-18 RX ORDER — ZOLPIDEM TARTRATE 5 MG/1
2.5 TABLET ORAL
Qty: 30 TABLET | Refills: 0 | Status: SHIPPED | OUTPATIENT
Start: 2020-08-18 | End: 2023-09-21

## 2020-08-18 RX ORDER — VALACYCLOVIR HYDROCHLORIDE 1 G/1
2000 TABLET, FILM COATED ORAL 2 TIMES DAILY
Qty: 4 TABLET | Refills: 3 | Status: SHIPPED | OUTPATIENT
Start: 2020-08-18 | End: 2021-08-19

## 2020-08-18 ASSESSMENT — ENCOUNTER SYMPTOMS
HEMATOCHEZIA: 0
CONSTIPATION: 0
HEMATURIA: 0
DYSURIA: 0
DIARRHEA: 0
JOINT SWELLING: 0
PARESTHESIAS: 0
HEADACHES: 0
HEARTBURN: 0
NAUSEA: 0
CHILLS: 0
EYE PAIN: 0
BREAST MASS: 0
WEAKNESS: 0
ARTHRALGIAS: 1
NERVOUS/ANXIOUS: 0
DIZZINESS: 0
SORE THROAT: 0
MYALGIAS: 0
FREQUENCY: 1
ABDOMINAL PAIN: 0
COUGH: 0
FEVER: 0
SHORTNESS OF BREATH: 0
PALPITATIONS: 0

## 2020-08-18 ASSESSMENT — MIFFLIN-ST. JEOR: SCORE: 1106.53

## 2020-08-18 ASSESSMENT — PATIENT HEALTH QUESTIONNAIRE - PHQ9: SUM OF ALL RESPONSES TO PHQ QUESTIONS 1-9: 1

## 2020-08-18 ASSESSMENT — ACTIVITIES OF DAILY LIVING (ADL): CURRENT_FUNCTION: NO ASSISTANCE NEEDED

## 2020-08-18 NOTE — PROGRESS NOTES
Dr Jackson's note    Patient's instructions / PLAN:                                                        Plan:  1. Wrist splints at night   2. Continue Carvedilol same dose  3. change Lisinopril 20 mg in the morning  4. Colonoscopy - spring 2021  5.  Labs today - [address]   6. Please schedule video appointment in about a month to discuss blood pressure           ASSESSMENT & PLAN:                                                      (Z00.00) Welcome to Medicare preventive visit  (primary encounter diagnosis)  Comment:   Plan: EKG 12-lead complete w/read - Clinics            (Z00.00) Routine general medical examination at a health care facility  Comment:   Plan: CBC with platelets, Comprehensive metabolic         panel, Lipid panel reflex to direct LDL         Fasting, Albumin Random Urine Quantitative with        Creat Ratio, TSH with free T4 reflex            (E05.90) Subclinical hyperthyroidism  Comment:   Plan: TSH with free T4 reflex            (C50.911) R breast Ca, 2014 s/p chemo, Rx and lumpectomy  (H)  Comment:   Plan:     (I10) HTN, goal below 140/90  Comment: Controlled    Plan: carvedilol (COREG) 12.5 MG tablet, lisinopril         (ZESTRIL) 10 MG tablet, CBC with platelets,         Comprehensive metabolic panel, Lipid panel         reflex to direct LDL Fasting, Albumin Random         Urine Quantitative with Creat Ratio            (Z95.2) H/O aortic valve replacement  Comment:   Plan:     (D12.6) Adenomatous polyp of colon,needs colonoscopy 2020  Comment:   Plan: GASTROENTEROLOGY ADULT REF PROCEDURE ONLY            (Z12.11) Special screening for malignant neoplasms, colon  Comment:   Plan: GASTROENTEROLOGY ADULT REF PROCEDURE ONLY            (B00.1) Cold sore  Comment:   Plan: acyclovir (ZOVIRAX) 5 % external cream,         valACYclovir (VALTREX) 1000 mg tablet            (G47.00) Insomnia, unspecified type  Comment:   Plan: zolpidem (AMBIEN) 5 MG tablet            (Z79.01) Long-term (current) use of  anticoagulants [Z79.01]  Comment:   Plan:        Chief Complaint:                                                        Annual exam  Follow up chronic medical problems      SUBJECTIVE:                                                    History of present illness     We reviewed the chronic medical problems as above.   I reviewed the recent tests results in Epic     Hands paresthesias  -- pins and needles that wake her up at night   -- not every night   -- x several months     Cardiology told her she doesn't need to be seen. They recommended f/u w pcp for meds and echo summer 2021    HTN  -- she states that BP at home in am has been on the high side and low at night in the 90s range some time ( yesterday am 141/81 and the night 119/65. HR 51 -55   -- rechecked BP now 175/90. Took meds 1.5 h ago  --     Breast Ca -- R breast lat lumpectomy     ROS:    See below        PMHx: - reviewed  Past Medical History:   Diagnosis Date     Adenomatous colon polyp 2015     Aortic stenosis 11    bicuspid AV with severe stenosis - 2011 mechanical AVR with 23 mm St Yordan AV conduit, composite graft placement     Arthritis     knees and hands     Bicuspid aortic valve      Breast cancer (H) 2014    R breast     H/O aortic valve replacement     St Yordan     Heart murmur     Valve repalcement .     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    AAA repair with av23 mm tube graft     PONV (postoperative nausea and vomiting)     n/v morphine vs anesthesia, vomiting x24 hrs     Subclinical hyperthyroidism 2017     Thrombosis of leg     after  of daughter     Uncomplicated asthma        PSHx: reviewed  Past Surgical History:   Procedure Laterality Date     BIOPSY NODE SENTINEL Right 9/10/2014    Procedure: BIOPSY NODE SENTINEL;  Surgeon: Ila Romano MD;  Location:  OR      CARDIAC SURGERY  6/2011    aortic valve replacement     ENT SURGERY      tonsillectomy     HRW PORT A CATH       INSERT PORT VASCULAR ACCESS Left 10/22/2014    Procedure: INSERT PORT VASCULAR ACCESS;  Surgeon: Ila Romano MD;  Location: [facility] OR     LUMPECTOMY BREAST WITH SEED LOCALIZATION Right 9/10/2014    Procedure: LUMPECTOMY BREAST WITH SEED LOCALIZATION;  Surgeon: Ila Romano MD;  Location: [facility] OR     ORTHOPEDIC SURGERY      shoulder, thumb surgery     REMOVE PORT VASCULAR ACCESS Left 4/8/2015    Procedure: REMOVE PORT VASCULAR ACCESS;  Surgeon: Ila Romano MD;  Location: [facility] OR     REPAIR ANEURYSM ASCENDING AORTA  6/23/2011    Procedure:REPAIR ANEURYSM ASCENDING AORTA; Medial Sternotomy, Aortic Valve Replacement, (St. Yordan [employer] HP Valved Graft, size 23 mm) on pump oxygenation, intraoperative transesophageal cardiogram; Surgeon:JOHN PAUL ULLOA; Location:[facility] OR     REPLACE VALVE AORTIC  6/23/11    bicuspid AV with severe stenosis - 6/2011 mechanical AVR with 23 mm St Yordan AV conduit, composite graft placement        Soc Hx: No daily alcohol, no smoking  Social History     Socioeconomic History     Marital status:      Spouse name: Not on file     Number of children: Not on file     Years of education: Not on file     Highest education level: Not on file   Occupational History     Not on file   Social Needs     Financial resource strain: Not on file     Food insecurity     Worry: Not on file     Inability: Not on file     Transportation needs     Medical: Not on file     Non-medical: Not on file   Tobacco Use     Smoking status: Never Smoker     Smokeless tobacco: Never Used   Substance and Sexual Activity     Alcohol use: Yes     Comment: 2 drinks per week -      Drug use: No     Sexual activity: Not Currently   Lifestyle     Physical activity     Days per week: Not on file     Minutes per session: Not on file     Stress: Not on file   Relationships      "Social connections     Talks on phone: Not on file     Gets together: Not on file     Attends [employer] service: Not on file     Active member of club or organization: Not on file     Attends meetings of clubs or organizations: Not on file     Relationship status: Not on file     Intimate partner violence     Fear of current or ex partner: Not on file     Emotionally abused: Not on file     Physically abused: Not on file     Forced sexual activity: Not on file   Other Topics Concern     Parent/sibling w/ CABG, MI or angioplasty before 65F 55M? Not Asked      Service Not Asked     Blood Transfusions Not Asked     Caffeine Concern Yes     Comment: 1-2 cups caffeine per day     Occupational Exposure Not Asked     Hobby Hazards Not Asked     Sleep Concern No     Stress Concern No     Weight Concern No     Special Diet Yes     Comment: low fat daily , low red meats     Back Care No     Exercise Yes     Comment: walks daily/ 3x a week exercise class     Bike Helmet Not Asked     Seat Belt Not Asked     Self-Exams Not Asked   Social History Narrative     Not on file        Fam Hx: reviewed  Family History   Problem Relation Age of Onset     Hypertension Mother      Thyroid Disease Mother         \"Toxic thyroid\"     Prostate Cancer Father 70     Cancer Father 80        Lung cancer, Not caused from smoking     Cerebrovascular Disease Father 80     Hypertension Father      Cardiovascular Brother         half brother      Cardiovascular Son         Puentes-Parkinsons White Syndrome     Cardiovascular Daughter         Heart murmur     Breast Cancer Paternal Aunt 80     Cardiovascular Paternal Aunt      Arthritis Brother 40        RA - half brother      Cancer Maternal Grandfather      Colon Cancer No family hx of          Screening: reviewed      All: reviewed    Meds: reviewed  Current Outpatient Medications   Medication Sig Dispense Refill     acyclovir (ZOVIRAX) 5 % external cream Apply topically 5 times daily 15 g 1 " "    alendronate (FOSAMAX) 70 MG tablet Take 70 mg by mouth every 7 days       anastrozole (ARIMIDEX) 1 MG tablet Take by mouth daily 30 tablet      ASPIRIN EC PO Take 81 mg by mouth daily       carvedilol (COREG) 12.5 MG tablet Take 1 tablet (12.5 mg) by mouth 2 times daily (with meals) 180 tablet 1     lisinopril (ZESTRIL) 10 MG tablet Take 1 tablet (10 mg) by mouth 2 times daily 180 tablet 1     methimazole (TAPAZOLE) 5 MG tablet Take 0.5 tablets (2.5 mg) by mouth daily 45 tablet 2     valACYclovir (VALTREX) 1000 mg tablet Take 2 tablets (2,000 mg) by mouth 2 times daily 4 tablet 3     warfarin ANTICOAGULANT (COUMADIN) 5 MG tablet TAKE 1 TABLET DAILY EXCEPT ONE AND ONE-HALF TABLETS ON MONDAY, WEDNESDAY AND SATURDAY OR AS DIRECTED BY THE INR CLINIC 105 tablet 1     zolpidem (AMBIEN) 5 MG tablet Take 0.5 tablets (2.5 mg) by mouth nightly as needed for sleep 60 tablet 0     cefdinir (OMNICEF) 300 MG capsule Take 1 capsule (300 mg) by mouth 2 times daily for 7 days (Patient not taking: Reported on 8/18/2020) 14 capsule 0     phenazopyridine (PYRIDIUM) 100 MG tablet Take 1 tablet (100 mg) by mouth 3 times daily as needed for urinary tract discomfort (Patient not taking: Reported on 8/18/2020) 6 tablet 0       OBJECTIVE:                                                    Physical Exam :  Blood pressure (!) 164/89, pulse 53, resp. rate 16, height 1.626 m (5' 4\"), weight 57.2 kg (126 lb), SpO2 97 %, not currently breastfeeding.     NAD, appears comfortable  Skin clear, no rashes  HEENT: PERRLA, EOMI, anicteric sclera, pink conjunctiva, external ears appear normal, bilateral tympanic membranes clinically normal, oropharynx normal color.  Neck: supple, no JVD,  no thyroidmegaly  Lymph nodes non palpable in the cervical, supraclavicular axillaries, inguinal areas  Chest: clear to auscultation with good respiratory effort  Cardiac: S1S2, RRR, no mgr appreciated  Abdomen: soft, not tender, not distended, audible bowel sound, " "no hepatosplenomegaly, no palpable masses, no abdominal bruits  Extremities: no cyanosis, clubbing or edema.   Neuro: A, Ox3, no focal signs.  Breast exam in supine and erect position: R brewast lumpectomy, no skin changes, no tenderness or nodes on palpation. Nipples are erect, no skin lesions, no discharge on pressure.    Pelvic exam: deferred, s/p menopause, no symptoms, no hx of abnormal pap         Brook Jackson MD  Internal Medicine        SUBJECTIVE:   CC: Annabella Bolanos is an 64 year old woman who presents for preventive health visit.     Healthy Habits:     In general, how would you rate your overall health?  Good    Frequency of exercise:  6-7 days/week    Duration of exercise:  45-60 minutes    Do you usually eat at least 4 servings of fruit and vegetables a day, include whole grains    & fiber and avoid regularly eating high fat or \"junk\" foods?  Yes    Taking medications regularly:  Yes    Medication side effects:  None    Ability to successfully perform activities of daily living:  No assistance needed    Home Safety:  No safety concerns identified    Hearing Impairment:  Difficulty understanding soft or whispered speech    In the past 6 months, have you been bothered by leaking of urine?  No    In general, how would you rate your overall mental or emotional health?  Excellent      PHQ-2 Total Score: 0    Additional concerns today:  Yes    Ability to successfully perform activities of daily living: Yes, no assistance needed  Home safety:  none identified   Hearing impairment: No    Fallen two or more times in the last year   No   Any fall with an injury in the last year  No    Vision screen:  Right: 20/25  Left: 20/30      Hypertension Follow-up      Do you check your blood pressure regularly outside of the clinic? Yes     Are you following a low salt diet? Yes    Are your blood pressures ever more than 140 on the top number (systolic) OR more   than 90 on the bottom number (diastolic), for example " 140/90? Sometimes      Today's PHQ-2 Score:   PHQ-2 ( 1999 [employer]) 8/17/2020   Q1: Little interest or pleasure in doing things 0   Q2: Feeling down, depressed or hopeless 0   PHQ-2 Score 0   Q1: Little interest or pleasure in doing things Not at all   Q2: Feeling down, depressed or hopeless Not at all   PHQ-2 Score 0       Abuse: Current or Past(Physical, Sexual or Emotional)- No  Do you feel safe in your environment? Yes    Have you ever done Advance Care Planning? (For example, a Health Directive, POLST, or a discussion with a medical provider or your loved ones about your wishes): No, advance care planning information given to patient to review.  Advanced care planning was discussed at today's visit.    Social History     Tobacco Use     Smoking status: Never Smoker     Smokeless tobacco: Never Used   Substance Use Topics     Alcohol use: Yes     Comment: 2 drinks per week -          Alcohol Use 8/17/2020   Prescreen: >3 drinks/day or >7 drinks/week? No   Prescreen: >3 drinks/day or >7 drinks/week? -       Reviewed orders with patient.  Reviewed health maintenance and updated orders accordingly - Yes  Labs reviewed in EPIC        Pertinent mammograms are reviewed under the imaging tab.  History of abnormal Pap smear:   PAP / HPV Latest Ref Rng & Units 7/18/2019 7/14/2016   PAP - NIL NIL   HPV 16 DNA NEG:Negative Negative Negative   HPV 18 DNA NEG:Negative Negative Negative   OTHER HR HPV NEG:Negative Negative Negative     Reviewed and updated as needed this visit by clinical staff  Allergies         Reviewed and updated as needed this visit by Provider            Review of Systems   Constitutional: Negative for chills and fever.   HENT: Negative for congestion, ear pain, hearing loss and sore throat.    Eyes: Negative for pain and visual disturbance.   Respiratory: Negative for cough and shortness of breath.    Cardiovascular: Negative for chest pain, palpitations and peripheral edema.   Gastrointestinal:  "Negative for abdominal pain, constipation, diarrhea, heartburn, hematochezia and nausea.   Breasts:  Negative for tenderness, breast mass and discharge.   Genitourinary: Positive for frequency and urgency. Negative for dysuria, genital sores, hematuria, pelvic pain, vaginal bleeding and vaginal discharge.   Musculoskeletal: Positive for arthralgias. Negative for joint swelling and myalgias.   Skin: Negative for rash.   Neurological: Negative for dizziness, weakness, headaches and paresthesias.   Psychiatric/Behavioral: Negative for mood changes. The patient is not nervous/anxious.        COUNSELING:  Reviewed preventive health counseling, as reflected in patient instructions       Regular exercise       Healthy diet/nutrition    Estimated body mass index is 21.77 kg/m  as calculated from the following:    Height as of 9/5/19: 1.626 m (5' 4\").    Weight as of 8/12/20: 57.5 kg (126 lb 12.8 oz).         reports that she has never smoked. She has never used smokeless tobacco.      Counseling Resources:  ATP IV Guidelines  Pooled Cohorts Equation Calculator  Breast Cancer Risk Calculator  FRAX Risk Assessment  ICSI Preventive Guidelines  Dietary Guidelines for Americans, 2010  USDA's MyPlate  ASA Prophylaxis  Lung CA Screening    Brook Echavarria MD  [facility]  Answers for HPI/ROS submitted by the patient on 8/17/2020   Annual Exam:  If you checked off any problems, how difficult have these problems made it for you to do your work, take care of things at home, or get along with other people?: Not difficult at all  PHQ9 TOTAL SCORE: 1                "

## 2020-08-18 NOTE — PROGRESS NOTES
ANTICOAGULATION FOLLOW-UP CLINIC VISIT    Patient Name:  Annabella Bolanos  Date:  2020  Contact Type:  Telephone/ Called patient, she denies any changes. Orders discussed with the patient, she agrees with the plan. Lab INR appointment scheduled on 9/15/20.    SUBJECTIVE:  Patient Findings     Positives:   Change in medications (Patient has 2 doses of her antibiotic left.)    Comments:   The patient was assessed for diet, medication, and activity level changes, missed or extra doses, bruising or bleeding, with no problem findings. Maintenance warfarin dosing was reviewed with patient and will remain on the same dose until next INR check. Patient did not have any questions or concerns.             Clinical Outcomes     Negatives:   Major bleeding event, Thromboembolic event, Anticoagulation-related hospital admission, Anticoagulation-related ED visit, Anticoagulation-related fatality    Comments:   The patient was assessed for diet, medication, and activity level changes, missed or extra doses, bruising or bleeding, with no problem findings. Maintenance warfarin dosing was reviewed with patient and will remain on the same dose until next INR check. Patient did not have any questions or concerns.                OBJECTIVE    Recent labs: (last 7 days)     20  0946   INR 2.00*       ASSESSMENT / PLAN  INR assessment THER    Recheck INR In: 4 WEEKS    INR Location Outside lab      Anticoagulation Summary  As of 2020    INR goal:   2.0-3.0   TTR:   98.7 % (1 y)   INR used for dosin.00 (2020)   Warfarin maintenance plan:   7.5 mg (5 mg x 1.5) every Mon, Wed, Sat; 5 mg (5 mg x 1) all other days   Full warfarin instructions:   7.5 mg every Mon, Wed, Sat; 5 mg all other days   Weekly warfarin total:   42.5 mg   No change documented:   Deborah Aguilera RN   Plan last modified:   Erica Stinson RN (2019)   Next INR check:   9/15/2020   Priority:   Maintenance   Target end date:   Indefinite     Indications    Long-term (current) use of anticoagulants [Z79.01] [Z79.01]  H/O aortic valve replacement [Z95.2]             Anticoagulation Episode Summary     INR check location:       Preferred lab:       Send INR reminders to:   [facility]    Comments:   4/1/20-ok to leave detailed message with dosing instructions if patient does not answer. INR Referral renewed, see 8/13/20 telephone encounter.       Anticoagulation Care Providers     Provider Role Specialty Phone number    Brook Echavarria MD Referring Internal Medicine [phone]            See the Encounter Report to view Anticoagulation Flowsheet and Dosing Calendar (Go to Encounters tab in chart review, and find the Anticoagulation Therapy Visit)    Dosage adjustment made based on physician directed care plan.    Deborah Aguilera RN

## 2020-08-18 NOTE — PATIENT INSTRUCTIONS
Plan:  1. Wrist splints at night   2. Continue Carvedilol same dose  3. change Lisinopril 20 mg in the morning  4. Colonoscopy - spring 2021  5.  Labs today - [address]   6. Please schedule video appointment in about a month to discuss blood pressure

## 2020-08-19 LAB
ALBUMIN SERPL-MCNC: 3.6 G/DL (ref 3.4–5)
ALP SERPL-CCNC: 62 U/L (ref 40–150)
ALT SERPL W P-5'-P-CCNC: 26 U/L (ref 0–50)
ANION GAP SERPL CALCULATED.3IONS-SCNC: 8 MMOL/L (ref 3–14)
AST SERPL W P-5'-P-CCNC: 20 U/L (ref 0–45)
BILIRUB SERPL-MCNC: 0.6 MG/DL (ref 0.2–1.3)
BUN SERPL-MCNC: 14 MG/DL (ref 7–30)
CALCIUM SERPL-MCNC: 9.3 MG/DL (ref 8.5–10.1)
CHLORIDE SERPL-SCNC: 105 MMOL/L (ref 94–109)
CHOLEST SERPL-MCNC: 220 MG/DL
CO2 SERPL-SCNC: 28 MMOL/L (ref 20–32)
CREAT SERPL-MCNC: 0.65 MG/DL (ref 0.52–1.04)
CREAT UR-MCNC: 30 MG/DL
GFR SERPL CREATININE-BSD FRML MDRD: >90 ML/MIN/{1.73_M2}
GLUCOSE SERPL-MCNC: 92 MG/DL (ref 70–99)
HDLC SERPL-MCNC: 70 MG/DL
LDLC SERPL CALC-MCNC: 131 MG/DL
MICROALBUMIN UR-MCNC: <5 MG/L
MICROALBUMIN/CREAT UR: NORMAL MG/G CR (ref 0–25)
NONHDLC SERPL-MCNC: 150 MG/DL
POTASSIUM SERPL-SCNC: 4 MMOL/L (ref 3.4–5.3)
PROT SERPL-MCNC: 7.3 G/DL (ref 6.8–8.8)
SODIUM SERPL-SCNC: 141 MMOL/L (ref 133–144)
TRIGL SERPL-MCNC: 94 MG/DL
TSH SERPL DL<=0.005 MIU/L-ACNC: 0.85 MU/L (ref 0.4–4)

## 2020-09-04 ENCOUNTER — ANCILLARY PROCEDURE (OUTPATIENT)
Dept: BONE DENSITY | Facility: CLINIC | Age: 65
End: 2020-09-04
Attending: INTERNAL MEDICINE
Payer: COMMERCIAL

## 2020-09-04 DIAGNOSIS — M81.0 AGE RELATED OSTEOPOROSIS: ICD-10-CM

## 2020-09-04 DIAGNOSIS — C50.919 BREAST CANCER (H): ICD-10-CM

## 2020-09-04 PROCEDURE — 77080 DXA BONE DENSITY AXIAL: CPT | Performed by: INTERNAL MEDICINE

## 2020-09-08 ENCOUNTER — TELEPHONE (OUTPATIENT)
Dept: INTERNAL MEDICINE | Facility: CLINIC | Age: 65
End: 2020-09-08

## 2020-09-08 ENCOUNTER — MYC MEDICAL ADVICE (OUTPATIENT)
Dept: ENDOCRINOLOGY | Facility: CLINIC | Age: 65
End: 2020-09-08

## 2020-09-08 ENCOUNTER — HOSPITAL ENCOUNTER (OUTPATIENT)
Dept: MAMMOGRAPHY | Facility: CLINIC | Age: 65
Discharge: HOME | End: 2020-09-08
Attending: INTERNAL MEDICINE | Admitting: INTERNAL MEDICINE
Payer: COMMERCIAL

## 2020-09-08 DIAGNOSIS — E05.90 SUBCLINICAL HYPERTHYROIDISM: Primary | ICD-10-CM

## 2020-09-08 DIAGNOSIS — Z12.31 SCREENING MAMMOGRAM, ENCOUNTER FOR: ICD-10-CM

## 2020-09-08 PROCEDURE — 77067 SCR MAMMO BI INCL CAD: CPT

## 2020-09-08 NOTE — TELEPHONE ENCOUNTER
Elicia from [facility] calls stating that patient has a colonoscopy scheduled for 9/18/20. Elicia is requesting a 4-day hold on warfarin, and is wondering if bridging is needed. Please advise.    Call [facility] at [phone] extension [phone]

## 2020-09-09 NOTE — TELEPHONE ENCOUNTER
Please see message below and advise.    TSH   Date Value Ref Range Status   08/18/2020 0.85 0.40 - 4.00 mU/L Final

## 2020-09-09 NOTE — TELEPHONE ENCOUNTER
PERCY-PROCEDURAL ANTICOAGULATION  MANAGEMENT    Assessment     Warfarin interruption plan for Colonoscopy on 2020.    AVR and DVT       Risk stratification for thromboembolism: moderate (2012 Chest guidelines)      St Yordan mechanical AVR     VTE in distant past    AVR: 2017 AHA/ACC Management of Valvular Heart disease guidelines suggests bridging is reasonable on individual basis with risk of bleeding weighed against risks of clotting for mechanical AVR patients with risk factors for thrombosis (Afib, previous thromboembolism, hypercoagulable condition, LV systolic dysfunction, older generation valves, or > 1 valve)    Plan       Pre-Procedure:    Hold warfarin until after procedure startin2020          Post-Procedure:    Resume home warfarin dose if okay with provider doing procedure on night of procedure, 2020 PM: 10mg (boost)    Recheck INR 5-7 days after resuming warfarin   ?   Tracie Aguilera [facility]    Subjective/Objective:      Annabella Bolanos, a 65 year old female    Reason for Anticoagulation: AVR and DVT    Goal INR Range: 2.0-3.0    Patient bridged in past: Yes: prior to guideline changes    Pertinent History: N/A    Wt Readings from Last 3 Encounters:   20 57.2 kg (126 lb)   20 57.5 kg (126 lb 12.8 oz)   19 58.6 kg (129 lb 1.6 oz)        Ideal body weight: 54.7 kg (120 lb 9.5 oz)  Adjusted ideal body weight: 55.7 kg (122 lb 12.1 oz)     Lab Results   Component Value Date    INR 2.00 (H) 2020    INR 2.70 (H) 2020    INR 2.70 (H) 2020     Lab Results   Component Value Date    HGB 13.5 2020    HCT 41.9 2020     2020     Lab Results   Component Value Date    CR 0.65 2020    CR 0.68 2019    CR 0.66 2019     CrCl cannot be calculated (Patient's most recent lab result is older than the maximum 10 days allowed.).

## 2020-09-09 NOTE — TELEPHONE ENCOUNTER
ENDO THYROID LABS-[facility] Latest Ref Rng & Units 8/18/2020 3/11/2020   TSH 0.40 - 4.00 mU/L 0.85 1.05   T4 FREE 0.76 - 1.46 ng/dL  1.04   FREE T3 2.3 - 4.2 pg/mL  3.0     Yes- would prefer complete set of labs.

## 2020-09-10 NOTE — TELEPHONE ENCOUNTER
Please order the labs.  Lab appt tomorrow.    Georgia Haney CMA  [facility]  [place]

## 2020-09-10 NOTE — TELEPHONE ENCOUNTER
Called [facility], had to leave a message with message stating it is ok for patient to hold warfarin for 4 days prior to 9/18/20 colonoscopy and patient will not need Lovenox bridging.     Left message for patient to call  INR clinic.     Deborah Aguilera RN

## 2020-09-11 ENCOUNTER — ANTICOAGULATION THERAPY VISIT (OUTPATIENT)
Dept: ANTICOAGULATION | Facility: CLINIC | Age: 65
End: 2020-09-11

## 2020-09-11 ENCOUNTER — VIRTUAL VISIT (OUTPATIENT)
Dept: INTERNAL MEDICINE | Facility: CLINIC | Age: 65
End: 2020-09-11
Payer: COMMERCIAL

## 2020-09-11 VITALS — DIASTOLIC BLOOD PRESSURE: 77 MMHG | SYSTOLIC BLOOD PRESSURE: 120 MMHG | HEART RATE: 52 BPM

## 2020-09-11 DIAGNOSIS — Z95.2 H/O AORTIC VALVE REPLACEMENT: ICD-10-CM

## 2020-09-11 DIAGNOSIS — Z79.01 LONG TERM CURRENT USE OF ANTICOAGULANT THERAPY: ICD-10-CM

## 2020-09-11 DIAGNOSIS — I10 HTN, GOAL BELOW 140/90: ICD-10-CM

## 2020-09-11 DIAGNOSIS — E05.90 SUBCLINICAL HYPERTHYROIDISM: ICD-10-CM

## 2020-09-11 DIAGNOSIS — Z79.01 LONG TERM CURRENT USE OF ANTICOAGULANT THERAPY: Primary | ICD-10-CM

## 2020-09-11 LAB
INR PPP: 3.1 (ref 0.86–1.14)
T3FREE SERPL-MCNC: 2.8 PG/ML (ref 2.3–4.2)
T4 FREE SERPL-MCNC: 1.04 NG/DL (ref 0.76–1.46)
TSH SERPL DL<=0.005 MIU/L-ACNC: 1.53 MU/L (ref 0.4–4)

## 2020-09-11 PROCEDURE — 84439 ASSAY OF FREE THYROXINE: CPT | Performed by: INTERNAL MEDICINE

## 2020-09-11 PROCEDURE — 84481 FREE ASSAY (FT-3): CPT | Performed by: INTERNAL MEDICINE

## 2020-09-11 PROCEDURE — 85610 PROTHROMBIN TIME: CPT | Performed by: INTERNAL MEDICINE

## 2020-09-11 PROCEDURE — 99214 OFFICE O/P EST MOD 30 MIN: CPT | Mod: 95 | Performed by: INTERNAL MEDICINE

## 2020-09-11 PROCEDURE — 36415 COLL VENOUS BLD VENIPUNCTURE: CPT | Performed by: INTERNAL MEDICINE

## 2020-09-11 PROCEDURE — 84443 ASSAY THYROID STIM HORMONE: CPT | Performed by: INTERNAL MEDICINE

## 2020-09-11 PROCEDURE — 99207 ZZC NO CHARGE NURSE ONLY: CPT | Performed by: INTERNAL MEDICINE

## 2020-09-11 RX ORDER — CARVEDILOL 12.5 MG/1
12.5 TABLET ORAL 2 TIMES DAILY WITH MEALS
Qty: 180 TABLET | Refills: 3 | Status: SHIPPED | OUTPATIENT
Start: 2020-09-11 | End: 2021-08-19

## 2020-09-11 RX ORDER — LISINOPRIL 10 MG/1
10 TABLET ORAL 2 TIMES DAILY
Qty: 180 TABLET | Refills: 0 | Status: SHIPPED | OUTPATIENT
Start: 2020-09-11 | End: 2021-01-25

## 2020-09-11 NOTE — PROGRESS NOTES
ANTICOAGULATION FOLLOW-UP CLINIC VISIT    Patient Name:  Annabella Bolanos  Date:  9/11/2020  Contact Type:  Telephone/ discussed with patient    SUBJECTIVE:  Patient Findings     Positives:   Upcoming invasive procedure (colonoscopy on 9/18.  Per 9/8 phone encounter, gagan will not need to use lovenox bridging.), Change in diet/appetite (will increase her vitamin K intake to help lower INR.)    Comments:   The patient was assessed for medication, and activity level changes, missed or extra doses, bruising or bleeding, with no problem findings.          Clinical Outcomes     Negatives:   Major bleeding event, Thromboembolic event, Anticoagulation-related hospital admission, Anticoagulation-related ED visit, Anticoagulation-related fatality    Comments:   The patient was assessed for medication, and activity level changes, missed or extra doses, bruising or bleeding, with no problem findings.             OBJECTIVE    Recent labs: (last 7 days)     09/11/20  0815   INR 3.10*       ASSESSMENT / PLAN  INR assessment SUPRA    Recheck INR In: 2 WEEKS    INR Location Clinic      Anticoagulation Summary  As of 9/11/2020    INR goal:   2.0-3.0   TTR:   99.4 % (1 y)   INR used for dosing:   3.10! (9/11/2020)   Warfarin maintenance plan:   7.5 mg (5 mg x 1.5) every Mon, Wed, Sat; 5 mg (5 mg x 1) all other days   Full warfarin instructions:   9/14: Hold; 9/15: Hold; 9/16: Hold; 9/17: Hold; Otherwise 7.5 mg every Mon, Wed, Sat; 5 mg all other days   Weekly warfarin total:   42.5 mg   Plan last modified:   Erica Stinson RN (8/2/2019)   Next INR check:   9/25/2020   Priority:   Maintenance   Target end date:   Indefinite    Indications    Long-term (current) use of anticoagulants [Z79.01] [Z79.01]  H/O aortic valve replacement [Z95.2]             Anticoagulation Episode Summary     INR check location:       Preferred lab:       [facility]    Comments:   4/1/20-ok to leave detailed message  with dosing instructions if patient does not answer. INR Referral renewed, see 8/13/20 telephone encounter.       Anticoagulation Care Providers     Provider Role Specialty Phone number    Brook Echavarria MD Referring Internal Medicine [phone]            See the Encounter Report to view Anticoagulation Flowsheet and Dosing Calendar (Go to Encounters tab in chart review, and find the Anticoagulation Therapy Visit)    Dosage adjustment made based on physician directed care plan.    Erica Stinson RN

## 2020-09-11 NOTE — PROGRESS NOTES
"Annabella Bolanos is a 65 year old female who is being evaluated via a billable video visit.      The patient has been notified of following:     \"This video visit will be conducted via a call between you and your physician/provider. We have found that certain health care needs can be provided without the need for an in-person physical exam.  This service lets us provide the care you need with a video conversation.  If a prescription is necessary we can send it directly to your pharmacy.  If lab work is needed we can place an order for that and you can then stop by our lab to have the test done at a later time.    Video visits are billed at different rates depending on your insurance coverage.  Please reach out to your insurance provider with any questions.    If during the course of the call the physician/provider feels a video visit is not appropriate, you will not be charged for this service.\"    Patient has given verbal consent for Video visit? Yes  How would you like to obtain your AVS? MyChart  If you are dropped from the video visit, the video invite should be resent to: Send to e-mail at: [email or web address]  Will anyone else be joining your video visit? No                This is a VIDEO ( using Doximity)  encounter with the patient.       Location of the provider : office   Location of the patient : home     11:28 -- 11:46           Dr Jackson's note      Patient's instructions / PLAN:                                                        Plan:  1. Continue same meds, same doses for now   2. Check blood pressure frequently during the colonoscopy prep. Do not take Lisinopril if the blood pressure is lower than 110  3. resume Warfarin after the colonoscopy      ASSESSMENT & PLAN:                                                      (I10) HTN, goal below 140/90  Comment: Controlled    Plan: carvedilol (COREG) 12.5 MG tablet, lisinopril         (ZESTRIL) 10 MG tablet         (Z79.01) Long-term (current) use of " anticoagulants [Z79.01]  (primary encounter diagnosis)  Z95.2) H/O aortic valve replacement - [facility]  Comment: stop Warfarine, no need for bridging  Plan: resume Warfarin after the colonoscopy             Chief complaint:                                                      F/u HTN    SUBJECTIVE:                                                    History of present illness:    HTN  --  yesterday  pm /65, and and sometimes 80/50 84/51, 89/59  -- in am immediately she wakes up sometimes the BP is 140 -150. otherwise is 120s    Colonoscopy scheduled in a week. She will stop Warfarin.  meds as above     Aug LOV    Plan:  1. Wrist splints at night   2. Continue Carvedilol same dose  3. change Lisinopril 20 mg in the morning  4. Colonoscopy - spring 2021  5.  Labs today - [address]   6. Please schedule video appointment in about a month to discuss blood pressure          Hypertension Follow-up      Do you check your blood pressure regularly outside of the clinic? Yes     Are you following a low salt diet? Yes    Are your blood pressures ever more than 140 on the top number (systolic) OR more   than 90 on the bottom number (diastolic), for example 140/90? Yes      How many servings of fruits and vegetables do you eat daily?  4 or more    On average, how many sweetened beverages do you drink each day (Examples: soda, juice, sweet tea, etc.  Do NOT count diet or artificially sweetened beverages)?   0    How many days per week do you exercise enough to make your heart beat faster? 7    How many minutes a day do you exercise enough to make your heart beat faster? 20 - 29    How many days per week do you miss taking your medication? 0        Review of Systems:                                                      ROS: negative for fever, chills, cough, wheezes, chest pain, shortness of breath, vomiting, abdominal pain, leg swelling          OBJECTIVE:           An actual physical exam can't be done during phone visit   A  limited exam can sometimes be performed by video visit    Blood pressure 120/77, pulse 52, not currently breastfeeding.         PMHx: reviewed  Past Medical History:   Diagnosis Date     Adenomatous colon polyp 2015     Aortic stenosis 11    bicuspid AV with severe stenosis - 2011 mechanical AVR with 23 mm [employer] AV conduit, composite graft placement     Arthritis     knees and hands     Bicuspid aortic valve      Breast cancer (H) 2014    R breast     H/O aortic valve replacement     [facility]     Heart murmur     Valve repalcement .     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    AAA repair with av23 mm tube graft     PONV (postoperative nausea and vomiting)     n/v morphine vs anesthesia, vomiting x24 hrs     Subclinical hyperthyroidism 2017     Thrombosis of leg     after  of daughter     Uncomplicated asthma       PSHx: reviewed  Past Surgical History:   Procedure Laterality Date     BIOPSY NODE SENTINEL Right 9/10/2014    Procedure: BIOPSY NODE SENTINEL;  Surgeon: Ila Romano MD;  Location: [facility] OR     CARDIAC SURGERY  2011    aortic valve replacement     ENT SURGERY      tonsillectomy     HRW PORT A CATH       INSERT PORT VASCULAR ACCESS Left 10/22/2014    Procedure: INSERT PORT VASCULAR ACCESS;  Surgeon: Ila Romano MD;  Location: [facility] OR     LUMPECTOMY BREAST WITH SEED LOCALIZATION Right 9/10/2014    Procedure: LUMPECTOMY BREAST WITH SEED LOCALIZATION;  Surgeon: Ila Romano MD;  Location: [facility] OR     ORTHOPEDIC SURGERY      shoulder, thumb surgery     REMOVE PORT VASCULAR ACCESS Left 2015    Procedure: REMOVE PORT VASCULAR ACCESS;  Surgeon: Ila Romano MD;  Location: [facility] OR     REPAIR ANEURYSM ASCENDING AORTA  2011    Procedure:REPAIR ANEURYSM ASCENDING AORTA; Medial Sternotomy, Aortic Valve  Replacement, ([employer] HP Valved Graft, size 23 mm) on pump oxygenation, intraoperative transesophageal cardiogram; Surgeon:JOHN PAUL ULLOA; Location:[facility] OR     REPLACE VALVE AORTIC  6/23/11    bicuspid AV with severe stenosis - 6/2011 mechanical AVR with 23 mm [facility] AV conduit, composite graft placement        Meds: reviewed  Current Outpatient Medications   Medication Sig Dispense Refill     acyclovir (ZOVIRAX) 5 % external cream Apply topically 5 times daily 15 g 1     alendronate (FOSAMAX) 70 MG tablet Take 70 mg by mouth every 7 days       anastrozole (ARIMIDEX) 1 MG tablet Take by mouth daily 30 tablet      ASPIRIN EC PO Take 81 mg by mouth daily       carvedilol (COREG) 12.5 MG tablet Take 1 tablet (12.5 mg) by mouth 2 times daily (with meals) 180 tablet 0     lisinopril (ZESTRIL) 10 MG tablet Take 1 tablet (10 mg) by mouth 2 times daily 180 tablet 0     methimazole (TAPAZOLE) 5 MG tablet Take 0.5 tablets (2.5 mg) by mouth daily 45 tablet 2     valACYclovir (VALTREX) 1000 mg tablet Take 2 tablets (2,000 mg) by mouth 2 times daily 4 tablet 3     warfarin ANTICOAGULANT (COUMADIN) 5 MG tablet TAKE 1 TABLET DAILY EXCEPT ONE AND ONE-HALF TABLETS ON MONDAY, WEDNESDAY AND SATURDAY OR AS DIRECTED BY THE INR CLINIC 105 tablet 1     zolpidem (AMBIEN) 5 MG tablet Take 0.5 tablets (2.5 mg) by mouth nightly as needed for sleep 30 tablet 0       Soc Hx: reviewed  Fam Hx: reviewed          Brook Jackson MD  Internal Medicine

## 2020-09-11 NOTE — PATIENT INSTRUCTIONS
Plan:  1. Continue same meds, same doses for now   2. Check blood pressure frequently during the colonoscopy prep. Do not take Lisinopril if the blood pressure is lower than 110  3. resume Warfarin after the colonoscopy

## 2020-09-15 ENCOUNTER — TRANSFERRED RECORDS (OUTPATIENT)
Dept: HEALTH INFORMATION MANAGEMENT | Facility: CLINIC | Age: 65
End: 2020-09-15

## 2020-09-18 ENCOUNTER — TRANSFERRED RECORDS (OUTPATIENT)
Dept: HEALTH INFORMATION MANAGEMENT | Facility: CLINIC | Age: 65
End: 2020-09-18

## 2020-09-21 ENCOUNTER — TELEPHONE (OUTPATIENT)
Dept: INTERNAL MEDICINE | Facility: CLINIC | Age: 65
End: 2020-09-21

## 2020-09-21 NOTE — TELEPHONE ENCOUNTER
Incoming call from patient who had her colonoscopy on Friday and resumed warfarin that night. She is not bridging. She is inquiring what she should do with her Vit K intake, over the weekend she had a bit less than usual and is wondering when she should increase back to baseline.     Discussed with patient that she should not cut out Vit K foods completely but in order to bring INR back in to target range sooner it is just fine if she wants to hold back a bit on her greens and veggies this week. INR is scheduled on Friday. In the meantime monitor for any s/sx of bleeding or thromboembolism.     Patient verbalized understanding and had no other concerns or questions

## 2020-09-23 ENCOUNTER — VIRTUAL VISIT (OUTPATIENT)
Dept: ENDOCRINOLOGY | Facility: CLINIC | Age: 65
End: 2020-09-23
Payer: COMMERCIAL

## 2020-09-23 DIAGNOSIS — E05.90 SUBCLINICAL HYPERTHYROIDISM: ICD-10-CM

## 2020-09-23 PROCEDURE — 99213 OFFICE O/P EST LOW 20 MIN: CPT | Mod: 95 | Performed by: INTERNAL MEDICINE

## 2020-09-23 RX ORDER — METHIMAZOLE 5 MG/1
2.5 TABLET ORAL DAILY
Qty: 45 TABLET | Refills: 2 | Status: SHIPPED | OUTPATIENT
Start: 2020-09-23 | End: 2021-03-31

## 2020-09-23 NOTE — PATIENT INSTRUCTIONS
[facility] & [place] locations   Dr Baxter, Endocrinology Department      [facility]   [address]  [place] [address]  Appointment [phone]  Fax: [phone]  [place]: Monday and Tuesday         [facility]   [address]  [place] [address]  Appointment [phone]  Fax: [phone]  [place]: Wednesday and Thursday            Please check the cost coverage and copay with insurance before recommended tests, services and medications (especially if new medications are prescribed).     If ordered, please get blood work done 1 week prior to your next appointment so they will be available to Dr. Baxter at your visit.    Continue methimazole at current dose.  Labs in 6 months.  Please make a lab appointment for blood work and follow up clinic appointment in 1 week after that to discuss results.    Discussed possible side effects of methimazole including liver dysfunction and agranulocytosis. Discussed to watch for s/s like jaundice, abdominal pain and skin rash. In case of prolonged unresolving fever, sore throat and infections, advise to seek medical care.    Call if:     Signs or symptoms of infection. These include a fever of 100.5 degrees or higher, chills, severe sore throat, ear or sinus pain, cough, increased sputum or change in color, painful urination, mouth sores.   Severe nausea or vomiting.   Joint pain or swelling.   Not able to eat.   Unusual bruising or bleeding.   Dark urine or yellow skin or eyes.

## 2020-09-23 NOTE — LETTER
"    9/23/2020         RE: Annabella Bolanos  [address] [place] [address]        Dear Colleague,    Thank you for referring your patient, Annabella Bolanos, to the [facility]. Please see a copy of my visit note below.    THIS IS A VIDEO VISIT:    Phone call visit/virtual visit encounter:    Name of patient: Annabella Bolanos    Date of encounter: 9/23/2020    Time of start of video visit:10:42    Video started: 10:44    Video ended: 10:52    Time visit video ended: 10:55    Provider location: working from home/ [facility]    Patient location: patients home.    Mode of transmission: video/ [employer]    Verbal consent: obtained before starting visit. Pt is agreeable.      The patient has been notified of following:      \"This VIDEO visit will be conducted via a call between you and your physician/provider. We have found that certain health care needs can be provided without the need for a physical exam.  This service lets us provide the care you need with a short phone conversation.  If a prescription is necessary we can send it directly to your pharmacy.  If lab work is needed we can place an order for that and you can then stop by our lab to have the test done at a later time.     With new updates with corona virus patient might be billed as clinic visit.     If during the course of the call the physician/provider feels a telephone visit is not appropriate, you will not be charged for this service.\"      Past medical history, social history, family history, allergy and medications were reviewed and updated as appropriate.  Reviewed pertinent labs, notes, imaging studies personally.    Name: Annabella Bolanos  Seen for follow-up of subclinical hyperthyroidism.    HPI:  Annabella Bolanos is a 65 year old female who presents for the evaluation of subclinical Hyperthyroidism.  H/o breast cancer and h/o aortic valve replacement and is on long term anticoagulation.  Breast cancer diagnosed in 2014 s/p " lumpectomy and chemo and radiation. Took radiation 5 days a week for 1 month.  DEXA 2018 scan showing osteopenia and appears stable.  She is not on medication for osteoporosis/osteopenia at this time.  She is also on aromatase inhibitor as a part of treatment for breast cancer.     Subclinical hyperthyroidism noted since 7/2016. Plan was for continue to monitor.  No h/o arrhythmia  No h/o osteoporosis-- has osteopenia. Not on treatment.  TSI neg  US thyroid ( h/o radiation)- no discrete nodules.  As there was further decline in TSH along with risk for low bone density with aromatase inhibitor (with with prior history of osteopenia) as well as complex cardiac history she was started on methimazole 5 mg in 7/2018.  Currently taking methimazole 2.5 mg/day.   F/u labs are WNL  LFT and CBC stable.    Since last clinic visit she was started on Fosamax by Dr. Felder for osteoporosis/osteopenia.  She is taking Fosamax on a weekly basis since February 2019.    Feeling good.  Has good energy.  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  H/o arthritis.  Weight is stable.  Wt Readings from Last 2 Encounters:   08/18/20 57.2 kg (126 lb)   08/12/20 57.5 kg (126 lb 12.8 oz)       Eating healthy.    History of radiation exposure: YES. As above.  History of thyroid dysfunction: not to her knowledge. No FH of thyroid cancer.  Palpitations: No  Changes to hair or skin: No  Diarrhea/Constipation:No  Changes in menses: s/p menopause  Changes in vision:No  Diplopia/Blurryness:No  Dysphagia or Shortness of breath:No  Tremors:No  Difficulty sleeping:Yes: most of the time  Changes in weight: No  Lithium/Amiodarone: No  URI: No  CT Scans:No  Mother had hyperthyroidism s/p DOE and was on levothyroxine.    PMH/PSH:  Past Medical History:   Diagnosis Date     Adenomatous colon polyp 8/2015     Aortic stenosis 6/23/11    bicuspid AV with severe stenosis - 6/2011 mechanical AVR with 23 mm St Yordan AV conduit, composite graft placement     Arthritis     knees and hands      Bicuspid aortic valve      Breast cancer (H) 2014    R breast     H/O aortic valve replacement     St Yordan     Heart murmur     Valve repalcement .     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    AAA repair with av23 mm tube graft     PONV (postoperative nausea and vomiting)     n/v morphine vs anesthesia, vomiting x24 hrs     Subclinical hyperthyroidism 2017     Thrombosis of leg     after  of daughter     Uncomplicated asthma      Past Surgical History:   Procedure Laterality Date     BIOPSY NODE SENTINEL Right 9/10/2014    Procedure: BIOPSY NODE SENTINEL;  Surgeon: Ila Romano MD;  Location: [facility] OR     CARDIAC SURGERY  2011    aortic valve replacement     ENT SURGERY      tonsillectomy     HRW PORT A CATH       INSERT PORT VASCULAR ACCESS Left 10/22/2014    Procedure: INSERT PORT VASCULAR ACCESS;  Surgeon: Ila Romano MD;  Location: [facility] OR     LUMPECTOMY BREAST WITH SEED LOCALIZATION Right 9/10/2014    Procedure: LUMPECTOMY BREAST WITH SEED LOCALIZATION;  Surgeon: Ila Romano MD;  Location: [facility] OR     ORTHOPEDIC SURGERY      shoulder, thumb surgery     REMOVE PORT VASCULAR ACCESS Left 2015    Procedure: REMOVE PORT VASCULAR ACCESS;  Surgeon: Ila Romano MD;  Location: [facility] OR     REPAIR ANEURYSM ASCENDING AORTA  2011    Procedure:REPAIR ANEURYSM ASCENDING AORTA; Medial Sternotomy, Aortic Valve Replacement, ([employer]. Y[employer] HP Valved Graft, size 23 mm) on pump oxygenation, intraoperative transesophageal cardiogram; Surgeon:JOHN PAUL ULLOA; Location:[facility] OR     REPLACE VALVE AORTIC  11    bicuspid AV with severe stenosis - 2011 mechanical AVR with 23 mm St Yordan AV conduit, composite graft placement     Family Hx:  Family History   Problem Relation Age of Onset     Hypertension Mother       "Thyroid Disease Mother         \"Toxic thyroid\"     Prostate Cancer Father 70     Cancer Father 80        Lung cancer, Not caused from smoking     Cerebrovascular Disease Father 80     Hypertension Father      Cardiovascular Brother         half brother      Cardiovascular Son         Puentes-Parkinsons White Syndrome     Cardiovascular Daughter         Heart murmur     Breast Cancer Paternal Aunt 80     Cardiovascular Paternal Aunt      Arthritis Brother 40        RA - half brother      Cancer Maternal Grandfather      Colon Cancer No family hx of      Thyroid disease: as above          Social Hx:  Social History     Socioeconomic History     Marital status:      Spouse name: Not on file     Number of children: Not on file     Years of education: Not on file     Highest education level: Not on file   Occupational History     Not on file   Social Needs     Financial resource strain: Not on file     Food insecurity     Worry: Not on file     Inability: Not on file     Transportation needs     Medical: Not on file     Non-medical: Not on file   Tobacco Use     Smoking status: Never Smoker     Smokeless tobacco: Never Used   Substance and Sexual Activity     Alcohol use: Yes     Comment: 2 drinks per week -      Drug use: No     Sexual activity: Not Currently   Lifestyle     Physical activity     Days per week: Not on file     Minutes per session: Not on file     Stress: Not on file   Relationships     Social connections     Talks on phone: Not on file     Gets together: Not on file     Attends Druze service: Not on file     Active member of club or organization: Not on file     Attends meetings of clubs or organizations: Not on file     Relationship status: Not on file     Intimate partner violence     Fear of current or ex partner: Not on file     Emotionally abused: Not on file     Physically abused: Not on file     Forced sexual activity: Not on file   Other Topics Concern     Parent/sibling w/ CABG, MI or " angioplasty before 65F 55M? Not Asked      Service Not Asked     Blood Transfusions Not Asked     Caffeine Concern Yes     Comment: 1-2 cups caffeine per day     Occupational Exposure Not Asked     Hobby Hazards Not Asked     Sleep Concern No     Stress Concern No     Weight Concern No     Special Diet Yes     Comment: low fat daily , low red meats     Back Care No     Exercise Yes     Comment: walks daily/ 3x a week exercise class     Bike Helmet Not Asked     Seat Belt Not Asked     Self-Exams Not Asked   Social History Narrative     Not on file          MEDICATIONS:  has a current medication list which includes the following prescription(s): alendronate, anastrozole, aspirin, carvedilol, lisinopril, methimazole, valacyclovir, warfarin anticoagulant, and zolpidem.    ROS   ROS: 10 point ROS neg other than the symptoms noted above in the HPI.    Physical Exam   VS: LMP  (LMP Unknown)   Breastfeeding No   GENERAL: healthy, alert and no distress  EYES: Eyes grossly normal to inspection, conjunctivae and sclerae normal  RESP: no audible wheeze, cough, or visible cyanosis.  No visible retractions or increased work of breathing.  Able to speak fully in complete sentences.  NEURO: Cranial nerves grossly intact, mentation intact and speech normal  PSYCH: mentation appears normal, affect normal/bright, judgement and insight intact, normal speech and appearance well-groomed      LABS:  TFTs:  ENDO THYROID LABS-[facility] Latest Ref Rng & Units 9/11/2020   TSH 0.40 - 4.00 mU/L 1.53   T4 FREE 0.76 - 1.46 ng/dL 1.04   FREE T3 2.3 - 4.2 pg/mL 2.8     ENDO THYROID LABS-[facility] Latest Ref Rng & Units 8/8/2019 6/27/2019   TSH 0.40 - 4.00 mU/L 0.68 0.32 (L)   T4 FREE 0.76 - 1.46 ng/dL 1.02 1.20   FREE T3 2.3 - 4.2 pg/mL 2.6 2.9     [facility] 5/13/2019 11/8/2018   TSH 0.40 - 4.00 mU/L 0.94 1.02   T4 FREE 0.76 - 1.46 ng/dL 1.05 1.02   FREE T3 2.3 - 4.2 pg/mL 2.7 2.9     ENDO THYROID LABS-[facility] Latest  Ref Rng & Units 9/6/2018   TSH 0.40 - 4.00 mU/L 1.10   T4 FREE 0.76 - 1.46 ng/dL 0.89   FREE T3 2.3 - 4.2 pg/mL 2.8     ENDO THYROID LABS-[facility] Latest Ref Rng & Units 6/5/2018   TSH 0.40 - 4.00 mU/L 0.05 (L)   T4 FREE 0.76 - 1.46 ng/dL 1.27   FREE T3 2.3 - 4.2 pg/mL 3.4     ENDO THYROID LABS-[facility] Latest Ref Rng & Units 12/6/2017   TSH 0.40 - 4.00 mU/L 0.18 (L)   T4 FREE 0.76 - 1.46 ng/dL 1.16   FREE T3 2.3 - 4.2 pg/mL 3.2   THYROID STIM IMMUNOG <=1.3 TSI index <1.0     ENDO THYROID LABS-[facility] Latest Ref Rng & Units 11/10/2017 7/24/2017   TSH 0.40 - 4.00 mU/L 0.12 (L) 0.12 (L)   T4 FREE 0.76 - 1.46 ng/dL 1.10 1.16     ENDO THYROID LABS-[facility] Latest Ref Rng & Units 7/14/2016   TSH 0.40 - 4.00 mU/L 0.24 (L)   T4 FREE 0.76 - 1.46 ng/dL 1.22       CBC:  WBC   Date Value Ref Range Status   08/18/2020 7.7 4.0 - 11.0 10e9/L Final         LFTs:  Liver Function Studies -   Recent Labs   Lab Test 08/18/20  0946   PROTTOTAL 7.3   ALBUMIN 3.6   BILITOTAL 0.6   ALKPHOS 62   AST 20   ALT 26       ULTRASOUND THYROID December 15, 2017 9:24 AM   HISTORY: Subclinical hyperthyroidism.   FINDINGS: Thyroid ultrasound demonstrates an enlarged thyroid gland. The right lobe measures 5.4 x 2.0 x 1.8 cm. The left lobe measures 3.8 x 1.6 x 1.3 cm. The isthmus is normal in thickness. Thyroid parenchyma is heterogeneous in echotexture. Increased color Doppler flow is noted throughout the thyroid gland. Thyroid nodules as follows: Right Lobe: None. Isthmus: None. Left Lobe: None.   IMPRESSION: Enlarged heterogeneous thyroid gland without evidence of a discrete thyroid nodule. Increased color Doppler flow suggests underlying thyroiditis. Correlate with thyroid function test and nuclear medicine thyroid scan as needed.    All pertinent notes, labs, and images personally reviewed by me.     A/P  Ms.Carol WINDY Bolanos is a 65 year old here for the evaluation of hyperthyroidism.    Subclinical hyperthyroidism (TSI neg):   TSH remained suppressed since 2016 and  along with normal free T4  No h/o arrhythmia  No h/o osteoporosis-- has osteopenia. Not on treatment.  She is on aromatase inhibitor for breast cancer.  TSI neg  US thyroid ( h/o radiation)- no discrete nodules.  Clinically looks euthyroid.  No major complaints.  As there is further decline in TSH along with risk for low bone density with aromatase inhibitor (with with prior history of osteopenia) was started on methimazole 5 mg 7/2018  Currently taking methimazole 2.5 mg/day.   Recent labs WNL.  Plan:  Continue methimazole 2.5 mg/day  Labs in 6 months.  Please make a lab appointment for blood work and follow up clinic appointment in 1 week after that to discuss results.  Can consider to take off of methimazole based on labs in 6 months.    Off note she is on anticoagulant and may need dose adjustment to get INR at target levels ( h/o aortic valve replacement)  Baseline LFT and WBC normal.  Discussed diagnosis, pathophysiology, management and treatment options of condition with pt.    Discussed indications, risks and benefits of all medications prescribed, and answered questions to patient's satisfaction.  The patient indicates understanding of these issues and agrees with the plan.    Discussed possible side effects of methimazole including liver dysfunction and agranulocytosis. Discussed to watch for s/s like jaundice, abdominal pain and skin rash. In case of prolonged unresolving fever, sore throat and infections, advise to seek medical care.    Call if:     Signs or symptoms of infection. These include a fever of 100.5 degrees or higher, chills, severe sore throat, ear or sinus pain, cough, increased sputum or change in color, painful urination, mouth sores.   Severe nausea or vomiting.   Joint pain or swelling.   Not able to eat.   Unusual bruising or bleeding.   Dark urine or yellow skin or eyes.      Follow-up:  6 months.    Merle Baxter MD  Endocrinology  [place]  CC:  Brook Echavarria     More than 50% of face to face time spent with Ms. Bolanos on counseling / coordinating her care.    All questions were answered.  The patient indicates understanding of the above issues and agrees with the plan set forth.     Addendum to above note and clinic visit:    Labs reviewed.    See result note/telephone encounter.                Again, thank you for allowing me to participate in the care of your patient.        Sincerely,        Merle Baxter MD

## 2020-09-23 NOTE — PROGRESS NOTES
"THIS IS A VIDEO VISIT:    Phone call visit/virtual visit encounter:    Name of patient: Annabella Bolanos    Date of encounter: 9/23/2020    Time of start of video visit:10:42    Video started: 10:44    Video ended: 10:52    Time visit video ended: 10:55    Provider location: working from [place]    Patient location: patients home.    Mode of transmission: video/ [employer]    Verbal consent: obtained before starting visit. Pt is agreeable.      The patient has been notified of following:      \"This VIDEO visit will be conducted via a call between you and your physician/provider. We have found that certain health care needs can be provided without the need for a physical exam.  This service lets us provide the care you need with a short phone conversation.  If a prescription is necessary we can send it directly to your pharmacy.  If lab work is needed we can place an order for that and you can then stop by our lab to have the test done at a later time.     With new updates with corona virus patient might be billed as clinic visit.     If during the course of the call the physician/provider feels a telephone visit is not appropriate, you will not be charged for this service.\"      Past medical history, social history, family history, allergy and medications were reviewed and updated as appropriate.  Reviewed pertinent labs, notes, imaging studies personally.    Name: Annabella Bolanos  Seen for follow-up of subclinical hyperthyroidism.    HPI:  Annabella Bolanos is a 65 year old female who presents for the evaluation of subclinical Hyperthyroidism.  H/o breast cancer and h/o aortic valve replacement and is on long term anticoagulation.  Breast cancer diagnosed in 2014 s/p lumpectomy and chemo and radiation. Took radiation 5 days a week for 1 month.  DEXA 2018 scan showing osteopenia and appears stable.  She is not on medication for osteoporosis/osteopenia at this time.  She is also on aromatase inhibitor as a part of " treatment for breast cancer.     Subclinical hyperthyroidism noted since 7/2016. Plan was for continue to monitor.  No h/o arrhythmia  No h/o osteoporosis-- has osteopenia. Not on treatment.  TSI neg  US thyroid ( h/o radiation)- no discrete nodules.  As there was further decline in TSH along with risk for low bone density with aromatase inhibitor (with with prior history of osteopenia) as well as complex cardiac history she was started on methimazole 5 mg in 7/2018.  Currently taking methimazole 2.5 mg/day.   F/u labs are WNL  LFT and CBC stable.    Since last clinic visit she was started on Fosamax by Dr. Felder for osteoporosis/osteopenia.  She is taking Fosamax on a weekly basis since February 2019.    Feeling good.  Has good energy.  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  H/o arthritis.  Weight is stable.  Wt Readings from Last 2 Encounters:   08/18/20 57.2 kg (126 lb)   08/12/20 57.5 kg (126 lb 12.8 oz)       Eating healthy.    History of radiation exposure: YES. As above.  History of thyroid dysfunction: not to her knowledge. No FH of thyroid cancer.  Palpitations: No  Changes to hair or skin: No  Diarrhea/Constipation:No  Changes in menses: s/p menopause  Changes in vision:No  Diplopia/Blurryness:No  Dysphagia or Shortness of breath:No  Tremors:No  Difficulty sleeping:Yes: most of the time  Changes in weight: No  Lithium/Amiodarone: No  URI: No  CT Scans:No  Mother had hyperthyroidism s/p DOE and was on levothyroxine.    PMH/PSH:  Past Medical History:   Diagnosis Date     Adenomatous colon polyp 8/2015     Aortic stenosis 6/23/11    bicuspid AV with severe stenosis - 6/2011 mechanical AVR with 23 mm St Yordan AV conduit, composite graft placement     Arthritis     knees and hands     Bicuspid aortic valve      Breast cancer (H) 7/2014    R breast     H/O aortic valve replacement     St Yordan     Heart murmur     Valve repalcement 2011.     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    AAA repair with av23 mm tube graft     PONV (postoperative nausea and vomiting)     n/v morphine vs anesthesia, vomiting x24 hrs     Subclinical hyperthyroidism 2017     Thrombosis of leg     after  of daughter     Uncomplicated asthma      Past Surgical History:   Procedure Laterality Date     BIOPSY NODE SENTINEL Right 9/10/2014    Procedure: BIOPSY NODE SENTINEL;  Surgeon: Ila Romano MD;  Location: [facility] OR     CARDIAC SURGERY  2011    aortic valve replacement     ENT SURGERY      tonsillectomy     HRW PORT A CATH       INSERT PORT VASCULAR ACCESS Left 10/22/2014    Procedure: INSERT PORT VASCULAR ACCESS;  Surgeon: Ila Romano MD;  Location: [facility] OR     LUMPECTOMY BREAST WITH SEED LOCALIZATION Right 9/10/2014    Procedure: LUMPECTOMY BREAST WITH SEED LOCALIZATION;  Surgeon: Ila Romano MD;  Location: [facility] OR     ORTHOPEDIC SURGERY      shoulder, thumb surgery     REMOVE PORT VASCULAR ACCESS Left 2015    Procedure: REMOVE PORT VASCULAR ACCESS;  Surgeon: Ila Romano MD;  Location: [facility] OR     REPAIR ANEURYSM ASCENDING AORTA  2011    Procedure:REPAIR ANEURYSM ASCENDING AORTA; Medial Sternotomy, Aortic Valve Replacement, (St. Yordan [employer] HP Valved Graft, size 23 mm) on pump oxygenation, intraoperative transesophageal cardiogram; Surgeon:JOHN PAUL ULLOA; Location:[facility] OR     REPLACE VALVE AORTIC  11    bicuspid AV with severe stenosis - 2011 mechanical AVR with 23 mm St Yordan AV conduit, composite graft placement     Family Hx:  Family History   Problem Relation Age of Onset     Hypertension Mother      Thyroid Disease Mother         \"Toxic thyroid\"     Prostate Cancer Father 70     Cancer Father 80        Lung cancer, Not caused from smoking     Cerebrovascular Disease Father 80     Hypertension Father      Cardiovascular Brother         half brother  "     Cardiovascular Son         Puentes-Parkinsons White Syndrome     Cardiovascular Daughter         Heart murmur     Breast Cancer Paternal Aunt 80     Cardiovascular Paternal Aunt      Arthritis Brother 40        RA - half brother      Cancer Maternal Grandfather      Colon Cancer No family hx of      Thyroid disease: as above          Social Hx:  Social History     Socioeconomic History     Marital status:      Spouse name: Not on file     Number of children: Not on file     Years of education: Not on file     Highest education level: Not on file   Occupational History     Not on file   Social Needs     Financial resource strain: Not on file     Food insecurity     Worry: Not on file     Inability: Not on file     Transportation needs     Medical: Not on file     Non-medical: Not on file   Tobacco Use     Smoking status: Never Smoker     Smokeless tobacco: Never Used   Substance and Sexual Activity     Alcohol use: Yes     Comment: 2 drinks per week -      Drug use: No     Sexual activity: Not Currently   Lifestyle     Physical activity     Days per week: Not on file     Minutes per session: Not on file     Stress: Not on file   Relationships     Social connections     Talks on phone: Not on file     Gets together: Not on file     Attends Anabaptist service: Not on file     Active member of club or organization: Not on file     Attends meetings of clubs or organizations: Not on file     Relationship status: Not on file     Intimate partner violence     Fear of current or ex partner: Not on file     Emotionally abused: Not on file     Physically abused: Not on file     Forced sexual activity: Not on file   Other Topics Concern     Parent/sibling w/ CABG, MI or angioplasty before 65F 55M? Not Asked      Service Not Asked     Blood Transfusions Not Asked     Caffeine Concern Yes     Comment: 1-2 cups caffeine per day     Occupational Exposure Not Asked     Hobby Hazards Not Asked     Sleep Concern No      Stress Concern No     Weight Concern No     Special Diet Yes     Comment: low fat daily , low red meats     Back Care No     Exercise Yes     Comment: walks daily/ 3x a week exercise class     Bike Helmet Not Asked     Seat Belt Not Asked     Self-Exams Not Asked   Social History Narrative     Not on file          MEDICATIONS:  has a current medication list which includes the following prescription(s): alendronate, anastrozole, aspirin, carvedilol, lisinopril, methimazole, valacyclovir, warfarin anticoagulant, and zolpidem.    ROS   ROS: 10 point ROS neg other than the symptoms noted above in the HPI.    Physical Exam   VS: LMP  (LMP Unknown)   Breastfeeding No   GENERAL: healthy, alert and no distress  EYES: Eyes grossly normal to inspection, conjunctivae and sclerae normal  RESP: no audible wheeze, cough, or visible cyanosis.  No visible retractions or increased work of breathing.  Able to speak fully in complete sentences.  NEURO: Cranial nerves grossly intact, mentation intact and speech normal  PSYCH: mentation appears normal, affect normal/bright, judgement and insight intact, normal speech and appearance well-groomed      LABS:  TFTs:  ENDO THYROID LABS-[facility] Latest Ref Rng & Units 9/11/2020   TSH 0.40 - 4.00 mU/L 1.53   T4 FREE 0.76 - 1.46 ng/dL 1.04   FREE T3 2.3 - 4.2 pg/mL 2.8     ENDO THYROID LABS-[facility] Latest Ref Rng & Units 8/8/2019 6/27/2019   TSH 0.40 - 4.00 mU/L 0.68 0.32 (L)   T4 FREE 0.76 - 1.46 ng/dL 1.02 1.20   FREE T3 2.3 - 4.2 pg/mL 2.6 2.9     ENDO THYROID LABS-[facility] Latest Ref Rng & Units 5/13/2019 11/8/2018   TSH 0.40 - 4.00 mU/L 0.94 1.02   T4 FREE 0.76 - 1.46 ng/dL 1.05 1.02   FREE T3 2.3 - 4.2 pg/mL 2.7 2.9     ENDO THYROID LABS-[facility] Latest Ref Rng & Units 9/6/2018   TSH 0.40 - 4.00 mU/L 1.10   T4 FREE 0.76 - 1.46 ng/dL 0.89   FREE T3 2.3 - 4.2 pg/mL 2.8     ENDO THYROID LABS-[facility] Latest Ref Rng & Units 6/5/2018   TSH 0.40 - 4.00 mU/L 0.05 (L)   T4 FREE 0.76 - 1.46 ng/dL 1.27   FREE T3 2.3  - 4.2 pg/mL 3.4     ENDO THYROID LABS-[facility] Latest Ref Rng & Units 12/6/2017   TSH 0.40 - 4.00 mU/L 0.18 (L)   T4 FREE 0.76 - 1.46 ng/dL 1.16   FREE T3 2.3 - 4.2 pg/mL 3.2   THYROID STIM IMMUNOG <=1.3 TSI index <1.0     ENDO THYROID LABS-[facility] Latest Ref Rng & Units 11/10/2017 7/24/2017   TSH 0.40 - 4.00 mU/L 0.12 (L) 0.12 (L)   T4 FREE 0.76 - 1.46 ng/dL 1.10 1.16     ENDO THYROID LABS-[facility] Latest Ref Rng & Units 7/14/2016   TSH 0.40 - 4.00 mU/L 0.24 (L)   T4 FREE 0.76 - 1.46 ng/dL 1.22       CBC:  WBC   Date Value Ref Range Status   08/18/2020 7.7 4.0 - 11.0 10e9/L Final         LFTs:  Liver Function Studies -   Recent Labs   Lab Test 08/18/20  0946   PROTTOTAL 7.3   ALBUMIN 3.6   BILITOTAL 0.6   ALKPHOS 62   AST 20   ALT 26       ULTRASOUND THYROID December 15, 2017 9:24 AM   HISTORY: Subclinical hyperthyroidism.   FINDINGS: Thyroid ultrasound demonstrates an enlarged thyroid gland. The right lobe measures 5.4 x 2.0 x 1.8 cm. The left lobe measures 3.8 x 1.6 x 1.3 cm. The isthmus is normal in thickness. Thyroid parenchyma is heterogeneous in echotexture. Increased color Doppler flow is noted throughout the thyroid gland. Thyroid nodules as follows: Right Lobe: None. Isthmus: None. Left Lobe: None.   IMPRESSION: Enlarged heterogeneous thyroid gland without evidence of a discrete thyroid nodule. Increased color Doppler flow suggests underlying thyroiditis. Correlate with thyroid function test and nuclear medicine thyroid scan as needed.    All pertinent notes, labs, and images personally reviewed by me.     A/P  Ms.Carol WINDY Bolanos is a 65 year old here for the evaluation of hyperthyroidism.    Subclinical hyperthyroidism (TSI neg):   TSH remained suppressed since 2016 and along with normal free T4  No h/o arrhythmia  No h/o osteoporosis-- has osteopenia. Not on treatment.  She is on aromatase inhibitor for breast cancer.  TSI neg  US thyroid ( h/o radiation)- no discrete nodules.  Clinically looks euthyroid.  No major  complaints.  As there is further decline in TSH along with risk for low bone density with aromatase inhibitor (with with prior history of osteopenia) was started on methimazole 5 mg 7/2018  Currently taking methimazole 2.5 mg/day.   Recent labs WNL.  Plan:  Continue methimazole 2.5 mg/day  Labs in 6 months.  Please make a lab appointment for blood work and follow up clinic appointment in 1 week after that to discuss results.  Can consider to take off of methimazole based on labs in 6 months.    Off note she is on anticoagulant and may need dose adjustment to get INR at target levels ( h/o aortic valve replacement)  Baseline LFT and WBC normal.  Discussed diagnosis, pathophysiology, management and treatment options of condition with pt.    Discussed indications, risks and benefits of all medications prescribed, and answered questions to patient's satisfaction.  The patient indicates understanding of these issues and agrees with the plan.    Discussed possible side effects of methimazole including liver dysfunction and agranulocytosis. Discussed to watch for s/s like jaundice, abdominal pain and skin rash. In case of prolonged unresolving fever, sore throat and infections, advise to seek medical care.    Call if:     Signs or symptoms of infection. These include a fever of 100.5 degrees or higher, chills, severe sore throat, ear or sinus pain, cough, increased sputum or change in color, painful urination, mouth sores.   Severe nausea or vomiting.   Joint pain or swelling.   Not able to eat.   Unusual bruising or bleeding.   Dark urine or yellow skin or eyes.      Follow-up:  6 months.    Merle Baxter MD  Endocrinology  [facility]  CC: Brook Echavarria     More than 50% of face to face time spent with Ms. Bolanos on counseling / coordinating her care.    All questions were answered.  The patient indicates understanding of the above issues and agrees with the plan set forth.      Addendum to above note and clinic visit:    Labs reviewed.    See result note/telephone encounter.

## 2020-09-25 ENCOUNTER — ANTICOAGULATION THERAPY VISIT (OUTPATIENT)
Dept: ANTICOAGULATION | Facility: CLINIC | Age: 65
End: 2020-09-25

## 2020-09-25 DIAGNOSIS — Z79.01 LONG TERM CURRENT USE OF ANTICOAGULANT THERAPY: ICD-10-CM

## 2020-09-25 DIAGNOSIS — Z95.2 H/O AORTIC VALVE REPLACEMENT: ICD-10-CM

## 2020-09-25 LAB
CAPILLARY BLOOD COLLECTION: NORMAL
INR PPP: 2.2 (ref 0.86–1.14)

## 2020-09-25 PROCEDURE — 85610 PROTHROMBIN TIME: CPT | Performed by: INTERNAL MEDICINE

## 2020-09-25 PROCEDURE — 99207 ZZC NO CHARGE NURSE ONLY: CPT | Performed by: INTERNAL MEDICINE

## 2020-09-25 PROCEDURE — 36416 COLLJ CAPILLARY BLOOD SPEC: CPT | Performed by: INTERNAL MEDICINE

## 2020-09-25 NOTE — PROGRESS NOTES
ANTICOAGULATION FOLLOW-UP CLINIC VISIT    Patient Name:  Annabella Bolanos  Date:  2020  Contact Type:  Telephone/ discussed with patient    SUBJECTIVE:  Patient Findings     Positives:   Missed doses (intentionally held for colonoscopy)    Comments:   Colonoscopy on .  Two polyps removed.  Patient was able to resume her maintenance dose on .  The patient was assessed for diet, medication, and activity level changes, extra doses, bruising or bleeding, with no problem findings.          Clinical Outcomes     Negatives:   Major bleeding event, Thromboembolic event, Anticoagulation-related hospital admission, Anticoagulation-related ED visit, Anticoagulation-related fatality    Comments:   Colonoscopy on .  Two polyps removed.  Patient was able to resume her maintenance dose on .  The patient was assessed for diet, medication, and activity level changes, extra doses, bruising or bleeding, with no problem findings.             OBJECTIVE    Recent labs: (last 7 days)     20  1207   INR 2.20*       ASSESSMENT / PLAN  INR assessment THER    Recheck INR In: 6 WEEKS    INR Location Clinic      Anticoagulation Summary  As of 2020    INR goal:   2.0-3.0   TTR:   99.0 % (1 y)   INR used for dosin.20 (2020)   Warfarin maintenance plan:   7.5 mg (5 mg x 1.5) every Mon, Wed, Sat; 5 mg (5 mg x 1) all other days   Full warfarin instructions:   7.5 mg every Mon, Wed, Sat; 5 mg all other days   Weekly warfarin total:   42.5 mg   No change documented:   Erica Stinson RN   Plan last modified:   Erica Stinson RN (2019)   Next INR check:   2020   Priority:   Maintenance   Target end date:   Indefinite    Indications    Long-term (current) use of anticoagulants [Z79.01] [Z79.01]  H/O aortic valve replacement [Z95.2]             Anticoagulation Episode Summary     INR check location:       Preferred lab:       Send INR reminders to:   [facility]    Comments:   20-ok to  leave detailed message with dosing instructions if patient does not answer. INR Referral renewed, see 8/13/20 telephone encounter.       Anticoagulation Care Providers     Provider Role Specialty Phone number    Brook Echavarria MD Referring Internal Medicine [phone]            See the Encounter Report to view Anticoagulation Flowsheet and Dosing Calendar (Go to Encounters tab in chart review, and find the Anticoagulation Therapy Visit)    Dosage adjustment made based on physician directed care plan.    Erica Stinson RN

## 2020-11-03 DIAGNOSIS — I10 HTN, GOAL BELOW 140/90: ICD-10-CM

## 2020-11-05 ENCOUNTER — ANTICOAGULATION THERAPY VISIT (OUTPATIENT)
Dept: ANTICOAGULATION | Facility: CLINIC | Age: 65
End: 2020-11-05
Payer: COMMERCIAL

## 2020-11-05 DIAGNOSIS — Z79.01 LONG TERM CURRENT USE OF ANTICOAGULANTS WITH INR GOAL OF 2.0-3.0: ICD-10-CM

## 2020-11-05 DIAGNOSIS — Z79.01 LONG TERM CURRENT USE OF ANTICOAGULANT THERAPY: ICD-10-CM

## 2020-11-05 DIAGNOSIS — Z95.2 S/P AORTIC VALVE REPLACEMENT: ICD-10-CM

## 2020-11-05 DIAGNOSIS — Z95.2 H/O AORTIC VALVE REPLACEMENT: ICD-10-CM

## 2020-11-05 LAB
CAPILLARY BLOOD COLLECTION: NORMAL
INR PPP: 3.4 (ref 0.86–1.14)

## 2020-11-05 PROCEDURE — 85610 PROTHROMBIN TIME: CPT | Performed by: INTERNAL MEDICINE

## 2020-11-05 PROCEDURE — 99207 PR NO CHARGE NURSE ONLY: CPT | Performed by: INTERNAL MEDICINE

## 2020-11-05 PROCEDURE — 36416 COLLJ CAPILLARY BLOOD SPEC: CPT | Performed by: INTERNAL MEDICINE

## 2020-11-05 RX ORDER — WARFARIN SODIUM 5 MG/1
TABLET ORAL
Qty: 105 TABLET | Refills: 1 | Status: SHIPPED | OUTPATIENT
Start: 2020-11-05 | End: 2021-04-05

## 2020-11-05 RX ORDER — LISINOPRIL 10 MG/1
TABLET ORAL
Qty: 180 TABLET | Refills: 3 | OUTPATIENT
Start: 2020-11-05

## 2020-11-05 RX ORDER — CARVEDILOL 12.5 MG/1
TABLET ORAL
Qty: 180 TABLET | Refills: 3 | OUTPATIENT
Start: 2020-11-05

## 2020-11-05 NOTE — PROGRESS NOTES
ANTICOAGULATION FOLLOW-UP CLINIC VISIT    Patient Name:  Annabella Bolanos  Date:  11/5/2020  Contact Type:  Telephone/ Called patient, she reports a health change, she denies any other changes. Orders discussed with the patient. Lab INR appointment scheduled on 11/19/20.    SUBJECTIVE:  Patient Findings     Positives:  Change in health (Patient reports for about 1 week, arthritis in both pointer fingers, right worse than left, has increased swelling in the joint and increased pain in the evening. )    Comments:  The patient was assessed for diet, medication, and activity level changes, missed or extra doses, bruising or bleeding, with no problem findings.           Clinical Outcomes     Comments:  The patient was assessed for diet, medication, and activity level changes, missed or extra doses, bruising or bleeding, with no problem findings.              OBJECTIVE    Recent labs: (last 7 days)     11/05/20  1118   INR 3.40*       ASSESSMENT / PLAN  INR assessment SUPRA    Recheck INR In: 2 WEEKS    INR Location Outside lab      Anticoagulation Summary  As of 11/5/2020    INR goal:  2.0-3.0   TTR:  95.2 % (1 y)   INR used for dosing:  3.40 (11/5/2020)   Warfarin maintenance plan:  7.5 mg (5 mg x 1.5) every Mon, Wed, Sat; 5 mg (5 mg x 1) all other days   Full warfarin instructions:  11/5: 2.5 mg; Otherwise 7.5 mg every Mon, Wed, Sat; 5 mg all other days   Weekly warfarin total:  42.5 mg   Plan last modified:  Erica Stinson RN (8/2/2019)   Next INR check:  11/19/2020   Priority:  High   Target end date:  Indefinite    Indications    Long-term (current) use of anticoagulants [Z79.01] [Z79.01]  H/O aortic valve replacement [Z95.2]             Anticoagulation Episode Summary     INR check location:      Preferred lab:      [facility]    Comments:  4/1/20-ok to leave detailed message with dosing instructions if patient does not answer. INR Referral renewed, see 8/13/20 telephone  encounter.       Anticoagulation Care Providers     Provider Role Specialty Phone number    Brook Echavarria MD Referring Internal Medicine [phone]            See the Encounter Report to view Anticoagulation Flowsheet and Dosing Calendar (Go to Encounters tab in chart review, and find the Anticoagulation Therapy Visit)    Dosage adjustment made based on physician directed care plan.    Deborah Aguilera RN

## 2020-11-05 NOTE — TELEPHONE ENCOUNTER
Pt should have refills on file  E-Prescribing Status: Receipt confirmed by pharmacy (9/11/2020 11:37 AM CDT)

## 2020-11-19 ENCOUNTER — ANTICOAGULATION THERAPY VISIT (OUTPATIENT)
Dept: ANTICOAGULATION | Facility: CLINIC | Age: 65
End: 2020-11-19

## 2020-11-19 DIAGNOSIS — Z79.01 LONG TERM CURRENT USE OF ANTICOAGULANT THERAPY: ICD-10-CM

## 2020-11-19 DIAGNOSIS — Z95.2 H/O AORTIC VALVE REPLACEMENT: ICD-10-CM

## 2020-11-19 LAB
CAPILLARY BLOOD COLLECTION: NORMAL
INR PPP: 3.1 (ref 0.86–1.14)

## 2020-11-19 PROCEDURE — 36416 COLLJ CAPILLARY BLOOD SPEC: CPT | Performed by: INTERNAL MEDICINE

## 2020-11-19 PROCEDURE — 99207 PR NO CHARGE NURSE ONLY: CPT | Performed by: INTERNAL MEDICINE

## 2020-11-19 PROCEDURE — 85610 PROTHROMBIN TIME: CPT | Performed by: INTERNAL MEDICINE

## 2020-11-19 NOTE — PROGRESS NOTES
ANTICOAGULATION FOLLOW-UP CLINIC VISIT    Patient Name:  Annabella Bolanos  Date:  11/19/2020  Contact Type:  Telephone/ Called patient, she denies any changes. Orders discussed with the patient, she agrees with the plan. Lab INR appointment scheduled on 12/3/20.    SUBJECTIVE:  Patient Findings     Comments:  The patient was assessed for diet, medication, and activity level changes, missed or extra doses, bruising or bleeding, with no problem findings. Maintenance warfarin dosing was reviewed with patient and will remain on the same dose until next INR check. Patient did not have any questions or concerns.           Clinical Outcomes     Negatives:  Major bleeding event, Thromboembolic event, Anticoagulation-related hospital admission, Anticoagulation-related ED visit, Anticoagulation-related fatality    Comments:  The patient was assessed for diet, medication, and activity level changes, missed or extra doses, bruising or bleeding, with no problem findings. Maintenance warfarin dosing was reviewed with patient and will remain on the same dose until next INR check. Patient did not have any questions or concerns.              OBJECTIVE    Recent labs: (last 7 days)     11/19/20  1200   INR 3.10*       ASSESSMENT / PLAN  INR assessment SUPRA    Recheck INR In: 2 WEEKS    INR Location Outside lab      Anticoagulation Summary  As of 11/19/2020    INR goal:  2.0-3.0   TTR:  91.4 % (1 y)   INR used for dosing:  3.10 (11/19/2020)   Warfarin maintenance plan:  7.5 mg (5 mg x 1.5) every Mon, Wed, Sat; 5 mg (5 mg x 1) all other days   Full warfarin instructions:  7.5 mg every Mon, Wed, Sat; 5 mg all other days   Weekly warfarin total:  42.5 mg   Plan last modified:  Erica Stinson RN (8/2/2019)   Next INR check:  12/3/2020   Priority:  High   Target end date:  Indefinite    Indications    Long-term (current) use of anticoagulants [Z79.01] [Z79.01]  H/O aortic valve replacement [Z95.2]             Anticoagulation Episode Summary      INR check location:      Preferred lab:      Send INR reminders to:  [facility]    Comments:  4/1/20-ok to leave detailed message with dosing instructions if patient does not answer. INR Referral renewed, see 8/13/20 telephone encounter.       Anticoagulation Care Providers     Provider Role Specialty Phone number    Brook Echavarria MD Referring Internal Medicine [phone]            See the Encounter Report to view Anticoagulation Flowsheet and Dosing Calendar (Go to Encounters tab in chart review, and find the Anticoagulation Therapy Visit)    Dosage adjustment made based on physician directed care plan.    Deborah Aguilera RN

## 2020-11-19 NOTE — PROGRESS NOTES
Left message to call [phone]. Please transfer call to Deborah at [phone].  Deborah Aguilera RN

## 2020-12-03 ENCOUNTER — ANTICOAGULATION THERAPY VISIT (OUTPATIENT)
Dept: ANTICOAGULATION | Facility: CLINIC | Age: 65
End: 2020-12-03

## 2020-12-03 DIAGNOSIS — Z79.01 LONG TERM CURRENT USE OF ANTICOAGULANT THERAPY: ICD-10-CM

## 2020-12-03 DIAGNOSIS — Z95.2 H/O AORTIC VALVE REPLACEMENT: ICD-10-CM

## 2020-12-03 LAB
CAPILLARY BLOOD COLLECTION: NORMAL
INR PPP: 2.8 (ref 0.86–1.14)

## 2020-12-03 PROCEDURE — 99207 PR NO CHARGE NURSE ONLY: CPT

## 2020-12-03 PROCEDURE — 36416 COLLJ CAPILLARY BLOOD SPEC: CPT | Performed by: INTERNAL MEDICINE

## 2020-12-03 PROCEDURE — 85610 PROTHROMBIN TIME: CPT | Performed by: INTERNAL MEDICINE

## 2020-12-03 NOTE — PROGRESS NOTES
Left message to call [phone]. Please transfer call to Deborah at [phone].  Deborah Aguilera RN

## 2020-12-03 NOTE — PROGRESS NOTES
ANTICOAGULATION FOLLOW-UP CLINIC VISIT    Patient Name:  Annabella Bolanos  Date:  12/3/2020  Contact Type:  Telephone/ Patient returned call, she denies any changes. Orders discussed with the patient, she agrees with the plan. Lab INR appointment scheduled on 20.    SUBJECTIVE:  Patient Findings     Comments:  The patient was assessed for diet, medication, and activity level changes, missed or extra doses, bruising or bleeding, with no problem findings. Maintenance warfarin dosing was reviewed with patient and will remain on the same dose until next INR check. Patient did not have any questions or concerns.           Clinical Outcomes     Comments:  The patient was assessed for diet, medication, and activity level changes, missed or extra doses, bruising or bleeding, with no problem findings. Maintenance warfarin dosing was reviewed with patient and will remain on the same dose until next INR check. Patient did not have any questions or concerns.              OBJECTIVE    Recent labs: (last 7 days)     20  1313   INR 2.80*       ASSESSMENT / PLAN  INR assessment THER    Recheck INR In: 3 WEEKS    INR Location Outside lab      Anticoagulation Summary  As of 12/3/2020    INR goal:  2.0-3.0   TTR:  90.1 % (1 y)   INR used for dosin.80 (12/3/2020)   Warfarin maintenance plan:  7.5 mg (5 mg x 1.5) every Mon, Wed, Sat; 5 mg (5 mg x 1) all other days   Full warfarin instructions:  7.5 mg every Mon, Wed, Sat; 5 mg all other days   Weekly warfarin total:  42.5 mg   No change documented:  Deborah Aguilera RN   Plan last modified:  Erica Stinson RN (2019)   Next INR check:  2020   Priority:  High   Target end date:  Indefinite    Indications    Long-term (current) use of anticoagulants [Z79.01] [Z79.01]  H/O aortic valve replacement [Z95.2]             Anticoagulation Episode Summary     INR check location:      Preferred lab:      Send INR reminders to:  [facility]    Comments:   4/1/20-ok to leave detailed message with dosing instructions if patient does not answer. INR Referral renewed, see 8/13/20 telephone encounter.       Anticoagulation Care Providers     Provider Role Specialty Phone number    Brook Echavarria MD Referring Internal Medicine [phone]            See the Encounter Report to view Anticoagulation Flowsheet and Dosing Calendar (Go to Encounters tab in chart review, and find the Anticoagulation Therapy Visit)    Dosage adjustment made based on physician directed care plan.    Deborah Aguilera RN

## 2020-12-14 ENCOUNTER — HEALTH MAINTENANCE LETTER (OUTPATIENT)
Age: 65
End: 2020-12-14

## 2020-12-23 ENCOUNTER — ANTICOAGULATION THERAPY VISIT (OUTPATIENT)
Dept: ANTICOAGULATION | Facility: CLINIC | Age: 65
End: 2020-12-23

## 2020-12-23 DIAGNOSIS — Z79.01 LONG TERM CURRENT USE OF ANTICOAGULANT THERAPY: ICD-10-CM

## 2020-12-23 DIAGNOSIS — Z95.2 H/O AORTIC VALVE REPLACEMENT: ICD-10-CM

## 2020-12-23 LAB
CAPILLARY BLOOD COLLECTION: NORMAL
INR PPP: 2.9 (ref 0.86–1.14)

## 2020-12-23 PROCEDURE — 85610 PROTHROMBIN TIME: CPT | Performed by: INTERNAL MEDICINE

## 2020-12-23 PROCEDURE — 36416 COLLJ CAPILLARY BLOOD SPEC: CPT | Performed by: INTERNAL MEDICINE

## 2020-12-23 PROCEDURE — 99207 PR NO CHARGE NURSE ONLY: CPT

## 2020-12-23 NOTE — PROGRESS NOTES
ANTICOAGULATION FOLLOW-UP CLINIC VISIT    Patient Name:  Annabella Bolanos  Date:  2020  Contact Type:  Telephone/ left detailed message for patient.  Also advised recheck in 4 weeks.  MyChart also sent.    SUBJECTIVE:  Patient Findings     Comments:  Left detailed message for patient advising her to contact INR clinic if she has had any changes in her health, diet, medications or is experiencing bleeding or bruising problems.        Clinical Outcomes     Negatives:  Major bleeding event, Thromboembolic event, Anticoagulation-related hospital admission, Anticoagulation-related ED visit, Anticoagulation-related fatality    Comments:  Left detailed message for patient advising her to contact INR clinic if she has had any changes in her health, diet, medications or is experiencing bleeding or bruising problems.           OBJECTIVE    Recent labs: (last 7 days)     20  1121   INR 2.90*       ASSESSMENT / PLAN  INR assessment THER    Recheck INR In: 4 WEEKS    INR Location Clinic      Anticoagulation Summary  As of 2020    INR goal:  2.0-3.0   TTR:  90.1 % (1 y)   INR used for dosin.90 (2020)   Warfarin maintenance plan:  7.5 mg (5 mg x 1.5) every Mon, Wed, Sat; 5 mg (5 mg x 1) all other days   Full warfarin instructions:  7.5 mg every Mon, Wed, Sat; 5 mg all other days   Weekly warfarin total:  42.5 mg   Plan last modified:  Erica Stinson RN (2019)   Next INR check:  2021   Priority:  High   Target end date:  Indefinite    Indications    Long-term (current) use of anticoagulants [Z79.01] [Z79.01]  H/O aortic valve replacement [Z95.2]             Anticoagulation Episode Summary     INR check location:      Preferred lab:      Send INR reminders to:  [facility]    Comments:  20-ok to leave detailed message with dosing instructions if patient does not answer. INR Referral renewed, see 20 telephone encounter.       Anticoagulation Care Providers     Provider Role  Specialty Phone number    Brook Echavarria MD Referring Internal Medicine [phone]            See the Encounter Report to view Anticoagulation Flowsheet and Dosing Calendar (Go to Encounters tab in chart review, and find the Anticoagulation Therapy Visit)    Dosage adjustment made based on physician directed care plan.    Erica Stinson RN                  Medical Necessity Clause: This procedure was medically necessary because the lesions that were treated were: Concentration Of Solution Injected (Mg/Ml): 3.0 X Size Of Lesion In Cm (Optional): 0 Total Volume Injected (Ccs- Only Use Numbers And Decimals): 0.1 Include Z78.9 (Other Specified Conditions Influencing Health Status) As An Associated Diagnosis?: No Detail Level: Zone Consent: The risks of atrophy were reviewed with the patient. Kenalog Preparation: Kenalog

## 2021-01-07 ENCOUNTER — MYC MEDICAL ADVICE (OUTPATIENT)
Dept: INTERNAL MEDICINE | Facility: CLINIC | Age: 66
End: 2021-01-07

## 2021-01-11 ENCOUNTER — OFFICE VISIT (OUTPATIENT)
Dept: INTERNAL MEDICINE | Facility: CLINIC | Age: 66
End: 2021-01-11
Payer: COMMERCIAL

## 2021-01-11 VITALS
BODY MASS INDEX: 21.8 KG/M2 | HEART RATE: 68 BPM | SYSTOLIC BLOOD PRESSURE: 120 MMHG | DIASTOLIC BLOOD PRESSURE: 66 MMHG | WEIGHT: 127 LBS | RESPIRATION RATE: 16 BRPM | OXYGEN SATURATION: 99 %

## 2021-01-11 DIAGNOSIS — R04.0 EPISTAXIS: Primary | ICD-10-CM

## 2021-01-11 PROCEDURE — 99213 OFFICE O/P EST LOW 20 MIN: CPT | Performed by: INTERNAL MEDICINE

## 2021-01-11 RX ORDER — SODIUM CHLORIDE/ALOE VERA
GEL (GRAM) NASAL ONCE
Status: DISCONTINUED | OUTPATIENT
Start: 2021-01-11 | End: 2021-01-11

## 2021-01-11 RX ORDER — SODIUM CHLORIDE/ALOE VERA
SPRAY, NON-AEROSOL (ML) NASAL
Qty: 1 TUBE | Refills: 1 | Status: SHIPPED | OUTPATIENT
Start: 2021-01-11 | End: 2021-08-19

## 2021-01-11 NOTE — PATIENT INSTRUCTIONS
Plan:  1. Saline nasal gel  2. ENT referral   N: [facility] [phone]  [address] [facility]  [id number] [place] [address]

## 2021-01-11 NOTE — PROGRESS NOTES
Dr Jackson's note      Patient's instructions / PLAN:                                                        Plan:  1. Saline nasal gel  2. ENT referral   N: [facility] [phone]  [address] [facility]  [id number] [place] [address]          ASSESSMENT & PLAN:                                                      (R04.0) Epistaxis  (primary encounter diagnosis)  Comment: possible mass, but I am not sure   Plan: saline nasal (AYR SALINE) topical gel,         OTOLARYNGOLOGY REFERRAL               Chief complaint:                                                      Epistaxis     SUBJECTIVE:                                                    History of present illness:    R nostril  -- last week for few days she noticed some blood when she blew the nose  -- she looked with a flash lights and she noticed a lump  -- Exam: maybe swollen on the septum aspect versus left side. I don't have the appropriate instruments to look better         Hypertension Follow-up      Do you check your blood pressure regularly outside of the clinic? Yes     Are you following a low salt diet? Yes    Are your blood pressures ever more than 140 on the top number (systolic) OR more   than 90 on the bottom number (diastolic), for example 140/90? Yes      How many servings of fruits and vegetables do you eat daily?  2-3    On average, how many sweetened beverages do you drink each day (Examples: soda, juice, sweet tea, etc.  Do NOT count diet or artificially sweetened beverages)?   0    How many days per week do you exercise enough to make your heart beat faster? 7    How many minutes a day do you exercise enough to make your heart beat faster? 30 - 60    How many days per week do you miss taking your medication? 0      Review of Systems:                                                      ROS: negative for fever, chills, cough, wheezes, chest pain, shortness of breath, vomiting,  abdominal pain, leg swelling       OBJECTIVE:             Physical exam:  Blood pressure 120/66, pulse 68, resp. rate 16, weight 57.6 kg (127 lb), SpO2 99 %, not currently breastfeeding.   as above     PMHx: reviewed  Past Medical History:   Diagnosis Date     Adenomatous colon polyp 2015     Aortic stenosis 11    bicuspid AV with severe stenosis - 2011 mechanical AVR with 23 mm St Yordan AV conduit, composite graft placement     Arthritis     knees and hands     Bicuspid aortic valve      Breast cancer (H) 2014    R breast     H/O aortic valve replacement     St Yordan     Heart murmur     Valve repalcement .     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    AAA repair with av23 mm tube graft     PONV (postoperative nausea and vomiting)     n/v morphine vs anesthesia, vomiting x24 hrs     Subclinical hyperthyroidism 2017     Thrombosis of leg     after  of daughter     Uncomplicated asthma       PSHx: reviewed  Past Surgical History:   Procedure Laterality Date     BIOPSY NODE SENTINEL Right 9/10/2014    Procedure: BIOPSY NODE SENTINEL;  Surgeon: Ila Romano MD;  Location: [facility] OR     CARDIAC SURGERY  2011    aortic valve replacement     ENT SURGERY      tonsillectomy     HRW PORT A CATH       INSERT PORT VASCULAR ACCESS Left 10/22/2014    Procedure: INSERT PORT VASCULAR ACCESS;  Surgeon: Ila Romano MD;  Location: [facility] OR     LUMPECTOMY BREAST WITH SEED LOCALIZATION Right 9/10/2014    Procedure: LUMPECTOMY BREAST WITH SEED LOCALIZATION;  Surgeon: Ila Romano MD;  Location: [facility] OR     ORTHOPEDIC SURGERY      shoulder, thumb surgery     REMOVE PORT VASCULAR ACCESS Left 2015    Procedure: REMOVE PORT VASCULAR ACCESS;  Surgeon: Ila Romano MD;  Location: [facility] OR     REPAIR ANEURYSM ASCENDING AORTA  2011     Procedure:REPAIR ANEURYSM ASCENDING AORTA; Medial Sternotomy, Aortic Valve Replacement, ([employer] Yordan [employer] HP Valved Graft, size 23 mm) on pump oxygenation, intraoperative transesophageal cardiogram; Surgeon:JOHN PAUL ULLOA; Location:[facility] OR     REPLACE VALVE AORTIC  6/23/11    bicuspid AV with severe stenosis - 6/2011 mechanical AVR with 23 mm St Yordan AV conduit, composite graft placement        Meds: reviewed  Current Outpatient Medications   Medication Sig Dispense Refill     alendronate (FOSAMAX) 70 MG tablet Take 70 mg by mouth every 7 days       anastrozole (ARIMIDEX) 1 MG tablet Take by mouth daily 30 tablet      ASPIRIN EC PO Take 81 mg by mouth daily       carvedilol (COREG) 12.5 MG tablet Take 1 tablet (12.5 mg) by mouth 2 times daily (with meals) 180 tablet 3     lisinopril (ZESTRIL) 10 MG tablet Take 1 tablet (10 mg) by mouth 2 times daily 180 tablet 0     methimazole (TAPAZOLE) 5 MG tablet Take 0.5 tablets (2.5 mg) by mouth daily 45 tablet 2     valACYclovir (VALTREX) 1000 mg tablet Take 2 tablets (2,000 mg) by mouth 2 times daily 4 tablet 3     warfarin ANTICOAGULANT (COUMADIN) 5 MG tablet TAKE 1 TABLET DAILY EXCEPT ONE AND ONE-HALF TABLETS ON MONDAY, WEDNESDAY AND SATURDAY OR AS DIRECTED BY THE INR CLINIC 105 tablet 1     zolpidem (AMBIEN) 5 MG tablet Take 0.5 tablets (2.5 mg) by mouth nightly as needed for sleep 30 tablet 0       Soc Hx: reviewed  Fam Hx: reviewed          Brook Jackson MD  Internal Medicine

## 2021-01-20 ENCOUNTER — ANTICOAGULATION THERAPY VISIT (OUTPATIENT)
Dept: ANTICOAGULATION | Facility: CLINIC | Age: 66
End: 2021-01-20

## 2021-01-20 DIAGNOSIS — Z79.01 LONG TERM CURRENT USE OF ANTICOAGULANT THERAPY: ICD-10-CM

## 2021-01-20 DIAGNOSIS — Z95.2 H/O AORTIC VALVE REPLACEMENT: ICD-10-CM

## 2021-01-20 LAB
CAPILLARY BLOOD COLLECTION: NORMAL
INR PPP: 2.4 (ref 0.86–1.14)

## 2021-01-20 PROCEDURE — 85610 PROTHROMBIN TIME: CPT | Performed by: INTERNAL MEDICINE

## 2021-01-20 PROCEDURE — 36416 COLLJ CAPILLARY BLOOD SPEC: CPT | Performed by: INTERNAL MEDICINE

## 2021-01-20 PROCEDURE — 99207 PR NO CHARGE NURSE ONLY: CPT

## 2021-01-20 NOTE — PROGRESS NOTES
ANTICOAGULATION FOLLOW-UP CLINIC VISIT    Patient Name:  Annabella Bolanos  Date:  2021  Contact Type:  Telephone/ Called patient, she denies any changes. Orders discussed with the patient, she agrees with the plan. Lab INR appointment scheduled on 21.    SUBJECTIVE:  Patient Findings     Comments:  The patient was assessed for diet, medication, and activity level changes, missed or extra doses, bruising or bleeding, with no problem findings. Maintenance warfarin dosing was reviewed with patient and will remain on the same dose until next INR check. Patient did not have any questions or concerns. Patient reported she was able to get in on the online registration for the COVID vaccine, scheduled on  and 21 in [place].          Clinical Outcomes     Negatives:  Major bleeding event, Thromboembolic event, Anticoagulation-related hospital admission, Anticoagulation-related ED visit, Anticoagulation-related fatality    Comments:  The patient was assessed for diet, medication, and activity level changes, missed or extra doses, bruising or bleeding, with no problem findings. Maintenance warfarin dosing was reviewed with patient and will remain on the same dose until next INR check. Patient did not have any questions or concerns. Patient reported she was able to get in on the online registration for the COVID vaccine, scheduled on  and 21 in [place].             OBJECTIVE    Recent labs: (last 7 days)     21  1113   INR 2.40*       ASSESSMENT / PLAN  INR assessment THER    Recheck INR In: 5 WEEKS    INR Location Outside lab      Anticoagulation Summary  As of 2021    INR goal:  2.0-3.0   TTR:  90.1 % (1 y)   INR used for dosin.40 (2021)   Warfarin maintenance plan:  7.5 mg (5 mg x 1.5) every Mon, Wed, Sat; 5 mg (5 mg x 1) all other days   Full warfarin instructions:  7.5 mg every Mon, Wed, Sat; 5 mg all other days   Weekly warfarin total:  42.5 mg   No change documented:  Willy  Deborah BUTCHER RN   Plan last modified:  Erica Stinson RN (8/2/2019)   Next INR check:  2/24/2021   Priority:  High   Target end date:  Indefinite    Indications    Long-term (current) use of anticoagulants [Z79.01] [Z79.01]  H/O aortic valve replacement [Z95.2]             Anticoagulation Episode Summary     INR check location:      Preferred lab:      Send INR reminders to:  [facility]    Comments:  4/1/20-ok to leave detailed message with dosing instructions if patient does not answer. INR Referral renewed, see 8/13/20 telephone encounter.       Anticoagulation Care Providers     Provider Role Specialty Phone number    Brook Echavarria MD Referring Internal Medicine [phone]            See the Encounter Report to view Anticoagulation Flowsheet and Dosing Calendar (Go to Encounters tab in chart review, and find the Anticoagulation Therapy Visit)    Dosage adjustment made based on physician directed care plan.    Deborah Aguilera RN

## 2021-01-22 ENCOUNTER — TRANSFERRED RECORDS (OUTPATIENT)
Dept: HEALTH INFORMATION MANAGEMENT | Facility: CLINIC | Age: 66
End: 2021-01-22

## 2021-01-22 DIAGNOSIS — I10 HTN, GOAL BELOW 140/90: ICD-10-CM

## 2021-01-25 RX ORDER — LISINOPRIL 10 MG/1
TABLET ORAL
Qty: 180 TABLET | Refills: 1 | Status: SHIPPED | OUTPATIENT
Start: 2021-01-25 | End: 2021-07-07

## 2021-02-24 ENCOUNTER — ANTICOAGULATION THERAPY VISIT (OUTPATIENT)
Dept: ANTICOAGULATION | Facility: CLINIC | Age: 66
End: 2021-02-24

## 2021-02-24 DIAGNOSIS — Z95.2 H/O AORTIC VALVE REPLACEMENT: ICD-10-CM

## 2021-02-24 DIAGNOSIS — Z79.01 LONG TERM CURRENT USE OF ANTICOAGULANT THERAPY: ICD-10-CM

## 2021-02-24 LAB
CAPILLARY BLOOD COLLECTION: NORMAL
INR PPP: 2.6 (ref 0.86–1.14)

## 2021-02-24 PROCEDURE — 85610 PROTHROMBIN TIME: CPT | Performed by: INTERNAL MEDICINE

## 2021-02-24 PROCEDURE — 99207 PR NO CHARGE NURSE ONLY: CPT

## 2021-02-24 PROCEDURE — 36416 COLLJ CAPILLARY BLOOD SPEC: CPT | Performed by: INTERNAL MEDICINE

## 2021-02-24 NOTE — PROGRESS NOTES
ANTICOAGULATION FOLLOW-UP CLINIC VISIT    Patient Name:  Annabella Bolanos  Date:  2021  Contact Type:  Telephone/ Called patient, she denies any changes. Orders discussed with the patient, she agrees with the plan. Lab INR appointment scheduled on 21    SUBJECTIVE:  Patient Findings     Comments:  The patient was assessed for diet, medication, and activity level changes, missed or extra doses, bruising or bleeding, with no problem findings. Maintenance warfarin dosing was reviewed with patient and will remain on the same dose until next INR check. Patient did not have any questions or concerns.           Clinical Outcomes     Negatives:  Major bleeding event, Thromboembolic event, Anticoagulation-related hospital admission, Anticoagulation-related ED visit, Anticoagulation-related fatality    Comments:  The patient was assessed for diet, medication, and activity level changes, missed or extra doses, bruising or bleeding, with no problem findings. Maintenance warfarin dosing was reviewed with patient and will remain on the same dose until next INR check. Patient did not have any questions or concerns.              OBJECTIVE    Recent labs: (last 7 days)     21  1109   INR 2.60*       ASSESSMENT / PLAN  INR assessment THER    Recheck INR In: 6 WEEKS    INR Location Outside lab      Anticoagulation Summary  As of 2021    INR goal:  2.0-3.0   TTR:  90.1 % (1 y)   INR used for dosin.60 (2021)   Warfarin maintenance plan:  7.5 mg (5 mg x 1.5) every Mon, Wed, Sat; 5 mg (5 mg x 1) all other days   Full warfarin instructions:  7.5 mg every Mon, Wed, Sat; 5 mg all other days   Weekly warfarin total:  42.5 mg   No change documented:  Deborah Aguilera RN   Plan last modified:  Erica Stinson RN (2019)   Next INR check:  2021   Priority:  Maintenance   Target end date:  Indefinite    Indications    Long-term (current) use of anticoagulants [Z79.01] [Z79.01]  H/O aortic valve replacement  [Z95.2]             Anticoagulation Episode Summary     INR check location:      Preferred lab:      Send INR reminders to:  [facility]    Comments:  4/1/20-ok to leave detailed message with dosing instructions if patient does not answer. INR Referral renewed, see 8/13/20 telephone encounter.       Anticoagulation Care Providers     Provider Role Specialty Phone number    Brook Echavarria MD Referring Internal Medicine [phone]            See the Encounter Report to view Anticoagulation Flowsheet and Dosing Calendar (Go to Encounters tab in chart review, and find the Anticoagulation Therapy Visit)    Dosage adjustment made based on physician directed care plan.    Deborah Aguilera RN

## 2021-03-24 ENCOUNTER — ANTICOAGULATION THERAPY VISIT (OUTPATIENT)
Dept: ANTICOAGULATION | Facility: CLINIC | Age: 66
End: 2021-03-24

## 2021-03-24 DIAGNOSIS — Z79.01 LONG TERM CURRENT USE OF ANTICOAGULANT THERAPY: ICD-10-CM

## 2021-03-24 DIAGNOSIS — Z95.2 H/O AORTIC VALVE REPLACEMENT: ICD-10-CM

## 2021-03-24 DIAGNOSIS — E05.90 SUBCLINICAL HYPERTHYROIDISM: ICD-10-CM

## 2021-03-24 LAB
INR PPP: 3.7 (ref 0.86–1.14)
T3FREE SERPL-MCNC: 3 PG/ML (ref 2.3–4.2)
T4 FREE SERPL-MCNC: 1.17 NG/DL (ref 0.76–1.46)
TSH SERPL DL<=0.005 MIU/L-ACNC: 1.06 MU/L (ref 0.4–4)

## 2021-03-24 PROCEDURE — 84481 FREE ASSAY (FT-3): CPT | Performed by: INTERNAL MEDICINE

## 2021-03-24 PROCEDURE — 84439 ASSAY OF FREE THYROXINE: CPT | Performed by: INTERNAL MEDICINE

## 2021-03-24 PROCEDURE — 85610 PROTHROMBIN TIME: CPT | Performed by: INTERNAL MEDICINE

## 2021-03-24 PROCEDURE — 99207 PR NO CHARGE NURSE ONLY: CPT

## 2021-03-24 PROCEDURE — 84443 ASSAY THYROID STIM HORMONE: CPT | Performed by: INTERNAL MEDICINE

## 2021-03-24 PROCEDURE — 36415 COLL VENOUS BLD VENIPUNCTURE: CPT | Performed by: INTERNAL MEDICINE

## 2021-03-24 NOTE — PROGRESS NOTES
ANTICOAGULATION FOLLOW-UP CLINIC VISIT    Patient Name:  Annabella Bolanos  Date:  3/24/2021  Contact Type:  Telephone/ discussed with patient    SUBJECTIVE:  Patient Findings     Positives:  Change in health (arthritis flair up for about 10-14 days.  This is slowly improving back to her baseline), Change in medications (500-2000 mg of tylenol daily for arthritis pain.  She is slowly cutting back on this.), Change in diet/appetite (Patient will try to eat more green veggies over the next 3 days to help lower INR.  If arthritis pain continues she will continue eating increased greens.)    Comments:  The patient was assessed for activity level changes, missed or extra doses, bruising or bleeding, with no problem findings.          Clinical Outcomes     Negatives:  Major bleeding event, Thromboembolic event, Anticoagulation-related hospital admission, Anticoagulation-related ED visit, Anticoagulation-related fatality    Comments:  The patient was assessed for activity level changes, missed or extra doses, bruising or bleeding, with no problem findings.             OBJECTIVE    Recent labs: (last 7 days)     03/24/21  1103   INR 3.70*       ASSESSMENT / PLAN  INR assessment SUPRA    Recheck INR In: 2 WEEKS    INR Location Clinic      Anticoagulation Summary  As of 3/24/2021    INR goal:  2.0-3.0   TTR:  85.2 % (1 y)   INR used for dosing:  3.70 (3/24/2021)   Warfarin maintenance plan:  7.5 mg (5 mg x 1.5) every Mon, Wed, Sat; 5 mg (5 mg x 1) all other days   Full warfarin instructions:  3/24: 2.5 mg; Otherwise 7.5 mg every Mon, Wed, Sat; 5 mg all other days   Weekly warfarin total:  42.5 mg   Plan last modified:  Erica Stinson RN (8/2/2019)   Next INR check:  4/7/2021   Priority:  Maintenance   Target end date:  Indefinite    Indications    Long-term (current) use of anticoagulants [Z79.01] [Z79.01]  H/O aortic valve replacement [Z95.2]             Anticoagulation Episode Summary     INR check location:      Preferred lab:       Send INR reminders to:  [facility]    Comments:  4/1/20-ok to leave detailed message with dosing instructions if patient does not answer. INR Referral renewed, see 8/13/20 telephone encounter.       Anticoagulation Care Providers     Provider Role Specialty Phone number    Brook Echavarria MD Referring Internal Medicine [phone]            See the Encounter Report to view Anticoagulation Flowsheet and Dosing Calendar (Go to Encounters tab in chart review, and find the Anticoagulation Therapy Visit)    Dosage adjustment made based on physician directed care plan.    Erica Stinson RN

## 2021-03-31 ENCOUNTER — VIRTUAL VISIT (OUTPATIENT)
Dept: ENDOCRINOLOGY | Facility: CLINIC | Age: 66
End: 2021-03-31
Payer: COMMERCIAL

## 2021-03-31 DIAGNOSIS — E05.90 SUBCLINICAL HYPERTHYROIDISM: ICD-10-CM

## 2021-03-31 PROCEDURE — 99214 OFFICE O/P EST MOD 30 MIN: CPT | Mod: 95 | Performed by: INTERNAL MEDICINE

## 2021-03-31 RX ORDER — METHIMAZOLE 5 MG/1
TABLET ORAL
Qty: 45 TABLET | Refills: 0 | Status: SHIPPED | OUTPATIENT
Start: 2021-03-31 | End: 2021-05-20

## 2021-03-31 NOTE — LETTER
"    3/31/2021         RE: Annabella Bolanos  [id number]   [place] [address]        Dear Colleague,    Thank you for referring your patient, Annabella Bolanos, to the [facility]. Please see a copy of my visit note below.    THIS IS A VIDEO VISIT:    Phone call visit/virtual visit encounter:    Name of patient: Annabella Bolanos    Date of encounter: 3/31/2021    Time of start of video visit: 11:00    Video started: 11:10    Video ended: 11:20    Time visit video ended: 11:28    Provider location: working from home/ [facility]    Patient location: patients home.    Mode of transmission: video/ [employer]    Verbal consent: obtained before starting visit. Pt is agreeable.      The patient has been notified of following:      \"This VIDEO visit will be conducted via a call between you and your physician/provider. We have found that certain health care needs can be provided without the need for a physical exam.  This service lets us provide the care you need with a short phone conversation.  If a prescription is necessary we can send it directly to your pharmacy.  If lab work is needed we can place an order for that and you can then stop by our lab to have the test done at a later time.     With new updates with corona virus patient might be billed as clinic visit.     If during the course of the call the physician/provider feels a telephone visit is not appropriate, you will not be charged for this service.\"      Past medical history, social history, family history, allergy and medications were reviewed and updated as appropriate.  Reviewed pertinent labs, notes, imaging studies personally.    Name: Annabella Bolanos  Seen for follow-up of subclinical hyperthyroidism.    HPI:  Annabella Bolanos is a 65 year old female who presents for the evaluation of subclinical Hyperthyroidism.  H/o breast cancer and h/o aortic valve replacement and is on long term anticoagulation.  Breast cancer diagnosed in 2014 s/p " lumpectomy and chemo and radiation. Took radiation 5 days a week for 1 month.  DEXA 2018 scan showing osteopenia and appears stable.  She is not on medication for osteoporosis/osteopenia at this time.  She is also on aromatase inhibitor as a part of treatment for breast cancer.     Subclinical hyperthyroidism noted since 7/2016. Plan was for continue to monitor.  No h/o arrhythmia  No h/o osteoporosis-- has osteopenia. Not on treatment.  TSI neg  US thyroid ( h/o radiation)- no discrete nodules.  As there was further decline in TSH along with risk for low bone density with aromatase inhibitor (with with prior history of osteopenia) as well as complex cardiac history she was started on methimazole 5 mg in 7/2018.    Currently taking methimazole 2.5 mg/day.   F/u labs are WNL  LFT and CBC stable.    Since last clinic visit she was started on Fosamax by Dr. Felder for osteoporosis/osteopenia.  She is taking Fosamax on a weekly basis since February 2019.    Feeling good.  Has good energy.  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  H/o arthritis.  Weight is stable.  Eating healthy.  Having  joint pain with arthritis.    History of radiation exposure: YES. As above.  History of thyroid dysfunction: not to her knowledge. No FH of thyroid cancer.  Palpitations: No  Changes to hair or skin: No  Diarrhea/Constipation:No  Changes in menses: s/p menopause  Changes in vision:No  Diplopia/Blurryness:No  Dysphagia or Shortness of breath:No  Tremors:No  Difficulty sleeping:Yes: most of the time  Changes in weight: No  Lithium/Amiodarone: No  URI: No  CT Scans:No  Mother had hyperthyroidism s/p DOE and was on levothyroxine.    PMH/PSH:  Past Medical History:   Diagnosis Date     Adenomatous colon polyp 8/2015     Aortic stenosis 6/23/11    bicuspid AV with severe stenosis - 6/2011 mechanical AVR with 23 mm St Yordan AV conduit, composite graft placement     Arthritis     knees and hands     Bicuspid aortic valve      Breast cancer (H) 7/2014    R breast     H/O  "aortic valve replacement     St Yordan     Heart murmur     Valve repalcement .     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    AAA repair with av23 mm tube graft     PONV (postoperative nausea and vomiting)     n/v morphine vs anesthesia, vomiting x24 hrs     Subclinical hyperthyroidism 2017     Thrombosis of leg     after  of daughter     Uncomplicated asthma      Past Surgical History:   Procedure Laterality Date     BIOPSY NODE SENTINEL Right 9/10/2014    Procedure: BIOPSY NODE SENTINEL;  Surgeon: Ila Romano MD;  Location: [facility] OR     CARDIAC SURGERY  2011    aortic valve replacement     ENT SURGERY      tonsillectomy     HRW PORT A CATH       INSERT PORT VASCULAR ACCESS Left 10/22/2014    Procedure: INSERT PORT VASCULAR ACCESS;  Surgeon: Ila Romano MD;  Location: [facility] OR     LUMPECTOMY BREAST WITH SEED LOCALIZATION Right 9/10/2014    Procedure: LUMPECTOMY BREAST WITH SEED LOCALIZATION;  Surgeon: Ila Romano MD;  Location: [facility] OR     ORTHOPEDIC SURGERY      shoulder, thumb surgery     REMOVE PORT VASCULAR ACCESS Left 2015    Procedure: REMOVE PORT VASCULAR ACCESS;  Surgeon: Ila Romano MD;  Location: [facility] OR     REPAIR ANEURYSM ASCENDING AORTA  2011    Procedure:REPAIR ANEURYSM ASCENDING AORTA; Medial Sternotomy, Aortic Valve Replacement, ([employer] Y[employer] HP Valved Graft, size 23 mm) on pump oxygenation, intraoperative transesophageal cardiogram; Surgeon:JOHN PAUL ULLOA; Location:[facility] OR     REPLACE VALVE AORTIC  11    bicuspid AV with severe stenosis - 2011 mechanical AVR with 23 mm [employer] Yordan AV conduit, composite graft placement     Family Hx:  Family History   Problem Relation Age of Onset     Hypertension Mother      Thyroid Disease Mother         \"Toxic thyroid\"     Prostate Cancer Father 70 "     Cancer Father 80        Lung cancer, Not caused from smoking     Cerebrovascular Disease Father 80     Hypertension Father      Cardiovascular Brother         half brother      Cardiovascular Son         Puentes-Parkinsons White Syndrome     Cardiovascular Daughter         Heart murmur     Breast Cancer Paternal Aunt 80     Cardiovascular Paternal Aunt      Arthritis Brother 40        RA - half brother      Cancer Maternal Grandfather      Colon Cancer No family hx of      Thyroid disease: as above          Social Hx:  Social History     Socioeconomic History     Marital status:      Spouse name: Not on file     Number of children: Not on file     Years of education: Not on file     Highest education level: Not on file   Occupational History     Not on file   Social Needs     Financial resource strain: Not on file     Food insecurity     Worry: Not on file     Inability: Not on file     Transportation needs     Medical: Not on file     Non-medical: Not on file   Tobacco Use     Smoking status: Never Smoker     Smokeless tobacco: Never Used   Substance and Sexual Activity     Alcohol use: Yes     Comment: 2 drinks per week -      Drug use: No     Sexual activity: Not Currently   Lifestyle     Physical activity     Days per week: Not on file     Minutes per session: Not on file     Stress: Not on file   Relationships     Social connections     Talks on phone: Not on file     Gets together: Not on file     Attends [employer] service: Not on file     Active member of club or organization: Not on file     Attends meetings of clubs or organizations: Not on file     Relationship status: Not on file     Intimate partner violence     Fear of current or ex partner: Not on file     Emotionally abused: Not on file     Physically abused: Not on file     Forced sexual activity: Not on file   Other Topics Concern     Parent/sibling w/ CABG, MI or angioplasty before 65F 55M? Not Asked      Service Not Asked      Blood Transfusions Not Asked     Caffeine Concern Yes     Comment: 1-2 cups caffeine per day     Occupational Exposure Not Asked     Hobby Hazards Not Asked     Sleep Concern No     Stress Concern No     Weight Concern No     Special Diet Yes     Comment: low fat daily , low red meats     Back Care No     Exercise Yes     Comment: walks daily/ 3x a week exercise class     Bike Helmet Not Asked     Seat Belt Not Asked     Self-Exams Not Asked   Social History Narrative     Not on file          MEDICATIONS:  has a current medication list which includes the following prescription(s): alendronate, anastrozole, aspirin, carvedilol, lisinopril, methimazole, ayr saline nasal no-drip, valacyclovir, warfarin anticoagulant, and zolpidem.    ROS   ROS: 10 point ROS neg other than the symptoms noted above in the HPI.    Physical Exam   VS: LMP  (LMP Unknown)   GENERAL: healthy, alert and no distress  EYES: Eyes grossly normal to inspection, conjunctivae and sclerae normal  ENT: no nose swelling, nasal discharge.  Thyroid: no apparent thyroid nodules  RESP: no audible wheeze, cough, or visible cyanosis.  No visible retractions or increased work of breathing.  Able to speak fully in complete sentences.  ABDO: not evaluated.  EXTREMITIES: no hand tremors.  NEURO: Cranial nerves grossly intact, mentation intact and speech normal  SKIN: No apparent skin lesions, rash or edema seen   PSYCH: mentation appears normal, affect normal/bright, judgement and insight intact, normal speech and appearance well-groomed      LABS:  TFTs:  ENDO THYROID LABS-[facility] Latest Ref Rng & Units 3/24/2021   TSH 0.40 - 4.00 mU/L 1.06   T4 FREE 0.76 - 1.46 ng/dL 1.17   FREE T3 2.3 - 4.2 pg/mL 3.0     ENDO THYROID LABS-[facility] Latest Ref Rng & Units 9/11/2020   TSH 0.40 - 4.00 mU/L 1.53   T4 FREE 0.76 - 1.46 ng/dL 1.04   FREE T3 2.3 - 4.2 pg/mL 2.8     ENDO THYROID LABS-[facility] Latest Ref Rng & Units 8/8/2019 6/27/2019   TSH 0.40 - 4.00 mU/L 0.68 0.32 (L)   T4 FREE  0.76 - 1.46 ng/dL 1.02 1.20   FREE T3 2.3 - 4.2 pg/mL 2.6 2.9     [facility]/13[facility] 11/8/2018   TSH 0.40 - 4.00 mU/L 0.94 1.02   T4 FREE 0.76 - 1.46 ng/dL 1.05 1.02   FREE T3 2.3 - 4.2 pg/mL 2.7 2.9     [facility] 9/6/2018   TSH 0.40 - 4.00 mU/L 1.10   T4 FREE 0.76 - 1.46 ng/dL 0.89   FREE T3 2.3 - 4.2 pg/mL 2.8     [facility] 6/5/2018   TSH 0.40 - 4.00 mU/L 0.05 (L)   T4 FREE 0.76 - 1.46 ng/dL 1.27   FREE T3 2.3 - 4.2 pg/mL 3.4     [facility] 12/6/2017   TSH 0.40 - 4.00 mU/L 0.18 (L)   T4 FREE 0.76 - 1.46 ng/dL 1.16   FREE T3 2.3 - 4.2 pg/mL 3.2   THYROID STIM IMMUNOG <=1.3 TSI index <1.0     [facility] 11/10/2017 7/24/2017   TSH 0.40 - 4.00 mU/L 0.12 (L) 0.12 (L)   T4 FREE 0.76 - 1.46 ng/dL 1.10 1.16     [facility] 7/14/2016   TSH 0.40 - 4.00 mU/L 0.24 (L)   T4 FREE 0.76 - 1.46 ng/dL 1.22       CBC:  WBC   Date Value Ref Range Status   08/18/2020 7.7 4.0 - 11.0 10e9/L Final         LFTs:  Liver Function Studies -   Recent Labs   Lab Test 08/18/20  0946   PROTTOTAL 7.3   ALBUMIN 3.6   BILITOTAL 0.6   ALKPHOS 62   AST 20   ALT 26       ULTRASOUND THYROID December 15, 2017 9:24 AM   HISTORY: Subclinical hyperthyroidism.   FINDINGS: Thyroid ultrasound demonstrates an enlarged thyroid gland. The right lobe measures 5.4 x 2.0 x 1.8 cm. The left lobe measures 3.8 x 1.6 x 1.3 cm. The isthmus is normal in thickness. Thyroid parenchyma is heterogeneous in echotexture. Increased color Doppler flow is noted throughout the thyroid gland. Thyroid nodules as follows: Right Lobe: None. Isthmus: None. Left Lobe: None.   IMPRESSION: Enlarged heterogeneous thyroid gland without evidence of a discrete thyroid nodule. Increased color Doppler flow suggests underlying thyroiditis. Correlate with thyroid function test and nuclear  medicine thyroid scan as needed.    All pertinent notes, labs, and images personally reviewed by me.     A/P  Ms.Carol WINDY Bolanos is a 65 year old here for the evaluation of hyperthyroidism.    Subclinical hyperthyroidism (TSI neg):   TSH remained suppressed since 2016 and along with normal free T4  No h/o arrhythmia  No h/o osteoporosis-- has osteopenia. Not on treatment.  She is on aromatase inhibitor for breast cancer.  TSI neg  US thyroid ( h/o radiation)- no discrete nodules.  Clinically looks euthyroid.  No major complaints.  As there is further decline in TSH along with risk for low bone density with aromatase inhibitor (with with prior history of osteopenia) was started on methimazole 5 mg 7/2018  Currently taking methimazole 2.5 mg/day.   Recent labs WNL.  Plan:  Discussed diagnosis, pathophysiology, management and treatment options of condition with pt.  Based on 3/2021 labs and stable labs for a while, recommend trial of smaller dose of methimazole.     Decrease dose of methimazole to 2.5 mg on alternate days (3/31/2021)  Labs in 5-6 weeks or sooner if concerns.  Please make a lab appointment for blood work and follow up clinic appointment in 1 week after that to discuss results.    Off note she is on anticoagulant and may need dose adjustment to get INR at target levels ( h/o aortic valve replacement)  Baseline LFT and WBC normal.  Discussed diagnosis, pathophysiology, management and treatment options of condition with pt.    Discussed indications, risks and benefits of all medications prescribed, and answered questions to patient's satisfaction.  The patient indicates understanding of these issues and agrees with the plan.    Discussed possible side effects of methimazole including liver dysfunction and agranulocytosis. Discussed to watch for s/s like jaundice, abdominal pain and skin rash. In case of prolonged unresolving fever, sore throat and infections, advise to seek medical care.    Call if:     Signs  or symptoms of infection. These include a fever of 100.5 degrees or higher, chills, severe sore throat, ear or sinus pain, cough, increased sputum or change in color, painful urination, mouth sores.   Severe nausea or vomiting.   Joint pain or swelling.   Not able to eat.   Unusual bruising or bleeding.   Dark urine or yellow skin or eyes.      Discussed s/s of hypothyroidism and hyperthyroidism to watch for.  The patient indicates understanding of these issues and agrees with the plan.    Follow-up:  6 weeks.    Merle Baxter MD  Endocrinology  [facility]  CC: Brook Echavarria     More than 50% of face to face time spent with Ms. Bolanos on counseling / coordinating her care.    All questions were answered.  The patient indicates understanding of the above issues and agrees with the plan set forth.     Addendum to above note and clinic visit:    Labs reviewed.    See result note/telephone encounter.                  Again, thank you for allowing me to participate in the care of your patient.        Sincerely,        Merle Baxter MD

## 2021-03-31 NOTE — PROGRESS NOTES
"THIS IS A VIDEO VISIT:    Phone call visit/virtual visit encounter:    Name of patient: Annabella Bolanos    Date of encounter: 3/31/2021    Time of start of video visit: 11:00    Video started: 11:10    Video ended: 11:20    Time visit video ended: 11:28    Provider location: working from [place]/ [facility]    Patient location: patients home.    Mode of transmission: video/ [employer]    Verbal consent: obtained before starting visit. Pt is agreeable.      The patient has been notified of following:      \"This VIDEO visit will be conducted via a call between you and your physician/provider. We have found that certain health care needs can be provided without the need for a physical exam.  This service lets us provide the care you need with a short phone conversation.  If a prescription is necessary we can send it directly to your pharmacy.  If lab work is needed we can place an order for that and you can then stop by our lab to have the test done at a later time.     With new updates with corona virus patient might be billed as clinic visit.     If during the course of the call the physician/provider feels a telephone visit is not appropriate, you will not be charged for this service.\"      Past medical history, social history, family history, allergy and medications were reviewed and updated as appropriate.  Reviewed pertinent labs, notes, imaging studies personally.    Name: Annabella Bolanos  Seen for follow-up of subclinical hyperthyroidism.    HPI:  Annabella Bolanos is a 65 year old female who presents for the evaluation of subclinical Hyperthyroidism.  H/o breast cancer and h/o aortic valve replacement and is on long term anticoagulation.  Breast cancer diagnosed in 2014 s/p lumpectomy and chemo and radiation. Took radiation 5 days a week for 1 month.  DEXA 2018 scan showing osteopenia and appears stable.  She is not on medication for osteoporosis/osteopenia at this time.  She is also on aromatase inhibitor as a part of " treatment for breast cancer.     Subclinical hyperthyroidism noted since 7/2016. Plan was for continue to monitor.  No h/o arrhythmia  No h/o osteoporosis-- has osteopenia. Not on treatment.  TSI neg  US thyroid ( h/o radiation)- no discrete nodules.  As there was further decline in TSH along with risk for low bone density with aromatase inhibitor (with with prior history of osteopenia) as well as complex cardiac history she was started on methimazole 5 mg in 7/2018.    Currently taking methimazole 2.5 mg/day.   F/u labs are WNL  LFT and CBC stable.    Since last clinic visit she was started on Fosamax by Dr. Felder for osteoporosis/osteopenia.  She is taking Fosamax on a weekly basis since February 2019.    Feeling good.  Has good energy.  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  H/o arthritis.  Weight is stable.  Eating healthy.  Having  joint pain with arthritis.    History of radiation exposure: YES. As above.  History of thyroid dysfunction: not to her knowledge. No FH of thyroid cancer.  Palpitations: No  Changes to hair or skin: No  Diarrhea/Constipation:No  Changes in menses: s/p menopause  Changes in vision:No  Diplopia/Blurryness:No  Dysphagia or Shortness of breath:No  Tremors:No  Difficulty sleeping:Yes: most of the time  Changes in weight: No  Lithium/Amiodarone: No  URI: No  CT Scans:No  Mother had hyperthyroidism s/p DOE and was on levothyroxine.    PMH/PSH:  Past Medical History:   Diagnosis Date     Adenomatous colon polyp 8/2015     Aortic stenosis 6/23/11    bicuspid AV with severe stenosis - 6/2011 mechanical AVR with 23 mm St Yordan AV conduit, composite graft placement     Arthritis     knees and hands     Bicuspid aortic valve      Breast cancer (H) 7/2014    R breast     H/O aortic valve replacement     St Yordan     Heart murmur     Valve repalcement 2011.     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    AAA repair with av23 mm tube graft     PONV (postoperative nausea and vomiting)     n/v morphine vs anesthesia, vomiting x24 hrs     Subclinical hyperthyroidism 2017     Thrombosis of leg     after  of daughter     Uncomplicated asthma      Past Surgical History:   Procedure Laterality Date     BIOPSY NODE SENTINEL Right 9/10/2014    Procedure: BIOPSY NODE SENTINEL;  Surgeon: Ila Romano MD;  Location: [facility] OR     CARDIAC SURGERY  2011    aortic valve replacement     ENT SURGERY      tonsillectomy     HRW PORT A CATH       INSERT PORT VASCULAR ACCESS Left 10/22/2014    Procedure: INSERT PORT VASCULAR ACCESS;  Surgeon: Ila Romano MD;  Location: [facility] OR     LUMPECTOMY BREAST WITH SEED LOCALIZATION Right 9/10/2014    Procedure: LUMPECTOMY BREAST WITH SEED LOCALIZATION;  Surgeon: Ila Romano MD;  Location: [facility] OR     ORTHOPEDIC SURGERY      shoulder, thumb surgery     REMOVE PORT VASCULAR ACCESS Left 2015    Procedure: REMOVE PORT VASCULAR ACCESS;  Surgeon: Ila Romano MD;  Location: [facility] OR     REPAIR ANEURYSM ASCENDING AORTA  2011    Procedure:REPAIR ANEURYSM ASCENDING AORTA; Medial Sternotomy, Aortic Valve Replacement, (St. Yordan [employer] HP Valved Graft, size 23 mm) on pump oxygenation, intraoperative transesophageal cardiogram; Surgeon:JOHN PAUL ULLOA; Location:[facility] OR     REPLACE VALVE AORTIC  11    bicuspid AV with severe stenosis - 2011 mechanical AVR with 23 mm St Yordan AV conduit, composite graft placement     Family Hx:  Family History   Problem Relation Age of Onset     Hypertension Mother      Thyroid Disease Mother         \"Toxic thyroid\"     Prostate Cancer Father 70     Cancer Father 80        Lung cancer, Not caused from smoking     Cerebrovascular Disease Father 80     Hypertension Father      Cardiovascular Brother         half brother      Cardiovascular Son         Puentes-Parkinsons White Syndrome     " Cardiovascular Daughter         Heart murmur     Breast Cancer Paternal Aunt 80     Cardiovascular Paternal Aunt      Arthritis Brother 40        RA - half brother      Cancer Maternal Grandfather      Colon Cancer No family hx of      Thyroid disease: as above          Social Hx:  Social History     Socioeconomic History     Marital status:      Spouse name: Not on file     Number of children: Not on file     Years of education: Not on file     Highest education level: Not on file   Occupational History     Not on file   Social Needs     Financial resource strain: Not on file     Food insecurity     Worry: Not on file     Inability: Not on file     Transportation needs     Medical: Not on file     Non-medical: Not on file   Tobacco Use     Smoking status: Never Smoker     Smokeless tobacco: Never Used   Substance and Sexual Activity     Alcohol use: Yes     Comment: 2 drinks per week -      Drug use: No     Sexual activity: Not Currently   Lifestyle     Physical activity     Days per week: Not on file     Minutes per session: Not on file     Stress: Not on file   Relationships     Social connections     Talks on phone: Not on file     Gets together: Not on file     Attends Quaker service: Not on file     Active member of club or organization: Not on file     Attends meetings of clubs or organizations: Not on file     Relationship status: Not on file     Intimate partner violence     Fear of current or ex partner: Not on file     Emotionally abused: Not on file     Physically abused: Not on file     Forced sexual activity: Not on file   Other Topics Concern     Parent/sibling w/ CABG, MI or angioplasty before 65F 55M? Not Asked      Service Not Asked     Blood Transfusions Not Asked     Caffeine Concern Yes     Comment: 1-2 cups caffeine per day     Occupational Exposure Not Asked     Hobby Hazards Not Asked     Sleep Concern No     Stress Concern No     Weight Concern No     Special Diet Yes      Comment: low fat daily , low red meats     Back Care No     Exercise Yes     Comment: walks daily/ 3x a week exercise class     Bike Helmet Not Asked     Seat Belt Not Asked     Self-Exams Not Asked   Social History Narrative     Not on file          MEDICATIONS:  has a current medication list which includes the following prescription(s): alendronate, anastrozole, aspirin, carvedilol, lisinopril, methimazole, ayr saline nasal no-drip, valacyclovir, warfarin anticoagulant, and zolpidem.    ROS   ROS: 10 point ROS neg other than the symptoms noted above in the HPI.    Physical Exam   VS: LMP  (LMP Unknown)   GENERAL: healthy, alert and no distress  EYES: Eyes grossly normal to inspection, conjunctivae and sclerae normal  ENT: no nose swelling, nasal discharge.  Thyroid: no apparent thyroid nodules  RESP: no audible wheeze, cough, or visible cyanosis.  No visible retractions or increased work of breathing.  Able to speak fully in complete sentences.  ABDO: not evaluated.  EXTREMITIES: no hand tremors.  NEURO: Cranial nerves grossly intact, mentation intact and speech normal  SKIN: No apparent skin lesions, rash or edema seen   PSYCH: mentation appears normal, affect normal/bright, judgement and insight intact, normal speech and appearance well-groomed      LABS:  TFTs:  ENDO THYROID LABS-[facility] Latest Ref Rng & Units 3/24/2021   TSH 0.40 - 4.00 mU/L 1.06   T4 FREE 0.76 - 1.46 ng/dL 1.17   FREE T3 2.3 - 4.2 pg/mL 3.0     ENDO THYROID LABS-[facility] Latest Ref Rng & Units 9/11/2020   TSH 0.40 - 4.00 mU/L 1.53   T4 FREE 0.76 - 1.46 ng/dL 1.04   FREE T3 2.3 - 4.2 pg/mL 2.8     ENDO THYROID LABS-[facility] Latest Ref Rng & Units 8/8/2019 6/27/2019   TSH 0.40 - 4.00 mU/L 0.68 0.32 (L)   T4 FREE 0.76 - 1.46 ng/dL 1.02 1.20   FREE T3 2.3 - 4.2 pg/mL 2.6 2.9     ENDO THYROID LABS-[facility] Latest Ref Rng & Units 5/13/2019 11/8/2018   TSH 0.40 - 4.00 mU/L 0.94 1.02   T4 FREE 0.76 - 1.46 ng/dL 1.05 1.02   FREE T3 2.3 - 4.2 pg/mL 2.7 2.9     ENDO  THYROID LABS-[facility] Latest Ref Rng & Units 9/6/2018   TSH 0.40 - 4.00 mU/L 1.10   T4 FREE 0.76 - 1.46 ng/dL 0.89   FREE T3 2.3 - 4.2 pg/mL 2.8     ENDO THYROID LABS-[facility] Latest Ref Rng & Units 6/5/2018   TSH 0.40 - 4.00 mU/L 0.05 (L)   T4 FREE 0.76 - 1.46 ng/dL 1.27   FREE T3 2.3 - 4.2 pg/mL 3.4     ENDO THYROID LABS-[facility] Latest Ref Rng & Units 12/6/2017   TSH 0.40 - 4.00 mU/L 0.18 (L)   T4 FREE 0.76 - 1.46 ng/dL 1.16   FREE T3 2.3 - 4.2 pg/mL 3.2   THYROID STIM IMMUNOG <=1.3 TSI index <1.0     [facility]/10/2017 7/24/2017   TSH 0.40 - 4.00 mU/L 0.12 (L) 0.12 (L)   T4 FREE 0.76 - 1.46 ng/dL 1.10 1.16     [facility] 7/14/2016   TSH 0.40 - 4.00 mU/L 0.24 (L)   T4 FREE 0.76 - 1.46 ng/dL 1.22       CBC:  WBC   Date Value Ref Range Status   08/18/2020 7.7 4.0 - 11.0 10e9/L Final         LFTs:  Liver Function Studies -   Recent Labs   Lab Test 08/18/20  0946   PROTTOTAL 7.3   ALBUMIN 3.6   BILITOTAL 0.6   ALKPHOS 62   AST 20   ALT 26       ULTRASOUND THYROID December 15, 2017 9:24 AM   HISTORY: Subclinical hyperthyroidism.   FINDINGS: Thyroid ultrasound demonstrates an enlarged thyroid gland. The right lobe measures 5.4 x 2.0 x 1.8 cm. The left lobe measures 3.8 x 1.6 x 1.3 cm. The isthmus is normal in thickness. Thyroid parenchyma is heterogeneous in echotexture. Increased color Doppler flow is noted throughout the thyroid gland. Thyroid nodules as follows: Right Lobe: None. Isthmus: None. Left Lobe: None.   IMPRESSION: Enlarged heterogeneous thyroid gland without evidence of a discrete thyroid nodule. Increased color Doppler flow suggests underlying thyroiditis. Correlate with thyroid function test and nuclear medicine thyroid scan as needed.    All pertinent notes, labs, and images personally reviewed by me.     A/P  Ms.Carol WINDY Bolanos is a 65 year old here for the evaluation of hyperthyroidism.    Subclinical hyperthyroidism (TSI neg):   TSH remained  suppressed since 2016 and along with normal free T4  No h/o arrhythmia  No h/o osteoporosis-- has osteopenia. Not on treatment.  She is on aromatase inhibitor for breast cancer.  TSI neg  US thyroid ( h/o radiation)- no discrete nodules.  Clinically looks euthyroid.  No major complaints.  As there is further decline in TSH along with risk for low bone density with aromatase inhibitor (with with prior history of osteopenia) was started on methimazole 5 mg 7/2018  Currently taking methimazole 2.5 mg/day.   Recent labs WNL.  Plan:  Discussed diagnosis, pathophysiology, management and treatment options of condition with pt.  Based on 3/2021 labs and stable labs for a while, recommend trial of smaller dose of methimazole.     Decrease dose of methimazole to 2.5 mg on alternate days (3/31/2021)  Labs in 5-6 weeks or sooner if concerns.  Please make a lab appointment for blood work and follow up clinic appointment in 1 week after that to discuss results.    Off note she is on anticoagulant and may need dose adjustment to get INR at target levels ( h/o aortic valve replacement)  Baseline LFT and WBC normal.  Discussed diagnosis, pathophysiology, management and treatment options of condition with pt.    Discussed indications, risks and benefits of all medications prescribed, and answered questions to patient's satisfaction.  The patient indicates understanding of these issues and agrees with the plan.    Discussed possible side effects of methimazole including liver dysfunction and agranulocytosis. Discussed to watch for s/s like jaundice, abdominal pain and skin rash. In case of prolonged unresolving fever, sore throat and infections, advise to seek medical care.    Call if:     Signs or symptoms of infection. These include a fever of 100.5 degrees or higher, chills, severe sore throat, ear or sinus pain, cough, increased sputum or change in color, painful urination, mouth sores.   Severe nausea or vomiting.   Joint pain or  swelling.   Not able to eat.   Unusual bruising or bleeding.   Dark urine or yellow skin or eyes.      Discussed s/s of hypothyroidism and hyperthyroidism to watch for.  The patient indicates understanding of these issues and agrees with the plan.    Follow-up:  6 weeks.    Merle Baxter MD  Endocrinology  [facility]  CC: Brook Echavarria     More than 50% of face to face time spent with Ms. Bolanos on counseling / coordinating her care.    All questions were answered.  The patient indicates understanding of the above issues and agrees with the plan set forth.     Addendum to above note and clinic visit:    Labs reviewed.    See result note/telephone encounter.

## 2021-03-31 NOTE — PATIENT INSTRUCTIONS
[facility]   Dr Baxter, Endocrinology Department      [facility]   [address] [address]  Appointment [phone]  Fax: [phone]   Monday and Tuesday         [facility]   [address] [place] [address]  Appointment [phone]  Fax: [phone]  Wednesday and Thursday           Decrease dose of methimazole to 2.5 mg on alternate days.  Labs in 5-6 weeks or sooner if concerns.  Please make a lab appointment for blood work and follow up clinic appointment in 1 week after that to discuss results.    Discussed possible side effects of methimazole including liver dysfunction and agranulocytosis. Discussed to watch for s/s like jaundice, abdominal pain and skin rash. In case of prolonged unresolving fever, sore throat and infections, advise to seek medical care.    Call if:     Signs or symptoms of infection. These include a fever of 100.5 degrees or higher, chills, severe sore throat, ear or sinus pain, cough, increased sputum or change in color, painful urination, mouth sores.   Severe nausea or vomiting.   Joint pain or swelling.   Not able to eat.   Unusual bruising or bleeding.   Dark urine or yellow skin or eyes.

## 2021-04-01 DIAGNOSIS — Z95.2 S/P AORTIC VALVE REPLACEMENT: ICD-10-CM

## 2021-04-01 DIAGNOSIS — Z79.01 LONG TERM CURRENT USE OF ANTICOAGULANTS WITH INR GOAL OF 2.0-3.0: ICD-10-CM

## 2021-04-05 RX ORDER — WARFARIN SODIUM 5 MG/1
TABLET ORAL
Qty: 105 TABLET | Refills: 1 | Status: SHIPPED | OUTPATIENT
Start: 2021-04-05 | End: 2021-09-23

## 2021-04-05 NOTE — TELEPHONE ENCOUNTER
Anticoagulation Monitoring Return Date Recheck   Latest Ref Rng & Units     3/24/2021 4/7/2021 2 WEEKS     Anticoagulation Monitoring Instructions   Latest Ref Rng & Units    3/24/2021 3/24: 2.5 mg; Otherwise 7.5 mg every Mon, Wed, Sat; 5 mg all other days   Prescription approved per [facility] Refill Protocol.  Liseth Chaney, RN  Anticoagulation Nurse - [facility]

## 2021-04-07 ENCOUNTER — ANTICOAGULATION THERAPY VISIT (OUTPATIENT)
Dept: ANTICOAGULATION | Facility: CLINIC | Age: 66
End: 2021-04-07

## 2021-04-07 DIAGNOSIS — Z95.2 H/O AORTIC VALVE REPLACEMENT: ICD-10-CM

## 2021-04-07 DIAGNOSIS — Z79.01 LONG TERM CURRENT USE OF ANTICOAGULANT THERAPY: ICD-10-CM

## 2021-04-07 LAB
CAPILLARY BLOOD COLLECTION: NORMAL
INR PPP: 4.4 (ref 0.86–1.14)

## 2021-04-07 PROCEDURE — 99207 PR NO CHARGE NURSE ONLY: CPT

## 2021-04-07 PROCEDURE — 85610 PROTHROMBIN TIME: CPT | Performed by: INTERNAL MEDICINE

## 2021-04-07 PROCEDURE — 36416 COLLJ CAPILLARY BLOOD SPEC: CPT | Performed by: INTERNAL MEDICINE

## 2021-04-07 NOTE — PROGRESS NOTES
ANTICOAGULATION FOLLOW-UP CLINIC VISIT    Patient Name:  Annabella Bolanos  Date:  2021  Contact Type:  Telephone/ Called patient, she continues with arthritis flare. Orders discussed with the patient, she agrees with the plan. Lab INR appointment scheduled on 21.    SUBJECTIVE:  Patient Findings     Positives:  Change in health (Patient reports arthritis continues to flare, will wake her up during the night. ), Change in medications (Continues to use more Tylenol, using Aspercreme with lidocaine, ), Change in diet/appetite (Patient reports she is going to eat more green veggies the next few days. )    Comments:  The patient was assessed for diet and activity level changes, missed or extra doses, bruising or bleeding, with no problem findings.         Clinical Outcomes     Comments:  The patient was assessed for diet and activity level changes, missed or extra doses, bruising or bleeding, with no problem findings.            OBJECTIVE    Recent labs: (last 7 days)     21  1122   INR 4.40*       ASSESSMENT / PLAN  INR assessment SUPRA    Recheck INR In: 9 DAYS    INR Location Outside lab      Anticoagulation Summary  As of 2021    INR goal:  2.0-3.0   TTR:  81.4 % (1 y)   INR used for dosin.40 (2021)   Warfarin maintenance plan:  7.5 mg (5 mg x 1.5) every Mon, Wed, Sat; 5 mg (5 mg x 1) all other days   Full warfarin instructions:  : Hold; Otherwise 7.5 mg every Mon, Wed, Sat; 5 mg all other days   Weekly warfarin total:  42.5 mg   Plan last modified:  Erica Stinson RN (2019)   Next INR check:  2021   Priority:  Maintenance   Target end date:  Indefinite    Indications    Long-term (current) use of anticoagulants [Z79.01] [Z79.01]  H/O aortic valve replacement [Z95.2]             Anticoagulation Episode Summary     INR check location:      Preferred lab:      Send INR reminders to:  [facility]    Comments:  20-ok to leave detailed message with dosing instructions  if patient does not answer. INR Referral renewed, see 8/13/20 telephone encounter.       Anticoagulation Care Providers     Provider Role Specialty Phone number    Brook Echavarria MD Referring Internal Medicine [phone]            See the Encounter Report to view Anticoagulation Flowsheet and Dosing Calendar (Go to Encounters tab in chart review, and find the Anticoagulation Therapy Visit)    Dosage adjustment made based on physician directed care plan.    Deborah Aguilera RN

## 2021-04-16 ENCOUNTER — ANTICOAGULATION THERAPY VISIT (OUTPATIENT)
Dept: ANTICOAGULATION | Facility: CLINIC | Age: 66
End: 2021-04-16

## 2021-04-16 DIAGNOSIS — Z79.01 LONG TERM CURRENT USE OF ANTICOAGULANT THERAPY: ICD-10-CM

## 2021-04-16 DIAGNOSIS — Z95.2 H/O AORTIC VALVE REPLACEMENT: ICD-10-CM

## 2021-04-16 LAB
CAPILLARY BLOOD COLLECTION: NORMAL
INR PPP: 2.9 (ref 0.86–1.14)

## 2021-04-16 PROCEDURE — 99207 PR NO CHARGE NURSE ONLY: CPT

## 2021-04-16 PROCEDURE — 36416 COLLJ CAPILLARY BLOOD SPEC: CPT | Performed by: INTERNAL MEDICINE

## 2021-04-16 PROCEDURE — 85610 PROTHROMBIN TIME: CPT | Performed by: INTERNAL MEDICINE

## 2021-04-16 NOTE — PROGRESS NOTES
ANTICOAGULATION FOLLOW-UP CLINIC VISIT    Patient Name:  Annabella Bolanos  Date:  2021  Contact Type:  Telephone/ Called patient, she reports health and diet changes. Orders discusssed with the patient, she agrees with the plan. Lab INR appointment scheduled on 21.    SUBJECTIVE:  Patient Findings     Positives:  Change in health (Patient reports arthritis is calming down.), Change in diet/appetite (Patient reports eating a little more green veggies. )    Comments:  The patient was assessed for diet, medication, and activity level changes, missed or extra doses, bruising or bleeding, with no problem findings. Maintenance warfarin dosing was reviewed with patient and will remain on the same dose until next INR check. Patient did not have any questions or concerns.           Clinical Outcomes     Comments:  The patient was assessed for diet, medication, and activity level changes, missed or extra doses, bruising or bleeding, with no problem findings. Maintenance warfarin dosing was reviewed with patient and will remain on the same dose until next INR check. Patient did not have any questions or concerns.              OBJECTIVE    Recent labs: (last 7 days)     21  1042   INR 2.90*       ASSESSMENT / PLAN  INR assessment THER    Recheck INR In: 2 WEEKS    INR Location Outside lab      Anticoagulation Summary  As of 2021    INR goal:  2.0-3.0   TTR:  79.1 % (1 y)   INR used for dosin.90 (2021)   Warfarin maintenance plan:  7.5 mg (5 mg x 1.5) every Mon, Wed, Sat; 5 mg (5 mg x 1) all other days   Full warfarin instructions:  7.5 mg every Mon, Wed, Sat; 5 mg all other days   Weekly warfarin total:  42.5 mg   No change documented:  Deborah Aguilera RN   Plan last modified:  Erica Stinson RN (2019)   Next INR check:  2021   Priority:  Maintenance   Target end date:  Indefinite    Indications    Long-term (current) use of anticoagulants [Z79.01] [Z79.01]  H/O aortic valve replacement  [Z95.2]             Anticoagulation Episode Summary     INR check location:      Preferred lab:      Send INR reminders to:  [facility]    Comments:  4/1/20-ok to leave detailed message with dosing instructions if patient does not answer. INR Referral renewed, see 8/13/20 telephone encounter.       Anticoagulation Care Providers     Provider Role Specialty Phone number    Brook Echavarria MD Referring Internal Medicine [phone]            See the Encounter Report to view Anticoagulation Flowsheet and Dosing Calendar (Go to Encounters tab in chart review, and find the Anticoagulation Therapy Visit)    Dosage adjustment made based on physician directed care plan.    Deborah Aguilera RN

## 2021-04-30 ENCOUNTER — ANTICOAGULATION THERAPY VISIT (OUTPATIENT)
Dept: ANTICOAGULATION | Facility: CLINIC | Age: 66
End: 2021-04-30

## 2021-04-30 DIAGNOSIS — Z79.01 LONG TERM CURRENT USE OF ANTICOAGULANT THERAPY: ICD-10-CM

## 2021-04-30 DIAGNOSIS — Z95.2 H/O AORTIC VALVE REPLACEMENT: ICD-10-CM

## 2021-04-30 LAB
CAPILLARY BLOOD COLLECTION: NORMAL
INR PPP: 3.8 (ref 0.86–1.14)

## 2021-04-30 PROCEDURE — 85610 PROTHROMBIN TIME: CPT | Performed by: INTERNAL MEDICINE

## 2021-04-30 PROCEDURE — 99207 PR NO CHARGE NURSE ONLY: CPT

## 2021-04-30 PROCEDURE — 36416 COLLJ CAPILLARY BLOOD SPEC: CPT | Performed by: INTERNAL MEDICINE

## 2021-04-30 NOTE — PROGRESS NOTES
ANTICOAGULATION FOLLOW-UP CLINIC VISIT    Patient Name:  Annabella Bolanos  Date:  4/30/2021  Contact Type:  Telephone/ Called patient, reports bloody nose and health changes. Orders discussed with the patient, she agrees with the plan. Lab INR appointment scheduled on 5/12/21.    SUBJECTIVE:  Patient Findings     Positives:  Signs/symptoms of bleeding (Patient reports had a nosebleed that lasted a few mintues 4/29/21.), Change in health (Patient reports her arthiritis is still bad.)    Comments:  The patient was assessed for diet, medication, and activity level changes, missed or extra doses, bruising, with no problem findings. Patient denies any identifiable changes that caused the supratherapeutic INR. Patient's INR has been supratherapeutic, will decrease weekly warfarin maintenance dose by 5.9% (2.5 mg)          Clinical Outcomes     Comments:  The patient was assessed for diet, medication, and activity level changes, missed or extra doses, bruising, with no problem findings. Patient denies any identifiable changes that caused the supratherapeutic INR. Patient's INR has been supratherapeutic, will decrease weekly warfarin maintenance dose by 5.9% (2.5 mg)             OBJECTIVE    Recent labs: (last 7 days)     04/30/21  1113   INR 3.80*       ASSESSMENT / PLAN  INR assessment SUPRA    Recheck INR In: 2 WEEKS    INR Location Outside lab      Anticoagulation Summary  As of 4/30/2021    INR goal:  2.0-3.0   TTR:  75.7 % (1 y)   INR used for dosing:  3.80 (4/30/2021)   Warfarin maintenance plan:  7.5 mg (5 mg x 1.5) every Mon, Thu; 5 mg (5 mg x 1) all other days   Full warfarin instructions:  4/30: Hold; Otherwise 7.5 mg every Mon, Thu; 5 mg all other days   Weekly warfarin total:  40 mg   Plan last modified:  Deborah Aguilera RN (4/30/2021)   Next INR check:  5/12/2021   Priority:  Maintenance   Target end date:  Indefinite    Indications    Long-term (current) use of anticoagulants [Z79.01] [Z79.01]  H/O aortic valve  replacement [Z95.2]             Anticoagulation Episode Summary     INR check location:      Preferred lab:      Send INR reminders to:  FRANCISCO PAZ POLLOS    Comments:  4/1/20-ok to leave detailed message with dosing instructions if patient does not answer. INR Referral renewed, see 8/13/20 telephone encounter.       Anticoagulation Care Providers     Provider Role Specialty Phone number    Brook Echavarria MD Referring Internal Medicine [phone]            See the Encounter Report to view Anticoagulation Flowsheet and Dosing Calendar (Go to Encounters tab in chart review, and find the Anticoagulation Therapy Visit)    Dosage adjustment made based on physician directed care plan.    Deborah Aguilera RN

## 2021-05-12 ENCOUNTER — ANTICOAGULATION THERAPY VISIT (OUTPATIENT)
Dept: ANTICOAGULATION | Facility: CLINIC | Age: 66
End: 2021-05-12

## 2021-05-12 DIAGNOSIS — Z95.2 H/O AORTIC VALVE REPLACEMENT: ICD-10-CM

## 2021-05-12 DIAGNOSIS — Z79.01 LONG TERM CURRENT USE OF ANTICOAGULANT THERAPY: ICD-10-CM

## 2021-05-12 DIAGNOSIS — E05.90 SUBCLINICAL HYPERTHYROIDISM: ICD-10-CM

## 2021-05-12 LAB
INR PPP: 3.1 (ref 0.86–1.14)
T3FREE SERPL-MCNC: 3.2 PG/ML (ref 2.3–4.2)
T4 FREE SERPL-MCNC: 1.23 NG/DL (ref 0.76–1.46)
TSH SERPL DL<=0.005 MIU/L-ACNC: 0.28 MU/L (ref 0.4–4)

## 2021-05-12 PROCEDURE — 99207 PR NO CHARGE NURSE ONLY: CPT

## 2021-05-12 PROCEDURE — 85610 PROTHROMBIN TIME: CPT | Performed by: INTERNAL MEDICINE

## 2021-05-12 PROCEDURE — 84481 FREE ASSAY (FT-3): CPT | Performed by: INTERNAL MEDICINE

## 2021-05-12 PROCEDURE — 84439 ASSAY OF FREE THYROXINE: CPT | Performed by: INTERNAL MEDICINE

## 2021-05-12 PROCEDURE — 84443 ASSAY THYROID STIM HORMONE: CPT | Performed by: INTERNAL MEDICINE

## 2021-05-12 PROCEDURE — 36415 COLL VENOUS BLD VENIPUNCTURE: CPT | Performed by: INTERNAL MEDICINE

## 2021-05-12 NOTE — PROGRESS NOTES
ANTICOAGULATION FOLLOW-UP CLINIC VISIT    Patient Name:  Annabella Bolanos  Date:  5/12/2021  Contact Type:  Telephone/ Called patient, she denies any changes. Orders discussed with the patient, she agrees with the plan. Lab INR appointment scheduled on 5/26/21.    SUBJECTIVE:  Patient Findings     Comments:  The patient was assessed for diet, medication, and activity level changes, missed or extra doses, bruising or bleeding, with no problem findings.Patient denies any identifiable changes that caused the supratherapeutic INR. INR continues to be supratherapeutic, will decrease weekly warfarin maintenance dose by 6.3%% (2.5 mg). Maintenance dose was also decreased at 4/30/21 INR check.          Clinical Outcomes     Negatives:  Major bleeding event, Thromboembolic event, Anticoagulation-related hospital admission, Anticoagulation-related ED visit, Anticoagulation-related fatality    Comments:  The patient was assessed for diet, medication, and activity level changes, missed or extra doses, bruising or bleeding, with no problem findings.Patient denies any identifiable changes that caused the supratherapeutic INR. INR continues to be supratherapeutic, will decrease weekly warfarin maintenance dose by 6.3%% (2.5 mg). Maintenance dose was also decreased at 4/30/21 INR check.             OBJECTIVE    Recent labs: (last 7 days)     05/12/21  1019   INR 3.10*       ASSESSMENT / PLAN  INR assessment SUPRA    Recheck INR In: 2 WEEKS    INR Location Outside lab      Anticoagulation Summary  As of 5/12/2021    INR goal:  2.0-3.0   TTR:  72.4 % (1 y)   INR used for dosing:  3.10 (5/12/2021)   Warfarin maintenance plan:  7.5 mg (5 mg x 1.5) every Mon; 5 mg (5 mg x 1) all other days   Full warfarin instructions:  7.5 mg every Mon; 5 mg all other days   Weekly warfarin total:  37.5 mg   Plan last modified:  Deborah Aguilera RN (5/12/2021)   Next INR check:  5/26/2021   Priority:  Maintenance   Target end date:  Indefinite     Indications    Long-term (current) use of anticoagulants [Z79.01] [Z79.01]  H/O aortic valve replacement [Z95.2]             Anticoagulation Episode Summary     INR check location:      Preferred lab:      Send INR reminders to:  [facility]    Comments:  4/1/20-ok to leave detailed message with dosing instructions if patient does not answer. INR Referral renewed, see 8/13/20 telephone encounter.       Anticoagulation Care Providers     Provider Role Specialty Phone number    Brook Echavarria MD Referring Internal Medicine [phone]            See the Encounter Report to view Anticoagulation Flowsheet and Dosing Calendar (Go to Encounters tab in chart review, and find the Anticoagulation Therapy Visit)    Dosage adjustment made based on physician directed care plan.    Deborah Aguilera RN

## 2021-05-20 ENCOUNTER — VIRTUAL VISIT (OUTPATIENT)
Dept: ENDOCRINOLOGY | Facility: CLINIC | Age: 66
End: 2021-05-20
Payer: COMMERCIAL

## 2021-05-20 DIAGNOSIS — E05.90 SUBCLINICAL HYPERTHYROIDISM: Primary | ICD-10-CM

## 2021-05-20 PROCEDURE — 99214 OFFICE O/P EST MOD 30 MIN: CPT | Mod: 95 | Performed by: INTERNAL MEDICINE

## 2021-05-20 RX ORDER — METHIMAZOLE 5 MG/1
TABLET ORAL
Qty: 45 TABLET | Refills: 0 | Status: SHIPPED | OUTPATIENT
Start: 2021-05-20 | End: 2021-07-06

## 2021-05-20 NOTE — LETTER
"    5/20/2021         RE: Annabella Bolanos  [address]   [place] [address]        Dear Colleague,    Thank you for referring your patient, Annabella Bolanos, to the [facility]. Please see a copy of my visit note below.    THIS IS A VIDEO VISIT:    Phone call visit/virtual visit encounter:    Name of patient: Annabella Bolanos    Date of encounter: 5/20/2021    Time of start of video visit: 9:30    Video started: 9:38    Video ended: 9:45    Time visit video ended:     Provider location: working from home/ [facility]    Patient location: patients home.    Mode of transmission: video/ [employer]    Verbal consent: obtained before starting visit. Pt is agreeable.      The patient has been notified of following:      \"This VIDEO visit will be conducted via a call between you and your physician/provider. We have found that certain health care needs can be provided without the need for a physical exam.  This service lets us provide the care you need with a short phone conversation.  If a prescription is necessary we can send it directly to your pharmacy.  If lab work is needed we can place an order for that and you can then stop by our lab to have the test done at a later time.     With new updates with corona virus patient might be billed as clinic visit.     If during the course of the call the physician/provider feels a telephone visit is not appropriate, you will not be charged for this service.\"      Past medical history, social history, family history, allergy and medications were reviewed and updated as appropriate.  Reviewed pertinent labs, notes, imaging studies personally.    Name: Annabella Bolanos  Seen for follow-up of subclinical hyperthyroidism.    HPI:  Annabella Bolanos is a 65 year old female who presents for the evaluation of subclinical Hyperthyroidism.  H/o breast cancer and h/o aortic valve replacement and is on long term anticoagulation.  Breast cancer diagnosed in 2014 s/p " lumpectomy and chemo and radiation. Took radiation 5 days a week for 1 month.  DEXA 2018 scan showing osteopenia and appears stable.  She is not on medication for osteoporosis/osteopenia at this time.  She is also on aromatase inhibitor as a part of treatment for breast cancer.     Subclinical hyperthyroidism noted since 7/2016. Plan was for continue to monitor.  No h/o arrhythmia  No h/o osteoporosis-- has osteopenia. Not on treatment.  TSI neg  US thyroid ( h/o radiation)- no discrete nodules.  As there was further decline in TSH along with risk for low bone density with aromatase inhibitor (with with prior history of osteopenia) as well as complex cardiac history she was started on methimazole 5 mg in 7/2018.    Currently taking methimazole to 2.5 mg on alternate days (3/31/2021- dose decreased at that time)  F/u labs 5/2021 with TSH 0.28.  LFT and CBC stable.    Since last clinic visit she was started on Fosamax by Dr. Felder for osteoporosis/osteopenia.  She is taking Fosamax on a weekly basis since February 2019.    Feeling good.  Has good energy.  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  H/o arthritis.  Weight is stable.  Eating healthy.  Having  joint pain with arthritis.    History of radiation exposure: YES. As above.  History of thyroid dysfunction: not to her knowledge. No FH of thyroid cancer.  Palpitations: No  Changes to hair or skin: No  Diarrhea/Constipation:No  Changes in menses: s/p menopause  Changes in vision:No  Diplopia/Blurryness:No  Dysphagia or Shortness of breath:No  Tremors:No  Difficulty sleeping:Yes: most of the time  Changes in weight: No  Lithium/Amiodarone: No  URI: No  CT Scans:No  Mother had hyperthyroidism s/p DOE and was on levothyroxine.    PMH/PSH:  Past Medical History:   Diagnosis Date     Adenomatous colon polyp 8/2015     Aortic stenosis 6/23/11    bicuspid AV with severe stenosis - 6/2011 mechanical AVR with 23 mm St Yordan AV conduit, composite graft placement     Arthritis     knees and hands      Bicuspid aortic valve      Breast cancer (H) 2014    R breast     H/O aortic valve replacement     St Yordan     Heart murmur     Valve repalcement .     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    AAA repair with av23 mm tube graft     PONV (postoperative nausea and vomiting)     n/v morphine vs anesthesia, vomiting x24 hrs     Subclinical hyperthyroidism 2017     Thrombosis of leg     after  of daughter     Uncomplicated asthma      Past Surgical History:   Procedure Laterality Date     BIOPSY NODE SENTINEL Right 9/10/2014    Procedure: BIOPSY NODE SENTINEL;  Surgeon: Ila Romano MD;  Location: [facility] OR     CARDIAC SURGERY  2011    aortic valve replacement     ENT SURGERY      tonsillectomy     HRW PORT A CATH       INSERT PORT VASCULAR ACCESS Left 10/22/2014    Procedure: INSERT PORT VASCULAR ACCESS;  Surgeon: Ila Romano MD;  Location: [facility] OR     LUMPECTOMY BREAST WITH SEED LOCALIZATION Right 9/10/2014    Procedure: LUMPECTOMY BREAST WITH SEED LOCALIZATION;  Surgeon: Ila Romano MD;  Location: [facility] OR     ORTHOPEDIC SURGERY      shoulder, thumb surgery     REMOVE PORT VASCULAR ACCESS Left 2015    Procedure: REMOVE PORT VASCULAR ACCESS;  Surgeon: Ila Romano MD;  Location: [facility] OR     REPAIR ANEURYSM ASCENDING AORTA  2011    Procedure:REPAIR ANEURYSM ASCENDING AORTA; Medial Sternotomy, Aortic Valve Replacement, ([employer]. Yordan [employer] HP Valved Graft, size 23 mm) on pump oxygenation, intraoperative transesophageal cardiogram; Surgeon:JOHN PAUL ULLOA; Location:[facility] OR     REPLACE VALVE AORTIC  11    bicuspid AV with severe stenosis - 2011 mechanical AVR with 23 mm St Yordan AV conduit, composite graft placement     Family Hx:  Family History   Problem Relation Age of Onset     Hypertension Mother       "Thyroid Disease Mother         \"Toxic thyroid\"     Prostate Cancer Father 70     Cancer Father 80        Lung cancer, Not caused from smoking     Cerebrovascular Disease Father 80     Hypertension Father      Cardiovascular Brother         half brother      Cardiovascular Son         Puentes-Parkinsons White Syndrome     Cardiovascular Daughter         Heart murmur     Breast Cancer Paternal Aunt 80     Cardiovascular Paternal Aunt      Arthritis Brother 40        RA - half brother      Cancer Maternal Grandfather      Colon Cancer No family hx of      Thyroid disease: as above          Social Hx:  Social History     Socioeconomic History     Marital status:      Spouse name: Not on file     Number of children: Not on file     Years of education: Not on file     Highest education level: Not on file   Occupational History     Not on file   Social Needs     Financial resource strain: Not on file     Food insecurity     Worry: Not on file     Inability: Not on file     Transportation needs     Medical: Not on file     Non-medical: Not on file   Tobacco Use     Smoking status: Never Smoker     Smokeless tobacco: Never Used   Substance and Sexual Activity     Alcohol use: Yes     Comment: 2 drinks per week -      Drug use: No     Sexual activity: Not Currently   Lifestyle     Physical activity     Days per week: Not on file     Minutes per session: Not on file     Stress: Not on file   Relationships     Social connections     Talks on phone: Not on file     Gets together: Not on file     Attends Taoist service: Not on file     Active member of club or organization: Not on file     Attends meetings of clubs or organizations: Not on file     Relationship status: Not on file     Intimate partner violence     Fear of current or ex partner: Not on file     Emotionally abused: Not on file     Physically abused: Not on file     Forced sexual activity: Not on file   Other Topics Concern     Parent/sibling w/ CABG, MI or " angioplasty before 65F 55M? Not Asked      Service Not Asked     Blood Transfusions Not Asked     Caffeine Concern Yes     Comment: 1-2 cups caffeine per day     Occupational Exposure Not Asked     Hobby Hazards Not Asked     Sleep Concern No     Stress Concern No     Weight Concern No     Special Diet Yes     Comment: low fat daily , low red meats     Back Care No     Exercise Yes     Comment: walks daily/ 3x a week exercise class     Bike Helmet Not Asked     Seat Belt Not Asked     Self-Exams Not Asked   Social History Narrative     Not on file          MEDICATIONS:  has a current medication list which includes the following prescription(s): alendronate, anastrozole, aspirin, ayr saline nasal no-drip, carvedilol, lisinopril, methimazole, valacyclovir, warfarin anticoagulant, and zolpidem.    ROS   ROS: 10 point ROS neg other than the symptoms noted above in the HPI.    Physical Exam   VS: LMP  (LMP Unknown)   Breastfeeding No   GENERAL: healthy, alert and no distress  EYES: Eyes grossly normal to inspection, conjunctivae and sclerae normal  ENT: no nose swelling, nasal discharge.  Thyroid: no apparent thyroid nodules  RESP: no audible wheeze, cough, or visible cyanosis.  No visible retractions or increased work of breathing.  Able to speak fully in complete sentences.  ABDO: not evaluated.  EXTREMITIES: no hand tremors.  NEURO: Cranial nerves grossly intact, mentation intact and speech normal  SKIN: No apparent skin lesions, rash or edema seen   PSYCH: mentation appears normal, affect normal/bright, judgement and insight intact, normal speech and appearance well-groomed      LABS:  TFTs:  ENDO THYROID LABS-[facility] Latest Ref Rng & Units 5/12/2021   TSH 0.40 - 4.00 mU/L 0.28 (L)   T4 FREE 0.76 - 1.46 ng/dL 1.23   FREE T3 2.3 - 4.2 pg/mL 3.2     ENDO THYROID LABS-[facility] Latest Ref Rng & Units 3/24/2021   TSH 0.40 - 4.00 mU/L 1.06   T4 FREE 0.76 - 1.46 ng/dL 1.17   FREE T3 2.3 - 4.2 pg/mL 3.0     ENDO THYROID  LABS-[facility] Latest Ref Rng & Units 9/11/2020   TSH 0.40 - 4.00 mU/L 1.53   T4 FREE 0.76 - 1.46 ng/dL 1.04   FREE T3 2.3 - 4.2 pg/mL 2.8     ENDO THYROID LABS-[facility] Latest Ref Rng & Units 8/8/2019 6/27/2019   TSH 0.40 - 4.00 mU/L 0.68 0.32 (L)   T4 FREE 0.76 - 1.46 ng/dL 1.02 1.20   FREE T3 2.3 - 4.2 pg/mL 2.6 2.9     ENDO THYROID LABS-[facility] Latest Ref Rng & Units 5/13/2019 11/8/2018   TSH 0.40 - 4.00 mU/L 0.94 1.02   T4 FREE 0.76 - 1.46 ng/dL 1.05 1.02   FREE T3 2.3 - 4.2 pg/mL 2.7 2.9     ENDO THYROID LABS-[facility] Latest Ref Rng & Units 9/6/2018   TSH 0.40 - 4.00 mU/L 1.10   T4 FREE 0.76 - 1.46 ng/dL 0.89   FREE T3 2.3 - 4.2 pg/mL 2.8     ENDO THYROID LABS-[facility] Latest Ref Rng & Units 6/5/2018   TSH 0.40 - 4.00 mU/L 0.05 (L)   T4 FREE 0.76 - 1.46 ng/dL 1.27   FREE T3 2.3 - 4.2 pg/mL 3.4     ENDO THYROID LABS-[facility] Latest Ref Rng & Units 12/6/2017   TSH 0.40 - 4.00 mU/L 0.18 (L)   T4 FREE 0.76 - 1.46 ng/dL 1.16   FREE T3 2.3 - 4.2 pg/mL 3.2   THYROID STIM IMMUNOG <=1.3 TSI index <1.0     ENDO THYROID LABS-[facility] Latest Ref Rng & Units 11/10/2017 7/24/2017   TSH 0.40 - 4.00 mU/L 0.12 (L) 0.12 (L)   T4 FREE 0.76 - 1.46 ng/dL 1.10 1.16     [facility]   TSH 0.40 - 4.00 mU/L 0.24 (L)   T4 FREE 0.76 - 1.46 ng/dL 1.22       CBC:  WBC   Date Value Ref Range Status   08/18/2020 7.7 4.0 - 11.0 10e9/L Final       LFTs:  Liver Function Studies -   Recent Labs   Lab Test 08/18/20  0946   PROTTOTAL 7.3   ALBUMIN 3.6   BILITOTAL 0.6   ALKPHOS 62   AST 20   ALT 26       ULTRASOUND THYROID December 15, 2017 9:24 AM   HISTORY: Subclinical hyperthyroidism.   FINDINGS: Thyroid ultrasound demonstrates an enlarged thyroid gland. The right lobe measures 5.4 x 2.0 x 1.8 cm. The left lobe measures 3.8 x 1.6 x 1.3 cm. The isthmus is normal in thickness. Thyroid parenchyma is heterogeneous in echotexture. Increased color Doppler flow is noted throughout the thyroid gland. Thyroid nodules as follows: Right Lobe: None.  Isthmus: None. Left Lobe: None.   IMPRESSION: Enlarged heterogeneous thyroid gland without evidence of a discrete thyroid nodule. Increased color Doppler flow suggests underlying thyroiditis. Correlate with thyroid function test and nuclear medicine thyroid scan as needed.    All pertinent notes, labs, and images personally reviewed by me.     A/P  Ms.Carol WINDY Bolanos is a 65 year old here for the evaluation of hyperthyroidism.    Subclinical hyperthyroidism (TSI neg):   TSH remained suppressed since 2016 and along with normal free T4  No h/o arrhythmia  No h/o osteoporosis-- has osteopenia. Not on treatment.  She is on aromatase inhibitor for breast cancer.  TSI neg  US thyroid ( h/o radiation)- no discrete nodules.  Clinically looks euthyroid.  No major complaints.  As there is further decline in TSH along with risk for low bone density with aromatase inhibitor (with with prior history of osteopenia) was started on methimazole 5 mg 7/2018  Currently  taking methimazole to 2.5 mg on alternate days. (3/31/2021).  Plan:  Discussed diagnosis, pathophysiology, management and treatment options of condition with pt.  Based on 5/2021 labs recommend to increase dose of methimazole to 2.5 mg/day (5/20/2021)  Labs in 6 weeks.  Please make a lab appointment for blood work and follow up clinic appointment in 1 week after that to discuss results.      Discussed s/s of hypothyroidism and hyperthyroidism to watch for.  The patient indicates understanding of these issues and agrees with the plan.    Off note she is on anticoagulant and may need dose adjustment to get INR at target levels ( h/o aortic valve replacement)  Baseline LFT and WBC normal.  Discussed diagnosis, pathophysiology, management and treatment options of condition with pt.    Discussed indications, risks and benefits of all medications prescribed, and answered questions to patient's satisfaction.  The patient indicates understanding of these issues and agrees with  the plan.    Discussed possible side effects of methimazole including liver dysfunction and agranulocytosis. Discussed to watch for s/s like jaundice, abdominal pain and skin rash. In case of prolonged unresolving fever, sore throat and infections, advise to seek medical care.    Call if:     Signs or symptoms of infection. These include a fever of 100.5 degrees or higher, chills, severe sore throat, ear or sinus pain, cough, increased sputum or change in color, painful urination, mouth sores.   Severe nausea or vomiting.   Joint pain or swelling.   Not able to eat.   Unusual bruising or bleeding.   Dark urine or yellow skin or eyes.      Discussed s/s of hypothyroidism and hyperthyroidism to watch for.  The patient indicates understanding of these issues and agrees with the plan.    Follow-up:  6 weeks.    Merle Baxter MD  Endocrinology  [facility]/[place]  CC: Brook Echavarria     More than 50% of face to face time spent with Ms. Bolanos on counseling / coordinating her care.    All questions were answered.  The patient indicates understanding of the above issues and agrees with the plan set forth.     Addendum to above note and clinic visit:    Labs reviewed.    See result note/telephone encounter.                  Again, thank you for allowing me to participate in the care of your patient.        Sincerely,        Merle Baxter MD

## 2021-05-20 NOTE — PROGRESS NOTES
"THIS IS A VIDEO VISIT:    Phone call visit/virtual visit encounter:    Name of patient: Annabella Bolanos    Date of encounter: 5/20/2021    Time of start of video visit: 9:30    Video started: 9:38    Video ended: 9:45    Time visit video ended: 9:52    Provider location: working from [place]/ [facility]    Patient location: patients home.    Mode of transmission: video/ [employer]    Verbal consent: obtained before starting visit. Pt is agreeable.      The patient has been notified of following:      \"This VIDEO visit will be conducted via a call between you and your physician/provider. We have found that certain health care needs can be provided without the need for a physical exam.  This service lets us provide the care you need with a short phone conversation.  If a prescription is necessary we can send it directly to your pharmacy.  If lab work is needed we can place an order for that and you can then stop by our lab to have the test done at a later time.     With new updates with corona virus patient might be billed as clinic visit.     If during the course of the call the physician/provider feels a telephone visit is not appropriate, you will not be charged for this service.\"      Past medical history, social history, family history, allergy and medications were reviewed and updated as appropriate.  Reviewed pertinent labs, notes, imaging studies personally.    Name: Annabella Bolanos  Seen for follow-up of subclinical hyperthyroidism.    HPI:  Annabella Bolanos is a 65 year old female who presents for the evaluation of subclinical Hyperthyroidism.  H/o breast cancer and h/o aortic valve replacement and is on long term anticoagulation.  Breast cancer diagnosed in 2014 s/p lumpectomy and chemo and radiation. Took radiation 5 days a week for 1 month.  DEXA 2018 scan showing osteopenia and appears stable.  She is not on medication for osteoporosis/osteopenia at this time.  She is also on aromatase inhibitor as a part of " treatment for breast cancer.     Subclinical hyperthyroidism noted since 7/2016. Plan was for continue to monitor.  No h/o arrhythmia  No h/o osteoporosis-- has osteopenia. Not on treatment.  TSI neg  US thyroid ( h/o radiation)- no discrete nodules.  As there was further decline in TSH along with risk for low bone density with aromatase inhibitor (with with prior history of osteopenia) as well as complex cardiac history she was started on methimazole 5 mg in 7/2018.    Currently taking methimazole to 2.5 mg on alternate days (3/31/2021- dose decreased at that time)  F/u labs 5/2021 with TSH 0.28.  LFT and CBC stable.    Since last clinic visit she was started on Fosamax by Dr. Felder for osteoporosis/osteopenia.  She is taking Fosamax on a weekly basis since February 2019.    Feeling good.  Has good energy.  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  H/o arthritis.  Weight is stable.  Eating healthy.  Having  joint pain with arthritis.    History of radiation exposure: YES. As above.  History of thyroid dysfunction: not to her knowledge. No FH of thyroid cancer.  Palpitations: No  Changes to hair or skin: No  Diarrhea/Constipation:No  Changes in menses: s/p menopause  Changes in vision:No  Diplopia/Blurryness:No  Dysphagia or Shortness of breath:No  Tremors:No  Difficulty sleeping:Yes: most of the time  Changes in weight: No  Lithium/Amiodarone: No  URI: No  CT Scans:No  Mother had hyperthyroidism s/p DOE and was on levothyroxine.    PMH/PSH:  Past Medical History:   Diagnosis Date     Adenomatous colon polyp 8/2015     Aortic stenosis 6/23/11    bicuspid AV with severe stenosis - 6/2011 mechanical AVR with 23 mm St Yordan AV conduit, composite graft placement     Arthritis     knees and hands     Bicuspid aortic valve      Breast cancer (H) 7/2014    R breast     H/O aortic valve replacement     St Yordan     Heart murmur     Valve repalcement 2011.     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    AAA repair with av23 mm tube graft     PONV (postoperative nausea and vomiting)     n/v morphine vs anesthesia, vomiting x24 hrs     Subclinical hyperthyroidism 2017     Thrombosis of leg     after  of daughter     Uncomplicated asthma      Past Surgical History:   Procedure Laterality Date     BIOPSY NODE SENTINEL Right 9/10/2014    Procedure: BIOPSY NODE SENTINEL;  Surgeon: Ila Romano MD;  Location: [facility] OR     CARDIAC SURGERY  2011    aortic valve replacement     ENT SURGERY      tonsillectomy     HRW PORT A CATH       INSERT PORT VASCULAR ACCESS Left 10/22/2014    Procedure: INSERT PORT VASCULAR ACCESS;  Surgeon: Ila Romano MD;  Location: [facility] OR     LUMPECTOMY BREAST WITH SEED LOCALIZATION Right 9/10/2014    Procedure: LUMPECTOMY BREAST WITH SEED LOCALIZATION;  Surgeon: Ila Romano MD;  Location: [facility] OR     ORTHOPEDIC SURGERY      shoulder, thumb surgery     REMOVE PORT VASCULAR ACCESS Left 2015    Procedure: REMOVE PORT VASCULAR ACCESS;  Surgeon: Ila Romano MD;  Location: [facility] OR     REPAIR ANEURYSM ASCENDING AORTA  2011    Procedure:REPAIR ANEURYSM ASCENDING AORTA; Medial Sternotomy, Aortic Valve Replacement, (St. Yordan [employer] HP Valved Graft, size 23 mm) on pump oxygenation, intraoperative transesophageal cardiogram; Surgeon:JOHN PAUL ULLOA; Location:[facility] OR     REPLACE VALVE AORTIC  11    bicuspid AV with severe stenosis - 2011 mechanical AVR with 23 mm St Yordan AV conduit, composite graft placement     Family Hx:  Family History   Problem Relation Age of Onset     Hypertension Mother      Thyroid Disease Mother         \"Toxic thyroid\"     Prostate Cancer Father 70     Cancer Father 80        Lung cancer, Not caused from smoking     Cerebrovascular Disease Father 80     Hypertension Father      Cardiovascular Brother         half brother  "     Cardiovascular Son         Puentes-Parkinsons White Syndrome     Cardiovascular Daughter         Heart murmur     Breast Cancer Paternal Aunt 80     Cardiovascular Paternal Aunt      Arthritis Brother 40        RA - half brother      Cancer Maternal Grandfather      Colon Cancer No family hx of      Thyroid disease: as above          Social Hx:  Social History     Socioeconomic History     Marital status:      Spouse name: Not on file     Number of children: Not on file     Years of education: Not on file     Highest education level: Not on file   Occupational History     Not on file   Social Needs     Financial resource strain: Not on file     Food insecurity     Worry: Not on file     Inability: Not on file     Transportation needs     Medical: Not on file     Non-medical: Not on file   Tobacco Use     Smoking status: Never Smoker     Smokeless tobacco: Never Used   Substance and Sexual Activity     Alcohol use: Yes     Comment: 2 drinks per week -      Drug use: No     Sexual activity: Not Currently   Lifestyle     Physical activity     Days per week: Not on file     Minutes per session: Not on file     Stress: Not on file   Relationships     Social connections     Talks on phone: Not on file     Gets together: Not on file     Attends Church service: Not on file     Active member of club or organization: Not on file     Attends meetings of clubs or organizations: Not on file     Relationship status: Not on file     Intimate partner violence     Fear of current or ex partner: Not on file     Emotionally abused: Not on file     Physically abused: Not on file     Forced sexual activity: Not on file   Other Topics Concern     Parent/sibling w/ CABG, MI or angioplasty before 65F 55M? Not Asked      Service Not Asked     Blood Transfusions Not Asked     Caffeine Concern Yes     Comment: 1-2 cups caffeine per day     Occupational Exposure Not Asked     Hobby Hazards Not Asked     Sleep Concern No      Stress Concern No     Weight Concern No     Special Diet Yes     Comment: low fat daily , low red meats     Back Care No     Exercise Yes     Comment: walks daily/ 3x a week exercise class     Bike Helmet Not Asked     Seat Belt Not Asked     Self-Exams Not Asked   Social History Narrative     Not on file          MEDICATIONS:  has a current medication list which includes the following prescription(s): alendronate, anastrozole, aspirin, ayr saline nasal no-drip, carvedilol, lisinopril, methimazole, valacyclovir, warfarin anticoagulant, and zolpidem.    ROS   ROS: 10 point ROS neg other than the symptoms noted above in the HPI.    Physical Exam   VS: LMP  (LMP Unknown)   Breastfeeding No   GENERAL: healthy, alert and no distress  EYES: Eyes grossly normal to inspection, conjunctivae and sclerae normal  ENT: no nose swelling, nasal discharge.  Thyroid: no apparent thyroid nodules  RESP: no audible wheeze, cough, or visible cyanosis.  No visible retractions or increased work of breathing.  Able to speak fully in complete sentences.  ABDO: not evaluated.  EXTREMITIES: no hand tremors.  NEURO: Cranial nerves grossly intact, mentation intact and speech normal  SKIN: No apparent skin lesions, rash or edema seen   PSYCH: mentation appears normal, affect normal/bright, judgement and insight intact, normal speech and appearance well-groomed      LABS:  TFTs:  ENDO THYROID LABS-[facility] Latest Ref Rng & Units 5/12/2021   TSH 0.40 - 4.00 mU/L 0.28 (L)   T4 FREE 0.76 - 1.46 ng/dL 1.23   FREE T3 2.3 - 4.2 pg/mL 3.2     ENDO THYROID LABS-[facility] Latest Ref Rng & Units 3/24/2021   TSH 0.40 - 4.00 mU/L 1.06   T4 FREE 0.76 - 1.46 ng/dL 1.17   FREE T3 2.3 - 4.2 pg/mL 3.0     ENDO THYROID LABS-[facility] Latest Ref Rng & Units 9/11/2020   TSH 0.40 - 4.00 mU/L 1.53   T4 FREE 0.76 - 1.46 ng/dL 1.04   FREE T3 2.3 - 4.2 pg/mL 2.8     ENDO THYROID LABS-[facility] Latest Ref Rng & Units 8/8/2019 6/27/2019   TSH 0.40 - 4.00 mU/L 0.68 0.32 (L)   T4 FREE 0.76 -  1.46 ng/dL 1.02 1.20   FREE T3 2.3 - 4.2 pg/mL 2.6 2.9     [facility] 5/13/2019 11/8/2018   TSH 0.40 - 4.00 mU/L 0.94 1.02   T4 FREE 0.76 - 1.46 ng/dL 1.05 1.02   FREE T3 2.3 - 4.2 pg/mL 2.7 2.9     [facility] 9/6/2018   TSH 0.40 - 4.00 mU/L 1.10   T4 FREE 0.76 - 1.46 ng/dL 0.89   FREE T3 2.3 - 4.2 pg/mL 2.8     [facility] 6/5/2018   TSH 0.40 - 4.00 mU/L 0.05 (L)   T4 FREE 0.76 - 1.46 ng/dL 1.27   FREE T3 2.3 - 4.2 pg/mL 3.4     [facility] 12/6/2017   TSH 0.40 - 4.00 mU/L 0.18 (L)   T4 FREE 0.76 - 1.46 ng/dL 1.16   FREE T3 2.3 - 4.2 pg/mL 3.2   THYROID STIM IMMUNOG <=1.3 TSI index <1.0     [facility] 11/10/2017 7/24/2017   TSH 0.40 - 4.00 mU/L 0.12 (L) 0.12 (L)   T4 FREE 0.76 - 1.46 ng/dL 1.10 1.16     [facility] 7/14/2016   TSH 0.40 - 4.00 mU/L 0.24 (L)   T4 FREE 0.76 - 1.46 ng/dL 1.22       CBC:  WBC   Date Value Ref Range Status   08/18/2020 7.7 4.0 - 11.0 10e9/L Final       LFTs:  Liver Function Studies -   Recent Labs   Lab Test 08/18/20  0946   PROTTOTAL 7.3   ALBUMIN 3.6   BILITOTAL 0.6   ALKPHOS 62   AST 20   ALT 26       ULTRASOUND THYROID December 15, 2017 9:24 AM   HISTORY: Subclinical hyperthyroidism.   FINDINGS: Thyroid ultrasound demonstrates an enlarged thyroid gland. The right lobe measures 5.4 x 2.0 x 1.8 cm. The left lobe measures 3.8 x 1.6 x 1.3 cm. The isthmus is normal in thickness. Thyroid parenchyma is heterogeneous in echotexture. Increased color Doppler flow is noted throughout the thyroid gland. Thyroid nodules as follows: Right Lobe: None. Isthmus: None. Left Lobe: None.   IMPRESSION: Enlarged heterogeneous thyroid gland without evidence of a discrete thyroid nodule. Increased color Doppler flow suggests underlying thyroiditis. Correlate with thyroid function test and nuclear medicine  thyroid scan as needed.    All pertinent notes, labs, and images personally reviewed by me.     A/P  Ms.Carol WINDY Bolanos is a 65 year old here for the evaluation of hyperthyroidism.    Subclinical hyperthyroidism (TSI neg):   TSH remained suppressed since 2016 and along with normal free T4  No h/o arrhythmia  No h/o osteoporosis-- has osteopenia. Not on treatment.  She is on aromatase inhibitor for breast cancer.  TSI neg  US thyroid ( h/o radiation)- no discrete nodules.  Clinically looks euthyroid.  No major complaints.  As there is further decline in TSH along with risk for low bone density with aromatase inhibitor (with with prior history of osteopenia) was started on methimazole 5 mg 7/2018  Currently  taking methimazole to 2.5 mg on alternate days. (3/31/2021).  Plan:  Discussed diagnosis, pathophysiology, management and treatment options of condition with pt.  Based on 5/2021 labs recommend to increase dose of methimazole to 2.5 mg/day (5/20/2021)  Labs in 6 weeks.  Please make a lab appointment for blood work and follow up clinic appointment in 1 week after that to discuss results.      Discussed s/s of hypothyroidism and hyperthyroidism to watch for.  The patient indicates understanding of these issues and agrees with the plan.    Off note she is on anticoagulant and may need dose adjustment to get INR at target levels ( h/o aortic valve replacement)  Baseline LFT and WBC normal.  Discussed diagnosis, pathophysiology, management and treatment options of condition with pt.    Discussed indications, risks and benefits of all medications prescribed, and answered questions to patient's satisfaction.  The patient indicates understanding of these issues and agrees with the plan.    Discussed possible side effects of methimazole including liver dysfunction and agranulocytosis. Discussed to watch for s/s like jaundice, abdominal pain and skin rash. In case of prolonged unresolving fever, sore throat and infections,  advise to seek medical care.    Call if:     Signs or symptoms of infection. These include a fever of 100.5 degrees or higher, chills, severe sore throat, ear or sinus pain, cough, increased sputum or change in color, painful urination, mouth sores.   Severe nausea or vomiting.   Joint pain or swelling.   Not able to eat.   Unusual bruising or bleeding.   Dark urine or yellow skin or eyes.      Discussed s/s of hypothyroidism and hyperthyroidism to watch for.  The patient indicates understanding of these issues and agrees with the plan.    Follow-up:  6 weeks.    Merle Baxter MD  Endocrinology  [facility]  CC: Brook Echavarria     More than 50% of face to face time spent with Ms. Bolanos on counseling / coordinating her care.    All questions were answered.  The patient indicates understanding of the above issues and agrees with the plan set forth.     Addendum to above note and clinic visit:    Labs reviewed.    See result note/telephone encounter.

## 2021-05-20 NOTE — PATIENT INSTRUCTIONS
[facility]  Dr Baxter, Endocrinology Department    [facility]   [address] [id number]  [place] [address]  Appointment [phone]  Fax: [phone]  [place]: Monday - Thursday      Please check the cost coverage and copay with insurance before recommended tests, services and medications (especially if new medications are prescribed).     If ordered, please get blood work done 1 week prior to your next appointment so they will be available to Dr. Baxter at your visit.    Based on 2021 labs recommend to increase dose of methimazole to 2.5 mg/day (2021)  Labs in 6 weeks.  Please make a lab appointment for blood work and follow up clinic appointment in 1 week after that to discuss results.    Discussed possible side effects of methimazole including liver dysfunction and agranulocytosis. Discussed to watch for s/s like jaundice, abdominal pain and skin rash. In case of prolonged unresolving fever, sore throat and infections, advise to seek medical care.    Call if:     Signs or symptoms of infection. These include a fever of 100.5 degrees or higher, chills, severe sore throat, ear or sinus pain, cough, increased sputum or change in color, painful urination, mouth sores.   Severe nausea or vomiting.   Joint pain or swelling.   Not able to eat.   Unusual bruising or bleeding.   Dark urine or yellow skin or eyes.

## 2021-05-26 ENCOUNTER — ANTICOAGULATION THERAPY VISIT (OUTPATIENT)
Dept: ANTICOAGULATION | Facility: CLINIC | Age: 66
End: 2021-05-26

## 2021-05-26 DIAGNOSIS — Z79.01 LONG TERM CURRENT USE OF ANTICOAGULANT THERAPY: ICD-10-CM

## 2021-05-26 DIAGNOSIS — Z95.2 H/O AORTIC VALVE REPLACEMENT: ICD-10-CM

## 2021-05-26 LAB
CAPILLARY BLOOD COLLECTION: NORMAL
INR PPP: 3.1 (ref 0.86–1.14)

## 2021-05-26 PROCEDURE — 85610 PROTHROMBIN TIME: CPT | Performed by: INTERNAL MEDICINE

## 2021-05-26 PROCEDURE — 36416 COLLJ CAPILLARY BLOOD SPEC: CPT | Performed by: INTERNAL MEDICINE

## 2021-05-26 PROCEDURE — 99207 PR NO CHARGE NURSE ONLY: CPT

## 2021-05-26 NOTE — PROGRESS NOTES
ANTICOAGULATION FOLLOW-UP CLINIC VISIT    Patient Name:  Annabella Bolanos  Date:  5/26/2021  Contact Type:  Telephone/ discussed with patient    SUBJECTIVE:  Patient Findings     Comments:  The patient was assessed for diet, medication, and activity level changes, missed or extra doses, bruising or bleeding, with no problem findings.  Patient denies any identifiable changes that caused the supra therapeutic INR. INR continues to be supra therapeutic, will lower maintenance dose by ~7% today.  If INR remains elevated with next check, may try adding more greens to her diet since patient does enjoy eating green veggies.            Clinical Outcomes     Negatives:  Major bleeding event, Thromboembolic event, Anticoagulation-related hospital admission, Anticoagulation-related ED visit, Anticoagulation-related fatality    Comments:  The patient was assessed for diet, medication, and activity level changes, missed or extra doses, bruising or bleeding, with no problem findings.  Patient denies any identifiable changes that caused the supra therapeutic INR. INR continues to be supra therapeutic, will lower maintenance dose by ~7% today.  If INR remains elevated with next check, may try adding more greens to her diet since patient does enjoy eating green veggies.               OBJECTIVE    Recent labs: (last 7 days)     05/26/21  1119   INR 3.10*       ASSESSMENT / PLAN  INR assessment SUPRA    Recheck INR In: 2 WEEKS    INR Location Clinic      Anticoagulation Summary  As of 5/26/2021    INR goal:  2.0-3.0   TTR:  68.6 % (1 y)   INR used for dosing:  3.10 (5/26/2021)   Warfarin maintenance plan:  5 mg (5 mg x 1) every day   Full warfarin instructions:  5 mg every day   Weekly warfarin total:  35 mg   Plan last modified:  Erica Stinson RN (5/26/2021)   Next INR check:  6/8/2021   Priority:  Maintenance   Target end date:  Indefinite    Indications    Long-term (current) use of anticoagulants [Z79.01] [Z79.01]  H/O aortic valve  replacement [Z95.2]             Anticoagulation Episode Summary     INR check location:      Preferred lab:      Send INR reminders to:  [facility]    Comments:  4/1/20-ok to leave detailed message with dosing instructions if patient does not answer. INR Referral renewed, see 8/13/20 telephone encounter.       Anticoagulation Care Providers     Provider Role Specialty Phone number    Brook Echavarria MD Referring Internal Medicine [phone]            See the Encounter Report to view Anticoagulation Flowsheet and Dosing Calendar (Go to Encounters tab in chart review, and find the Anticoagulation Therapy Visit)    Dosage adjustment made based on physician directed care plan.    Erica Stinson RN

## 2021-06-08 ENCOUNTER — ANTICOAGULATION THERAPY VISIT (OUTPATIENT)
Dept: ANTICOAGULATION | Facility: CLINIC | Age: 66
End: 2021-06-08

## 2021-06-08 DIAGNOSIS — Z95.2 H/O AORTIC VALVE REPLACEMENT: ICD-10-CM

## 2021-06-08 DIAGNOSIS — Z79.01 LONG TERM CURRENT USE OF ANTICOAGULANT THERAPY: ICD-10-CM

## 2021-06-08 LAB
CAPILLARY BLOOD COLLECTION: NORMAL
INR PPP: 2 (ref 0.86–1.14)

## 2021-06-08 PROCEDURE — 85610 PROTHROMBIN TIME: CPT | Performed by: INTERNAL MEDICINE

## 2021-06-08 PROCEDURE — 99207 PR NO CHARGE NURSE ONLY: CPT

## 2021-06-08 PROCEDURE — 36416 COLLJ CAPILLARY BLOOD SPEC: CPT | Performed by: INTERNAL MEDICINE

## 2021-06-08 NOTE — PROGRESS NOTES
ANTICOAGULATION FOLLOW-UP CLINIC VISIT    Patient Name:  Annabella Bolanos  Date:  2021  Contact Type:  Telephone/ discussed with patient    SUBJECTIVE:  Patient Findings     Comments:  The patient was assessed for diet, medication, and activity level changes, missed or extra doses, bruising or bleeding, with no problem findings.  Patient will try to cut back on her green veggies a little bit to help get INR closer to 2.5.  Will consider maintenance dose adjustment if INR is sub therapeutic with next check since it did drop significantly with this check.          Clinical Outcomes     Negatives:  Major bleeding event, Thromboembolic event, Anticoagulation-related hospital admission, Anticoagulation-related ED visit, Anticoagulation-related fatality    Comments:  The patient was assessed for diet, medication, and activity level changes, missed or extra doses, bruising or bleeding, with no problem findings.  Patient will try to cut back on her green veggies a little bit to help get INR closer to 2.5.  Will consider maintenance dose adjustment if INR is sub therapeutic with next check since it did drop significantly with this check.             OBJECTIVE    Recent labs: (last 7 days)     21  1323   INR 2.00*       ASSESSMENT / PLAN  INR assessment THER    Recheck INR In: 2 WEEKS    INR Location Clinic      Anticoagulation Summary  As of 2021    INR goal:  2.0-3.0   TTR:  68.3 % (1 y)   INR used for dosin.00 (2021)   Warfarin maintenance plan:  5 mg (5 mg x 1) every day   Full warfarin instructions:  5 mg every day   Weekly warfarin total:  35 mg   No change documented:  Erica Stinson RN   Plan last modified:  Erica Stinson RN (2021)   Next INR check:  2021   Priority:  Maintenance   Target end date:  Indefinite    Indications    Long-term (current) use of anticoagulants [Z79.01] [Z79.01]  H/O aortic valve replacement [Z95.2]             Anticoagulation Episode Summary     INR check  location:      Preferred lab:      [facility]    Comments:  4/1/20-ok to leave detailed message with dosing instructions if patient does not answer. INR Referral renewed, see 8/13/20 telephone encounter.       Anticoagulation Care Providers     Provider Role Specialty Phone number    Brook Echavarria MD Referring Internal Medicine [phone]            See the Encounter Report to view Anticoagulation Flowsheet and Dosing Calendar (Go to Encounters tab in chart review, and find the Anticoagulation Therapy Visit)    Dosage adjustment made based on physician directed care plan.    Erica Stinson RN

## 2021-06-14 ENCOUNTER — TELEPHONE (OUTPATIENT)
Dept: INTERNAL MEDICINE | Facility: CLINIC | Age: 66
End: 2021-06-14

## 2021-06-14 DIAGNOSIS — M19.041 ARTHRITIS OF BOTH HANDS: Primary | ICD-10-CM

## 2021-06-14 DIAGNOSIS — M19.042 ARTHRITIS OF BOTH HANDS: Primary | ICD-10-CM

## 2021-06-14 NOTE — TELEPHONE ENCOUNTER
Pt calling requesting a rheumatology referral for her arthritis. She would like to see Dr. Gabriel through [facility]. Pt can be reached at [phone]. OK to leave a detailed message. Please advise. Thanks.

## 2021-06-14 NOTE — TELEPHONE ENCOUNTER
"Hands have sharp shooting pains with \"huge knuckles\" in fingers.  Her knees have become enlarged and are painful after any period of inactivity then ambulating.  Unable to ambulate any distances now.    Symptoms for a couple of years.  Unable to take Ibuprofen due to aortic valve replacement and taking Warfarin. Tylenol, Aspercream, Biofreeze have been ineffective.  [facility] nurse states it is getting hard to regulate INRs and suggested she see rheumatology.  Patient was doing some research and found a Dr. Re Gabriel with [facility] whose interests or specialty is IM/rheumatology and may be in an ortho group but states she is open to seeing anyone.  GLENDA Baca R.N.      "

## 2021-06-15 NOTE — TELEPHONE ENCOUNTER
Left detailed voicemail message on patient's home/cell number with scheduling information for rheumatology.    [facility]  will call you to coordinate your care as prescribed by your provider. A representative will call you within 1 business day to help schedule your appointment, or you may contact the  Representative at [phone].

## 2021-06-22 ENCOUNTER — ANTICOAGULATION THERAPY VISIT (OUTPATIENT)
Dept: ANTICOAGULATION | Facility: CLINIC | Age: 66
End: 2021-06-22

## 2021-06-22 DIAGNOSIS — Z79.01 LONG TERM CURRENT USE OF ANTICOAGULANT THERAPY: ICD-10-CM

## 2021-06-22 DIAGNOSIS — Z95.2 H/O AORTIC VALVE REPLACEMENT: ICD-10-CM

## 2021-06-22 LAB
CAPILLARY BLOOD COLLECTION: NORMAL
INR PPP: 2.1 (ref 0.86–1.14)

## 2021-06-22 PROCEDURE — 85610 PROTHROMBIN TIME: CPT | Performed by: INTERNAL MEDICINE

## 2021-06-22 PROCEDURE — 36416 COLLJ CAPILLARY BLOOD SPEC: CPT | Performed by: INTERNAL MEDICINE

## 2021-06-22 NOTE — PROGRESS NOTES
ANTICOAGULATION MANAGEMENT     Annabella Bolanos 65 year old female is on warfarin with therapeutic INR result. (Goal INR 2.0-3.0)    Recent labs: (last 7 days)     06/22/21  1331   INR 2.10*       ASSESSMENT     Source(s): Patient/Caregiver Call       Warfarin doses taken: Warfarin taken as instructed    Diet: No new diet changes identified    New illness, injury, or hospitalization: No    Medication/supplement changes: None noted    Signs or symptoms of bleeding or clotting: No    Previous INR: Therapeutic last visit; previously outside of goal range    Additional findings: None     PLAN     Recommended plan for no diet, medication or health factor changes affecting INR     Dosing Instructions: Continue your current warfarin dose with next INR in 2 weeks       Summary  As of 6/22/2021    Full warfarin instructions:  5 mg every day   Next INR check:  7/6/2021                 Lab visit scheduled    Education provided: Please call back if any changes to your diet, medications or how you've been taking warfarin, Vitamin K content of foods and Contact [phone]  with any changes, questions or concerns.  A new list of Vit K containing foods mailed to the patient.    Plan made per [facility] anticoagulation protocol       Deborah Aguilera RN  Anticoagulation Clinic  6/22/2021    _______________________________________________________________________     Anticoagulation Episode Summary     Current INR goal:  2.0-3.0   TTR:  68.3 % (1 y)   Target end date:  Indefinite   Send INR reminders to:  [facility]    Indications    Long-term (current) use of anticoagulants [Z79.01] [Z79.01]  H/O aortic valve replacement [Z95.2]           Comments:  4/1/20-ok to leave detailed message with dosing instructions if patient does not answer. INR Referral renewed, see 8/13/20 telephone encounter.          Anticoagulation Care Providers     Provider Role Specialty Phone number    Brook Echavarria MD Referring Internal  Medicine [phone]

## 2021-06-30 DIAGNOSIS — E05.90 SUBCLINICAL HYPERTHYROIDISM: ICD-10-CM

## 2021-06-30 LAB
T3FREE SERPL-MCNC: 2.9 PG/ML (ref 2.3–4.2)
WBC # BLD AUTO: 6 10E9/L (ref 4–11)

## 2021-06-30 PROCEDURE — 85048 AUTOMATED LEUKOCYTE COUNT: CPT | Performed by: INTERNAL MEDICINE

## 2021-06-30 PROCEDURE — 80076 HEPATIC FUNCTION PANEL: CPT | Performed by: INTERNAL MEDICINE

## 2021-06-30 PROCEDURE — 84443 ASSAY THYROID STIM HORMONE: CPT | Performed by: INTERNAL MEDICINE

## 2021-06-30 PROCEDURE — 36415 COLL VENOUS BLD VENIPUNCTURE: CPT | Performed by: INTERNAL MEDICINE

## 2021-06-30 PROCEDURE — 84439 ASSAY OF FREE THYROXINE: CPT | Performed by: INTERNAL MEDICINE

## 2021-06-30 PROCEDURE — 99000 SPECIMEN HANDLING OFFICE-LAB: CPT | Performed by: INTERNAL MEDICINE

## 2021-06-30 PROCEDURE — 84481 FREE ASSAY (FT-3): CPT | Performed by: INTERNAL MEDICINE

## 2021-06-30 PROCEDURE — 84445 ASSAY OF TSI GLOBULIN: CPT | Mod: 90 | Performed by: INTERNAL MEDICINE

## 2021-07-01 LAB
ALBUMIN SERPL-MCNC: 3.6 G/DL (ref 3.4–5)
ALP SERPL-CCNC: 60 U/L (ref 40–150)
ALT SERPL W P-5'-P-CCNC: 22 U/L (ref 0–50)
AST SERPL W P-5'-P-CCNC: 19 U/L (ref 0–45)
BILIRUB DIRECT SERPL-MCNC: 0.1 MG/DL (ref 0–0.2)
BILIRUB SERPL-MCNC: 0.6 MG/DL (ref 0.2–1.3)
PROT SERPL-MCNC: 6.8 G/DL (ref 6.8–8.8)
T4 FREE SERPL-MCNC: 1.12 NG/DL (ref 0.76–1.46)
TSH SERPL DL<=0.005 MIU/L-ACNC: 0.7 MU/L (ref 0.4–4)

## 2021-07-06 ENCOUNTER — DOCUMENTATION ONLY (OUTPATIENT)
Dept: INTERNAL MEDICINE | Facility: CLINIC | Age: 66
End: 2021-07-06

## 2021-07-06 ENCOUNTER — ANTICOAGULATION THERAPY VISIT (OUTPATIENT)
Dept: ANTICOAGULATION | Facility: CLINIC | Age: 66
End: 2021-07-06

## 2021-07-06 ENCOUNTER — VIRTUAL VISIT (OUTPATIENT)
Dept: ENDOCRINOLOGY | Facility: CLINIC | Age: 66
End: 2021-07-06
Payer: COMMERCIAL

## 2021-07-06 DIAGNOSIS — Z95.2 H/O AORTIC VALVE REPLACEMENT: ICD-10-CM

## 2021-07-06 DIAGNOSIS — E05.90 SUBCLINICAL HYPERTHYROIDISM: Primary | ICD-10-CM

## 2021-07-06 DIAGNOSIS — I10 HTN, GOAL BELOW 140/90: ICD-10-CM

## 2021-07-06 DIAGNOSIS — Z79.01 LONG TERM CURRENT USE OF ANTICOAGULANT THERAPY: ICD-10-CM

## 2021-07-06 DIAGNOSIS — Z95.2 H/O AORTIC VALVE REPLACEMENT: Primary | ICD-10-CM

## 2021-07-06 LAB
CAPILLARY BLOOD COLLECTION: NORMAL
INR PPP: 1.8 (ref 0.86–1.14)

## 2021-07-06 PROCEDURE — 85610 PROTHROMBIN TIME: CPT | Performed by: INTERNAL MEDICINE

## 2021-07-06 PROCEDURE — 36416 COLLJ CAPILLARY BLOOD SPEC: CPT | Performed by: INTERNAL MEDICINE

## 2021-07-06 PROCEDURE — 99213 OFFICE O/P EST LOW 20 MIN: CPT | Mod: 95 | Performed by: INTERNAL MEDICINE

## 2021-07-06 RX ORDER — METHIMAZOLE 5 MG/1
TABLET ORAL
Qty: 45 TABLET | Refills: 2 | Status: SHIPPED | OUTPATIENT
Start: 2021-07-06 | End: 2022-05-19

## 2021-07-06 NOTE — PROGRESS NOTES
"THIS IS A VIDEO VISIT:    Phone call visit/virtual visit encounter:    Name of patient: Annabella Bolanos    Date of encounter: 7/6/2021    Time of start of video visit: 9:31    Video started: 9:38    Video ended: 9:45    Provider location: working from [place]    Patient location: patients home.    Mode of transmission: video/ [employer]    Verbal consent: obtained before starting visit. Pt is agreeable.      The patient has been notified of following:      \"This VIDEO visit will be conducted via a call between you and your physician/provider. We have found that certain health care needs can be provided without the need for a physical exam.  This service lets us provide the care you need with a short phone conversation.  If a prescription is necessary we can send it directly to your pharmacy.  If lab work is needed we can place an order for that and you can then stop by our lab to have the test done at a later time.     With new updates with corona virus patient might be billed as clinic visit.     If during the course of the call the physician/provider feels a telephone visit is not appropriate, you will not be charged for this service.\"      Past medical history, social history, family history, allergy and medications were reviewed and updated as appropriate.  Reviewed pertinent labs, notes, imaging studies personally.    Name: Annabella Bolanos  Seen for follow-up of subclinical hyperthyroidism.    HPI:  Annabella Bolanos is a 65 year old female who presents for the evaluation of subclinical Hyperthyroidism.  H/o breast cancer and h/o aortic valve replacement and is on long term anticoagulation.  Breast cancer diagnosed in 2014 s/p lumpectomy and chemo and radiation. Took radiation 5 days a week for 1 month.  DEXA 2018 scan showing osteopenia and appears stable.  She is not on medication for osteoporosis/osteopenia at this time.  She is also on aromatase inhibitor as a part of treatment for breast cancer. "     Subclinical hyperthyroidism noted since 7/2016. Plan was for continue to monitor.  No h/o arrhythmia  No h/o osteoporosis-- has osteopenia. Not on treatment.  TSI neg  US thyroid ( h/o radiation)- no discrete nodules.  As there was further decline in TSH along with risk for low bone density with aromatase inhibitor (with with prior history of osteopenia) as well as complex cardiac history she was started on methimazole 5 mg in 7/2018.    Currently taking methimazole 2.5 mg/day  X 5/2021- dose was increased at that time)  F/u labs 6/2021 in range.  LFT and CBC stable.    Since last clinic visit she was started on Fosamax by Dr. Felder for osteoporosis/osteopenia.  She is taking Fosamax on a weekly basis since February 2019.    Feeling good.  Has good energy.  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  H/o arthritis.  Weight is stable.  Eating healthy.  Having  joint pain with arthritis.    History of radiation exposure: YES. As above.  History of thyroid dysfunction: not to her knowledge. No FH of thyroid cancer.  Palpitations: No  Changes to hair or skin: No  Diarrhea/Constipation:No  Changes in menses: s/p menopause  Changes in vision:No  Diplopia/Blurryness:No  Dysphagia or Shortness of breath:No  Tremors:No  Difficulty sleeping:Yes: most of the time  Changes in weight: No  Lithium/Amiodarone: No  URI: No  CT Scans:No  Mother had hyperthyroidism s/p DOE and was on levothyroxine.    PMH/PSH:  Past Medical History:   Diagnosis Date     Adenomatous colon polyp 8/2015     Aortic stenosis 6/23/11    bicuspid AV with severe stenosis - 6/2011 mechanical AVR with 23 mm St Yordan AV conduit, composite graft placement     Arthritis     knees and hands     Bicuspid aortic valve      Breast cancer (H) 7/2014    R breast     H/O aortic valve replacement     St Yordan     Heart murmur     Valve repalcement 2011.     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    AAA repair with av23 mm tube graft     PONV (postoperative nausea and vomiting)     n/v morphine vs anesthesia, vomiting x24 hrs     Subclinical hyperthyroidism 2017     Thrombosis of leg     after  of daughter     Uncomplicated asthma      Past Surgical History:   Procedure Laterality Date     BIOPSY NODE SENTINEL Right 9/10/2014    Procedure: BIOPSY NODE SENTINEL;  Surgeon: Ila Romano MD;  Location: [facility] OR     CARDIAC SURGERY  2011    aortic valve replacement     ENT SURGERY      tonsillectomy     HRW PORT A CATH       INSERT PORT VASCULAR ACCESS Left 10/22/2014    Procedure: INSERT PORT VASCULAR ACCESS;  Surgeon: Ila Romano MD;  Location: [facility] OR     LUMPECTOMY BREAST WITH SEED LOCALIZATION Right 9/10/2014    Procedure: LUMPECTOMY BREAST WITH SEED LOCALIZATION;  Surgeon: Ila Romano MD;  Location: [facility] OR     ORTHOPEDIC SURGERY      shoulder, thumb surgery     REMOVE PORT VASCULAR ACCESS Left 2015    Procedure: REMOVE PORT VASCULAR ACCESS;  Surgeon: Ila Romano MD;  Location: [facility] OR     REPAIR ANEURYSM ASCENDING AORTA  2011    Procedure:REPAIR ANEURYSM ASCENDING AORTA; Medial Sternotomy, Aortic Valve Replacement, ([employer]. Yordan [employer] HP Valved Graft, size 23 mm) on pump oxygenation, intraoperative transesophageal cardiogram; Surgeon:JOHN PAUL ULLOA; Location:[facility] OR     REPLACE VALVE AORTIC  11    bicuspid AV with severe stenosis - 2011 mechanical AVR with 23 mm St Yordan AV conduit, composite graft placement     Family Hx:  Family History   Problem Relation Age of Onset     Hypertension Mother      Thyroid Disease Mother         \"Toxic thyroid\"     Prostate Cancer Father 70     Cancer Father 80        Lung cancer, Not caused from smoking     Cerebrovascular Disease Father 80     Hypertension Father      Cardiovascular Brother         half brother      Cardiovascular Son         Puentes-Parkinsons " White Syndrome     Cardiovascular Daughter         Heart murmur     Breast Cancer Paternal Aunt 80     Cardiovascular Paternal Aunt      Arthritis Brother 40        RA - half brother      Cancer Maternal Grandfather      Colon Cancer No family hx of      Thyroid disease: as above          Social Hx:  Social History     Socioeconomic History     Marital status:      Spouse name: Not on file     Number of children: Not on file     Years of education: Not on file     Highest education level: Not on file   Occupational History     Not on file   Social Needs     Financial resource strain: Not on file     Food insecurity     Worry: Not on file     Inability: Not on file     Transportation needs     Medical: Not on file     Non-medical: Not on file   Tobacco Use     Smoking status: Never Smoker     Smokeless tobacco: Never Used   Substance and Sexual Activity     Alcohol use: Yes     Comment: 2 drinks per week -      Drug use: No     Sexual activity: Not Currently   Lifestyle     Physical activity     Days per week: Not on file     Minutes per session: Not on file     Stress: Not on file   Relationships     Social connections     Talks on phone: Not on file     Gets together: Not on file     Attends [employer] service: Not on file     Active member of club or organization: Not on file     Attends meetings of clubs or organizations: Not on file     Relationship status: Not on file     Intimate partner violence     Fear of current or ex partner: Not on file     Emotionally abused: Not on file     Physically abused: Not on file     Forced sexual activity: Not on file   Other Topics Concern     Parent/sibling w/ CABG, MI or angioplasty before 65F 55M? Not Asked      Service Not Asked     Blood Transfusions Not Asked     Caffeine Concern Yes     Comment: 1-2 cups caffeine per day     Occupational Exposure Not Asked     Hobby Hazards Not Asked     Sleep Concern No     Stress Concern No     Weight Concern No      Special Diet Yes     Comment: low fat daily , low red meats     Back Care No     Exercise Yes     Comment: walks daily/ 3x a week exercise class     Bike Helmet Not Asked     Seat Belt Not Asked     Self-Exams Not Asked   Social History Narrative     Not on file          MEDICATIONS:  has a current medication list which includes the following prescription(s): alendronate, anastrozole, aspirin, ayr saline nasal no-drip, carvedilol, lisinopril, methimazole, valacyclovir, warfarin anticoagulant, and zolpidem.    ROS   ROS: 10 point ROS neg other than the symptoms noted above in the HPI.    Physical Exam   VS: LMP  (LMP Unknown)   Breastfeeding No   GENERAL: healthy, alert and no distress  EYES: Eyes grossly normal to inspection, conjunctivae and sclerae normal  ENT: no nose swelling, nasal discharge.  Thyroid: no apparent thyroid nodules  RESP: no audible wheeze, cough, or visible cyanosis.  No visible retractions or increased work of breathing.  Able to speak fully in complete sentences.  ABDO: not evaluated.  EXTREMITIES: no hand tremors.  NEURO: Cranial nerves grossly intact, mentation intact and speech normal  SKIN: No apparent skin lesions, rash or edema seen   PSYCH: mentation appears normal, affect normal/bright, judgement and insight intact, normal speech and appearance well-groomed      LABS:  TFTs:  ENDO THYROID LABS-[facility] Latest Ref Rng & Units 6/30/2021   TSH 0.40 - 4.00 mU/L 0.70   T4 FREE 0.76 - 1.46 ng/dL 1.12   FREE T3 2.3 - 4.2 pg/mL 2.9     ENDO THYROID LABS-[facility] Latest Ref Rng & Units 5/12/2021   TSH 0.40 - 4.00 mU/L 0.28 (L)   T4 FREE 0.76 - 1.46 ng/dL 1.23   FREE T3 2.3 - 4.2 pg/mL 3.2     CBC:  WBC   Date Value Ref Range Status   06/30/2021 6.0 4.0 - 11.0 10e9/L Final       LFTs:  Liver Function Studies -   Recent Labs   Lab Test 08/18/20  0946   PROTTOTAL 7.3   ALBUMIN 3.6   BILITOTAL 0.6   ALKPHOS 62   AST 20   ALT 26       ULTRASOUND THYROID December 15, 2017 9:24 AM   HISTORY: Subclinical  hyperthyroidism.   FINDINGS: Thyroid ultrasound demonstrates an enlarged thyroid gland. The right lobe measures 5.4 x 2.0 x 1.8 cm. The left lobe measures 3.8 x 1.6 x 1.3 cm. The isthmus is normal in thickness. Thyroid parenchyma is heterogeneous in echotexture. Increased color Doppler flow is noted throughout the thyroid gland. Thyroid nodules as follows: Right Lobe: None. Isthmus: None. Left Lobe: None.   IMPRESSION: Enlarged heterogeneous thyroid gland without evidence of a discrete thyroid nodule. Increased color Doppler flow suggests underlying thyroiditis. Correlate with thyroid function test and nuclear medicine thyroid scan as needed.    All pertinent notes, labs, and images personally reviewed by me.     A/P  Ms.Carol WINDY Bolanos is a 65 year old here for the evaluation of hyperthyroidism.    Subclinical hyperthyroidism (TSI neg):   TSH remained suppressed since 2016 and along with normal free T4  No h/o arrhythmia  No h/o osteoporosis-- has osteopenia. Not on treatment.  She is on aromatase inhibitor for breast cancer.  TSI neg  US thyroid ( h/o radiation)- no discrete nodules.  Clinically looks euthyroid.  No major complaints.  As there is further decline in TSH along with risk for low bone density with aromatase inhibitor (with with prior history of osteopenia) was started on methimazole 5 mg 7/2018  Currently  taking methimazole to 2.5/day (5/20/2021)  F/u labs in range.  Plan:  Discussed diagnosis, pathophysiology, management and treatment options of condition with pt.  Based on 6/2021 labs recommend to continue methimazole 2.5 mg/day (5/20/2021)  Labs in 6 months or sooner.  Please make a lab appointment for blood work and follow up clinic appointment in 1 week after that to discuss results.      Discussed s/s of hypothyroidism and hyperthyroidism to watch for.  The patient indicates understanding of these issues and agrees with the plan.    Off note she is on anticoagulant and may need dose adjustment to  get INR at target levels ( h/o aortic valve replacement)  Baseline LFT and WBC normal.  Discussed diagnosis, pathophysiology, management and treatment options of condition with pt.    Discussed indications, risks and benefits of all medications prescribed, and answered questions to patient's satisfaction.  The patient indicates understanding of these issues and agrees with the plan.    Discussed possible side effects of methimazole including liver dysfunction and agranulocytosis. Discussed to watch for s/s like jaundice, abdominal pain and skin rash. In case of prolonged unresolving fever, sore throat and infections, advise to seek medical care.    Call if:     Signs or symptoms of infection. These include a fever of 100.5 degrees or higher, chills, severe sore throat, ear or sinus pain, cough, increased sputum or change in color, painful urination, mouth sores.   Severe nausea or vomiting.   Joint pain or swelling.   Not able to eat.   Unusual bruising or bleeding.   Dark urine or yellow skin or eyes.      Discussed s/s of hypothyroidism and hyperthyroidism to watch for.  The patient indicates understanding of these issues and agrees with the plan.    Follow-up:  6 months.    Merle Baxter MD  Endocrinology  [facility]  CC: Brook Echavarria        Addendum to above note and clinic visit:    Labs reviewed.    See result note/telephone encounter.

## 2021-07-06 NOTE — PATIENT INSTRUCTIONS
[facility]  Dr Baxter, Endocrinology Department    [facility]   [address] [facility]  [place] [address]  Appointment [phone]  Fax: [phone]  [place]: Monday - Thursday      Please check the cost coverage and copay with insurance before recommended tests, services and medications (especially if new medications are prescribed).     If ordered, please get blood work done 1 week prior to your next appointment so they will be available to Dr. Baxter at your visit.    Continue Methimazole 2.5 mg/day  Labs in 6 months or sooner.  Please make a lab appointment for blood work and follow up clinic appointment in 1 week after that to discuss results.    Discussed possible side effects of methimazole including liver dysfunction and agranulocytosis. Discussed to watch for s/s like jaundice, abdominal pain and skin rash. In case of prolonged unresolving fever, sore throat and infections, advise to seek medical care.    Call if:     Signs or symptoms of infection. These include a fever of 100.5 degrees or higher, chills, severe sore throat, ear or sinus pain, cough, increased sputum or change in color, painful urination, mouth sores.   Severe nausea or vomiting.   Joint pain or swelling.   Not able to eat.   Unusual bruising or bleeding.   Dark urine or yellow skin or eyes.

## 2021-07-06 NOTE — LETTER
"    7/6/2021         RE: Annabella Bolanos  [id number]   [place] [address]        Dear Colleague,    Thank you for referring your patient, Annabella Bolanos, to the [facility]. Please see a copy of my visit note below.    THIS IS A VIDEO VISIT:    Phone call visit/virtual visit encounter:    Name of patient: Annabella Bolanos    Date of encounter: 7/6/2021    Time of start of video visit: 9:31    Video started: 9:38    Video ended: 9:45    Provider location: working from home/ [facility]    Patient location: patients home.    Mode of transmission: video/ [employer]    Verbal consent: obtained before starting visit. Pt is agreeable.      The patient has been notified of following:      \"This VIDEO visit will be conducted via a call between you and your physician/provider. We have found that certain health care needs can be provided without the need for a physical exam.  This service lets us provide the care you need with a short phone conversation.  If a prescription is necessary we can send it directly to your pharmacy.  If lab work is needed we can place an order for that and you can then stop by our lab to have the test done at a later time.     With new updates with corona virus patient might be billed as clinic visit.     If during the course of the call the physician/provider feels a telephone visit is not appropriate, you will not be charged for this service.\"      Past medical history, social history, family history, allergy and medications were reviewed and updated as appropriate.  Reviewed pertinent labs, notes, imaging studies personally.    Name: Annabella Bolanos  Seen for follow-up of subclinical hyperthyroidism.    HPI:  Annabella Bolanos is a 65 year old female who presents for the evaluation of subclinical Hyperthyroidism.  H/o breast cancer and h/o aortic valve replacement and is on long term anticoagulation.  Breast cancer diagnosed in 2014 s/p lumpectomy and chemo and radiation. " Took radiation 5 days a week for 1 month.  DEXA 2018 scan showing osteopenia and appears stable.  She is not on medication for osteoporosis/osteopenia at this time.  She is also on aromatase inhibitor as a part of treatment for breast cancer.     Subclinical hyperthyroidism noted since 7/2016. Plan was for continue to monitor.  No h/o arrhythmia  No h/o osteoporosis-- has osteopenia. Not on treatment.  TSI neg  US thyroid ( h/o radiation)- no discrete nodules.  As there was further decline in TSH along with risk for low bone density with aromatase inhibitor (with with prior history of osteopenia) as well as complex cardiac history she was started on methimazole 5 mg in 7/2018.    Currently taking methimazole 2.5 mg/day  X 5/2021- dose was increased at that time)  F/u labs 6/2021 in range.  LFT and CBC stable.    Since last clinic visit she was started on Fosamax by Dr. Felder for osteoporosis/osteopenia.  She is taking Fosamax on a weekly basis since February 2019.    Feeling good.  Has good energy.  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  H/o arthritis.  Weight is stable.  Eating healthy.  Having  joint pain with arthritis.    History of radiation exposure: YES. As above.  History of thyroid dysfunction: not to her knowledge. No FH of thyroid cancer.  Palpitations: No  Changes to hair or skin: No  Diarrhea/Constipation:No  Changes in menses: s/p menopause  Changes in vision:No  Diplopia/Blurryness:No  Dysphagia or Shortness of breath:No  Tremors:No  Difficulty sleeping:Yes: most of the time  Changes in weight: No  Lithium/Amiodarone: No  URI: No  CT Scans:No  Mother had hyperthyroidism s/p DOE and was on levothyroxine.    PMH/PSH:  Past Medical History:   Diagnosis Date     Adenomatous colon polyp 8/2015     Aortic stenosis 6/23/11    bicuspid AV with severe stenosis - 6/2011 mechanical AVR with 23 mm St Yordan AV conduit, composite graft placement     Arthritis     knees and hands     Bicuspid aortic valve      Breast cancer (H) 7/2014    R  "breast     H/O aortic valve replacement     St Yordan     Heart murmur     Valve repalcement .     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    AAA repair with av23 mm tube graft     PONV (postoperative nausea and vomiting)     n/v morphine vs anesthesia, vomiting x24 hrs     Subclinical hyperthyroidism 2017     Thrombosis of leg     after  of daughter     Uncomplicated asthma      Past Surgical History:   Procedure Laterality Date     BIOPSY NODE SENTINEL Right 9/10/2014    Procedure: BIOPSY NODE SENTINEL;  Surgeon: Ila Romano MD;  Location: [facility] OR     CARDIAC SURGERY  2011    aortic valve replacement     ENT SURGERY      tonsillectomy     HRW PORT A CATH       INSERT PORT VASCULAR ACCESS Left 10/22/2014    Procedure: INSERT PORT VASCULAR ACCESS;  Surgeon: Ila Romano MD;  Location: [facility] OR     LUMPECTOMY BREAST WITH SEED LOCALIZATION Right 9/10/2014    Procedure: LUMPECTOMY BREAST WITH SEED LOCALIZATION;  Surgeon: Ila Romano MD;  Location: [facility] OR     ORTHOPEDIC SURGERY      shoulder, thumb surgery     REMOVE PORT VASCULAR ACCESS Left 2015    Procedure: REMOVE PORT VASCULAR ACCESS;  Surgeon: Ila Romano MD;  Location: [facility] OR     REPAIR ANEURYSM ASCENDING AORTA  2011    Procedure:REPAIR ANEURYSM ASCENDING AORTA; Medial Sternotomy, Aortic Valve Replacement, (St. Yordan [employer] HP Valved Graft, size 23 mm) on pump oxygenation, intraoperative transesophageal cardiogram; Surgeon:JOHN PAUL ULLOA; Location:[facility] OR     REPLACE VALVE AORTIC  11    bicuspid AV with severe stenosis - 2011 mechanical AVR with 23 mm St Yordan AV conduit, composite graft placement     Family Hx:  Family History   Problem Relation Age of Onset     Hypertension Mother      Thyroid Disease Mother         \"Toxic thyroid\"     Prostate " Cancer Father 70     Cancer Father 80        Lung cancer, Not caused from smoking     Cerebrovascular Disease Father 80     Hypertension Father      Cardiovascular Brother         half brother      Cardiovascular Son         Puentes-Parkinsons White Syndrome     Cardiovascular Daughter         Heart murmur     Breast Cancer Paternal Aunt 80     Cardiovascular Paternal Aunt      Arthritis Brother 40        RA - half brother      Cancer Maternal Grandfather      Colon Cancer No family hx of      Thyroid disease: as above          Social Hx:  Social History     Socioeconomic History     Marital status:      Spouse name: Not on file     Number of children: Not on file     Years of education: Not on file     Highest education level: Not on file   Occupational History     Not on file   Social Needs     Financial resource strain: Not on file     Food insecurity     Worry: Not on file     Inability: Not on file     Transportation needs     Medical: Not on file     Non-medical: Not on file   Tobacco Use     Smoking status: Never Smoker     Smokeless tobacco: Never Used   Substance and Sexual Activity     Alcohol use: Yes     Comment: 2 drinks per week -      Drug use: No     Sexual activity: Not Currently   Lifestyle     Physical activity     Days per week: Not on file     Minutes per session: Not on file     Stress: Not on file   Relationships     Social connections     Talks on phone: Not on file     Gets together: Not on file     Attends Cheondo[employer] service: Not on file     Active member of club or organization: Not on file     Attends meetings of clubs or organizations: Not on file     Relationship status: Not on file     Intimate partner violence     Fear of current or ex partner: Not on file     Emotionally abused: Not on file     Physically abused: Not on file     Forced sexual activity: Not on file   Other Topics Concern     Parent/sibling w/ CABG, MI or angioplasty before 65F 55M? Not Asked      Service  Not Asked     Blood Transfusions Not Asked     Caffeine Concern Yes     Comment: 1-2 cups caffeine per day     Occupational Exposure Not Asked     Hobby Hazards Not Asked     Sleep Concern No     Stress Concern No     Weight Concern No     Special Diet Yes     Comment: low fat daily , low red meats     Back Care No     Exercise Yes     Comment: walks daily/ 3x a week exercise class     Bike Helmet Not Asked     Seat Belt Not Asked     Self-Exams Not Asked   Social History Narrative     Not on file          MEDICATIONS:  has a current medication list which includes the following prescription(s): alendronate, anastrozole, aspirin, ayr saline nasal no-drip, carvedilol, lisinopril, methimazole, valacyclovir, warfarin anticoagulant, and zolpidem.    ROS   ROS: 10 point ROS neg other than the symptoms noted above in the HPI.    Physical Exam   VS: LMP  (LMP Unknown)   Breastfeeding No   GENERAL: healthy, alert and no distress  EYES: Eyes grossly normal to inspection, conjunctivae and sclerae normal  ENT: no nose swelling, nasal discharge.  Thyroid: no apparent thyroid nodules  RESP: no audible wheeze, cough, or visible cyanosis.  No visible retractions or increased work of breathing.  Able to speak fully in complete sentences.  ABDO: not evaluated.  EXTREMITIES: no hand tremors.  NEURO: Cranial nerves grossly intact, mentation intact and speech normal  SKIN: No apparent skin lesions, rash or edema seen   PSYCH: mentation appears normal, affect normal/bright, judgement and insight intact, normal speech and appearance well-groomed      LABS:  TFTs:  ENDO THYROID LABS-[facility] Latest Ref Rng & Units 6/30/2021   TSH 0.40 - 4.00 mU/L 0.70   T4 FREE 0.76 - 1.46 ng/dL 1.12   FREE T3 2.3 - 4.2 pg/mL 2.9     ENDO THYROID LABS-[facility] Latest Ref Rng & Units 5/12/2021   TSH 0.40 - 4.00 mU/L 0.28 (L)   T4 FREE 0.76 - 1.46 ng/dL 1.23   FREE T3 2.3 - 4.2 pg/mL 3.2     CBC:  WBC   Date Value Ref Range Status   06/30/2021 6.0 4.0 - 11.0 10e9/L  Final       LFTs:  Liver Function Studies -   Recent Labs   Lab Test 08/18/20  0946   PROTTOTAL 7.3   ALBUMIN 3.6   BILITOTAL 0.6   ALKPHOS 62   AST 20   ALT 26       ULTRASOUND THYROID December 15, 2017 9:24 AM   HISTORY: Subclinical hyperthyroidism.   FINDINGS: Thyroid ultrasound demonstrates an enlarged thyroid gland. The right lobe measures 5.4 x 2.0 x 1.8 cm. The left lobe measures 3.8 x 1.6 x 1.3 cm. The isthmus is normal in thickness. Thyroid parenchyma is heterogeneous in echotexture. Increased color Doppler flow is noted throughout the thyroid gland. Thyroid nodules as follows: Right Lobe: None. Isthmus: None. Left Lobe: None.   IMPRESSION: Enlarged heterogeneous thyroid gland without evidence of a discrete thyroid nodule. Increased color Doppler flow suggests underlying thyroiditis. Correlate with thyroid function test and nuclear medicine thyroid scan as needed.    All pertinent notes, labs, and images personally reviewed by me.     A/P  Ms.Carol WINDY Bolanos is a 65 year old here for the evaluation of hyperthyroidism.    Subclinical hyperthyroidism (TSI neg):   TSH remained suppressed since 2016 and along with normal free T4  No h/o arrhythmia  No h/o osteoporosis-- has osteopenia. Not on treatment.  She is on aromatase inhibitor for breast cancer.  TSI neg  US thyroid ( h/o radiation)- no discrete nodules.  Clinically looks euthyroid.  No major complaints.  As there is further decline in TSH along with risk for low bone density with aromatase inhibitor (with with prior history of osteopenia) was started on methimazole 5 mg 7/2018  Currently  taking methimazole to 2.5/day (5/20/2021)  F/u labs in range.  Plan:  Discussed diagnosis, pathophysiology, management and treatment options of condition with pt.  Based on 6/2021 labs recommend to continue methimazole 2.5 mg/day (5/20/2021)  Labs in 6 months or sooner.  Please make a lab appointment for blood work and follow up clinic appointment in 1 week after that to  discuss results.      Discussed s/s of hypothyroidism and hyperthyroidism to watch for.  The patient indicates understanding of these issues and agrees with the plan.    Off note she is on anticoagulant and may need dose adjustment to get INR at target levels ( h/o aortic valve replacement)  Baseline LFT and WBC normal.  Discussed diagnosis, pathophysiology, management and treatment options of condition with pt.    Discussed indications, risks and benefits of all medications prescribed, and answered questions to patient's satisfaction.  The patient indicates understanding of these issues and agrees with the plan.    Discussed possible side effects of methimazole including liver dysfunction and agranulocytosis. Discussed to watch for s/s like jaundice, abdominal pain and skin rash. In case of prolonged unresolving fever, sore throat and infections, advise to seek medical care.    Call if:     Signs or symptoms of infection. These include a fever of 100.5 degrees or higher, chills, severe sore throat, ear or sinus pain, cough, increased sputum or change in color, painful urination, mouth sores.   Severe nausea or vomiting.   Joint pain or swelling.   Not able to eat.   Unusual bruising or bleeding.   Dark urine or yellow skin or eyes.      Discussed s/s of hypothyroidism and hyperthyroidism to watch for.  The patient indicates understanding of these issues and agrees with the plan.    Follow-up:  6 months.    Merle Baxter MD  Endocrinology  [facility]  CC: Brook Echavarria        Addendum to above note and clinic visit:    Labs reviewed.    See result note/telephone encounter.                  Again, thank you for allowing me to participate in the care of your patient.        Sincerely,        Merle Baxter MD

## 2021-07-06 NOTE — PROGRESS NOTES
ANTICOAGULATION MANAGEMENT      Annabella Bolanos due for annual renewal of referral to anticoagulation monitoring. Order pended for your review and signature.      ANTICOAGULATION SUMMARY      Warfarin indication(s)     Heart Valve Replacement    Heart valve present?  Bioprosthetic AVR       Current goal range   INR: 2.0-3.0     Goal appropriate for indication? Yes, INR 2-3 appropriate for hx of DVT, PE, hypercoagulable state, Afib, LVAD, or bileaflet AVR without risk factors     Current duration of therapy Indefinite/long term therapy   Time in Therapeutic Range (TTR)  (Goal > 60%) 65.7%       Office visit with referring provider's group within last year yes on 1/11/21       Erica Stinson RN

## 2021-07-06 NOTE — PROGRESS NOTES
ANTICOAGULATION MANAGEMENT     Annabella Bolanos 65 year old female is on warfarin with subtherapeutic INR result. (Goal INR 2.0-3.0)    Recent labs: (last 7 days)     07/06/21  0817   INR 1.80*       ASSESSMENT     Source(s): Patient/Caregiver Call       Warfarin doses taken: Warfarin taken as instructed    Diet: No new diet changes identified    New illness, injury, or hospitalization: No    Medication/supplement changes: None noted    Signs or symptoms of bleeding or clotting: No    Previous INR: Therapeutic last 2(+) visits    Additional findings: patient had an appointment with endocrinology today.  No changes from that visit.  Patient also reports that her arthritis has gotten a little better at this time     PLAN     Recommended plan for no diet, medication or health factor changes affecting INR     Dosing Instructions: Booster dose then continue your current warfarin dose with next INR in 2 weeks       Summary  As of 7/6/2021    Full warfarin instructions:  7/6: 7.5 mg; Otherwise 5 mg every day   Next INR check:  7/23/2021             Telephone call with Annabella who verbalizes understanding and agrees to plan    Lab visit scheduled    Education provided: Importance of consistent vitamin K intake and Potential interaction between warfarin and inflamation (arthritis)    Plan made per [facility] anticoagulation protocol    Erica Stinson RN  Anticoagulation Clinic  7/6/2021    _______________________________________________________________________     Anticoagulation Episode Summary     Current INR goal:  2.0-3.0   TTR:  65.7 % (1 y)   Target end date:  Indefinite   Send INR reminders to:  [facility]    Indications    Long-term (current) use of anticoagulants [Z79.01] [Z79.01]  H/O aortic valve replacement [Z95.2]           Comments:  4/1/20-ok to leave detailed message with dosing instructions if patient does not answer. INR Referral renewed, see 8/13/20 telephone encounter.          Anticoagulation Care  Providers     Provider Role Specialty Phone number    Brook Echavarria MD Referring Internal Medicine [phone]

## 2021-07-07 RX ORDER — LISINOPRIL 10 MG/1
10 TABLET ORAL 2 TIMES DAILY
Qty: 180 TABLET | Refills: 0 | Status: SHIPPED | OUTPATIENT
Start: 2021-07-07 | End: 2021-08-19

## 2021-07-07 NOTE — TELEPHONE ENCOUNTER
Patient notified and expressed understanding. She will call back to schedule an appointment after she speaks to her insurance company.

## 2021-07-09 LAB — TSI SER-ACNC: <1 TSI INDEX

## 2021-07-09 NOTE — TELEPHONE ENCOUNTER
NOTES Status Details   OFFICE NOTE from referring provider Internal 06.14.2021 Brook Echavarria MD   OFFICE NOTE from other specialist N/A    DISCHARGE SUMMARY from hospital N/A    DISCHARGE REPORT from the ER N/A    MEDICATION LIST Care Everywhere /Internal    LABS (Any and all labs)      Internal    Biopsy/pathology (Anything related to diagnoses I.e. fluid aspirations, lip biopsy, muscle biopsy)      N/A     N/A    Imaging (All imaging related to diagnoses)     Echo N/A    HRCT N/A    CXR N/A    EMG N/A                   Scleroderma/Dermatomyositis diagnoses     Previous Cardiology notes  N/A    Previous Pulmonary notes N/A    Previous Dermatology notes N/A    Previous GI notes N/A    Lupus diagnoses     Previous Nephrology notes N/A    Previous Dermatology notes N/A    Previous Cardiology notes N/A

## 2021-07-12 ENCOUNTER — OFFICE VISIT (OUTPATIENT)
Dept: RHEUMATOLOGY | Facility: CLINIC | Age: 66
End: 2021-07-12
Attending: INTERNAL MEDICINE
Payer: COMMERCIAL

## 2021-07-12 ENCOUNTER — ANCILLARY PROCEDURE (OUTPATIENT)
Dept: GENERAL RADIOLOGY | Facility: CLINIC | Age: 66
End: 2021-07-12
Attending: INTERNAL MEDICINE
Payer: COMMERCIAL

## 2021-07-12 ENCOUNTER — PRE VISIT (OUTPATIENT)
Dept: RHEUMATOLOGY | Facility: CLINIC | Age: 66
End: 2021-07-12

## 2021-07-12 VITALS
BODY MASS INDEX: 21.65 KG/M2 | OXYGEN SATURATION: 98 % | HEART RATE: 61 BPM | DIASTOLIC BLOOD PRESSURE: 81 MMHG | SYSTOLIC BLOOD PRESSURE: 171 MMHG | WEIGHT: 126.1 LBS

## 2021-07-12 DIAGNOSIS — M19.042 ARTHRITIS OF BOTH HANDS: ICD-10-CM

## 2021-07-12 DIAGNOSIS — M19.041 ARTHRITIS OF BOTH HANDS: ICD-10-CM

## 2021-07-12 PROCEDURE — 73630 X-RAY EXAM OF FOOT: CPT | Mod: LT | Performed by: RADIOLOGY

## 2021-07-12 PROCEDURE — 99205 OFFICE O/P NEW HI 60 MIN: CPT | Performed by: INTERNAL MEDICINE

## 2021-07-12 PROCEDURE — 73120 X-RAY EXAM OF HAND: CPT | Mod: LT | Performed by: RADIOLOGY

## 2021-07-12 NOTE — PROGRESS NOTES
Referral to rheumatology for hand pain    CC: Bilateral hand pain and knee pain, question could inflammation affect the INR?    HPI: Annabella Burciaga is 65 years old and she has arthritis in her hands and knees and see is limited on medications for her arthritis because she is on warfarin.  So she cannot take NSAIDs and Tylenol does not work.  Lately she has more difficulty controlling her INR and there is no clear explanation for this.  She walks her dog every day but she has more difficulty doing her daily work she is concerned that that sometimes [place]    PMH: Subclinical hyperthyroidism, chronic anticoagulation with warfarin for an aortic valve placement, osteoporosis, status post breast cancer, hypertension.     I have reviewed her medications as they are listed in epic    Social history she does not smoke, occasional alcohol intake probably 2-3 drinks per week, she is a retired teacher of .    Family history there is significant osteoarthritis in the family    Physical examination  I reviewed her vital signs and she has an elevated blood pressure of 171/81.  Examination of her joints shows that she has clear osteoarthritic changes in the hands at the PIPs and DIPs consistent with Heberden's and Alexandra's nodes bilaterally.  No tenderness in the MCPs.  Significant bilateral OA of the CMC joints.  Wrist exam, elbow and shoulder exam is unremarkable with full range of motion she has normal strength.  Knee exam shows bilateral osteoarthritic changes with effusion, small, in the left knee, without any active synovitis.  Small Baker's cyst on the right.  Ankles and feet are essentially unremarkable.  Cervical spine is reduced limited range of motion.  Normal strength, but she has some guarded gait,    I reviewed her blood tests.   I also reviewed her x-ray imaging of the hands and feet and they show significant osteoarthritis.    Impression plan  1.   Significant osteoarthritis in multiple joints including the hands and the knees.  2.  There is no evidence for rheumatoid arthritis on exam or by x-ray.  3.  Osteopenia by DEXA scan in 2020.  4.  I do not have a good explanation for the significant swings of her INR.  I do not think there is an rheumatologic cause for this.  5.  Labs ordered today are still to be done.  6. I did not schedule follow-up visit but she is welcome to return if new symptoms were to develop.    I spent 60 minutes face to face with the patient, with 35 minutes on counseling and coordination of care.

## 2021-07-12 NOTE — LETTER
7/12/2021       RE: Annabella Bolanos  [address] [place] [address]     Dear Colleague,    Thank you for referring your patient, Annabella Bolanos, to the [facility]. Please see a copy of my visit note below.    Referral to rheumatology for hand pain    CC: Bilateral hand pain and knee pain, question could inflammation affect the INR?    HPI: Annabella Burciaga is 65 years old and she has arthritis in her hands and knees and see is limited on medications for her arthritis because she is on warfarin.  So she cannot take NSAIDs and Tylenol does not work.  Lately she has more difficulty controlling her INR and there is no clear explanation for this.  She walks her dog every day but she has more difficulty doing her daily work she is concerned that that sometimes [place]    PMH: Subclinical hyperthyroidism, chronic anticoagulation with warfarin for an aortic valve placement, osteoporosis, status post breast cancer, hypertension.     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    Family history there is significant osteoarthritis in the family    Physical examination  I reviewed her vital signs and she has an elevated blood pressure of 171/81.  Examination of her joints shows that she has clear osteoarthritic changes in the hands at the PIPs and DIPs consistent with Heberden's and Alexandra's nodes bilaterally.  No tenderness in the MCPs.  Significant bilateral OA of the CMC joints.  Wrist exam, elbow and shoulder exam is unremarkable with full range of motion she has normal strength.  Knee exam shows bilateral osteoarthritic changes with effusion, small, in the left knee, without any active synovitis.  Small Baker's cyst on the  right.  Ankles and feet are essentially unremarkable.  Cervical spine is reduced limited range of motion.  Normal strength, but she has some guarded gait,    I reviewed her blood tests.   I also reviewed her x-ray imaging of the hands and feet and they show significant osteoarthritis.    Impression plan  1.  Significant osteoarthritis in multiple joints including the hands and the knees.  2.  There is no evidence for rheumatoid arthritis on exam or by x-ray.  3.  Osteopenia by DEXA scan in 2020.  4.  I do not have a good explanation for the significant swings of her INR.  I do not think there is an rheumatologic cause for this.  5.  Labs ordered today are still to be done.  6. I did not schedule follow-up visit but she is welcome to return if new symptoms were to develop.    I spent 60 minutes face to face with the patient, with 35 minutes on counseling and coordination of care.    Again, thank you for allowing me to participate in the care of your patient.      Sincerely,    Dae Strauss MD

## 2021-07-23 ENCOUNTER — ANTICOAGULATION THERAPY VISIT (OUTPATIENT)
Dept: ANTICOAGULATION | Facility: CLINIC | Age: 66
End: 2021-07-23

## 2021-07-23 ENCOUNTER — LAB (OUTPATIENT)
Dept: LAB | Facility: CLINIC | Age: 66
End: 2021-07-23
Payer: COMMERCIAL

## 2021-07-23 DIAGNOSIS — M19.042 ARTHRITIS OF BOTH HANDS: ICD-10-CM

## 2021-07-23 DIAGNOSIS — Z95.2 H/O AORTIC VALVE REPLACEMENT: ICD-10-CM

## 2021-07-23 DIAGNOSIS — Z79.01 LONG TERM CURRENT USE OF ANTICOAGULANT THERAPY: Primary | ICD-10-CM

## 2021-07-23 DIAGNOSIS — Z79.01 LONG TERM CURRENT USE OF ANTICOAGULANT THERAPY: ICD-10-CM

## 2021-07-23 DIAGNOSIS — M19.041 ARTHRITIS OF BOTH HANDS: ICD-10-CM

## 2021-07-23 LAB
CRP SERPL-MCNC: <2.9 MG/L (ref 0–8)
ERYTHROCYTE [SEDIMENTATION RATE] IN BLOOD BY WESTERGREN METHOD: 8 MM/HR (ref 0–30)
INR BLD: 1.9 (ref 0.9–1.1)

## 2021-07-23 PROCEDURE — 86140 C-REACTIVE PROTEIN: CPT

## 2021-07-23 PROCEDURE — 85610 PROTHROMBIN TIME: CPT

## 2021-07-23 PROCEDURE — 85652 RBC SED RATE AUTOMATED: CPT

## 2021-07-23 PROCEDURE — 86431 RHEUMATOID FACTOR QUANT: CPT

## 2021-07-23 PROCEDURE — 36415 COLL VENOUS BLD VENIPUNCTURE: CPT

## 2021-07-23 PROCEDURE — 86200 CCP ANTIBODY: CPT

## 2021-07-23 NOTE — PROGRESS NOTES
ANTICOAGULATION MANAGEMENT     Annabella Bolanos 65 year old female is on warfarin with subtherapeutic INR result. (Goal INR 2.0-3.0)    Recent labs: (last 7 days)     07/23/21  1021   INR 1.9*       ASSESSMENT     Source(s): Patient/Caregiver Call       Warfarin doses taken: Warfarin taken as instructed    Diet: Increased greens/vitamin K in diet; plans to resume previous intake    New illness, injury, or hospitalization: No    Medication/supplement changes: None noted    Signs or symptoms of bleeding or clotting: No    Previous INR: Subtherapeutic    Additional findings: Patient reports she continues with arthritis pain but it has improved some.      PLAN     Recommended plan for no diet, medication or health factor changes affecting INR     Dosing Instructions:  Increase your warfarin dose (7.1% change) with next INR in 2 weeks  Last 2 INR's were subtherapeutic. Since patient has reported a slight improvement in arthritic pain, will increase weekly maintenance dose 2.5 mg.     Summary  As of 7/23/2021    Full warfarin instructions:  7.5 mg every Fri; 5 mg all other days   Next INR check:  8/6/2021             Telephone call with Annabella who verbalizes understanding and agrees to plan    Lab visit scheduled    Education provided: Please call back if any changes to your diet, medications or how you've been taking warfarin and Contact [phone]  with any changes, questions or concerns.     Plan made per [facility] anticoagulation protocol    Deborah Aguilera RN  Anticoagulation Clinic  7/23/2021    _______________________________________________________________________     Anticoagulation Episode Summary     Current INR goal:  2.0-3.0   TTR:  61.1 % (1 y)   Target end date:  Indefinite   Send INR reminders to:  [facility]    Indications    Long-term (current) use of anticoagulants [Z79.01] [Z79.01]  H/O aortic valve replacement [Z95.2]           Comments:  4/1/20-ok to leave detailed message with dosing  instructions if patient does not answer. INR Referral renewed, see 8/13/20 telephone encounter.          Anticoagulation Care Providers     Provider Role Specialty Phone number    Brook Echavarria MD Referring Internal Medicine [phone]

## 2021-07-26 LAB — RHEUMATOID FACT SER NEPH-ACNC: <7 IU/ML

## 2021-07-27 LAB — CCP AB SER IA-ACNC: 1.2 U/ML

## 2021-08-06 ENCOUNTER — LAB (OUTPATIENT)
Dept: LAB | Facility: CLINIC | Age: 66
End: 2021-08-06
Payer: COMMERCIAL

## 2021-08-06 ENCOUNTER — ANTICOAGULATION THERAPY VISIT (OUTPATIENT)
Dept: ANTICOAGULATION | Facility: CLINIC | Age: 66
End: 2021-08-06

## 2021-08-06 DIAGNOSIS — Z79.01 LONG TERM CURRENT USE OF ANTICOAGULANT THERAPY: ICD-10-CM

## 2021-08-06 DIAGNOSIS — Z95.2 H/O AORTIC VALVE REPLACEMENT: ICD-10-CM

## 2021-08-06 DIAGNOSIS — Z79.01 LONG TERM CURRENT USE OF ANTICOAGULANT THERAPY: Primary | ICD-10-CM

## 2021-08-06 LAB — INR BLD: 2.4 (ref 0.9–1.1)

## 2021-08-06 PROCEDURE — 85610 PROTHROMBIN TIME: CPT

## 2021-08-06 PROCEDURE — 36415 COLL VENOUS BLD VENIPUNCTURE: CPT

## 2021-08-06 NOTE — PROGRESS NOTES
ANTICOAGULATION MANAGEMENT     Annabella Bolanos 65 year old female is on warfarin with therapeutic INR result. (Goal INR 2.0-3.0)    Recent labs: (last 7 days)     08/06/21  1133   INR 2.4*       ASSESSMENT     Source(s): Chart Review and Patient/Caregiver Call       Warfarin doses taken: Warfarin taken as instructed    Diet: No new diet changes identified    New illness, injury, or hospitalization: No    Medication/supplement changes: None noted    Signs or symptoms of bleeding or clotting: No    Previous INR: Subtherapeutic    Additional findings: None     PLAN     Recommended plan for no diet, medication or health factor changes affecting INR     Dosing Instructions: Continue your current warfarin dose with next INR in 3 weeks       Summary  As of 8/6/2021    Full warfarin instructions:  7.5 mg every Fri; 5 mg all other days   Next INR check:  8/27/2021             Telephone call with Annabella who verbalizes understanding and agrees to plan    Lab visit scheduled    Education provided: Please call back if any changes to your diet, medications or how you've been taking warfarin and Contact [phone]  with any changes, questions or concerns.     Plan made per [facility] anticoagulation protocol    Deborah Aguilera RN  Anticoagulation Clinic  8/6/2021    _______________________________________________________________________     Anticoagulation Episode Summary     Current INR goal:  2.0-3.0   TTR:  60.3 % (1 y)   Target end date:  Indefinite   Send INR reminders to:  [facility]    Indications    Long-term (current) use of anticoagulants [Z79.01] [Z79.01]  H/O aortic valve replacement [Z95.2]           Comments:  4/1/20-ok to leave detailed message with dosing instructions if patient does not answer. INR Referral renewed, see 8/13/20 telephone encounter.          Anticoagulation Care Providers     Provider Role Specialty Phone number    Brook Echavarria MD Referring Internal Medicine [phone]

## 2021-08-16 ASSESSMENT — ENCOUNTER SYMPTOMS
PARESTHESIAS: 0
HEARTBURN: 0
DYSURIA: 0
FREQUENCY: 1
PALPITATIONS: 0
FEVER: 0
WEAKNESS: 0
NERVOUS/ANXIOUS: 0
CONSTIPATION: 0
SORE THROAT: 0
BREAST MASS: 0
ARTHRALGIAS: 1
JOINT SWELLING: 1
SHORTNESS OF BREATH: 0
ABDOMINAL PAIN: 0
HEMATOCHEZIA: 0
DIZZINESS: 0
HEMATURIA: 0
HEADACHES: 0
DIARRHEA: 0
NAUSEA: 0
MYALGIAS: 1
COUGH: 0
EYE PAIN: 0
CHILLS: 0

## 2021-08-16 ASSESSMENT — ACTIVITIES OF DAILY LIVING (ADL): CURRENT_FUNCTION: NO ASSISTANCE NEEDED

## 2021-08-19 ENCOUNTER — OFFICE VISIT (OUTPATIENT)
Dept: INTERNAL MEDICINE | Facility: CLINIC | Age: 66
End: 2021-08-19
Payer: COMMERCIAL

## 2021-08-19 VITALS
BODY MASS INDEX: 21.13 KG/M2 | OXYGEN SATURATION: 99 % | RESPIRATION RATE: 18 BRPM | TEMPERATURE: 97.9 F | DIASTOLIC BLOOD PRESSURE: 70 MMHG | HEIGHT: 64 IN | WEIGHT: 123.8 LBS | SYSTOLIC BLOOD PRESSURE: 124 MMHG | HEART RATE: 69 BPM

## 2021-08-19 DIAGNOSIS — Z95.2 H/O AORTIC VALVE REPLACEMENT: ICD-10-CM

## 2021-08-19 DIAGNOSIS — Z86.79 H/O AORTIC ANEURYSM REPAIR: ICD-10-CM

## 2021-08-19 DIAGNOSIS — Z00.00 ROUTINE GENERAL MEDICAL EXAMINATION AT A HEALTH CARE FACILITY: Primary | ICD-10-CM

## 2021-08-19 DIAGNOSIS — B00.1 COLD SORE: ICD-10-CM

## 2021-08-19 DIAGNOSIS — I10 HTN, GOAL BELOW 140/90: ICD-10-CM

## 2021-08-19 DIAGNOSIS — Z98.890 H/O AORTIC ANEURYSM REPAIR: ICD-10-CM

## 2021-08-19 DIAGNOSIS — C50.911 MALIGNANT NEOPLASM OF RIGHT FEMALE BREAST, UNSPECIFIED ESTROGEN RECEPTOR STATUS, UNSPECIFIED SITE OF BREAST (H): ICD-10-CM

## 2021-08-19 DIAGNOSIS — D12.6 ADENOMATOUS POLYP OF COLON, UNSPECIFIED PART OF COLON: ICD-10-CM

## 2021-08-19 LAB
ERYTHROCYTE [DISTWIDTH] IN BLOOD BY AUTOMATED COUNT: 11.8 % (ref 10–15)
HCT VFR BLD AUTO: 42.2 % (ref 35–47)
HGB BLD-MCNC: 13.5 G/DL (ref 11.7–15.7)
MCH RBC QN AUTO: 31.2 PG (ref 26.5–33)
MCHC RBC AUTO-ENTMCNC: 32 G/DL (ref 31.5–36.5)
MCV RBC AUTO: 98 FL (ref 78–100)
PLATELET # BLD AUTO: 221 10E3/UL (ref 150–450)
RBC # BLD AUTO: 4.33 10E6/UL (ref 3.8–5.2)
WBC # BLD AUTO: 5.8 10E3/UL (ref 4–11)

## 2021-08-19 PROCEDURE — 82043 UR ALBUMIN QUANTITATIVE: CPT | Performed by: INTERNAL MEDICINE

## 2021-08-19 PROCEDURE — 99214 OFFICE O/P EST MOD 30 MIN: CPT | Mod: 25 | Performed by: INTERNAL MEDICINE

## 2021-08-19 PROCEDURE — G0438 PPPS, INITIAL VISIT: HCPCS | Performed by: INTERNAL MEDICINE

## 2021-08-19 PROCEDURE — 36415 COLL VENOUS BLD VENIPUNCTURE: CPT | Performed by: INTERNAL MEDICINE

## 2021-08-19 PROCEDURE — 80053 COMPREHEN METABOLIC PANEL: CPT | Performed by: INTERNAL MEDICINE

## 2021-08-19 PROCEDURE — 80061 LIPID PANEL: CPT | Performed by: INTERNAL MEDICINE

## 2021-08-19 PROCEDURE — 85027 COMPLETE CBC AUTOMATED: CPT | Performed by: INTERNAL MEDICINE

## 2021-08-19 RX ORDER — CARVEDILOL 12.5 MG/1
12.5 TABLET ORAL 2 TIMES DAILY WITH MEALS
Qty: 180 TABLET | Refills: 4 | Status: SHIPPED | OUTPATIENT
Start: 2021-08-19 | End: 2022-08-26

## 2021-08-19 RX ORDER — LISINOPRIL 10 MG/1
10 TABLET ORAL 2 TIMES DAILY
Qty: 180 TABLET | Refills: 4 | Status: SHIPPED | OUTPATIENT
Start: 2021-08-19 | End: 2022-08-26

## 2021-08-19 RX ORDER — VALACYCLOVIR HYDROCHLORIDE 1 G/1
2000 TABLET, FILM COATED ORAL 2 TIMES DAILY
Qty: 4 TABLET | Refills: 3 | Status: SHIPPED | OUTPATIENT
Start: 2021-08-19 | End: 2022-09-21

## 2021-08-19 ASSESSMENT — ENCOUNTER SYMPTOMS
CONSTIPATION: 0
DYSURIA: 0
SORE THROAT: 0
WEAKNESS: 0
PARESTHESIAS: 0
COUGH: 0
FEVER: 0
HEMATOCHEZIA: 0
BREAST MASS: 0
PALPITATIONS: 0
DIZZINESS: 0
DIARRHEA: 0
ARTHRALGIAS: 1
HEADACHES: 0
HEARTBURN: 0
NERVOUS/ANXIOUS: 0
NAUSEA: 0
MYALGIAS: 1
HEMATURIA: 0
ABDOMINAL PAIN: 0
CHILLS: 0
FREQUENCY: 1
SHORTNESS OF BREATH: 0
JOINT SWELLING: 1
EYE PAIN: 0

## 2021-08-19 ASSESSMENT — MIFFLIN-ST. JEOR: SCORE: 1091.55

## 2021-08-19 ASSESSMENT — ACTIVITIES OF DAILY LIVING (ADL): CURRENT_FUNCTION: NO ASSISTANCE NEEDED

## 2021-08-19 NOTE — PATIENT INSTRUCTIONS
Plan:  1. Echocardiogram -- To schedule this test please call [facility] at: [phone]   2.   Labs today - [address]   3. If the blood pressure is lower then 100 decrease the Lisinopril to 10 mg daily   4. Continue the other meds, same doses for now.  5. if questions about the blood pressure and the meds -- please schedule video appointment

## 2021-08-19 NOTE — PROGRESS NOTES
"SUBJECTIVE:   Annabella Bolanos is a 65 year old female who presents for Preventive Visit.    {Split Bill scripting  The purpose of this visit is to discuss your medical history and prevent health problems before you are sick. You may be responsible for a co-pay, coinsurance, or deductible if your visit today includes services such as checking on a sore throat, having an x-ray or lab test, or treating and evaluating a new or existing condition :492828}  Patient has been advised of split billing requirements and indicates understanding: Yes   Are you in the first 12 months of your Medicare coverage?  Yes,  Visual Acuity:  Right Eye: ***   Left Eye: ***  Both Eyes: ***    Healthy Habits:     In general, how would you rate your overall health?  Good    Frequency of exercise:  6-7 days/week    Duration of exercise:  30-45 minutes    Do you usually eat at least 4 servings of fruit and vegetables a day, include whole grains    & fiber and avoid regularly eating high fat or \"junk\" foods?  Yes    Taking medications regularly:  Yes    Medication side effects:  None    Ability to successfully perform activities of daily living:  No assistance needed    Home Safety:  No safety concerns identified    Hearing Impairment:  Need to ask people to speak up or repeat themselves    In the past 6 months, have you been bothered by leaking of urine? Yes    In general, how would you rate your overall mental or emotional health?  Excellent      PHQ-2 Total Score: 0    Additional concerns today:  No    Do you feel safe in your environment? Yes    Have you ever done Advance Care Planning? (For example, a Health Directive, POLST, or a discussion with a medical provider or your loved ones about your wishes): No, advance care planning information given to patient to review.  Patient declined advance care planning discussion at this time.       Fall risk  { :819652}    Cognitive Screening   1) Repeat 3 items (Leader, Season, Table)  {Get patient's " "attention, then say, \"I am going to say three words that I want you to remember now and later.  The words are Leader, Season, and Table.  Please say them for me now.\"  If pt. unable after 3 tries, go to next item}  2) Clock draw: {Say the following phrases in order: \"Please draw a clock.  Start by drawing a large Eek.  Put all the numbers in the Eek and set the hands to show 11:10 (10 past 11).\"  If pt cannot complete in 3 minutes, stop and ask for recall items.  \"NORMAL\" test = all twelve numbers in correct location, and clock hands correctly designating 11:10}{ :44342::\"NORMAL\"}  3) 3 item recall: {Say: \"What were the three words I asked you to remember?\"}{ :202949}  Results: {MINICOG RESULTS:438081}    Mini-CogTM Copyright [employer]LI[employer]. Licensed by the author for use in [facility]; reprinted with permission ([email or web address]). All rights reserved.      {Do you have sleep apnea, excessive snoring or daytime drowsiness? (Optional):249166}    Reviewed and updated as needed this visit by clinical staff                 Reviewed and updated as needed this visit by Provider                Social History     Tobacco Use     Smoking status: Never Smoker     Smokeless tobacco: Never Used   Substance Use Topics     Alcohol use: Yes     Comment: 2 drinks per week -          Alcohol Use 8/16/2021   Prescreen: >3 drinks/day or >7 drinks/week? No   Prescreen: >3 drinks/day or >7 drinks/week? -       {Outside tests to abstract? :068567}    Hypertension Follow-up      Do you check your blood pressure regularly outside of the clinic? { :768633}     Are you following a low salt diet? { :584717}    Are your blood pressures ever more than 140 on the top number (systolic) OR more   than 90 on the bottom number (diastolic), for example 140/90? { :555945}      Current providers sharing in care for this patient include: {Rooming staff:  Please update Care Team in Rooming Activity, refresh this note and then delete this " "statement}  Patient Care Team:  Brook Echavarria MD as PCP - General (Internal Medicine)  Brook Echavarria MD as Assigned PCP  Merle Baxter MD as Assigned Endocrinology Provider  Dae Strauss MD as Assigned Rheumatology Provider    The following health maintenance items are reviewed in Epic and correct as of today:  Health Maintenance Due   Topic Date Due     ANNUAL REVIEW OF HM ORDERS  Never done     HIV SCREENING  Never done     FALL RISK ASSESSMENT  08/18/2021     MEDICARE ANNUAL WELLNESS VISIT  08/18/2021     {Chronicprobdata (optional):278221}  {Decision Support (Optional):904075}  Any new diagnosis of family breast, ovarian, or bowel cancer? {Yes_Link to Screening / No:162935}    FHS-7: No flowsheet data found.  {If any of the questions to the BCRA (FHS-7) are answered yes, consider ordering referral for genetic counseling (Optional) :785319::\"click delete button to remove this line now\"}  {AMB Mammogram Decision Support (Optional) :456947}  Pertinent mammograms are reviewed under the imaging tab.    Review of Systems   Constitutional: Negative for chills and fever.   HENT: Negative for congestion, ear pain, hearing loss and sore throat.    Eyes: Negative for pain and visual disturbance.   Respiratory: Negative for cough and shortness of breath.    Cardiovascular: Negative for chest pain, palpitations and peripheral edema.   Gastrointestinal: Negative for abdominal pain, constipation, diarrhea, heartburn, hematochezia and nausea.   Breasts:  Negative for tenderness, breast mass and discharge.   Genitourinary: Positive for frequency and urgency. Negative for dysuria, genital sores, hematuria, pelvic pain, vaginal bleeding and vaginal discharge.   Musculoskeletal: Positive for arthralgias, joint swelling and myalgias.   Skin: Negative for rash.   Neurological: Negative for dizziness, weakness, headaches and paresthesias.   Psychiatric/Behavioral: Negative for mood changes. " "The patient is not nervous/anxious.      {ROS COMP (Optional):828087}    OBJECTIVE:   LMP  (LMP Unknown)  Estimated body mass index is 21.65 kg/m  as calculated from the following:    Height as of 20: 1.626 m (5' 4\").    Weight as of 21: 57.2 kg (126 lb 1.6 oz).  Physical Exam  {Exam (Optional) :298687}    {Diagnostic Test Results (Optional):492299::\"Diagnostic Test Results:\",\"Labs reviewed in Epic\"}    ASSESSMENT / PLAN:   {Diag Picklist:641711}    Patient has been advised of split billing requirements and indicates understanding: {YES / NO:600420::\"Yes\"}  COUNSELING:  {Medicare Counselin}    Estimated body mass index is 21.65 kg/m  as calculated from the following:    Height as of 20: 1.626 m (5' 4\").    Weight as of 21: 57.2 kg (126 lb 1.6 oz).    {Weight Management Plan (ACO) Complete if BMI is abnormal-  Ages 18-64  BMI >24.9.  Age 65+ with BMI <23 or >30 (Optional):828832}    She reports that she has never smoked. She has never used smokeless tobacco.      Appropriate preventive services were discussed with this patient, including applicable screening as appropriate for cardiovascular disease, diabetes, osteopenia/osteoporosis, and glaucoma.  As appropriate for age/gender, discussed screening for colorectal cancer, prostate cancer, breast cancer, and cervical cancer. Checklist reviewing preventive services available has been given to the patient.    Reviewed patients plan of care and provided an AVS. The {CarePlan:328622} for Annabella meets the Care Plan requirement. This Care Plan has been established and reviewed with the {PATIENT, FAMILY MEMBER, CAREGIVER:865365}.    Counseling Resources:  ATP IV Guidelines  Pooled Cohorts Equation Calculator  Breast Cancer Risk Calculator  Breast Cancer: Medication to Reduce Risk  FRAX Risk Assessment  ICSI Preventive Guidelines  Dietary Guidelines for Americans,   USDA's MyPlate  ASA Prophylaxis  Lung CA Screening    Brook Gutiérrez " MD [place]  [facility]    Identified Health Risks:

## 2021-08-20 LAB
ALBUMIN SERPL-MCNC: 3.6 G/DL (ref 3.4–5)
ALP SERPL-CCNC: 58 U/L (ref 40–150)
ALT SERPL W P-5'-P-CCNC: 22 U/L (ref 0–50)
ANION GAP SERPL CALCULATED.3IONS-SCNC: 4 MMOL/L (ref 3–14)
AST SERPL W P-5'-P-CCNC: 16 U/L (ref 0–45)
BILIRUB SERPL-MCNC: 0.7 MG/DL (ref 0.2–1.3)
BUN SERPL-MCNC: 19 MG/DL (ref 7–30)
CALCIUM SERPL-MCNC: 8.9 MG/DL (ref 8.5–10.1)
CHLORIDE BLD-SCNC: 107 MMOL/L (ref 94–109)
CHOLEST SERPL-MCNC: 218 MG/DL
CO2 SERPL-SCNC: 28 MMOL/L (ref 20–32)
CREAT SERPL-MCNC: 0.76 MG/DL (ref 0.52–1.04)
CREAT UR-MCNC: 117 MG/DL
FASTING STATUS PATIENT QL REPORTED: YES
GFR SERPL CREATININE-BSD FRML MDRD: 83 ML/MIN/1.73M2
GLUCOSE BLD-MCNC: 98 MG/DL (ref 70–99)
HDLC SERPL-MCNC: 66 MG/DL
LDLC SERPL CALC-MCNC: 136 MG/DL
MICROALBUMIN UR-MCNC: 10 MG/L
MICROALBUMIN/CREAT UR: 8.55 MG/G CR (ref 0–25)
NONHDLC SERPL-MCNC: 152 MG/DL
POTASSIUM BLD-SCNC: 4.1 MMOL/L (ref 3.4–5.3)
PROT SERPL-MCNC: 6.8 G/DL (ref 6.8–8.8)
SODIUM SERPL-SCNC: 139 MMOL/L (ref 133–144)
TRIGL SERPL-MCNC: 80 MG/DL

## 2021-08-25 ENCOUNTER — HOSPITAL ENCOUNTER (OUTPATIENT)
Dept: CARDIOLOGY | Facility: CLINIC | Age: 66
Discharge: HOME | End: 2021-08-25
Attending: INTERNAL MEDICINE | Admitting: INTERNAL MEDICINE
Payer: COMMERCIAL

## 2021-08-25 DIAGNOSIS — Z95.2 H/O AORTIC VALVE REPLACEMENT: ICD-10-CM

## 2021-08-25 DIAGNOSIS — Z98.890 H/O AORTIC ANEURYSM REPAIR: ICD-10-CM

## 2021-08-25 DIAGNOSIS — Z86.79 H/O AORTIC ANEURYSM REPAIR: ICD-10-CM

## 2021-08-25 LAB — LVEF ECHO: NORMAL

## 2021-08-25 PROCEDURE — 93306 TTE W/DOPPLER COMPLETE: CPT | Mod: 26 | Performed by: INTERNAL MEDICINE

## 2021-08-25 PROCEDURE — 93306 TTE W/DOPPLER COMPLETE: CPT

## 2021-08-27 ENCOUNTER — LAB (OUTPATIENT)
Dept: LAB | Facility: CLINIC | Age: 66
End: 2021-08-27
Payer: COMMERCIAL

## 2021-08-27 ENCOUNTER — ANTICOAGULATION THERAPY VISIT (OUTPATIENT)
Dept: ANTICOAGULATION | Facility: CLINIC | Age: 66
End: 2021-08-27

## 2021-08-27 DIAGNOSIS — Z79.01 LONG TERM CURRENT USE OF ANTICOAGULANT THERAPY: Primary | ICD-10-CM

## 2021-08-27 DIAGNOSIS — Z79.01 LONG TERM CURRENT USE OF ANTICOAGULANT THERAPY: ICD-10-CM

## 2021-08-27 DIAGNOSIS — Z95.2 H/O AORTIC VALVE REPLACEMENT: ICD-10-CM

## 2021-08-27 LAB — INR BLD: 2 (ref 0.9–1.1)

## 2021-08-27 PROCEDURE — 85610 PROTHROMBIN TIME: CPT

## 2021-08-27 PROCEDURE — 36416 COLLJ CAPILLARY BLOOD SPEC: CPT

## 2021-08-27 NOTE — PROGRESS NOTES
ANTICOAGULATION MANAGEMENT     Annabella Bolanos 65 year old female is on warfarin with therapeutic INR result. (Goal INR 2.0-3.0)    Recent labs: (last 7 days)     08/27/21  1002   INR 2.0*       ASSESSMENT     Source(s): Chart Review and Patient/Caregiver Call       Warfarin doses taken: Warfarin taken as instructed    Diet: No new diet changes identified    New illness, injury, or hospitalization: No    Medication/supplement changes: None noted    Signs or symptoms of bleeding or clotting: No    Previous INR: Therapeutic last visit; previously outside of goal range    Additional findings: None     PLAN     Recommended plan for no diet, medication or health factor changes affecting INR     Dosing Instructions:  Increase your warfarin dose (6.7% change) with next INR in 2 weeks  INR trending down, increased warfarin maintenance dose 2.5 mg     Summary  As of 8/27/2021    Full warfarin instructions:  7.5 mg every Mon, Fri; 5 mg all other days   Next INR check:  9/9/2021             Telephone call with Annabella who verbalizes understanding and agrees to plan    Lab visit scheduled    Education provided: Please call back if any changes to your diet, medications or how you've been taking warfarin and Contact [phone]  with any changes, questions or concerns.     Plan made per [facility] anticoagulation protocol    Deborah Aguilera RN  Anticoagulation Clinic  8/27/2021    _______________________________________________________________________     Anticoagulation Episode Summary     Current INR goal:  2.0-3.0   TTR:  60.3 % (1 y)   Target end date:  Indefinite   Send INR reminders to:  [facility]    Indications    Long-term (current) use of anticoagulants [Z79.01] [Z79.01]  H/O aortic valve replacement [Z95.2]           Comments:  4/1/20-ok to leave detailed message with dosing instructions if patient does not answer. INR Referral renewed, see 8/13/20 telephone encounter.          Anticoagulation Care Providers      Provider Role Specialty Phone number    Brook Echavarria MD Referring Internal Medicine [phone]

## 2021-09-09 ENCOUNTER — LAB (OUTPATIENT)
Dept: LAB | Facility: CLINIC | Age: 66
End: 2021-09-09
Payer: COMMERCIAL

## 2021-09-09 ENCOUNTER — HOSPITAL ENCOUNTER (OUTPATIENT)
Dept: MAMMOGRAPHY | Facility: CLINIC | Age: 66
Discharge: HOME | End: 2021-09-09
Attending: INTERNAL MEDICINE | Admitting: INTERNAL MEDICINE
Payer: COMMERCIAL

## 2021-09-09 ENCOUNTER — ANTICOAGULATION THERAPY VISIT (OUTPATIENT)
Dept: ANTICOAGULATION | Facility: CLINIC | Age: 66
End: 2021-09-09

## 2021-09-09 DIAGNOSIS — Z79.01 LONG TERM CURRENT USE OF ANTICOAGULANT THERAPY: ICD-10-CM

## 2021-09-09 DIAGNOSIS — Z12.31 VISIT FOR SCREENING MAMMOGRAM: ICD-10-CM

## 2021-09-09 DIAGNOSIS — Z79.01 LONG TERM CURRENT USE OF ANTICOAGULANT THERAPY: Primary | ICD-10-CM

## 2021-09-09 DIAGNOSIS — Z95.2 H/O AORTIC VALVE REPLACEMENT: ICD-10-CM

## 2021-09-09 LAB — INR BLD: 2.2 (ref 0.9–1.1)

## 2021-09-09 PROCEDURE — 36416 COLLJ CAPILLARY BLOOD SPEC: CPT

## 2021-09-09 PROCEDURE — 77063 BREAST TOMOSYNTHESIS BI: CPT

## 2021-09-09 PROCEDURE — 85610 PROTHROMBIN TIME: CPT

## 2021-09-09 NOTE — PROGRESS NOTES
ANTICOAGULATION MANAGEMENT     Annabella Bolanos 66 year old female is on warfarin with therapeutic INR result. (Goal INR 2.0-3.0)    Recent labs: (last 7 days)     09/09/21  0852   INR 2.2*       ASSESSMENT     Source(s): Chart Review and Patient/Caregiver Call       Warfarin doses taken: Warfarin taken as instructed    Diet: No new diet changes identified    New illness, injury, or hospitalization: No    Medication/supplement changes: None noted    Signs or symptoms of bleeding or clotting: No    Previous INR: Therapeutic last 2 visits    Additional findings: None     PLAN     Recommended plan for no diet, medication or health factor changes affecting INR     Dosing Instructions: Continue your current warfarin dose with next INR in 3 weeks       Summary  As of 9/9/2021    Full warfarin instructions:  7.5 mg every Mon, Fri; 5 mg all other days   Next INR check:  9/29/2021             Telephone call with Annabella who verbalizes understanding and agrees to plan    Lab visit scheduled    Education provided: Please call back if any changes to your diet, medications or how you've been taking warfarin and Contact [phone]  with any changes, questions or concerns.     Plan made per [facility] anticoagulation protocol    Deborah Aguilera RN  Anticoagulation Clinic  9/9/2021    _______________________________________________________________________     Anticoagulation Episode Summary     Current INR goal:  2.0-3.0   TTR:  60.3 % (1 y)   Target end date:  Indefinite   Send INR reminders to:  [facility]    Indications    Long-term (current) use of anticoagulants [Z79.01] [Z79.01]  H/O aortic valve replacement [Z95.2]           Comments:  4/1/20-ok to leave detailed message with dosing instructions if patient does not answer. INR Referral renewed, see 8/13/20 telephone encounter.          Anticoagulation Care Providers     Provider Role Specialty Phone number    Brook Echavarria MD Referring Internal  Medicine [phone]

## 2021-09-22 DIAGNOSIS — Z79.01 LONG TERM CURRENT USE OF ANTICOAGULANTS WITH INR GOAL OF 2.0-3.0: ICD-10-CM

## 2021-09-22 DIAGNOSIS — Z95.2 S/P AORTIC VALVE REPLACEMENT: ICD-10-CM

## 2021-09-23 RX ORDER — WARFARIN SODIUM 5 MG/1
TABLET ORAL
Qty: 115 TABLET | Refills: 1 | Status: SHIPPED | OUTPATIENT
Start: 2021-09-23 | End: 2022-03-04

## 2021-09-23 NOTE — TELEPHONE ENCOUNTER
Last INR: 9/9/21=2.2  Next INR: 9/29/21  INR referral and OV up-to-date: yes      Prescription approved per [facility] Refill Protocol.    Marybeth Stock RN

## 2021-09-23 NOTE — TELEPHONE ENCOUNTER
Pending Prescriptions:                       Disp   Refills    warfarin ANTICOAGULANT (COUMADIN) 5 MG tab*105 ta*3        Sig: TAKE 1 TABLET DAILY EXCEPT ONE AND ONE-HALF TABLETS           ON MONDAY WEDNESDAY AND SATURDAY OR AS DIRECTED           BY THE INR CLINIC

## 2021-09-29 ENCOUNTER — LAB (OUTPATIENT)
Dept: LAB | Facility: CLINIC | Age: 66
End: 2021-09-29
Payer: COMMERCIAL

## 2021-09-29 ENCOUNTER — ANTICOAGULATION THERAPY VISIT (OUTPATIENT)
Dept: ANTICOAGULATION | Facility: CLINIC | Age: 66
End: 2021-09-29

## 2021-09-29 DIAGNOSIS — Z95.2 H/O AORTIC VALVE REPLACEMENT: ICD-10-CM

## 2021-09-29 DIAGNOSIS — Z79.01 LONG TERM CURRENT USE OF ANTICOAGULANT THERAPY: ICD-10-CM

## 2021-09-29 DIAGNOSIS — Z79.01 LONG TERM CURRENT USE OF ANTICOAGULANT THERAPY: Primary | ICD-10-CM

## 2021-09-29 LAB — INR BLD: 2.6 (ref 0.9–1.1)

## 2021-09-29 PROCEDURE — 36416 COLLJ CAPILLARY BLOOD SPEC: CPT

## 2021-09-29 PROCEDURE — 85610 PROTHROMBIN TIME: CPT

## 2021-09-29 NOTE — PROGRESS NOTES
ANTICOAGULATION MANAGEMENT     Annabella Bolanos 66 year old female is on warfarin with therapeutic INR result. (Goal INR 2.0-3.0)    Recent labs: (last 7 days)     09/29/21  1300   INR 2.6*       ASSESSMENT     Source(s): Chart Review and Patient/Caregiver Call       Warfarin doses taken: Warfarin taken as instructed    Diet: No new diet changes identified    New illness, injury, or hospitalization: No    Medication/supplement changes: None noted    Signs or symptoms of bleeding or clotting: No    Previous INR: Therapeutic last 2(+) visits    Additional findings: None     PLAN     Recommended plan for no diet, medication or health factor changes affecting INR     Dosing Instructions: Continue your current warfarin dose with next INR in 4 weeks       Summary  As of 9/29/2021    Full warfarin instructions:  7.5 mg every Mon, Fri; 5 mg all other days   Next INR check:  10/27/2021             Telephone call with Annabella who verbalizes understanding and agrees to plan    Lab visit scheduled    Education provided: Please call back if any changes to your diet, medications or how you've been taking warfarin and Contact [phone]  with any changes, questions or concerns.     Plan made per [facility] anticoagulation protocol    Deborah Aguilera RN  Anticoagulation Clinic  9/29/2021    _______________________________________________________________________     Anticoagulation Episode Summary     Current INR goal:  2.0-3.0   TTR:  61.3 % (1 y)   Target end date:  Indefinite   Send INR reminders to:  [facility]    Indications    Long-term (current) use of anticoagulants [Z79.01] [Z79.01]  H/O aortic valve replacement [Z95.2]           Comments:  4/1/20-ok to leave detailed message with dosing instructions if patient does not answer. INR Referral renewed, see 8/13/20 telephone encounter.          Anticoagulation Care Providers     Provider Role Specialty Phone number    Brook Echavarria MD Referring Internal  Medicine [phone]

## 2021-10-02 ENCOUNTER — HEALTH MAINTENANCE LETTER (OUTPATIENT)
Age: 66
End: 2021-10-02

## 2021-10-27 ENCOUNTER — ANTICOAGULATION THERAPY VISIT (OUTPATIENT)
Dept: ANTICOAGULATION | Facility: CLINIC | Age: 66
End: 2021-10-27

## 2021-10-27 ENCOUNTER — LAB (OUTPATIENT)
Dept: LAB | Facility: CLINIC | Age: 66
End: 2021-10-27
Payer: COMMERCIAL

## 2021-10-27 DIAGNOSIS — Z95.2 H/O AORTIC VALVE REPLACEMENT: ICD-10-CM

## 2021-10-27 DIAGNOSIS — Z79.01 LONG TERM CURRENT USE OF ANTICOAGULANT THERAPY: Primary | ICD-10-CM

## 2021-10-27 DIAGNOSIS — Z79.01 LONG TERM CURRENT USE OF ANTICOAGULANT THERAPY: ICD-10-CM

## 2021-10-27 LAB — INR BLD: 2.5 (ref 0.9–1.1)

## 2021-10-27 PROCEDURE — 85610 PROTHROMBIN TIME: CPT

## 2021-10-27 PROCEDURE — 36416 COLLJ CAPILLARY BLOOD SPEC: CPT

## 2021-10-27 NOTE — PROGRESS NOTES
ANTICOAGULATION MANAGEMENT     Annabella Bolanos 66 year old female is on warfarin with therapeutic INR result. (Goal INR 2.0-3.0)    Recent labs: (last 7 days)     10/27/21  1117   INR 2.5*       ASSESSMENT     Source(s): Chart Review and Patient/Caregiver Call       Warfarin doses taken: Warfarin taken as instructed    Diet: No new diet changes identified    New illness, injury, or hospitalization: Yes: minor reaction post COVID booster    Medication/supplement changes: None noted    Signs or symptoms of bleeding or clotting: No    Previous INR: Therapeutic last 2(+) visits    Additional findings: None     PLAN     Recommended plan for temporary change(s) affecting INR     Dosing Instructions: Continue your current warfarin dose with next INR in 5 weeks       Summary  As of 10/27/2021    Full warfarin instructions:  7.5 mg every Mon, Fri; 5 mg all other days   Next INR check:  12/1/2021             Telephone call with Annabella who verbalizes understanding and agrees to plan    Lab visit scheduled    Education provided: Please call back if any changes to your diet, medications or how you've been taking warfarin, Monitoring for bleeding signs and symptoms, Monitoring for clotting signs and symptoms and Importance of notifying clinic for changes in medications; a sooner lab recheck maybe needed.    Plan made per [facility] anticoagulation protocol    Genevieve Villagran RN  Anticoagulation Clinic  10/27/2021    _______________________________________________________________________     Anticoagulation Episode Summary     Current INR goal:  2.0-3.0   TTR:  62.5 % (1 y)   Target end date:  Indefinite   Send INR reminders to:  [facility]    Indications    Long-term (current) use of anticoagulants [Z79.01] [Z79.01]  H/O aortic valve replacement [Z95.2]           Comments:  4/1/20-ok to leave detailed message with dosing instructions if patient does not answer. INR Referral renewed, see 8/13/20 telephone encounter.           Anticoagulation Care Providers     Provider Role Specialty Phone number    Brook Echavarria MD Referring Internal Medicine [phone]

## 2021-10-27 NOTE — PROGRESS NOTES
Anticoagulation Management     Attempted to reach Annabella to discuss today's INR result, no answer at this time.      Left  requesting callback at patient's earliest convenience.      Anticoagulation clinic to follow up     Genevieve Villagran RN

## 2021-12-01 ENCOUNTER — LAB (OUTPATIENT)
Dept: LAB | Facility: CLINIC | Age: 66
End: 2021-12-01
Payer: COMMERCIAL

## 2021-12-01 ENCOUNTER — ANTICOAGULATION THERAPY VISIT (OUTPATIENT)
Dept: ANTICOAGULATION | Facility: CLINIC | Age: 66
End: 2021-12-01

## 2021-12-01 DIAGNOSIS — Z95.2 H/O AORTIC VALVE REPLACEMENT: ICD-10-CM

## 2021-12-01 DIAGNOSIS — Z79.01 LONG TERM CURRENT USE OF ANTICOAGULANT THERAPY: ICD-10-CM

## 2021-12-01 DIAGNOSIS — Z79.01 LONG TERM CURRENT USE OF ANTICOAGULANT THERAPY: Primary | ICD-10-CM

## 2021-12-01 DIAGNOSIS — E05.90 SUBCLINICAL HYPERTHYROIDISM: ICD-10-CM

## 2021-12-01 LAB
INR BLD: 2.7 (ref 0.9–1.1)
T3FREE SERPL-MCNC: 3.1 PG/ML (ref 2.3–4.2)

## 2021-12-01 PROCEDURE — 85610 PROTHROMBIN TIME: CPT

## 2021-12-01 PROCEDURE — 84481 FREE ASSAY (FT-3): CPT

## 2021-12-01 PROCEDURE — 36416 COLLJ CAPILLARY BLOOD SPEC: CPT

## 2021-12-01 PROCEDURE — 36415 COLL VENOUS BLD VENIPUNCTURE: CPT

## 2021-12-01 PROCEDURE — 84439 ASSAY OF FREE THYROXINE: CPT

## 2021-12-01 PROCEDURE — 84443 ASSAY THYROID STIM HORMONE: CPT

## 2021-12-01 NOTE — PROGRESS NOTES
ANTICOAGULATION MANAGEMENT     Annabella Bolanos 66 year old female is on warfarin with therapeutic INR result. (Goal INR 2.0-3.0)    Recent labs: (last 7 days)     12/01/21  1124   INR 2.7*       ASSESSMENT     Source(s): Chart Review and Patient/Caregiver Call       Warfarin doses taken: Warfarin taken as instructed    Diet: No new diet changes identified    New illness, injury, or hospitalization: No    Medication/supplement changes: None noted    Signs or symptoms of bleeding or clotting: No    Previous INR: Therapeutic last 2(+) visits    Additional findings: None     PLAN     Recommended plan for no diet, medication or health factor changes affecting INR     Dosing Instructions: Continue your current warfarin dose with next INR in 6 weeks       Summary  As of 12/1/2021    Full warfarin instructions:  7.5 mg every Mon, Fri; 5 mg all other days   Next INR check:  1/12/2022             Telephone call with Annabella who agrees to plan and repeated back plan correctly    Lab visit scheduled    Education provided: Please call back if any changes to your diet, medications or how you've been taking warfarin    Plan made per [facility] anticoagulation protocol    Erica Stinson RN  Anticoagulation Clinic  12/1/2021    _______________________________________________________________________     Anticoagulation Episode Summary     Current INR goal:  2.0-3.0   TTR:  70.2 % (1 y)   Target end date:  Indefinite   Send INR reminders to:  [facility]    Indications    Long-term (current) use of anticoagulants [Z79.01] [Z79.01]  H/O aortic valve replacement [Z95.2]           Comments:           Anticoagulation Care Providers     Provider Role Specialty Phone number    Brook Echavarria MD Referring Internal Medicine [phone]

## 2021-12-02 LAB
T4 FREE SERPL-MCNC: 1.24 NG/DL (ref 0.76–1.46)
TSH SERPL DL<=0.005 MIU/L-ACNC: 0.87 MU/L (ref 0.4–4)

## 2021-12-02 NOTE — RESULT ENCOUNTER NOTE
Annabella    Recently done endocrinology lab test/ imaging test showed:  Thyroid labs are in acceptable range.  Continue current dose of methimazole.    Here is a copy for your records.    Follow up as discussed in last clinic visit.    Please call endocrinology clinic (nurse line: [phone]) if questions.    Merle Baxter MD  Endocrinology   [facility]  December 2, 2021

## 2022-01-09 ENCOUNTER — E-VISIT (OUTPATIENT)
Dept: INTERNAL MEDICINE | Facility: CLINIC | Age: 67
End: 2022-01-09
Payer: COMMERCIAL

## 2022-01-09 DIAGNOSIS — Z20.822 EXPOSURE TO 2019 NOVEL CORONAVIRUS: Primary | ICD-10-CM

## 2022-01-09 PROCEDURE — 99421 OL DIG E/M SVC 5-10 MIN: CPT | Performed by: INTERNAL MEDICINE

## 2022-01-10 NOTE — PATIENT INSTRUCTIONS
Thank you for choosing us for your care. Given your symptoms, I would like you to do a lab-only visit to determine what is causing them.  I have placed the orders.  Please schedule an appointment with the lab right here in [employer] [facility], or call [phone].  I will let you know when the results are back and next steps to take.

## 2022-01-12 ENCOUNTER — LAB (OUTPATIENT)
Dept: LAB | Facility: CLINIC | Age: 67
End: 2022-01-12
Payer: COMMERCIAL

## 2022-01-12 ENCOUNTER — ANTICOAGULATION THERAPY VISIT (OUTPATIENT)
Dept: ANTICOAGULATION | Facility: CLINIC | Age: 67
End: 2022-01-12

## 2022-01-12 DIAGNOSIS — Z20.822 EXPOSURE TO 2019 NOVEL CORONAVIRUS: ICD-10-CM

## 2022-01-12 DIAGNOSIS — Z95.2 H/O AORTIC VALVE REPLACEMENT: ICD-10-CM

## 2022-01-12 DIAGNOSIS — Z79.01 LONG TERM CURRENT USE OF ANTICOAGULANT THERAPY: ICD-10-CM

## 2022-01-12 DIAGNOSIS — Z79.01 LONG TERM CURRENT USE OF ANTICOAGULANT THERAPY: Primary | ICD-10-CM

## 2022-01-12 LAB — INR BLD: 2.6 (ref 0.9–1.1)

## 2022-01-12 PROCEDURE — U0005 INFEC AGEN DETEC AMPLI PROBE: HCPCS

## 2022-01-12 PROCEDURE — U0003 INFECTIOUS AGENT DETECTION BY NUCLEIC ACID (DNA OR RNA); SEVERE ACUTE RESPIRATORY SYNDROME CORONAVIRUS 2 (SARS-COV-2) (CORONAVIRUS DISEASE [COVID-19]), AMPLIFIED PROBE TECHNIQUE, MAKING USE OF HIGH THROUGHPUT TECHNOLOGIES AS DESCRIBED BY CMS-2020-01-R: HCPCS

## 2022-01-12 PROCEDURE — 85610 PROTHROMBIN TIME: CPT

## 2022-01-12 PROCEDURE — 36416 COLLJ CAPILLARY BLOOD SPEC: CPT

## 2022-01-12 NOTE — PROGRESS NOTES
ANTICOAGULATION MANAGEMENT     Annabella Bolanos 66 year old female is on warfarin with therapeutic INR result. (Goal INR 2.0-3.0)    Recent labs: (last 7 days)     01/12/22  1222   INR 2.6*       ASSESSMENT     Source(s): Chart Review and Patient/Caregiver Call       Warfarin doses taken: Warfarin taken as instructed    Diet: No new diet changes identified    New illness, injury, or hospitalization: Yes: Waiting for COVID PCR test done this morning, 1 granddaughter tested positive over 1 week ago, patient states her throat is starting to be sore    Medication/supplement changes: None noted    Signs or symptoms of bleeding or clotting: No    Previous INR: Therapeutic last 2(+) visits    Additional findings: None     PLAN     Recommended plan for no diet, medication or health factor changes affecting INR     Dosing Instructions: Continue your current warfarin dose with next INR in 6 weeks       Summary  As of 1/12/2022    Full warfarin instructions:  7.5 mg every Mon, Fri; 5 mg all other days   Next INR check:  2/23/2022             Telephone call with Annabella who verbalizes understanding and agrees to plan    Lab visit scheduled    Education provided: Please call back if any changes to your diet, medications or how you've been taking warfarin and Contact [phone]  with any changes, questions or concerns.     Plan made per [facility] anticoagulation protocol    Deborah Aguilera RN  Anticoagulation Clinic  1/12/2022    _______________________________________________________________________     Anticoagulation Episode Summary     Current INR goal:  2.0-3.0   TTR:  70.2 % (1 y)   Target end date:  Indefinite   Send INR reminders to:  [facility]    Indications    Long-term (current) use of anticoagulants [Z79.01] [Z79.01]  H/O aortic valve replacement [Z95.2]           Comments:           Anticoagulation Care Providers     Provider Role Specialty Phone number    Brook Echavarria MD Referring Internal  Medicine [phone]

## 2022-01-13 LAB — SARS-COV-2 RNA RESP QL NAA+PROBE: NEGATIVE

## 2022-02-23 ENCOUNTER — LAB (OUTPATIENT)
Dept: LAB | Facility: CLINIC | Age: 67
End: 2022-02-23
Payer: COMMERCIAL

## 2022-02-23 ENCOUNTER — ANTICOAGULATION THERAPY VISIT (OUTPATIENT)
Dept: ANTICOAGULATION | Facility: CLINIC | Age: 67
End: 2022-02-23

## 2022-02-23 DIAGNOSIS — Z95.2 H/O AORTIC VALVE REPLACEMENT: ICD-10-CM

## 2022-02-23 DIAGNOSIS — Z79.01 LONG TERM CURRENT USE OF ANTICOAGULANT THERAPY: ICD-10-CM

## 2022-02-23 DIAGNOSIS — Z79.01 LONG TERM CURRENT USE OF ANTICOAGULANT THERAPY: Primary | ICD-10-CM

## 2022-02-23 LAB — INR BLD: 2.6 (ref 0.9–1.1)

## 2022-02-23 PROCEDURE — 85610 PROTHROMBIN TIME: CPT

## 2022-02-23 PROCEDURE — 36416 COLLJ CAPILLARY BLOOD SPEC: CPT

## 2022-02-23 NOTE — PROGRESS NOTES
ANTICOAGULATION MANAGEMENT     Annabella Bolanos 66 year old female is on warfarin with therapeutic INR result. (Goal INR 2.0-3.0)    Recent labs: (last 7 days)     02/23/22  1138   INR 2.6*       ASSESSMENT     Source(s): Chart Review and Patient/Caregiver Call       Warfarin doses taken: Warfarin taken as instructed    Diet: No new diet changes identified    New illness, injury, or hospitalization: Yes: Patient reports COVID PCR was negative, but had cold-like symptoms, home test a couple days later was positive, states symptoms last a couple days    Medication/supplement changes: None noted    Signs or symptoms of bleeding or clotting: No    Previous INR: Therapeutic last 2(+) visits    Additional findings: None     PLAN     Recommended plan for no diet, medication or health factor changes affecting INR     Dosing Instructions: Continue your current warfarin dose with next INR in 6 weeks       Summary  As of 2/23/2022    Full warfarin instructions:  7.5 mg every Mon, Fri; 5 mg all other days   Next INR check:  4/6/2022             Telephone call with Annabella who verbalizes understanding and agrees to plan    Lab visit scheduled    Education provided: Please call back if any changes to your diet, medications or how you've been taking warfarin and Contact [phone]  with any changes, questions or concerns.     Plan made per [facility] anticoagulation protocol    Deborah Aguilera RN  Anticoagulation Clinic  2/23/2022    _______________________________________________________________________     Anticoagulation Episode Summary     Current INR goal:  2.0-3.0   TTR:  70.2 % (1 y)   Target end date:  Indefinite   Send INR reminders to:  [facility]    Indications    Long-term (current) use of anticoagulants [Z79.01] [Z79.01]  H/O aortic valve replacement [Z95.2]           Comments:           Anticoagulation Care Providers     Provider Role Specialty Phone number    Brook Echavarria MD Referring  Internal Medicine [phone]

## 2022-03-03 DIAGNOSIS — Z79.01 LONG TERM CURRENT USE OF ANTICOAGULANTS WITH INR GOAL OF 2.0-3.0: ICD-10-CM

## 2022-03-03 DIAGNOSIS — Z95.2 S/P AORTIC VALVE REPLACEMENT: ICD-10-CM

## 2022-03-04 RX ORDER — WARFARIN SODIUM 5 MG/1
TABLET ORAL
Qty: 115 TABLET | Refills: 1 | Status: SHIPPED | OUTPATIENT
Start: 2022-03-04 | End: 2022-08-31

## 2022-03-04 NOTE — TELEPHONE ENCOUNTER
"Requested Prescriptions   Pending Prescriptions Disp Refills     warfarin ANTICOAGULANT (COUMADIN) 5 MG tablet [Pharmacy Med Name: WARFARIN TABS 5MG] 115 tablet 3     Sig: TAKE 1 TABLET  DAILY EXCEPT ONE AND ONE-HALF TABLETS ON MONDAY AND FRIDAY OR AS DIRECTED BY THE INR CLINIC (MOST CURRENT DOSE)       Vitamin K Antagonists Failed - 3/4/2022 11:26 AM        Failed - INR is within goal in the past 6 weeks     Confirm INR is within goal in the past 6 weeks.     Recent Labs   Lab Test 02/23/22  1138   INR 2.6*                       Passed - Recent (12 mo) or future (30 days) visit within the authorizing provider's specialty     Patient has had an office visit with the authorizing provider or a provider within the authorizing providers department within the previous 12 mos or has a future within next 30 days. See \"Patient Info\" tab in inbasket, or \"Choose Columns\" in Meds & Orders section of the refill encounter.              Passed - Medication is active on med list        Passed - Patient is 18 years of age or older        Passed - Patient is not pregnant        Passed - No positive pregnancy on file in past 12 months           Last office visit 8/19/21.  Warfarin dosing is managed by INR Clinic.  Prescription approved per [facility] Refill Protocol.    "

## 2022-04-06 ENCOUNTER — LAB (OUTPATIENT)
Dept: LAB | Facility: CLINIC | Age: 67
End: 2022-04-06
Payer: COMMERCIAL

## 2022-04-06 ENCOUNTER — ANTICOAGULATION THERAPY VISIT (OUTPATIENT)
Dept: ANTICOAGULATION | Facility: CLINIC | Age: 67
End: 2022-04-06

## 2022-04-06 DIAGNOSIS — Z95.2 H/O AORTIC VALVE REPLACEMENT: ICD-10-CM

## 2022-04-06 DIAGNOSIS — Z79.01 LONG TERM CURRENT USE OF ANTICOAGULANT THERAPY: ICD-10-CM

## 2022-04-06 DIAGNOSIS — Z79.01 LONG TERM CURRENT USE OF ANTICOAGULANT THERAPY: Primary | ICD-10-CM

## 2022-04-06 LAB — INR BLD: 2.5 (ref 0.9–1.1)

## 2022-04-06 PROCEDURE — 85610 PROTHROMBIN TIME: CPT

## 2022-04-06 PROCEDURE — 36416 COLLJ CAPILLARY BLOOD SPEC: CPT

## 2022-04-06 NOTE — PROGRESS NOTES
ANTICOAGULATION MANAGEMENT     Annabella Bolanos 66 year old female is on warfarin with therapeutic INR result. (Goal INR 2.0-3.0)    Recent labs: (last 7 days)     04/06/22  0829   INR 2.5*       ASSESSMENT       Source(s): Chart Review    Previous INR was Therapeutic last 2(+) visits    Medication, diet, health changes since last INR chart reviewed; none identified           PLAN     Recommended plan for no diet, medication or health factor changes affecting INR     Dosing Instructions: continue your current warfarin dose with next INR in 6 weeks       Summary  As of 4/6/2022    Full warfarin instructions:  7.5 mg every Mon, Fri; 5 mg all other days   Next INR check:  5/18/2022             Detailed voice message left for Annabella with dosing instructions and follow up date.     Contact [phone]  to schedule and with any changes, questions or concerns.     Education provided: Please call back if any changes to your diet, medications or how you've been taking warfarin and Contact [phone]  with any changes, questions or concerns.     Plan made per [facility] anticoagulation protocol    Erica Stinson RN  Anticoagulation Clinic  4/6/2022    _______________________________________________________________________     Anticoagulation Episode Summary     Current INR goal:  2.0-3.0   TTR:  78.5 % (1 y)   Target end date:  Indefinite   Send INR reminders to:  [facility]    Indications    Long-term (current) use of anticoagulants [Z79.01] [Z79.01]  H/O aortic valve replacement [Z95.2]           Comments:           Anticoagulation Care Providers     Provider Role Specialty Phone number    Brook cEhavarria MD Referring Internal Medicine [phone]

## 2022-04-26 ENCOUNTER — TELEPHONE (OUTPATIENT)
Dept: ENDOCRINOLOGY | Facility: CLINIC | Age: 67
End: 2022-04-26
Payer: COMMERCIAL

## 2022-04-26 DIAGNOSIS — E05.90 SUBCLINICAL HYPERTHYROIDISM: ICD-10-CM

## 2022-05-05 RX ORDER — METHIMAZOLE 5 MG/1
TABLET ORAL
Qty: 45 TABLET | Refills: 3 | OUTPATIENT
Start: 2022-05-05

## 2022-05-18 ENCOUNTER — ANTICOAGULATION THERAPY VISIT (OUTPATIENT)
Dept: ANTICOAGULATION | Facility: CLINIC | Age: 67
End: 2022-05-18

## 2022-05-18 ENCOUNTER — LAB (OUTPATIENT)
Dept: LAB | Facility: CLINIC | Age: 67
End: 2022-05-18
Payer: COMMERCIAL

## 2022-05-18 DIAGNOSIS — Z79.01 LONG TERM CURRENT USE OF ANTICOAGULANT THERAPY: Primary | ICD-10-CM

## 2022-05-18 DIAGNOSIS — E05.90 SUBCLINICAL HYPERTHYROIDISM: ICD-10-CM

## 2022-05-18 DIAGNOSIS — Z95.2 H/O AORTIC VALVE REPLACEMENT: ICD-10-CM

## 2022-05-18 DIAGNOSIS — Z79.01 LONG TERM CURRENT USE OF ANTICOAGULANT THERAPY: ICD-10-CM

## 2022-05-18 LAB
INR BLD: 2.7 (ref 0.9–1.1)
T3FREE SERPL-MCNC: 3.2 PG/ML (ref 2.3–4.2)
T4 FREE SERPL-MCNC: 1.14 NG/DL (ref 0.76–1.46)
TSH SERPL DL<=0.005 MIU/L-ACNC: 1.03 MU/L (ref 0.4–4)

## 2022-05-18 PROCEDURE — 84439 ASSAY OF FREE THYROXINE: CPT

## 2022-05-18 PROCEDURE — 84481 FREE ASSAY (FT-3): CPT

## 2022-05-18 PROCEDURE — 84443 ASSAY THYROID STIM HORMONE: CPT

## 2022-05-18 PROCEDURE — 36415 COLL VENOUS BLD VENIPUNCTURE: CPT

## 2022-05-18 PROCEDURE — 85610 PROTHROMBIN TIME: CPT

## 2022-05-18 NOTE — PROGRESS NOTES
ANTICOAGULATION MANAGEMENT     Annabella Bolanos 66 year old female is on warfarin with therapeutic INR result. (Goal INR 2.0-3.0)    Recent labs: (last 7 days)     05/18/22  0839   INR 2.7*       ASSESSMENT       Source(s): Chart Review and Patient/Caregiver Call       Warfarin doses taken: Warfarin taken as instructed    Diet: No new diet changes identified    New illness, injury, or hospitalization: No    Medication/supplement changes: None noted    Signs or symptoms of bleeding or clotting: No    Previous INR: Therapeutic last 2(+) visits    Additional findings: None       PLAN     Recommended plan for no diet, medication or health factor changes affecting INR     Dosing Instructions: continue your current warfarin dose with next INR in 6 weeks       Summary  As of 5/18/2022    Full warfarin instructions:  7.5 mg every Mon, Fri; 5 mg all other days   Next INR check:  6/29/2022             Telephone call with Annabella who agrees to plan and repeated back plan correctly    Lab visit scheduled    Education provided: Please call back if any changes to your diet, medications or how you've been taking warfarin    Plan made per [facility] anticoagulation protocol    Erica Stinson RN  Anticoagulation Clinic  5/18/2022    _______________________________________________________________________     Anticoagulation Episode Summary     Current INR goal:  2.0-3.0   TTR:  89.4 % (1 y)   Target end date:  Indefinite   Send INR reminders to:  [facility]    Indications    Long-term (current) use of anticoagulants [Z79.01] [Z79.01]  H/O aortic valve replacement [Z95.2]           Comments:           Anticoagulation Care Providers     Provider Role Specialty Phone number    Brook Echavarria MD Referring Internal Medicine [phone]

## 2022-05-18 NOTE — TELEPHONE ENCOUNTER
Patient returned call and made appt out into September. Per patient worried about refills as she will be out in August. Patient requesting call back to discuss.

## 2022-05-19 RX ORDER — METHIMAZOLE 5 MG/1
TABLET ORAL
Qty: 45 TABLET | Refills: 0 | Status: SHIPPED | OUTPATIENT
Start: 2022-05-19 | End: 2022-08-01

## 2022-05-25 NOTE — RESULT ENCOUNTER NOTE
Labs results/imaging studies results noted. Will discuss in next clinic visit 9/21/22.  Thyroid labs are in acceptable range.  Continue current dose of methimazole.    Here is a copy for your records.  Follow-up in endocrinology Clinic as scheduled/discussed. We will review these studies/results in further detail at that visit, but feel free to contact us with any other questions in the interim.    Please call endocrinology clinic (nurse line: [phone]) if questions.    Merle Baxter MD

## 2022-06-29 ENCOUNTER — ANTICOAGULATION THERAPY VISIT (OUTPATIENT)
Dept: ANTICOAGULATION | Facility: CLINIC | Age: 67
End: 2022-06-29

## 2022-06-29 ENCOUNTER — DOCUMENTATION ONLY (OUTPATIENT)
Dept: ANTICOAGULATION | Facility: CLINIC | Age: 67
End: 2022-06-29

## 2022-06-29 ENCOUNTER — LAB (OUTPATIENT)
Dept: LAB | Facility: CLINIC | Age: 67
End: 2022-06-29
Payer: COMMERCIAL

## 2022-06-29 DIAGNOSIS — Z79.01 LONG TERM CURRENT USE OF ANTICOAGULANT THERAPY: ICD-10-CM

## 2022-06-29 DIAGNOSIS — Z79.01 LONG TERM CURRENT USE OF ANTICOAGULANT THERAPY: Primary | ICD-10-CM

## 2022-06-29 DIAGNOSIS — Z95.2 H/O AORTIC VALVE REPLACEMENT: ICD-10-CM

## 2022-06-29 DIAGNOSIS — Z95.2 H/O AORTIC VALVE REPLACEMENT: Primary | ICD-10-CM

## 2022-06-29 LAB — INR BLD: 3 (ref 0.9–1.1)

## 2022-06-29 PROCEDURE — 36416 COLLJ CAPILLARY BLOOD SPEC: CPT

## 2022-06-29 PROCEDURE — 85610 PROTHROMBIN TIME: CPT

## 2022-06-29 NOTE — PROGRESS NOTES
ANTICOAGULATION CLINIC REFERRAL RENEWAL REQUEST       An annual renewal order is required for all patients referred to [facility].?  Please review and sign the pended referral order for Annabella WINDY Bolanos.       ANTICOAGULATION SUMMARY      Warfarin indication(s)   Mechanical AVR    Mechanical heart valve present?  Mechanical  AVR-Bileaflet       Current goal range   INR: 2.0-3.0     Goal appropriate for indication? Goal INR 2-3, standard for indication(s) above     Time in Therapeutic Range (TTR)  (Goal > 60%) 92.5%       Office visit with referring provider's group within last year yes on 8/19/21       Erica Stinson RN  [facility]

## 2022-06-29 NOTE — PROGRESS NOTES
ANTICOAGULATION MANAGEMENT     Annabella Bolanos 66 year old female is on warfarin with therapeutic INR result. (Goal INR 2.0-3.0)    Recent labs: (last 7 days)     06/29/22  1018   INR 3.0*       ASSESSMENT       Source(s): Chart Review and Patient/Caregiver Call       Warfarin doses taken: Warfarin taken as instructed    Diet: No new diet changes identified    New illness, injury, or hospitalization: No    Medication/supplement changes: None noted    Signs or symptoms of bleeding or clotting: No    Previous INR: Therapeutic last 2(+) visits    Additional findings: None       PLAN     Recommended plan for no diet, medication or health factor changes affecting INR     Dosing Instructions: continue your current warfarin dose with next INR in 6 weeks       Summary  As of 6/29/2022    Full warfarin instructions:  7.5 mg every Mon, Fri; 5 mg all other days   Next INR check:  8/10/2022             Telephone call with Annabella who agrees to plan and repeated back plan correctly    Lab visit scheduled    Education provided: Please call back if any changes to your diet, medications or how you've been taking warfarin    Plan made per [facility] anticoagulation protocol    Erica Stinson RN  Anticoagulation Clinic  6/29/2022    _______________________________________________________________________     Anticoagulation Episode Summary     Current INR goal:  2.0-3.0   TTR:  92.5 % (1 y)   Target end date:  Indefinite   Send INR reminders to:  [facility]    Indications    Long-term (current) use of anticoagulants [Z79.01] [Z79.01]  H/O aortic valve replacement [Z95.2]           Comments:           Anticoagulation Care Providers     Provider Role Specialty Phone number    Brook Echavarria MD Referring Internal Medicine [phone]

## 2022-07-31 DIAGNOSIS — E05.90 SUBCLINICAL HYPERTHYROIDISM: ICD-10-CM

## 2022-08-01 RX ORDER — METHIMAZOLE 5 MG/1
TABLET ORAL
Qty: 45 TABLET | Refills: 0 | Status: SHIPPED | OUTPATIENT
Start: 2022-08-01 | End: 2022-09-21

## 2022-08-10 ENCOUNTER — ANTICOAGULATION THERAPY VISIT (OUTPATIENT)
Dept: ANTICOAGULATION | Facility: CLINIC | Age: 67
End: 2022-08-10

## 2022-08-10 ENCOUNTER — LAB (OUTPATIENT)
Dept: LAB | Facility: CLINIC | Age: 67
End: 2022-08-10
Payer: COMMERCIAL

## 2022-08-10 DIAGNOSIS — Z79.01 LONG TERM CURRENT USE OF ANTICOAGULANT THERAPY: Primary | ICD-10-CM

## 2022-08-10 DIAGNOSIS — Z79.01 LONG TERM CURRENT USE OF ANTICOAGULANT THERAPY: ICD-10-CM

## 2022-08-10 DIAGNOSIS — Z95.2 H/O AORTIC VALVE REPLACEMENT: ICD-10-CM

## 2022-08-10 LAB — INR BLD: 2.3 (ref 0.9–1.1)

## 2022-08-10 PROCEDURE — 85610 PROTHROMBIN TIME: CPT

## 2022-08-10 PROCEDURE — 36416 COLLJ CAPILLARY BLOOD SPEC: CPT

## 2022-08-10 NOTE — PROGRESS NOTES
ANTICOAGULATION MANAGEMENT     Annabella Bolanos 66 year old female is on warfarin with therapeutic INR result. (Goal INR 2.0-3.0)    Recent labs: (last 7 days)     08/10/22  1022   INR 2.3*       ASSESSMENT       Source(s): Chart Review    Previous INR was Therapeutic last 2(+) visits    Medication, diet, health changes since last INR chart reviewed; none identified           PLAN     Recommended plan for no diet, medication or health factor changes affecting INR     Dosing Instructions: Continue your current warfarin dose with next INR in 6 weeks       Summary  As of 8/10/2022    Full warfarin instructions:  7.5 mg every Mon, Fri; 5 mg all other days   Next INR check:  9/21/2022             Detailed voice message left for Annabella with dosing instructions and follow up date.     Contact [phone]  to schedule and with any changes, questions or concerns.     Education provided: Please call back if any changes to your diet, medications or how you've been taking warfarin and Contact [phone]  with any changes, questions or concerns.     Plan made per [facility] anticoagulation protocol    Deborah Aguilera RN  Anticoagulation Clinic  8/10/2022    _______________________________________________________________________     Anticoagulation Episode Summary     Current INR goal:  2.0-3.0   TTR:  100.0 % (1 y)   Target end date:  Indefinite   Send INR reminders to:  [facility]    Indications    Long-term (current) use of anticoagulants [Z79.01] [Z79.01]  H/O aortic valve replacement [Z95.2]           Comments:           Anticoagulation Care Providers     Provider Role Specialty Phone number    Brook Echavarria MD Referring Internal Medicine [phone]

## 2022-08-23 DIAGNOSIS — I10 HTN, GOAL BELOW 140/90: ICD-10-CM

## 2022-08-25 NOTE — TELEPHONE ENCOUNTER
Pending Prescriptions:                       Disp   Refills    carvedilol (COREG) 12.5 MG tablet [Pharmac*180 ta*3        Sig: TAKE 1 TABLET TWICE A DAY WITH MEALS    lisinopril (ZESTRIL) 10 MG tablet [Pharmac*180 ta*3        Sig: TAKE 1 TABLET TWICE A DAY    Routing refill request to provider for review/approval because:  Protocols failed.    Please advise, thanks.

## 2022-08-26 RX ORDER — CARVEDILOL 12.5 MG/1
TABLET ORAL
Qty: 180 TABLET | Refills: 0 | Status: SHIPPED | OUTPATIENT
Start: 2022-08-26 | End: 2022-10-21

## 2022-08-26 RX ORDER — LISINOPRIL 10 MG/1
TABLET ORAL
Qty: 180 TABLET | Refills: 0 | Status: SHIPPED | OUTPATIENT
Start: 2022-08-26 | End: 2022-10-21

## 2022-08-30 DIAGNOSIS — Z95.2 S/P AORTIC VALVE REPLACEMENT: ICD-10-CM

## 2022-08-30 DIAGNOSIS — Z79.01 LONG TERM CURRENT USE OF ANTICOAGULANTS WITH INR GOAL OF 2.0-3.0: ICD-10-CM

## 2022-08-31 RX ORDER — WARFARIN SODIUM 5 MG/1
TABLET ORAL
Qty: 115 TABLET | Refills: 0 | Status: SHIPPED | OUTPATIENT
Start: 2022-08-31 | End: 2022-11-29

## 2022-08-31 NOTE — TELEPHONE ENCOUNTER
ANTICOAGULATION MANAGEMENT:  Medication Refill    Anticoagulation Summary  As of 8/10/2022    Warfarin maintenance plan:  7.5 mg (5 mg x 1.5) every Mon, Fri; 5 mg (5 mg x 1) all other days   Next INR check:  9/21/2022   Target end date:  Indefinite    Indications    Long-term (current) use of anticoagulants [Z79.01] [Z79.01]  H/O aortic valve replacement [Z95.2]             Anticoagulation Care Providers     Provider Role Specialty Phone number    Brook Echavarria MD Referring Internal Medicine [phone]          Visit with referring provider/group within last year: No, last visit date: 8/19/21     Next 5 appointments (look out 90 days)    Oct 21, 2022  8:30 AM  (Arrive by 8:10 AM)  Annual Wellness Visit with Karla Moss MD  [facility] ([facility] ) [address] [place] [id number] [phone]            [facility] referral signed within last year: Yes    Annabella does NOT meet all criteria for refill: Visit with referring provider's group was >= 1 year ago. 90 day linden fill approved; patient notified to schedule with referring provider per [facility] protocol    Erica Stinson RN  Anticoagulation Clinic

## 2022-09-03 ENCOUNTER — HEALTH MAINTENANCE LETTER (OUTPATIENT)
Age: 67
End: 2022-09-03

## 2022-09-07 DIAGNOSIS — E05.90 SUBCLINICAL HYPERTHYROIDISM: Primary | ICD-10-CM

## 2022-09-12 ENCOUNTER — HOSPITAL ENCOUNTER (OUTPATIENT)
Dept: MAMMOGRAPHY | Facility: CLINIC | Age: 67
Discharge: HOME | End: 2022-09-12
Attending: INTERNAL MEDICINE | Admitting: INTERNAL MEDICINE
Payer: COMMERCIAL

## 2022-09-12 ENCOUNTER — LAB (OUTPATIENT)
Dept: LAB | Facility: CLINIC | Age: 67
End: 2022-09-12
Payer: COMMERCIAL

## 2022-09-12 DIAGNOSIS — E05.90 SUBCLINICAL HYPERTHYROIDISM: ICD-10-CM

## 2022-09-12 DIAGNOSIS — Z12.31 VISIT FOR SCREENING MAMMOGRAM: ICD-10-CM

## 2022-09-12 LAB — T3FREE SERPL-MCNC: 3.3 PG/ML (ref 2–4.4)

## 2022-09-12 PROCEDURE — 77067 SCR MAMMO BI INCL CAD: CPT

## 2022-09-12 PROCEDURE — 84481 FREE ASSAY (FT-3): CPT

## 2022-09-12 PROCEDURE — 36415 COLL VENOUS BLD VENIPUNCTURE: CPT

## 2022-09-12 PROCEDURE — 84443 ASSAY THYROID STIM HORMONE: CPT

## 2022-09-12 PROCEDURE — 84439 ASSAY OF FREE THYROXINE: CPT

## 2022-09-13 LAB
T4 FREE SERPL-MCNC: 1.24 NG/DL (ref 0.76–1.46)
TSH SERPL DL<=0.005 MIU/L-ACNC: 1.04 MU/L (ref 0.4–4)

## 2022-09-19 ASSESSMENT — ENCOUNTER SYMPTOMS
DIFFICULTY URINATING: 1
JOINT SWELLING: 1
STIFFNESS: 1
ARTHRALGIAS: 1

## 2022-09-21 ENCOUNTER — OFFICE VISIT (OUTPATIENT)
Dept: ENDOCRINOLOGY | Facility: CLINIC | Age: 67
End: 2022-09-21
Payer: COMMERCIAL

## 2022-09-21 ENCOUNTER — LAB (OUTPATIENT)
Dept: LAB | Facility: CLINIC | Age: 67
End: 2022-09-21
Payer: COMMERCIAL

## 2022-09-21 ENCOUNTER — ANTICOAGULATION THERAPY VISIT (OUTPATIENT)
Dept: ANTICOAGULATION | Facility: CLINIC | Age: 67
End: 2022-09-21

## 2022-09-21 VITALS
HEART RATE: 56 BPM | TEMPERATURE: 97.9 F | SYSTOLIC BLOOD PRESSURE: 140 MMHG | HEIGHT: 64 IN | RESPIRATION RATE: 18 BRPM | DIASTOLIC BLOOD PRESSURE: 86 MMHG | OXYGEN SATURATION: 100 % | WEIGHT: 123.7 LBS | BODY MASS INDEX: 21.12 KG/M2

## 2022-09-21 DIAGNOSIS — Z79.01 LONG TERM CURRENT USE OF ANTICOAGULANT THERAPY: ICD-10-CM

## 2022-09-21 DIAGNOSIS — Z95.2 H/O AORTIC VALVE REPLACEMENT: ICD-10-CM

## 2022-09-21 DIAGNOSIS — Z79.01 LONG TERM CURRENT USE OF ANTICOAGULANT THERAPY: Primary | ICD-10-CM

## 2022-09-21 DIAGNOSIS — E05.90 SUBCLINICAL HYPERTHYROIDISM: Primary | ICD-10-CM

## 2022-09-21 LAB — INR BLD: 3.1 (ref 0.9–1.1)

## 2022-09-21 PROCEDURE — 99214 OFFICE O/P EST MOD 30 MIN: CPT | Performed by: INTERNAL MEDICINE

## 2022-09-21 PROCEDURE — 85610 PROTHROMBIN TIME: CPT

## 2022-09-21 PROCEDURE — 36416 COLLJ CAPILLARY BLOOD SPEC: CPT

## 2022-09-21 RX ORDER — METHIMAZOLE 5 MG/1
TABLET ORAL
Qty: 45 TABLET | Refills: 3 | Status: SHIPPED | OUTPATIENT
Start: 2022-09-21 | End: 2023-09-21

## 2022-09-21 NOTE — PROGRESS NOTES
ANTICOAGULATION MANAGEMENT     Annabella Bolanos 67 year old female is on warfarin with supratherapeutic INR result. (Goal INR 2.0-3.0)    Recent labs: (last 7 days)     09/21/22  1453   INR 3.1*       ASSESSMENT       Source(s): Chart Review and Patient/Caregiver Call       Warfarin doses taken: Warfarin taken as instructed    Diet: No new diet changes identified    New illness, injury, or hospitalization: No    Medication/supplement changes: None noted    Signs or symptoms of bleeding or clotting: No    Previous INR: Therapeutic last 2(+) visits    Additional findings: None       PLAN     Recommended plan for no diet, medication or health factor changes affecting INR     Dosing Instructions: Continue your current warfarin dose with next INR in 3 weeks       Summary  As of 9/21/2022    Full warfarin instructions:  7.5 mg every Mon, Fri; 5 mg all other days   Next INR check:  10/12/2022             Telephone call with Annabella who agrees to plan and repeated back plan correctly    Lab visit scheduled    Education provided: Please call back if any changes to your diet, medications or how you've been taking warfarin    Plan made per [facility] anticoagulation protocol    Erica Stinson RN  Anticoagulation Clinic  9/21/2022    _______________________________________________________________________     Anticoagulation Episode Summary     Current INR goal:  2.0-3.0   TTR:  98.6 % (1 y)   Target end date:  Indefinite   Send INR reminders to:  [facility]    Indications    Long-term (current) use of anticoagulants [Z79.01] [Z79.01]  H/O aortic valve replacement [Z95.2]           Comments:           Anticoagulation Care Providers     Provider Role Specialty Phone number    Brook Echavarria MD Referring Internal Medicine [phone]

## 2022-09-21 NOTE — PROGRESS NOTES
Name: Annabella Bolanos  Seen for follow-up of subclinical hyperthyroidism.    HPI:  Annabella Bolanos is a 67 year old female who presents for the evaluation of subclinical Hyperthyroidism.  H/o breast cancer and h/o aortic valve replacement and is on long term anticoagulation.  Breast cancer diagnosed in 2014 s/p lumpectomy and chemo and radiation. Took radiation 5 days a week for 1 month.  DEXA 2018 scan showing osteopenia and appears stable.  She is not on medication for osteoporosis/osteopenia at this time.  She is also on aromatase inhibitor as a part of treatment for breast cancer.     Subclinical hyperthyroidism noted since 7/2016. Plan was for continue to monitor.  No h/o arrhythmia  No h/o osteoporosis-- has osteopenia based on DEXA 2020. On Fosamax.  TSI neg  US thyroid ( h/o radiation)- no discrete nodules.  As there was further decline in TSH along with risk for low bone density with aromatase inhibitor (with with prior history of osteopenia) as well as complex cardiac history she was started on methimazole 5 mg in 7/2018.    Currently taking methimazole 2.5 mg/day  X 5/2021- dose was increased at that time)  F/u labs 9/2022 in range.  LFT and CBC stable.    Since last clinic visit she was started on Fosamax by Dr. Felder for osteoporosis/osteopenia.  She is taking Fosamax on a weekly basis since February 2019.    Feeling good.  Has good energy.  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  H/o arthritis.  Weight is stable.  Eating healthy.  Having  joint pain with arthritis.    History of radiation exposure: YES. As above.  History of thyroid dysfunction: not to her knowledge. No FH of thyroid cancer.  Palpitations: No  Changes to hair or skin: No  Diarrhea/Constipation:No  Changes in menses: s/p menopause  Changes in vision:No  Diplopia/Blurryness:No  Dysphagia or Shortness of breath:No  Tremors:No  Difficulty sleeping:Yes: most of the time  Changes in weight: No  Lithium/Amiodarone: No  URI: No  CT Scans:No  Mother had hyperthyroidism s/p  DOE and was on levothyroxine.  Wt Readings from Last 2 Encounters:   22 56.1 kg (123 lb 11.2 oz)   21 56.2 kg (123 lb 12.8 oz)     PMH/PSH:  Past Medical History:   Diagnosis Date     Adenomatous colon polyp 2015     Aortic stenosis 11    bicuspid AV with severe stenosis - 2011 mechanical AVR with 23 mm St Yordan AV conduit, composite graft placement     Arthritis     knees and hands     Bicuspid aortic valve      Breast cancer (H) 2014    R breast     H/O aortic valve replacement     St Yordan     Heart murmur     Valve repalcement .     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    AAA repair with av23 mm tube graft     PONV (postoperative nausea and vomiting)     n/v morphine vs anesthesia, vomiting x24 hrs     Subclinical hyperthyroidism 2017     Thrombosis of leg     after  of daughter     Uncomplicated asthma      Past Surgical History:   Procedure Laterality Date     BIOPSY NODE SENTINEL Right 9/10/2014    Procedure: BIOPSY NODE SENTINEL;  Surgeon: Ila Romano MD;  Location: [facility] OR     CARDIAC SURGERY  2011    aortic valve replacement     ENT SURGERY      tonsillectomy     HRW PORT A CATH       INSERT PORT VASCULAR ACCESS Left 10/22/2014    Procedure: INSERT PORT VASCULAR ACCESS;  Surgeon: Ila Romano MD;  Location: [facility] OR     LUMPECTOMY BREAST WITH SEED LOCALIZATION Right 9/10/2014    Procedure: LUMPECTOMY BREAST WITH SEED LOCALIZATION;  Surgeon: Ila Romano MD;  Location: [facility] OR     ORTHOPEDIC SURGERY      shoulder, thumb surgery     REMOVE PORT VASCULAR ACCESS Left 2015    Procedure: REMOVE PORT VASCULAR ACCESS;  Surgeon: Ila Romano MD;  Location: [facility] OR     REPAIR ANEURYSM ASCENDING AORTA  2011    Procedure:REPAIR ANEURYSM ASCENDING AORTA; Medial Sternotomy, Aortic Valve Replacement, (St.  "Yordan [employer] HP Valved Graft, size 23 mm) on pump oxygenation, intraoperative transesophageal cardiogram; Surgeon:JOHN PAUL ULLOA; Location:[facility] OR     REPLACE VALVE AORTIC  6/23/11    bicuspid AV with severe stenosis - 6/2011 mechanical AVR with 23 mm St Yordan AV conduit, composite graft placement     Family Hx:  Family History   Problem Relation Age of Onset     Hypertension Mother      Thyroid Disease Mother         \"Toxic thyroid\"     Prostate Cancer Father 70     Cancer Father 80        Lung cancer, Not caused from smoking     Cerebrovascular Disease Father 80     Hypertension Father      Cardiovascular Brother         half brother      Cardiovascular Son         Puentes-Parkinsons White Syndrome     Cardiovascular Daughter         Heart murmur     Breast Cancer Paternal Aunt 80     Cardiovascular Paternal Aunt      Arthritis Brother 40        RA - half brother      Cancer Maternal Grandfather      Colon Cancer No family hx of      Thyroid disease: as above          Social Hx:  Social History     Socioeconomic History     Marital status:      Spouse name: Not on file     Number of children: Not on file     Years of education: Not on file     Highest education level: Not on file   Occupational History     Not on file   Tobacco Use     Smoking status: Never Smoker     Smokeless tobacco: Never Used   Vaping Use     Vaping Use: Never used   Substance and Sexual Activity     Alcohol use: Yes     Comment: 2 drinks per week -      Drug use: No     Sexual activity: Not Currently   Other Topics Concern     Parent/sibling w/ CABG, MI or angioplasty before 65F 55M? Not Asked      Service Not Asked     Blood Transfusions Not Asked     Caffeine Concern Yes     Comment: 1-2 cups caffeine per day     Occupational Exposure Not Asked     Hobby Hazards Not Asked     Sleep Concern No     Stress Concern No     Weight Concern No     Special Diet Yes     Comment: low fat daily , low red meats     Back Care " "No     Exercise Yes     Comment: walks daily/ 3x a week exercise class     Bike Helmet Not Asked     Seat Belt Not Asked     Self-Exams Not Asked   Social History Narrative     Not on file     Social Determinants of Health     Financial Resource Strain: Not on file   Food Insecurity: Not on file   Transportation Needs: Not on file   Physical Activity: Not on file   Stress: Not on file   Social Connections: Not on file   Intimate Partner Violence: Not on file   Housing Stability: Not on file          MEDICATIONS:  has a current medication list which includes the following prescription(s): alendronate, anastrozole, aspirin, carvedilol, lisinopril, methimazole, warfarin anticoagulant, and zolpidem.    ROS   ROS: 10 point ROS neg other than the symptoms noted above in the HPI.    Physical Exam   VS: BP (!) 161/89 (BP Location: Left arm, Patient Position: Chair, Cuff Size: Adult Regular)   Pulse 56   Temp 97.9  F (36.6  C) (Tympanic)   Resp 18   Ht 1.626 m (5' 4\")   Wt 56.1 kg (123 lb 11.2 oz)   LMP  (LMP Unknown)   SpO2 100%   BMI 21.23 kg/m    GENERAL: healthy, alert and no distress  EYES: Eyes grossly normal to inspection, conjunctivae and sclerae normal  ENT: no nose swelling, nasal discharge.  Thyroid: no apparent thyroid nodules  RESP: no audible wheeze, cough, or visible cyanosis.  No visible retractions or increased work of breathing.  Able to speak fully in complete sentences.  ABDO: not evaluated.  EXTREMITIES: no hand tremors.  NEURO: Cranial nerves grossly intact, mentation intact and speech normal  SKIN: No apparent skin lesions, rash or edema seen   PSYCH: mentation appears normal, affect normal/bright, judgement and insight intact, normal speech and appearance well-groomed      LABS:  TFTs:  ENDO THYROID LABS-[facility] Latest Ref Rng & Units 9/12/2022   TSH 0.40 - 4.00 mU/L 1.04   FREE T3 2.0 - 4.4 pg/mL 3.3   FREE T4 0.76 - 1.46 ng/dL 1.24     CBC:  WBC   Date Value Ref Range Status   06/30/2021 6.0 4.0 " - 11.0 10e9/L Final     WBC Count   Date Value Ref Range Status   08/19/2021 5.8 4.0 - 11.0 10e3/uL Final       LFTs:  Liver Function Studies - Recent Labs   Lab Test 08/19/21  0930   PROTTOTAL 6.8   ALBUMIN 3.6   BILITOTAL 0.7   ALKPHOS 58   AST 16   ALT 22       ULTRASOUND THYROID December 15, 2017 9:24 AM   HISTORY: Subclinical hyperthyroidism.   FINDINGS: Thyroid ultrasound demonstrates an enlarged thyroid gland. The right lobe measures 5.4 x 2.0 x 1.8 cm. The left lobe measures 3.8 x 1.6 x 1.3 cm. The isthmus is normal in thickness. Thyroid parenchyma is heterogeneous in echotexture. Increased color Doppler flow is noted throughout the thyroid gland. Thyroid nodules as follows: Right Lobe: None. Isthmus: None. Left Lobe: None.   IMPRESSION: Enlarged heterogeneous thyroid gland without evidence of a discrete thyroid nodule. Increased color Doppler flow suggests underlying thyroiditis. Correlate with thyroid function test and nuclear medicine thyroid scan as needed.    All pertinent notes, labs, and images personally reviewed by me.     A/P  Ms.Carol WINDY Bolanos is a 67 year old here for the evaluation of hyperthyroidism.    Subclinical hyperthyroidism (TSI neg):   TSH remained suppressed since 2016 and along with normal free T4  No h/o arrhythmia  No h/o osteoporosis-- has osteopenia. Not on treatment.  She is on aromatase inhibitor for breast cancer.  TSI neg  US thyroid ( h/o radiation)- no discrete nodules.  Clinically looks euthyroid.  No major complaints.  As there is further decline in TSH along with risk for low bone density with aromatase inhibitor (with with prior history of osteopenia) was started on methimazole 5 mg 7/2018  Currently  taking methimazole to 2.5/day (5/20/2021)  F/u labs in range.  Plan:  Discussed diagnosis, pathophysiology, management and treatment options of condition with pt.  Based on 9/2022 labs recommend to continue methimazole 2.5 mg/day (5/20/2021)  Labs in 6 months or sooner if  concerns.  Follow up in 1 year.  Please make a lab appointment for blood work and follow up clinic appointment in 1 week after that to discuss results.      Discussed s/s of hypothyroidism and hyperthyroidism to watch for.  The patient indicates understanding of these issues and agrees with the plan.    Off note she is on anticoagulant and may need dose adjustment to get INR at target levels ( h/o aortic valve replacement)  Baseline LFT and WBC normal.  Discussed diagnosis, pathophysiology, management and treatment options of condition with pt.    Discussed indications, risks and benefits of all medications prescribed, and answered questions to patient's satisfaction.  The patient indicates understanding of these issues and agrees with the plan.    Discussed possible side effects of methimazole including liver dysfunction and agranulocytosis. Discussed to watch for s/s like jaundice, abdominal pain and skin rash. In case of prolonged unresolving fever, sore throat and infections, advise to seek medical care.    Call if:     Signs or symptoms of infection. These include a fever of 100.5 degrees or higher, chills, severe sore throat, ear or sinus pain, cough, increased sputum or change in color, painful urination, mouth sores.   Severe nausea or vomiting.   Joint pain or swelling.   Not able to eat.   Unusual bruising or bleeding.   Dark urine or yellow skin or eyes.      Discussed s/s of hypothyroidism and hyperthyroidism to watch for.  The patient indicates understanding of these issues and agrees with the plan.    Follow-up:  1 year.    Merle Baxter MD  Endocrinology  [facility]  CC: Brook Echavarria        Addendum to above note and clinic visit:    Labs reviewed.    See result note/telephone encounter.              Answers for HPI/ROS submitted by the patient on 9/19/2022  General Symptoms: No  Skin Symptoms: No  HENT Symptoms: No  EYE SYMPTOMS: No  HEART SYMPTOMS: No  LUNG  SYMPTOMS: No  INTESTINAL SYMPTOMS: No  URINARY SYMPTOMS: Yes  GYNECOLOGIC SYMPTOMS: No  BREAST SYMPTOMS: No  SKELETAL SYMPTOMS: Yes  BLOOD SYMPTOMS: No  NERVOUS SYSTEM SYMPTOMS: No  MENTAL HEALTH SYMPTOMS: No  Trouble holding urine or incontinence: Yes  Increased frequency of urination: Yes  Frequent nighttime urination: Yes  Difficulty emptying bladder: Yes  Swollen joints: Yes  Joint pain: Yes  Joint stiffness: Yes

## 2022-09-21 NOTE — LETTER
9/21/2022         RE: Annabella Bolanos  [address] [place] [address]        Dear Colleague,    Thank you for referring your patient, Annabella Bolanos, to the [facility]. Please see a copy of my visit note below.    Name: Annabella Bolanos  Seen for follow-up of subclinical hyperthyroidism.    HPI:  Annabella Bolanos is a 67 year old female who presents for the evaluation of subclinical Hyperthyroidism.  H/o breast cancer and h/o aortic valve replacement and is on long term anticoagulation.  Breast cancer diagnosed in 2014 s/p lumpectomy and chemo and radiation. Took radiation 5 days a week for 1 month.  DEXA 2018 scan showing osteopenia and appears stable.  She is not on medication for osteoporosis/osteopenia at this time.  She is also on aromatase inhibitor as a part of treatment for breast cancer.     Subclinical hyperthyroidism noted since 7/2016. Plan was for continue to monitor.  No h/o arrhythmia  No h/o osteoporosis-- has osteopenia based on DEXA 2020. On Fosamax.  TSI neg  US thyroid ( h/o radiation)- no discrete nodules.  As there was further decline in TSH along with risk for low bone density with aromatase inhibitor (with with prior history of osteopenia) as well as complex cardiac history she was started on methimazole 5 mg in 7/2018.    Currently taking methimazole 2.5 mg/day  X 5/2021- dose was increased at that time)  F/u labs 9/2022 in range.  LFT and CBC stable.    Since last clinic visit she was started on Fosamax by Dr. Felder for osteoporosis/osteopenia.  She is taking Fosamax on a weekly basis since February 2019.    Feeling good.  Has good energy.  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  H/o arthritis.  Weight is stable.  Eating healthy.  Having  joint pain with arthritis.    History of radiation exposure: YES. As above.  History of thyroid dysfunction: not to her knowledge. No FH of thyroid cancer.  Palpitations: No  Changes to hair or skin: No  Diarrhea/Constipation:No  Changes in menses:  s/p menopause  Changes in vision:No  Diplopia/Blurryness:No  Dysphagia or Shortness of breath:No  Tremors:No  Difficulty sleeping:Yes: most of the time  Changes in weight: No  Lithium/Amiodarone: No  URI: No  CT Scans:No  Mother had hyperthyroidism s/p DOE and was on levothyroxine.  Wt Readings from Last 2 Encounters:   22 56.1 kg (123 lb 11.2 oz)   21 56.2 kg (123 lb 12.8 oz)     PMH/PSH:  Past Medical History:   Diagnosis Date     Adenomatous colon polyp 2015     Aortic stenosis 11    bicuspid AV with severe stenosis - 2011 mechanical AVR with 23 mm St Yordan AV conduit, composite graft placement     Arthritis     knees and hands     Bicuspid aortic valve      Breast cancer (H) 2014    R breast     H/O aortic valve replacement     St Yordan     Heart murmur     Valve repalcement .     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    AAA repair with av23 mm tube graft     PONV (postoperative nausea and vomiting)     n/v morphine vs anesthesia, vomiting x24 hrs     Subclinical hyperthyroidism 2017     Thrombosis of leg     after  of daughter     Uncomplicated asthma      Past Surgical History:   Procedure Laterality Date     BIOPSY NODE SENTINEL Right 9/10/2014    Procedure: BIOPSY NODE SENTINEL;  Surgeon: Ila Romano MD;  Location: [facility] OR     CARDIAC SURGERY  2011    aortic valve replacement     ENT SURGERY      tonsillectomy     HRW PORT A CATH       INSERT PORT VASCULAR ACCESS Left 10/22/2014    Procedure: INSERT PORT VASCULAR ACCESS;  Surgeon: Ila Romano MD;  Location: [facility] OR     LUMPECTOMY BREAST WITH SEED LOCALIZATION Right 9/10/2014    Procedure: LUMPECTOMY BREAST WITH SEED LOCALIZATION;  Surgeon: Ila Romano MD;  Location: [facility] OR     ORTHOPEDIC SURGERY      shoulder, thumb surgery     REMOVE PORT VASCULAR ACCESS  "Left 4/8/2015    Procedure: REMOVE PORT VASCULAR ACCESS;  Surgeon: Ila Romano MD;  Location: [facility] OR     REPAIR ANEURYSM ASCENDING AORTA  6/23/2011    Procedure:REPAIR ANEURYSM ASCENDING AORTA; Medial Sternotomy, Aortic Valve Replacement, (St. Yordan [employer] HP Valved Graft, size 23 mm) on pump oxygenation, intraoperative transesophageal cardiogram; Surgeon:JOHN PAUL ULLOA; Location:[facility] OR     REPLACE VALVE AORTIC  6/23/11    bicuspid AV with severe stenosis - 6/2011 mechanical AVR with 23 mm St Yordan AV conduit, composite graft placement     Family Hx:  Family History   Problem Relation Age of Onset     Hypertension Mother      Thyroid Disease Mother         \"Toxic thyroid\"     Prostate Cancer Father 70     Cancer Father 80        Lung cancer, Not caused from smoking     Cerebrovascular Disease Father 80     Hypertension Father      Cardiovascular Brother         half brother      Cardiovascular Son         Puentes-Parkinsons White Syndrome     Cardiovascular Daughter         Heart murmur     Breast Cancer Paternal Aunt 80     Cardiovascular Paternal Aunt      Arthritis Brother 40        RA - half brother      Cancer Maternal Grandfather      Colon Cancer No family hx of      Thyroid disease: as above          Social Hx:  Social History     Socioeconomic History     Marital status:      Spouse name: Not on file     Number of children: Not on file     Years of education: Not on file     Highest education level: Not on file   Occupational History     Not on file   Tobacco Use     Smoking status: Never Smoker     Smokeless tobacco: Never Used   Vaping Use     Vaping Use: Never used   Substance and Sexual Activity     Alcohol use: Yes     Comment: 2 drinks per week -      Drug use: No     Sexual activity: Not Currently   Other Topics Concern     Parent/sibling w/ CABG, MI or angioplasty before 65F 55M? Not Asked      Service Not Asked     Blood Transfusions Not Asked     Caffeine " "Concern Yes     Comment: 1-2 cups caffeine per day     Occupational Exposure Not Asked     Hobby Hazards Not Asked     Sleep Concern No     Stress Concern No     Weight Concern No     Special Diet Yes     Comment: low fat daily , low red meats     Back Care No     Exercise Yes     Comment: walks daily/ 3x a week exercise class     Bike Helmet Not Asked     Seat Belt Not Asked     Self-Exams Not Asked   Social History Narrative     Not on file     Social Determinants of Health     Financial Resource Strain: Not on file   Food Insecurity: Not on file   Transportation Needs: Not on file   Physical Activity: Not on file   Stress: Not on file   Social Connections: Not on file   Intimate Partner Violence: Not on file   Housing Stability: Not on file          MEDICATIONS:  has a current medication list which includes the following prescription(s): alendronate, anastrozole, aspirin, carvedilol, lisinopril, methimazole, warfarin anticoagulant, and zolpidem.    ROS   ROS: 10 point ROS neg other than the symptoms noted above in the HPI.    Physical Exam   VS: BP (!) 161/89 (BP Location: Left arm, Patient Position: Chair, Cuff Size: Adult Regular)   Pulse 56   Temp 97.9  F (36.6  C) (Tympanic)   Resp 18   Ht 1.626 m (5' 4\")   Wt 56.1 kg (123 lb 11.2 oz)   LMP  (LMP Unknown)   SpO2 100%   BMI 21.23 kg/m    GENERAL: healthy, alert and no distress  EYES: Eyes grossly normal to inspection, conjunctivae and sclerae normal  ENT: no nose swelling, nasal discharge.  Thyroid: no apparent thyroid nodules  RESP: no audible wheeze, cough, or visible cyanosis.  No visible retractions or increased work of breathing.  Able to speak fully in complete sentences.  ABDO: not evaluated.  EXTREMITIES: no hand tremors.  NEURO: Cranial nerves grossly intact, mentation intact and speech normal  SKIN: No apparent skin lesions, rash or edema seen   PSYCH: mentation appears normal, affect normal/bright, judgement and insight intact, normal speech " and appearance well-groomed      LABS:  TFTs:  ENDO THYROID LABS-[facility] Latest Ref Rng & Units 9/12/2022   TSH 0.40 - 4.00 mU/L 1.04   FREE T3 2.0 - 4.4 pg/mL 3.3   FREE T4 0.76 - 1.46 ng/dL 1.24     CBC:  WBC   Date Value Ref Range Status   06/30/2021 6.0 4.0 - 11.0 10e9/L Final     WBC Count   Date Value Ref Range Status   08/19/2021 5.8 4.0 - 11.0 10e3/uL Final       LFTs:  Liver Function Studies - Recent Labs   Lab Test 08/19/21  0930   PROTTOTAL 6.8   ALBUMIN 3.6   BILITOTAL 0.7   ALKPHOS 58   AST 16   ALT 22       ULTRASOUND THYROID December 15, 2017 9:24 AM   HISTORY: Subclinical hyperthyroidism.   FINDINGS: Thyroid ultrasound demonstrates an enlarged thyroid gland. The right lobe measures 5.4 x 2.0 x 1.8 cm. The left lobe measures 3.8 x 1.6 x 1.3 cm. The isthmus is normal in thickness. Thyroid parenchyma is heterogeneous in echotexture. Increased color Doppler flow is noted throughout the thyroid gland. Thyroid nodules as follows: Right Lobe: None. Isthmus: None. Left Lobe: None.   IMPRESSION: Enlarged heterogeneous thyroid gland without evidence of a discrete thyroid nodule. Increased color Doppler flow suggests underlying thyroiditis. Correlate with thyroid function test and nuclear medicine thyroid scan as needed.    All pertinent notes, labs, and images personally reviewed by me.     A/P  Ms.Carol WINDY Bolanos is a 67 year old here for the evaluation of hyperthyroidism.    Subclinical hyperthyroidism (TSI neg):   TSH remained suppressed since 2016 and along with normal free T4  No h/o arrhythmia  No h/o osteoporosis-- has osteopenia. Not on treatment.  She is on aromatase inhibitor for breast cancer.  TSI neg  US thyroid ( h/o radiation)- no discrete nodules.  Clinically looks euthyroid.  No major complaints.  As there is further decline in TSH along with risk for low bone density with aromatase inhibitor (with with prior history of osteopenia) was started on methimazole 5 mg 7/2018  Currently  taking methimazole  to 2.5/day (5/20/2021)  F/u labs in range.  Plan:  Discussed diagnosis, pathophysiology, management and treatment options of condition with pt.  Based on 9/2022 labs recommend to continue methimazole 2.5 mg/day (5/20/2021)  Labs in 6 months or sooner if concerns.  Follow up in 1 year.  Please make a lab appointment for blood work and follow up clinic appointment in 1 week after that to discuss results.      Discussed s/s of hypothyroidism and hyperthyroidism to watch for.  The patient indicates understanding of these issues and agrees with the plan.    Off note she is on anticoagulant and may need dose adjustment to get INR at target levels ( h/o aortic valve replacement)  Baseline LFT and WBC normal.  Discussed diagnosis, pathophysiology, management and treatment options of condition with pt.    Discussed indications, risks and benefits of all medications prescribed, and answered questions to patient's satisfaction.  The patient indicates understanding of these issues and agrees with the plan.    Discussed possible side effects of methimazole including liver dysfunction and agranulocytosis. Discussed to watch for s/s like jaundice, abdominal pain and skin rash. In case of prolonged unresolving fever, sore throat and infections, advise to seek medical care.    Call if:     Signs or symptoms of infection. These include a fever of 100.5 degrees or higher, chills, severe sore throat, ear or sinus pain, cough, increased sputum or change in color, painful urination, mouth sores.   Severe nausea or vomiting.   Joint pain or swelling.   Not able to eat.   Unusual bruising or bleeding.   Dark urine or yellow skin or eyes.      Discussed s/s of hypothyroidism and hyperthyroidism to watch for.  The patient indicates understanding of these issues and agrees with the plan.    Follow-up:  1 year.    Merle Baxter MD  Endocrinology  [facility]  CC: Brook Echavarria        Addendum to above note  and clinic visit:    Labs reviewed.    See result note/telephone encounter.              Answers for HPI/ROS submitted by the patient on 9/19/2022  General Symptoms: No  Skin Symptoms: No  HENT Symptoms: No  EYE SYMPTOMS: No  HEART SYMPTOMS: No  LUNG SYMPTOMS: No  INTESTINAL SYMPTOMS: No  URINARY SYMPTOMS: Yes  GYNECOLOGIC SYMPTOMS: No  BREAST SYMPTOMS: No  SKELETAL SYMPTOMS: Yes  BLOOD SYMPTOMS: No  NERVOUS SYSTEM SYMPTOMS: No  MENTAL HEALTH SYMPTOMS: No  Trouble holding urine or incontinence: Yes  Increased frequency of urination: Yes  Frequent nighttime urination: Yes  Difficulty emptying bladder: Yes  Swollen joints: Yes  Joint pain: Yes  Joint stiffness: Yes          Again, thank you for allowing me to participate in the care of your patient.        Sincerely,        Merle Baxter MD

## 2022-09-21 NOTE — PATIENT INSTRUCTIONS
[facility]  Dr Baxter, Endocrinology Department    [facility]   [address] [id number]  [place] [address]  Appointment [phone]  Fax: [phone]  Leopold: Monday - Thursday      Please check the cost coverage and copay with insurance before recommended tests, services and medications (especially if new medications are prescribed).     If ordered, please get blood work done 1 week prior to your next appointment so they will be available to Dr. Baxter at your visit.    Thyroid labs are in acceptable range.  Continue current dose of methimazole 2.5 mg/day.  Labs in 6 months.  If stable- follow up in 1 year.    Discussed possible side effects of methimazole including liver dysfunction and agranulocytosis. Discussed to watch for s/s like jaundice, abdominal pain and skin rash. In case of prolonged unresolving fever, sore throat and infections, advise to seek medical care.    Call if:    Signs or symptoms of infection. These include a fever of 100.5 degrees or higher, chills, severe sore throat, ear or sinus pain, cough, increased sputum or change in color, painful urination, mouth sores.  Severe nausea or vomiting.  Joint pain or swelling.  Not able to eat.  Unusual bruising or bleeding.  Dark urine or yellow skin or eyes.

## 2022-10-16 ASSESSMENT — ENCOUNTER SYMPTOMS
HEMATOCHEZIA: 0
HEADACHES: 0
NERVOUS/ANXIOUS: 0
DIARRHEA: 0
SORE THROAT: 0
DYSURIA: 0
ARTHRALGIAS: 1
NAUSEA: 0
HEARTBURN: 0
JOINT SWELLING: 1
SHORTNESS OF BREATH: 0
WEAKNESS: 0
PALPITATIONS: 0
BREAST MASS: 0
PARESTHESIAS: 0
CHILLS: 0
FEVER: 0
COUGH: 0
ABDOMINAL PAIN: 0
DIZZINESS: 0
FREQUENCY: 1
EYE PAIN: 0
MYALGIAS: 0
HEMATURIA: 0
CONSTIPATION: 0

## 2022-10-16 ASSESSMENT — ACTIVITIES OF DAILY LIVING (ADL): CURRENT_FUNCTION: NO ASSISTANCE NEEDED

## 2022-10-17 ENCOUNTER — ANTICOAGULATION THERAPY VISIT (OUTPATIENT)
Dept: ANTICOAGULATION | Facility: CLINIC | Age: 67
End: 2022-10-17

## 2022-10-17 ENCOUNTER — LAB (OUTPATIENT)
Dept: LAB | Facility: CLINIC | Age: 67
End: 2022-10-17
Payer: COMMERCIAL

## 2022-10-17 DIAGNOSIS — Z79.01 LONG TERM CURRENT USE OF ANTICOAGULANT THERAPY: ICD-10-CM

## 2022-10-17 DIAGNOSIS — Z95.2 H/O AORTIC VALVE REPLACEMENT: ICD-10-CM

## 2022-10-17 DIAGNOSIS — Z79.01 LONG TERM CURRENT USE OF ANTICOAGULANT THERAPY: Primary | ICD-10-CM

## 2022-10-17 LAB — INR BLD: 2.4 (ref 0.9–1.1)

## 2022-10-17 PROCEDURE — 36415 COLL VENOUS BLD VENIPUNCTURE: CPT

## 2022-10-17 PROCEDURE — 85610 PROTHROMBIN TIME: CPT

## 2022-10-17 NOTE — PROGRESS NOTES
ANTICOAGULATION MANAGEMENT     Annabella Bolanos 67 year old female is on warfarin with therapeutic INR result. (Goal INR 2.0-3.0)    Recent labs: (last 7 days)     10/17/22  1116   INR 2.4*       ASSESSMENT       Source(s): Chart Review and Patient/Caregiver Call       Warfarin doses taken: Warfarin taken as instructed    Diet: No new diet changes identified    New illness, injury, or hospitalization: No    Medication/supplement changes: None noted    Signs or symptoms of bleeding or clotting: No    Previous INR: Supratherapeutic    Additional findings: None       PLAN     Recommended plan for no diet, medication or health factor changes affecting INR     Dosing Instructions: Continue your current warfarin dose with next INR in 4 weeks       Summary  As of 10/17/2022    Full warfarin instructions:  7.5 mg every Mon, Fri; 5 mg all other days   Next INR check:  11/14/2022             Telephone call with Annabella who verbalizes understanding and agrees to plan    Lab visit scheduled    Education provided: Goal range and significance of current result, Importance of therapeutic range, Potential interaction between warfarin and voltaren (pt not currently taking but may discuss with provider at next visit) and Monitoring for bleeding signs and symptoms    Plan made per [facility] anticoagulation protocol    Guanaco Diaz RN  Anticoagulation Clinic  10/17/2022    _______________________________________________________________________     Anticoagulation Episode Summary     Current INR goal:  2.0-3.0   TTR:  97.5 % (1 y)   Target end date:  Indefinite   Send INR reminders to:  [facility]    Indications    Long-term (current) use of anticoagulants [Z79.01] [Z79.01]  H/O aortic valve replacement [Z95.2]           Comments:           Anticoagulation Care Providers     Provider Role Specialty Phone number    Brook Echavarria MD Referring Internal Medicine [phone]

## 2022-10-21 ENCOUNTER — OFFICE VISIT (OUTPATIENT)
Dept: INTERNAL MEDICINE | Facility: CLINIC | Age: 67
End: 2022-10-21
Payer: COMMERCIAL

## 2022-10-21 DIAGNOSIS — Z23 NEED FOR PROPHYLACTIC VACCINATION AND INOCULATION AGAINST INFLUENZA: ICD-10-CM

## 2022-10-21 DIAGNOSIS — Z95.2 AORTIC VALVE REPLACED: ICD-10-CM

## 2022-10-21 DIAGNOSIS — Z79.01 LONG TERM CURRENT USE OF ANTICOAGULANT THERAPY: ICD-10-CM

## 2022-10-21 DIAGNOSIS — I10 BENIGN ESSENTIAL HYPERTENSION: ICD-10-CM

## 2022-10-21 DIAGNOSIS — Z23 NEED FOR VACCINATION: ICD-10-CM

## 2022-10-21 DIAGNOSIS — R39.15 URINARY URGENCY: ICD-10-CM

## 2022-10-21 DIAGNOSIS — G89.29 CHRONIC PAIN OF LEFT KNEE: ICD-10-CM

## 2022-10-21 DIAGNOSIS — M25.562 CHRONIC PAIN OF LEFT KNEE: ICD-10-CM

## 2022-10-21 DIAGNOSIS — E78.5 HYPERLIPIDEMIA LDL GOAL <130: ICD-10-CM

## 2022-10-21 DIAGNOSIS — M19.041 PRIMARY OSTEOARTHRITIS OF BOTH HANDS: ICD-10-CM

## 2022-10-21 DIAGNOSIS — M85.89 OSTEOPENIA OF MULTIPLE SITES: ICD-10-CM

## 2022-10-21 DIAGNOSIS — Z00.00 ENCOUNTER FOR ANNUAL WELLNESS EXAM IN MEDICARE PATIENT: Primary | ICD-10-CM

## 2022-10-21 DIAGNOSIS — M19.042 PRIMARY OSTEOARTHRITIS OF BOTH HANDS: ICD-10-CM

## 2022-10-21 PROBLEM — Z85.3 PERSONAL HISTORY OF MALIGNANT NEOPLASM OF BREAST: Status: ACTIVE | Noted: 2019-07-18

## 2022-10-21 PROBLEM — I35.0 AORTIC STENOSIS WITH BICUSPID VALVE: Status: RESOLVED | Noted: 2018-05-22 | Resolved: 2022-10-21

## 2022-10-21 PROBLEM — Q23.0 AORTIC STENOSIS WITH BICUSPID VALVE: Status: RESOLVED | Noted: 2018-05-22 | Resolved: 2022-10-21

## 2022-10-21 PROBLEM — E05.90 SUBCLINICAL HYPERTHYROIDISM: Status: RESOLVED | Noted: 2017-12-06 | Resolved: 2022-10-21

## 2022-10-21 PROBLEM — E05.90 HYPERTHYROIDISM: Status: ACTIVE | Noted: 2017-12-06

## 2022-10-21 PROBLEM — Z98.890 H/O AORTIC ANEURYSM REPAIR: Status: RESOLVED | Noted: 2018-05-22 | Resolved: 2022-10-21

## 2022-10-21 PROBLEM — Z86.79 H/O AORTIC ANEURYSM REPAIR: Status: RESOLVED | Noted: 2018-05-22 | Resolved: 2022-10-21

## 2022-10-21 PROBLEM — Q23.81 AORTIC STENOSIS WITH BICUSPID VALVE: Status: RESOLVED | Noted: 2018-05-22 | Resolved: 2022-10-21

## 2022-10-21 LAB — HGB BLD-MCNC: 14.2 G/DL (ref 11.7–15.7)

## 2022-10-21 PROCEDURE — 85018 HEMOGLOBIN: CPT | Performed by: INTERNAL MEDICINE

## 2022-10-21 PROCEDURE — 80061 LIPID PANEL: CPT | Performed by: INTERNAL MEDICINE

## 2022-10-21 PROCEDURE — 99214 OFFICE O/P EST MOD 30 MIN: CPT | Mod: 25 | Performed by: INTERNAL MEDICINE

## 2022-10-21 PROCEDURE — 90677 PCV20 VACCINE IM: CPT | Performed by: INTERNAL MEDICINE

## 2022-10-21 PROCEDURE — G0008 ADMIN INFLUENZA VIRUS VAC: HCPCS | Performed by: INTERNAL MEDICINE

## 2022-10-21 PROCEDURE — 82043 UR ALBUMIN QUANTITATIVE: CPT | Performed by: INTERNAL MEDICINE

## 2022-10-21 PROCEDURE — 80048 BASIC METABOLIC PNL TOTAL CA: CPT | Performed by: INTERNAL MEDICINE

## 2022-10-21 PROCEDURE — G0439 PPPS, SUBSEQ VISIT: HCPCS | Performed by: INTERNAL MEDICINE

## 2022-10-21 PROCEDURE — 36415 COLL VENOUS BLD VENIPUNCTURE: CPT | Performed by: INTERNAL MEDICINE

## 2022-10-21 RX ORDER — CARVEDILOL 12.5 MG/1
12.5 TABLET ORAL 2 TIMES DAILY WITH MEALS
Qty: 180 TABLET | Refills: 3 | Status: SHIPPED | OUTPATIENT
Start: 2022-10-21 | End: 2023-10-25

## 2022-10-21 RX ORDER — LISINOPRIL 10 MG/1
10 TABLET ORAL 2 TIMES DAILY
Qty: 180 TABLET | Refills: 3 | Status: SHIPPED | OUTPATIENT
Start: 2022-10-21 | End: 2023-10-25

## 2022-10-21 ASSESSMENT — ENCOUNTER SYMPTOMS
SHORTNESS OF BREATH: 0
MYALGIAS: 0
PALPITATIONS: 0
CHILLS: 0
BREAST MASS: 0
SORE THROAT: 0
HEMATOCHEZIA: 0
HEMATURIA: 0
HEADACHES: 0
NAUSEA: 0
ABDOMINAL PAIN: 0
HEARTBURN: 0
DIZZINESS: 0
DIARRHEA: 0
DYSURIA: 0
PARESTHESIAS: 0
COUGH: 0
FREQUENCY: 1
CONSTIPATION: 0
FEVER: 0
EYE PAIN: 0
NERVOUS/ANXIOUS: 0
WEAKNESS: 0
ARTHRALGIAS: 1
JOINT SWELLING: 1

## 2022-10-21 ASSESSMENT — PAIN SCALES - GENERAL: PAINLEVEL: NO PAIN (0)

## 2022-10-21 ASSESSMENT — ACTIVITIES OF DAILY LIVING (ADL): CURRENT_FUNCTION: NO ASSISTANCE NEEDED

## 2022-10-21 NOTE — PROGRESS NOTES
"  Marcelino Winchester is a 67 year old, presenting for the following health issues: New patient to me here to establish care and WEllness       Healthy Habits:     In general, how would you rate your overall health?  Good    Frequency of exercise:  4-5 days/week    Duration of exercise:  Greater than 60 minutes    Do you usually eat at least 4 servings of fruit and vegetables a day, include whole grains    & fiber and avoid regularly eating high fat or \"junk\" foods?  Yes    Taking medications regularly:  Yes    Medication side effects:  None    Ability to successfully perform activities of daily living:  No assistance needed    Home Safety:  No safety concerns identified    Hearing Impairment:  Need to ask people to speak up or repeat themselves and difficulty understanding soft or whispered speech    In the past 6 months, have you been bothered by leaking of urine? Yes    In general, how would you rate your overall mental or emotional health?  Excellent      PHQ-2 Total Score: 0    Additional concerns today:  Yes       Problems:   1.  Hypertension: Blood pressures have been well controlled, recent results: 104/60, 112/69, 133/77, 126/78, 120/67, 123/67.  No concerns about medication  2.  Hypothyroidism: Managed by endocrinology with good control symptoms  3.  Osteopenia: She is on Fosamax, stable bone density 2 years ago, due next year  4.  Breast cancer: Still on Arimidex without any recurrence  5.  Aortic valve replaced: Stable without any palpitations or lightheadedness      Current concerns:    Osteoarthritis: She has a lot of significantly deformed joints, wonders what else she could do about this.         Patient Active Problem List   Diagnosis     Aortic valve replaced     Benign essential hypertension     Advanced directives, counseling/discussion     Long-term (current) use of anticoagulants [Z79.01]     Osteopenia of multiple sites     Hyperthyroidism     Adenomatous polyp of colon, unspecified part of " colon     Personal history of malignant neoplasm of breast     Current Outpatient Medications   Medication Sig Dispense Refill     alendronate (FOSAMAX) 70 MG tablet Take 70 mg by mouth every 7 days       anastrozole (ARIMIDEX) 1 MG tablet Take by mouth daily 30 tablet      ASPIRIN EC PO Take 81 mg by mouth daily       carvedilol (COREG) 12.5 MG tablet Take 1 tablet (12.5 mg) by mouth 2 times daily (with meals) 180 tablet 3     lisinopril (ZESTRIL) 10 MG tablet Take 1 tablet (10 mg) by mouth 2 times daily 180 tablet 3     methimazole (TAPAZOLE) 5 MG tablet TAKE ONE-HALF (1/2) TABLET DAILY 45 tablet 3     warfarin ANTICOAGULANT (COUMADIN) 5 MG tablet TAKE 1 TABLET  DAILY EXCEPT ONE AND ONE-HALF TABLETS ON MONDAY AND FRIDAY OR AS DIRECTED BY THE INR CLINIC 115 tablet 0     zolpidem (AMBIEN) 5 MG tablet Take 0.5 tablets (2.5 mg) by mouth nightly as needed for sleep 30 tablet 0        How many servings of fruits and vegetables do you eat daily?  4 or more    On average, how many sweetened beverages do you drink each day (Examples: soda, juice, sweet tea, etc.  Do NOT count diet or artificially sweetened beverages)?   0    How many days per week do you exercise enough to make your heart beat faster? 4    How many minutes a day do you exercise enough to make your heart beat faster? 60 or more    How many days per week do you miss taking your medication? 0          Review of Systems   Constitutional: Negative for chills and fever.   HENT: Negative for congestion, ear pain, hearing loss and sore throat.    Eyes: Negative for pain and visual disturbance.   Respiratory: Negative for cough and shortness of breath.    Cardiovascular: Negative for chest pain, palpitations and peripheral edema.   Gastrointestinal: Negative for abdominal pain, constipation, diarrhea, heartburn, hematochezia and nausea.   Breasts:  Negative for tenderness, breast mass and discharge.   Genitourinary: Positive for frequency and urgency. Negative for  "dysuria, genital sores, hematuria, pelvic pain, vaginal bleeding and vaginal discharge.   Musculoskeletal: Positive for arthralgias and joint swelling. Negative for myalgias.   Skin: Negative for rash.   Neurological: Negative for dizziness, weakness, headaches and paresthesias.   Psychiatric/Behavioral: Negative for mood changes. The patient is not nervous/anxious.             Objective    BP (!) 152/84   Pulse 72   Temp 96.9  F (36.1  C) (Tympanic)   Resp 18   Ht 1.626 m (5' 4\")   Wt 55.9 kg (123 lb 4.8 oz)   LMP  (LMP Unknown)   SpO2 99%   BMI 21.16 kg/m    Body mass index is 21.16 kg/m .  Physical Exam     HEENT: extraocular movements are intact, pupils equal and reactive to light and accommodation, TMs clear  NECK: Neck supple. No adenopathy. Thyroid symmetric, normal size,, Carotids without bruits.  PULMONARY: clear to auscultation  CARDIAC: regular rate and rhythm and no murmurs, clicks, or gallops  PULSES: 2/2 throughout  BACK: no spinal or CVAT  ABDOMINAL: Soft, nontender.  Normal bowel sounds.  No hepatosplenomegaly or abnormal masses        OBJECTIVE:   /78   Pulse 72   Temp 96.9  F (36.1  C) (Tympanic)   Resp 18   Ht 1.626 m (5' 4\")   Wt 55.9 kg (123 lb 4.8 oz)   LMP  (LMP Unknown)   SpO2 99%   BMI 21.16 kg/m     Physical Exam  HEENT: extraocular movements are intact, pupils equal and reactive to light and accommodation  NECK: Neck supple. No adenopathy. Thyroid symmetric, normal size,, Carotids without bruits.  PULMONARY: clear to auscultation  CARDIAC: Sharp valve click, soft ejection murmur  PULSES: 2/2 throughout  BACK: no spinal or CVAT  ABDOMINAL: Soft, nontender.  Normal bowel sounds.  No hepatosplenomegaly or abnormal masses  Azael nodules at the DIP joints.  Enlargement of PIP joints.  Multiple deformities        ASSESSMENT / PLAN:   (Z00.00) Encounter for annual wellness exam in Medicare patient  (primary encounter diagnosis)  Comment: Up-to-date  Plan:     (I10) Benign " essential hypertension  Comment: Well-controlled, continue home blood pressure monitoring  Plan: Basic metabolic panel  (Ca, Cl, CO2, Creat,         Gluc, K, Na, BUN), Albumin Random Urine         Quantitative with Creat Ratio            (E78.5) Hyperlipidemia LDL goal <130  Comment: Recheck lab  Plan: Lipid panel reflex to direct LDL Fasting            (M25.562,  G89.29) Chronic pain of left knee  Comment: Refer to orthopedics  Plan: Orthopedic  Referral            (M19.041,  M19.042) Primary osteoarthritis of both hands  Comment: Advised there is probably not anything that can be done about the nodules other than surgery  Plan:     (M85.89) Osteopenia of multiple sites  Comment: She will be getting her bone density this fall through oncology  Plan:     (R39.15) Urinary urgency  Comment: Refer to urology  Plan: Adult Urology  Referral            (Z95.2) Aortic valve replaced  Comment:   Plan: Hemoglobin            (Z23) Need for vaccination  Comment:   Plan: Pneumococcal 20 Valent Conjugate (Prevnar 20)            (Z23) Need for prophylactic vaccination and inoculation against influenza  Comment:   Plan:     (Z79.01) Long-term (current) use of anticoagulants [Z79.01]  Comment:   Plan: Hemoglobin              Patient has been advised of split billing requirements and indicates understanding: Yes      COUNSELING:  Reviewed preventive health counseling, as reflected in patient instructions    Appropriate preventive services were discussed with this patient, including applicable screening as appropriate for cardiovascular disease, diabetes, osteopenia/osteoporosis, and glaucoma.  As appropriate for age/gender, discussed screening for colorectal cancer, prostate cancer, breast cancer, and cervical cancer. Checklist reviewing preventive services available has been given to the patient.    Reviewed patients plan of care and provided an AVS. The Basic Care Plan (routine screening as documented in Health  Maintenance) for GCarol meets the Care Plan requirement. This Care Plan has been established and reviewed with the Patient.        Karla Moss MD   Electronically signed

## 2022-10-22 LAB
ANION GAP SERPL CALCULATED.3IONS-SCNC: 4 MMOL/L (ref 3–14)
BUN SERPL-MCNC: 17 MG/DL (ref 7–30)
CALCIUM SERPL-MCNC: 9 MG/DL (ref 8.5–10.1)
CHLORIDE BLD-SCNC: 106 MMOL/L (ref 94–109)
CHOLEST SERPL-MCNC: 206 MG/DL
CO2 SERPL-SCNC: 30 MMOL/L (ref 20–32)
CREAT SERPL-MCNC: 0.64 MG/DL (ref 0.52–1.04)
CREAT UR-MCNC: 57 MG/DL
FASTING STATUS PATIENT QL REPORTED: YES
GFR SERPL CREATININE-BSD FRML MDRD: >90 ML/MIN/1.73M2
GLUCOSE BLD-MCNC: 108 MG/DL (ref 70–99)
HDLC SERPL-MCNC: 70 MG/DL
LDLC SERPL CALC-MCNC: 120 MG/DL
MICROALBUMIN UR-MCNC: <5 MG/L
MICROALBUMIN/CREAT UR: NORMAL MG/G{CREAT}
NONHDLC SERPL-MCNC: 136 MG/DL
POTASSIUM BLD-SCNC: 4.2 MMOL/L (ref 3.4–5.3)
SODIUM SERPL-SCNC: 140 MMOL/L (ref 133–144)
TRIGL SERPL-MCNC: 79 MG/DL

## 2022-11-09 ENCOUNTER — OFFICE VISIT (OUTPATIENT)
Dept: ORTHOPEDICS | Facility: CLINIC | Age: 67
End: 2022-11-09
Attending: INTERNAL MEDICINE
Payer: COMMERCIAL

## 2022-11-09 ENCOUNTER — ANCILLARY PROCEDURE (OUTPATIENT)
Dept: GENERAL RADIOLOGY | Facility: CLINIC | Age: 67
End: 2022-11-09
Attending: STUDENT IN AN ORGANIZED HEALTH CARE EDUCATION/TRAINING PROGRAM
Payer: COMMERCIAL

## 2022-11-09 VITALS — BODY MASS INDEX: 21 KG/M2 | HEIGHT: 64 IN | WEIGHT: 123 LBS

## 2022-11-09 DIAGNOSIS — G89.29 CHRONIC PAIN OF LEFT KNEE: ICD-10-CM

## 2022-11-09 DIAGNOSIS — M25.561 CHRONIC PAIN OF RIGHT KNEE: ICD-10-CM

## 2022-11-09 DIAGNOSIS — M25.562 CHRONIC PAIN OF LEFT KNEE: ICD-10-CM

## 2022-11-09 DIAGNOSIS — G89.29 CHRONIC PAIN OF BOTH KNEES: Primary | ICD-10-CM

## 2022-11-09 DIAGNOSIS — M25.562 CHRONIC PAIN OF BOTH KNEES: Primary | ICD-10-CM

## 2022-11-09 DIAGNOSIS — G89.29 CHRONIC PAIN OF RIGHT KNEE: ICD-10-CM

## 2022-11-09 DIAGNOSIS — M15.0 PRIMARY OSTEOARTHRITIS INVOLVING MULTIPLE JOINTS: ICD-10-CM

## 2022-11-09 DIAGNOSIS — M25.561 CHRONIC PAIN OF BOTH KNEES: Primary | ICD-10-CM

## 2022-11-09 PROCEDURE — 99204 OFFICE O/P NEW MOD 45 MIN: CPT | Performed by: STUDENT IN AN ORGANIZED HEALTH CARE EDUCATION/TRAINING PROGRAM

## 2022-11-09 PROCEDURE — 73562 X-RAY EXAM OF KNEE 3: CPT | Mod: TC | Performed by: RADIOLOGY

## 2022-11-09 NOTE — LETTER
"    11/9/2022         RE: Annabella Bolanos  [address]   [place] [address]        Dear Colleague,    Thank you for referring your patient, Annabella Bolanos, to the [facility] [place]. Please see a copy of my visit note below.    ASSESSMENT & PLAN  Patient Instructions     1. Chronic pain of both knees    2. Primary osteoarthritis involving multiple joints      Annabella Bolanos is a 67 year old female presenting for evaluation of chronic pain of both knees and both hands due to significant polyarticular osteoarthritis. Previous Rheumatology consult was not suggestive of rheumatological or inflammatory arthropathy.    For the hands, I recommend a trial of Hand Therapy. A referral has been placed. Please call [phone] to schedule.   - Please follow up with me at any time if you would like cortisone injections of particularly bothersome joints. This should be scheduled as a 40 minutes \"Injection\" visit.   - Alternatively, we can consider referral to Hand Surgery at any time for further evaluation/consideration of surgery.  - No NSAIDS due to warfarin.     For the knee osteoarthritis, recommend the following:  - Referral placed to meet with an Orthopedic Surgeon for discussion of knee replacements. Please call [phone] to schedule a consultation.  - In the meantime, referral placed for formal physical therapy for knee strengthening and stabilization, hip/core strengthening and biomechanic assessment. Please stop by the  or call [phone] to schedule.  - Avoid activities that provoke pain.  - Heat and compression can be very helpful for arthritis. Ice can be used 10-15 minutes 3-4 times per day as needed for arthritis flares.   - Tylenol, 2 tablets (1,000 mg) three times per day, not to exceed 3,000 mg per day.  - We can go ahead with cortisone injections at any time for arthritis flares.     Please schedule a follow up appointment to see me in 6-8 weeks, or " "sooner as needed for persistence or worsening of pain. Please schedule as a 40 minute visit. You may call our direct clinic number ([phone]) at any time with questions or concerns.    Fay Ivy MD, [facility]      -----    SUBJECTIVE  Annabella Bolanos is a/an 67 year old female who is seen in consultation at the request of  Karla Moss M.D. for evaluation of bilateral knee pain. The patient is seen by themselves.    Onset: \"Years\" with pain progressively getting worse. Reports insidious onset without acute precipitating event. Patient was evaluated in 2016 by Dr. Melita Briscoe. Patient states she has was subsequently evaluated by Rheumatology in July 2021, where labs and xrays of her hands and feet were completed. She was diagnosed with \"Significant osteoarthritis involving multiple joints\" without evidence of autoimmune or inflammatory arthropathy. She has been treating with Tylenol and activity modifications. She cannot use NSAIDS due to anticoagulation. She has also held off on cortisone injections due to concerns about interaction with the warfarin/INR.   Location of Pain: bilateral anterior/diffuse knee  Rating of Pain at worst: 8+/10  Rating of Pain Currently: 5/10  Worsened by: getting up and down off the floor, sitting cross legged, walking  Better with: rest / activity avoidance  Treatments tried: rest/activity avoidance, Tylenol and Voltaren gel, previous cortisone injection many years ago  Associated symptoms: no distal numbness or tingling; denies swelling or warmth  Orthopedic history: YES - multiple joint pains   Relevant surgical history: NO  Social history: retired    Past Medical History:   Diagnosis Date     Adenomatous colon polyp 8/2015     Aortic stenosis 6/23/11    bicuspid AV with severe stenosis - 6/2011 mechanical AVR with 23 mm St Yordan AV conduit, composite graft placement     Arthritis     knees and hands     Bicuspid aortic valve  " "    Breast cancer (H) 2014    R breast     H/O aortic valve replacement     St Yordan     Heart murmur     Valve repalcement .     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    AAA repair with av23 mm tube graft     PONV (postoperative nausea and vomiting)     n/v morphine vs anesthesia, vomiting x24 hrs     Subclinical hyperthyroidism 2017     Thrombosis of leg     after  of daughter     Uncomplicated asthma      Social History     Socioeconomic History     Marital status:    Tobacco Use     Smoking status: Never     Smokeless tobacco: Never   Vaping Use     Vaping Use: Never used   Substance and Sexual Activity     Alcohol use: Yes     Comment: 2 drinks per week -      Drug use: No     Sexual activity: Not Currently   Other Topics Concern     Caffeine Concern Yes     Comment: 1-2 cups caffeine per day     Sleep Concern No     Stress Concern No     Weight Concern No     Special Diet Yes     Comment: low fat daily , low red meats     Back Care No     Exercise Yes     Comment: walks daily/ 3x a week exercise class         Patient's past medical, surgical, social, and family histories were reviewed today and no changes are noted.    REVIEW OF SYSTEMS:  10 point ROS is negative other than symptoms noted above in HPI, Past Medical History or as stated below  Constitutional: NEGATIVE for fever, chills, change in weight  Skin: NEGATIVE for worrisome rashes, moles or lesions  GI/: NEGATIVE for bowel or bladder changes  Neuro: NEGATIVE for weakness, dizziness or paresthesias    OBJECTIVE:  Ht 1.626 m (5' 4\")   Wt 55.8 kg (123 lb)   LMP  (LMP Unknown)   BMI 21.11 kg/m     General: healthy, alert and in no distress  HEENT: no scleral icterus or conjunctival erythema  Skin: no suspicious lesions or rash. No jaundice.  CV: no pedal edema  Resp: normal respiratory effort " without conversational dyspnea   Psych: normal mood and affect  Gait: antalgic gait with lack of full knee flexion bilaterally, compensatory anterior pelvic tilt; appropriate coordination and balance  Neuro: Normal light sensory exam of lower extremity  MSK:  BILATERAL KNEE  Inspection:    Genu valgum alignment bilateral with hypertrophy of both knee joints consistent with osteoarthritis  Palpation:    Tender about the lateral patellar facet, medial patellar facet, lateral joint line, medial joint line and popliteal region (R) with small Baker's cyst. Remainder of bony and ligamentous landmarks are nontender.    Small effusion is present bilaterally    Patellofemoral crepitus is Present  Range of Motion:     Right: lack of approx 200 of extension, to 1000 flexion    Lack: lack of approx 150 of extension, to 900 flexion  Strength:    Quadriceps grossly intact and hamstrings grossly intact    Extensor mechanism intact    Dynamic knee valgus bilaterally    BILATERAL HAND & WRIST  Inspection:    Diffuse osteoarthritic changes in the hands at the PIPs and DIPs consistent with Heberden's and Alexandra's nodes bilaterally. No active synovitis or effusion. No erythema, warmth, discoloration.  Palpation:    Tender about the IP joints diffusely, most significantly about the right ring finger PIP joint. Mild tenderness about the 1st CMC and MCP joints. Remainder of bony and ligamentous line marks are nontender.    Crepitus is Absent  Range of Motion:    Significantly limited throughout due to stiffness.  Strength:    No deficits in finger flexion, extension, abduction, adduction, opposition.    Independent visualization of the below image:  Recent Results (from the past 24 hour(s))   XR Knee Bilateral 3 vw    Narrative    KNEE THREE VIEWS BILATERAL  11/9/2022 8:33 AM     HISTORY: Chronic pain of left knee; Chronic pain of right knee  COMPARISON: 7/20/2016      Impression    IMPRESSION:    Right knee: No acute fracture or  malalignment. Severe lateral  compartment degenerative changes with bone-on-bone articulation.  Moderate patellofemoral and mild medial compartment degenerative  changes. Moderate knee joint effusion. Atherosclerosis.    Left knee: No acute fracture or malalignment. Moderate to severe  medial compartment degenerative changes. Moderate patellofemoral  compartment degenerative changes. Moderate knee joint effusion.  Atherosclerosis.    DONNA KHALIL MD         SYSTEM ID:  [id number]     3 views bilateral foot radiographs 7/12/2021 1:04 PM  3 views bilateral hand radiographs     Impression:  1. No acute osseous abnormality.  2. Severe bilateral polyarticular osteoarthritis with possible  marginal erosions, possibly seen in setting such as rheumatoid  arthritis. Given MCP involvement and hooked osteophytes, differential  considerations include entities such as CPPD arthropathy.  3. Bilateral hallux valgus with mild to moderate degenerative changes  of bilateral metatarsophalangeal joints.  4. Focal cortical thickening  of the right fourth metatarsal midshaft,  can be seen in the setting of stress fracture.     LISA Ivy MD, [facility]        Again, thank you for allowing me to participate in the care of your patient.        Sincerely,        Fay Ivy MD

## 2022-11-09 NOTE — PROGRESS NOTES
"ASSESSMENT & PLAN  Patient Instructions     1. Chronic pain of both knees    2. Primary osteoarthritis involving multiple joints      Annabella Bolanos is a 67 year old female presenting for evaluation of chronic pain of both knees and both hands due to significant polyarticular osteoarthritis. Previous Rheumatology consult was not suggestive of rheumatological or inflammatory arthropathy.    For the hands, I recommend a trial of Hand Therapy. A referral has been placed. Please call [phone] to schedule.   - Please follow up with me at any time if you would like cortisone injections of particularly bothersome joints. This should be scheduled as a 40 minutes \"Injection\" visit.   - Alternatively, we can consider referral to Hand Surgery at any time for further evaluation/consideration of surgery.  - No NSAIDS due to warfarin.     For the knee osteoarthritis, recommend the following:  - Referral placed to meet with an Orthopedic Surgeon for discussion of knee replacements. Please call [phone] to schedule a consultation.  - In the meantime, referral placed for formal physical therapy for knee strengthening and stabilization, hip/core strengthening and biomechanic assessment. Please stop by the  or call [phone] to schedule.  - Avoid activities that provoke pain.  - Heat and compression can be very helpful for arthritis. Ice can be used 10-15 minutes 3-4 times per day as needed for arthritis flares.   - Tylenol, 2 tablets (1,000 mg) three times per day, not to exceed 3,000 mg per day.  - We can go ahead with cortisone injections at any time for arthritis flares.     Please schedule a follow up appointment to see me in 6-8 weeks, or sooner as needed for persistence or worsening of pain. Please schedule as a 40 minute visit. You may call our direct clinic number ([phone]) at any time with questions or concerns.    Fay Ivy MD, [facility] " "[facility]      -----    SUBJECTIVE  Annabella Bolanos is a/an 67 year old female who is seen in consultation at the request of  Karla Moss M.D. for evaluation of bilateral knee pain. The patient is seen by themselves.    Onset: \"Years\" with pain progressively getting worse. Reports insidious onset without acute precipitating event. Patient was evaluated in 2016 by Dr. Melita Briscoe. Patient states she has was subsequently evaluated by Rheumatology in July 2021, where labs and xrays of her hands and feet were completed. She was diagnosed with \"Significant osteoarthritis involving multiple joints\" without evidence of autoimmune or inflammatory arthropathy. She has been treating with Tylenol and activity modifications. She cannot use NSAIDS due to anticoagulation. She has also held off on cortisone injections due to concerns about interaction with the warfarin/INR.   Location of Pain: bilateral anterior/diffuse knee  Rating of Pain at worst: 8+/10  Rating of Pain Currently: 5/10  Worsened by: getting up and down off the floor, sitting cross legged, walking  Better with: rest / activity avoidance  Treatments tried: rest/activity avoidance, Tylenol and Voltaren gel, previous cortisone injection many years ago  Associated symptoms: no distal numbness or tingling; denies swelling or warmth  Orthopedic history: YES - multiple joint pains   Relevant surgical history: NO  Social history: retired    Past Medical History:   Diagnosis Date     Adenomatous colon polyp 8/2015     Aortic stenosis 6/23/11    bicuspid AV with severe stenosis - 6/2011 mechanical AVR with 23 mm St Yordan AV conduit, composite graft placement     Arthritis     knees and hands     Bicuspid aortic valve      Breast cancer (H) 7/2014    R breast     H/O aortic valve replacement     St Yordan     Heart murmur     Valve repalcement 2011.     History of blood transfusion     Unsure with Aortic valve replacement     HTN, goal below 140/90      Hx of repair of dissecting " "aneurysm of ascending thoracic aorta 11    AAA repair with av23 mm tube graft     PONV (postoperative nausea and vomiting)     n/v morphine vs anesthesia, vomiting x24 hrs     Subclinical hyperthyroidism 2017     Thrombosis of leg     after  of daughter     Uncomplicated asthma      Social History     Socioeconomic History     Marital status:    Tobacco Use     Smoking status: Never     Smokeless tobacco: Never   Vaping Use     Vaping Use: Never used   Substance and Sexual Activity     Alcohol use: Yes     Comment: 2 drinks per week -      Drug use: No     Sexual activity: Not Currently   Other Topics Concern     Caffeine Concern Yes     Comment: 1-2 cups caffeine per day     Sleep Concern No     Stress Concern No     Weight Concern No     Special Diet Yes     Comment: low fat daily , low red meats     Back Care No     Exercise Yes     Comment: walks daily/ 3x a week exercise class         Patient's past medical, surgical, social, and family histories were reviewed today and no changes are noted.    REVIEW OF SYSTEMS:  10 point ROS is negative other than symptoms noted above in HPI, Past Medical History or as stated below  Constitutional: NEGATIVE for fever, chills, change in weight  Skin: NEGATIVE for worrisome rashes, moles or lesions  GI/: NEGATIVE for bowel or bladder changes  Neuro: NEGATIVE for weakness, dizziness or paresthesias    OBJECTIVE:  Ht 1.626 m (5' 4\")   Wt 55.8 kg (123 lb)   LMP  (LMP Unknown)   BMI 21.11 kg/m     General: healthy, alert and in no distress  HEENT: no scleral icterus or conjunctival erythema  Skin: no suspicious lesions or rash. No jaundice.  CV: no pedal edema  Resp: normal respiratory effort without conversational dyspnea   Psych: normal mood and affect  Gait: antalgic gait with lack of full knee flexion bilaterally, compensatory anterior pelvic tilt; appropriate coordination and balance  Neuro: Normal light sensory exam of lower " extremity  MSK:  BILATERAL KNEE  Inspection:    Genu valgum alignment bilateral with hypertrophy of both knee joints consistent with osteoarthritis  Palpation:    Tender about the lateral patellar facet, medial patellar facet, lateral joint line, medial joint line and popliteal region (R) with small Baker's cyst. Remainder of bony and ligamentous landmarks are nontender.    Small effusion is present bilaterally    Patellofemoral crepitus is Present  Range of Motion:     Right: lack of approx 200 of extension, to 1000 flexion    Lack: lack of approx 150 of extension, to 900 flexion  Strength:    Quadriceps grossly intact and hamstrings grossly intact    Extensor mechanism intact    Dynamic knee valgus bilaterally    BILATERAL HAND & WRIST  Inspection:    Diffuse osteoarthritic changes in the hands at the PIPs and DIPs consistent with Heberden's and Alexandra's nodes bilaterally. No active synovitis or effusion. No erythema, warmth, discoloration.  Palpation:    Tender about the IP joints diffusely, most significantly about the right ring finger PIP joint. Mild tenderness about the 1st CMC and MCP joints. Remainder of bony and ligamentous line marks are nontender.    Crepitus is Absent  Range of Motion:    Significantly limited throughout due to stiffness.  Strength:    No deficits in finger flexion, extension, abduction, adduction, opposition.    Independent visualization of the below image:  Recent Results (from the past 24 hour(s))   XR Knee Bilateral 3 vw    Narrative    KNEE THREE VIEWS BILATERAL  11/9/2022 8:33 AM     HISTORY: Chronic pain of left knee; Chronic pain of right knee  COMPARISON: 7/20/2016      Impression    IMPRESSION:    Right knee: No acute fracture or malalignment. Severe lateral  compartment degenerative changes with bone-on-bone articulation.  Moderate patellofemoral and mild medial compartment degenerative  changes. Moderate knee joint effusion. Atherosclerosis.    Left knee: No acute fracture  or malalignment. Moderate to severe  medial compartment degenerative changes. Moderate patellofemoral  compartment degenerative changes. Moderate knee joint effusion.  Atherosclerosis.    DONNA KHALIL MD         SYSTEM ID:  [id number]     3 views bilateral foot radiographs 7/12/2021 1:04 PM  3 views bilateral hand radiographs     Impression:  1. No acute osseous abnormality.  2. Severe bilateral polyarticular osteoarthritis with possible  marginal erosions, possibly seen in setting such as rheumatoid  arthritis. Given MCP involvement and hooked osteophytes, differential  considerations include entities such as CPPD arthropathy.  3. Bilateral hallux valgus with mild to moderate degenerative changes  of bilateral metatarsophalangeal joints.  4. Focal cortical thickening  of the right fourth metatarsal midshaft,  can be seen in the setting of stress fracture.     LISA Ivy MD, [facility]

## 2022-11-09 NOTE — PATIENT INSTRUCTIONS
"1. Chronic pain of both knees    2. Primary osteoarthritis involving multiple joints      Annabella Bolanos is a 67 year old female presenting for evaluation of chronic pain of both knees and both hands due to significant polyarticular osteoarthritis. Previous Rheumatology consult was not suggestive of rheumatological or inflammatory arthropathy.    For the hands, I recommend a trial of Hand Therapy. A referral has been placed. Please call [phone] to schedule.   - Please follow up with me at any time if you would like cortisone injections of particularly bothersome joints. This should be scheduled as a 40 minutes \"Injection\" visit.   - Alternatively, we can consider referral to Hand Surgery at any time for further evaluation/consideration of surgery.  - No NSAIDS due to warfarin.     For the knee osteoarthritis, recommend the following:  - Referral placed to meet with an Orthopedic Surgeon for discussion of knee replacements. Please call [phone] to schedule a consultation.  - In the meantime, referral placed for formal physical therapy for knee strengthening and stabilization, hip/core strengthening and biomechanic assessment. Please stop by the  or call [phone] to schedule.  - Avoid activities that provoke pain.  - Heat and compression can be very helpful for arthritis. Ice can be used 10-15 minutes 3-4 times per day as needed for arthritis flares.   - Tylenol, 2 tablets (1,000 mg) three times per day, not to exceed 3,000 mg per day.  - We can go ahead with cortisone injections at any time for arthritis flares.     Please schedule a follow up appointment to see me in 6-8 weeks, or sooner as needed for persistence or worsening of pain. Please schedule as a 40 minute visit. You may call our direct clinic number ([phone]) at any time with questions or concerns.    Fay Ivy MD, [facility]    "

## 2022-11-10 ENCOUNTER — OFFICE VISIT (OUTPATIENT)
Dept: UROLOGY | Facility: CLINIC | Age: 67
End: 2022-11-10
Payer: COMMERCIAL

## 2022-11-10 ENCOUNTER — MYC MEDICAL ADVICE (OUTPATIENT)
Dept: INTERNAL MEDICINE | Facility: CLINIC | Age: 67
End: 2022-11-10

## 2022-11-10 VITALS
OXYGEN SATURATION: 97 % | DIASTOLIC BLOOD PRESSURE: 80 MMHG | WEIGHT: 123 LBS | HEIGHT: 64 IN | BODY MASS INDEX: 21 KG/M2 | HEART RATE: 63 BPM | SYSTOLIC BLOOD PRESSURE: 150 MMHG

## 2022-11-10 DIAGNOSIS — N39.41 URGE INCONTINENCE: Primary | ICD-10-CM

## 2022-11-10 DIAGNOSIS — N81.11 CYSTOCELE, MIDLINE: ICD-10-CM

## 2022-11-10 DIAGNOSIS — R39.15 URINARY URGENCY: ICD-10-CM

## 2022-11-10 DIAGNOSIS — R35.0 URINARY FREQUENCY: ICD-10-CM

## 2022-11-10 DIAGNOSIS — N81.6 RECTOCELE: ICD-10-CM

## 2022-11-10 LAB — RESIDUAL VOLUME (RV) (EXTERNAL): 5

## 2022-11-10 PROCEDURE — 99204 OFFICE O/P NEW MOD 45 MIN: CPT | Mod: 25 | Performed by: PHYSICIAN ASSISTANT

## 2022-11-10 PROCEDURE — 51798 US URINE CAPACITY MEASURE: CPT | Performed by: PHYSICIAN ASSISTANT

## 2022-11-10 RX ORDER — OXYBUTYNIN CHLORIDE 10 MG/1
10 TABLET, EXTENDED RELEASE ORAL AT BEDTIME
Qty: 30 TABLET | Refills: 3 | Status: SHIPPED | OUTPATIENT
Start: 2022-11-10 | End: 2023-01-10 | Stop reason: SINTOL

## 2022-11-10 ASSESSMENT — PAIN SCALES - GENERAL: PAINLEVEL: SEVERE PAIN (6)

## 2022-11-10 NOTE — PATIENT INSTRUCTIONS
-You have been prescribed Oxybutynin 10mg nightly. Potential side effects include but not limited to dry eyes, dry mouth, constipation and possible memory loss issues. Advise you to contact our clinic if develop any side effects to the medication.   - Follow-up in 2 months   - Referred to physical therapy  - Try timed voiding by increasing by 15min each week until you get to 2-3 hours  ___________  Below is a list of things that can irritate the bladder and should be eliminated:    Caffeinated soft drinks.  Coffee.  Tea.  Chocolate.  Tomato-based foods.  Acidic juices and fruits. (includes cranberry juice)  Alcohol.  Nicotine  Carbonated drinks.  Aspartame/Nutrasweet.   ______________  Locations for Pelvic Floor Physical Therapy:    [facility] ([place])   [facility] Rehabilitation services - [facility] [place]   [facility]   M [facility] - [facility] Rehabilitation services - [facility]    _________________________________________________

## 2022-11-10 NOTE — PROGRESS NOTES
Urology Clinic    Name: Annabella Bolanos    MRN: [id number]   YOB: 1955  Accompanied at today's visit by:self              Assessment and Plan:   67 year old female with urinary urgency/frequency, UUI, cystocele, rectocele, vaginal atrophy    - PVR WNL  - Did not leave urine sample; denied s/s of UTI today  - Discussed behavioral modifications as well as advising patient to avoid bladder irritants  - Encouraged timed voiding.  - Referred to PFPT; will start after her hand/knee PT.   - Start Oxybutynin 10mg nightly; discussed risks/benifits and potential side effects such as but not limited to dry eyes, dry mouth, constipation, memory issues; advised to notify PCP of medication.  - Educated patient about prolapse. Discussed possible pessary management to see if improves bladder symptoms; patient declined. Plan to continue to monitor for now since asymptomatic and PVR WNL.  - Follow-up in 2 months with PVR check.    Orders Placed This Encounter   Procedures     MEASURE POST-VOID RESIDUAL URINE/BLADDER CAPACITY, US NON-IMAGING (45211)     UA without Microscopic [[id number]]     After discussing the assessment and plan with patient, patient verbalized understanding and agreed to the above plan. All questions answered.     30 minutes were spent today on the date of the encounter in reviewing the EMR, direct patient care, coordination of care and documentation in addition to exam, ordering medication, referral to PT.    Jania Washington PA-C  November 10, 2022    Patient Care Team:  Karla Moss MD as PCP - General (Internal Medicine)  Brook Echavarria MD as Assigned PCP  Merle Baxter MD as Assigned Endocrinology Provider  Dae Strauss MD as Assigned Rheumatology Provider  Jania Washington PA-C as Physician Assistant  JANIA WASHINGTON          Chief Complaint:   Urinary urgency/frequency          History of Present Illness:   November 10, 2022    HISTORY: Annabella Bolanos is a 67 year  old female as a new consultation for concerns of long standing history urinary urgency/frequency. Referred by Dr. Moss. Patient reports voiding every 1-2 hours during the day and every 2 hours at night.  Describes UUI occasionally. Sometimes will take her time and bare down to void. Feels like she empties her bladder completely. Wears a panty liner daily for precaution. Denies history of sleep apnea. Unsure if she snores. Can occasionally take a nap during the day.  Drinks half a can of carbonated beverage, 1 cup of coffee and 1 cup of tea per day. Denies gross hematuria or history of kidney stones. Denies history of recurrent UTIs. Denies prolapse symptoms, however was told by her PCP that she has bladder prolapse. Not sexually active; . Bowels are regular. PMH is significant for asthma, hx of breast cancer s/p lumpectomy; currently on Anastrozole without recurrence. PSH includes repair of dissecting aneurysm thoracic aorta and aortic valve replacement. On chronic warfarin. Denies any  surgeries. Denies history of abnormal pap smears in the past. Denies history of smoking. Patient voices no other concerns at this time.            Past Medical History:     Past Medical History:   Diagnosis Date     Adenomatous colon polyp 8/2015     Aortic stenosis 6/23/11    bicuspid AV with severe stenosis - 6/2011 mechanical AVR with 23 mm [employer] Yordan AV conduit, composite graft placement     Arthritis     knees and hands     Bicuspid aortic valve      Breast cancer (H) 7/2014    R breast     H/O aortic valve replacement     St Yordan     Heart murmur     Valve repalcement 2011.     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    AAA repair with av23 mm tube graft     PONV (postoperative nausea and vomiting)     n/v morphine vs anesthesia, vomiting x24 hrs     Subclinical hyperthyroidism 12/6/2017  "    Thrombosis of leg     after  of daughter     Uncomplicated asthma             Past Surgical History:     Past Surgical History:   Procedure Laterality Date     BIOPSY NODE SENTINEL Right 9/10/2014    Procedure: BIOPSY NODE SENTINEL;  Surgeon: Ila Romano MD;  Location: [facility] OR     CARDIAC SURGERY  2011    aortic valve replacement     ENT SURGERY      tonsillectomy     HRW PORT A CATH       INSERT PORT VASCULAR ACCESS Left 10/22/2014    Procedure: INSERT PORT VASCULAR ACCESS;  Surgeon: Ila Romano MD;  Location: [facility] OR     LUMPECTOMY BREAST WITH SEED LOCALIZATION Right 9/10/2014    Procedure: LUMPECTOMY BREAST WITH SEED LOCALIZATION;  Surgeon: Ila Romano MD;  Location: [facility] OR     ORTHOPEDIC SURGERY      shoulder, thumb surgery     REMOVE PORT VASCULAR ACCESS Left 2015    Procedure: REMOVE PORT VASCULAR ACCESS;  Surgeon: Ila Romano MD;  Location: [facility] OR     REPAIR ANEURYSM ASCENDING AORTA  2011    Procedure:REPAIR ANEURYSM ASCENDING AORTA; Medial Sternotomy, Aortic Valve Replacement, ([employer] HP Valved Graft, size 23 mm) on pump oxygenation, intraoperative transesophageal cardiogram; Surgeon:JOHN PAUL ULLOA; Location: OR     REPLACE VALVE AORTIC  11    bicuspid AV with severe stenosis - 2011 mechanical AVR with 23 mm [employer] Yordan AV conduit, composite graft placement            Social History:     Social History     Tobacco Use     Smoking status: Never     Smokeless tobacco: Never   Substance Use Topics     Alcohol use: Yes     Comment: 2 drinks per week -             Family History:     Family History   Problem Relation Age of Onset     Hypertension Mother      Thyroid Disease Mother         \"Toxic thyroid\"     Prostate Cancer Father 70     Cancer Father 80        Lung cancer, Not caused from smoking     Cerebrovascular Disease Father 80     Hypertension Father      Cardiovascular Brother         half brother      " Cardiovascular Son         Puentes-Parkinsons White Syndrome     Cardiovascular Daughter         Heart murmur     Breast Cancer Paternal Aunt 80     Cardiovascular Paternal Aunt      Arthritis Brother 40        RA - half brother      Cancer Maternal Grandfather      Colon Cancer No family hx of             Allergies:     Allergies   Allergen Reactions     Morphine Sulfate Nausea and Vomiting            Medications:     Current Outpatient Medications   Medication Sig     alendronate (FOSAMAX) 70 MG tablet Take 70 mg by mouth every 7 days     anastrozole (ARIMIDEX) 1 MG tablet Take by mouth daily     ASPIRIN EC PO Take 81 mg by mouth daily     carvedilol (COREG) 12.5 MG tablet Take 1 tablet (12.5 mg) by mouth 2 times daily (with meals)     lisinopril (ZESTRIL) 10 MG tablet Take 1 tablet (10 mg) by mouth 2 times daily     methimazole (TAPAZOLE) 5 MG tablet TAKE ONE-HALF (1/2) TABLET DAILY     warfarin ANTICOAGULANT (COUMADIN) 5 MG tablet TAKE 1 TABLET  DAILY EXCEPT ONE AND ONE-HALF TABLETS ON MONDAY AND FRIDAY OR AS DIRECTED BY THE INR CLINIC     zolpidem (AMBIEN) 5 MG tablet Take 0.5 tablets (2.5 mg) by mouth nightly as needed for sleep     No current facility-administered medications for this visit.             Review of Systems:    ROS: 14 point ROS neg other than the symptoms noted above in the HPI.          Physical Exam:   not currently breastfeeding.  Data Unavailable, There is no height or weight on file to calculate BMI., 0 lbs 0 oz  Gen appearance: Age-appropriate appearing female in NAD.   HEENT:  EOMI, conjunctiva clear/white. Normal ROM of neck for age.   Psych:  alert , In no acute distress.  Neuro:  A&Ox3  Skin:  Clear of obvious rashes, ecchymoses.  Resp:  Normal respiratory effort; not in acute respiratory distress.   Vasc:  Regular rate.  lymph:  No obvious LE edema bilaterally.     exam:  Vulva is normal in appearance. Urethral prolapse. negative for KATERINE with reduction of speculum when instructed to  cough. Vaginal mucosa atrophic. No pain to palpation on internal exam. Cystocele grade 2. Rectocele grade 2. Slight irritation on vaginal wall from where prolapse rubs; non-bleeding and nontender. Strength 1/5. Cervix unremarkable. No other obvious masses, lesions, ulcers, bleeding noted on internal or external exam.          Data:    PVR  5mL    Labs:    Office Visit on 10/21/2022   Component Date Value Ref Range Status     Sodium 10/21/2022 140  133 - 144 mmol/L Final     Potassium 10/21/2022 4.2  3.4 - 5.3 mmol/L Final     Chloride 10/21/2022 106  94 - 109 mmol/L Final     Carbon Dioxide (CO2) 10/21/2022 30  20 - 32 mmol/L Final     Anion Gap 10/21/2022 4  3 - 14 mmol/L Final     Urea Nitrogen 10/21/2022 17  7 - 30 mg/dL Final     Creatinine 10/21/2022 0.64  0.52 - 1.04 mg/dL Final     Calcium 10/21/2022 9.0  8.5 - 10.1 mg/dL Final     Glucose 10/21/2022 108 (H)  70 - 99 mg/dL Final     GFR Estimate 10/21/2022 >90  >60 mL/min/1.73m2 Final    Effective December 21, 2021 eGFRcr in adults is calculated using the 2021 CKD-EPI creatinine equation which includes age and gender (Jacob et al., NEJ, DOI: 10.1056/AMRLxx1086059)     Hemoglobin 10/21/2022 14.2  11.7 - 15.7 g/dL Final     Cholesterol 10/21/2022 206 (H)  <200 mg/dL Final     Triglycerides 10/21/2022 79  <150 mg/dL Final     Direct Measure HDL 10/21/2022 70  >=50 mg/dL Final     LDL Cholesterol Calculated 10/21/2022 120 (H)  <=100 mg/dL Final     Non HDL Cholesterol 10/21/2022 136 (H)  <130 mg/dL Final     Patient Fasting > 8hrs? 10/21/2022 Yes   Final     Creatinine Urine mg/dL 10/21/2022 57  mg/dL Final     Albumin Urine mg/L 10/21/2022 <5  mg/L Final     Albumin Urine mg/g Cr 10/21/2022    Final    Unable to calculate, urine albumin or creatinine is outside the detectable limits.

## 2022-11-10 NOTE — LETTER
11/10/2022       RE: Annabella Bolanos  [address] [place] [address]     Dear Colleague,    Thank you for referring your patient, Annabella Bolanos, to the [facility] at [facility]. Please see a copy of my visit note below.    Urology Clinic    Name: Annabella Bolanos    MRN: [id number]   YOB: 1955  Accompanied at today's visit by:self              Assessment and Plan:   67 year old female with urinary urgency/frequency, UUI, cystocele, rectocele, vaginal atrophy    - PVR WNL  - Did not leave urine sample; denied s/s of UTI today  - Discussed behavioral modifications as well as advising patient to avoid bladder irritants  - Encouraged timed voiding.  - Referred to PFPT; will start after her hand/knee PT.   - Start Oxybutynin 10mg nightly; discussed risks/benifits and potential side effects such as but not limited to dry eyes, dry mouth, constipation, memory issues; advised to notify PCP of medication.  - Educated patient about prolapse. Discussed possible pessary management to see if improves bladder symptoms; patient declined. Plan to continue to monitor for now since asymptomatic and PVR WNL.  - Follow-up in 2 months with PVR check.    Orders Placed This Encounter   Procedures     MEASURE POST-VOID RESIDUAL URINE/BLADDER CAPACITY, US NON-IMAGING (52807)     UA without Microscopic [[id number]]     After discussing the assessment and plan with patient, patient verbalized understanding and agreed to the above plan. All questions answered.     30 minutes were spent today on the date of the encounter in reviewing the EMR, direct patient care, coordination of care and documentation in addition to exam, ordering medication, referral to PT.    Laura Washington PA-C  November 10, 2022    Patient Care Team:  Karla Moss MD as PCP - General (Internal Medicine)  Brook Echavarria MD as Assigned PCP  Merle Baxter,  MD as Assigned Endocrinology Provider  Dae Strauss MD as Assigned Rheumatology Provider  Jania Washington PA-C as Physician Assistant  JANIA WASHINGTON          Chief Complaint:   Urinary urgency/frequency          History of Present Illness:   November 10, 2022    HISTORY: Annabella Bolanos is a 67 year old female as a new consultation for concerns of long standing history urinary urgency/frequency. Referred by Dr. Moss. Patient reports voiding every 1-2 hours during the day and every 2 hours at night.  Describes UUI occasionally. Sometimes will take her time and bare down to void. Feels like she empties her bladder completely. Wears a panty liner daily for precaution. Denies history of sleep apnea. Unsure if she snores. Can occasionally take a nap during the day.  Drinks half a can of carbonated beverage, 1 cup of coffee and 1 cup of tea per day. Denies gross hematuria or history of kidney stones. Denies history of recurrent UTIs. Denies prolapse symptoms, however was told by her PCP that she has bladder prolapse. Not sexually active; . Bowels are regular. PMH is significant for asthma, hx of breast cancer s/p lumpectomy; currently on Anastrozole without recurrence. PSH includes repair of dissecting aneurysm thoracic aorta and aortic valve replacement. On chronic warfarin. Denies any  surgeries. Denies history of abnormal pap smears in the past. Denies history of smoking. Patient voices no other concerns at this time.            Past Medical History:     Past Medical History:   Diagnosis Date     Adenomatous colon polyp 8/2015     Aortic stenosis 6/23/11    bicuspid AV with severe stenosis - 6/2011 mechanical AVR with 23 mm St Yordan AV conduit, composite graft placement     Arthritis     knees and hands     Bicuspid aortic valve      Breast cancer (H) 7/2014    R breast     H/O aortic valve replacement     [employer] Yordan     Heart murmur     Valve repalcement 2011.     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 11    AAA repair with av23 mm tube graft     PONV (postoperative nausea and vomiting)     n/v morphine vs anesthesia, vomiting x24 hrs     Subclinical hyperthyroidism 2017     Thrombosis of leg     after  of daughter     Uncomplicated asthma             Past Surgical History:     Past Surgical History:   Procedure Laterality Date     BIOPSY NODE SENTINEL Right 9/10/2014    Procedure: BIOPSY NODE SENTINEL;  Surgeon: Ila Romano MD;  Location: [facility] OR     CARDIAC SURGERY  2011    aortic valve replacement     ENT SURGERY      tonsillectomy     HRW PORT A CATH       INSERT PORT VASCULAR ACCESS Left 10/22/2014    Procedure: INSERT PORT VASCULAR ACCESS;  Surgeon: Ila Romano MD;  Location: [facility] OR     LUMPECTOMY BREAST WITH SEED LOCALIZATION Right 9/10/2014    Procedure: LUMPECTOMY BREAST WITH SEED LOCALIZATION;  Surgeon: lIa Romano MD;  Location: [facility] OR     ORTHOPEDIC SURGERY      shoulder, thumb surgery     REMOVE PORT VASCULAR ACCESS Left 2015    Procedure: REMOVE PORT VASCULAR ACCESS;  Surgeon: Ila Romano MD;  Location: [facility] OR     REPAIR ANEURYSM ASCENDING AORTA  2011    Procedure:REPAIR ANEURYSM ASCENDING AORTA; Medial Sternotomy, Aortic Valve Replacement, ([employer] Medical Masters HP Valved Graft, size 23 mm) on pump oxygenation, intraoperative transesophageal cardiogram; Surgeon:JOHN PAUL ULLOA; Location:[facility]     REPLACE VALVE AORTIC  11    bicuspid AV with severe stenosis - 2011 mechanical AVR with 23 mm [employer] Yordan AV conduit, composite graft placement            Social History:     Social History     Tobacco Use     Smoking status: Never     Smokeless tobacco: Never   Substance Use Topics     Alcohol use: Yes     Comment: 2 drinks per week -             Family History:     Family History   Problem Relation  "Age of Onset     Hypertension Mother      Thyroid Disease Mother         \"Toxic thyroid\"     Prostate Cancer Father 70     Cancer Father 80        Lung cancer, Not caused from smoking     Cerebrovascular Disease Father 80     Hypertension Father      Cardiovascular Brother         half brother      Cardiovascular Son         Puentes-Parkinsons White Syndrome     Cardiovascular Daughter         Heart murmur     Breast Cancer Paternal Aunt 80     Cardiovascular Paternal Aunt      Arthritis Brother 40        RA - half brother      Cancer Maternal Grandfather      Colon Cancer No family hx of             Allergies:     Allergies   Allergen Reactions     Morphine Sulfate Nausea and Vomiting            Medications:     Current Outpatient Medications   Medication Sig     alendronate (FOSAMAX) 70 MG tablet Take 70 mg by mouth every 7 days     anastrozole (ARIMIDEX) 1 MG tablet Take by mouth daily     ASPIRIN EC PO Take 81 mg by mouth daily     carvedilol (COREG) 12.5 MG tablet Take 1 tablet (12.5 mg) by mouth 2 times daily (with meals)     lisinopril (ZESTRIL) 10 MG tablet Take 1 tablet (10 mg) by mouth 2 times daily     methimazole (TAPAZOLE) 5 MG tablet TAKE ONE-HALF (1/2) TABLET DAILY     warfarin ANTICOAGULANT (COUMADIN) 5 MG tablet TAKE 1 TABLET  DAILY EXCEPT ONE AND ONE-HALF TABLETS ON MONDAY AND FRIDAY OR AS DIRECTED BY THE INR CLINIC     zolpidem (AMBIEN) 5 MG tablet Take 0.5 tablets (2.5 mg) by mouth nightly as needed for sleep     No current facility-administered medications for this visit.             Review of Systems:    ROS: 14 point ROS neg other than the symptoms noted above in the HPI.          Physical Exam:   not currently breastfeeding.  Data Unavailable, There is no height or weight on file to calculate BMI., 0 lbs 0 oz  Gen appearance: Age-appropriate appearing female in NAD.   HEENT:  EOMI, conjunctiva clear/white. Normal ROM of neck for age.   Psych:  alert , In no acute distress.  Neuro:  A&Ox3  Skin: "  Clear of obvious rashes, ecchymoses.  Resp:  Normal respiratory effort; not in acute respiratory distress.   Vasc:  Regular rate.  lymph:  No obvious LE edema bilaterally.     exam:  Vulva is normal in appearance. Urethral prolapse. negative for KATERINE with reduction of speculum when instructed to cough. Vaginal mucosa atrophic. No pain to palpation on internal exam. Cystocele grade 2. Rectocele grade 2. Slight irritation on vaginal wall from where prolapse rubs; non-bleeding and nontender. Strength 1/5. Cervix unremarkable. No other obvious masses, lesions, ulcers, bleeding noted on internal or external exam.          Data:    PVR  5mL    Labs:    Office Visit on 10/21/2022   Component Date Value Ref Range Status     Sodium 10/21/2022 140  133 - 144 mmol/L Final     Potassium 10/21/2022 4.2  3.4 - 5.3 mmol/L Final     Chloride 10/21/2022 106  94 - 109 mmol/L Final     Carbon Dioxide (CO2) 10/21/2022 30  20 - 32 mmol/L Final     Anion Gap 10/21/2022 4  3 - 14 mmol/L Final     Urea Nitrogen 10/21/2022 17  7 - 30 mg/dL Final     Creatinine 10/21/2022 0.64  0.52 - 1.04 mg/dL Final     Calcium 10/21/2022 9.0  8.5 - 10.1 mg/dL Final     Glucose 10/21/2022 108 (H)  70 - 99 mg/dL Final     GFR Estimate 10/21/2022 >90  >60 mL/min/1.73m2 Final    Effective December 21, 2021 eGFRcr in adults is calculated using the 2021 CKD-EPI creatinine equation which includes age and gender (Jacob et al., NEJ, DOI: 10.1056/YZXYfs7046758)     Hemoglobin 10/21/2022 14.2  11.7 - 15.7 g/dL Final     Cholesterol 10/21/2022 206 (H)  <200 mg/dL Final     Triglycerides 10/21/2022 79  <150 mg/dL Final     Direct Measure HDL 10/21/2022 70  >=50 mg/dL Final     LDL Cholesterol Calculated 10/21/2022 120 (H)  <=100 mg/dL Final     Non HDL Cholesterol 10/21/2022 136 (H)  <130 mg/dL Final     Patient Fasting > 8hrs? 10/21/2022 Yes   Final     Creatinine Urine mg/dL 10/21/2022 57  mg/dL Final     Albumin Urine mg/L 10/21/2022 <5  mg/L Final     Albumin  Urine mg/g Cr 10/21/2022    Final    Unable to calculate, urine albumin or creatinine is outside the detectable limits.          Again, thank you for allowing me to participate in the care of your patient.      Sincerely,    JANIA GLYNN PA-C

## 2022-11-10 NOTE — NURSING NOTE
Chief Complaint   Patient presents with     urinary urgency     prolapse bladder   PVR was 5 ml.  Darlene Collado LPN

## 2022-11-14 ENCOUNTER — ANTICOAGULATION THERAPY VISIT (OUTPATIENT)
Dept: ANTICOAGULATION | Facility: CLINIC | Age: 67
End: 2022-11-14

## 2022-11-14 ENCOUNTER — LAB (OUTPATIENT)
Dept: LAB | Facility: CLINIC | Age: 67
End: 2022-11-14
Payer: COMMERCIAL

## 2022-11-14 DIAGNOSIS — Z95.2 AORTIC VALVE REPLACED: ICD-10-CM

## 2022-11-14 DIAGNOSIS — Z79.01 LONG TERM CURRENT USE OF ANTICOAGULANT THERAPY: ICD-10-CM

## 2022-11-14 DIAGNOSIS — Z95.2 H/O AORTIC VALVE REPLACEMENT: ICD-10-CM

## 2022-11-14 DIAGNOSIS — Z79.01 LONG TERM CURRENT USE OF ANTICOAGULANT THERAPY: Primary | ICD-10-CM

## 2022-11-14 LAB — INR BLD: 2.3 (ref 0.9–1.1)

## 2022-11-14 PROCEDURE — 36415 COLL VENOUS BLD VENIPUNCTURE: CPT

## 2022-11-14 PROCEDURE — 85610 PROTHROMBIN TIME: CPT

## 2022-11-14 NOTE — PROGRESS NOTES
ANTICOAGULATION MANAGEMENT     Annabella Bolanos 67 year old female is on warfarin with therapeutic INR result. (Goal INR 2.0-3.0)    Recent labs: (last 7 days)     11/14/22  1117   INR 2.3*       ASSESSMENT       Source(s): Chart Review and Patient/Caregiver Call       Warfarin doses taken: Warfarin taken as instructed    Diet: No new diet changes identified    New illness, injury, or hospitalization: No    Medication/supplement changes: Oxybutynin started on 11/10/22 No interaction anticipated    Signs or symptoms of bleeding or clotting: No    Previous INR: Therapeutic last 2(+) visits    Additional findings: Will begin PT for knee pain, Office visit with PCP 10/21/22, no changes to patient's care plan.       PLAN     Recommended plan for no diet, medication or health factor changes affecting INR     Dosing Instructions: Continue your current warfarin dose with next INR in 5 weeks       Summary  As of 11/14/2022    Full warfarin instructions:  7.5 mg every Mon, Fri; 5 mg all other days; Starting 11/14/2022   Next INR check:  12/19/2022             Telephone call with Annabella who verbalizes understanding and agrees to plan    Lab visit scheduled    Education provided:     Please call back if any changes to your diet, medications or how you've been taking warfarin    Interaction IS NOT anticipated between warfarin and Oxybutynin    Contact [phone]  with any changes, questions or concerns.     Plan made per [facility] anticoagulation protocol    Deborah Aguilera RN  Anticoagulation Clinic  11/14/2022    _______________________________________________________________________     Anticoagulation Episode Summary     Current INR goal:  2.0-3.0   TTR:  97.5 % (1 y)   Target end date:  Indefinite   Send INR reminders to:  [facility]    Indications    Long-term (current) use of anticoagulants [Z79.01] [Z79.01]  Aortic valve replaced [Z95.2]           Comments:           Anticoagulation Care Providers     Provider  Role Specialty Phone number    Brook Echavarria MD Referring Internal Medicine [phone]

## 2022-11-17 ENCOUNTER — THERAPY VISIT (OUTPATIENT)
Dept: PHYSICAL THERAPY | Facility: CLINIC | Age: 67
End: 2022-11-17
Attending: STUDENT IN AN ORGANIZED HEALTH CARE EDUCATION/TRAINING PROGRAM
Payer: COMMERCIAL

## 2022-11-17 DIAGNOSIS — G89.29 CHRONIC PAIN OF RIGHT KNEE: ICD-10-CM

## 2022-11-17 DIAGNOSIS — M25.561 CHRONIC PAIN OF RIGHT KNEE: ICD-10-CM

## 2022-11-17 DIAGNOSIS — G89.29 CHRONIC PAIN OF LEFT KNEE: ICD-10-CM

## 2022-11-17 DIAGNOSIS — M25.561 CHRONIC PAIN OF BOTH KNEES: ICD-10-CM

## 2022-11-17 DIAGNOSIS — M15.0 PRIMARY OSTEOARTHRITIS INVOLVING MULTIPLE JOINTS: ICD-10-CM

## 2022-11-17 DIAGNOSIS — G89.29 CHRONIC PAIN OF BOTH KNEES: ICD-10-CM

## 2022-11-17 DIAGNOSIS — M25.562 CHRONIC PAIN OF LEFT KNEE: ICD-10-CM

## 2022-11-17 DIAGNOSIS — M25.562 CHRONIC PAIN OF BOTH KNEES: ICD-10-CM

## 2022-11-17 PROCEDURE — 97110 THERAPEUTIC EXERCISES: CPT | Mod: GP | Performed by: PHYSICAL THERAPIST

## 2022-11-17 PROCEDURE — 97161 PT EVAL LOW COMPLEX 20 MIN: CPT | Mod: GP | Performed by: PHYSICAL THERAPIST

## 2022-11-17 NOTE — PROGRESS NOTES
[facility]    OUTPATIENT Physical Therapy ORTHOPEDIC EVALUATION  PLAN OF TREATMENT FOR OUTPATIENT REHABILITATION  (COMPLETE FOR INITIAL CLAIMS ONLY)  Patient's Last Name, First Name, M.I.  YOB: 1955  Annabella Bolanos s Name:  [facility]   Medical Record No.  [id number]   Start of Care Date:  11/17/22   Onset Date:   11/09/22   Treatment Diagnosis:  Chronic kassandra knee pain, Knee OA kassandra Medical Diagnosis:     Chronic pain of both knees  Primary osteoarthritis involving multiple joints  Chronic pain of right knee  Chronic pain of left knee       Goals:     11/17/22 0500   Body Part   Goals listed below are for kassandra knees   Goal #1   Goal #1 ambulation   Previous Functional Level No restrictions   Current Functional Level Minutes patient can walk   Performance Level 30 minutes with mod limp and sharp pain   STG Target Performance Hours patient will be able to walk   Performance Level > 1 hour with less than 2/10 pain   Rationale for safe outdoor household ambulation;for safe household ambulation;for safe community ambulation;for safe work place ambulation;to maintain proper body mechanics/posture while ambulating to avoid additional compensatory injury due to improper gait mechanics;to promote a healthy and active lifestyle   Due Date 12/08/22    LTG Target Performance Hours patient will be able to walk   Performance Level unrestricted pain free without limp   Rationale for safe outdoor household ambulation;for safe household ambulation;for safe work place ambulation;for safe community ambulation;to promote a healthy and active lifestyle;to maintain proper body mechanics/posture while ambulating to avoid additional compensatory injury due to improper gait mechanics   Due Date 01/26/23   Goal #2   Goal #2 stairs   Previous Functional Level No restrictions   Current  Functional Level Ascend/descend stairs;one step at a time;with a railing   Performance Level with pain   STG Target Performance Ascend/descend stairs;one step at a time;with a railing   Performance Level with less than 2/10 pain   Rationale for safe community ambulaton;for safe community access to buildings;to reach lower level of home safely;to reach upper level of home safely;to enter/leave the house safely   Due Date 12/08/22   LTG Target Performance Ascend/descend stairs;in a normal reciprocal pattern;without a railing   Performance Level pain free   Rationale for safe community ambulaton;for safe community access to buildings;to reach lower level of home safely;to reach upper level of home safely;to enter/leave the house safely   Due Date 01/26/23       Therapy Frequency:  1 x week  Predicted Duration of Therapy Intervention:  10 weeks    Kian Myers, PT                 I CERTIFY THE NEED FOR THESE SERVICES FURNISHED UNDER        THIS PLAN OF TREATMENT AND WHILE UNDER MY CARE     (Physician attestation of this document indicates review and certification of the therapy plan).                     Certification Date From:  11/17/22   Certification Date To:  03/17/23    Referring Provider:  Fay Ivy    Initial Assessment        See Epic Evaluation SOC Date: 11/17/22

## 2022-11-17 NOTE — PROGRESS NOTES
Answers for HPI/ROS submitted by the patient on 11/13/2022  Reason for Visit:: arthritis in knees  When problem began:: 1/1/2010  How problem occurred:: ?  Number scale: 6/10  General health as reported by patient: good  Please check all that apply to your current or past medical history: asthma, cancer  Medical allergies: other  Other Allergies Detail: morphine or anesthetic during surgery  Surgeries: orthopedic surgery, heart surgery, cancer surgery, other  Other Surgery Detail: tonsillectomy  Medications you are currently taking: bone density, cardiac medication, heparin/coumadin, high blood pressure medication, hormone replacement therapy, thyroid medication  Occupation:: retired teacher  What are your primary job tasks: driving, other  Other Tasks Detail: housework, cooking, yardwork  [facility] Initial Evaluation     Present: no    Subjective:  Annabella Bolanos is a 67 year old female with complaints of kassandra knee . Pt reports that she's been having pain in both knees for years now and has a long standing history of pain and dysfunction in this area. Worse with bending, stairs, squatting, and walking. Denies vague symptoms. Wants to get rid of this pain and improve overall function. Wants to be able to keep up with the yard work as well without this pain.      Symptoms commenced as a result of: Chronic. Condition occurred in the following environment: Chronic. Onset of symptoms: 11/9/22(date of MD PT order). Location of symptoms: top and posterior kassandra knees. Pain level on number scale: 6/10. Quality of pain: aching, shooting. Associated symptoms: none. Pain frequency (constant/intermittent): intermittent. Symptoms are exacerbated by: walking, standing, stairs, bending the knees. Symptoms are relieved by: water aerobics, avoidancey, compression sleeves. Progression of symptoms since onset: worse. Imaging: Xrays OA kassandra knee. Previous treatment: none. Response to previous treatment:  none. General health as reported by patient is good. Pertinent medical history includes: See Epic. Medical allergies: see Epic. Other pertinent surgeries: see Epic. Current medications: See Epic. Occupation: retired teacher. Work/restriction status: none. Primary job tasks: housework, cooking, yardwork. Barriers at home/work: None reported by patient. Red flags: None reported by patient.    Objective  Gait:  Lacks terminal knee extension kassandra with gait with mod limp on each side  Screening: negative    Flexibility: tight hamstring, gastroc kassandra    Knee AROM/PROM: R 0-20*-120*, L 0-27*-95*    Hip ROM: WFL kassandra less motion L hip    Knee Strength (* = pain) Right Left   FL 4+/5 4+/5   EXT 4-/5* 3+/5*   Quad Contraction (Good/Fair/Poor) fair poor   Hip Extension 3+/5 3+/5   Hip ABD 3+/5 3+/5       Palpation Tenderness:  Joint line kassandra knee    Swelling/Circumferential Measurements: moderate swelling kassandra knee L worse than right      Accessory Motion: hypomobile patellar passive motion all planes kassandra with pain L worse than R    Functional Squat: poor squat with pain    Balance: good SLS R and fair on the L     Other tests: none    Key Findings  Chronic kassandra knee pain with OA with severe motion loss and severe loss of function. Trailing PT prior to more aggressive treatment.   Assessment/Plan:    Patient is a 67 year old female with both sides knee complaints.    Patient has the following significant findings with corresponding treatment plan.                Diagnosis 1:  Chronic kassandra knee pain, Bilateral knee OA  Pain -  manual therapy, splint/taping/bracing/orthotics, self management, education and home program  Decreased ROM/flexibility - manual therapy and therapeutic exercise  Decreased joint mobility - manual therapy and therapeutic exercise  Decreased strength - therapeutic exercise and therapeutic activities  Impaired balance - neuro re-education and therapeutic activities  Impaired gait - gait training  Impaired muscle  performance - neuro re-education  Decreased function - therapeutic activities    Therapy Evaluation Codes:     1) History comprised of:   Personal factors that impact the plan of care:      Time since onset of symptoms.    Comorbidity factors that impact the plan of care are:      Asthma and Cancer.     Medications impacting care: Cardiac, High blood pressure and Heparin/coumadin.  2) Examination of Body Systems comprised of:   Body structures and functions that impact the plan of care:      Knee.   Activity limitations that impact the plan of care are:      Lifting, Squatting/kneeling, Stairs, Standing and Walking.  3) Clinical presentation characteristics are:   Stable/Uncomplicated.  4) Decision-Making    Low complexity using standardized patient assessment instrument and/or measureable assessment of functional outcome.    Cumulative Therapy Evaluation is: Low complexity.    Previous and current functional limitations:  (See Goal Flow Sheet for this information)    Short term and Long term goals: (See Goal Flow Sheet for this information)     Communication ability:  Patient appears to be able to clearly communicate and understand verbal and written communication and follow directions correctly.  Treatment Explanation - The following has been discussed with the patient:   RX ordered/plan of care  Anticipated outcomes  Possible risks and side effects  This patient would benefit from PT intervention to resume normal activities.   Rehab potential is good.    Frequency:  1 X week, once daily  Duration:  for 10 weeks  Discharge Plan:  Achieve all LTG.  Independent in home treatment program.  Reach maximal therapeutic benefit.    Please refer to the daily flowsheet for treatment today, total treatment time and time spent performing 1:1 timed codes.     Inquires  Kian Myers PT, DPT, CSCS  Physical Therapist  [facility]  [address] [facility], [address]  [address]  [phone]

## 2022-11-20 VITALS
HEART RATE: 72 BPM | RESPIRATION RATE: 18 BRPM | OXYGEN SATURATION: 99 % | HEIGHT: 64 IN | WEIGHT: 123.3 LBS | BODY MASS INDEX: 21.05 KG/M2 | DIASTOLIC BLOOD PRESSURE: 78 MMHG | TEMPERATURE: 96.9 F | SYSTOLIC BLOOD PRESSURE: 123 MMHG

## 2022-11-28 DIAGNOSIS — Z79.01 LONG TERM CURRENT USE OF ANTICOAGULANTS WITH INR GOAL OF 2.0-3.0: ICD-10-CM

## 2022-11-28 DIAGNOSIS — Z95.2 S/P AORTIC VALVE REPLACEMENT: ICD-10-CM

## 2022-11-29 RX ORDER — WARFARIN SODIUM 5 MG/1
TABLET ORAL
Qty: 115 TABLET | Refills: 1 | Status: SHIPPED | OUTPATIENT
Start: 2022-11-29 | End: 2023-05-31

## 2022-11-29 NOTE — TELEPHONE ENCOUNTER
ANTICOAGULATION MANAGEMENT:  Medication Refill    Anticoagulation Summary  As of 11/14/2022    Warfarin maintenance plan:  7.5 mg (5 mg x 1.5) every Mon, Fri; 5 mg (5 mg x 1) all other days; Starting 11/14/2022   Next INR check:  12/19/2022   Target end date:  Indefinite    Indications    Long-term (current) use of anticoagulants [Z79.01] [Z79.01]  Aortic valve replaced [Z95.2]             Anticoagulation Care Providers     Provider Role Specialty Phone number    Brook Echavarria MD Referring Internal Medicine [phone]          Visit with referring provider/group within last year: Yes, with PCP Karla Moss    [facility] referral signed within last year: Yes    Annabella meets all criteria for refill (current [facility] referral, office visit with referring provider/group in last year, lab monitoring up to date or not exceeding 2 weeks overdue). Rx instructions and quantity supplied updated to match patient's current dosing plan. Warfarin 90 day supply with 1 refill granted per [facility] protocol     Kusum Aguilera RN  Anticoagulation Clinic     unknown

## 2022-11-30 ENCOUNTER — THERAPY VISIT (OUTPATIENT)
Dept: PHYSICAL THERAPY | Facility: CLINIC | Age: 67
End: 2022-11-30
Attending: STUDENT IN AN ORGANIZED HEALTH CARE EDUCATION/TRAINING PROGRAM
Payer: COMMERCIAL

## 2022-11-30 DIAGNOSIS — M25.561 CHRONIC PAIN OF RIGHT KNEE: Primary | ICD-10-CM

## 2022-11-30 DIAGNOSIS — M25.562 CHRONIC PAIN OF LEFT KNEE: ICD-10-CM

## 2022-11-30 DIAGNOSIS — G89.29 CHRONIC PAIN OF RIGHT KNEE: Primary | ICD-10-CM

## 2022-11-30 DIAGNOSIS — G89.29 CHRONIC PAIN OF LEFT KNEE: ICD-10-CM

## 2022-11-30 PROCEDURE — 97110 THERAPEUTIC EXERCISES: CPT | Mod: GP | Performed by: PHYSICAL THERAPIST

## 2022-12-01 ENCOUNTER — THERAPY VISIT (OUTPATIENT)
Dept: OCCUPATIONAL THERAPY | Facility: CLINIC | Age: 67
End: 2022-12-01
Attending: STUDENT IN AN ORGANIZED HEALTH CARE EDUCATION/TRAINING PROGRAM
Payer: COMMERCIAL

## 2022-12-01 DIAGNOSIS — M15.0 PRIMARY OSTEOARTHRITIS INVOLVING MULTIPLE JOINTS: ICD-10-CM

## 2022-12-01 PROCEDURE — 97166 OT EVAL MOD COMPLEX 45 MIN: CPT | Mod: GO | Performed by: OCCUPATIONAL THERAPIST

## 2022-12-01 PROCEDURE — 97110 THERAPEUTIC EXERCISES: CPT | Mod: GO | Performed by: OCCUPATIONAL THERAPIST

## 2022-12-01 NOTE — PROGRESS NOTES
[facility]    OUTPATIENT Occupational Therapy ORTHOPEDIC EVALUATION  PLAN OF TREATMENT FOR OUTPATIENT REHABILITATION  (COMPLETE FOR INITIAL CLAIMS ONLY)  Patient's Last Name, First Name, M.I.  YOB: 1955  Annabella Bolanos    Provider s Name:  [facility]   Medical Record No.  [id number]   Start of Care Date:  12/01/22   Onset Date:   12/01/22   Treatment Diagnosis:  Bilateral OA Hands Medical Diagnosis:  Primary osteoarthritis involving multiple joints       Goals:     12/01/22 0500   Goal #1   Goal #1 household chores   Previous Performance Level Independent   Current Functional Task    Current Performance Level moderate difficulty   STG Target Perfomance Open a tight or new jar   STG Target Perform Level Mild difficulty   Due Date 02/01/22   LTG Target Task/Performance Independent   Due Date 12/01/22       Therapy Frequency:  1x per week  Predicted Duration of Therapy Intervention:  2 months    Alissa Roper OT                 I CERTIFY THE NEED FOR THESE SERVICES FURNISHED UNDER        THIS PLAN OF TREATMENT AND WHILE UNDER MY CARE     (Physician attestation of this document indicates review and certification of the therapy plan).                     Certification Date From:  12/01/22   Certification Date To:  03/01/22    Referring Provider:  Fay Ivy    Initial Assessment        See Epic Evaluation SOC Date: 12/01/22

## 2022-12-01 NOTE — PROGRESS NOTES
Hand Therapy Initial Evaluation    Current Date:  12/1/2022    Diagnosis: Bilateral Arthritis  DOI: past 5 years has gotten worse  DOS: NA  Procedure:  NA  Post:  5 years    Precautions: Significant loss of mobility due to OA    Subjective:  Annabella Bolanos is a 67 year old female. Patient reports symptoms of the bilateral arthritis R>L which occurred due to use. Since onset symptoms are Gradually getting better but at times still get worse.  Current occupation is retired teacher. Goes to exercise classes, reading, cooking, water aerobics.  Reason for Visit:: arthritis in hands  When problem began:: 1/1/2016  Number scale: 7/10  General health as reported by patient: good  Please check all that apply to your current or past medical history: changes in bowel/bladder, cancer, high blood pressure, menopausal, osteoarthritis, thyroid problems  Surgeries: orthopedic surgery, heart surgery, cancer surgery, other  Other Surgery Detail: tonsils  Medications you are currently taking: bone density, heparin/coumadin, high blood pressure medication, thyroid medication  Occupation:: retired  What are your primary job tasks: driving, lifting/carrying, prolonged sitting, prolonged standing, pushing/pulling    Occupational Profile Information:  Left hand dominant  Prior functional level:  no limitations  Patient reports symptoms of pain, stiffness/loss of motion, weakness/loss of strength and edema  Special tests:  x-ray.    Previous treatment: PT for knees  Barriers include:none  Mobility: No difficulty  Transportation: drives    Functional Outcome Measure:   68/80    Objective:  Pain Level (Scale 0-10)   12/1/2022   At Rest 2/10   With Use 7/10     Pain Description  Date 12/1/2022   Location hand   Pain Quality Aching, Sharp and Shooting   Frequency constant     Pain is worst  daytime or nighttime   Exacerbated by  use   Relieved by none   Progression Gradually improving as hand is able to move less      ROM  Thumb 12/1/2022  12/1/2022   AROM  (PROM) R L   MP 0/60 0/40   IP 0/40 hyper extension +40 0/37+ 50    RABD 30 40   PABD 45 40   Retropulsion (cm) 0 0   Kapandji Opposition Scale (0-10/10) 7 7      AROM  AROM(PROM) 12/1/2022    R L    Index MP 0/65  0/60   PIP 15/60 15/90   DIP 5/50 20/65   RINCON    Long MP 0/60 0 /65   PIP 30/70 10/90   DIP 10/35 25/60   RINCON    Ring MP 0/55  0/60   PIP 30/70 15/95   DIP 5/35 15/60   RINCON    Small MP 0/40  0/40   PIP 40/55 15/90   DIP 0/20 15 /63   RINCON     Wrist E/F  R55/65 L 50/55      Strength   (Measured in pounds)  Pain Report: - none  + mild    ++ moderate    +++ severe    12/1/2022 12/1/2022   Trials R L    1  2  3 20 30   Average       Lat Pinch 12/1/2022 12/1/2022   Trials R L   1  2  3 8 10   Average       3 Pt Pinch 12/1/2022 12/1/2022   Trials R L   1  2  3 6 9   Average       Palpation   Pain Report:  - none    + mild    ++ moderate    +++ severe    12/1/2022   DPC Middle R: 2.5 cm  L: 3 cm    Herberden Nodes R: ++  L: ++   PIP joint enlargement R: +++  L: +++   Angulation DIP  R: --  L: Middle 20 radial    Thumb IP joint enlagement R: +++  L: ++         Assessment:  Patient presents with symptoms consistent with diagnosis of bilateral hand arthritis, with conservative intervention.    Patient's limitations or Problem List includes:  Pain, Decreased ROM/motion, Increased edema, Weakness, Hypomobility, Decreased , Decreased pinch, Decreased dexterity and Tightness in musculature of the bilateral wrist, hand and thumb which interferes with the patient's ability to perform Self Care Tasks (dressing), Work Tasks and Household Chores as compared to previous level of function.    Rehab Potential:  Good - Return to full activity, some limitations    Patient will benefit from skilled Occupational Therapy to increase ROM, flexibility, stability of wrist and stability of hand to return to previous activity level and resume normal daily tasks and to reach their rehab potential.    Barriers to  Learning:  No barrier    Communication Issues:  Patient appears to be able to clearly communicate and understand verbal and written communication and follow directions correctly.    Chart Review: Brief Chart Review      Identified Performance Deficits: Household chores, shopping, preparing food, dressing, opening doors and jars.  Assessment of Occupational Performance:  3-5 Performance Deficits    Clinical Decision Making (Complexity): Moderate complexity    Treatment Explanation:  The following has been discussed with the patient:    RX ordered/plan of care  Anticipated outcomes  Possible risks and side effects    Plan:  Frequency:  1 X week, once daily  Duration:  for 2 months    Treatment Plan:    Modalities:    Paraffin   Therapeutic Exercise:    AROM, AAROM, PROM, Isometrics and Stabilization  Therapeutic Activities:   Functional activities   Retraining   Neuromuscular re-ed:   Proprioceptive Training, Isometrics and Stabilization  Manual Techniques:   Myofascial release  Orthotic Fabrication:    Positional   Self Care:    Self Care Tasks and Ergonomic Considerations    Discharge Plan:  Achieve all LTG  [place] in home treatment program.  Reach maximal therapeutic benefit.    Home Program:  Hand AROM program      Next Visit:  [facility]  Hand Stabilization Program   Incorporate joint protection into daily functional activities  Adaptive equipment as needed  Heat Intrinsic stretching

## 2022-12-06 ENCOUNTER — MYC MEDICAL ADVICE (OUTPATIENT)
Dept: INTERNAL MEDICINE | Facility: CLINIC | Age: 67
End: 2022-12-06

## 2022-12-07 ENCOUNTER — THERAPY VISIT (OUTPATIENT)
Dept: PHYSICAL THERAPY | Facility: CLINIC | Age: 67
End: 2022-12-07
Payer: COMMERCIAL

## 2022-12-07 DIAGNOSIS — G89.29 CHRONIC PAIN OF LEFT KNEE: ICD-10-CM

## 2022-12-07 DIAGNOSIS — G89.29 CHRONIC PAIN OF RIGHT KNEE: Primary | ICD-10-CM

## 2022-12-07 DIAGNOSIS — M25.561 CHRONIC PAIN OF RIGHT KNEE: Primary | ICD-10-CM

## 2022-12-07 DIAGNOSIS — M25.562 CHRONIC PAIN OF LEFT KNEE: ICD-10-CM

## 2022-12-07 PROCEDURE — 97110 THERAPEUTIC EXERCISES: CPT | Mod: GP | Performed by: PHYSICAL THERAPIST

## 2022-12-08 ENCOUNTER — THERAPY VISIT (OUTPATIENT)
Dept: OCCUPATIONAL THERAPY | Facility: CLINIC | Age: 67
End: 2022-12-08
Attending: STUDENT IN AN ORGANIZED HEALTH CARE EDUCATION/TRAINING PROGRAM
Payer: COMMERCIAL

## 2022-12-08 DIAGNOSIS — M15.0 PRIMARY OSTEOARTHRITIS INVOLVING MULTIPLE JOINTS: Primary | ICD-10-CM

## 2022-12-08 PROCEDURE — 97530 THERAPEUTIC ACTIVITIES: CPT | Mod: GO | Performed by: OCCUPATIONAL THERAPIST

## 2022-12-08 PROCEDURE — 97110 THERAPEUTIC EXERCISES: CPT | Mod: GO | Performed by: OCCUPATIONAL THERAPIST

## 2022-12-08 PROCEDURE — 97018 PARAFFIN BATH THERAPY: CPT | Mod: GO | Performed by: OCCUPATIONAL THERAPIST

## 2022-12-13 ASSESSMENT — KOOS JR
STANDING UPRIGHT: SEVERE
GOING UP OR DOWN STAIRS: SEVERE
KOOS JR SCORING: 42.28
STRAIGHTENING KNEE FULLY: SEVERE
HOW SEVERE IS YOUR KNEE STIFFNESS AFTER FIRST WAKING IN MORNING: SEVERE
TWISING OR PIVOTING ON KNEE: SEVERE
BENDING TO THE FLOOR TO PICK UP OBJECT: MILD
RISING FROM SITTING: MODERATE

## 2022-12-14 ENCOUNTER — THERAPY VISIT (OUTPATIENT)
Dept: OCCUPATIONAL THERAPY | Facility: CLINIC | Age: 67
End: 2022-12-14
Payer: COMMERCIAL

## 2022-12-14 DIAGNOSIS — M79.641 BILATERAL HAND PAIN: ICD-10-CM

## 2022-12-14 DIAGNOSIS — M79.642 BILATERAL HAND PAIN: ICD-10-CM

## 2022-12-14 PROCEDURE — 97110 THERAPEUTIC EXERCISES: CPT | Mod: GO

## 2022-12-19 ENCOUNTER — ANTICOAGULATION THERAPY VISIT (OUTPATIENT)
Dept: ANTICOAGULATION | Facility: CLINIC | Age: 67
End: 2022-12-19

## 2022-12-19 ENCOUNTER — LAB (OUTPATIENT)
Dept: LAB | Facility: CLINIC | Age: 67
End: 2022-12-19
Payer: COMMERCIAL

## 2022-12-19 DIAGNOSIS — Z95.2 H/O AORTIC VALVE REPLACEMENT: ICD-10-CM

## 2022-12-19 DIAGNOSIS — Z95.2 AORTIC VALVE REPLACED: ICD-10-CM

## 2022-12-19 DIAGNOSIS — Z79.01 LONG TERM CURRENT USE OF ANTICOAGULANT THERAPY: Primary | ICD-10-CM

## 2022-12-19 DIAGNOSIS — Z79.01 LONG TERM CURRENT USE OF ANTICOAGULANT THERAPY: ICD-10-CM

## 2022-12-19 LAB — INR BLD: 2.6 (ref 0.9–1.1)

## 2022-12-19 PROCEDURE — 85610 PROTHROMBIN TIME: CPT

## 2022-12-19 PROCEDURE — 36416 COLLJ CAPILLARY BLOOD SPEC: CPT

## 2022-12-19 NOTE — PROGRESS NOTES
ANTICOAGULATION MANAGEMENT     Annabella Bolanos 67 year old female is on warfarin with therapeutic INR result. (Goal INR 2.0-3.0)    Recent labs: (last 7 days)     12/19/22  1037   INR 2.6*       ASSESSMENT       Source(s): Chart Review and Patient/Caregiver Call       Warfarin doses taken: Warfarin taken as instructed    Diet: No new diet changes identified    New illness, injury, or hospitalization: No    Medication/supplement changes: None noted    Signs or symptoms of bleeding or clotting: No    Previous INR: Therapeutic last 2(+) visits    Additional findings: Patient reports she meets with Ortho 12/20/22 to discuss knee replacement       PLAN     Recommended plan for no diet, medication or health factor changes affecting INR     Dosing Instructions: Continue your current warfarin dose with next INR in 6 weeks       Summary  As of 12/19/2022    Full warfarin instructions:  7.5 mg every Mon, Fri; 5 mg all other days; Starting 12/19/2022   Next INR check:  1/30/2023             Telephone call with Annabella who verbalizes understanding and agrees to plan    Lab visit scheduled    Education provided:     Please call back if any changes to your diet, medications or how you've been taking warfarin    Contact [phone]  with any changes, questions or concerns.     Plan made per [facility] anticoagulation protocol    Deborah Aguilera RN  Anticoagulation Clinic  12/19/2022    _______________________________________________________________________     Anticoagulation Episode Summary     Current INR goal:  2.0-3.0   TTR:  97.5 % (1 y)   Target end date:  Indefinite   Send INR reminders to:  [facility]    Indications    Long-term (current) use of anticoagulants [Z79.01] [Z79.01]  Aortic valve replaced [Z95.2]           Comments:           Anticoagulation Care Providers     Provider Role Specialty Phone number    Brook Echavarria MD Referring Internal Medicine [phone]

## 2022-12-20 ENCOUNTER — OFFICE VISIT (OUTPATIENT)
Dept: ORTHOPEDICS | Facility: CLINIC | Age: 67
End: 2022-12-20
Attending: STUDENT IN AN ORGANIZED HEALTH CARE EDUCATION/TRAINING PROGRAM
Payer: COMMERCIAL

## 2022-12-20 ENCOUNTER — MYC MEDICAL ADVICE (OUTPATIENT)
Dept: INTERNAL MEDICINE | Facility: CLINIC | Age: 67
End: 2022-12-20

## 2022-12-20 VITALS — DIASTOLIC BLOOD PRESSURE: 72 MMHG | BODY MASS INDEX: 20.6 KG/M2 | SYSTOLIC BLOOD PRESSURE: 138 MMHG | WEIGHT: 120 LBS

## 2022-12-20 DIAGNOSIS — M25.562 CHRONIC PAIN OF BOTH KNEES: ICD-10-CM

## 2022-12-20 DIAGNOSIS — M17.0 BILATERAL PRIMARY OSTEOARTHRITIS OF KNEE: Primary | ICD-10-CM

## 2022-12-20 DIAGNOSIS — M25.561 CHRONIC PAIN OF BOTH KNEES: ICD-10-CM

## 2022-12-20 DIAGNOSIS — M15.0 PRIMARY OSTEOARTHRITIS INVOLVING MULTIPLE JOINTS: ICD-10-CM

## 2022-12-20 DIAGNOSIS — G89.29 CHRONIC PAIN OF BOTH KNEES: ICD-10-CM

## 2022-12-20 PROCEDURE — 99203 OFFICE O/P NEW LOW 30 MIN: CPT | Mod: 25 | Performed by: ORTHOPAEDIC SURGERY

## 2022-12-20 PROCEDURE — 2894A VOIDCORRECT: CPT | Performed by: ORTHOPAEDIC SURGERY

## 2022-12-20 PROCEDURE — 20610 DRAIN/INJ JOINT/BURSA W/O US: CPT | Mod: 50 | Performed by: ORTHOPAEDIC SURGERY

## 2022-12-20 NOTE — LETTER
12/20/2022         RE: Annabella Bolanos  [address]   [place] [address]        Dear Colleague,    Thank you for referring your patient, Annabella Bolanos, to the [facility]. Please see a copy of my visit note below.    S: Patient is a 67 year old female seen today in consultation for both knees.  They report onset of symptoms many years with pain continually getting worse. Patient denies any previous injury.   Pain is located anterior aspect of knee, and described as sharp pain.  Increased pain with getting up off the floor, sitting cross legged, walking .  Pain wakes at nighttime occasionally . Pain is improved by rest.  They have tried the following therapies:Tylenol and Voltaren gel, previous cortisone injection many years ago   Current pain level: 0/10, Worst pain level: 8/10.     Patient is currently working retired.            Patient Active Problem List   Diagnosis     Aortic valve replaced     Benign essential hypertension     Advanced directives, counseling/discussion     Long-term (current) use of anticoagulants [Z79.01]     Osteopenia of multiple sites     Hyperthyroidism     Adenomatous polyp of colon, unspecified part of colon     Personal history of malignant neoplasm of breast     Chronic pain of right knee     Chronic pain of left knee     Bilateral hand pain            Past Medical History:   Diagnosis Date     Adenomatous colon polyp 08/2015     Aortic stenosis 06/23/2011    bicuspid AV with severe stenosis - 6/2011 mechanical AVR with 23 mm St Yordan AV conduit, composite graft placement     Arthritis     knees and hands     Bicuspid aortic valve      Breast cancer (H) 07/2014    R breast     H/O aortic valve replacement     St Yordan     Heart murmur     Valve repalcement 2011.     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  "2011    AAA repair with av23 mm tube graft     Palpitations      PONV (postoperative nausea and vomiting)     n/v morphine vs anesthesia, vomiting x24 hrs     Subclinical hyperthyroidism 2017     Thrombosis of leg     after  of daughter     Uncomplicated asthma             Past Surgical History:   Procedure Laterality Date     BIOPSY NODE SENTINEL Right 09/10/2014    Procedure: BIOPSY NODE SENTINEL;  Surgeon: Ila Romano MD;  Location: [facility]     CARDIAC SURGERY  2011    aortic valve replacement     ENT SURGERY      tonsillectomy     HRW PORT A CATH       INSERT PORT VASCULAR ACCESS Left 10/22/2014    Procedure: INSERT PORT VASCULAR ACCESS;  Surgeon: Ila Romano MD;  Location: [facility]     LUMPECTOMY BREAST WITH SEED LOCALIZATION Right 09/10/2014    Procedure: LUMPECTOMY BREAST WITH SEED LOCALIZATION;  Surgeon: Ila Romano MD;  Location: [facility]     ORTHOPEDIC SURGERY      shoulder, thumb surgery     REMOVE PORT VASCULAR ACCESS Left 2015    Procedure: REMOVE PORT VASCULAR ACCESS;  Surgeon: Ila Romano MD;  Location: [facility]     REPAIR ANEURYSM ASCENDING AORTA  2011    Procedure:REPAIR ANEURYSM ASCENDING AORTA; Medial Sternotomy, Aortic Valve Replacement, ([employer] Y[employer] Medical Masters HP Valved Graft, size 23 mm) on pump oxygenation, intraoperative transesophageal cardiogram; Surgeon:JOHN PAUL ULLOA; [facility]     REPLACE VALVE AORTIC  2011    bicuspid AV with severe stenosis - 2011 mechanical AVR with 23 mm St Yordan AV conduit, composite graft placement            Social History     Tobacco Use     Smoking status: Never     Smokeless tobacco: Never   Substance Use Topics     Alcohol use: Yes     Comment: 2 drinks per week -             Family History   Problem Relation Age of Onset     Hypertension Mother      Thyroid Disease Mother         \"Toxic thyroid\"     Prostate Cancer Father 70     Cancer Father 80        Lung cancer, " Not caused from smoking     Cerebrovascular Disease Father 80     Hypertension Father      Cardiovascular Brother         half brother      Cardiovascular Son         Puentes-Parkinsons White Syndrome     Cardiovascular Daughter         Heart murmur     Breast Cancer Paternal Aunt 80     Cardiovascular Paternal Aunt      Arthritis Brother 40        RA - half brother      Cancer Maternal Grandfather      Colon Cancer No family hx of                Allergies   Allergen Reactions     Morphine Sulfate Nausea and Vomiting            Current Outpatient Medications   Medication Sig Dispense Refill     alendronate (FOSAMAX) 70 MG tablet Take 70 mg by mouth every 7 days       anastrozole (ARIMIDEX) 1 MG tablet Take by mouth daily 30 tablet      ASPIRIN EC PO Take 81 mg by mouth daily       carvedilol (COREG) 12.5 MG tablet Take 1 tablet (12.5 mg) by mouth 2 times daily (with meals) 180 tablet 3     lisinopril (ZESTRIL) 10 MG tablet Take 1 tablet (10 mg) by mouth 2 times daily 180 tablet 3     methimazole (TAPAZOLE) 5 MG tablet TAKE ONE-HALF (1/2) TABLET DAILY 45 tablet 3     oxybutynin ER (DITROPAN XL) 10 MG 24 hr tablet Take 1 tablet (10 mg) by mouth At Bedtime 30 tablet 3     warfarin ANTICOAGULANT (COUMADIN) 5 MG tablet TAKE 1 TABLET DAILY, EXCEPT ONE AND ONE-HALF TABLETS ON MONDAY AND FRIDAY OR AS DIRECTED BY THE INR CLINIC 115 tablet 1     zolpidem (AMBIEN) 5 MG tablet Take 0.5 tablets (2.5 mg) by mouth nightly as needed for sleep 30 tablet 0          Review Of Systems  Skin: negative  Eyes: negative  Ears/Nose/Throat: negative  Respiratory: No shortness of breath, dyspnea on exertion, cough, or hemoptysis    O: physical exam:  Pain to palpation both knee joint lines medial and lateral. Some parapatellar pain to palpation.  No effusion either knee.  Adequate knee flexion and extension.  CMS intact.       Lab:No recent Inflammatory markers or arthritic profile    Images:IMPRESSION:     Right knee: No acute fracture or  malalignment. Severe lateral  compartment degenerative changes with bone-on-bone articulation.  Moderate patellofemoral and mild medial compartment degenerative  changes. Moderate knee joint effusion. Atherosclerosis.     Left knee: No acute fracture or malalignment. Moderate to severe  medial compartment degenerative changes. Moderate patellofemoral  compartment degenerative changes. Moderate knee joint effusion.  Atherosclerosis.         A:  OA each knee    P: Inject each knee intra articular after verbal and written consent, ethyl chloride/chloroprep has tried steroid injections in the past and would like to try viscosupplementation.  Follow outcomes  See back in one month after obtain new labs and review DEXA  Notify if exacerbation symptoms      Large Joint Injection/Arthocentesis: bilateral knee    Date/Time: 12/20/2022 1:59 PM  Performed by: Akil Hahn MD  Authorized by: Akil Hahn MD     Needle Size:  20 G  Guidance: landmark guided    Location:  Knee  Laterality:  Bilateral      Medications (Right):  48 mg hylan 48 MG/6ML  Medications (Left):  48 mg hylan 48 MG/6ML  Outcome:  Tolerated well, no immediate complications  Procedure discussed: discussed risks, benefits, and alternatives    Consent Given by:  Patient  Timeout: timeout called immediately prior to procedure               In addition to the above assessment and plan each active problem on Annabella's problem list was evaluated today. This included the questioning of Annabella for any medication problems. We will continue the current treatment plan for these active problems except as noted.        Again, thank you for allowing me to participate in the care of your patient.        Sincerely,        AKIL HAHN MD

## 2022-12-20 NOTE — PROGRESS NOTES
S: Patient is a 67 year old female seen today in consultation for both knees.  They report onset of symptoms many years with pain continually getting worse. Patient denies any previous injury.   Pain is located anterior aspect of knee, and described as sharp pain.  Increased pain with getting up off the floor, sitting cross legged, walking .  Pain wakes at nighttime occasionally . Pain is improved by rest.  They have tried the following therapies:Tylenol and Voltaren gel, previous cortisone injection many years ago   Current pain level: 0/10, Worst pain level: 8/10.     Patient is currently working retired.            Patient Active Problem List   Diagnosis     Aortic valve replaced     Benign essential hypertension     Advanced directives, counseling/discussion     Long-term (current) use of anticoagulants [Z79.01]     Osteopenia of multiple sites     Hyperthyroidism     Adenomatous polyp of colon, unspecified part of colon     Personal history of malignant neoplasm of breast     Chronic pain of right knee     Chronic pain of left knee     Bilateral hand pain            Past Medical History:   Diagnosis Date     Adenomatous colon polyp 2015     Aortic stenosis 2011    bicuspid AV with severe stenosis - 2011 mechanical AVR with 23 mm St Yordan AV conduit, composite graft placement     Arthritis     knees and hands     Bicuspid aortic valve      Breast cancer (H) 2014    R breast     H/O aortic valve replacement     St Yordan     Heart murmur     Valve repalcement .     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    AAA repair with av23 mm tube graft     Palpitations      PONV (postoperative nausea and vomiting)     n/v morphine vs anesthesia, vomiting x24 hrs     Subclinical hyperthyroidism 2017     Thrombosis of leg     after  of daughter     Uncomplicated  "asthma             Past Surgical History:   Procedure Laterality Date     BIOPSY NODE SENTINEL Right 09/10/2014    Procedure: BIOPSY NODE SENTINEL;  Surgeon: Ila Romano MD;  [facility]: [facility]     CARDIAC SURGERY  06/01/2011    aortic valve replacement     ENT SURGERY      tonsillectomy     HRW PORT A CATH       INSERT PORT VASCULAR ACCESS Left 10/22/2014    Procedure: INSERT PORT VASCULAR ACCESS;  Surgeon: Ila Romano MD;  [facility]: [facility]     LUMPECTOMY BREAST WITH SEED LOCALIZATION Right 09/10/2014    Procedure: LUMPECTOMY BREAST WITH SEED LOCALIZATION;  Surgeon: Ila Romano MD;  Location: [facility]     ORTHOPEDIC SURGERY      shoulder, thumb surgery     REMOVE PORT VASCULAR ACCESS Left 04/08/2015    Procedure: REMOVE PORT VASCULAR ACCESS;  Surgeon: Ila Romano MD;  [facility]: [facility]     REPAIR ANEURYSM ASCENDING AORTA  06/23/2011    Procedure:REPAIR ANEURYSM ASCENDING AORTA; Medial Sternotomy, Aortic Valve Replacement, ([employer] Yordan Medical Masters HP Valved Graft, size 23 mm) on pump oxygenation, intraoperative transesophageal cardiogram; Surgeon:JOHN PAUL ULLOA; [facility]     REPLACE VALVE AORTIC  06/23/2011    bicuspid AV with severe stenosis - 6/2011 mechanical AVR with 23 mm St Yordan AV conduit, composite graft placement            Social History     Tobacco Use     Smoking status: Never     Smokeless tobacco: Never   Substance Use Topics     Alcohol use: Yes     Comment: 2 drinks per week -             Family History   Problem Relation Age of Onset     Hypertension Mother      Thyroid Disease Mother         \"Toxic thyroid\"     Prostate Cancer Father 70     Cancer Father 80        Lung cancer, Not caused from smoking     Cerebrovascular Disease Father 80     Hypertension Father      Cardiovascular Brother         half brother      Cardiovascular Son         Puentes-Parkinsons White Syndrome     Cardiovascular Daughter         Heart murmur     Breast Cancer " Paternal Aunt 80     Cardiovascular Paternal Aunt      Arthritis Brother 40        RA - half brother      Cancer Maternal Grandfather      Colon Cancer No family hx of                Allergies   Allergen Reactions     Morphine Sulfate Nausea and Vomiting            Current Outpatient Medications   Medication Sig Dispense Refill     alendronate (FOSAMAX) 70 MG tablet Take 70 mg by mouth every 7 days       anastrozole (ARIMIDEX) 1 MG tablet Take by mouth daily 30 tablet      ASPIRIN EC PO Take 81 mg by mouth daily       carvedilol (COREG) 12.5 MG tablet Take 1 tablet (12.5 mg) by mouth 2 times daily (with meals) 180 tablet 3     lisinopril (ZESTRIL) 10 MG tablet Take 1 tablet (10 mg) by mouth 2 times daily 180 tablet 3     methimazole (TAPAZOLE) 5 MG tablet TAKE ONE-HALF (1/2) TABLET DAILY 45 tablet 3     oxybutynin ER (DITROPAN XL) 10 MG 24 hr tablet Take 1 tablet (10 mg) by mouth At Bedtime 30 tablet 3     warfarin ANTICOAGULANT (COUMADIN) 5 MG tablet TAKE 1 TABLET DAILY, EXCEPT ONE AND ONE-HALF TABLETS ON MONDAY AND FRIDAY OR AS DIRECTED BY THE INR CLINIC 115 tablet 1     zolpidem (AMBIEN) 5 MG tablet Take 0.5 tablets (2.5 mg) by mouth nightly as needed for sleep 30 tablet 0          Review Of Systems  Skin: negative  Eyes: negative  Ears/Nose/Throat: negative  Respiratory: No shortness of breath, dyspnea on exertion, cough, or hemoptysis    O: physical exam:  Pain to palpation both knee joint lines medial and lateral. Some parapatellar pain to palpation.  No effusion either knee.  Adequate knee flexion and extension.  CMS intact.       Lab:No recent Inflammatory markers or arthritic profile    Images:IMPRESSION:     Right knee: No acute fracture or malalignment. Severe lateral  compartment degenerative changes with bone-on-bone articulation.  Moderate patellofemoral and mild medial compartment degenerative  changes. Moderate knee joint effusion. Atherosclerosis.     Left knee: No acute fracture or malalignment.  Moderate to severe  medial compartment degenerative changes. Moderate patellofemoral  compartment degenerative changes. Moderate knee joint effusion.  Atherosclerosis.         A:  OA each knee    P: Inject each knee intra articular after verbal and written consent, ethyl chloride/chloroprep has tried steroid injections in the past and would like to try viscosupplementation.  Follow outcomes  See back in one month after obtain new labs and review DEXA  Notify if exacerbation symptoms      Large Joint Injection/Arthocentesis: bilateral knee    Date/Time: 12/20/2022 1:59 PM  Performed by: Colin Miles MD  Authorized by: Colin Miles MD     Needle Size:  20 G  Guidance: landmark guided    Location:  Knee  Laterality:  Bilateral      Medications (Right):  48 mg hylan 48 MG/6ML  Medications (Left):  48 mg hylan 48 MG/6ML  Outcome:  Tolerated well, no immediate complications  Procedure discussed: discussed risks, benefits, and alternatives    Consent Given by:  Patient  Timeout: timeout called immediately prior to procedure               In addition to the above assessment and plan each active problem on Annabella's problem list was evaluated today. This included the questioning of Annabella for any medication problems. We will continue the current treatment plan for these active problems except as noted.

## 2022-12-20 NOTE — PATIENT INSTRUCTIONS
Thank you for choosing [facility]    Dr. Miles Locations:    [facility] [place]   [address] [place] [address], [address]  [place], [place] [address] [place] [address]   Appointments: [phone] Appointments: [phone]   Fax: [phone] Fax: [phone]       Follow up: in 4 weeks  Please call [phone] to schedule your follow up appointment.         Steroid injection of the bilateral knee was performed today in clinic    - Would not soak in a hot tub, bath or swimming pool for 48 hours  - Ok to shower  - Ice today and only do your normal amounts of activity  - The lidocaine (what is giving you pain relief right now) will likely stop working in 1-2 hours.  You will then have pain again, similar to before you received the injection. The corticosteroid will not start working until approximately 1-2 weeks from now.  In a small percentage of people, cortisone can cause flushing/redness in the face. This usually lasts for 1-3 days and resolves. Cool compress and Ibuprofen/Tylenol can help if this happens.        For any questions please contact my office, [phone].

## 2022-12-21 NOTE — TELEPHONE ENCOUNTER
Give her fax number for lab, the orthopedist should send orders for labs they want to the lab and they will be sent directly to him.

## 2022-12-22 ENCOUNTER — ANCILLARY PROCEDURE (OUTPATIENT)
Dept: BONE DENSITY | Facility: CLINIC | Age: 67
End: 2022-12-22
Attending: INTERNAL MEDICINE
Payer: COMMERCIAL

## 2022-12-22 DIAGNOSIS — M85.80 OSTEOPENIA: ICD-10-CM

## 2022-12-22 DIAGNOSIS — C50.919 PRIMARY MALIGNANT NEOPLASM OF FEMALE BREAST (H): ICD-10-CM

## 2022-12-22 PROCEDURE — 77080 DXA BONE DENSITY AXIAL: CPT | Performed by: INTERNAL MEDICINE

## 2022-12-26 ENCOUNTER — DOCUMENTATION ONLY (OUTPATIENT)
Dept: LAB | Facility: CLINIC | Age: 67
End: 2022-12-26

## 2022-12-30 ENCOUNTER — LAB (OUTPATIENT)
Dept: LAB | Facility: CLINIC | Age: 67
End: 2022-12-30
Payer: COMMERCIAL

## 2022-12-30 DIAGNOSIS — M15.0 PRIMARY OSTEOARTHRITIS INVOLVING MULTIPLE JOINTS: ICD-10-CM

## 2022-12-30 DIAGNOSIS — M25.562 CHRONIC PAIN OF BOTH KNEES: ICD-10-CM

## 2022-12-30 DIAGNOSIS — G89.29 CHRONIC PAIN OF BOTH KNEES: ICD-10-CM

## 2022-12-30 DIAGNOSIS — M17.0 BILATERAL PRIMARY OSTEOARTHRITIS OF KNEE: ICD-10-CM

## 2022-12-30 DIAGNOSIS — M25.561 CHRONIC PAIN OF BOTH KNEES: ICD-10-CM

## 2022-12-30 LAB
CRP SERPL-MCNC: <3 MG/L
D DIMER PPP FEU-MCNC: 0.48 UG/ML FEU (ref 0–0.5)
ERYTHROCYTE [SEDIMENTATION RATE] IN BLOOD BY WESTERGREN METHOD: 9 MM/HR (ref 0–30)
URATE SERPL-MCNC: 4.8 MG/DL (ref 2.4–5.7)

## 2022-12-30 PROCEDURE — 86038 ANTINUCLEAR ANTIBODIES: CPT

## 2022-12-30 PROCEDURE — 85652 RBC SED RATE AUTOMATED: CPT

## 2022-12-30 PROCEDURE — 86039 ANTINUCLEAR ANTIBODIES (ANA): CPT

## 2022-12-30 PROCEDURE — 36415 COLL VENOUS BLD VENIPUNCTURE: CPT

## 2022-12-30 PROCEDURE — 86225 DNA ANTIBODY NATIVE: CPT

## 2022-12-30 PROCEDURE — 84550 ASSAY OF BLOOD/URIC ACID: CPT

## 2022-12-30 PROCEDURE — 86140 C-REACTIVE PROTEIN: CPT

## 2022-12-30 PROCEDURE — 85379 FIBRIN DEGRADATION QUANT: CPT

## 2023-01-02 LAB
ANA PAT SER IF-IMP: ABNORMAL
ANA SER QL IF: POSITIVE
ANA TITR SER IF: ABNORMAL {TITER}

## 2023-01-04 LAB — DSDNA AB SER-ACNC: 1.2 IU/ML

## 2023-01-10 ENCOUNTER — OFFICE VISIT (OUTPATIENT)
Dept: UROLOGY | Facility: CLINIC | Age: 68
End: 2023-01-10
Payer: COMMERCIAL

## 2023-01-10 VITALS — OXYGEN SATURATION: 98 % | HEIGHT: 64 IN | BODY MASS INDEX: 20.49 KG/M2 | WEIGHT: 120 LBS | HEART RATE: 59 BPM

## 2023-01-10 DIAGNOSIS — N39.41 URGE INCONTINENCE: Primary | ICD-10-CM

## 2023-01-10 DIAGNOSIS — R35.0 URINARY FREQUENCY: ICD-10-CM

## 2023-01-10 DIAGNOSIS — R39.15 URINARY URGENCY: ICD-10-CM

## 2023-01-10 DIAGNOSIS — N81.6 RECTOCELE: ICD-10-CM

## 2023-01-10 DIAGNOSIS — N81.11 CYSTOCELE, MIDLINE: ICD-10-CM

## 2023-01-10 LAB — RESULT: 5

## 2023-01-10 PROCEDURE — 99214 OFFICE O/P EST MOD 30 MIN: CPT | Mod: 25 | Performed by: PHYSICIAN ASSISTANT

## 2023-01-10 PROCEDURE — 51798 US URINE CAPACITY MEASURE: CPT | Performed by: PHYSICIAN ASSISTANT

## 2023-01-10 RX ORDER — SOLIFENACIN SUCCINATE 5 MG/1
5 TABLET, FILM COATED ORAL DAILY
Qty: 30 TABLET | Refills: 3 | Status: SHIPPED | OUTPATIENT
Start: 2023-01-10 | End: 2023-02-09

## 2023-01-10 RX ORDER — TROSPIUM CHLORIDE 20 MG/1
20 TABLET, FILM COATED ORAL AT BEDTIME
Qty: 90 TABLET | Refills: 3 | Status: SHIPPED | OUTPATIENT
Start: 2023-01-10 | End: 2023-01-10 | Stop reason: ALTCHOICE

## 2023-01-10 ASSESSMENT — PAIN SCALES - GENERAL: PAINLEVEL: NO PAIN (0)

## 2023-01-10 NOTE — NURSING NOTE
Chief Complaint   Patient presents with     Urinary Frequency     Urge Incontinence     Here for 2 month PVR check         pvr- 5 ml       Kusum Jordan CMA

## 2023-01-10 NOTE — PROGRESS NOTES
Urology Clinic      Name: Annabella Bolanos    MRN: [id number]   YOB: 1955  Accompanied at today's visit by:self                 Chief Complaint:   UUI          History of Present Illness:   January 10, 2023    HISTORY:   We have been following 67 year old Annabella Bolanos for urinary urgency/frequency, UUI, cystocele, rectocele, vaginal atrophy. Last seen on 11/10/22 Was referred to PFPT and started on oxybutynin 10mg nightly. Prior to Oxybutynin was voiding every 1-2 hours during the day and every 2 hours at night with occasional UUI. States now she can void every 3-5 hours during the day and voiding 2x at night. Has occasional very small amounts of UUI. Has cut back on bladder irritants. Bowels regular. Reports dry mouth from oxybutynin. Not bothered by prolapse. PMH is significant for asthma, hx of breast cancer s/p lumpectomy; currently on Anastrozole without recurrence. On chronic warfarin. Patient voices no other concerns at this time.            Allergies:     Allergies   Allergen Reactions     Morphine Sulfate Nausea and Vomiting            Medications:     Current Outpatient Medications   Medication Sig     alendronate (FOSAMAX) 70 MG tablet Take 70 mg by mouth every 7 days     anastrozole (ARIMIDEX) 1 MG tablet Take by mouth daily     ASPIRIN EC PO Take 81 mg by mouth daily     carvedilol (COREG) 12.5 MG tablet Take 1 tablet (12.5 mg) by mouth 2 times daily (with meals)     lisinopril (ZESTRIL) 10 MG tablet Take 1 tablet (10 mg) by mouth 2 times daily     methimazole (TAPAZOLE) 5 MG tablet TAKE ONE-HALF (1/2) TABLET DAILY     oxybutynin ER (DITROPAN XL) 10 MG 24 hr tablet Take 1 tablet (10 mg) by mouth At Bedtime     warfarin ANTICOAGULANT (COUMADIN) 5 MG tablet TAKE 1 TABLET DAILY, EXCEPT ONE AND ONE-HALF TABLETS ON MONDAY AND FRIDAY OR AS DIRECTED BY THE INR CLINIC     zolpidem (AMBIEN) 5 MG tablet Take 0.5 tablets (2.5 mg) by mouth nightly as needed for sleep     Current Facility-Administered  "Medications   Medication     hylan (SYNVISC ONE) injection 48 mg     hylan (SYNVISC ONE) injection 48 mg               Past  Surgical History:     Past Surgical History:   Procedure Laterality Date     BIOPSY NODE SENTINEL Right 09/10/2014    Procedure: BIOPSY NODE SENTINEL;  Surgeon: Ila Romano MD;  Location: [facility]     CARDIAC SURGERY  06/01/2011    aortic valve replacement     ENT SURGERY      tonsillectomy     HRW PORT A CATH       INSERT PORT VASCULAR ACCESS Left 10/22/2014    Procedure: INSERT PORT VASCULAR ACCESS;  Surgeon: Ila Romano MD;  Location: [facility] OR     LUMPECTOMY BREAST WITH SEED LOCALIZATION Right 09/10/2014    Procedure: LUMPECTOMY BREAST WITH SEED LOCALIZATION;  Surgeon: Ila Romano MD;  Location: [facility] OR     ORTHOPEDIC SURGERY      shoulder, thumb surgery     REMOVE PORT VASCULAR ACCESS Left 04/08/2015    Procedure: REMOVE PORT VASCULAR ACCESS;  Surgeon: Ila Romano MD;  Location: [facility] OR     REPAIR ANEURYSM ASCENDING AORTA  06/23/2011    Procedure:REPAIR ANEURYSM ASCENDING AORTA; Medial Sternotomy, Aortic Valve Replacement, ([employer] Y[employer] Medical Masters HP Valved Graft, size 23 mm) on pump oxygenation, intraoperative transesophageal cardiogram; Surgeon:JOHN PAUL ULLOA; Location[facility]     REPLACE VALVE AORTIC  06/23/2011    bicuspid AV with severe stenosis - 6/2011 mechanical AVR with 23 mm St Yrodan AV conduit, composite graft placement             Physical Exam:     Vitals:    01/10/23 1259   Pulse: 59   SpO2: 98%   Weight: 54.4 kg (120 lb)   Height: 1.626 m (5' 4\")     PSYCH: NAD  EYES: EOMI  NEURO: AAO x3    LABS:   PVR 5mL    Creatinine   Date Value Ref Range Status   10/21/2022 0.64 0.52 - 1.04 mg/dL Final   08/18/2020 0.65 0.52 - 1.04 mg/dL Final            Assessment and Plan:   67 year old is a pleasant female who has  urinary urgency/frequency, UUI, nocturia, asymptomatic prolapse, vaginal atrophy and hx of breast cancer currently on " anastrozole     Plan:  -  Discontinue oxybutynin. Start vesicare 5mg daily. Discussed risks/benifits and potential side effects.   - If fails Vesicare, would need to clear myrbetriq with oncologist. Otherwise consider cysto with discussion of third line options.  - Briefly discussed third line options; thinks PTNS is most appealing  - Does not think she has sleep apnea; declined referral for sleep study.  - Continue to monitor prolapse as not overly bothersome.  - Follow-up in 2-3 months with PVR check.   - After discussing the assessment and plan with patient, patient verbalizes understanding and agrees to the above plan. All questions answered.     Other orders as below:  Orders Placed This Encounter   Procedures     MEASURE POST-VOID RESIDUAL URINE/BLADDER CAPACITY, US NON-IMAGING (83509)     33 minutes spent on the date of the encounter doing chart review, review of outside records, review of test results, interpretation of tests, patient visit and documentation.      Laura Washington PA-C  Urology  January 10, 2023      Patient Care Team:  Karla Moss MD as PCP - General (Internal Medicine)  Merle Baxter MD as Assigned Endocrinology Provider  Dae Strauss MD as Assigned Rheumatology Provider  Laura Washington PA-C as Physician Assistant  Karla Moss MD as Assigned PCP  Colin Miles MD as Assigned Musculoskeletal Provider

## 2023-01-10 NOTE — LETTER
1/10/2023       RE: Annabella Bolanos  [address] [place] [address]     Dear Colleague,    Thank you for referring your patient, Annabella Bolanos, to the [facility] at [facility]. Please see a copy of my visit note below.    Urology Clinic      Name: Annabella Bolanos    MRN: [id number]   YOB: 1955  Accompanied at today's visit by:self                 Chief Complaint:   UUI          History of Present Illness:   January 10, 2023    HISTORY:   We have been following 67 year old Annabella Bolanos for urinary urgency/frequency, UUI, cystocele, rectocele, vaginal atrophy. Last seen on 11/10/22 Was referred to PFPT and started on oxybutynin 10mg nightly. Prior to Oxybutynin was voiding every 1-2 hours during the day and every 2 hours at night with occasional UUI. States now she can void every 3-5 hours during the day and voiding 2x at night. Has occasional very small amounts of UUI. Has cut back on bladder irritants. Bowels regular. Reports dry mouth from oxybutynin. Not bothered by prolapse. PMH is significant for asthma, hx of breast cancer s/p lumpectomy; currently on Anastrozole without recurrence. On chronic warfarin. Patient voices no other concerns at this time.            Allergies:     Allergies   Allergen Reactions     Morphine Sulfate Nausea and Vomiting            Medications:     Current Outpatient Medications   Medication Sig     alendronate (FOSAMAX) 70 MG tablet Take 70 mg by mouth every 7 days     anastrozole (ARIMIDEX) 1 MG tablet Take by mouth daily     ASPIRIN EC PO Take 81 mg by mouth daily     carvedilol (COREG) 12.5 MG tablet Take 1 tablet (12.5 mg) by mouth 2 times daily (with meals)     lisinopril (ZESTRIL) 10 MG tablet Take 1 tablet (10 mg) by mouth 2 times daily     methimazole (TAPAZOLE) 5 MG tablet TAKE ONE-HALF (1/2) TABLET DAILY     oxybutynin ER (DITROPAN XL) 10 MG 24 hr tablet Take 1 tablet (10 mg) by  "mouth At Bedtime     warfarin ANTICOAGULANT (COUMADIN) 5 MG tablet TAKE 1 TABLET DAILY, EXCEPT ONE AND ONE-HALF TABLETS ON MONDAY AND FRIDAY OR AS DIRECTED BY THE INR CLINIC     zolpidem (AMBIEN) 5 MG tablet Take 0.5 tablets (2.5 mg) by mouth nightly as needed for sleep     Current Facility-Administered Medications   Medication     hylan (SYNVISC ONE) injection 48 mg     hylan (SYNVISC ONE) injection 48 mg               Past  Surgical History:     Past Surgical History:   Procedure Laterality Date     BIOPSY NODE SENTINEL Right 09/10/2014    Procedure: BIOPSY NODE SENTINEL;  Surgeon: Ila Romano MD;  Location: [facility]     CARDIAC SURGERY  06/01/2011    aortic valve replacement     ENT SURGERY      tonsillectomy     HRW PORT A CATH       INSERT PORT VASCULAR ACCESS Left 10/22/2014    Procedure: INSERT PORT VASCULAR ACCESS;  Surgeon: Ila Romano MD;  Location: [facility] OR     LUMPECTOMY BREAST WITH SEED LOCALIZATION Right 09/10/2014    Procedure: LUMPECTOMY BREAST WITH SEED LOCALIZATION;  Surgeon: Ila Romano MD;  Location: [facility] OR     ORTHOPEDIC SURGERY      shoulder, thumb surgery     REMOVE PORT VASCULAR ACCESS Left 04/08/2015    Procedure: REMOVE PORT VASCULAR ACCESS;  Surgeon: Ila Romano MD;  Location: [facility] OR     REPAIR ANEURYSM ASCENDING AORTA  06/23/2011    Procedure:REPAIR ANEURYSM ASCENDING AORTA; Medial Sternotomy, Aortic Valve Replacement, ([employer] HP Valved Graft, size 23 mm) on pump oxygenation, intraoperative transesophageal cardiogram; Surgeon:JOHN PAUL ULLOA; Location:[facility] OR     REPLACE VALVE AORTIC  06/23/2011    bicuspid AV with severe stenosis - 6/2011 mechanical AVR with 23 mm [employer] AV conduit, composite graft placement             Physical Exam:     Vitals:    01/10/23 1259   Pulse: 59   SpO2: 98%   Weight: 54.4 kg (120 lb)   Height: 1.626 m (5' 4\")     PSYCH: NAD  EYES: EOMI  NEURO: AAO x3    LABS:   PVR 5mL    Creatinine   Date " Value Ref Range Status   10/21/2022 0.64 0.52 - 1.04 mg/dL Final   08/18/2020 0.65 0.52 - 1.04 mg/dL Final            Assessment and Plan:   67 year old is a pleasant female who has  urinary urgency/frequency, UUI, nocturia, asymptomatic prolapse, vaginal atrophy and hx of breast cancer currently on anastrozole     Plan:  -  Discontinue oxybutynin. Start vesicare 5mg daily. Discussed risks/benifits and potential side effects.   - If fails Vesicare, would need to clear myrbetriq with oncologist. Otherwise consider cysto with discussion of third line options.  - Briefly discussed third line options; thinks PTNS is most appealing  - Does not think she has sleep apnea; declined referral for sleep study.  - Continue to monitor prolapse as not overly bothersome.  - Follow-up in 2-3 months with PVR check.   - After discussing the assessment and plan with patient, patient verbalizes understanding and agrees to the above plan. All questions answered.     Other orders as below:  Orders Placed This Encounter   Procedures     MEASURE POST-VOID RESIDUAL URINE/BLADDER CAPACITY, US NON-IMAGING ([id number])     33 minutes spent on the date of the encounter doing chart review, review of outside records, review of test results, interpretation of tests, patient visit and documentation.      Laura Washington PA-C  Urology  January 10, 2023      Patient Care Team:  Karla Moss MD as PCP - General (Internal Medicine)  Merle Baxter MD as Assigned Endocrinology Provider  Dae Strauss MD as Assigned Rheumatology Provider  Laura Washington PA-C as Physician Assistant  Karla Moss MD as Assigned PCP  Colin Miles MD as Assigned Musculoskeletal Provider

## 2023-01-18 ENCOUNTER — OFFICE VISIT (OUTPATIENT)
Dept: ORTHOPEDICS | Facility: CLINIC | Age: 68
End: 2023-01-18
Payer: COMMERCIAL

## 2023-01-18 VITALS — DIASTOLIC BLOOD PRESSURE: 66 MMHG | SYSTOLIC BLOOD PRESSURE: 114 MMHG

## 2023-01-18 DIAGNOSIS — M17.10 ARTHRITIS OF KNEE: Primary | ICD-10-CM

## 2023-01-18 PROCEDURE — 99213 OFFICE O/P EST LOW 20 MIN: CPT | Mod: 25 | Performed by: ORTHOPAEDIC SURGERY

## 2023-01-18 PROCEDURE — 20610 DRAIN/INJ JOINT/BURSA W/O US: CPT | Mod: 50 | Performed by: ORTHOPAEDIC SURGERY

## 2023-01-18 RX ADMIN — BUPIVACAINE HYDROCHLORIDE 3 ML: 5 INJECTION, SOLUTION EPIDURAL; INTRACAUDAL at 13:55

## 2023-01-18 RX ADMIN — LIDOCAINE HYDROCHLORIDE 3 ML: 10 INJECTION, SOLUTION INFILTRATION; PERINEURAL at 13:55

## 2023-01-18 RX ADMIN — TRIAMCINOLONE ACETONIDE 40 MG: 40 INJECTION, SUSPENSION INTRA-ARTICULAR; INTRAMUSCULAR at 13:55

## 2023-01-18 ASSESSMENT — KOOS JR
HOW SEVERE IS YOUR KNEE STIFFNESS AFTER FIRST WAKING IN MORNING: SEVERE
KOOS JR SCORING: 57.14
STANDING UPRIGHT: MODERATE
TWISING OR PIVOTING ON KNEE: MILD
RISING FROM SITTING: MODERATE
BENDING TO THE FLOOR TO PICK UP OBJECT: MILD
GOING UP OR DOWN STAIRS: MODERATE
STRAIGHTENING KNEE FULLY: MILD

## 2023-01-18 NOTE — PROGRESS NOTES
S: Patient is seen today in follow up for bilateral knee pain. She was previously evaluated on 22 and obtained bilateral Synvisc One injections. She reports improved pain in her knees, but continues to have significant morning stiffness, stiffness after bouts of rest. She reports her discomfort will improve throughout the day. She also notes limited range of motion, and unable to straighten her knees flat.  She continues to perform Physical Therapy exercises, and aerobics at [facility], and water aerobics at [facility] weekly.         Patient Active Problem List   Diagnosis     Aortic valve replaced     Benign essential hypertension     Advanced directives, counseling/discussion     Long-term (current) use of anticoagulants [Z79.01]     Osteopenia of multiple sites     Hyperthyroidism     Adenomatous polyp of colon, unspecified part of colon     Personal history of malignant neoplasm of breast     Chronic pain of right knee     Chronic pain of left knee     Bilateral hand pain            Past Medical History:   Diagnosis Date     Adenomatous colon polyp 2015     Aortic stenosis 2011    bicuspid AV with severe stenosis - 2011 mechanical AVR with 23 mm [employer] AV conduit, composite graft placement     Arthritis     knees and hands     Bicuspid aortic valve      Breast cancer (H) 2014    R breast     H/O aortic valve replacement     St Yordan     Heart murmur     Valve repalcement .     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    AAA repair with av23 mm tube graft     Palpitations      PONV (postoperative nausea and vomiting)     n/v morphine vs anesthesia, vomiting x24 hrs     Subclinical hyperthyroidism 2017     Thrombosis of leg     after  of daughter     Uncomplicated asthma             Past Surgical History:   Procedure Laterality Date     BIOPSY NODE  "SENTINEL Right 09/10/2014    Procedure: BIOPSY NODE SENTINEL;  Surgeon: Ila Romano MD;  Location: [facility]     CARDIAC SURGERY  06/01/2011    aortic valve replacement     ENT SURGERY      tonsillectomy     HRW PORT A CATH       INSERT PORT VASCULAR ACCESS Left 10/22/2014    Procedure: INSERT PORT VASCULAR ACCESS;  Surgeon: Ila Romano MD;  Location: [facility]     LUMPECTOMY BREAST WITH SEED LOCALIZATION Right 09/10/2014    Procedure: LUMPECTOMY BREAST WITH SEED LOCALIZATION;  Surgeon: Ila Romano MD;  Location: [facility]     ORTHOPEDIC SURGERY      shoulder, thumb surgery     REMOVE PORT VASCULAR ACCESS Left 04/08/2015    Procedure: REMOVE PORT VASCULAR ACCESS;  Surgeon: Ila Romano MD;  Location: [facility]     REPAIR ANEURYSM ASCENDING AORTA  06/23/2011    Procedure:REPAIR ANEURYSM ASCENDING AORTA; Medial Sternotomy, Aortic Valve Replacement, ([employer] Masters HP Valved Graft, size 23 mm) on pump oxygenation, intraoperative transesophageal cardiogram; Surgeon:JOHN PAUL ULLOA; [facility]     REPLACE VALVE AORTIC  06/23/2011    bicuspid AV with severe stenosis - 6/2011 mechanical AVR with 23 mm [employer] AV conduit, composite graft placement            Social History     Tobacco Use     Smoking status: Never     Smokeless tobacco: Never   Substance Use Topics     Alcohol use: Yes     Comment: 2 drinks per week -             Family History   Problem Relation Age of Onset     Hypertension Mother      Thyroid Disease Mother         \"Toxic thyroid\"     Prostate Cancer Father 70     Cancer Father 80        Lung cancer, Not caused from smoking     Cerebrovascular Disease Father 80     Hypertension Father      Cardiovascular Brother         half brother      Cardiovascular Son         Puentes-Parkinsons White Syndrome     Cardiovascular Daughter         Heart murmur     Breast Cancer Paternal Aunt 80     Cardiovascular Paternal Aunt      Arthritis Brother 40        RA - half " brother      Cancer Maternal Grandfather      Colon Cancer No family hx of                Allergies   Allergen Reactions     Morphine Sulfate Nausea and Vomiting            Current Outpatient Medications   Medication Sig Dispense Refill     alendronate (FOSAMAX) 70 MG tablet Take 70 mg by mouth every 7 days       anastrozole (ARIMIDEX) 1 MG tablet Take by mouth daily 30 tablet      ASPIRIN EC PO Take 81 mg by mouth daily       carvedilol (COREG) 12.5 MG tablet Take 1 tablet (12.5 mg) by mouth 2 times daily (with meals) 180 tablet 3     lisinopril (ZESTRIL) 10 MG tablet Take 1 tablet (10 mg) by mouth 2 times daily 180 tablet 3     methimazole (TAPAZOLE) 5 MG tablet TAKE ONE-HALF (1/2) TABLET DAILY 45 tablet 3     solifenacin (VESICARE) 5 MG tablet Take 1 tablet (5 mg) by mouth daily for 30 days 30 tablet 3     warfarin ANTICOAGULANT (COUMADIN) 5 MG tablet TAKE 1 TABLET DAILY, EXCEPT ONE AND ONE-HALF TABLETS ON MONDAY AND FRIDAY OR AS DIRECTED BY THE INR CLINIC 115 tablet 1     zolpidem (AMBIEN) 5 MG tablet Take 0.5 tablets (2.5 mg) by mouth nightly as needed for sleep 30 tablet 0          Review Of Systems  Skin: negative  Eyes: negative  Ears/Nose/Throat: negative  Respiratory: No shortness of breath, dyspnea on exertion, cough, or hemoptysis    O: Physical Exam:  Pain to palpation each knee medial and lateral Joint line as well as  parapatellar.  Adequate knee flexion and  Extension both lower extremities.  CMS intact.      Lab: PAU 1:320 speckled, chol 206,     Images:2022      PATIENT: Annabella Bolanos  CHART: [id number]   :  1955  AGE:  67 year old  SEX:  female   REFERRING PROVIDER:  Lucille Felder MD     PROCEDURE:  Bone density scanning was performed using DXA technology of the lumbar spine and hip.  Scanning was performed on a Lunar Prodigy scanner.  Reporting is completed in the form of a T-score.  The T-score represents the standard deviation from peak bone mass based on a young  healthy adult.     REFERENCE T-SCORES:       Normal                -1.0 and greater                                 Osteopenia         Between -1.0 and -2.5                                           Osteoporosis     -2.5 and less                                       RISK FACTORS:  Post-menopausal, Height loss of 2.75 inches, Parent history of osteoporosis, Parent history of a hip fracture, Condition related to bone loss: breast cancer therapy: arimidex and hyperthyroidism, follow up osteopenia     CURRENT TREATMENT:  Calcium with Vitamin D, Fosamax (alendronate)     FINDINGS:               Lumbar Spine (L1-L2)      T-score: -0.7, marked degenerative changes present at L3,4, so only L1,2 are evaluated.                Left Femoral Neck            T-score:  -1.1               Right Femoral Neck          T-score:  -1.1                  Lumbar (L1-L2) BMD: 1.086  Previous: 1.070                         Total Hip Mean BMD: 0.830  Previous: 0.854     Comparison is made to another DXA performed on the same Lunar Prodigy  machine on 09/04/2020.        IMPRESSION  Low Bone Density (osteopenia).  Degenerative changes at the lumbar spine may falsely elevate results.      There has been no significant change in bone density of the lumbar spine. There has been no significant change in bone density of the hip(s).      Recommendations include ensuring adequate Calcium and Vitamin D.     Follow up can be considered in 3 years.   ___________________  KNEE THREE VIEWS BILATERAL  11/9/2022 8:33 AM      HISTORY: Chronic pain of left knee; Chronic pain of right knee  COMPARISON: 7/20/2016                                                                      IMPRESSION:     Right knee: No acute fracture or malalignment. Severe lateral  compartment degenerative changes with bone-on-bone articulation.  Moderate patellofemoral and mild medial compartment degenerative  changes. Moderate knee joint effusion. Atherosclerosis.     Left knee: No  acute fracture or malalignment. Moderate to severe  medial compartment degenerative changes. Moderate patellofemoral  compartment degenerative changes. Moderate knee joint effusion.  Atherosclerosis.         A: Bilateral Knee OA    P:  Inject each knee joint after verbal and written consent, ethyl chloride and  Chloroprep.  Follow outcomes  Discussed PAU elevation and  Follow up with Rheumatology.  See back 2-3 months      Large Joint Injection/Arthocentesis: bilateral knee    Date/Time: 1/18/2023 1:55 PM  Performed by: Colin Miles MD  Authorized by: Colin Miles MD     Needle Size:  22 G  Guidance: landmark guided    Approach:  Superolateral  Location:  Knee  Laterality:  Bilateral      Medications (Right):  40 mg triamcinolone 40 MG/ML; 3 mL bupivacaine (PF) 0.5 %; 3 mL lidocaine 1 %  Medications (Left):  40 mg triamcinolone 40 MG/ML; 3 mL lidocaine 1 %; 3 mL bupivacaine (PF) 0.5 %  Outcome:  Tolerated well, no immediate complications  Procedure discussed: discussed risks, benefits, and alternatives    Consent Given by:  Patient  Timeout: timeout called immediately prior to procedure                 In addition to the above assessment and plan each active problem on Annabella's problem list was evaluated today. This included the questioning of Annabella for any medication problems. We will continue the current treatment plan for these active problems except as noted.

## 2023-01-18 NOTE — PATIENT INSTRUCTIONS
Thank you for choosing [facility]    Dr. Miles Locations:    [facility] [place]   [id number] [place] [address], [address]  [place], [place] [address] [place] [id number]   Appointments: [phone] Appointments: [phone]   Fax: [phone] Fax: [phone]       Follow up: 2-3 months.  Please call [phone] to schedule your follow up appointment.     Steroid injection of the bilateral knee was performed today in clinic    - Would not soak in a hot tub, bath or swimming pool for 48 hours  - Ok to shower  - Ice today and only do your normal amounts of activity  - The lidocaine (what is giving you pain relief right now) will likely stop working in 1-2 hours.  You will then have pain again, similar to before you received the injection. The corticosteroid will not start working until approximately 1-2 weeks from now.  In a small percentage of people, cortisone can cause flushing/redness in the face. This usually lasts for 1-3 days and resolves. Cool compress and Ibuprofen/Tylenol can help if this happens.      For any questions please contact my office, [phone].

## 2023-01-18 NOTE — LETTER
1/18/2023         RE: Annabella Bolanos  [address] [place] [address]        Dear Colleague,    Thank you for referring your patient, Annabella Bolanos, to the [facility] [place]. Please see a copy of my visit note below.    S: Patient is seen today in follow up for bilateral knee pain. She was previously evaluated on 12/20/22 and obtained bilateral Synvisc One injections. She reports improved pain in her knees, but continues to have significant morning stiffness, stiffness after bouts of rest. She reports her discomfort will improve throughout the day. She also notes limited range of motion, and unable to straighten her knees flat.  She continues to perform Physical Therapy exercises, and aerobics at [facility], and water aerobics at [facility] weekly.         Patient Active Problem List   Diagnosis     Aortic valve replaced     Benign essential hypertension     Advanced directives, counseling/discussion     Long-term (current) use of anticoagulants [Z79.01]     Osteopenia of multiple sites     Hyperthyroidism     Adenomatous polyp of colon, unspecified part of colon     Personal history of malignant neoplasm of breast     Chronic pain of right knee     Chronic pain of left knee     Bilateral hand pain            Past Medical History:   Diagnosis Date     Adenomatous colon polyp 08/2015     Aortic stenosis 06/23/2011    bicuspid AV with severe stenosis - 6/2011 mechanical AVR with 23 mm St Yordan AV conduit, composite graft placement     Arthritis     knees and hands     Bicuspid aortic valve      Breast cancer (H) 07/2014    R breast     H/O aortic valve replacement     St Yordan     Heart murmur     Valve repalcement 2011.     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    AAA repair with av23 mm tube graft     Palpitations      PONV (postoperative nausea  "and vomiting)     n/v morphine vs anesthesia, vomiting x24 hrs     Subclinical hyperthyroidism 2017     Thrombosis of leg     after  of daughter     Uncomplicated asthma             Past Surgical History:   Procedure Laterality Date     BIOPSY NODE SENTINEL Right 09/10/2014    Procedure: BIOPSY NODE SENTINEL;  Surgeon: Ila Romano MD;  Location: [facility] OR     CARDIAC SURGERY  2011    aortic valve replacement     ENT SURGERY      tonsillectomy     HRW PORT A CATH       INSERT PORT VASCULAR ACCESS Left 10/22/2014    Procedure: INSERT PORT VASCULAR ACCESS;  Surgeon: Ila Romano MD;  Location: [facility] OR     LUMPECTOMY BREAST WITH SEED LOCALIZATION Right 09/10/2014    Procedure: LUMPECTOMY BREAST WITH SEED LOCALIZATION;  Surgeon: Ila Romano MD;  Location: [facility] OR     ORTHOPEDIC SURGERY      shoulder, thumb surgery     REMOVE PORT VASCULAR ACCESS Left 2015    Procedure: REMOVE PORT VASCULAR ACCESS;  Surgeon: Ila Romano MD;  Location: [facility] OR     REPAIR ANEURYSM ASCENDING AORTA  2011    Procedure:REPAIR ANEURYSM ASCENDING AORTA; Medial Sternotomy, Aortic Valve Replacement, ([employer] Y[employer] Masters HP Valved Graft, size 23 mm) on pump oxygenation, intraoperative transesophageal cardiogram; Surgeon:JOHN PAUL ULLOA; Location:[facility] OR     REPLACE VALVE AORTIC  2011    bicuspid AV with severe stenosis - 2011 mechanical AVR with 23 mm St Yordan AV conduit, composite graft placement            Social History     Tobacco Use     Smoking status: Never     Smokeless tobacco: Never   Substance Use Topics     Alcohol use: Yes     Comment: 2 drinks per week -             Family History   Problem Relation Age of Onset     Hypertension Mother      Thyroid Disease Mother         \"Toxic thyroid\"     Prostate Cancer Father 70     Cancer Father 80        Lung cancer, Not caused from smoking     Cerebrovascular Disease Father 80     Hypertension Father      " Cardiovascular Brother         half brother      Cardiovascular Son         Puentes-Parkinsons White Syndrome     Cardiovascular Daughter         Heart murmur     Breast Cancer Paternal Aunt 80     Cardiovascular Paternal Aunt      Arthritis Brother 40        RA - half brother      Cancer Maternal Grandfather      Colon Cancer No family hx of                Allergies   Allergen Reactions     Morphine Sulfate Nausea and Vomiting            Current Outpatient Medications   Medication Sig Dispense Refill     alendronate (FOSAMAX) 70 MG tablet Take 70 mg by mouth every 7 days       anastrozole (ARIMIDEX) 1 MG tablet Take by mouth daily 30 tablet      ASPIRIN EC PO Take 81 mg by mouth daily       carvedilol (COREG) 12.5 MG tablet Take 1 tablet (12.5 mg) by mouth 2 times daily (with meals) 180 tablet 3     lisinopril (ZESTRIL) 10 MG tablet Take 1 tablet (10 mg) by mouth 2 times daily 180 tablet 3     methimazole (TAPAZOLE) 5 MG tablet TAKE ONE-HALF (1/2) TABLET DAILY 45 tablet 3     solifenacin (VESICARE) 5 MG tablet Take 1 tablet (5 mg) by mouth daily for 30 days 30 tablet 3     warfarin ANTICOAGULANT (COUMADIN) 5 MG tablet TAKE 1 TABLET DAILY, EXCEPT ONE AND ONE-HALF TABLETS ON MONDAY AND FRIDAY OR AS DIRECTED BY THE INR CLINIC 115 tablet 1     zolpidem (AMBIEN) 5 MG tablet Take 0.5 tablets (2.5 mg) by mouth nightly as needed for sleep 30 tablet 0          Review Of Systems  Skin: negative  Eyes: negative  Ears/Nose/Throat: negative  Respiratory: No shortness of breath, dyspnea on exertion, cough, or hemoptysis    O: Physical Exam:  Pain to palpation each knee medial and lateral Joint line as well as  parapatellar.  Adequate knee flexion and  Extension both lower extremities.  CMS intact.      Lab: PAU 1:320 speckled, chol 206,     Images:2022      PATIENT: Annabella Bolanos  CHART: [id number]   :  1955  AGE:  67 year old  SEX:  female   REFERRING PROVIDER:  Lucille Felder MD     PROCEDURE:  Bone  density scanning was performed using DXA technology of the lumbar spine and hip.  Scanning was performed on a Lunar Prodigy scanner.  Reporting is completed in the form of a T-score.  The T-score represents the standard deviation from peak bone mass based on a young healthy adult.     REFERENCE T-SCORES:       Normal                -1.0 and greater                                 Osteopenia         Between -1.0 and -2.5                                           Osteoporosis     -2.5 and less                                       RISK FACTORS:  Post-menopausal, Height loss of 2.75 inches, Parent history of osteoporosis, Parent history of a hip fracture, Condition related to bone loss: breast cancer therapy: arimidex and hyperthyroidism, follow up osteopenia     CURRENT TREATMENT:  Calcium with Vitamin D, Fosamax (alendronate)     FINDINGS:               Lumbar Spine (L1-L2)      T-score: -0.7, marked degenerative changes present at L3,4, so only L1,2 are evaluated.                Left Femoral Neck            T-score:  -1.1               Right Femoral Neck          T-score:  -1.1                  Lumbar (L1-L2) BMD: 1.086  Previous: 1.070                         Total Hip Mean BMD: 0.830  Previous: 0.854     Comparison is made to another DXA performed on the same Lunar Prodigy  machine on 09/04/2020.        IMPRESSION  Low Bone Density (osteopenia).  Degenerative changes at the lumbar spine may falsely elevate results.      There has been no significant change in bone density of the lumbar spine. There has been no significant change in bone density of the hip(s).      Recommendations include ensuring adequate Calcium and Vitamin D.     Follow up can be considered in 3 years.   ___________________  KNEE THREE VIEWS BILATERAL  11/9/2022 8:33 AM      HISTORY: Chronic pain of left knee; Chronic pain of right knee  COMPARISON: 7/20/2016                                                                       IMPRESSION:     Right knee: No acute fracture or malalignment. Severe lateral  compartment degenerative changes with bone-on-bone articulation.  Moderate patellofemoral and mild medial compartment degenerative  changes. Moderate knee joint effusion. Atherosclerosis.     Left knee: No acute fracture or malalignment. Moderate to severe  medial compartment degenerative changes. Moderate patellofemoral  compartment degenerative changes. Moderate knee joint effusion.  Atherosclerosis.         A: Bilateral Knee OA    P:  Inject each knee joint after verbal and written consent, ethyl chloride and  Chloroprep.  Follow outcomes  Discussed PAU elevation and  Follow up with Rheumatology.  See back 2-3 months      Large Joint Injection/Arthocentesis: bilateral knee    Date/Time: 1/18/2023 1:55 PM  Performed by: Akil Hahn MD  Authorized by: Akil Hahn MD     Needle Size:  22 G  Guidance: landmark guided    Approach:  Superolateral  Location:  Knee  Laterality:  Bilateral      Medications (Right):  40 mg triamcinolone 40 MG/ML; 3 mL bupivacaine (PF) 0.5 %; 3 mL lidocaine 1 %  Medications (Left):  40 mg triamcinolone 40 MG/ML; 3 mL lidocaine 1 %; 3 mL bupivacaine (PF) 0.5 %  Outcome:  Tolerated well, no immediate complications  Procedure discussed: discussed risks, benefits, and alternatives    Consent Given by:  Patient  Timeout: timeout called immediately prior to procedure                 In addition to the above assessment and plan each active problem on Annabella's problem list was evaluated today. This included the questioning of Annabella for any medication problems. We will continue the current treatment plan for these active problems except as noted.        Again, thank you for allowing me to participate in the care of your patient.        Sincerely,        AKIL HAHN MD

## 2023-01-25 RX ORDER — LIDOCAINE HYDROCHLORIDE 10 MG/ML
3 INJECTION, SOLUTION INFILTRATION; PERINEURAL
Status: DISCONTINUED | OUTPATIENT
Start: 2023-01-18 | End: 2023-10-11

## 2023-01-25 RX ORDER — BUPIVACAINE HYDROCHLORIDE 5 MG/ML
3 INJECTION, SOLUTION EPIDURAL; INTRACAUDAL
Status: DISCONTINUED | OUTPATIENT
Start: 2023-01-18 | End: 2023-10-11

## 2023-01-25 RX ORDER — TRIAMCINOLONE ACETONIDE 40 MG/ML
40 INJECTION, SUSPENSION INTRA-ARTICULAR; INTRAMUSCULAR
Status: DISCONTINUED | OUTPATIENT
Start: 2023-01-18 | End: 2023-10-11

## 2023-01-30 ENCOUNTER — LAB (OUTPATIENT)
Dept: LAB | Facility: CLINIC | Age: 68
End: 2023-01-30
Payer: COMMERCIAL

## 2023-01-30 ENCOUNTER — ANTICOAGULATION THERAPY VISIT (OUTPATIENT)
Dept: ANTICOAGULATION | Facility: CLINIC | Age: 68
End: 2023-01-30

## 2023-01-30 DIAGNOSIS — Z79.01 LONG TERM CURRENT USE OF ANTICOAGULANT THERAPY: ICD-10-CM

## 2023-01-30 DIAGNOSIS — Z95.2 H/O AORTIC VALVE REPLACEMENT: ICD-10-CM

## 2023-01-30 DIAGNOSIS — Z79.01 LONG TERM CURRENT USE OF ANTICOAGULANT THERAPY: Primary | ICD-10-CM

## 2023-01-30 DIAGNOSIS — Z95.2 AORTIC VALVE REPLACED: ICD-10-CM

## 2023-01-30 LAB — INR BLD: 1.9 (ref 0.9–1.1)

## 2023-01-30 PROCEDURE — 85610 PROTHROMBIN TIME: CPT

## 2023-01-30 PROCEDURE — 36416 COLLJ CAPILLARY BLOOD SPEC: CPT

## 2023-01-30 NOTE — PROGRESS NOTES
ANTICOAGULATION MANAGEMENT     Annabella Bolanos 67 year old female is on warfarin with subtherapeutic INR result. (Goal INR 2.0-3.0)    Recent labs: (last 7 days)     01/30/23  1118   INR 1.9*       ASSESSMENT       Source(s): Chart Review and Patient/Caregiver Call       Warfarin doses taken: Warfarin taken as instructed    Diet: Increased greens/vitamin K in diet; plans to resume previous intake    New illness, injury, or hospitalization: No    Medication/supplement changes: None noted    Signs or symptoms of bleeding or clotting: No    Previous INR: Therapeutic last 2(+) visits    Additional findings: None       PLAN     Recommended plan for temporary change(s) affecting INR     Dosing Instructions: Continue your current warfarin dose with next INR in 2 weeks       Summary  As of 1/30/2023    Full warfarin instructions:  7.5 mg every Mon, Fri; 5 mg all other days   Next INR check:  2/13/2023             Telephone call with Annabella who verbalizes understanding and agrees to plan    Lab visit scheduled    Education provided:     Please call back if any changes to your diet, medications or how you've been taking warfarin    Contact [phone]  with any changes, questions or concerns.     Plan made per [facility] anticoagulation protocol    Deborah Aguilera RN  Anticoagulation Clinic  1/30/2023    _______________________________________________________________________     Anticoagulation Episode Summary     Current INR goal:  2.0-3.0   TTR:  95.9 % (1 y)   Target end date:  Indefinite   Send INR reminders to:  [facility]    Indications    Long-term (current) use of anticoagulants [Z79.01] [Z79.01]  Aortic valve replaced [Z95.2]           Comments:           Anticoagulation Care Providers     Provider Role Specialty Phone number    Brook Echavarria MD Referring Internal Medicine [phone]

## 2023-02-13 ENCOUNTER — ANTICOAGULATION THERAPY VISIT (OUTPATIENT)
Dept: ANTICOAGULATION | Facility: CLINIC | Age: 68
End: 2023-02-13

## 2023-02-13 ENCOUNTER — LAB (OUTPATIENT)
Dept: LAB | Facility: CLINIC | Age: 68
End: 2023-02-13
Payer: COMMERCIAL

## 2023-02-13 DIAGNOSIS — Z95.2 AORTIC VALVE REPLACED: ICD-10-CM

## 2023-02-13 DIAGNOSIS — Z95.2 H/O AORTIC VALVE REPLACEMENT: ICD-10-CM

## 2023-02-13 DIAGNOSIS — Z79.01 LONG TERM CURRENT USE OF ANTICOAGULANT THERAPY: ICD-10-CM

## 2023-02-13 DIAGNOSIS — Z79.01 LONG TERM CURRENT USE OF ANTICOAGULANT THERAPY: Primary | ICD-10-CM

## 2023-02-13 LAB — INR BLD: 2.2 (ref 0.9–1.1)

## 2023-02-13 PROCEDURE — 85610 PROTHROMBIN TIME: CPT

## 2023-02-13 PROCEDURE — 36415 COLL VENOUS BLD VENIPUNCTURE: CPT

## 2023-03-06 ENCOUNTER — LAB (OUTPATIENT)
Dept: LAB | Facility: CLINIC | Age: 68
End: 2023-03-06
Payer: COMMERCIAL

## 2023-03-06 ENCOUNTER — ANTICOAGULATION THERAPY VISIT (OUTPATIENT)
Dept: ANTICOAGULATION | Facility: CLINIC | Age: 68
End: 2023-03-06

## 2023-03-06 DIAGNOSIS — Z79.01 LONG TERM CURRENT USE OF ANTICOAGULANT THERAPY: ICD-10-CM

## 2023-03-06 DIAGNOSIS — E05.90 SUBCLINICAL HYPERTHYROIDISM: ICD-10-CM

## 2023-03-06 DIAGNOSIS — Z95.2 AORTIC VALVE REPLACED: ICD-10-CM

## 2023-03-06 DIAGNOSIS — Z79.01 LONG TERM CURRENT USE OF ANTICOAGULANT THERAPY: Primary | ICD-10-CM

## 2023-03-06 DIAGNOSIS — Z95.2 H/O AORTIC VALVE REPLACEMENT: ICD-10-CM

## 2023-03-06 LAB
INR BLD: 2.1 (ref 0.9–1.1)
T3 SERPL-MCNC: 106 NG/DL (ref 85–202)
T4 FREE SERPL-MCNC: 1.41 NG/DL (ref 0.9–1.7)
TSH SERPL DL<=0.005 MIU/L-ACNC: 1.3 UIU/ML (ref 0.3–4.2)

## 2023-03-06 PROCEDURE — 84443 ASSAY THYROID STIM HORMONE: CPT

## 2023-03-06 PROCEDURE — 84480 ASSAY TRIIODOTHYRONINE (T3): CPT

## 2023-03-06 PROCEDURE — 84439 ASSAY OF FREE THYROXINE: CPT

## 2023-03-06 PROCEDURE — 36415 COLL VENOUS BLD VENIPUNCTURE: CPT

## 2023-03-06 PROCEDURE — 85610 PROTHROMBIN TIME: CPT

## 2023-03-06 NOTE — PROGRESS NOTES
ANTICOAGULATION MANAGEMENT     Annabella Bolanos 67 year old female is on warfarin with therapeutic INR result. (Goal INR 2.0-3.0)    Recent labs: (last 7 days)     03/06/23  1123   INR 2.1*       ASSESSMENT     Warfarin Lab Questionnaire    No flowsheet data found.  No flowsheet data found.  Warfarin Lab Questionnaire 3/6/2023   Missed doses? No   Medication changes? No   Abnormal bleeding? No   Shortness of breath? No   Injuries or illness since last INR? No   Changes in diet or alcohol? Yes   Please explain:  on vacation 10-5 days ago and had more alcohol than usual   Upcoming surgery, procedure? No   Best number to call with results? [phone]       Additional findings: Confirmed maintenance dose. Had increase in alcohol while on vacation in [place], however she ate more broccoli to counteract.        PLAN     Recommended plan for temporary change(s) affecting INR     Dosing Instructions: Continue your current warfarin dose with next INR in 4 weeks       Summary  As of 3/6/2023    Full warfarin instructions:  7.5 mg every Mon, Fri; 5 mg all other days   Next INR check:  4/3/2023             Telephone call with Annabella who verbalizes understanding and agrees to plan    Lab visit scheduled    Education provided:     Please call back if any changes to your diet, medications or how you've been taking warfarin    Healthy lifestyle considerations: potential interaction between warfarin and alcohol    Plan made per [facility] anticoagulation protocol    Kusum Aguilera RN  Anticoagulation Clinic  3/6/2023    _______________________________________________________________________     Anticoagulation Episode Summary     Current INR goal:  2.0-3.0   TTR:  94.6 % (1 y)   Target end date:  Indefinite   Send INR reminders to:  [facility]    Indications    Long-term (current) use of anticoagulants [Z79.01] [Z79.01]  Aortic valve replaced [Z95.2]           Comments:           Anticoagulation Care Providers      Provider Role Specialty Phone number    Brook Echavarria MD Referring Internal Medicine [phone]

## 2023-03-07 PROBLEM — M79.642 BILATERAL HAND PAIN: Status: RESOLVED | Noted: 2022-12-14 | Resolved: 2023-03-07

## 2023-03-07 PROBLEM — M79.641 BILATERAL HAND PAIN: Status: RESOLVED | Noted: 2022-12-14 | Resolved: 2023-03-07

## 2023-03-07 NOTE — PROGRESS NOTES
Patient did not return to therapy. Current episode of care will be resolved. D/C from hand therapy.

## 2023-03-07 NOTE — RESULT ENCOUNTER NOTE
Annabella    Recently done endocrinology lab test/ imaging test showed:  Thyroid labs are in acceptable range.  Here is a copy for your records.    Follow up as discussed in last clinic visit.    Please call endocrinology clinic ( [phone]) if questions.    Merle Baxter MD  Endocrinology   [facility]  March 7, 2023

## 2023-03-14 ENCOUNTER — TELEPHONE (OUTPATIENT)
Dept: ENDOCRINOLOGY | Facility: CLINIC | Age: 68
End: 2023-03-14
Payer: COMMERCIAL

## 2023-03-14 DIAGNOSIS — E05.90 SUBCLINICAL HYPERTHYROIDISM: Primary | ICD-10-CM

## 2023-03-14 NOTE — TELEPHONE ENCOUNTER
[facility] Call Center    Phone Message    May a detailed message be left on voicemail: yes     Reason for Call: Order(s): Other:   Reason for requested: patient requesting labs for 9/21/2023 visit with Dr Fowler  Date needed: before 9/21/2023  Provider name: Dr Fowler      Action Taken: Other: endo    Travel Screening: Not Applicable

## 2023-03-20 PROBLEM — G89.29 CHRONIC PAIN OF RIGHT KNEE: Status: RESOLVED | Noted: 2022-11-17 | Resolved: 2023-03-20

## 2023-03-20 PROBLEM — M25.562 CHRONIC PAIN OF LEFT KNEE: Status: RESOLVED | Noted: 2022-11-17 | Resolved: 2023-03-20

## 2023-03-20 PROBLEM — M25.561 CHRONIC PAIN OF RIGHT KNEE: Status: RESOLVED | Noted: 2022-11-17 | Resolved: 2023-03-20

## 2023-03-20 PROBLEM — G89.29 CHRONIC PAIN OF LEFT KNEE: Status: RESOLVED | Noted: 2022-11-17 | Resolved: 2023-03-20

## 2023-03-20 NOTE — PROGRESS NOTES
DISCHARGE SUMMARY    Annabella Bolanos was seen 3 times for evaluation and treatment.  Patient did not return for further treatment and current status is unknown.  Due to short treatment duration, no objective or functional changes were made.  Please see goal flow sheet from episode noted date below and initial evaluation for further information.  Patient is discharged from therapy and therapy episode is resolved as of 03/20/23.      Linked Episodes   Type: Episode: Status: Noted: Resolved: Last update: Updated by:   PHYSICAL THERAPY Michael knees 11/17/22 Active 11/17/2022  3/20/2023 10:32 AM Kian Myers, PT      Comments:

## 2023-03-21 ENCOUNTER — OFFICE VISIT (OUTPATIENT)
Dept: UROLOGY | Facility: CLINIC | Age: 68
End: 2023-03-21
Payer: COMMERCIAL

## 2023-03-21 VITALS
SYSTOLIC BLOOD PRESSURE: 120 MMHG | WEIGHT: 120 LBS | DIASTOLIC BLOOD PRESSURE: 80 MMHG | HEIGHT: 63 IN | OXYGEN SATURATION: 98 % | BODY MASS INDEX: 21.26 KG/M2 | HEART RATE: 64 BPM

## 2023-03-21 DIAGNOSIS — R35.0 URINARY FREQUENCY: ICD-10-CM

## 2023-03-21 DIAGNOSIS — N81.6 RECTOCELE: ICD-10-CM

## 2023-03-21 DIAGNOSIS — N39.41 URGE INCONTINENCE: Primary | ICD-10-CM

## 2023-03-21 DIAGNOSIS — N81.11 CYSTOCELE, MIDLINE: ICD-10-CM

## 2023-03-21 DIAGNOSIS — R39.15 URINARY URGENCY: ICD-10-CM

## 2023-03-21 LAB — RESIDUAL VOLUME (RV) (EXTERNAL): 35

## 2023-03-21 PROCEDURE — 51798 US URINE CAPACITY MEASURE: CPT | Performed by: PHYSICIAN ASSISTANT

## 2023-03-21 PROCEDURE — 99214 OFFICE O/P EST MOD 30 MIN: CPT | Mod: 25 | Performed by: PHYSICIAN ASSISTANT

## 2023-03-21 RX ORDER — SOLIFENACIN SUCCINATE 10 MG/1
10 TABLET, FILM COATED ORAL DAILY
Qty: 30 TABLET | Refills: 3 | Status: SHIPPED | OUTPATIENT
Start: 2023-03-21 | End: 2023-09-21

## 2023-03-21 ASSESSMENT — PAIN SCALES - GENERAL: PAINLEVEL: EXTREME PAIN (8)

## 2023-03-21 NOTE — PATIENT INSTRUCTIONS
Will refer you to pelvic floor physical therapy; they will call you to schedule the appointment    Increase vesicare to 10mg daily. Notify me if you develop side effects to medication. In the mean time, ask your oncologist if can trial Myrbetriq 25mg in the future if you fail vesicare.     Plan to follow-up in 3 months.     Try not to bare down with voiding. Recommend positional changes and double voiding techniques.    Locations for Pelvic Floor Physical Therapy:    [facility]:   [facility] - [facility]    _________________________________________________

## 2023-03-21 NOTE — PROGRESS NOTES
Urology Clinic      Name: Annabella Bolanos    MRN: [id number]   YOB: 1955  Accompanied at today's visit by:self                 Chief Complaint:   UUI          History of Present Illness:   March 21, 2023    HISTORY:   We have been following 67 year old Annabella Bolanos for urinary urgency/frequency, UUI, cystocele, rectocele, vaginal atrophy. Her hx is complicated due to breast cancer; currently on Anastrozole without recurrence. Last seen on 1/10/23 and was switched from oxybutynin to vesicare 5mg daily to see if dry mouth would improve. Reports having some improvement with bladder symptoms and no longer has dry mouth with vesicare. Prior to medications was voiding every 1-2 hours during the day and every 2 hours at night with occasional UUI. Bladder diary today shows voiding every 2-3 hours during the day with 1-2 episodes of very small episodes of urinary incontinence per day, going through 2-3 pads per day. Getting up about 3x at night; bladder diary does suggest nocturnal polyuria. Did not go to PFPT. States she notices if she bares down more urine will come out after she voids. Bowels regular. Not bothered by prolapse. PMH is significant for asthma, replacement of aortic valve and on chronic warfarin. Patient voices no other concerns at this time.            Allergies:     Allergies   Allergen Reactions     Morphine Sulfate Nausea and Vomiting            Medications:     Current Outpatient Medications   Medication Sig     alendronate (FOSAMAX) 70 MG tablet Take 70 mg by mouth every 7 days     anastrozole (ARIMIDEX) 1 MG tablet Take by mouth daily     ASPIRIN EC PO Take 81 mg by mouth daily     carvedilol (COREG) 12.5 MG tablet Take 1 tablet (12.5 mg) by mouth 2 times daily (with meals)     lisinopril (ZESTRIL) 10 MG tablet Take 1 tablet (10 mg) by mouth 2 times daily     methimazole (TAPAZOLE) 5 MG tablet TAKE ONE-HALF (1/2) TABLET DAILY     solifenacin (VESICARE) 10 MG tablet Take 1 tablet (10 mg) by  mouth daily     warfarin ANTICOAGULANT (COUMADIN) 5 MG tablet TAKE 1 TABLET DAILY, EXCEPT ONE AND ONE-HALF TABLETS ON MONDAY AND FRIDAY OR AS DIRECTED BY THE INR CLINIC     zolpidem (AMBIEN) 5 MG tablet Take 0.5 tablets (2.5 mg) by mouth nightly as needed for sleep     Current Facility-Administered Medications   Medication     3 mL bupivacaine (MARCAINE) preservative free injection 0.5% (20 mL vial)     3 mL bupivacaine (MARCAINE) preservative free injection 0.5% (20 mL vial)     lidocaine 1 % injection 3 mL     lidocaine 1 % injection 3 mL     triamcinolone (KENALOG-40) injection 40 mg     triamcinolone (KENALOG-40) injection 40 mg               Past  Surgical History:     Past Surgical History:   Procedure Laterality Date     BIOPSY NODE SENTINEL Right 09/10/2014    Procedure: BIOPSY NODE SENTINEL;  Surgeon: Ila Romano MD;  Location: [facility] OR     CARDIAC SURGERY  06/01/2011    aortic valve replacement     ENT SURGERY      tonsillectomy     HRW PORT A CATH       INSERT PORT VASCULAR ACCESS Left 10/22/2014    Procedure: INSERT PORT VASCULAR ACCESS;  Surgeon: Ila Romano MD;  Location: [facility] OR     LUMPECTOMY BREAST WITH SEED LOCALIZATION Right 09/10/2014    Procedure: LUMPECTOMY BREAST WITH SEED LOCALIZATION;  Surgeon: Ila Romano MD;  Location: [facility] OR     ORTHOPEDIC SURGERY      shoulder, thumb surgery     REMOVE PORT VASCULAR ACCESS Left 04/08/2015    Procedure: REMOVE PORT VASCULAR ACCESS;  Surgeon: Ila Romano MD;  Location: [facility] OR     REPAIR ANEURYSM ASCENDING AORTA  06/23/2011    Procedure:REPAIR ANEURYSM ASCENDING AORTA; Medial Sternotomy, Aortic Valve Replacement, ([employer] Medical Masters HP Valved Graft, size 23 mm) on pump oxygenation, intraoperative transesophageal cardiogram; Surgeon:JOHN PAUL ULLOA; Location:[facility] OR     REPLACE VALVE AORTIC  06/23/2011    bicuspid AV with severe stenosis - 6/2011 mechanical AVR with 23 mm St Yordan AV conduit, composite  "graft placement             Physical Exam:     Vitals:    03/21/23 1332   BP: 120/80   BP Location: Right arm   Patient Position: Sitting   Cuff Size: Adult Regular   Pulse: 64   SpO2: 98%   Weight: 54.4 kg (120 lb)   Height: 1.6 m (5' 3\")     PSYCH: NAD  EYES: EOMI  NEURO: AAO x3    LABS:   PVR 35mL    Creatinine   Date Value Ref Range Status   10/21/2022 0.64 0.52 - 1.04 mg/dL Final   08/18/2020 0.65 0.52 - 1.04 mg/dL Final            Assessment and Plan:   67 year old is a pleasant female who has  urinary urgency/frequency, UUI, cystocele, rectocele, vaginal atrophy. Her hx is complicated due to breast cancer; currently on Anastrozole     Plan:  - PVR WNL  - Increase vesicare to 10mg daily. Discussed risks/benifits and potential side effects  - Advised to discuss possible myrbetriq in the future with oncology if fails vesicare.  - Will refer to PFPT.   - Advised positional changes and double voiding techniques. Do not recommend baring down.   - Follow-up in 3 months.   - Discussed third line options and cysto if fails medications.   - Consider rediscussing referral to sleep study given nocturnal polyuria noted on bladder diary.   - After discussing the assessment and plan with patient, patient verbalizes understanding and agrees to the above plan. All questions answered.       Other orders as below:  Orders Placed This Encounter   Procedures     MEASURE POST-VOID RESIDUAL URINE/BLADDER CAPACITY, US NON-IMAGING (00391)     Physical Therapy Referral     30 minutes spent on the date of the encounter doing chart review, review of outside records, review of test results, interpretation of tests, patient visit and documentation.      Laura Washington PA-C  Urology  March 21, 2023      Patient Care Team:  Karla Moss MD as PCP - General (Internal Medicine)  Merle Baxter MD as Assigned Endocrinology Provider  Laura Washington PA-C as Physician Assistant  Karla Moss MD as Assigned PCP  Colin Miles MD " as Assigned Musculoskeletal Provider

## 2023-03-21 NOTE — LETTER
3/21/2023       RE: Annabella Bolanos  [address] [place] [address]     Dear Colleague,    Thank you for referring your patient, Annabella Bolanos, to the [facility] at [facility]. Please see a copy of my visit note below.    Urology Clinic      Name: Annabella Bolanos    MRN: [id number]   YOB: 1955  Accompanied at today's visit by:self                 Chief Complaint:   UUI          History of Present Illness:   March 21, 2023    HISTORY:   We have been following 67 year old Annabella Bolanos for urinary urgency/frequency, UUI, cystocele, rectocele, vaginal atrophy. Her hx is complicated due to breast cancer; currently on Anastrozole without recurrence. Last seen on 1/10/23 and was switched from oxybutynin to vesicare 5mg daily to see if dry mouth would improve. Reports having some improvement with bladder symptoms and no longer has dry mouth with vesicare. Prior to medications was voiding every 1-2 hours during the day and every 2 hours at night with occasional UUI. Bladder diary today shows voiding every 2-3 hours during the day with 1-2 episodes of very small episodes of urinary incontinence per day, going through 2-3 pads per day. Getting up about 3x at night; bladder diary does suggest nocturnal polyuria. Did not go to PFPT. States she notices if she bares down more urine will come out after she voids. Bowels regular. Not bothered by prolapse. PMH is significant for asthma, replacement of aortic valve and on chronic warfarin. Patient voices no other concerns at this time.            Allergies:     Allergies   Allergen Reactions     Morphine Sulfate Nausea and Vomiting            Medications:     Current Outpatient Medications   Medication Sig     alendronate (FOSAMAX) 70 MG tablet Take 70 mg by mouth every 7 days     anastrozole (ARIMIDEX) 1 MG tablet Take by mouth daily     ASPIRIN EC PO Take 81 mg by mouth daily      carvedilol (COREG) 12.5 MG tablet Take 1 tablet (12.5 mg) by mouth 2 times daily (with meals)     lisinopril (ZESTRIL) 10 MG tablet Take 1 tablet (10 mg) by mouth 2 times daily     methimazole (TAPAZOLE) 5 MG tablet TAKE ONE-HALF (1/2) TABLET DAILY     solifenacin (VESICARE) 10 MG tablet Take 1 tablet (10 mg) by mouth daily     warfarin ANTICOAGULANT (COUMADIN) 5 MG tablet TAKE 1 TABLET DAILY, EXCEPT ONE AND ONE-HALF TABLETS ON MONDAY AND FRIDAY OR AS DIRECTED BY THE INR CLINIC     zolpidem (AMBIEN) 5 MG tablet Take 0.5 tablets (2.5 mg) by mouth nightly as needed for sleep     Current Facility-Administered Medications   Medication     3 mL bupivacaine (MARCAINE) preservative free injection 0.5% (20 mL vial)     3 mL bupivacaine (MARCAINE) preservative free injection 0.5% (20 mL vial)     lidocaine 1 % injection 3 mL     lidocaine 1 % injection 3 mL     triamcinolone (KENALOG-40) injection 40 mg     triamcinolone (KENALOG-40) injection 40 mg               Past  Surgical History:     Past Surgical History:   Procedure Laterality Date     BIOPSY NODE SENTINEL Right 09/10/2014    Procedure: BIOPSY NODE SENTINEL;  Surgeon: Ila Romano MD;  Location: [facility] OR     CARDIAC SURGERY  06/01/2011    aortic valve replacement     ENT SURGERY      tonsillectomy     HRW PORT A CATH       INSERT PORT VASCULAR ACCESS Left 10/22/2014    Procedure: INSERT PORT VASCULAR ACCESS;  Surgeon: Ila Romano MD;  Location: [facility] OR     LUMPECTOMY BREAST WITH SEED LOCALIZATION Right 09/10/2014    Procedure: LUMPECTOMY BREAST WITH SEED LOCALIZATION;  Surgeon: Ila Romano MD;  Location: [facility] OR     ORTHOPEDIC SURGERY      shoulder, thumb surgery     REMOVE PORT VASCULAR ACCESS Left 04/08/2015    Procedure: REMOVE PORT VASCULAR ACCESS;  Surgeon: Ila Romano MD;  Location: [facility] OR     REPAIR ANEURYSM ASCENDING AORTA  06/23/2011    Procedure:REPAIR ANEURYSM ASCENDING AORTA; Medial Sternotomy, Aortic Valve  "Replacement, ([employer] HP Valved Graft, size 23 mm) on pump oxygenation, intraoperative transesophageal cardiogram; Surgeon:JOHN PAUL ULLOA; Location:[facility] OR     REPLACE VALVE AORTIC  06/23/2011    bicuspid AV with severe stenosis - 6/2011 mechanical AVR with 23 mm [employer] AV conduit, composite graft placement             Physical Exam:     Vitals:    03/21/23 1332   BP: 120/80   BP Location: Right arm   Patient Position: Sitting   Cuff Size: Adult Regular   Pulse: 64   SpO2: 98%   Weight: 54.4 kg (120 lb)   Height: 1.6 m (5' 3\")     PSYCH: NAD  EYES: EOMI  NEURO: AAO x3    LABS:   PVR 35mL    Creatinine   Date Value Ref Range Status   10/21/2022 0.64 0.52 - 1.04 mg/dL Final   08/18/2020 0.65 0.52 - 1.04 mg/dL Final            Assessment and Plan:   67 year old is a pleasant female who has  urinary urgency/frequency, UUI, cystocele, rectocele, vaginal atrophy. Her hx is complicated due to breast cancer; currently on Anastrozole     Plan:  - PVR WNL  - Increase vesicare to 10mg daily. Discussed risks/benifits and potential side effects  - Advised to discuss possible myrbetriq in the future with oncology if fails vesicare.  - Will refer to PFPT.   - Advised positional changes and double voiding techniques. Do not recommend baring down.   - Follow-up in 3 months.   - Discussed third line options and cysto if fails medications.   - Consider rediscussing referral to sleep study given nocturnal polyuria noted on bladder diary.   - After discussing the assessment and plan with patient, patient verbalizes understanding and agrees to the above plan. All questions answered.       Other orders as below:  Orders Placed This Encounter   Procedures     MEASURE POST-VOID RESIDUAL URINE/BLADDER CAPACITY, US NON-IMAGING (59594)     Physical Therapy Referral     30 minutes spent on the date of the encounter doing chart review, review of outside records, review of test results, interpretation of tests, patient " visit and documentation.      Laura Washington PA-C  Urology  March 21, 2023      Patient Care Team:  Karla Moss MD as PCP - General (Internal Medicine)  Merle Baxter MD as Assigned Endocrinology Provider  Laura Washington PA-C as Physician Assistant  Karla Moss MD as Assigned PCP  Colin Miles MD as Assigned Musculoskeletal Provider

## 2023-03-21 NOTE — NURSING NOTE
Chief Complaint   Patient presents with     Urge Incontinence     cystocele   PVR was 35 ml by a bladder scan.  Darlene Collado LPN

## 2023-04-03 ENCOUNTER — LAB (OUTPATIENT)
Dept: LAB | Facility: CLINIC | Age: 68
End: 2023-04-03
Payer: COMMERCIAL

## 2023-04-03 ENCOUNTER — ANTICOAGULATION THERAPY VISIT (OUTPATIENT)
Dept: ANTICOAGULATION | Facility: CLINIC | Age: 68
End: 2023-04-03

## 2023-04-03 DIAGNOSIS — Z79.01 LONG TERM CURRENT USE OF ANTICOAGULANT THERAPY: Primary | ICD-10-CM

## 2023-04-03 DIAGNOSIS — Z95.2 H/O AORTIC VALVE REPLACEMENT: ICD-10-CM

## 2023-04-03 DIAGNOSIS — Z95.2 AORTIC VALVE REPLACED: ICD-10-CM

## 2023-04-03 DIAGNOSIS — Z79.01 LONG TERM CURRENT USE OF ANTICOAGULANT THERAPY: ICD-10-CM

## 2023-04-03 LAB — INR BLD: 2.1 (ref 0.9–1.1)

## 2023-04-03 PROCEDURE — 85610 PROTHROMBIN TIME: CPT

## 2023-04-03 PROCEDURE — 36416 COLLJ CAPILLARY BLOOD SPEC: CPT

## 2023-04-03 NOTE — PROGRESS NOTES
ANTICOAGULATION MANAGEMENT     Annabella Bolanos 67 year old female is on warfarin with therapeutic INR result. (Goal INR 2.0-3.0)    Recent labs: (last 7 days)     04/03/23  1113   INR 2.1*       ASSESSMENT     Warfarin Lab Questionnaire        4/3/2023    11:10 AM   Dose in Tablet or Mg   TAB or MG? milligram (mg)          View : No data to display.                  4/3/2023    11:10 AM   Warfarin Lab Questionnaire   Missed doses? No   Medication changes? No   Abnormal bleeding? No   Shortness of breath? No   Injuries or illness since last INR? No   Changes in diet or alcohol? No - patient reports she actually has been eating less greens because her INR has been on the low end of her goal range. Patient reports she would like to increase her green veggies back to what she had been eating.   Upcoming surgery, procedure? No       Additional findings: Patient reports she is more active, currently taking 2 classes and out walking her dog more. Patient would like to increase her green veggies.       PLAN     Recommended plan for ongoing change(s) affecting INR. Will increase warfarin maintenance dose today so patient can increase her green veggie intake.     Dosing Instructions: Increase your warfarin dose (6.2% change) with next INR in 2 weeks       Summary  As of 4/3/2023    Full warfarin instructions:  7.5 mg every Mon, Wed, Fri; 5 mg all other days   Next INR check:  5/22/2023             Telephone call with Annabella who verbalizes understanding and agrees to plan    Lab visit scheduled    Education provided:     Please call back if any changes to your diet, medications or how you've been taking warfarin    Contact [phone]  with any changes, questions or concerns.     Plan made per [facility] anticoagulation protocol    Deborah Aguilera RN  Anticoagulation Clinic  4/3/2023    _______________________________________________________________________     Anticoagulation Episode Summary     Current INR goal:  2.0-3.0   TTR:  94.6  % (1 y)   Target end date:  Indefinite   [facility] IN[facility]ers [facility]:  [facility]    Indications    Long-term (current) use of anticoagulants [Z79.01] [Z79.01]  Aortic valve replaced [Z95.2]           Comments:           Anticoagulation Care Providers     Provider Role Specialty Phone number    Brook Echavarria MD Referring Internal Medicine [phone]

## 2023-04-17 ENCOUNTER — LAB (OUTPATIENT)
Dept: LAB | Facility: CLINIC | Age: 68
End: 2023-04-17
Payer: COMMERCIAL

## 2023-04-17 ENCOUNTER — ANTICOAGULATION THERAPY VISIT (OUTPATIENT)
Dept: ANTICOAGULATION | Facility: CLINIC | Age: 68
End: 2023-04-17

## 2023-04-17 DIAGNOSIS — Z95.2 AORTIC VALVE REPLACED: ICD-10-CM

## 2023-04-17 DIAGNOSIS — Z79.01 LONG TERM CURRENT USE OF ANTICOAGULANT THERAPY: ICD-10-CM

## 2023-04-17 DIAGNOSIS — Z95.2 H/O AORTIC VALVE REPLACEMENT: ICD-10-CM

## 2023-04-17 DIAGNOSIS — Z79.01 LONG TERM CURRENT USE OF ANTICOAGULANT THERAPY: Primary | ICD-10-CM

## 2023-04-17 LAB — INR BLD: 2.1 (ref 0.9–1.1)

## 2023-04-17 PROCEDURE — 85610 PROTHROMBIN TIME: CPT

## 2023-04-17 PROCEDURE — 36416 COLLJ CAPILLARY BLOOD SPEC: CPT

## 2023-04-17 NOTE — PROGRESS NOTES
ANTICOAGULATION MANAGEMENT     Annabella Bolanos 67 year old female is on warfarin with therapeutic INR result. (Goal INR 2.0-3.0)    Recent labs: (last 7 days)     04/17/23  1115   INR 2.1*       ASSESSMENT     Warfarin Lab Questionnaire    Warfarin Doses Last 7 Days      4/16/2023    11:49 AM   Dose in Tablet or Mg   TAB or MG? milligram (mg)     Pt Rptd Dose SUNDAY MONDAY TUESDAY WED THURS FRIDAY SATURDAY 4/16/2023  11:49 AM 5 7.5 5 7.5 5 7.5 5         4/16/2023   Warfarin Lab Questionnaire   Missed doses? No   Changes in diet or alcohol? Yes   Please explain:  a little more vitamin K foods because an extra .5 dose was added on Wednesdays   Medication changes? No   Injuries or illness since last INR? No   Shortness of breath? No   Abnormal bleeding? No   Upcoming surgery, procedure? No   Best number to call with results? [phone]        Previous INR: Therapeutic last 2(+) visits  Additional findings: None       PLAN     Recommended plan for no diet, medication or health factor changes affecting INR. Patient reports she will try to keep greens about the same as she has had over the last week, however, patient would like INR to be a little bit higher than 2.1 so slight increase of warfarin maintenance dose today.     Dosing Instructions: Increase your warfarin dose (5.9% change) with next INR in 2 weeks       Summary  As of 4/17/2023    Full warfarin instructions:  5 mg every Sun, Tue, Thu; 7.5 mg all other days   Next INR check:  5/1/2023             Telephone call with Annabella who agrees to plan and repeated back plan correctly    Lab visit scheduled    Education provided:     Please call back if any changes to your diet, medications or how you've been taking warfarin    Contact [phone]  with any changes, questions or concerns.     Plan made per [facility] anticoagulation protocol    Deborah Aguilera RN  Anticoagulation Clinic  4/17/2023    _______________________________________________________________________      Anticoagulation Episode Summary     Current INR goal:  2.0-3.0   TTR:  94.6 % (1 y)   Target end date:  Indefinite   Send INR reminders to:  [facility]    Indications    Long-term (current) use of anticoagulants [Z79.01] [Z79.01]  Aortic valve replaced [Z95.2]           Comments:           Anticoagulation Care Providers     Provider Role Specialty Phone number    Brook Echavarria MD Referring Internal Medicine [phone]

## 2023-04-24 ENCOUNTER — OFFICE VISIT (OUTPATIENT)
Dept: ORTHOPEDICS | Facility: CLINIC | Age: 68
End: 2023-04-24
Payer: COMMERCIAL

## 2023-04-24 VITALS — DIASTOLIC BLOOD PRESSURE: 75 MMHG | WEIGHT: 119 LBS | SYSTOLIC BLOOD PRESSURE: 122 MMHG | BODY MASS INDEX: 21.08 KG/M2

## 2023-04-24 DIAGNOSIS — M17.10 ARTHRITIS OF KNEE: Primary | ICD-10-CM

## 2023-04-24 PROCEDURE — 99212 OFFICE O/P EST SF 10 MIN: CPT | Performed by: ORTHOPAEDIC SURGERY

## 2023-04-24 NOTE — LETTER
2023         RE: Annabella Bolanos  [address] [place] [address]        Dear Colleague,    Thank you for referring your patient, Annabella Bolanos, to the [facility] [place]. Please see a copy of my visit note below.    S: Patient is a 67 year old, female seen today in follow up for left knee.    They were previously evaluated on 23,  they are  days out from injury.  Since this visit they report patient states glenn the cortisone from last visit is still helping, but feels they are starting to wear of. She is still unable to fully straighten her knee.    Current pain level: 0/10, Worst pain level: 4/10.                 Patient Active Problem List   Diagnosis     Aortic valve replaced     Benign essential hypertension     Advanced directives, counseling/discussion     Long-term (current) use of anticoagulants [Z79.01]     Osteopenia of multiple sites     Hyperthyroidism     Adenomatous polyp of colon, unspecified part of colon     Personal history of malignant neoplasm of breast            Past Medical History:   Diagnosis Date     Adenomatous colon polyp 2015     Aortic stenosis 2011    bicuspid AV with severe stenosis - 2011 mechanical AVR with 23 mm [employer] AV conduit, composite graft placement     Arthritis     knees and hands     Bicuspid aortic valve      Breast cancer (H) 2014    R breast     H/O aortic valve replacement     St [employer]     Heart murmur     Valve repalcement .     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    AAA repair with av23 mm tube graft     Palpitations      PONV (postoperative nausea and vomiting)     n/v morphine vs anesthesia, vomiting x24 hrs     Subclinical hyperthyroidism 2017     Thrombosis of leg     after  of daughter     Uncomplicated asthma             Past Surgical  "History:   Procedure Laterality Date     BIOPSY NODE SENTINEL Right 09/10/2014    Procedure: BIOPSY NODE SENTINEL;  Surgeon: Ila Romano MD;  Location: [facility] OR     CARDIAC SURGERY  06/01/2011    aortic valve replacement     ENT SURGERY      tonsillectomy     HRW PORT A CATH       INSERT PORT VASCULAR ACCESS Left 10/22/2014    Procedure: INSERT PORT VASCULAR ACCESS;  Surgeon: Ila Romano MD;  Location: [facility] OR     LUMPECTOMY BREAST WITH SEED LOCALIZATION Right 09/10/2014    Procedure: LUMPECTOMY BREAST WITH SEED LOCALIZATION;  Surgeon: Ila Romano MD;  Location: [facility] OR     ORTHOPEDIC SURGERY      shoulder, thumb surgery     REMOVE PORT VASCULAR ACCESS Left 04/08/2015    Procedure: REMOVE PORT VASCULAR ACCESS;  Surgeon: Ila Romano MD;  Location: [facility] OR     REPAIR ANEURYSM ASCENDING AORTA  06/23/2011    Procedure:REPAIR ANEURYSM ASCENDING AORTA; Medial Sternotomy, Aortic Valve Replacement, ([employer] Masters HP Valved Graft, size 23 mm) on pump oxygenation, intraoperative transesophageal cardiogram; Surgeon:JOHN PAUL ULLOA; [facility]     REPLACE VALVE AORTIC  06/23/2011    bicuspid AV with severe stenosis - 6/2011 mechanical AVR with 23 mm [employer] AV conduit, composite graft placement            Social History     Tobacco Use     Smoking status: Never     Smokeless tobacco: Never   Vaping Use     Vaping status: Never Used     Passive vaping exposure: Yes   Substance Use Topics     Alcohol use: Yes     Comment: 2 drinks per week -             Family History   Problem Relation Age of Onset     Hypertension Mother      Thyroid Disease Mother         \"Toxic thyroid\"     Prostate Cancer Father 70     Cancer Father 80        Lung cancer, Not caused from smoking     Cerebrovascular Disease Father 80     Hypertension Father      Cardiovascular Brother         half brother      Cardiovascular Son         Puentes-Parkinsons White Syndrome     Cardiovascular Daughter  "        Heart murmur     Breast Cancer Paternal Aunt 80     Cardiovascular Paternal Aunt      Arthritis Brother 40        RA - half brother      Cancer Maternal Grandfather      Colon Cancer No family hx of                Allergies   Allergen Reactions     Morphine Sulfate Nausea and Vomiting            Current Outpatient Medications   Medication Sig Dispense Refill     alendronate (FOSAMAX) 70 MG tablet Take 70 mg by mouth every 7 days       anastrozole (ARIMIDEX) 1 MG tablet Take by mouth daily 30 tablet      ASPIRIN EC PO Take 81 mg by mouth daily       carvedilol (COREG) 12.5 MG tablet Take 1 tablet (12.5 mg) by mouth 2 times daily (with meals) 180 tablet 3     lisinopril (ZESTRIL) 10 MG tablet Take 1 tablet (10 mg) by mouth 2 times daily 180 tablet 3     methimazole (TAPAZOLE) 5 MG tablet TAKE ONE-HALF (1/2) TABLET DAILY 45 tablet 3     solifenacin (VESICARE) 10 MG tablet Take 1 tablet (10 mg) by mouth daily 30 tablet 3     warfarin ANTICOAGULANT (COUMADIN) 5 MG tablet TAKE 1 TABLET DAILY, EXCEPT ONE AND ONE-HALF TABLETS ON MONDAY AND FRIDAY OR AS DIRECTED BY THE INR CLINIC 115 tablet 1     zolpidem (AMBIEN) 5 MG tablet Take 0.5 tablets (2.5 mg) by mouth nightly as needed for sleep 30 tablet 0          Review Of Systems  Skin: negative  Eyes: negative  Ears/Nose/Throat: negative  Respiratory: No shortness of breath, dyspnea on exertion, cough, or hemoptysis    O: Physical exam:   Pain to palpation medial and lateral joint line each knee and parapatellar bilateral. Not able to fully extend left knee.  Adequate flexion each knee.  CMS intact each lower extremity.    Labs: need to update Vit D    Images:  BONE DENSITOMETRY  [facility] [address] [place] [address]  2022      PATIENT: Annabella Bolanos  CHART: [id number]   :  1955  AGE:  67 year old  SEX:  female   REFERRING PROVIDER:  Lucille Felder MD     PROCEDURE:  Bone density scanning was performed using  DXA technology of the lumbar spine and hip.  Scanning was performed on a Lunar Prodigy scanner.  Reporting is completed in the form of a T-score.  The T-score represents the standard deviation from peak bone mass based on a young healthy adult.     REFERENCE T-SCORES:       Normal                -1.0 and greater                                 Osteopenia         Between -1.0 and -2.5                                           Osteoporosis     -2.5 and less                                       RISK FACTORS:  Post-menopausal, Height loss of 2.75 inches, Parent history of osteoporosis, Parent history of a hip fracture, Condition related to bone loss: breast cancer therapy: arimidex and hyperthyroidism, follow up osteopenia     CURRENT TREATMENT:  Calcium with Vitamin D, Fosamax (alendronate)     FINDINGS:               Lumbar Spine (L1-L2)      T-score: -0.7, marked degenerative changes present at L3,4, so only L1,2 are evaluated.                Left Femoral Neck            T-score:  -1.1               Right Femoral Neck          T-score:  -1.1                  Lumbar (L1-L2) BMD: 1.086  Previous: 1.070                         Total Hip Mean BMD: 0.830  Previous: 0.854     Comparison is made to another DXA performed on the same Lunar Prodigy  machine on 09/04/2020.        IMPRESSION  Low Bone Density (osteopenia).  Degenerative changes at the lumbar spine may falsely elevate results.      There has been no significant change in bone density of the lumbar spine. There has been no significant change in bone density of the hip(s).      Recommendations include ensuring adequate Calcium and Vitamin D.     Follow up can be considered in 3 years.     KNEE THREE VIEWS BILATERAL  11/9/2022 8:33 AM      HISTORY: Chronic pain of left knee; Chronic pain of right knee  COMPARISON: 7/20/2016                                                                      IMPRESSION:     Right knee: No acute fracture or malalignment. Severe  lateral  compartment degenerative changes with bone-on-bone articulation.  Moderate patellofemoral and mild medial compartment degenerative  changes. Moderate knee joint effusion. Atherosclerosis.     Left knee: No acute fracture or malalignment. Moderate to severe  medial compartment degenerative changes. Moderate patellofemoral  compartment degenerative changes. Moderate knee joint effusion.  Atherosclerosis.         A: OA both knees    P:  Patient and I discussed pathology and interventions.  She'd like to contemplate and will let us know how she'd like to proceed.  See back 3-6 mos           In addition to the above assessment and plan each active problem on Annabella's problem list was evaluated today. This included the questioning of Annabella for any medication problems. We will continue the current treatment plan for these active problems except as noted.        Again, thank you for allowing me to participate in the care of your patient.        Sincerely,        AKIL HAHN MD

## 2023-05-01 ENCOUNTER — LAB (OUTPATIENT)
Dept: LAB | Facility: CLINIC | Age: 68
End: 2023-05-01
Payer: COMMERCIAL

## 2023-05-01 ENCOUNTER — ANTICOAGULATION THERAPY VISIT (OUTPATIENT)
Dept: ANTICOAGULATION | Facility: CLINIC | Age: 68
End: 2023-05-01

## 2023-05-01 DIAGNOSIS — Z79.01 LONG TERM CURRENT USE OF ANTICOAGULANT THERAPY: ICD-10-CM

## 2023-05-01 DIAGNOSIS — Z79.01 LONG TERM CURRENT USE OF ANTICOAGULANT THERAPY: Primary | ICD-10-CM

## 2023-05-01 DIAGNOSIS — Z95.2 AORTIC VALVE REPLACED: ICD-10-CM

## 2023-05-01 DIAGNOSIS — Z95.2 H/O AORTIC VALVE REPLACEMENT: ICD-10-CM

## 2023-05-01 LAB — INR BLD: 2.4 (ref 0.9–1.1)

## 2023-05-01 PROCEDURE — 36416 COLLJ CAPILLARY BLOOD SPEC: CPT

## 2023-05-01 PROCEDURE — 85610 PROTHROMBIN TIME: CPT

## 2023-05-01 NOTE — PROGRESS NOTES
ANTICOAGULATION MANAGEMENT     Annabella Bolanos 67 year old female is on warfarin with therapeutic INR result. (Goal INR 2.0-3.0)    Recent labs: (last 7 days)     05/01/23  1132   INR 2.4*       ASSESSMENT     Warfarin Lab Questionnaire    Warfarin Doses Last 7 Days      4/30/2023    11:17 AM   Dose in Tablet or Mg   TAB or MG? milligram (mg)     Pt Rptd Dose SUNDAY MONDAY TUESDAY WED THURS FRIDAY SATURDAY 4/30/2023  11:17 AM 5 7.5 5 7.5 5 7.5 7.5         4/30/2023   Warfarin Lab Questionnaire   Missed doses within past 14 days? No   Changes in diet or alcohol within past 14 days? Yes   Please explain:  added more vitamin K foods - as reported last visit, this is her new normal   Medication changes since last result? No   Injuries or illness since last result? No   New shortness of breath, severe headaches or sudden changes in vision since last result? No   Abnormal bleeding since last result? No   Upcoming surgery, procedure? No   Best number to call with results? [phone]        Previous result: Therapeutic last 2(+) visits  Additional findings: None       PLAN     Recommended plan for no diet, medication or health factor changes affecting INR     Dosing Instructions: Continue your current warfarin dose with next INR in 3 weeks       Summary  As of 5/1/2023    Full warfarin instructions:  5 mg every Sun, Tue, Thu; 7.5 mg all other days   Next INR check:  5/22/2023             Telephone call with Annabella who verbalizes understanding and agrees to plan    Lab visit scheduled    Education provided:     Please call back if any changes to your diet, medications or how you've been taking warfarin    Plan made per [facility] anticoagulation protocol    Kusum Aguilera RN  Anticoagulation Clinic  5/1/2023    _______________________________________________________________________     Anticoagulation Episode Summary     Current INR goal:  2.0-3.0   TTR:  94.6 % (1 y)   Target end date:  Indefinite   [facility]    Indications    Long-term (current) use of anticoagulants [Z79.01] [Z79.01]  Aortic valve replaced [Z95.2]           Comments:           Anticoagulation Care Providers     Provider Role Specialty Phone number    Brook Echavarria MD Referring Internal Medicine [phone]

## 2023-05-04 ENCOUNTER — THERAPY VISIT (OUTPATIENT)
Dept: PHYSICAL THERAPY | Facility: CLINIC | Age: 68
End: 2023-05-04
Attending: PHYSICIAN ASSISTANT
Payer: COMMERCIAL

## 2023-05-04 DIAGNOSIS — R39.15 URINARY URGENCY: ICD-10-CM

## 2023-05-04 DIAGNOSIS — R35.0 URINARY FREQUENCY: ICD-10-CM

## 2023-05-04 DIAGNOSIS — N39.41 URGE INCONTINENCE: ICD-10-CM

## 2023-05-04 PROCEDURE — 97161 PT EVAL LOW COMPLEX 20 MIN: CPT | Mod: GP | Performed by: PHYSICAL THERAPIST

## 2023-05-04 PROCEDURE — 97530 THERAPEUTIC ACTIVITIES: CPT | Mod: GP | Performed by: PHYSICAL THERAPIST

## 2023-05-04 NOTE — PROGRESS NOTES
Physical Therapy Initial Evaluation  Subjective:  SUBJECTIVE:  Patient reports onset of symptoms gradually over years.Symptoms include urgency (managed with medication during the day) and nocturia. Since onset symptoms have been getting better, worse or staying the same? Better during the day, worse at night   Urination:  Do you leak on the way to the bathroom or with a strong urge to void? No   Do you leak with cough,sneeze, jumping, running?No   Any other activities that cause leaking? No   Do you have triggers that make you feel you can't wait to go to the bathroom? No what are they N?A.  Type of pad and number used per day?  Sometimes one pad of unspecified type  When you leak what is the amount? Small  How long can you delay the need to urinate? 11 - 30 minutes.   How many times do you get up to urinate at night? 3   Can you stop the flow of urine when on the toilet? Yes  Is the volume of urine passed usually: average. (8sec rule= 250ml with average bladder storing 400-600ml)  Do you strain to pass urine? No  Do you have a slow or hesitant urinary stream? No  Do you have difficulty initiating the urine stream? No  Is urination painful? No  How many bladder infections have you had in last 12 months?0  Fluid intake(one glass is 8oz or one cup) 4glasses/day, 1caffinated glasses/day  1 alcohol glasses/day.  Bowel habits:  Frequency of bowel movements? 7 times a week  Consistancy of stool? soft formed, [place] Stool Scale N/A  Do you ignore the urge to defecate? No  Do you strain to pass stool? No  Pelvic Pain:  Do you have any pelvic pain with intercourse, exams, use of tampons? No  Is initial penetration during intercourse painful? No  Is deeper penetration painful? No  Do you use lubricant? No What kind? N/A     Given birth? Yes Any complications? No  # of vaginal delieveries?2  # of C-sections?0  # of episiotomies?0.  Are you sexually active?No  Have you ever been worried for your physical safety? No   Any  abdominal or pelvic surgeries? No  Are you having any regular exercise? Yes  Have you practiced the PF(kegel) exercises for 4 or more weeks?Yes    Marinoff Scale:Level 0  (Level 3: Abstinence from intercourse because of severe pain. Level 2: Painful intercourse which limites frequency of activity. Level 1: Painful intercourse not severe enough to prevent activity.)        Patient Health History  Annabella Bolanos being seen for frequency and urgency of urination.     Problem began: 8/1/2017.      Pain is reported as 6/10 on pain scale.  General health as reported by patient is good.  Pertinent medical history includes: cancer, heart problems, high blood pressure, menopausal, osteoarthritis, pain at night/rest and thyroid problems.     Medical allergies: other. Other medical allergies details: morphine or anesthetic.   Surgeries include:  Orthopedic surgery, heart surgery and cancer surgery.    Current medications:  Bone density, heparin/coumadin, high blood pressure medication, hormone replacement therapy and thyroid medication.    Current occupation is retired.   Primary job tasks include:  Driving, prolonged sitting and prolonged standing.                                    Objective:  System                                 Pelvic Dysfunction Evaluation:            Pelvic Clock Exam:  normal                External Assessment:  External assessment pelvic: Grade 1 prolapse.              Internal Assessment:  normal    Contraction/Grade:  Good squeeze, good hold with lift, repeatable (4)                               General     ROS    Assessment/Plan:    Patient is a 67 year old female with pelvic complaints.    Patient has the following significant findings with corresponding treatment plan.                Diagnosis 1:  Nocturia  Impaired muscle performance - neuro re-education and home program  Decreased function - therapeutic activities and home program    Therapy Evaluation Codes:   1) History comprised  of:   Personal factors that impact the plan of care:      None.    Comorbidity factors that impact the plan of care are:      None.     Medications impacting care: See EMR.  2) Examination of Body Systems comprised of:   Body structures and functions that impact the plan of care:      Pelvis.   Activity limitations that impact the plan of care are:      Sleeping.  3) Clinical presentation characteristics are:   Stable/Uncomplicated.  4) Decision-Making    Low complexity using standardized patient assessment instrument and/or measureable assessment of functional outcome.  Cumulative Therapy Evaluation is: Low complexity.    Previous and current functional limitations:  (See Goal Flow Sheet for this information)    Short term and Long term goals: (See Goal Flow Sheet for this information)     Communication ability:  Patient appears to be able to clearly communicate and understand verbal and written communication and follow directions correctly.  Treatment Explanation - The following has been discussed with the patient:   RX ordered/plan of care  Anticipated outcomes  Possible risks and side effects  This patient would benefit from PT intervention to resume normal activities.   Rehab potential is good.    Frequency:  2 X a month, once daily  Duration:  for 2 months  Discharge Plan:  Achieve all LTG.  Independent in home treatment program.  Reach maximal therapeutic benefit.    Please refer to the daily flowsheet for treatment today, total treatment time and time spent performing 1:1 timed codes.

## 2023-05-04 NOTE — PROGRESS NOTES
[facility]    OUTPATIENT Physical Therapy ORTHOPEDIC EVALUATION  PLAN OF TREATMENT FOR OUTPATIENT REHABILITATION  (COMPLETE FOR INITIAL CLAIMS ONLY)  Patient's Last Name, First Name, M.I.  YOB: 1955  Annabella Bolanos    Provider s Name:  [facility]   Medical Record No.  [id number]   Start of Care Date:  05/04/23   Onset Date:   03/21/23   Treatment Diagnosis:  Urge incontinence of urine/nocturia Medical Diagnosis:     Urge incontinence  Urinary urgency  Urinary frequency       Goals:     05/04/23 0700   Voiding Patterns   Previous Functional Level No problems   Current Functional Level Number of times patient voids during night   Peformance level 3   STG Target Performance Reduce number of times patient voids at night   Performance Level 2   Rationale to establish restorative sleep pattern;continence throughout the night   Due Date 06/01/23   LTG Target Performance Reduce number of times patient voids at night   Performance Level 0-1   Rationale continence throughout the night;to establish restorative sleep pattern   Due Date 06/29/23         Therapy Frequency:  2 x per month  Predicted Duration of Therapy Intervention:  8 weeks    Rolf Taveras, PT                 I CERTIFY THE NEED FOR THESE SERVICES FURNISHED UNDER        THIS PLAN OF TREATMENT AND WHILE UNDER MY CARE     (Physician co-signature of this document indicates review and certification of the therapy plan).                     Certification Date From:  05/04/23   Certification Date To:  08/01/23    Referring Provider:  Laura Washington    Initial Assessment        See Epic Evaluation SOC Date: 05/04/23

## 2023-05-16 ENCOUNTER — THERAPY VISIT (OUTPATIENT)
Dept: PHYSICAL THERAPY | Facility: CLINIC | Age: 68
End: 2023-05-16
Attending: PHYSICIAN ASSISTANT
Payer: COMMERCIAL

## 2023-05-16 DIAGNOSIS — R39.15 URINARY URGENCY: Primary | ICD-10-CM

## 2023-05-16 DIAGNOSIS — N39.41 URGE INCONTINENCE: ICD-10-CM

## 2023-05-16 DIAGNOSIS — R35.0 URINARY FREQUENCY: ICD-10-CM

## 2023-05-16 PROCEDURE — 97535 SELF CARE MNGMENT TRAINING: CPT | Mod: GP | Performed by: PHYSICAL THERAPIST

## 2023-05-16 PROCEDURE — 97110 THERAPEUTIC EXERCISES: CPT | Mod: GP | Performed by: PHYSICAL THERAPIST

## 2023-05-16 NOTE — PROGRESS NOTES
Annabella Bolanos was seen 2 times for evaluation and treatment.  Patient did very well with 2 visits and ready to continue on her own.  Please see goal flow sheet from episode noted date below and initial evaluation for further information.    No linked episodes

## 2023-05-22 ENCOUNTER — DOCUMENTATION ONLY (OUTPATIENT)
Dept: ANTICOAGULATION | Facility: CLINIC | Age: 68
End: 2023-05-22

## 2023-05-22 ENCOUNTER — ANTICOAGULATION THERAPY VISIT (OUTPATIENT)
Dept: ANTICOAGULATION | Facility: CLINIC | Age: 68
End: 2023-05-22

## 2023-05-22 ENCOUNTER — LAB (OUTPATIENT)
Dept: LAB | Facility: CLINIC | Age: 68
End: 2023-05-22
Payer: COMMERCIAL

## 2023-05-22 DIAGNOSIS — Z79.01 LONG TERM CURRENT USE OF ANTICOAGULANT THERAPY: Primary | ICD-10-CM

## 2023-05-22 DIAGNOSIS — Z95.2 AORTIC VALVE REPLACED: Primary | ICD-10-CM

## 2023-05-22 DIAGNOSIS — Z95.2 H/O AORTIC VALVE REPLACEMENT: ICD-10-CM

## 2023-05-22 DIAGNOSIS — Z95.2 AORTIC VALVE REPLACED: ICD-10-CM

## 2023-05-22 DIAGNOSIS — Z79.01 LONG TERM CURRENT USE OF ANTICOAGULANT THERAPY: ICD-10-CM

## 2023-05-22 LAB — INR BLD: 2.1 (ref 0.9–1.1)

## 2023-05-22 PROCEDURE — 85610 PROTHROMBIN TIME: CPT

## 2023-05-22 PROCEDURE — 36416 COLLJ CAPILLARY BLOOD SPEC: CPT

## 2023-05-22 NOTE — PROGRESS NOTES
ANTICOAGULATION CLINIC REFERRAL RENEWAL REQUEST       An annual renewal order is required for all patients referred to [facility].?  Please review and sign the pended referral order for Annabella TEMPLE Luis Miguel.       ANTICOAGULATION SUMMARY      Warfarin indication(s)   Mechanical AVR    Mechanical heart valve present?  Mechanical  AVR-Bileaflet       Current goal range   INR: 2.0-3.0     Goal appropriate for indication? Goal INR 2-3, standard for indication(s) above     Time in Therapeutic Range (TTR)  (Goal > 60%) 95%       Office visit with referring provider's group within last year yes on 10/21/2022       Marybeth Stock RN  [facility]

## 2023-05-22 NOTE — PROGRESS NOTES
ANTICOAGULATION MANAGEMENT     Annabella Bolanos 67 year old female is on warfarin with therapeutic INR result. (Goal INR 2.0-3.0)    Recent labs: (last 7 days)     05/22/23  1108   INR 2.1*       ASSESSMENT     Warfarin Lab Questionnaire    Warfarin Doses Last 7 Days      5/21/2023    11:44 AM   Dose in Tablet or Mg   TAB or MG? milligram (mg)     Pt Rptd Dose SUNDAY MONDAY TUESDAY WED THURS FRIDAY SATURDAY 5/21/2023  11:44 AM 5 7.5 5 7.5 5 7.5 7.5         5/21/2023   Warfarin Lab Questionnaire   Missed doses within past 14 days? No   Changes in diet or alcohol within past 14 days? No   Medication changes since last result? No   Injuries or illness since last result? No   New shortness of breath, severe headaches or sudden changes in vision since last result? No   Abnormal bleeding since last result? No   Upcoming surgery, procedure? No   Best number to call with results? [phone]        Previous result: Therapeutic last 2(+) visits  Additional findings: patient states she likes her INR a bit higher than 2.1 but per chart review, her INR tends to run in lower ranges most likely due to eating vitamin K foods everyday.  Patient states she will watch her portions of salad and/or broccoli but overall, does not plan to change her vitamin K regimen.       PLAN     Recommended plan for no diet, medication or health factor changes affecting INR     Dosing Instructions: Continue your current warfarin dose with next INR in 4 weeks       Summary  As of 5/22/2023    Full warfarin instructions:  5 mg every Sun, Tue, Thu; 7.5 mg all other days   Next INR check:  6/19/2023             Telephone call with Annabella who agrees to plan and repeated back plan correctly    Lab visit scheduled    Education provided:     Dietary considerations: importance of consistent vitamin K intake, impact of vitamin K foods on INR, vitamin K content of foods and importance of notifying [facility] to changes in diet    Plan made per [facility] anticoagulation  protocol    Marybeth Stock RN  Anticoagulation Clinic  5/22/2023    _______________________________________________________________________     Anticoagulation Episode Summary     Current INR goal:  2.0-3.0   TTR:  94.6 % (1 y)   Target end date:  Indefinite   Send INR reminders to:  [facility]    Indications    Long-term (current) use of anticoagulants [Z79.01] [Z79.01]  Aortic valve replaced [Z95.2]           Comments:           Anticoagulation Care Providers     Provider Role Specialty Phone number    Brook Echavarria MD Referring Internal Medicine [phone]

## 2023-05-29 DIAGNOSIS — Z79.01 LONG TERM CURRENT USE OF ANTICOAGULANTS WITH INR GOAL OF 2.0-3.0: ICD-10-CM

## 2023-05-29 DIAGNOSIS — Z95.2 S/P AORTIC VALVE REPLACEMENT: ICD-10-CM

## 2023-05-31 RX ORDER — WARFARIN SODIUM 5 MG/1
TABLET ORAL
Qty: 110 TABLET | Refills: 1 | Status: SHIPPED | OUTPATIENT
Start: 2023-05-31 | End: 2023-11-01

## 2023-05-31 NOTE — TELEPHONE ENCOUNTER
Pending Prescriptions:                       Disp   Refills    warfarin ANTICOAGULANT (COUMADIN) 5 MG tab*115 ta*3        Sig: TAKE 1 TABLET DAILY, EXCEPT ONE AND ONE-HALF TABLETS           ON MONDAY AND FRIDAY OR AS DIRECTED BY THE INR           CLINIC

## 2023-05-31 NOTE — TELEPHONE ENCOUNTER
ANTICOAGULATION MANAGEMENT:  Medication Refill    Anticoagulation Summary  As of 5/22/2023    Warfarin maintenance plan:  5 mg (5 mg x 1) every Sun, Tue, Thu; 7.5 mg (5 mg x 1.5) all other days   Next INR check:  6/19/2023   Target end date:  Indefinite    Indications    Long-term (current) use of anticoagulants [Z79.01] [Z79.01]  Aortic valve replaced [Z95.2]             Anticoagulation Care Providers     Provider Role Specialty Phone number    Brook Echavarria MD Referring Internal Medicine [phone]    Karla Moss MD Referring Internal Medicine [phone]          Visit with referring provider/group within last year: Yes    [facility] referral signed within last year: Yes    Annabella meets all criteria for refill (current [facility] referral, office visit with referring provider/group in last year, lab monitoring up to date or not exceeding 2 weeks overdue). Rx instructions and quantity supplied updated to match patient's current dosing plan. Warfarin 90 day supply with 1 refill granted per [facility] protocol     Marybeth Stock RN  Anticoagulation Clinic

## 2023-06-19 ENCOUNTER — LAB (OUTPATIENT)
Dept: LAB | Facility: CLINIC | Age: 68
End: 2023-06-19
Payer: COMMERCIAL

## 2023-06-19 ENCOUNTER — ANTICOAGULATION THERAPY VISIT (OUTPATIENT)
Dept: ANTICOAGULATION | Facility: CLINIC | Age: 68
End: 2023-06-19

## 2023-06-19 DIAGNOSIS — Z95.2 AORTIC VALVE REPLACED: ICD-10-CM

## 2023-06-19 DIAGNOSIS — Z79.01 LONG TERM CURRENT USE OF ANTICOAGULANT THERAPY: Primary | ICD-10-CM

## 2023-06-19 LAB — INR BLD: 2.9 (ref 0.9–1.1)

## 2023-06-19 PROCEDURE — 36416 COLLJ CAPILLARY BLOOD SPEC: CPT

## 2023-06-19 PROCEDURE — 85610 PROTHROMBIN TIME: CPT

## 2023-06-22 ENCOUNTER — OFFICE VISIT (OUTPATIENT)
Dept: UROLOGY | Facility: CLINIC | Age: 68
End: 2023-06-22
Payer: COMMERCIAL

## 2023-06-22 VITALS
BODY MASS INDEX: 20.02 KG/M2 | WEIGHT: 113 LBS | OXYGEN SATURATION: 95 % | DIASTOLIC BLOOD PRESSURE: 69 MMHG | SYSTOLIC BLOOD PRESSURE: 115 MMHG | HEART RATE: 64 BPM | HEIGHT: 63 IN

## 2023-06-22 DIAGNOSIS — R35.0 URINARY FREQUENCY: ICD-10-CM

## 2023-06-22 DIAGNOSIS — R39.15 URINARY URGENCY: ICD-10-CM

## 2023-06-22 DIAGNOSIS — N81.11 CYSTOCELE, MIDLINE: ICD-10-CM

## 2023-06-22 DIAGNOSIS — N39.41 URGE INCONTINENCE: Primary | ICD-10-CM

## 2023-06-22 DIAGNOSIS — Z85.3 PERSONAL HISTORY OF MALIGNANT NEOPLASM OF BREAST: ICD-10-CM

## 2023-06-22 DIAGNOSIS — N81.6 RECTOCELE: ICD-10-CM

## 2023-06-22 DIAGNOSIS — R35.81 NOCTURNAL POLYURIA: ICD-10-CM

## 2023-06-22 PROCEDURE — 99214 OFFICE O/P EST MOD 30 MIN: CPT | Performed by: PHYSICIAN ASSISTANT

## 2023-06-22 RX ORDER — MIRABEGRON 50 MG/1
50 TABLET, EXTENDED RELEASE ORAL DAILY
Qty: 30 TABLET | Refills: 3 | Status: SHIPPED | OUTPATIENT
Start: 2023-06-22 | End: 2023-09-21

## 2023-06-22 ASSESSMENT — PAIN SCALES - GENERAL: PAINLEVEL: NO PAIN (0)

## 2023-06-22 NOTE — PROGRESS NOTES
Urology Clinic      Name: Annabella Bolanos    MRN: [id number]   YOB: 1955  Accompanied at today's visit by:self               Chief Complaint:   UUI          History of Present Illness:   June 22, 2023    HISTORY:   We have been following 67 year old Annabella Bolanos for urinary urgency/frequency, UUI, cystocele, rectocele, vaginal atrophy. Her hx is complicated due to breast cancer; currently on Anastrozole without recurrence. Has failed oxybutynin (dry mouth). Last seen on 3/21/23 and increased vesicare to 10mg daily. Reports she continues to have UUI. Voiding every 2-3 hours during the day and 2x/night. Has done a bladder diary in the past suggestive of nocturnal polyuria. Not interested in sleep study. Went to PFPT and does not think this has helped her symptoms. Bowels regular. Not bothered by prolapse. PMH is significant for asthma, replacement of aortic valve and on chronic warfarin. Patient voices no other concerns at this time.           Allergies:     Allergies   Allergen Reactions     Morphine Sulfate Nausea and Vomiting            Medications:     Current Outpatient Medications   Medication Sig     alendronate (FOSAMAX) 70 MG tablet Take 70 mg by mouth every 7 days     anastrozole (ARIMIDEX) 1 MG tablet Take by mouth daily     ASPIRIN EC PO Take 81 mg by mouth daily     carvedilol (COREG) 12.5 MG tablet Take 1 tablet (12.5 mg) by mouth 2 times daily (with meals)     lisinopril (ZESTRIL) 10 MG tablet Take 1 tablet (10 mg) by mouth 2 times daily     methimazole (TAPAZOLE) 5 MG tablet TAKE ONE-HALF (1/2) TABLET DAILY     mirabegron (MYRBETRIQ) 50 MG 24 hr tablet Take 1 tablet (50 mg) by mouth daily     solifenacin (VESICARE) 10 MG tablet Take 1 tablet (10 mg) by mouth daily     warfarin ANTICOAGULANT (COUMADIN) 5 MG tablet Take 5mg every Sun,Tues & Thur / Take 7.5mg all other days OR per INR clinic     zolpidem (AMBIEN) 5 MG tablet Take 0.5 tablets (2.5 mg) by mouth nightly as needed for sleep  "(Patient not taking: Reported on 6/22/2023)     Current Facility-Administered Medications   Medication     3 mL bupivacaine (MARCAINE) preservative free injection 0.5% (20 mL vial)     3 mL bupivacaine (MARCAINE) preservative free injection 0.5% (20 mL vial)     lidocaine 1 % injection 3 mL     lidocaine 1 % injection 3 mL     triamcinolone (KENALOG-40) injection 40 mg     triamcinolone (KENALOG-40) injection 40 mg            Past  Surgical History:     Past Surgical History:   Procedure Laterality Date     BIOPSY NODE SENTINEL Right 09/10/2014    Procedure: BIOPSY NODE SENTINEL;  Surgeon: Ila Romano MD;  Location: [facility] OR     CARDIAC SURGERY  06/01/2011    aortic valve replacement     ENT SURGERY      tonsillectomy     HRW PORT A CATH       INSERT PORT VASCULAR ACCESS Left 10/22/2014    Procedure: INSERT PORT VASCULAR ACCESS;  Surgeon: Ila Romano MD;  Location: [facility] OR     LUMPECTOMY BREAST WITH SEED LOCALIZATION Right 09/10/2014    Procedure: LUMPECTOMY BREAST WITH SEED LOCALIZATION;  Surgeon: Ila Romano MD;  Location: [facility] OR     ORTHOPEDIC SURGERY      shoulder, thumb surgery     REMOVE PORT VASCULAR ACCESS Left 04/08/2015    Procedure: REMOVE PORT VASCULAR ACCESS;  Surgeon: Ila Romano MD;  Location: [facility] OR     REPAIR ANEURYSM ASCENDING AORTA  06/23/2011    Procedure:REPAIR ANEURYSM ASCENDING AORTA; Medial Sternotomy, Aortic Valve Replacement, ([employer] HP Valved Graft, size 23 mm) on pump oxygenation, intraoperative transesophageal cardiogram; Surgeon:JOHN PAUL ULLOA; Location:[facility] OR     REPLACE VALVE AORTIC  06/23/2011    bicuspid AV with severe stenosis - 6/2011 mechanical AVR with 23 mm [employer] AV conduit, composite graft placement             Physical Exam:     Vitals:    06/22/23 1329   BP: 115/69   Pulse: 64   SpO2: 95%   Weight: 51.3 kg (113 lb)   Height: 1.6 m (5' 3\")     PSYCH: NAD  EYES: EOMI  NEURO: AAO x3    LABS:   PVR " 131mL    Creatinine   Date Value Ref Range Status   10/21/2022 0.64 0.52 - 1.04 mg/dL Final   08/18/2020 0.65 0.52 - 1.04 mg/dL Final            Assessment and Plan:   67 year old is a pleasant female who has urinary urgency/frequency, UUI, cystocele, rectocele, vaginal atrophy, nocturnal polyuria. Her hx is complicated due to breast cancer; currently on Anastrozole.    Plan:  - PVR 131mL.  - Stop Vesicare. Clear Myrbetriq with Oncology team first; if cleared start daily. Discussed risks/benifits and potential side effects of medication.   - Discussed third line options at length if fails myrbetriq; handouts given.   - Not interested in sleep study.   - Follow-up in 3 months.   - Clear vaginal estrogen cream 2x/nightly with oncology.   - After discussing the assessment and plan with patient, patient verbalizes understanding and agrees to the above plan. All questions answered.     39 minutes spent on the date of the encounter doing chart review, review of outside records, review of test results, interpretation of tests, patient visit and documentation.      Laura Washington PA-C  Urology  June 22, 2023      Patient Care Team:  Karla Moss MD as PCP - General (Internal Medicine)  Merle Baxter MD as Assigned Endocrinology Provider  Laura Washington PA-C as Physician Assistant  Karla Moss MD as Assigned PCP  Colin Miles MD as Assigned Musculoskeletal Provider

## 2023-06-22 NOTE — NURSING NOTE
Chief Complaint   Patient presents with     Urge Incontinence     Patient here today to discuss her symptoms after medication       DELMY Jeffery

## 2023-06-22 NOTE — PATIENT INSTRUCTIONS
Ask your oncologist about estrogen cream vaginally and Myrbetriq 50mg daily for your urinary incontinence.   Contact me if the medications are to expensive or if cleared/not cleared by oncology team  Follow-up in 3 months. If unable to get the Myrbetriq, contact me and I will see you sooner than 3 months.     Myrbetriq can help with urinary urgency/frequency and urge incontinence. Some potential risks of Myrbetriq include but not limited to headache, nasal congestion and increase in blood pressure. Advise you to monitor your blood pressure at home while taking Myrbetriq, and to contact our clinic if develop elevated blood pressures. Studies show that changes in blood pressure are typically not clinically significant (per UpToDate).

## 2023-06-22 NOTE — LETTER
6/22/2023       RE: Annabella Bolanos  [address] [place] [address]     Dear Colleague,    Thank you for referring your patient, Annabella Bolanos, to the [facility] at [facility]. Please see a copy of my visit note below.    Urology Clinic      Name: Annabella Bolanos    MRN: [id number]   YOB: 1955  Accompanied at today's visit by:self               Chief Complaint:   UUI          History of Present Illness:   June 22, 2023    HISTORY:   We have been following 67 year old Annabella Bolanos for urinary urgency/frequency, UUI, cystocele, rectocele, vaginal atrophy. Her hx is complicated due to breast cancer; currently on Anastrozole without recurrence. Has failed oxybutynin (dry mouth). Last seen on 3/21/23 and increased vesicare to 10mg daily. Reports she continues to have UUI. Voiding every 2-3 hours during the day and 2x/night. Has done a bladder diary in the past suggestive of nocturnal polyuria. Not interested in sleep study. Went to PFPT and does not think this has helped her symptoms. Bowels regular. Not bothered by prolapse. PMH is significant for asthma, replacement of aortic valve and on chronic warfarin. Patient voices no other concerns at this time.           Allergies:     Allergies   Allergen Reactions    Morphine Sulfate Nausea and Vomiting            Medications:     Current Outpatient Medications   Medication Sig    alendronate (FOSAMAX) 70 MG tablet Take 70 mg by mouth every 7 days    anastrozole (ARIMIDEX) 1 MG tablet Take by mouth daily    ASPIRIN EC PO Take 81 mg by mouth daily    carvedilol (COREG) 12.5 MG tablet Take 1 tablet (12.5 mg) by mouth 2 times daily (with meals)    lisinopril (ZESTRIL) 10 MG tablet Take 1 tablet (10 mg) by mouth 2 times daily    methimazole (TAPAZOLE) 5 MG tablet TAKE ONE-HALF (1/2) TABLET DAILY    mirabegron (MYRBETRIQ) 50 MG 24 hr tablet Take 1 tablet (50 mg) by mouth daily     solifenacin (VESICARE) 10 MG tablet Take 1 tablet (10 mg) by mouth daily    warfarin ANTICOAGULANT (COUMADIN) 5 MG tablet Take 5mg every Sun,Tues & Thur / Take 7.5mg all other days OR per INR clinic    zolpidem (AMBIEN) 5 MG tablet Take 0.5 tablets (2.5 mg) by mouth nightly as needed for sleep (Patient not taking: Reported on 6/22/2023)     Current Facility-Administered Medications   Medication    3 mL bupivacaine (MARCAINE) preservative free injection 0.5% (20 mL vial)    3 mL bupivacaine (MARCAINE) preservative free injection 0.5% (20 mL vial)    lidocaine 1 % injection 3 mL    lidocaine 1 % injection 3 mL    triamcinolone (KENALOG-40) injection 40 mg    triamcinolone (KENALOG-40) injection 40 mg            Past  Surgical History:     Past Surgical History:   Procedure Laterality Date    BIOPSY NODE SENTINEL Right 09/10/2014    Procedure: BIOPSY NODE SENTINEL;  Surgeon: Ila Romano MD;  Location: [facility]    CARDIAC SURGERY  06/01/2011    aortic valve replacement    ENT SURGERY      tonsillectomy    HRW PORT A CATH      INSERT PORT VASCULAR ACCESS Left 10/22/2014    Procedure: INSERT PORT VASCULAR ACCESS;  Surgeon: Ila Romano MD;  Location: [facility]    LUMPECTOMY BREAST WITH SEED LOCALIZATION Right 09/10/2014    Procedure: LUMPECTOMY BREAST WITH SEED LOCALIZATION;  Surgeon: Ila Romano MD;  Location: [facility] OR    ORTHOPEDIC SURGERY      shoulder, thumb surgery    REMOVE PORT VASCULAR ACCESS Left 04/08/2015    Procedure: REMOVE PORT VASCULAR ACCESS;  Surgeon: Ila Romano MD;  Location: [facility] OR    REPAIR ANEURYSM ASCENDING AORTA  06/23/2011    Procedure:REPAIR ANEURYSM ASCENDING AORTA; Medial Sternotomy, Aortic Valve Replacement, ([employer] HP Valved Graft, size 23 mm) on pump oxygenation, intraoperative transesophageal cardiogram; Surgeon:JOHN PAUL ULLOA; Location:[facility] OR    REPLACE VALVE AORTIC  06/23/2011    bicuspid AV with severe stenosis - 6/2011  "mechanical AVR with 23 mm [employer] AV conduit, composite graft placement             Physical Exam:     Vitals:    06/22/23 1329   BP: 115/69   Pulse: 64   SpO2: 95%   Weight: 51.3 kg (113 lb)   Height: 1.6 m (5' 3\")     PSYCH: NAD  EYES: EOMI  NEURO: AAO x3    LABS:   PVR 131mL    Creatinine   Date Value Ref Range Status   10/21/2022 0.64 0.52 - 1.04 mg/dL Final   08/18/2020 0.65 0.52 - 1.04 mg/dL Final            Assessment and Plan:   67 year old is a pleasant female who has urinary urgency/frequency, UUI, cystocele, rectocele, vaginal atrophy, nocturnal polyuria. Her hx is complicated due to breast cancer; currently on Anastrozole.    Plan:  - PVR 131mL.  - Stop Vesicare. Clear Myrbetriq with Oncology team first; if cleared start daily. Discussed risks/benifits and potential side effects of medication.   - Discussed third line options at length if fails myrbetriq; handouts given.   - Not interested in sleep study.   - Follow-up in 3 months.   - Clear vaginal estrogen cream 2x/nightly with oncology.   - After discussing the assessment and plan with patient, patient verbalizes understanding and agrees to the above plan. All questions answered.     39 minutes spent on the date of the encounter doing chart review, review of outside records, review of test results, interpretation of tests, patient visit and documentation.      Laura Washington PA-C  Urology  June 22, 2023      Patient Care Team:  Karla Moss MD as PCP - General (Internal Medicine)  Merle Baxter MD as Assigned Endocrinology Provider  Laura Washington PA-C as Physician Assistant  Karla Moss MD as Assigned PCP  Colin Miles MD as Assigned Musculoskeletal Provider       "

## 2023-07-05 DIAGNOSIS — N95.2 VAGINAL ATROPHY: Primary | ICD-10-CM

## 2023-07-05 RX ORDER — ESTRADIOL 0.1 MG/G
CREAM VAGINAL
Qty: 40 G | Refills: 3 | Status: SHIPPED | OUTPATIENT
Start: 2023-07-05

## 2023-07-05 NOTE — TELEPHONE ENCOUNTER
Provider E-Visit time total (minutes): 5   Where Do You Want The Question To Include Opioid Counseling Located?: Case Summary Tab

## 2023-07-14 ENCOUNTER — TELEPHONE (OUTPATIENT)
Dept: INTERNAL MEDICINE | Facility: CLINIC | Age: 68
End: 2023-07-14
Payer: COMMERCIAL

## 2023-07-14 NOTE — TELEPHONE ENCOUNTER
I spoke to Annabella, she recently started using a small amount of estradiol cream intravaginally 3 times per week. The literature Annabella received states there is a warning when taken with warfarin, Micromedex states could cause decreased OR increased anticoagulant effectiveness. I informed patient that there likely wouldn't be any interaction with her warfarin since only small amounts would be absorbed.    Patient has INR scheduled for 7/24/23, no need to check INR sooner; however, I informed patient to call if she is concerned with any clotting or bleeding signs or symptoms.    Marybeth Stock RN  Anticoagulation Clinic

## 2023-07-17 ENCOUNTER — TELEPHONE (OUTPATIENT)
Dept: FAMILY MEDICINE | Facility: CLINIC | Age: 68
End: 2023-07-17
Payer: COMMERCIAL

## 2023-07-17 ENCOUNTER — TRANSFERRED RECORDS (OUTPATIENT)
Dept: HEALTH INFORMATION MANAGEMENT | Facility: CLINIC | Age: 68
End: 2023-07-17
Payer: COMMERCIAL

## 2023-07-17 NOTE — TELEPHONE ENCOUNTER
Patient Returning Call    Reason for call:  Annabella is scheduled for an Annual Wellness in October before her Knee replacement with Dr Tucker, and just wanted to verify she could get her Pre-op done at the same time.  Please message her on [employer] if not possible to do both at the same time.      Information relayed to patient:  N    Patient has additional questions:  No      Could we send this information to you in [employer] or would you prefer to receive a phone call?:   Patient would like to be contacted via [employer]

## 2023-07-18 ENCOUNTER — TELEPHONE (OUTPATIENT)
Dept: ENDOCRINOLOGY | Facility: CLINIC | Age: 68
End: 2023-07-18
Payer: COMMERCIAL

## 2023-07-18 DIAGNOSIS — E05.90 SUBCLINICAL HYPERTHYROIDISM: Primary | ICD-10-CM

## 2023-07-18 NOTE — TELEPHONE ENCOUNTER
Recommend repeat labs.     Latest Ref Rng 3/6/2023  11:23 AM   ENDO THYROID LABS-[facility]     TSH 0.30 - 4.20 uIU/mL 1.30    Free T3 2.0 - 4.4 pg/mL    Triiodothyronine (T3) 85 - 202 ng/dL 106    FREE T4 0.90 - 1.70 ng/dL 1.41

## 2023-07-18 NOTE — TELEPHONE ENCOUNTER
[facility] Call Center    Phone Message    May a detailed message be left on voicemail: yes     Reason for Call: Other: patient has had an 8lb weight loss and wanted it known by Dr Baxter as she has not actively tried to loose weight please advise as seen fit     Action Taken: Other: endo    Travel Screening: Not Applicable

## 2023-07-18 NOTE — TELEPHONE ENCOUNTER
Last OV 9/2022  Dx: Hyperthyroidism  From note;  Currently  taking methimazole to 2.5/day (5/20/2021)  F/u labs in range.  Plan:  Discussed diagnosis, pathophysiology, management and treatment options of condition with pt.  Based on 9/2022 labs recommend to continue methimazole 2.5 mg/day (5/20/2021)  Labs in 6 months or sooner if concerns.  Follow up in 1 year.  Please make a lab appointment for blood work and follow up clinic appointment in 1 week after that to discuss results.  Last labs 3/2023 WNL     Next OV 9/2023

## 2023-07-19 NOTE — TELEPHONE ENCOUNTER
Spoke to pt and informed of below. Pt will have labs drawn at her appt on 7/24.     Pt states [facility] and she was 110 and today she [facility] herself [facility] and was 114.

## 2023-07-24 ENCOUNTER — LAB (OUTPATIENT)
Dept: LAB | Facility: CLINIC | Age: 68
End: 2023-07-24
Payer: COMMERCIAL

## 2023-07-24 ENCOUNTER — ANTICOAGULATION THERAPY VISIT (OUTPATIENT)
Dept: ANTICOAGULATION | Facility: CLINIC | Age: 68
End: 2023-07-24

## 2023-07-24 DIAGNOSIS — Z79.01 LONG TERM CURRENT USE OF ANTICOAGULANT THERAPY: Primary | ICD-10-CM

## 2023-07-24 DIAGNOSIS — E05.90 SUBCLINICAL HYPERTHYROIDISM: ICD-10-CM

## 2023-07-24 DIAGNOSIS — Z95.2 AORTIC VALVE REPLACED: ICD-10-CM

## 2023-07-24 LAB
INR BLD: 3.4 (ref 0.9–1.1)
T3 SERPL-MCNC: 94 NG/DL (ref 85–202)
T4 FREE SERPL-MCNC: 1.49 NG/DL (ref 0.9–1.7)
TSH SERPL DL<=0.005 MIU/L-ACNC: 0.86 UIU/ML (ref 0.3–4.2)

## 2023-07-24 PROCEDURE — 36415 COLL VENOUS BLD VENIPUNCTURE: CPT

## 2023-07-24 PROCEDURE — 84439 ASSAY OF FREE THYROXINE: CPT

## 2023-07-24 PROCEDURE — 84480 ASSAY TRIIODOTHYRONINE (T3): CPT

## 2023-07-24 PROCEDURE — 84443 ASSAY THYROID STIM HORMONE: CPT

## 2023-07-24 PROCEDURE — 85610 PROTHROMBIN TIME: CPT

## 2023-07-24 NOTE — PROGRESS NOTES
ANTICOAGULATION MANAGEMENT     Annabella Bolanos 67 year old female is on warfarin with supratherapeutic INR result. (Goal INR 2.0-3.0)    Recent labs: (last 7 days)     07/24/23  1130   INR 3.4*       ASSESSMENT     Warfarin Lab Questionnaire    Warfarin Doses Last 7 Days      7/24/2023     8:07 AM   Dose in Tablet or Mg   TAB or MG? milligram (mg)     Pt Rptd Dose SUNDAY MONDAY TUESDAY WED THURS FRIDAY SATURDAY 7/24/2023   8:07 AM 5 7.5 5 7.5 5 7.5 7.5         7/24/2023   Warfarin Lab Questionnaire   Missed doses within past 14 days? No   Changes in diet or alcohol within past 14 days? No, yes, ate less broccoli and more green beans. Patient will resume her usual amount of broccoli per week.   Medication changes since last result? Yes   Please list: estradiol 0.1 mg/gm cre pada   Injuries or illness since last result? No   New shortness of breath, severe headaches or sudden changes in vision since last result? No   Abnormal bleeding since last result? No   Upcoming surgery, procedure? No   Best number to call with results? [phone]     Previous result: Therapeutic last 2(+) visits  Additional findings: [facility] states not enough estradiol is absorbed to affect INR.       PLAN     Recommended plan for temporary change(s) and ongoing change(s) affecting INR     Dosing Instructions: partial hold then continue your current warfarin dose with next INR in 2 weeks       Summary  As of 7/24/2023      Full warfarin instructions:  7/24: 5 mg; Otherwise 5 mg every Sun, Tue, Thu; 7.5 mg all other days   Next INR check:  8/7/2023               Telephone call with Annabella who verbalizes understanding and agrees to plan    Lab visit scheduled    Education provided:   Dietary considerations: importance of consistent vitamin K intake, impact of vitamin K foods on INR, vitamin K content of foods, and Dr. Aragon food list placed in out-going mail.    Plan made per [facility] anticoagulation protocol    Marybeth Stock RN  Anticoagulation  Clinic  7/24/2023    _______________________________________________________________________     Anticoagulation Episode Summary       Current INR goal:  2.0-3.0   TTR:  87.0 % (1 y)   Target end date:  Indefinite   Send INR reminders to:  [facility]    Indications    Long-term (current) use of anticoagulants [Z79.01] [Z79.01]  Aortic valve replaced [Z95.2]             Comments:               Anticoagulation Care Providers       Provider Role Specialty Phone number    Brook Echavarria MD Referring Internal Medicine [phone]    Karla Moss MD Referring Internal Medicine [phone]

## 2023-07-25 NOTE — TELEPHONE ENCOUNTER
Component      Latest Ref Rng 7/24/2023  11:30 AM   TSH      0.30 - 4.20 uIU/mL 0.86    T4 Free      0.90 - 1.70 ng/dL 1.49    Triiodothyronine (T3)      85 - 202 ng/dL 94

## 2023-07-25 NOTE — TELEPHONE ENCOUNTER
Spoke to pt and informed of below. Pt verbalized understanding. Pt will keep appointment on 9/21 as well

## 2023-07-28 ENCOUNTER — MYC MEDICAL ADVICE (OUTPATIENT)
Dept: UROLOGY | Facility: CLINIC | Age: 68
End: 2023-07-28
Payer: COMMERCIAL

## 2023-07-28 DIAGNOSIS — N39.41 URGE INCONTINENCE: Primary | ICD-10-CM

## 2023-08-01 RX ORDER — SOLIFENACIN SUCCINATE 10 MG/1
10 TABLET, FILM COATED ORAL DAILY
Qty: 90 TABLET | Refills: 0 | Status: SHIPPED | OUTPATIENT
Start: 2023-08-01 | End: 2023-10-30

## 2023-08-07 ENCOUNTER — ANTICOAGULATION THERAPY VISIT (OUTPATIENT)
Dept: ANTICOAGULATION | Facility: CLINIC | Age: 68
End: 2023-08-07

## 2023-08-07 ENCOUNTER — LAB (OUTPATIENT)
Dept: LAB | Facility: CLINIC | Age: 68
End: 2023-08-07
Payer: COMMERCIAL

## 2023-08-07 ENCOUNTER — MYC MEDICAL ADVICE (OUTPATIENT)
Dept: INTERNAL MEDICINE | Facility: CLINIC | Age: 68
End: 2023-08-07

## 2023-08-07 ENCOUNTER — TELEPHONE (OUTPATIENT)
Dept: ANTICOAGULATION | Facility: CLINIC | Age: 68
End: 2023-08-07

## 2023-08-07 DIAGNOSIS — Z95.2 AORTIC VALVE REPLACED: ICD-10-CM

## 2023-08-07 LAB — INR BLD: 3.2 (ref 0.9–1.1)

## 2023-08-07 PROCEDURE — 36416 COLLJ CAPILLARY BLOOD SPEC: CPT

## 2023-08-07 PROCEDURE — 85610 PROTHROMBIN TIME: CPT

## 2023-08-07 NOTE — TELEPHONE ENCOUNTER
FYI - Status Update    Who is Calling: patient    Update: Pt would like to discuss a supplement she was [facility] as well as her vitamin K intake with INR nurse. She also had a few questions regarding her INR.    Does caller want a call/response back: Yes     Could we send this information to you in [email or web address] or would you prefer to receive a phone call?:   Patient would prefer a phone call   Okay to leave a detailed message?: yes at Home number on file [phone] (home)     Spoke to pt. See 8/7/23 [facility] encounter

## 2023-08-07 NOTE — PROGRESS NOTES
ANTICOAGULATION MANAGEMENT     Annabella Bolanos 67 year old female is on warfarin with supratherapeutic INR result. (Goal INR 2.0-3.0)    Recent labs: (last 7 days)     08/07/23  1145   INR 3.2*       ASSESSMENT     Warfarin Lab Questionnaire    Warfarin Doses Last 7 Days      8/6/2023    11:35 AM   Dose in Tablet or Mg   TAB or MG? milligram (mg)     Pt Rptd Dose SUNDAY MONDAY TUESDAY WED THURS FRIDAY SATURDAY 8/6/2023  11:35 AM 5 7.5 5 7.5 5 7.5 7.5         8/6/2023   Warfarin Lab Questionnaire   Missed doses within past 14 days? No   Changes in diet or alcohol within past 14 days? No   Medication changes since last result? No   Injuries or illness since last result? No   New shortness of breath, severe headaches or sudden changes in vision since last result? No   Abnormal bleeding since last result? No   Upcoming surgery, procedure? No     Previous result: Supratherapeutic  Additional findings: None       PLAN     Recommended plan for ongoing change(s) affecting INR     Dosing Instructions: decrease your warfarin dose (5.6% change) with next INR in 1 week   (partial hold on 8/7/23)    Summary  As of 8/7/2023      Full warfarin instructions:  8/7: 5 mg; Otherwise 7.5 mg every Mon, Wed, Fri; 5 mg all other days   Next INR check:  8/21/2023               Detailed voice message left for Annabella with dosing instructions and follow up date.   Sent [employer] message with dosing and follow up instructions    Pt called back and reports that she has been having a lot of arthritic inflammation. Pt wants to start taking a hemp gummie that also has turmeric in it. Pt will start this week and recheck INR in 1 week on 8/14/23    Contact [phone]  to schedule and with any changes, questions or concerns.     Education provided:   Contact [phone]  with any changes, questions or concerns.     Plan made per [facility] anticoagulation protocol    Nellie Valentin RN  Anticoagulation  Clinic  8/7/2023    _______________________________________________________________________     Anticoagulation Episode Summary       Current INR goal:  2.0-3.0   TTR:  83.1 % (1 y)   Target end date:  Indefinite   Send INR reminders to:  [facility]    Indications    Long-term (current) use of anticoagulants [Z79.01] [Z79.01]  Aortic valve replaced [Z95.2]             Comments:               Anticoagulation Care Providers       Provider Role Specialty Phone number    Brook Echavarria MD Referring Internal Medicine [phone]    Karla Moss MD Referring Internal Medicine [phone]

## 2023-08-21 ENCOUNTER — ANTICOAGULATION THERAPY VISIT (OUTPATIENT)
Dept: ANTICOAGULATION | Facility: CLINIC | Age: 68
End: 2023-08-21

## 2023-08-21 ENCOUNTER — LAB (OUTPATIENT)
Dept: LAB | Facility: CLINIC | Age: 68
End: 2023-08-21
Payer: COMMERCIAL

## 2023-08-21 DIAGNOSIS — Z95.2 AORTIC VALVE REPLACED: ICD-10-CM

## 2023-08-21 DIAGNOSIS — Z79.01 LONG TERM CURRENT USE OF ANTICOAGULANT THERAPY: Primary | ICD-10-CM

## 2023-08-21 LAB — INR BLD: 2.2 (ref 0.9–1.1)

## 2023-08-21 PROCEDURE — 85610 PROTHROMBIN TIME: CPT

## 2023-08-21 PROCEDURE — 36416 COLLJ CAPILLARY BLOOD SPEC: CPT

## 2023-08-21 NOTE — PROGRESS NOTES
ANTICOAGULATION MANAGEMENT     Annabella Bolanos 67 year old female is on warfarin with therapeutic INR result. (Goal INR 2.0-3.0)    Recent labs: (last 7 days)     08/21/23  1136   INR 2.2*       ASSESSMENT     Source(s): Chart Review and Patient/Caregiver Call     Warfarin doses taken: Warfarin taken as instructed  Diet: No new diet changes identified  Medication/supplement changes: None noted, she elected NOT to start the CBD/turmeric/abraham supplement that was discussed with her last INR  New illness, injury, or hospitalization: No  Signs or symptoms of bleeding or clotting: No  Previous result: Supratherapeutic  Additional findings: None       PLAN     Recommended plan for no diet, medication or health factor changes affecting INR     Dosing Instructions: Continue your current warfarin dose with next INR in 3 weeks       Summary  As of 8/21/2023      Full warfarin instructions:  7.5 mg every Mon, Wed, Fri; 5 mg all other days   Next INR check:  9/11/2023               Telephone call with Annabella who verbalizes understanding and agrees to plan    Lab visit scheduled    Education provided:   Contact [phone]  with any changes, questions or concerns.     Plan made per [facility] anticoagulation protocol    Dior Bellamy RN  Anticoagulation Clinic  8/21/2023    _______________________________________________________________________     Anticoagulation Episode Summary       Current INR goal:  2.0-3.0   TTR:  82.4 % (1 y)   Target end date:  Indefinite   Send INR reminders to:  [facility]    Indications    Long-term (current) use of anticoagulants [Z79.01] [Z79.01]  Aortic valve replaced [Z95.2]             Comments:               Anticoagulation Care Providers       Provider Role Specialty Phone number    Brook Echavarria MD Referring Internal Medicine [phone]    Karla Moss MD Referring Internal Medicine [phone]

## 2023-09-13 ENCOUNTER — LAB (OUTPATIENT)
Dept: LAB | Facility: CLINIC | Age: 68
End: 2023-09-13
Payer: COMMERCIAL

## 2023-09-13 ENCOUNTER — ANTICOAGULATION THERAPY VISIT (OUTPATIENT)
Dept: ANTICOAGULATION | Facility: CLINIC | Age: 68
End: 2023-09-13

## 2023-09-13 ENCOUNTER — HOSPITAL ENCOUNTER (OUTPATIENT)
Dept: MAMMOGRAPHY | Facility: CLINIC | Age: 68
Discharge: HOME | End: 2023-09-13
Attending: PHYSICIAN ASSISTANT | Admitting: PHYSICIAN ASSISTANT
Payer: COMMERCIAL

## 2023-09-13 DIAGNOSIS — Z12.31 ENCOUNTER FOR SCREENING MAMMOGRAM FOR MALIGNANT NEOPLASM OF BREAST: ICD-10-CM

## 2023-09-13 DIAGNOSIS — Z79.01 LONG TERM CURRENT USE OF ANTICOAGULANT THERAPY: Primary | ICD-10-CM

## 2023-09-13 DIAGNOSIS — Z95.2 AORTIC VALVE REPLACED: ICD-10-CM

## 2023-09-13 LAB — INR BLD: 2.5 (ref 0.9–1.1)

## 2023-09-13 PROCEDURE — 85610 PROTHROMBIN TIME: CPT

## 2023-09-13 PROCEDURE — 36416 COLLJ CAPILLARY BLOOD SPEC: CPT

## 2023-09-13 PROCEDURE — 77067 SCR MAMMO BI INCL CAD: CPT

## 2023-09-13 NOTE — PROGRESS NOTES
ANTICOAGULATION MANAGEMENT     Annabella Bolanos 68 year old female is on warfarin with therapeutic INR result. (Goal INR 2.0-3.0)    Recent labs: (last 7 days)     09/13/23  1238   INR 2.5*       ASSESSMENT     Warfarin Lab Questionnaire    Warfarin Doses Last 7 Days      9/12/2023     4:24 PM   Dose in Tablet or Mg   TAB or MG? milligram (mg)     Pt Rptd Dose SUNDAY MONDAY TUESDAY WED THURS FRIDAY SATURDAY 9/12/2023   4:24 PM 5 7.5 5 7.5 5 7.5 5         9/12/2023   Warfarin Lab Questionnaire   Missed doses within past 14 days? No   Changes in diet or alcohol within past 14 days? No   Medication changes since last result? No   Injuries or illness since last result? No   New shortness of breath, severe headaches or sudden changes in vision since last result? No   Abnormal bleeding since last result? No   Upcoming surgery, procedure? Yes   Please explain, date scheduled? knee replacement surgery, Nov 13th   Best number to call with results? [phone]     Previous result: Therapeutic last visit; previously outside of goal range  Additional findings: Upcoming surgery/procedure knee replacement 11/13/23       PLAN     Recommended plan for no diet, medication or health factor changes affecting INR     Dosing Instructions: Continue your current warfarin dose with next INR in 4 weeks       Summary  As of 9/13/2023      Full warfarin instructions:  7.5 mg every Mon, Wed, Fri; 5 mg all other days   Next INR check:  10/11/2023               Telephone call with Annabella who verbalizes understanding and agrees to plan    Lab visit scheduled    Education provided:   Please call back if any changes to your diet, medications or how you've been taking warfarin  Contact [phone]  with any changes, questions or concerns.     Plan made per [facility] anticoagulation protocol    Deborah Aguilera RN  Anticoagulation Clinic  9/13/2023    _______________________________________________________________________     Anticoagulation Episode Summary        Current INR goal:  2.0-3.0   TTR:  82.4 % (1 y)   Target end date:  Indefinite   Send INR reminders to:  [facility]    Indications    Long-term (current) use of anticoagulants [Z79.01] [Z79.01]  Aortic valve replaced [Z95.2]             Comments:               Anticoagulation Care Providers       Provider Role Specialty Phone number    Brook Echavarria MD Referring Internal Medicine [phone]    Karla Moss MD Referring Internal Medicine [phone]

## 2023-09-20 ASSESSMENT — ENCOUNTER SYMPTOMS
FLANK PAIN: 0
ARTHRALGIAS: 1
MYALGIAS: 0
MUSCLE CRAMPS: 0
DYSURIA: 0
DIFFICULTY URINATING: 0
BACK PAIN: 1
JOINT SWELLING: 1
NECK PAIN: 0
HEMATURIA: 0
STIFFNESS: 1
MUSCLE WEAKNESS: 0

## 2023-09-21 ENCOUNTER — OFFICE VISIT (OUTPATIENT)
Dept: ENDOCRINOLOGY | Facility: CLINIC | Age: 68
End: 2023-09-21
Payer: COMMERCIAL

## 2023-09-21 VITALS
OXYGEN SATURATION: 96 % | TEMPERATURE: 99 F | DIASTOLIC BLOOD PRESSURE: 72 MMHG | WEIGHT: 114.4 LBS | SYSTOLIC BLOOD PRESSURE: 139 MMHG | BODY MASS INDEX: 20.27 KG/M2 | RESPIRATION RATE: 16 BRPM | HEIGHT: 63 IN | HEART RATE: 62 BPM

## 2023-09-21 DIAGNOSIS — E05.90 SUBCLINICAL HYPERTHYROIDISM: Primary | ICD-10-CM

## 2023-09-21 PROCEDURE — 99214 OFFICE O/P EST MOD 30 MIN: CPT | Performed by: INTERNAL MEDICINE

## 2023-09-21 RX ORDER — METHIMAZOLE 5 MG/1
TABLET ORAL
Qty: 45 TABLET | Refills: 3 | Status: SHIPPED | OUTPATIENT
Start: 2023-09-21 | End: 2024-09-03

## 2023-09-21 NOTE — LETTER
9/21/2023         RE: Annabella Bolanos  [address] [place] [address]        Dear Colleague,    Thank you for referring your patient, Annabella Bolanos, to the [facility]. Please see a copy of my visit note below.    Name: Annabella Bolanos  Seen for follow-up of subclinical hyperthyroidism.    HPI:  Annabella Bolanos is a 68 year old female who presents for the evaluation of subclinical Hyperthyroidism.  H/o breast cancer and h/o aortic valve replacement and is on long term anticoagulation.  Breast cancer diagnosed in 2014 s/p lumpectomy and chemo and radiation. Took radiation 5 days a week for 1 month.  DEXA 2018 scan showing osteopenia and appears stable.  She is not on medication for osteoporosis/osteopenia at this time.  She is also on aromatase inhibitor as a part of treatment for breast cancer.     Subclinical hyperthyroidism noted since 7/2016. Plan was for continue to monitor.  No h/o arrhythmia  No h/o osteoporosis-- has osteopenia based on DEXA 2020. On Fosamax.  TSI neg  US thyroid ( h/o radiation)- no discrete nodules.  As there was further decline in TSH along with risk for low bone density with aromatase inhibitor (with with prior history of osteopenia) as well as complex cardiac history she was started on methimazole 5 mg in 7/2018.    Currently taking methimazole 2.5 mg/day  X 5/2021- dose was increased at that time)  F/u labs 9/2022 in range.  LFT and CBC stable.    Since last clinic visit she was started on Fosamax by Dr. Felder for osteoporosis/osteopenia.  She is taking Fosamax on a weekly basis since February 2019.    Feeling good.  Has good energy.  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  H/o arthritis.  Weight is stable.  Eating healthy.  Having  joint pain with arthritis.    History of radiation exposure: YES. As above.  History of thyroid dysfunction: not to her knowledge. No FH of thyroid cancer.  Palpitations: No  Changes to hair or skin: No  Diarrhea/Constipation:No  Changes in menses:  s/p menopause  Changes in vision:No  Diplopia/Blurryness:No  Dysphagia or Shortness of breath:No  Tremors:No  Difficulty sleeping:Yes: most of the time  Changes in weight: No  Lithium/Amiodarone: No  URI: No  CT Scans:No  Mother had hyperthyroidism s/p DOE and was on levothyroxine.  Wt Readings from Last 2 Encounters:   23 51.9 kg (114 lb 6.4 oz)   23 51.3 kg (113 lb)     PMH/PSH:  Past Medical History:   Diagnosis Date     Adenomatous colon polyp 2015     Aortic stenosis 2011    bicuspid AV with severe stenosis - 2011 mechanical AVR with 23 mm St Yordan AV conduit, composite graft placement     Arthritis     knees and hands     Bicuspid aortic valve      Breast cancer (H) 2014    R breast     H/O aortic valve replacement     St Yordan     Heart murmur     Valve repalcement .     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    AAA repair with av23 mm tube graft     Palpitations      PONV (postoperative nausea and vomiting)     n/v morphine vs anesthesia, vomiting x24 hrs     Subclinical hyperthyroidism 2017     Thrombosis of leg     after  of daughter     Uncomplicated asthma      Past Surgical History:   Procedure Laterality Date     BIOPSY NODE SENTINEL Right 09/10/2014    Procedure: BIOPSY NODE SENTINEL;  Surgeon: Ila Romano MD;  Location: [facility] OR     CARDIAC SURGERY  2011    aortic valve replacement     ENT SURGERY      tonsillectomy     HRW PORT A CATH       INSERT PORT VASCULAR ACCESS Left 10/22/2014    Procedure: INSERT PORT VASCULAR ACCESS;  Surgeon: Ila Romano MD;  Location: [facility] OR     LUMPECTOMY BREAST WITH SEED LOCALIZATION Right 09/10/2014    Procedure: LUMPECTOMY BREAST WITH SEED LOCALIZATION;  Surgeon: Ila Romano MD;  Location: [facility] OR     ORTHOPEDIC SURGERY      shoulder, thumb surgery     REMOVE  "PORT VASCULAR ACCESS Left 04/08/2015    Procedure: REMOVE PORT VASCULAR ACCESS;  Surgeon: Ila Romano MD;  Location: [facility] OR     REPAIR ANEURYSM ASCENDING AORTA  06/23/2011    Procedure:REPAIR ANEURYSM ASCENDING AORTA; Medial Sternotomy, Aortic Valve Replacement, ([employer]. Y[employer] HP Valved Graft, size 23 mm) on pump oxygenation, intraoperative transesophageal cardiogram; Surgeon:JOHN PAUL ULLOA; Location:[facility] OR     REPLACE VALVE AORTIC  06/23/2011    bicuspid AV with severe stenosis - 6/2011 mechanical AVR with 23 mm St Yordan AV conduit, composite graft placement     Family Hx:  Family History   Problem Relation Age of Onset     Hypertension Mother      Thyroid Disease Mother         \"Toxic thyroid\"     Prostate Cancer Father 70     Cancer Father 80        Lung cancer, Not caused from smoking     Cerebrovascular Disease Father 80     Hypertension Father      Cardiovascular Brother         half brother      Cardiovascular Son         Puentes-Parkinsons White Syndrome     Cardiovascular Daughter         Heart murmur     Breast Cancer Paternal Aunt 80     Cardiovascular Paternal Aunt      Arthritis Brother 40        RA - half brother      Cancer Maternal Grandfather      Colon Cancer No family hx of      Thyroid disease: as above          Social Hx:  Social History     Socioeconomic History     Marital status:      Spouse name: Not on file     Number of children: Not on file     Years of education: Not on file     Highest education level: Not on file   Occupational History     Not on file   Tobacco Use     Smoking status: Never     Smokeless tobacco: Never   Vaping Use     Vaping Use: Never used   Substance and Sexual Activity     Alcohol use: Yes     Comment: 2 drinks per week -      Drug use: No     Sexual activity: Not Currently   Other Topics Concern     Parent/sibling w/ CABG, MI or angioplasty before 65F 55M? Not Asked      Service Not Asked     Blood Transfusions Not Asked " "    Caffeine Concern Yes     Comment: 1-2 cups caffeine per day     Occupational Exposure Not Asked     Hobby Hazards Not Asked     Sleep Concern No     Stress Concern No     Weight Concern No     Special Diet Yes     Comment: low fat daily , low red meats     Back Care No     Exercise Yes     Comment: walks daily/ 3x a week exercise class     Bike Helmet Not Asked     Seat Belt Not Asked     Self-Exams Not Asked   Social History Narrative     Not on file     Social Determinants of Health     Financial Resource Strain: Not on file   Food Insecurity: Not on file   Transportation Needs: Not on file   Physical Activity: Not on file   Stress: Not on file   Social Connections: Not on file   Interpersonal Safety: Not on file   Housing Stability: Not on file          MEDICATIONS:  has a current medication list which includes the following prescription(s): alendronate, anastrozole, aspirin, carvedilol, estradiol, lisinopril, methimazole, solifenacin, and warfarin anticoagulant, and the following Facility-Administered Medications: bupivacaine (pf), bupivacaine (pf), lidocaine, lidocaine, triamcinolone, and triamcinolone.    ROS   ROS: 10 point ROS neg other than the symptoms noted above in the HPI.    Physical Exam   VS: /72 (BP Location: Left arm, Patient Position: Chair, Cuff Size: Adult Regular)   Pulse 62   Temp 99  F (37.2  C) (Tympanic)   Resp 16   Ht 1.6 m (5' 2.99\")   Wt 51.9 kg (114 lb 6.4 oz)   LMP  (LMP Unknown)   SpO2 96%   Breastfeeding No   BMI 20.27 kg/m    GENERAL: healthy, alert and no distress  EYES: Eyes grossly normal to inspection, conjunctivae and sclerae normal  ENT: no nose swelling, nasal discharge.  Thyroid: no apparent thyroid nodules  RESP: no audible wheeze, cough, or visible cyanosis.  No visible retractions or increased work of breathing.  Able to speak fully in complete sentences.  ABDO: not evaluated.  EXTREMITIES: no hand tremors.  NEURO: Cranial nerves grossly intact, " mentation intact and speech normal  SKIN: No apparent skin lesions, rash or edema seen   PSYCH: mentation appears normal, affect normal/bright, judgement and insight intact, normal speech and appearance well-groomed      LABS:  TFTs:   Latest Ref Rng 7/24/2023  11:30 AM   ENDO THYROID LABS-[facility]     TSH 0.30 - 4.20 uIU/mL 0.86    Free T3 2.0 - 4.4 pg/mL    Triiodothyronine (T3) 85 - 202 ng/dL 94    FREE T4 0.90 - 1.70 ng/dL 1.49        CBC:  WBC   Date Value Ref Range Status   06/30/2021 6.0 4.0 - 11.0 10e9/L Final     WBC Count   Date Value Ref Range Status   08/19/2021 5.8 4.0 - 11.0 10e3/uL Final       LFTs:  Liver Function Studies - Recent Labs   Lab Test 08/19/21  0930   PROTTOTAL 6.8   ALBUMIN 3.6   BILITOTAL 0.7   ALKPHOS 58   AST 16   ALT 22       ULTRASOUND THYROID December 15, 2017 9:24 AM   HISTORY: Subclinical hyperthyroidism.   FINDINGS: Thyroid ultrasound demonstrates an enlarged thyroid gland. The right lobe measures 5.4 x 2.0 x 1.8 cm. The left lobe measures 3.8 x 1.6 x 1.3 cm. The isthmus is normal in thickness. Thyroid parenchyma is heterogeneous in echotexture. Increased color Doppler flow is noted throughout the thyroid gland. Thyroid nodules as follows: Right Lobe: None. Isthmus: None. Left Lobe: None.   IMPRESSION: Enlarged heterogeneous thyroid gland without evidence of a discrete thyroid nodule. Increased color Doppler flow suggests underlying thyroiditis. Correlate with thyroid function test and nuclear medicine thyroid scan as needed.    All pertinent notes, labs, and images personally reviewed by me.     A/P  Ms.Carol WINDY Bolanos is a 67 year old here for the evaluation of hyperthyroidism.    Subclinical hyperthyroidism (TSI neg):   TSH remained suppressed since 2016 and along with normal free T4  No h/o arrhythmia  No h/o osteoporosis-- has osteopenia. Not on treatment.  She is on aromatase inhibitor for breast cancer.  TSI neg  US thyroid ( h/o radiation)- no discrete nodules.  Clinically looks  euthyroid.  No major complaints.  As there is further decline in TSH along with risk for low bone density with aromatase inhibitor (with with prior history of osteopenia) was started on methimazole 5 mg 7/2018  Currently  taking methimazole to 2.5/day (5/20/2021)  F/u labs in range.  Plan:  Discussed diagnosis, pathophysiology, management and treatment options of condition with pt.  Discussed possible decreasing dose but at this time she would like to wait as she has upcoming surgery.  Based on 7/2023 labs recommend to continue methimazole 2.5 mg/day  Thyroid labs are in acceptable range.  Continue  continue methimazole 2.5 mg/day.  Labs in oct before surgery. (Has knee replacement in Nov)  If stable then labs and follow up in 7/2024.  Please make a lab appointment for blood work and follow up clinic appointment in 1 week after that to discuss results.      Discussed s/s of hypothyroidism and hyperthyroidism to watch for.  The patient indicates understanding of these issues and agrees with the plan.    Off note she is on anticoagulant and may need dose adjustment to get INR at target levels ( h/o aortic valve replacement)  Baseline LFT and WBC normal.  Discussed diagnosis, pathophysiology, management and treatment options of condition with pt.    Discussed indications, risks and benefits of all medications prescribed, and answered questions to patient's satisfaction.  The patient indicates understanding of these issues and agrees with the plan.    Discussed possible side effects of methimazole including liver dysfunction and agranulocytosis. Discussed to watch for s/s like jaundice, abdominal pain and skin rash. In case of prolonged unresolving fever, sore throat and infections, advise to seek medical care.    Call if:     Signs or symptoms of infection. These include a fever of 100.5 degrees or higher, chills, severe sore throat, ear or sinus pain, cough, increased sputum or change in color, painful urination,  mouth sores.   Severe nausea or vomiting.   Joint pain or swelling.   Not able to eat.   Unusual bruising or bleeding.   Dark urine or yellow skin or eyes.      Discussed s/s of hypothyroidism and hyperthyroidism to watch for.  The patient indicates understanding of these issues and agrees with the plan.    Follow-up:  1 year.    Merle Baxter MD  Endocrinology  [facility]  CC: Brook Echavarria        Addendum to above note and clinic visit:    Labs reviewed.    See result note/telephone encounter.    Answers submitted by the patient for this visit:  Symptoms you have experienced in the last 30 days (Submitted on 9/20/2023)  General Symptoms: No  Skin Symptoms: No  HENT Symptoms: No  EYE SYMPTOMS: No  HEART SYMPTOMS: No  LUNG SYMPTOMS: No  INTESTINAL SYMPTOMS: No  URINARY SYMPTOMS: Yes  GYNECOLOGIC SYMPTOMS: No  BREAST SYMPTOMS: No  SKELETAL SYMPTOMS: Yes  BLOOD SYMPTOMS: No  NERVOUS SYSTEM SYMPTOMS: No  MENTAL HEALTH SYMPTOMS: No  Please answer the questions below to tell us what condition you are experiencing: (Submitted on 9/20/2023)  Trouble holding urine or incontinence: Yes  Pain or burning: No  Trouble starting or stopping: No  Increased frequency of urination: No  Blood in urine: No  Decreased frequency of urination: No  Frequent nighttime urination: Yes  Flank pain: No  Difficulty emptying bladder: No  Please answer the questions below to tell us what condition you are experiencing: (Submitted on 9/20/2023)  Back pain: Yes  Muscle aches: No  Neck pain: No  Swollen joints: Yes  Joint pain: Yes  Bone pain: No  Muscle cramps: No  Muscle weakness: No  Joint stiffness: Yes  Bone fracture: No      Again, thank you for allowing me to participate in the care of your patient.        Sincerely,        Merle Baxter MD

## 2023-09-21 NOTE — PATIENT INSTRUCTIONS
[facility]  Dr Baxter, Endocrinology Department    [place] [address] [id number]  [place] [address]  Appointment [phone]  Fax: [phone]  [place]: Monday - Thursday      Thyroid labs are in acceptable range.  Continue  continue methimazole 2.5 mg/day.  Labs in oct before surgery.  If stable then labs and follow up in 2024.  Please make a lab appointment for blood work and follow up clinic appointment in 1 week after that to discuss results.    Discussed possible side effects of methimazole including liver dysfunction and agranulocytosis. Discussed to watch for s/s like jaundice, abdominal pain and skin rash. In case of prolonged unresolving fever, sore throat and infections, advise to seek medical care.    Call if:    Signs or symptoms of infection. These include a fever of 100.5 degrees or higher, chills, severe sore throat, ear or sinus pain, cough, increased sputum or change in color, painful urination, mouth sores.  Severe nausea or vomiting.  Joint pain or swelling.  Not able to eat.  Unusual bruising or bleeding.  Dark urine or yellow skin or eyes.

## 2023-09-21 NOTE — PROGRESS NOTES
Name: Annabella Bolanos  Seen for follow-up of subclinical hyperthyroidism.    HPI:  Annabella Bolanos is a 68 year old female who presents for the evaluation of subclinical Hyperthyroidism.  H/o breast cancer and h/o aortic valve replacement and is on long term anticoagulation.  Breast cancer diagnosed in 2014 s/p lumpectomy and chemo and radiation. Took radiation 5 days a week for 1 month.  DEXA 2018 scan showing osteopenia and appears stable.  She is not on medication for osteoporosis/osteopenia at this time.  She is also on aromatase inhibitor as a part of treatment for breast cancer.     Subclinical hyperthyroidism noted since 7/2016. Plan was for continue to monitor.  No h/o arrhythmia  No h/o osteoporosis-- has osteopenia based on DEXA 2020. On Fosamax.  TSI neg  US thyroid ( h/o radiation)- no discrete nodules.  As there was further decline in TSH along with risk for low bone density with aromatase inhibitor (with with prior history of osteopenia) as well as complex cardiac history she was started on methimazole 5 mg in 7/2018.    Currently taking methimazole 2.5 mg/day  X 5/2021- dose was increased at that time)  F/u labs 9/2022 in range.  LFT and CBC stable.    Since last clinic visit she was started on Fosamax by Dr. Felder for osteoporosis/osteopenia.  She is taking Fosamax on a weekly basis since February 2019.    Feeling good.  Has good energy.  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  H/o arthritis.  Weight is stable.  Eating healthy.  Having  joint pain with arthritis.    History of radiation exposure: YES. As above.  History of thyroid dysfunction: not to her knowledge. No FH of thyroid cancer.  Palpitations: No  Changes to hair or skin: No  Diarrhea/Constipation:No  Changes in menses: s/p menopause  Changes in vision:No  Diplopia/Blurryness:No  Dysphagia or Shortness of breath:No  Tremors:No  Difficulty sleeping:Yes: most of the time  Changes in weight: No  Lithium/Amiodarone: No  URI: No  CT Scans:No  Mother had hyperthyroidism s/p  DOE and was on levothyroxine.  Wt Readings from Last 2 Encounters:   23 51.9 kg (114 lb 6.4 oz)   23 51.3 kg (113 lb)     PMH/PSH:  Past Medical History:   Diagnosis Date    Adenomatous colon polyp 2015    Aortic stenosis 2011    bicuspid AV with severe stenosis - 2011 mechanical AVR with 23 mm St Yordan AV conduit, composite graft placement    Arthritis     knees and hands    Bicuspid aortic valve     Breast cancer (H) 2014    R breast    H/O aortic valve replacement     St Yordan    Heart murmur     Valve repalcement .    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    AAA repair with av23 mm tube graft    Palpitations     PONV (postoperative nausea and vomiting)     n/v morphine vs anesthesia, vomiting x24 hrs    Subclinical hyperthyroidism 2017    Thrombosis of leg     after  of daughter    Uncomplicated asthma      Past Surgical History:   Procedure Laterality Date    BIOPSY NODE SENTINEL Right 09/10/2014    Procedure: BIOPSY NODE SENTINEL;  Surgeon: Ila Romano MD;  Location: [facility]    CARDIAC SURGERY  2011    aortic valve replacement    ENT SURGERY      tonsillectomy    HRW PORT A CATH      INSERT PORT VASCULAR ACCESS Left 10/22/2014    Procedure: INSERT PORT VASCULAR ACCESS;  Surgeon: Ila Romano MD;  Location: [facility] OR    LUMPECTOMY BREAST WITH SEED LOCALIZATION Right 09/10/2014    Procedure: LUMPECTOMY BREAST WITH SEED LOCALIZATION;  Surgeon: Ila Romano MD;  Location: [facility]    ORTHOPEDIC SURGERY      shoulder, thumb surgery    REMOVE PORT VASCULAR ACCESS Left 2015    Procedure: REMOVE PORT VASCULAR ACCESS;  Surgeon: Ila Romano MD;  Location: [facility] OR    REPAIR ANEURYSM ASCENDING AORTA  2011    Procedure:REPAIR ANEURYSM ASCENDING AORTA; Medial Sternotomy, Aortic Valve Replacement,  "([employer]. Yordan Medical Masters HP Valved Graft, size 23 mm) on pump oxygenation, intraoperative transesophageal cardiogram; Surgeon:JOHN PAUL ULLOA; Location:[facility]    REPLACE VALVE AORTIC  06/23/2011    bicuspid AV with severe stenosis - 6/2011 mechanical AVR with 23 mm St Yordan AV conduit, composite graft placement     Family Hx:  Family History   Problem Relation Age of Onset    Hypertension Mother     Thyroid Disease Mother         \"Toxic thyroid\"    Prostate Cancer Father 70    Cancer Father 80        Lung cancer, Not caused from smoking    Cerebrovascular Disease Father 80    Hypertension Father     Cardiovascular Brother         half brother     Cardiovascular Son         Puentes-Parkinsons White Syndrome    Cardiovascular Daughter         Heart murmur    Breast Cancer Paternal Aunt 80    Cardiovascular Paternal Aunt     Arthritis Brother 40        RA - half brother     Cancer Maternal Grandfather     Colon Cancer No family hx of      Thyroid disease: as above          Social Hx:  Social History     Socioeconomic History    Marital status:      Spouse name: Not on file    Number of children: Not on file    Years of education: Not on file    Highest education level: Not on file   Occupational History    Not on file   Tobacco Use    Smoking status: Never    Smokeless tobacco: Never   Vaping Use    Vaping Use: Never used   Substance and Sexual Activity    Alcohol use: Yes     Comment: 2 drinks per week -     Drug use: No    Sexual activity: Not Currently   Other Topics Concern    Parent/sibling w/ CABG, MI or angioplasty before 65F 55M? Not Asked     Service Not Asked    Blood Transfusions Not Asked    Caffeine Concern Yes     Comment: 1-2 cups caffeine per day    Occupational Exposure Not Asked    Hobby Hazards Not Asked    Sleep Concern No    Stress Concern No    Weight Concern No    Special Diet Yes     Comment: low fat daily , low red meats    Back Care No    Exercise Yes     Comment: walks " "daily/ 3x a week exercise class    Bike Helmet Not Asked    Seat Belt Not Asked    Self-Exams Not Asked   Social History Narrative    Not on file     Social Determinants of Health     Financial Resource Strain: Not on file   Food Insecurity: Not on file   Transportation Needs: Not on file   Physical Activity: Not on file   Stress: Not on file   Social Connections: Not on file   Interpersonal Safety: Not on file   Housing Stability: Not on file          MEDICATIONS:  has a current medication list which includes the following prescription(s): alendronate, anastrozole, aspirin, carvedilol, estradiol, lisinopril, methimazole, solifenacin, and warfarin anticoagulant, and the following Facility-Administered Medications: bupivacaine (pf), bupivacaine (pf), lidocaine, lidocaine, triamcinolone, and triamcinolone.    ROS   ROS: 10 point ROS neg other than the symptoms noted above in the HPI.    Physical Exam   VS: /72 (BP Location: Left arm, Patient Position: Chair, Cuff Size: Adult Regular)   Pulse 62   Temp 99  F (37.2  C) (Tympanic)   Resp 16   Ht 1.6 m (5' 2.99\")   Wt 51.9 kg (114 lb 6.4 oz)   LMP  (LMP Unknown)   SpO2 96%   Breastfeeding No   BMI 20.27 kg/m    GENERAL: healthy, alert and no distress  EYES: Eyes grossly normal to inspection, conjunctivae and sclerae normal  ENT: no nose swelling, nasal discharge.  Thyroid: no apparent thyroid nodules  RESP: no audible wheeze, cough, or visible cyanosis.  No visible retractions or increased work of breathing.  Able to speak fully in complete sentences.  ABDO: not evaluated.  EXTREMITIES: no hand tremors.  NEURO: Cranial nerves grossly intact, mentation intact and speech normal  SKIN: No apparent skin lesions, rash or edema seen   PSYCH: mentation appears normal, affect normal/bright, judgement and insight intact, normal speech and appearance well-groomed      LABS:  TFTs:   Latest Ref Rng 7/24/2023  11:30 AM   ENDO THYROID LABS-[facility]     TSH 0.30 - 4.20 uIU/mL " 0.86    Free T3 2.0 - 4.4 pg/mL    Triiodothyronine (T3) 85 - 202 ng/dL 94    FREE T4 0.90 - 1.70 ng/dL 1.49        CBC:  WBC   Date Value Ref Range Status   06/30/2021 6.0 4.0 - 11.0 10e9/L Final     WBC Count   Date Value Ref Range Status   08/19/2021 5.8 4.0 - 11.0 10e3/uL Final       LFTs:  Liver Function Studies - Recent Labs   Lab Test 08/19/21  0930   PROTTOTAL 6.8   ALBUMIN 3.6   BILITOTAL 0.7   ALKPHOS 58   AST 16   ALT 22       ULTRASOUND THYROID December 15, 2017 9:24 AM   HISTORY: Subclinical hyperthyroidism.   FINDINGS: Thyroid ultrasound demonstrates an enlarged thyroid gland. The right lobe measures 5.4 x 2.0 x 1.8 cm. The left lobe measures 3.8 x 1.6 x 1.3 cm. The isthmus is normal in thickness. Thyroid parenchyma is heterogeneous in echotexture. Increased color Doppler flow is noted throughout the thyroid gland. Thyroid nodules as follows: Right Lobe: None. Isthmus: None. Left Lobe: None.   IMPRESSION: Enlarged heterogeneous thyroid gland without evidence of a discrete thyroid nodule. Increased color Doppler flow suggests underlying thyroiditis. Correlate with thyroid function test and nuclear medicine thyroid scan as needed.    All pertinent notes, labs, and images personally reviewed by me.     A/P  Ms.Carol WINDY Bolanos is a 67 year old here for the evaluation of hyperthyroidism.    Subclinical hyperthyroidism (TSI neg):   TSH remained suppressed since 2016 and along with normal free T4  No h/o arrhythmia  No h/o osteoporosis-- has osteopenia. Not on treatment.  She is on aromatase inhibitor for breast cancer.  TSI neg  US thyroid ( h/o radiation)- no discrete nodules.  Clinically looks euthyroid.  No major complaints.  As there is further decline in TSH along with risk for low bone density with aromatase inhibitor (with with prior history of osteopenia) was started on methimazole 5 mg 7/2018  Currently  taking methimazole to 2.5/day (5/20/2021)  F/u labs in range.  Plan:  Discussed diagnosis,  pathophysiology, management and treatment options of condition with pt.  Discussed possible decreasing dose but at this time she would like to wait as she has upcoming surgery.  Based on 7/2023 labs recommend to continue methimazole 2.5 mg/day  Thyroid labs are in acceptable range.  Continue  continue methimazole 2.5 mg/day.  Labs in oct before surgery. (Has knee replacement in Nov)  If stable then labs and follow up in 7/2024.  Please make a lab appointment for blood work and follow up clinic appointment in 1 week after that to discuss results.      Discussed s/s of hypothyroidism and hyperthyroidism to watch for.  The patient indicates understanding of these issues and agrees with the plan.    Off note she is on anticoagulant and may need dose adjustment to get INR at target levels ( h/o aortic valve replacement)  Baseline LFT and WBC normal.  Discussed diagnosis, pathophysiology, management and treatment options of condition with pt.    Discussed indications, risks and benefits of all medications prescribed, and answered questions to patient's satisfaction.  The patient indicates understanding of these issues and agrees with the plan.    Discussed possible side effects of methimazole including liver dysfunction and agranulocytosis. Discussed to watch for s/s like jaundice, abdominal pain and skin rash. In case of prolonged unresolving fever, sore throat and infections, advise to seek medical care.    Call if:    Signs or symptoms of infection. These include a fever of 100.5 degrees or higher, chills, severe sore throat, ear or sinus pain, cough, increased sputum or change in color, painful urination, mouth sores.  Severe nausea or vomiting.  Joint pain or swelling.  Not able to eat.  Unusual bruising or bleeding.  Dark urine or yellow skin or eyes.      Discussed s/s of hypothyroidism and hyperthyroidism to watch for.  The patient indicates understanding of these issues and agrees with the plan.    Follow-up:  1  year.    Merle Baxter MD  Endocrinology  [facility]  CC: Brook Echavarria        Addendum to above note and clinic visit:    Labs reviewed.    See result note/telephone encounter.    Answers submitted by the patient for this visit:  Symptoms you have experienced in the last 30 days (Submitted on 9/20/2023)  General Symptoms: No  Skin Symptoms: No  HENT Symptoms: No  EYE SYMPTOMS: No  HEART SYMPTOMS: No  LUNG SYMPTOMS: No  INTESTINAL SYMPTOMS: No  URINARY SYMPTOMS: Yes  GYNECOLOGIC SYMPTOMS: No  BREAST SYMPTOMS: No  SKELETAL SYMPTOMS: Yes  BLOOD SYMPTOMS: No  NERVOUS SYSTEM SYMPTOMS: No  MENTAL HEALTH SYMPTOMS: No  Please answer the questions below to tell us what condition you are experiencing: (Submitted on 9/20/2023)  Trouble holding urine or incontinence: Yes  Pain or burning: No  Trouble starting or stopping: No  Increased frequency of urination: No  Blood in urine: No  Decreased frequency of urination: No  Frequent nighttime urination: Yes  Flank pain: No  Difficulty emptying bladder: No  Please answer the questions below to tell us what condition you are experiencing: (Submitted on 9/20/2023)  Back pain: Yes  Muscle aches: No  Neck pain: No  Swollen joints: Yes  Joint pain: Yes  Bone pain: No  Muscle cramps: No  Muscle weakness: No  Joint stiffness: Yes  Bone fracture: No

## 2023-09-22 ENCOUNTER — OFFICE VISIT (OUTPATIENT)
Dept: PODIATRY | Facility: CLINIC | Age: 68
End: 2023-09-22
Payer: COMMERCIAL

## 2023-09-22 VITALS
HEIGHT: 63 IN | SYSTOLIC BLOOD PRESSURE: 148 MMHG | BODY MASS INDEX: 20.2 KG/M2 | WEIGHT: 114 LBS | DIASTOLIC BLOOD PRESSURE: 90 MMHG

## 2023-09-22 DIAGNOSIS — M79.675 TOE PAIN, BILATERAL: ICD-10-CM

## 2023-09-22 DIAGNOSIS — L60.0 INGROWING RIGHT GREAT TOENAIL: Primary | ICD-10-CM

## 2023-09-22 DIAGNOSIS — L60.8 NAIL DEFORMITY: ICD-10-CM

## 2023-09-22 DIAGNOSIS — L60.0 INGROWING LEFT GREAT TOENAIL: ICD-10-CM

## 2023-09-22 DIAGNOSIS — M79.674 TOE PAIN, BILATERAL: ICD-10-CM

## 2023-09-22 PROCEDURE — 99203 OFFICE O/P NEW LOW 30 MIN: CPT | Mod: 25 | Performed by: PODIATRIST

## 2023-09-22 PROCEDURE — 11750 EXCISION NAIL&NAIL MATRIX: CPT | Mod: TA | Performed by: PODIATRIST

## 2023-09-22 ASSESSMENT — PAIN SCALES - GENERAL: PAINLEVEL: MILD PAIN (2)

## 2023-09-22 NOTE — LETTER
"    9/22/2023         RE: Annabella Bolanos  [address] [place] [address]        Dear Colleague,    Thank you for referring your patient, Annabella Bolanos, to the [facility]. Please see a copy of my visit note below.    ASSESSMENT:  Encounter Diagnoses   Name Primary?     Ingrowing right great toenail, lateral edge Yes     Ingrowing left great toenail, lateral edge      Toe pain, bilateral      Nail deformity      MEDICAL DECISION MAKING:  Her pain is consistent with chronic ingrown toenail edges involving the lateral edge of the bilateral hallux.    Since this has gone on for years and causes her pain, I think inquiry about permanent removal is reasonable.  She is at risk for developing related infection.    Chemical Matrixectomy/ Permanent nail removal:    The chemical matrixectomy procedure was reviewed, including risks, benefits, and post-procedure cares.  The risk of discomfort and infection was discussed.  The chance of nail regrowth was discussed.  Verbal and written consent was obtained.  The site was marked and the \"Time Out\" called.      The base of the right  great toewas injected with 2 cc of  2% Lidocaine plain.  The toe was then prepped with betadine solution.  A tourniquet was applied around the base of the toe to for hemostasis.   Next the toe was checked for adequate anesthesia.  The lateral nail was freed from the nail bed and marginal soft tissue attachments with a small spatula. A longitudinal cut in the nail was made 2-3mm from the lateral skin fold via a nail splitter.  It was completed under the eponychium with a [place] blade.)  The nail edge was firmly grasped with a hemostat and removed in total.      Using small applicator sticks, three 30 second applications of phenol to the nail matrix were performed.  The area was diluted with a copious amount of isopropyl alcohol.  It was blotted dry.    Next the tourniquet was removed. Bacitracin ointment was " applied to the nail bed, followed by a compressive dressing.  Annabella Bolanos tolerated the procedure well. Post-procedure instruction handout was provided.    Procedure #2:      The same procedure performed on the right great toe was performed on the left great toe, without exception.      Disclaimer: This note consists of symbols derived from keyboarding, dictation and/or voice recognition software. As a result, there may be errors in the script that have gone undetected. Please consider this when interpreting information found in this chart.    Colin Velasco, RUDY, FACFAS, MS    [facility] Podiat[facility]/Foot & Ankle Surgery      ____________________________________________________________________    HPI:       Annabella presents today reporting having issues with her great toenails since 2014.  At that time she underwent chemotherapy for breast cancer.  She has dealt with pain along the lateral nail unit of the both great toes.  She reports that she has managed this herself with fairly aggressive trimming.  A friend of hers suggest edthat she see a podiatrist, that there is a permanent removal option.  She is on warfarin.    Past Medical History:   Diagnosis Date     Adenomatous colon polyp 08/2015     Aortic stenosis 06/23/2011    bicuspid AV with severe stenosis - 6/2011 mechanical AVR with 23 mm St Yordan AV conduit, composite graft placement     Arthritis     knees and hands     Bicuspid aortic valve      Breast cancer (H) 07/2014    R breast     H/O aortic valve replacement     St Yordan     Heart murmur     Valve repalcement 2011.     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    AAA repair with av23 mm tube graft     Palpitations      PONV (postoperative nausea and vomiting)     n/v morphine vs anesthesia, vomiting x24 hrs     Subclinical hyperthyroidism 12/06/2017     Thrombosis  of leg     after  of daughter     Uncomplicated asthma    *  *  Past Surgical History:   Procedure Laterality Date     BIOPSY NODE SENTINEL Right 09/10/2014    Procedure: BIOPSY NODE SENTINEL;  Surgeon: Ila Romano MD;  Location: [facility] OR     CARDIAC SURGERY  2011    aortic valve replacement     ENT SURGERY      tonsillectomy     HRW PORT A CATH       INSERT PORT VASCULAR ACCESS Left 10/22/2014    Procedure: INSERT PORT VASCULAR ACCESS;  Surgeon: Ila Romano MD;  Location: [facility] OR     LUMPECTOMY BREAST WITH SEED LOCALIZATION Right 09/10/2014    Procedure: LUMPECTOMY BREAST WITH SEED LOCALIZATION;  Surgeon: Ila Romano MD;  Location: [facility] OR     ORTHOPEDIC SURGERY      shoulder, thumb surgery     REMOVE PORT VASCULAR ACCESS Left 2015    Procedure: REMOVE PORT VASCULAR ACCESS;  Surgeon: Ila Romano MD;  Location: [facility] OR     REPAIR ANEURYSM ASCENDING AORTA  2011    Procedure:REPAIR ANEURYSM ASCENDING AORTA; Medial Sternotomy, Aortic Valve Replacement, (St. Yordan Medical Masters HP Valved Graft, size 23 mm) on pump oxygenation, intraoperative transesophageal cardiogram; Surgeon:JOHN PAUL ULLOA; Location[facility]     REPLACE VALVE AORTIC  2011    bicuspid AV with severe stenosis - 2011 mechanical AVR with 23 mm St Yordan AV conduit, composite graft placement   *  *  Current Outpatient Medications   Medication Sig Dispense Refill     alendronate (FOSAMAX) 70 MG tablet Take 70 mg by mouth every 7 days       anastrozole (ARIMIDEX) 1 MG tablet Take by mouth daily 30 tablet      ASPIRIN EC PO Take 81 mg by mouth daily       carvedilol (COREG) 12.5 MG tablet Take 1 tablet (12.5 mg) by mouth 2 times daily (with meals) 180 tablet 3     estradiol (ESTRACE) 0.1 MG/GM vaginal cream Apply blueberry sized amount to finger and rub inside vagina 3x/week at night. 40 g 3     lisinopril (ZESTRIL) 10 MG tablet Take 1 tablet (10 mg) by mouth 2 times daily 180 tablet 3  "    methimazole (TAPAZOLE) 5 MG tablet TAKE ONE-HALF (1/2) TABLET DAILY 45 tablet 3     solifenacin (VESICARE) 10 MG tablet Take 1 tablet (10 mg) by mouth daily 90 tablet 0     warfarin ANTICOAGULANT (COUMADIN) 5 MG tablet Take 5mg every Sun,Tues & Thur / Take 7.5mg all other days OR per INR clinic 110 tablet 1         EXAM:    Vitals: BP (!) 148/90   Ht 1.6 m (5' 3\")   Wt 51.7 kg (114 lb)   LMP  (LMP Unknown)   BMI 20.19 kg/m    BMI: Body mass index is 20.19 kg/m .    Constitutional:  Annabella Bolanos is in no apparent distress, appears well-nourished.  Cooperative with history and physical exam.    Vascular:  Pedal pulses are palpable for both the DP and PT arteries.  CFT < 3 sec.  No edema.      Derm: The bilateral hallux nail plate is incurvated.  Mild tenderness on palpation to the lateral skin folds.  No associated wound, erythema or edema.    Musculoskeletal:    Lower extremity muscle strength is normal.  Mild hallux abduction bilaterally and digital contractures of the lesser digits.  Flexible.  Lower medial longitudinal arch.        Again, thank you for allowing me to participate in the care of your patient.        Sincerely,        Colin Velasoc, RUDY  "

## 2023-09-22 NOTE — PATIENT INSTRUCTIONS
Thank you for choosing [facility] Podiatry / Foot & Ankle Surgery!    DR. CARLSON'S CLINIC LOCATIONS:     [facility] TRIAGE LINE: [phone]   [address] APPOINTMENTS: [phone]   [facility] [place] [address] RADIOLOGY: [phone]   (Every other Tues - Wed - Fri PM) SET UP SURGERY: [phone]    PHYSICAL THERAPY: [phone]   [facility] BILLING QUESTIONS: [phone] [address] FAX: [phone]   [place] [address]    (Thurs & Fri AM)       POST-PERMANENT NAIL REMOVAL  1. Over-the-counter pain medication (tylenol / ibuprofen), elevating your foot, and ice application to the top of the foot is all that you will need for pain control.    2. Some bleeding is normal. If bleeding seems excessive to you, place ice on top of your foot for 15-20 minutes and elevate your foot above heart level.   3. Keep bandage on until this evening or tomorrow morning. If the bandage falls off or if it feels too tight , start the soaking process below.  4. Soaking instructions:   a. Soak your foot twice a day for 15 minutes in a mild solution of warm water and soap for a minimum of 2-4 weeks. If your toe is still draining at 4 weeks, continue with soaking.    b. It s okay to soak your foot for a few minutes to loosen the bandage applied at your appointment.   c. After soaking, use a Q-tip to clean under and around the skin where the nail was removed. This helps get rid of the brownish material and helps the wound drain.    d. Blot your toe dry and apply a band-aid.  5. It is normal to experience some discomfort and redness around the nail for 1-2 weeks following the procedure. Drainage will likely appear brownish and thick at times. This is normal. It might drain for up to 2 months.  7. Initial discomfort might last 2-3 days. You may resume with regular activities at that time, as long as you keep the wound clean and follow the soaking instructions. It is recommended that you do not enter  a public swimming pool or hot tub while your toe is draining.   8. After 2-3 days, if you are experiencing worsening pain and redness, or notice pus, please contact the clinic. Ask to speak with a triage nurse and they will inform our team of your symptoms and we can advise if a follow up is needed.

## 2023-09-22 NOTE — PROGRESS NOTES
"ASSESSMENT:  Encounter Diagnoses   Name Primary?    Ingrowing right great toenail, lateral edge Yes    Ingrowing left great toenail, lateral edge     Toe pain, bilateral     Nail deformity      MEDICAL DECISION MAKING:  Her pain is consistent with chronic ingrown toenail edges involving the lateral edge of the bilateral hallux.    Since this has gone on for years and causes her pain, I think inquiry about permanent removal is reasonable.  She is at risk for developing related infection.    Chemical Matrixectomy/ Permanent nail removal:    The chemical matrixectomy procedure was reviewed, including risks, benefits, and post-procedure cares.  The risk of discomfort and infection was discussed.  The chance of nail regrowth was discussed.  Verbal and written consent was obtained.  The site was marked and the \"Time Out\" called.      The base of the right  great toewas injected with 2 cc of  2% Lidocaine plain.  The toe was then prepped with betadine solution.  A tourniquet was applied around the base of the toe to for hemostasis.   Next the toe was checked for adequate anesthesia.  The lateral nail was freed from the nail bed and marginal soft tissue attachments with a small spatula. A longitudinal cut in the nail was made 2-3mm from the lateral skin fold via a nail splitter.  It was completed under the eponychium with a [place] blade.)  The nail edge was firmly grasped with a hemostat and removed in total.      Using small applicator sticks, three 30 second applications of phenol to the nail matrix were performed.  The area was diluted with a copious amount of isopropyl alcohol.  It was blotted dry.    Next the tourniquet was removed. Bacitracin ointment was applied to the nail bed, followed by a compressive dressing.  Annabella Bolanos tolerated the procedure well. Post-procedure instruction handout was provided.    Procedure #2:      The same procedure performed on the right great toe was performed on the left great toe, " without exception.      Disclaimer: This note consists of symbols derived from keyboarding, dictation and/or voice recognition software. As a result, there may be errors in the script that have gone undetected. Please consider this when interpreting information found in this chart.    Colin Velasco DPM, FACFAS, MS    [facility]      ____________________________________________________________________    HPI:       Annabella presents today reporting having issues with her great toenails since .  At that time she underwent chemotherapy for breast cancer.  She has dealt with pain along the lateral nail unit of the both great toes.  She reports that she has managed this herself with fairly aggressive trimming.  A friend of hers suggest lizandrothat she see a podiatrist, that there is a permanent removal option.  She is on warfarin.    Past Medical History:   Diagnosis Date    Adenomatous colon polyp 2015    Aortic stenosis 2011    bicuspid AV with severe stenosis - 2011 mechanical AVR with 23 mm [employer] AV conduit, composite graft placement    Arthritis     knees and hands    Bicuspid aortic valve     Breast cancer (H) 2014    R breast    H/O aortic valve replacement     [employer]    Heart murmur     Valve repalcement .    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    AAA repair with av23 mm tube graft    Palpitations     PONV (postoperative nausea and vomiting)     n/v morphine vs anesthesia, vomiting x24 hrs    Subclinical hyperthyroidism 2017    Thrombosis of leg     after  of daughter    Uncomplicated asthma    *  *  Past Surgical History:   Procedure Laterality Date    BIOPSY NODE SENTINEL Right 09/10/2014    Procedure: BIOPSY NODE SENTINEL;  Surgeon: Ila Romano MD;  Location: [facility]    CARDIAC SURGERY   06/01/2011    aortic valve replacement    ENT SURGERY      tonsillectomy    HRW PORT A CATH      INSERT PORT VASCULAR ACCESS Left 10/22/2014    Procedure: INSERT PORT VASCULAR ACCESS;  Surgeon: Ila Romano MD;  Location: [facility]    LUMPECTOMY BREAST WITH SEED LOCALIZATION Right 09/10/2014    Procedure: LUMPECTOMY BREAST WITH SEED LOCALIZATION;  Surgeon: Ila Romano MD;  Location: [facility]    ORTHOPEDIC SURGERY      shoulder, thumb surgery    REMOVE PORT VASCULAR ACCESS Left 04/08/2015    Procedure: REMOVE PORT VASCULAR ACCESS;  Surgeon: Ila Romano MD;  Location: [facility]    REPAIR ANEURYSM ASCENDING AORTA  06/23/2011    Procedure:REPAIR ANEURYSM ASCENDING AORTA; Medial Sternotomy, Aortic Valve Replacement, ([employer] Masters HP Valved Graft, size 23 mm) on pump oxygenation, intraoperative transesophageal cardiogram; Surgeon:JOHN PAUL ULLOA; Location:[facility]    REPLACE VALVE AORTIC  06/23/2011    bicuspid AV with severe stenosis - 6/2011 mechanical AVR with 23 mm [employer] AV conduit, composite graft placement   *  *  Current Outpatient Medications   Medication Sig Dispense Refill    alendronate (FOSAMAX) 70 MG tablet Take 70 mg by mouth every 7 days      anastrozole (ARIMIDEX) 1 MG tablet Take by mouth daily 30 tablet     ASPIRIN EC PO Take 81 mg by mouth daily      carvedilol (COREG) 12.5 MG tablet Take 1 tablet (12.5 mg) by mouth 2 times daily (with meals) 180 tablet 3    estradiol (ESTRACE) 0.1 MG/GM vaginal cream Apply blueberry sized amount to finger and rub inside vagina 3x/week at night. 40 g 3    lisinopril (ZESTRIL) 10 MG tablet Take 1 tablet (10 mg) by mouth 2 times daily 180 tablet 3    methimazole (TAPAZOLE) 5 MG tablet TAKE ONE-HALF (1/2) TABLET DAILY 45 tablet 3    solifenacin (VESICARE) 10 MG tablet Take 1 tablet (10 mg) by mouth daily 90 tablet 0    warfarin ANTICOAGULANT (COUMADIN) 5 MG tablet Take 5mg every Sun,Tues & Thur / Take 7.5mg all other days OR per  "INR clinic 110 tablet 1         EXAM:    Vitals: BP (!) 148/90   Ht 1.6 m (5' 3\")   Wt 51.7 kg (114 lb)   LMP  (LMP Unknown)   BMI 20.19 kg/m    BMI: Body mass index is 20.19 kg/m .    Constitutional:  Annabella Bolanos is in no apparent distress, appears well-nourished.  Cooperative with history and physical exam.    Vascular:  Pedal pulses are palpable for both the DP and PT arteries.  CFT < 3 sec.  No edema.      Derm: The bilateral hallux nail plate is incurvated.  Mild tenderness on palpation to the lateral skin folds.  No associated wound, erythema or edema.    Musculoskeletal:    Lower extremity muscle strength is normal.  Mild hallux abduction bilaterally and digital contractures of the lesser digits.  Flexible.  Lower medial longitudinal arch.      "

## 2023-09-28 ENCOUNTER — TELEPHONE (OUTPATIENT)
Dept: UROLOGY | Facility: CLINIC | Age: 68
End: 2023-09-28

## 2023-09-28 ENCOUNTER — OFFICE VISIT (OUTPATIENT)
Dept: UROLOGY | Facility: CLINIC | Age: 68
End: 2023-09-28
Payer: COMMERCIAL

## 2023-09-28 VITALS
SYSTOLIC BLOOD PRESSURE: 110 MMHG | DIASTOLIC BLOOD PRESSURE: 64 MMHG | BODY MASS INDEX: 20.2 KG/M2 | WEIGHT: 114 LBS | HEART RATE: 64 BPM | HEIGHT: 63 IN

## 2023-09-28 DIAGNOSIS — N81.6 RECTOCELE: ICD-10-CM

## 2023-09-28 DIAGNOSIS — N95.2 VAGINAL ATROPHY: ICD-10-CM

## 2023-09-28 DIAGNOSIS — N81.11 CYSTOCELE, MIDLINE: ICD-10-CM

## 2023-09-28 DIAGNOSIS — R35.81 NOCTURNAL POLYURIA: ICD-10-CM

## 2023-09-28 DIAGNOSIS — N39.41 URGE INCONTINENCE: Primary | ICD-10-CM

## 2023-09-28 DIAGNOSIS — R39.15 URINARY URGENCY: ICD-10-CM

## 2023-09-28 DIAGNOSIS — R35.1 NOCTURIA: ICD-10-CM

## 2023-09-28 DIAGNOSIS — Z85.3 PERSONAL HISTORY OF MALIGNANT NEOPLASM OF BREAST: ICD-10-CM

## 2023-09-28 LAB — PR INTERVAL - MUSE: 39

## 2023-09-28 PROCEDURE — 51798 US URINE CAPACITY MEASURE: CPT | Performed by: PHYSICIAN ASSISTANT

## 2023-09-28 PROCEDURE — 99214 OFFICE O/P EST MOD 30 MIN: CPT | Mod: 25 | Performed by: PHYSICIAN ASSISTANT

## 2023-09-28 RX ORDER — MIRABEGRON 25 MG/1
25 TABLET, FILM COATED, EXTENDED RELEASE ORAL DAILY
Qty: 90 TABLET | Refills: 3 | Status: SHIPPED | OUTPATIENT
Start: 2023-09-28 | End: 2023-10-10

## 2023-09-28 ASSESSMENT — PAIN SCALES - GENERAL: PAINLEVEL: SEVERE PAIN (6)

## 2023-09-28 NOTE — PROGRESS NOTES
Urology Clinic      Name: Annabella Bolanos    MRN: [id number]   YOB: 1955  Accompanied at today's visit by:self                 Chief Complaint:   UUI          History of Present Illness:   September 28, 2023    HISTORY:   We have been following 68 year old Annabella Bolanos for urinary urgency/frequency, UUI, grade 2 cystocele, grade 2 rectocele, vaginal atrophy. Her hx is complicated due to breast cancer; currently on Anastrozole without recurrence. Has failed oxybutynin (dry mouth). Last seen on 6/22/23 and symptoms not improved with increased dose of vesicare to 10mg daily. Recommended clearing myrbetriq with oncology team and stopping vesicare. Had both myrbetriq and estrogen cream cleared by oncology. Unfortunately the myrbetriq was too costly so switched back to vesicare. Sates her symptom have improved, but continues to have UUI and nocturia. Have discussed third line options with patient, but not interested in this at this time as planning to have knee surgery in November. Of note, has completed a bladder diary in the past suggestive of nocturnal polyuria. Has not been interested in sleep study in past encounters. Went to PFPT and does not think this has helped her symptoms some. Bowels regular. Not bothered by prolapse. Of note, her PVR was 131mL at last encounter. PMH is significant for, replacement of aortic valve and on chronic warfarin. Of note, patient's PMH reports asthma, however patient states she has never been diagnosed with asthma only sports induced asthma in the past; advised to further discuss with PCP. Patient voices no other concerns at this time.          Allergies:     Allergies   Allergen Reactions    Morphine Sulfate Nausea and Vomiting    Anesthesia S-I-40 [Propofol]             Medications:     Current Outpatient Medications   Medication Sig    alendronate (FOSAMAX) 70 MG tablet Take 70 mg by mouth every 7 days    anastrozole (ARIMIDEX) 1 MG tablet Take by mouth daily    ASPIRIN  EC PO Take 81 mg by mouth daily    carvedilol (COREG) 12.5 MG tablet Take 1 tablet (12.5 mg) by mouth 2 times daily (with meals)    estradiol (ESTRACE) 0.1 MG/GM vaginal cream Apply blueberry sized amount to finger and rub inside vagina 3x/week at night.    lisinopril (ZESTRIL) 10 MG tablet Take 1 tablet (10 mg) by mouth 2 times daily    methimazole (TAPAZOLE) 5 MG tablet TAKE ONE-HALF (1/2) TABLET DAILY    mirabegron (MYRBETRIQ) 25 MG 24 hr tablet Take 1 tablet (25 mg) by mouth daily    solifenacin (VESICARE) 10 MG tablet Take 1 tablet (10 mg) by mouth daily    warfarin ANTICOAGULANT (COUMADIN) 5 MG tablet Take 5mg every Sun,Tues & Thur / Take 7.5mg all other days OR per INR clinic     Current Facility-Administered Medications   Medication    3 mL bupivacaine (MARCAINE) preservative free injection 0.5% (20 mL vial)    3 mL bupivacaine (MARCAINE) preservative free injection 0.5% (20 mL vial)    lidocaine 1 % injection 3 mL    lidocaine 1 % injection 3 mL    triamcinolone (KENALOG-40) injection 40 mg    triamcinolone (KENALOG-40) injection 40 mg               Past  Surgical History:     Past Surgical History:   Procedure Laterality Date    BIOPSY NODE SENTINEL Right 09/10/2014    Procedure: BIOPSY NODE SENTINEL;  Surgeon: Ila Romano MD;  Location: [facility]    CARDIAC SURGERY  06/01/2011    aortic valve replacement    ENT SURGERY      tonsillectomy    HRW PORT A CATH      INSERT PORT VASCULAR ACCESS Left 10/22/2014    Procedure: INSERT PORT VASCULAR ACCESS;  Surgeon: Ila Romano MD;  [facility]: [facility]    LUMPECTOMY BREAST WITH SEED LOCALIZATION Right 09/10/2014    Procedure: LUMPECTOMY BREAST WITH SEED LOCALIZATION;  Surgeon: Ila Romano MD;  Location: [facility]    ORTHOPEDIC SURGERY      shoulder, thumb surgery    REMOVE PORT VASCULAR ACCESS Left 04/08/2015    Procedure: REMOVE PORT VASCULAR ACCESS;  Surgeon: Ila Romano MD;  Location: [facility]    REPAIR ANEURYSM ASCENDING AORTA  " 06/23/2011    Procedure:REPAIR ANEURYSM ASCENDING AORTA; Medial Sternotomy, Aortic Valve Replacement, ([employer] Medical Masters HP Valved Graft, size 23 mm) on pump oxygenation, intraoperative transesophageal cardiogram; Surgeon:JOHN PAUL ULLOA; [facility]    REPLACE VALVE AORTIC  06/23/2011    bicuspid AV with severe stenosis - 6/2011 mechanical AVR with 23 mm [employer] AV conduit, composite graft placement             Physical Exam:     Vitals:    09/28/23 1305   BP: 110/64   Pulse: 64   Weight: 51.7 kg (114 lb)   Height: 1.6 m (5' 3\")     PSYCH: NAD  EYES: EOMI  NEURO: AAO x3    LABS:   PVR 39mL    Creatinine   Date Value Ref Range Status   10/21/2022 0.64 0.52 - 1.04 mg/dL Final   08/18/2020 0.65 0.52 - 1.04 mg/dL Final            Assessment and Plan:   68 year old is a pleasant female who has urinary urgency/frequency, UUI, grade 2 cystocele, grade 2 rectocele, vaginal atrophy. Her hx is complicated due to breast cancer; currently on Anastrozole without recurrence.     Plan:  - PVR WNL.  - Myrbetriq was expensive and not much improvement with vesicare. Will try to get PA on myrbetriq.   - Discussed third line options at length if fails myrbetriq. Patient may consider PTNS.   - Discussed at length nocturnal polyuria, nocturia associated with sleep apnea. Patient agrees to sleep study referral.    - Follow-up in 3 months.   - Continue estrogen cream per oncology recommendations. Likely may need to discontinue once off anastrozole.   - Follow-up in 1/2023 to give her time to heal from knee surgery.  - Consider cysto if fails myrbetriq.   - After discussing the assessment and plan with patient, patient verbalizes understanding and agrees to the above plan. All questions answered.     Other orders as below:  Orders Placed This Encounter   Procedures    MEASURE POST-VOID RESIDUAL URINE/BLADDER CAPACITY, US NON-IMAGING (00477)    Adult Sleep Eval & Management Referral       32 minutes spent on the date of " the encounter doing chart review, review of outside records, review of test results, interpretation of tests, patient visit and documentation.      Laura Washington PA-C  Urology  September 28, 2023      Patient Care Team:  Karla Moss MD as PCP - General (Internal Medicine)  Merle Baxter MD as Assigned Endocrinology Provider  Laura Washington PA-C as Physician Assistant  Karla Moss MD as Assigned PCP  Colin Miles MD as Assigned Musculoskeletal Provider

## 2023-09-28 NOTE — LETTER
9/28/2023       RE: Annabella Bolanos  [address] [place] [address]     Dear Colleague,    Thank you for referring your patient, Annabella Bolanos, to the [facility] at [facility]. Please see a copy of my visit note below.    Urology Clinic      Name: Annabella Bolanos    MRN: [id number]   YOB: 1955  Accompanied at today's visit by:self                 Chief Complaint:   UUI          History of Present Illness:   September 28, 2023    HISTORY:   We have been following 68 year old Annabella Bolanos for urinary urgency/frequency, UUI, grade 2 cystocele, grade 2 rectocele, vaginal atrophy. Her hx is complicated due to breast cancer; currently on Anastrozole without recurrence. Has failed oxybutynin (dry mouth). Last seen on 6/22/23 and symptoms not improved with increased dose of vesicare to 10mg daily. Recommended clearing myrbetriq with oncology team and stopping vesicare. Had both myrbetriq and estrogen cream cleared by oncology. Unfortunately the myrbetriq was too costly so switched back to vesicare. Sates her symptom have improved, but continues to have UUI and nocturia. Have discussed third line options with patient, but not interested in this at this time as planning to have knee surgery in November. Of note, has completed a bladder diary in the past suggestive of nocturnal polyuria. Has not been interested in sleep study in past encounters. Went to PFPT and does not think this has helped her symptoms some. Bowels regular. Not bothered by prolapse. Of note, her PVR was 131mL at last encounter. PMH is significant for asthma, replacement of aortic valve and on chronic warfarin. Patient voices no other concerns at this time.          Allergies:     Allergies   Allergen Reactions    Morphine Sulfate Nausea and Vomiting    Anesthesia S-I-40 [Propofol]             Medications:     Current Outpatient Medications   Medication Sig     alendronate (FOSAMAX) 70 MG tablet Take 70 mg by mouth every 7 days    anastrozole (ARIMIDEX) 1 MG tablet Take by mouth daily    ASPIRIN EC PO Take 81 mg by mouth daily    carvedilol (COREG) 12.5 MG tablet Take 1 tablet (12.5 mg) by mouth 2 times daily (with meals)    estradiol (ESTRACE) 0.1 MG/GM vaginal cream Apply blueberry sized amount to finger and rub inside vagina 3x/week at night.    lisinopril (ZESTRIL) 10 MG tablet Take 1 tablet (10 mg) by mouth 2 times daily    methimazole (TAPAZOLE) 5 MG tablet TAKE ONE-HALF (1/2) TABLET DAILY    mirabegron (MYRBETRIQ) 25 MG 24 hr tablet Take 1 tablet (25 mg) by mouth daily    solifenacin (VESICARE) 10 MG tablet Take 1 tablet (10 mg) by mouth daily    warfarin ANTICOAGULANT (COUMADIN) 5 MG tablet Take 5mg every Sun,Tues & Thur / Take 7.5mg all other days OR per INR clinic     Current Facility-Administered Medications   Medication    3 mL bupivacaine (MARCAINE) preservative free injection 0.5% (20 mL vial)    3 mL bupivacaine (MARCAINE) preservative free injection 0.5% (20 mL vial)    lidocaine 1 % injection 3 mL    lidocaine 1 % injection 3 mL    triamcinolone (KENALOG-40) injection 40 mg    triamcinolone (KENALOG-40) injection 40 mg               Past  Surgical History:     Past Surgical History:   Procedure Laterality Date    BIOPSY NODE SENTINEL Right 09/10/2014    Procedure: BIOPSY NODE SENTINEL;  Surgeon: Ila Romano MD;  Location: [facility]    CARDIAC SURGERY  06/01/2011    aortic valve replacement    ENT SURGERY      tonsillectomy    HRW PORT A CATH      INSERT PORT VASCULAR ACCESS Left 10/22/2014    Procedure: INSERT PORT VASCULAR ACCESS;  Surgeon: Ila Romano MD;  Location: [facility]    LUMPECTOMY BREAST WITH SEED LOCALIZATION Right 09/10/2014    Procedure: LUMPECTOMY BREAST WITH SEED LOCALIZATION;  Surgeon: Ila Romano MD;  Location: [facility]    ORTHOPEDIC SURGERY      shoulder, thumb surgery    REMOVE PORT VASCULAR ACCESS Left  "04/08/2015    Procedure: REMOVE PORT VASCULAR ACCESS;  Surgeon: Ila Romano MD;  Location: [facility]    REPAIR ANEURYSM ASCENDING AORTA  06/23/2011    Procedure:REPAIR ANEURYSM ASCENDING AORTA; Medial Sternotomy, Aortic Valve Replacement, ([employer] Medical Masters HP Valved Graft, size 23 mm) on pump oxygenation, intraoperative transesophageal cardiogram; Surgeon:JOHN PAUL ULLOA; Location[facility]    REPLACE VALVE AORTIC  06/23/2011    bicuspid AV with severe stenosis - 6/2011 mechanical AVR with 23 mm [employer] AV conduit, composite graft placement             Physical Exam:     Vitals:    09/28/23 1305   BP: 110/64   Pulse: 64   Weight: 51.7 kg (114 lb)   Height: 1.6 m (5' 3\")     PSYCH: NAD  EYES: EOMI  NEURO: AAO x3    LABS:   PVR 39mL    Creatinine   Date Value Ref Range Status   10/21/2022 0.64 0.52 - 1.04 mg/dL Final   08/18/2020 0.65 0.52 - 1.04 mg/dL Final            Assessment and Plan:   68 year old is a pleasant female who has urinary urgency/frequency, UUI, grade 2 cystocele, grade 2 rectocele, vaginal atrophy. Her hx is complicated due to breast cancer; currently on Anastrozole without recurrence.     Plan:  - PVR WNL.  - Myrbetriq was expensive and not much improvement with vesicare. Will try to get PA on myrbetriq.   - Discussed third line options at length if fails myrbetriq. Patient may consider PTNS.   - Discussed at length nocturnal polyuria, nocturia associated with sleep apnea. Patient agrees to sleep study referral.    - Follow-up in 3 months.   - Continue estrogen cream per oncology recommendations. Likely may need to discontinue once off anastrozole.   - Follow-up in 1/2023 to give her time to heal from knee surgery.  - Consider cysto if fails myrbetriq.   - After discussing the assessment and plan with patient, patient verbalizes understanding and agrees to the above plan. All questions answered.     Other orders as below:  Orders Placed This Encounter   Procedures    MEASURE " POST-VOID RESIDUAL URINE/BLADDER CAPACITY, US NON-IMAGING (19878)    Adult Sleep Eval & Management Referral       32 minutes spent on the date of the encounter doing chart review, review of outside records, review of test results, interpretation of tests, patient visit and documentation.      Laura Washington PA-C  Urology  September 28, 2023      Patient Care Team:  Karla Moss MD as PCP - General (Internal Medicine)  Merle Baxter MD as Assigned Endocrinology Provider  Laura Washington PA-C as Physician Assistant  Karla Moss MD as Assigned PCP  Colin Miles MD as Assigned Musculoskeletal Provider

## 2023-09-28 NOTE — PATIENT INSTRUCTIONS
Referred to sleep study team, they will call you to scheduled this.    Continue vesicare for now. Will try and get a prior auth on your myrbetriq.     Follow-up in 1/2024.

## 2023-09-28 NOTE — TELEPHONE ENCOUNTER
----- Message from Luara Washington PA-C sent at 9/28/2023  1:23 PM CDT -----  Myrbetriq was expensive ($400) can we get a PA on medication. Has failed vesicare and oxybutynin due to side effects and not effective.     agnes

## 2023-09-29 NOTE — TELEPHONE ENCOUNTER
PRIOR AUTHORIZATION DENIED    Medication: MYRBETRIQ 25 MG PO TB24 TIER EXCEPTION     Insurance Company: [employer] - Phone [phone] Fax [phone]    Denial Date: 9/29/2023    Denial Rational:       Appeal Information:

## 2023-09-29 NOTE — TELEPHONE ENCOUNTER
PA Initiation    Medication: MYRBETRIQ 25 MG PO TB24  Insurance Company: [employer] - Phone [phone] Fax [phone]  Pharmacy Filling the Rx: [facility] - [address]  Filling Pharmacy Phone: [phone]  Filling Pharmacy Fax: [phone]  Start Date: 9/29/2023

## 2023-10-03 ENCOUNTER — NURSE TRIAGE (OUTPATIENT)
Dept: INTERNAL MEDICINE | Facility: CLINIC | Age: 68
End: 2023-10-03
Payer: COMMERCIAL

## 2023-10-03 ENCOUNTER — APPOINTMENT (OUTPATIENT)
Dept: MRI IMAGING | Facility: CLINIC | Age: 68
End: 2023-10-03
Payer: COMMERCIAL

## 2023-10-03 ENCOUNTER — HOSPITAL ENCOUNTER (EMERGENCY)
Facility: CLINIC | Age: 68
Discharge: HOME | End: 2023-10-03
Attending: EMERGENCY MEDICINE | Admitting: EMERGENCY MEDICINE
Payer: COMMERCIAL

## 2023-10-03 ENCOUNTER — APPOINTMENT (OUTPATIENT)
Dept: CT IMAGING | Facility: CLINIC | Age: 68
End: 2023-10-03
Attending: EMERGENCY MEDICINE
Payer: COMMERCIAL

## 2023-10-03 VITALS
WEIGHT: 114 LBS | BODY MASS INDEX: 20.2 KG/M2 | DIASTOLIC BLOOD PRESSURE: 88 MMHG | TEMPERATURE: 97.6 F | HEART RATE: 51 BPM | RESPIRATION RATE: 18 BRPM | SYSTOLIC BLOOD PRESSURE: 153 MMHG | HEIGHT: 63 IN | OXYGEN SATURATION: 99 %

## 2023-10-03 DIAGNOSIS — R42 VERTIGO: ICD-10-CM

## 2023-10-03 LAB
ALBUMIN SERPL BCG-MCNC: 4.1 G/DL (ref 3.5–5.2)
ALBUMIN UR-MCNC: NEGATIVE MG/DL
ALP SERPL-CCNC: 63 U/L (ref 35–104)
ALT SERPL W P-5'-P-CCNC: 18 U/L (ref 0–50)
ANION GAP SERPL CALCULATED.3IONS-SCNC: 11 MMOL/L (ref 7–15)
APPEARANCE UR: CLEAR
AST SERPL W P-5'-P-CCNC: 22 U/L (ref 0–45)
ATRIAL RATE - MUSE: 51 BPM
BASO+EOS+MONOS # BLD AUTO: NORMAL 10*3/UL
BASO+EOS+MONOS NFR BLD AUTO: NORMAL %
BASOPHILS # BLD AUTO: 0 10E3/UL (ref 0–0.2)
BASOPHILS NFR BLD AUTO: 1 %
BILIRUB DIRECT SERPL-MCNC: <0.2 MG/DL (ref 0–0.3)
BILIRUB SERPL-MCNC: 0.5 MG/DL
BILIRUB UR QL STRIP: NEGATIVE
BUN SERPL-MCNC: 17.7 MG/DL (ref 8–23)
CALCIUM SERPL-MCNC: 9.3 MG/DL (ref 8.8–10.2)
CHLORIDE SERPL-SCNC: 99 MMOL/L (ref 98–107)
COLOR UR AUTO: ABNORMAL
CREAT SERPL-MCNC: 0.63 MG/DL (ref 0.51–0.95)
DEPRECATED HCO3 PLAS-SCNC: 28 MMOL/L (ref 22–29)
DIASTOLIC BLOOD PRESSURE - MUSE: NORMAL MMHG
EGFRCR SERPLBLD CKD-EPI 2021: >90 ML/MIN/1.73M2
EOSINOPHIL # BLD AUTO: 0 10E3/UL (ref 0–0.7)
EOSINOPHIL NFR BLD AUTO: 0 %
ERYTHROCYTE [DISTWIDTH] IN BLOOD BY AUTOMATED COUNT: 11.8 % (ref 10–15)
FLUAV RNA SPEC QL NAA+PROBE: NEGATIVE
FLUBV RNA RESP QL NAA+PROBE: NEGATIVE
GLUCOSE SERPL-MCNC: 131 MG/DL (ref 70–99)
GLUCOSE UR STRIP-MCNC: NEGATIVE MG/DL
HCT VFR BLD AUTO: 40.1 % (ref 35–47)
HGB BLD-MCNC: 13.4 G/DL (ref 11.7–15.7)
HGB UR QL STRIP: NEGATIVE
HOLD SPECIMEN: NORMAL
HOLD SPECIMEN: NORMAL
IMM GRANULOCYTES # BLD: 0 10E3/UL
IMM GRANULOCYTES NFR BLD: 0 %
INR PPP: 2.29 (ref 0.85–1.15)
INTERPRETATION ECG - MUSE: NORMAL
KETONES UR STRIP-MCNC: 10 MG/DL
LEUKOCYTE ESTERASE UR QL STRIP: NEGATIVE
LYMPHOCYTES # BLD AUTO: 0.8 10E3/UL (ref 0.8–5.3)
LYMPHOCYTES NFR BLD AUTO: 18 %
MCH RBC QN AUTO: 31.6 PG (ref 26.5–33)
MCHC RBC AUTO-ENTMCNC: 33.4 G/DL (ref 31.5–36.5)
MCV RBC AUTO: 95 FL (ref 78–100)
MONOCYTES # BLD AUTO: 0.4 10E3/UL (ref 0–1.3)
MONOCYTES NFR BLD AUTO: 10 %
MUCOUS THREADS #/AREA URNS LPF: PRESENT /LPF
NEUTROPHILS # BLD AUTO: 2.9 10E3/UL (ref 1.6–8.3)
NEUTROPHILS NFR BLD AUTO: 71 %
NITRATE UR QL: NEGATIVE
NRBC # BLD AUTO: 0 10E3/UL
NRBC BLD AUTO-RTO: 0 /100
P AXIS - MUSE: 56 DEGREES
PH UR STRIP: 6.5 [PH] (ref 5–7)
PLATELET # BLD AUTO: 202 10E3/UL (ref 150–450)
POTASSIUM SERPL-SCNC: 3.7 MMOL/L (ref 3.4–5.3)
PR INTERVAL - MUSE: 136 MS
PROT SERPL-MCNC: 6.4 G/DL (ref 6.4–8.3)
QRS DURATION - MUSE: 86 MS
QT - MUSE: 478 MS
QTC - MUSE: 440 MS
R AXIS - MUSE: 14 DEGREES
RBC # BLD AUTO: 4.24 10E6/UL (ref 3.8–5.2)
RBC URINE: 1 /HPF
RSV RNA SPEC NAA+PROBE: NEGATIVE
SARS-COV-2 RNA RESP QL NAA+PROBE: NEGATIVE
SODIUM SERPL-SCNC: 138 MMOL/L (ref 135–145)
SP GR UR STRIP: 1.01 (ref 1–1.03)
SQUAMOUS EPITHELIAL: 1 /HPF
SYSTOLIC BLOOD PRESSURE - MUSE: NORMAL MMHG
T AXIS - MUSE: 51 DEGREES
TROPONIN T SERPL HS-MCNC: 10 NG/L
TROPONIN T SERPL HS-MCNC: 13 NG/L
UROBILINOGEN UR STRIP-MCNC: NORMAL MG/DL
VENTRICULAR RATE- MUSE: 51 BPM
WBC # BLD AUTO: 4.2 10E3/UL (ref 4–11)
WBC URINE: 2 /HPF

## 2023-10-03 PROCEDURE — 82947 ASSAY GLUCOSE BLOOD QUANT: CPT | Performed by: EMERGENCY MEDICINE

## 2023-10-03 PROCEDURE — 84484 ASSAY OF TROPONIN QUANT: CPT | Mod: 91

## 2023-10-03 PROCEDURE — 250N000011 HC RX IP 250 OP 636: Mod: JZ | Performed by: EMERGENCY MEDICINE

## 2023-10-03 PROCEDURE — 81001 URINALYSIS AUTO W/SCOPE: CPT | Performed by: EMERGENCY MEDICINE

## 2023-10-03 PROCEDURE — 93005 ELECTROCARDIOGRAM TRACING: CPT

## 2023-10-03 PROCEDURE — 85025 COMPLETE CBC W/AUTO DIFF WBC: CPT | Performed by: EMERGENCY MEDICINE

## 2023-10-03 PROCEDURE — 80053 COMPREHEN METABOLIC PANEL: CPT | Performed by: EMERGENCY MEDICINE

## 2023-10-03 PROCEDURE — 36415 COLL VENOUS BLD VENIPUNCTURE: CPT | Performed by: EMERGENCY MEDICINE

## 2023-10-03 PROCEDURE — 96361 HYDRATE IV INFUSION ADD-ON: CPT

## 2023-10-03 PROCEDURE — 36415 COLL VENOUS BLD VENIPUNCTURE: CPT

## 2023-10-03 PROCEDURE — 96375 TX/PRO/DX INJ NEW DRUG ADDON: CPT

## 2023-10-03 PROCEDURE — 82248 BILIRUBIN DIRECT: CPT | Performed by: EMERGENCY MEDICINE

## 2023-10-03 PROCEDURE — 70551 MRI BRAIN STEM W/O DYE: CPT

## 2023-10-03 PROCEDURE — 84484 ASSAY OF TROPONIN QUANT: CPT | Performed by: EMERGENCY MEDICINE

## 2023-10-03 PROCEDURE — 87637 SARSCOV2&INF A&B&RSV AMP PRB: CPT | Performed by: EMERGENCY MEDICINE

## 2023-10-03 PROCEDURE — 96374 THER/PROPH/DIAG INJ IV PUSH: CPT

## 2023-10-03 PROCEDURE — 250N000013 HC RX MED GY IP 250 OP 250 PS 637: Performed by: EMERGENCY MEDICINE

## 2023-10-03 PROCEDURE — 250N000011 HC RX IP 250 OP 636

## 2023-10-03 PROCEDURE — 96376 TX/PRO/DX INJ SAME DRUG ADON: CPT

## 2023-10-03 PROCEDURE — 85610 PROTHROMBIN TIME: CPT | Performed by: EMERGENCY MEDICINE

## 2023-10-03 PROCEDURE — 70450 CT HEAD/BRAIN W/O DYE: CPT

## 2023-10-03 PROCEDURE — 250N000013 HC RX MED GY IP 250 OP 250 PS 637

## 2023-10-03 PROCEDURE — 99285 EMERGENCY DEPT VISIT HI MDM: CPT | Mod: 25

## 2023-10-03 PROCEDURE — 258N000003 HC RX IP 258 OP 636: Performed by: EMERGENCY MEDICINE

## 2023-10-03 RX ORDER — ACETAMINOPHEN 325 MG/1
975 TABLET ORAL ONCE
Status: COMPLETED | OUTPATIENT
Start: 2023-10-03 | End: 2023-10-03

## 2023-10-03 RX ORDER — MECLIZINE HYDROCHLORIDE 25 MG/1
25 TABLET ORAL ONCE
Status: COMPLETED | OUTPATIENT
Start: 2023-10-03 | End: 2023-10-03

## 2023-10-03 RX ORDER — ONDANSETRON 2 MG/ML
4 INJECTION INTRAMUSCULAR; INTRAVENOUS ONCE
Status: COMPLETED | OUTPATIENT
Start: 2023-10-03 | End: 2023-10-03

## 2023-10-03 RX ORDER — DIAZEPAM 10 MG/2ML
2.5 INJECTION, SOLUTION INTRAMUSCULAR; INTRAVENOUS ONCE
Status: COMPLETED | OUTPATIENT
Start: 2023-10-03 | End: 2023-10-03

## 2023-10-03 RX ORDER — MECLIZINE HYDROCHLORIDE 25 MG/1
25 TABLET ORAL 3 TIMES DAILY PRN
Qty: 15 TABLET | Refills: 0 | Status: SHIPPED | OUTPATIENT
Start: 2023-10-03 | End: 2023-10-08

## 2023-10-03 RX ADMIN — ONDANSETRON 4 MG: 2 INJECTION INTRAMUSCULAR; INTRAVENOUS at 11:49

## 2023-10-03 RX ADMIN — MECLIZINE HYDROCHLORIDE 25 MG: 25 TABLET ORAL at 10:51

## 2023-10-03 RX ADMIN — ONDANSETRON 4 MG: 2 INJECTION INTRAMUSCULAR; INTRAVENOUS at 09:13

## 2023-10-03 RX ADMIN — ACETAMINOPHEN 975 MG: 325 TABLET, FILM COATED ORAL at 15:52

## 2023-10-03 RX ADMIN — ONDANSETRON 4 MG: 2 INJECTION INTRAMUSCULAR; INTRAVENOUS at 10:47

## 2023-10-03 RX ADMIN — DIAZEPAM 2.5 MG: 5 INJECTION INTRAMUSCULAR; INTRAVENOUS at 12:24

## 2023-10-03 RX ADMIN — DIAZEPAM 2.5 MG: 5 INJECTION INTRAMUSCULAR; INTRAVENOUS at 15:49

## 2023-10-03 RX ADMIN — SODIUM CHLORIDE 1000 ML: 9 INJECTION, SOLUTION INTRAVENOUS at 11:37

## 2023-10-03 ASSESSMENT — ACTIVITIES OF DAILY LIVING (ADL)
ADLS_ACUITY_SCORE: 35
ADLS_ACUITY_SCORE: 33
ADLS_ACUITY_SCORE: 35
ADLS_ACUITY_SCORE: 35

## 2023-10-03 NOTE — ED PROVIDER NOTES
Emergency Department Attending Supervision Note  10/3/2023  12:03 PM      I evaluated this patient in conjunction with Case       Briefly, the patient presented with post immunizations feeling off somewhat vertiginous and vomiting.        On my exam, well appearing no focal neuro deficits.  Nystagmus to left.  Normal heart and lungs    Results:  Ct head MRI and labs normal.    ED course:  AFter sx control much better    My impression is peripheral vertigo possibly in coincidence or in concert with immunizations. Improved here and safe for home.  No other CV resp neuro or emergency causes identified.         Diagnosis    ICD-10-CM    1. Vertigo  R42                Louie Flor MD  10/03/23 170

## 2023-10-03 NOTE — TELEPHONE ENCOUNTER
Nurse Triage SBAR    Is this a 2nd Level Triage? NO    Situation: nausea, vomiting, vertigo    Background: Patient had Influenza and Covid vaccines 10/1/23, feels that is what caused her symptoms. No previous history of vertigo.    Assessment: possible response to influenza and Covid vaccines vs. Possible Viral illness.  Reports drinking 1 bottle of water in past 24 hours.  She has had near constant vomiting or wretching, voiding in small amounts.  She tried to eat part of an apple but that came right back up.  She denies fever, vision changes, headache, numbness, tingling or weakness in face or limbs.     Protocol Recommended Disposition:   Go to ER now.  Patient will have another person  her to ER    Recommendation: If unable to safely walk to car with assistance call ambulance to transport.     Does the patient meet one of the following criteria for ADS visit consideration? No    Reason for Disposition   SEVERE dizziness (e.g., unable to stand, requires support to walk, feels like passing out now)   Spinning or tilting sensation (vertigo) present now and one or more stroke risk factors (i.e., hypertension, diabetes mellitus, prior stroke/TIA, heart attack, age over 60) (Exception: Prior physician evaluation for this AND no different/worse than usual.)    Additional Information   Negative: SEVERE difficulty breathing (e.g., struggling for each breath, speaks in single words)   Negative: Shock suspected (e.g., cold/pale/clammy skin, too weak to stand, low BP, rapid pulse)   Negative: Difficult to awaken or acting confused (e.g., disoriented, slurred speech)   Negative: Fainted, and still feels dizzy afterwards   Negative: Overdose (accidental or intentional) of medications   Negative: New neurologic deficit that is present now: * Weakness of the face, arm, or leg on one side of the body * Numbness of the face, arm, or leg on one side of the body * Loss of speech or garbled speech   Negative: Heart beating <  "50 beats per minute OR > 140 beats per minute   Negative: Sounds like a life-threatening emergency to the triager   Negative: Chest pain   Negative: Rectal bleeding, bloody stool, or tarry-black stool   Negative: Vomiting is main symptom   Negative: Diarrhea is main symptom   Negative: Headache is main symptom   Negative: Heat exhaustion suspected (i.e., dehydration from heat exposure)   Negative: Patient states that they are having an anxiety or panic attack   Negative: Dizziness from low blood sugar (i.e., < 60 mg/dl or 3.5 mmol/l)   Negative: SEVERE headache or neck pain   Negative: Neurologic deficit that was brief (now gone), ANY of the following:* Weakness of the face, arm, or leg on one side of the body* Numbness of the face, arm, or leg on one side of the body* Loss of speech or garbled speech    Answer Assessment - Initial Assessment Questions  1. DESCRIPTION: \"Describe your dizziness.\"      Vertigo, not able to walk on her own  2. LIGHTHEADED: \"Do you feel lightheaded?\" (e.g., somewhat faint, woozy, weak upon standing)      no  3. VERTIGO: \"Do you feel like either you or the room is spinning or tilting?\" (i.e. vertigo)      yes  4. SEVERITY: \"How bad is it?\"  \"Do you feel like you are going to faint?\" \"Can you stand and walk?\"    - MILD: Feels slightly dizzy, but walking normally.    - MODERATE: Feels unsteady when walking, but not falling; interferes with normal activities (e.g., school, work).    - SEVERE: Unable to walk without falling, or requires assistance to walk without falling; feels like passing out now.       Moderate to severe  5. ONSET:  \"When did the dizziness begin?\"      10:00 a.m. 10/2/23  6. AGGRAVATING FACTORS: \"Does anything make it worse?\" (e.g., standing, change in head position)      Sitting up or moving in any way  7. HEART RATE: \"Can you tell me your heart rate?\" \"How many beats in 15 seconds?\"  (Note: not all patients can do this)        Doesn't know  8. CAUSE: \"What do you think " "is causing the dizziness?\"      Had Covid and Influenza vaccine Sun. 10/1/23  9. RECURRENT SYMPTOM: \"Have you had dizziness before?\" If Yes, ask: \"When was the last time?\" \"What happened that time?\"      no  10. OTHER SYMPTOMS: \"Do you have any other symptoms?\" (e.g., fever, chest pain, vomiting, diarrhea, bleeding)        Nausea, vomiting and vertigo.  Did have a mild headache at onset yesterday.  11. PREGNANCY: \"Is there any chance you are pregnant?\" \"When was your last menstrual period?\"        NA    Protocols used: Dizziness-A-OH    "

## 2023-10-03 NOTE — ED PROVIDER NOTES
History     Chief Complaint:  Nausea & Vomiting and Dizziness       HPI   Annabella Bolanos is a 68 year old female with a history of aortic valve replacement, HTN, hyperthyroidism, s/p ascending aortic aneurysm repair anticoagulated on warfarin who presents for evaluation of dizziness. The patient states that two days ago she received her Moderna COVID booster along with the influenza vaccine. The following day at 1000, the patient developed dizziness, nausea, and repetitive vomiting. She has had approximately 25-30 episodes of vomiting since and has developed abdominal pain from the excessive vomiting. She notes that position changes exacerbate her symptoms and she has to close her eyes in order to not exacerbate the symptoms.  She has not been able to eat or drink in the past 24 hours without vomiting.  She has never had these symptoms previously.  The patient denies fever, upper respiratory symptoms, chest pain, shortness of breath, urinary symptoms, hematemesis, diarrhea, blood in the stool, numbness, tingling, or weakness.      Independent Historian:   None - Patient Only    Review of External Notes:   Chart reviewed, no pertinent external notes.      Medications:    meclizine (ANTIVERT) 25 MG tablet  alendronate (FOSAMAX) 70 MG tablet  anastrozole (ARIMIDEX) 1 MG tablet  ASPIRIN EC PO  carvedilol (COREG) 12.5 MG tablet  estradiol (ESTRACE) 0.1 MG/GM vaginal cream  lisinopril (ZESTRIL) 10 MG tablet  methimazole (TAPAZOLE) 5 MG tablet  mirabegron (MYRBETRIQ) 25 MG 24 hr tablet  solifenacin (VESICARE) 10 MG tablet  warfarin ANTICOAGULANT (COUMADIN) 5 MG tablet      Past Medical History:    Past Medical History:   Diagnosis Date    Adenomatous colon polyp 08/2015    Aortic stenosis 06/23/2011    Arthritis     Bicuspid aortic valve     Breast cancer (H) 07/2014    H/O aortic valve replacement     Heart murmur     History of blood transfusion     HTN, goal below 140/90     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    Palpitations     PONV (postoperative nausea and vomiting)     Subclinical hyperthyroidism 12/06/2017    Thrombosis of leg     Uncomplicated asthma      Past Surgical History:    Past Surgical History:   Procedure Laterality Date    BIOPSY NODE SENTINEL Right 09/10/2014    Procedure: BIOPSY NODE SENTINEL;  Surgeon: Ila Romano MD;  Location: [facility] OR    CARDIAC SURGERY  06/01/2011    aortic valve replacement    ENT SURGERY      tonsillectomy    HRW PORT A CATH      INSERT PORT VASCULAR ACCESS Left 10/22/2014    Procedure: INSERT PORT VASCULAR ACCESS;  Surgeon: Ila Romano MD;  Location: [facility] OR    LUMPECTOMY BREAST WITH SEED LOCALIZATION Right 09/10/2014    Procedure: LUMPECTOMY BREAST WITH SEED LOCALIZATION;  Surgeon: Ila Romano MD;  Location: [facility] OR    ORTHOPEDIC SURGERY      shoulder, thumb surgery    REMOVE PORT VASCULAR ACCESS Left 04/08/2015    Procedure: REMOVE PORT VASCULAR ACCESS;  Surgeon: Ila Romano MD;  Location: [facility] OR    REPAIR ANEURYSM ASCENDING AORTA  06/23/2011    Procedure:REPAIR ANEURYSM ASCENDING AORTA; Medial Sternotomy, Aortic Valve Replacement, ([employer] HP Valved Graft, size 23 mm) on pump oxygenation, intraoperative transesophageal cardiogram; Surgeon:JOHN PAUL ULLOA; Location:[facility] OR    REPLACE VALVE AORTIC  06/23/2011    bicuspid AV with severe stenosis - 6/2011 mechanical AVR with 23 mm [employer] AV conduit, composite graft placement        Physical Exam   Patient Vitals for the past 24 hrs:   BP Temp Temp src Pulse Resp SpO2 Height Weight   10/03/23 1700 (!) 153/88 -- -- 51 -- 99 % -- --   10/03/23 1500 (!) 163/83 -- -- 50 -- 96 % -- --   10/03/23 1400 (!) 161/84 -- -- (!) 48 -- 98 % -- --   10/03/23 1300 (!) 166/80 -- -- (!) 48 -- 100 % -- --   10/03/23 1243 -- -- -- -- -- 100 % -- --   10/03/23 1232 -- -- -- -- -- 98 % -- --   10/03/23 1230 (!) 154/91 -- -- 56 -- 100 % -- --   10/03/23  "1228 (!) 151/82 -- -- 56 -- 96 % -- --   10/03/23 0915 (!) 174/84 97.6  F (36.4  C) Temporal 60 18 100 % 1.6 m (5' 3\") 51.7 kg (114 lb)        Physical Exam  General: Alert, appears well-developed and well-nourished. Cooperative.     In moderate distress. Position changes/movement exacerbates patients symptoms per report.  HEENT:  Head:  Atraumatic  Ears:  External ears are normal  Mouth/Throat:  Oropharynx is without erythema or exudate and mucous membranes are dry.   Eyes:   Horizontal nystagmus. Conjunctivae normal and EOM otherwise normal. No scleral icterus.    Pupils are equal, round, and reactive to light.   Neck:   Normal range of motion. Neck supple.  CV:  Sharp valve click with systolic murmur. Normal rate, regular rhythm, radial and dorsalis pedis pulses are 2+ and symmetric.   Resp:  Breath sounds are clear bilaterally    Non-labored, no retractions or accessory muscle use  GI:  Abdomen is soft, no distension, no tenderness. No rebound or guarding.  MS:  Normal range of motion. No edema.    Normal strength in all 4 extremities.   Skin:  Warm and dry.  No rash or lesions noted.  Neuro:   Alert. Normal strength.  Sensation intact in all 4 extremities. GCS: 15    Cranial nerves 2-12 intact. No pronator drift. Normal finger nose finger, heel to shin.  Psych: Normal mood and affect.    Emergency Department Course   ECG  ECG results from 10/03/23   EKG 12-lead, tracing only     Value    Systolic Blood Pressure     Diastolic Blood Pressure     Ventricular Rate 51    Atrial Rate 51    CT Interval 136    QRS Duration 86        QTc 440    P Axis 56    R AXIS 14    T Axis 51    Interpretation ECG      Sinus bradycardia with Premature atrial complexes  Otherwise normal ECG  When compared with ECG of 24-JUN-2011 03:58,  Premature atrial complexes are now Present  Vent. rate has decreased BY  25 BPM  ST no longer elevated in Anterior leads  T wave inversion no longer evident in Lateral leads  Interpreted by " Chacho       Imaging:  MR Brain w/o Contrast   Final Result   IMPRESSION:   1.  No acute intracranial process.   2.  Generalized brain atrophy and presumed microvascular ischemic changes as detailed above.      CT Head w/o Contrast   Final Result   IMPRESSION:   No intracranial hemorrhage, mass, or definite CT evidence of recent   ischemia.      JOSUE ENNIS MD            SYSTEM ID:  [id number]         Report per radiology    Laboratory:  Labs Ordered and Resulted from Time of ED Arrival to Time of ED Departure   BASIC METABOLIC PANEL - Abnormal       Result Value    Sodium 138      Potassium 3.7      Chloride 99      Carbon Dioxide (CO2) 28      Anion Gap 11      Urea Nitrogen 17.7      Creatinine 0.63      GFR Estimate >90      Calcium 9.3      Glucose 131 (*)    INR - Abnormal    INR 2.29 (*)    ROUTINE UA WITH MICROSCOPIC REFLEX TO CULTURE - Abnormal    Color Urine Light Yellow      Appearance Urine Clear      Glucose Urine Negative      Bilirubin Urine Negative      Ketones Urine 10 (*)     Specific Gravity Urine 1.014      Blood Urine Negative      pH Urine 6.5      Protein Albumin Urine Negative      Urobilinogen Urine Normal      Nitrite Urine Negative      Leukocyte Esterase Urine Negative      Mucus Urine Present (*)     RBC Urine 1      WBC Urine 2      Squamous Epithelials Urine 1     HEPATIC FUNCTION PANEL - Normal    Protein Total 6.4      Albumin 4.1      Bilirubin Total 0.5      Alkaline Phosphatase 63      AST 22      ALT 18      Bilirubin Direct <0.20     INFLUENZA A/B, RSV, & SARS-COV2 PCR - Normal    Influenza A PCR Negative      Influenza B PCR Negative      RSV PCR Negative      SARS CoV2 PCR Negative     TROPONIN T, HIGH SENSITIVITY - Normal    Troponin T, High Sensitivity 13     TROPONIN T, HIGH SENSITIVITY - Normal    Troponin T, High Sensitivity 10     CBC WITH PLATELETS AND DIFFERENTIAL    WBC Count 4.2      RBC Count 4.24      Hemoglobin 13.4      Hematocrit 40.1      MCV 95       MCH 31.6      MCHC 33.4      RDW 11.8      Platelet Count 202      % Neutrophils 71      % Lymphocytes 18      % Monocytes 10      Mids % (Monos, Eos, Basos)        % Eosinophils 0      % Basophils 1      % Immature Granulocytes 0      NRBCs per 100 WBC 0      Absolute Neutrophils 2.9      Absolute Lymphocytes 0.8      Absolute Monocytes 0.4      Mids Abs (Monos, Eos, Basos)        Absolute Eosinophils 0.0      Absolute Basophils 0.0      Absolute Immature Granulocytes 0.0      Absolute NRBCs 0.0          Procedures   None    Emergency Department Course & Assessments    Interventions:  Medications   ondansetron (ZOFRAN) injection 4 mg (4 mg Intravenous $Given 10/3/23 0913)   ondansetron (ZOFRAN) injection 4 mg (4 mg Intravenous $Given 10/3/23 1047)   meclizine (ANTIVERT) tablet 25 mg (25 mg Oral $Given 10/3/23 1051)   sodium chloride 0.9% BOLUS 1,000 mL (0 mLs Intravenous Stopped 10/3/23 1406)   ondansetron (ZOFRAN) injection 4 mg (4 mg Intravenous $Given 10/3/23 1149)   diazepam (VALIUM) injection 2.5 mg (2.5 mg Intravenous $Given 10/3/23 1224)   diazepam (VALIUM) injection 2.5 mg (2.5 mg Intravenous $Given 10/3/23 1549)   acetaminophen (TYLENOL) tablet 975 mg (975 mg Oral $Given 10/3/23 1552)        Assessments:  1121: Initial evaluation and assessment.  1346: Patient reassessed, symptoms mildly improved.    Independent Interpretation (X-rays, CTs, rhythm strip):  None    Consultations/Discussion of Management or Tests:  Patient was seen in conjunction with Dr. Flor.     Social Determinants of Health affecting care:   None    Disposition:  The patient was discharged to home.     Impression & Plan    CMS Diagnoses: None    Medical Decision Making:  Annabella Bolanos is a 68 year old female with a history of aortic valve replacement, HTN, hyperthyroidism, s/p ascending aortic aneurysm repair anticoagulated on warfarin who presents for evaluation of dizziness. On exam, the patient has horizontal nystagmus without any  other focal neurologic deficits.  Patient is hypertensive while in the ED without fever or hypoxia.  She is also noted to be bradycardic with heart rate between 40-60, consistent with current carvedilol use.  Blood work shows no evidence of leukocytosis, anemia, or electrolyte abnormalities.  COVID, influenza, and RSV are negative.  UA shows no evidence of infection.  The patient is anticoagulated on warfarin, and INR is within therapeutic window of 2-3.  Serial troponins are negative and EKG does not show any evidence of acute ischemic changes.  CT of the head shows no acute intracranial abnormalities.  MRI shows no evidence of posterior circulation ischemic changes.  Patient symptoms gradually improved throughout ED course with interventions.  At this time, symptoms are consistent with BPPV, potentially precipitated by recent immunizations.  Patient feels comfortable with plan for discharge home, we provided a prescription for meclizine to take as needed for any ongoing dizziness/nausea/vomiting.  She was advised to follow-up with her primary care provider soon as she is able to to discuss ongoing management and any other therapeutic needs for vertigo.  She was advised to return to the ER in the interim for any new or worsening symptoms.  The patient was in agreement with this plan and all questions were answered.      Diagnosis:    ICD-10-CM    1. Vertigo  R42            Discharge Medications:  Discharge Medication List as of 10/3/2023  5:12 PM        START taking these medications    Details   meclizine (ANTIVERT) 25 MG tablet Take 1 tablet (25 mg) by mouth 3 times daily as needed for dizziness, Disp-15 tablet, R-0, Local Print                Rayna Farrell PA-C  10/3/2023        Rayna Farrell PA-C  10/03/23 1743

## 2023-10-03 NOTE — ED TRIAGE NOTES
Pt arrives in wheelchair with son for dizziness and vomiting since 10am yesterday. Had COVID booster and flu shot on Sunday. Pt reports inability to open eyes due to dizziness and inability to walk. Actively vomiting in triage room.

## 2023-10-04 ENCOUNTER — DOCUMENTATION ONLY (OUTPATIENT)
Dept: ANTICOAGULATION | Facility: CLINIC | Age: 68
End: 2023-10-04
Payer: COMMERCIAL

## 2023-10-04 NOTE — PROGRESS NOTES
ANTICOAGULATION  MANAGEMENT: Discharge Review    Annabella Bolanos chart reviewed for anticoagulation continuity of care    Emergency room visit on 10/3/23 for Nausea & Vomiting and Dizziness .    Discharge disposition: Home    Results:    Recent labs: (last 7 days)     10/03/23  0909   INR 2.29*     Anticoagulation inpatient management:     not applicable     Anticoagulation discharge instructions:     Warfarin dosing: home regimen continued   Bridging: No   INR goal change: No      Medication changes affecting anticoagulation: No    Additional factors affecting anticoagulation: No     PLAN     No adjustment to anticoagulation plan needed    Patient not contacted    No adjustment to Anticoagulation Calendar was required    Deborah Aguilera RN

## 2023-10-07 ENCOUNTER — NURSE TRIAGE (OUTPATIENT)
Dept: NURSING | Facility: CLINIC | Age: 68
End: 2023-10-07
Payer: COMMERCIAL

## 2023-10-07 NOTE — TELEPHONE ENCOUNTER
"Please call patient and advise on working patient into clinic 10/9/23 for follow up/per Central Scheduling no available appointments.     Nurse Triage SBAR    Is this a 2nd Level Triage? YES, LICENSED PRACTITIONER REVIEW IS REQUIRED      Situation:  Patient calling reporting elevated blood pressure reading, ongoing dizziness, unsteadiness, and dull headache.    Background:     HTN   Aortic aneurysm repair  Coumadin  See ED visit from 10/3/23        Assessment:    Patient calling requesting to schedule a follow up appointment from ED visit on 10/3/23.  Stating she went to the ED for vomiting, and vertigo that she attributes to a reaction from the COVID 19 and Flu shot given on 10/1/23.  Ongoing \"dull headache\" in back of head, occasional unsteadiness. Denies falling.Reporting symptoms are improving since ED visit on 10/3/23.  Reporting she has been checking her blood pressure since she was discharged from ED and it has been   129/79, 136/72 in past few days.  B/P reading elevated today 174/90 2 hours after taking Lisinopril and Coreg as prescribed.    Protocol Recommended Disposition:   See in ED. Paged on call provider as patient was seen in ED on 10/3/23 for vertigo, new elevation in blood pressure.    Recommendation:   Paged to provider    1150 a.m. Placed call to on call provider Dr Moss through [facility]/reached on cell phone.    Dr Moss advised to have patient recheck blood pressure in 4 hours, to call back to have provider paged if B/P remained above 170.     Provider Recommendation Follow Up:   Reached patient/caregiver. Informed of provider's recommendations. Patient verbalized understanding and agrees with the plan.       Reason for Disposition   [1] Systolic BP  >= 160 OR Diastolic >= 100 AND [2] cardiac (e.g., breathing difficulty, chest pain) or neurologic symptoms (e.g., new-onset blurred or double vision, unsteady gait)    Additional Information   Negative: Difficult to awaken or " acting confused (e.g., disoriented, slurred speech)   Negative: SEVERE difficulty breathing (e.g., struggling for each breath, speaks in single words)   Negative: [1] Weakness of the face, arm or leg on one side of the body AND [2] new-onset   Negative: [1] Numbness (i.e., loss of sensation) of the face, arm or leg on one side of the body AND [2] new-onset   Negative: [1] Chest pain lasts > 5 minutes AND [2] history of heart disease (i.e., heart attack, bypass surgery, angina, angioplasty, CHF)   Negative: [1] Chest pain AND [2] took nitrogylcerin AND [3] pain was not relieved   Negative: Sounds like a life-threatening emergency to the triager    Protocols used: Blood Pressure - High-A-AH

## 2023-10-10 ENCOUNTER — OFFICE VISIT (OUTPATIENT)
Dept: INTERNAL MEDICINE | Facility: CLINIC | Age: 68
End: 2023-10-10
Payer: COMMERCIAL

## 2023-10-10 ENCOUNTER — TELEPHONE (OUTPATIENT)
Dept: ANTICOAGULATION | Facility: CLINIC | Age: 68
End: 2023-10-10

## 2023-10-10 ENCOUNTER — ANTICOAGULATION THERAPY VISIT (OUTPATIENT)
Dept: ANTICOAGULATION | Facility: CLINIC | Age: 68
End: 2023-10-10

## 2023-10-10 VITALS
OXYGEN SATURATION: 97 % | HEIGHT: 63 IN | BODY MASS INDEX: 19.67 KG/M2 | RESPIRATION RATE: 16 BRPM | DIASTOLIC BLOOD PRESSURE: 86 MMHG | HEART RATE: 68 BPM | WEIGHT: 111 LBS | TEMPERATURE: 97.5 F | SYSTOLIC BLOOD PRESSURE: 136 MMHG

## 2023-10-10 DIAGNOSIS — R42 VERTIGO: Primary | ICD-10-CM

## 2023-10-10 DIAGNOSIS — Z95.2 AORTIC VALVE REPLACED: ICD-10-CM

## 2023-10-10 DIAGNOSIS — Z79.01 LONG TERM CURRENT USE OF ANTICOAGULANT THERAPY: Primary | ICD-10-CM

## 2023-10-10 DIAGNOSIS — T50.Z95D ADVERSE EFFECT OF VACCINE, SUBSEQUENT ENCOUNTER: ICD-10-CM

## 2023-10-10 DIAGNOSIS — I10 BENIGN ESSENTIAL HYPERTENSION: ICD-10-CM

## 2023-10-10 LAB — INR BLD: 2.3 (ref 0.9–1.1)

## 2023-10-10 PROCEDURE — 36415 COLL VENOUS BLD VENIPUNCTURE: CPT | Performed by: INTERNAL MEDICINE

## 2023-10-10 PROCEDURE — 85610 PROTHROMBIN TIME: CPT | Performed by: INTERNAL MEDICINE

## 2023-10-10 PROCEDURE — 99213 OFFICE O/P EST LOW 20 MIN: CPT | Performed by: INTERNAL MEDICINE

## 2023-10-10 NOTE — TELEPHONE ENCOUNTER
Patient is scheduled for Left total knee arthroplasty on 11/13/23.    Provider doing scheduled procedure:  Jignesh Perry MD    Patient will need to hold warfarin for 4-5 days prior to surgery/procedure.    Patient would need to start holding warfarin on 11/8/23.     Patient is taking warfarin for Aortic valve replacement.      Patient has bridged with lovenox in the past.    Patient will be in the hospital overnight.    Patient will be establishing care with a new PCP 10/25/23, Dr Tucker at [facility] as current PCP is retiring.    Please review for warfarin hold instructions.    Deborah Aguilera RN, BSN  Anticoagulation Clinic

## 2023-10-10 NOTE — PROGRESS NOTES
Assessment & Plan     Vertigo  Improving, advised it will take time to resolve. Would not refer for PT, she can stop the home exercises since not likely to help as her symptoms are not BPPV., agree she can stop meclizine. Recommend stretches for neck which is likely causing some of headache.     Adverse effect of vaccine, subsequent encounter  Advised she could have issues with next vaccine, would go back to Pfizer if she is going to get one, can get meclizine otc prior in case of reaction, could also order zofran    HTN: well controlled at home, advised she could take extra Carvedilol the morning of her surgery if home BP high.      MED REC REQUIRED  Post Medication Reconciliation Status: discharge medications reconciled and changed, per note/orders      Karla Moss MD  [facility] BRANDI Winchester is a 68 year old, presenting for the following health issues:  ER F/U      10/10/2023     3:21 PM   Additional Questions   Roomed by KAYLEN Llamas LPN   Accompanied by self         10/10/2023     3:21 PM   Patient Reported Additional Medications   Patient reports taking the following new medications none       HPI       ED/UC Followup:    Facility:  [facility]  Date of visit: 10-3-2023  Reason for visit: vertigo related to Covid vaccine reaction  Current Status: improved    She had bad constant vertigo with nausea and vomiting after Moderna covid vaccine, previously had Pfizer.   She still feels a little off balance with bending, turning, walking on uneven ground.   She was given home exercises but they are not really helping. She is not feeling symptoms turning on either side.   She took meclizine for a few days, stopped, wanting to be sure that is okay.   Her nausea resolved.   Her BP was high at the ED but better at home. She has marked white coat hypertension. She checks 3-4 times and the last is 130s/80s.       Patient Active Problem List   Diagnosis    Aortic valve replaced    Benign  "essential hypertension    Advanced directives, counseling/discussion    Long-term (current) use of anticoagulants [Z79.01]    Osteopenia of multiple sites    Hyperthyroidism    Adenomatous polyp of colon, unspecified part of colon    Personal history of malignant neoplasm of breast     Current Outpatient Medications   Medication Sig Dispense Refill    alendronate (FOSAMAX) 70 MG tablet Take 70 mg by mouth every 7 days      anastrozole (ARIMIDEX) 1 MG tablet Take by mouth daily 30 tablet     ASPIRIN EC PO Take 81 mg by mouth daily      carvedilol (COREG) 12.5 MG tablet Take 1 tablet (12.5 mg) by mouth 2 times daily (with meals) 180 tablet 3    estradiol (ESTRACE) 0.1 MG/GM vaginal cream Apply blueberry sized amount to finger and rub inside vagina 3x/week at night. 40 g 3    lisinopril (ZESTRIL) 10 MG tablet Take 1 tablet (10 mg) by mouth 2 times daily 180 tablet 3    methimazole (TAPAZOLE) 5 MG tablet TAKE ONE-HALF (1/2) TABLET DAILY 45 tablet 3    solifenacin (VESICARE) 10 MG tablet Take 1 tablet (10 mg) by mouth daily 90 tablet 0    warfarin ANTICOAGULANT (COUMADIN) 5 MG tablet Take 5mg every Sun,Tues & Thur / Take 7.5mg all other days OR per INR clinic 110 tablet 1        Review of Systems   She reports some posterior headache      Objective    /86   Pulse 68   Temp 97.5  F (36.4  C) (Oral)   Resp 16   Ht 1.6 m (5' 3\")   Wt 50.3 kg (111 lb)   LMP  (LMP Unknown)   SpO2 97%   Breastfeeding No   BMI 19.66 kg/m    Body mass index is 19.66 kg/m .  Physical Exam       Tender muscles neck                "

## 2023-10-10 NOTE — PROGRESS NOTES
ANTICOAGULATION MANAGEMENT     Annabella Bolanos 68 year old female is on warfarin with therapeutic INR result. (Goal INR 2.0-3.0)    Recent labs: (last 7 days)     10/10/23  1618   INR 2.3*       ASSESSMENT     Source(s): Chart Review and Patient/Caregiver Call     Warfarin doses taken: Warfarin taken as instructed  Diet: No new diet changes identified  Medication/supplement changes:  meclizine, Zofran and Valium given in the ED, no interaction with warfarin anticipated  New illness, injury, or hospitalization: Yes: Seen in ED 10/3/23 for nausea, vomiting and dizziness  Signs or symptoms of bleeding or clotting: No  Previous result: Therapeutic last 2(+) visits  Additional findings: Upcoming surgery/procedure Left total knee arthroplasty on 11/13/23, will be inpatient 1 night       PLAN     Recommended plan for no diet, medication or health factor changes affecting INR     Dosing Instructions: Continue your current warfarin dose with next INR in 6 weeks       Summary  As of 10/10/2023      Full warfarin instructions:  7.5 mg every Mon, Wed, Fri; 5 mg all other days   Next INR check:  11/21/2023               Telephone call with Annabella who verbalizes understanding and agrees to plan    Lab visit scheduled    Education provided:   Please call back if any changes to your diet, medications or how you've been taking warfarin  Contact [phone]  with any changes, questions or concerns.     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

## 2023-10-16 RX ORDER — VITAMIN B COMPLEX
TABLET ORAL DAILY
COMMUNITY

## 2023-10-16 RX ORDER — IBUPROFEN 200 MG
1 CAPSULE ORAL 2 TIMES DAILY
COMMUNITY

## 2023-10-16 NOTE — PROVIDER NOTIFICATION
10/16/23 2086   Discharge Planning   Patient/Family Anticipates Transition to home with family   Concerns to be Addressed all concerns addressed in this encounter   Living Arrangements   People in Home alone   Type of Residence Private Residence   Is your private residence a single family home or apartment? Single family home   Number of Stairs, Within Home, Primary greater than 10 stairs   Stair Railings, Within Home, Primary railings safe and in good condition   Once home, are you able to live on one level? No   Which rooms are not on the main level? Bathroom;Bedroom;Living Room   Bathroom Shower/Tub Walk-in shower   Equipment Currently Used at Home shower chair;walker, standard;raised toilet seat;walker, rolling;grab bar, tub/shower;tub bench  (she will be staying with her son for up to one week post op)   Support System   Support Systems Children   Do you have someone available to stay with you one or two nights once you are home? Yes   Medical Clearance   Date of Physical 10/30/23   Clinic Name [facility]   It is recommended that you call and check with any specialty providers before surgery to see if you need surgical clearance.  Do you see any specialty providers outside of your primary care provider? No   Blood   Known Bleeding Disorder or Coagulopathy Yes  ([facility]-on warfarin)   Does the patient have any Amish/cultural preferences related to blood products? No   Education   Has the patient scheduled or completed pre-op total joint education, either in class or online, in the last 12 months? Yes  (TBS has looked through book)   Patient attended total joint pre-op class/received pre-op teaching  online

## 2023-10-24 ASSESSMENT — ENCOUNTER SYMPTOMS
MYALGIAS: 0
HEMATOCHEZIA: 0
NERVOUS/ANXIOUS: 0
PARESTHESIAS: 0
NAUSEA: 0
EYE PAIN: 0
DIARRHEA: 0
HEARTBURN: 0
ABDOMINAL PAIN: 0
FREQUENCY: 1
HEADACHES: 0
CONSTIPATION: 0
CHILLS: 0
ARTHRALGIAS: 1
DIZZINESS: 0
FEVER: 0
COUGH: 0
HEMATURIA: 0
DYSURIA: 0
BREAST MASS: 0
WEAKNESS: 0
SHORTNESS OF BREATH: 0
JOINT SWELLING: 1
SORE THROAT: 0
PALPITATIONS: 0

## 2023-10-24 ASSESSMENT — ACTIVITIES OF DAILY LIVING (ADL): CURRENT_FUNCTION: NO ASSISTANCE NEEDED

## 2023-10-24 NOTE — PROGRESS NOTES
ANTICOAGULATION MANAGEMENT     Annabella Bolanos 67 year old female is on warfarin with therapeutic INR result. (Goal INR 2.0-3.0)    Recent labs: (last 7 days)     06/19/23  1051   INR 2.9*       ASSESSMENT     Warfarin Lab Questionnaire    Warfarin Doses Last 7 Days      6/18/2023    12:19 PM   Dose in Tablet or Mg   TAB or MG? milligram (mg)     Pt Rptd Dose SUNDAY MONDAY TUESDAY WED THURS FRIDAY SATURDAY 6/18/2023  12:19 PM 5 7.5 5 7.5 5 7.5 7.5         6/18/2023   Warfarin Lab Questionnaire   Missed doses within past 14 days? No   Changes in diet or alcohol within past 14 days? No   Medication changes since last result? No   Injuries or illness since last result? No   New shortness of breath, severe headaches or sudden changes in vision since last result? No   Abnormal bleeding since last result? No   Upcoming surgery, procedure? No   Best number to call with results? [phone]        Previous result: Therapeutic last 2(+) visits  Additional findings: patient states she is being cognizant of how much vitamin K she is eating per week.       PLAN     Recommended plan for no diet, medication or health factor changes affecting INR     Dosing Instructions: Continue your current warfarin dose with next INR in 5 weeks       Summary  As of 6/19/2023    Full warfarin instructions:  5 mg every Sun, Tue, Thu; 7.5 mg all other days   Next INR check:  7/24/2023             Telephone call with Annabella who verbalizes understanding and agrees to plan    Lab visit scheduled    Education provided:     Dietary considerations: importance of consistent vitamin K intake and and not to change vitamin K intake based on INR value    Plan made per [facility] anticoagulation protocol    Marybeth Stock, RN  Anticoagulation Clinic  6/19/2023    _______________________________________________________________________     Anticoagulation Episode Summary     Current INR goal:  2.0-3.0   TTR:  94.6 % (1 y)   Target end date:  Indefinite   Send INR  reminders to:  [facility]    Indications    Long-term (current) use of anticoagulants [Z79.01] [Z79.01]  Aortic valve replaced [Z95.2]           Comments:           Anticoagulation Care Providers     Provider Role Specialty Phone number    Brook Echavarria MD Referring Internal Medicine [phone]    Karla Moss MD Referring Internal Medicine [phone]             Sski Pregnancy And Lactation Text: This medication is Pregnancy Category D and isn't considered safe during pregnancy. It is excreted in breast milk.

## 2023-10-25 ENCOUNTER — OFFICE VISIT (OUTPATIENT)
Dept: FAMILY MEDICINE | Facility: CLINIC | Age: 68
End: 2023-10-25
Payer: COMMERCIAL

## 2023-10-25 VITALS
HEART RATE: 71 BPM | HEIGHT: 63 IN | OXYGEN SATURATION: 95 % | SYSTOLIC BLOOD PRESSURE: 128 MMHG | WEIGHT: 112 LBS | RESPIRATION RATE: 16 BRPM | TEMPERATURE: 96.7 F | BODY MASS INDEX: 19.84 KG/M2 | DIASTOLIC BLOOD PRESSURE: 86 MMHG

## 2023-10-25 DIAGNOSIS — H91.93 DECREASED HEARING OF BOTH EARS: ICD-10-CM

## 2023-10-25 DIAGNOSIS — I10 BENIGN ESSENTIAL HYPERTENSION: ICD-10-CM

## 2023-10-25 DIAGNOSIS — Z13.1 SCREENING FOR DIABETES MELLITUS: ICD-10-CM

## 2023-10-25 DIAGNOSIS — Z00.00 ENCOUNTER FOR MEDICARE ANNUAL WELLNESS EXAM: Primary | ICD-10-CM

## 2023-10-25 DIAGNOSIS — Z95.2 AORTIC VALVE REPLACED: ICD-10-CM

## 2023-10-25 DIAGNOSIS — N95.2 VAGINAL ATROPHY: ICD-10-CM

## 2023-10-25 DIAGNOSIS — Z85.3 PERSONAL HISTORY OF MALIGNANT NEOPLASM OF BREAST: ICD-10-CM

## 2023-10-25 DIAGNOSIS — R39.15 URINARY URGENCY: ICD-10-CM

## 2023-10-25 DIAGNOSIS — Z79.01 LONG TERM CURRENT USE OF ANTICOAGULANT THERAPY: ICD-10-CM

## 2023-10-25 DIAGNOSIS — Z13.220 LIPID SCREENING: ICD-10-CM

## 2023-10-25 DIAGNOSIS — R35.0 URINARY FREQUENCY: ICD-10-CM

## 2023-10-25 DIAGNOSIS — R73.9 ELEVATED BLOOD SUGAR: ICD-10-CM

## 2023-10-25 DIAGNOSIS — E05.90 SUBCLINICAL HYPERTHYROIDISM: ICD-10-CM

## 2023-10-25 LAB
CHOLEST SERPL-MCNC: 206 MG/DL
CREAT UR-MCNC: 35.4 MG/DL
FASTING STATUS PATIENT QL REPORTED: YES
GLUCOSE SERPL-MCNC: 103 MG/DL (ref 70–99)
HBA1C MFR BLD: 5.3 % (ref 0–5.6)
HDLC SERPL-MCNC: 66 MG/DL
LDLC SERPL CALC-MCNC: 125 MG/DL
MICROALBUMIN UR-MCNC: <12 MG/L
MICROALBUMIN/CREAT UR: NORMAL MG/G{CREAT}
NONHDLC SERPL-MCNC: 140 MG/DL
TRIGL SERPL-MCNC: 75 MG/DL

## 2023-10-25 PROCEDURE — 82043 UR ALBUMIN QUANTITATIVE: CPT | Performed by: PHYSICIAN ASSISTANT

## 2023-10-25 PROCEDURE — 36415 COLL VENOUS BLD VENIPUNCTURE: CPT | Performed by: PHYSICIAN ASSISTANT

## 2023-10-25 PROCEDURE — 99214 OFFICE O/P EST MOD 30 MIN: CPT | Mod: 25 | Performed by: PHYSICIAN ASSISTANT

## 2023-10-25 PROCEDURE — 80061 LIPID PANEL: CPT | Performed by: PHYSICIAN ASSISTANT

## 2023-10-25 PROCEDURE — 82947 ASSAY GLUCOSE BLOOD QUANT: CPT | Performed by: PHYSICIAN ASSISTANT

## 2023-10-25 PROCEDURE — G0439 PPPS, SUBSEQ VISIT: HCPCS | Performed by: PHYSICIAN ASSISTANT

## 2023-10-25 PROCEDURE — 82570 ASSAY OF URINE CREATININE: CPT | Performed by: PHYSICIAN ASSISTANT

## 2023-10-25 PROCEDURE — 83036 HEMOGLOBIN GLYCOSYLATED A1C: CPT | Performed by: PHYSICIAN ASSISTANT

## 2023-10-25 RX ORDER — CARVEDILOL 12.5 MG/1
12.5 TABLET ORAL 2 TIMES DAILY WITH MEALS
Qty: 180 TABLET | Refills: 3 | Status: SHIPPED | OUTPATIENT
Start: 2023-10-25

## 2023-10-25 RX ORDER — LISINOPRIL 10 MG/1
20 TABLET ORAL EVERY MORNING
Qty: 180 TABLET | Refills: 3 | Status: SHIPPED | OUTPATIENT
Start: 2023-10-25

## 2023-10-25 RX ORDER — RESPIRATORY SYNCYTIAL VIRUS VACCINE 120MCG/0.5
0.5 KIT INTRAMUSCULAR ONCE
Qty: 1 EACH | Refills: 0 | Status: CANCELLED | OUTPATIENT
Start: 2023-10-25 | End: 2023-10-25

## 2023-10-25 ASSESSMENT — ENCOUNTER SYMPTOMS
EYE PAIN: 0
CONSTIPATION: 0
DIZZINESS: 0
FEVER: 0
HEMATURIA: 0
FREQUENCY: 1
DIARRHEA: 0
NERVOUS/ANXIOUS: 0
CHILLS: 0
DYSURIA: 0
BREAST MASS: 0
PARESTHESIAS: 0
ABDOMINAL PAIN: 0
SHORTNESS OF BREATH: 0
HEARTBURN: 0
HEMATOCHEZIA: 0
COUGH: 0
HEADACHES: 0
NAUSEA: 0
PALPITATIONS: 0
WEAKNESS: 0
SORE THROAT: 0
ARTHRALGIAS: 1
JOINT SWELLING: 1
MYALGIAS: 0

## 2023-10-25 ASSESSMENT — ACTIVITIES OF DAILY LIVING (ADL): CURRENT_FUNCTION: NO ASSISTANCE NEEDED

## 2023-10-25 NOTE — PATIENT INSTRUCTIONS
Patient Education   Personalized Prevention Plan  You are due for the preventive services outlined below.  Your care team is available to assist you in scheduling these services.  If you have already completed any of these items, please share that information with your care team to update in your medical record.  Health Maintenance Due   Topic Date Due     RSV VACCINE 60+ (1 - 1-dose 60+ series) Never done     Comprehensive Metabolic Panel  08/19/2022     Kidney Microalbumin Urine Test  10/21/2023     ANNUAL REVIEW OF HM ORDERS  10/21/2023     Diptheria Tetanus Pertussis (DTAP/TDAP/TD) Vaccine (5 - Td or Tdap) 10/01/2023     Annual Wellness Visit  10/21/2023     Hearing Loss: Care Instructions  Overview     Hearing loss is a sudden or slow decrease in how well you hear. It can range from slight to profound. Permanent hearing loss can occur with aging. It also can happen when you are exposed long-term to loud noise. Examples include listening to loud music, riding motorcycles, or being around other loud machines.  Hearing loss can affect your work and home life. It can make you feel lonely or depressed. You may feel that you have lost your independence. But hearing aids and other devices can help you hear better and feel connected to others.  Follow-up care is a key part of your treatment and safety. Be sure to make and go to all appointments, and call your doctor if you are having problems. It's also a good idea to know your test results and keep a list of the medicines you take.  How can you care for yourself at home?  Avoid loud noises whenever possible. This helps keep your hearing from getting worse.  Always wear hearing protection around loud noises.  Wear a hearing aid as directed.  A professional can help you pick a hearing aid that will work best for you.  You can also get hearing aids over the counter for mild to moderate hearing loss.  Have hearing tests as your doctor suggests. They can show whether your  "hearing has changed. Your hearing aid may need to be adjusted.  Use other devices as needed. These may include:  Telephone amplifiers and hearing aids that can connect to a television, stereo, radio, or microphone.  Devices that use lights or vibrations. These alert you to the doorbell, a ringing telephone, or a baby monitor.  Television closed-captioning. This shows the words at the bottom of the screen. Most new TVs can do this.  TTY (text telephone). This lets you type messages back and forth on the telephone instead of talking or listening. These devices are also called TDD. When messages are typed on the keyboard, they are sent over the phone line to a receiving TTY. The message is shown on a monitor.  Use text messaging, social media, and email if it is hard for you to communicate by telephone.  Try to learn a listening technique called speechreading. It is not lipreading. You pay attention to people's gestures, expressions, posture, and tone of voice. These clues can help you understand what a person is saying. Face the person you are talking to, and have them face you. Make sure the lighting is good. You need to see the other person's face clearly.  Think about counseling if you need help to adjust to your hearing loss.  When should you call for help?  Watch closely for changes in your health, and be sure to contact your doctor if:    You think your hearing is getting worse.     You have new symptoms, such as dizziness or nausea.   Where can you learn more?  Go to https://www.[employer].net/patiented  Enter R798 in the search box to learn more about \"Hearing Loss: Care Instructions.\"  Current as of: March 1, 2023               Content Version: 13.7    6991-4348 [employer].   Care instructions adapted under license by your healthcare professional. If you have questions about a medical condition or this instruction, always ask your healthcare professional. [employer] disclaims any " warranty or liability for your use of this information.      Bladder Training: Care Instructions  Your Care Instructions     Bladder training is used to treat urge incontinence and stress incontinence. Urge incontinence means that the need to urinate comes on so fast that you can't get to a toilet in time. Stress incontinence means that you leak urine because of pressure on your bladder. For example, it may happen when you laugh, cough, or lift something heavy.  Bladder training can increase how long you can wait before you have to urinate. It can also help your bladder hold more urine. And it can give you better control over the urge to urinate.  It is important to remember that bladder training takes a few weeks to a few months to make a difference. You may not see results right away, but don't give up.  Follow-up care is a key part of your treatment and safety. Be sure to make and go to all appointments, and call your doctor if you are having problems. It's also a good idea to know your test results and keep a list of the medicines you take.  How can you care for yourself at home?  Work with your doctor to come up with a bladder training program that is right for you. You may use one or more of the following methods.  Delayed urination  In the beginning, try to keep from urinating for 5 minutes after you first feel the need to go.  While you wait, take deep, slow breaths to relax. Kegel exercises can also help you delay the need to go to the bathroom.  After some practice, when you can easily wait 5 minutes to urinate, try to wait 10 minutes before you urinate.  Slowly increase the waiting period until you are able to control when you have to urinate.  Scheduled urination  Empty your bladder when you first wake up in the morning.  Schedule times throughout the day when you will urinate.  Start by going to the bathroom every hour, even if you don't need to go.  Slowly increase the time between trips to the  "bathroom.  When you have found a schedule that works well for you, keep doing it.  If you wake up during the night and have to urinate, do it. Apply your schedule to waking hours only.  Kegel exercises  These tighten and strengthen pelvic muscles, which can help you control the flow of urine. (If doing these exercises causes pain, stop doing them and talk with your doctor.) To do Kegel exercises:  Squeeze your muscles as if you were trying not to pass gas. Or squeeze your muscles as if you were stopping the flow of urine. Your belly, legs, and buttocks shouldn't move.  Hold the squeeze for 3 seconds, then relax for 5 to 10 seconds.  Start with 3 seconds, then add 1 second each week until you are able to squeeze for 10 seconds.  Repeat the exercise 10 times a session. Do 3 to 8 sessions a day.  When should you call for help?  Watch closely for changes in your health, and be sure to contact your doctor if:    Your incontinence is getting worse.     You do not get better as expected.   Where can you learn more?  Go to https://www.[employer].net/patiented  Enter V684 in the search box to learn more about \"Bladder Training: Care Instructions.\"  Current as of: March 1, 2023               Content Version: 13.7 2006-2023 [employer].   Care instructions adapted under license by your healthcare professional. If you have questions about a medical condition or this instruction, always ask your healthcare professional. [employer] disclaims any warranty or liability for your use of this information.         "

## 2023-10-25 NOTE — PROGRESS NOTES
"SUBJECTIVE:   Annabella is a 68 year old who presents for Preventive Visit.      10/25/2023     7:32 AM   Additional Questions   Roomed by Sophia TEMPLE CMA       Are you in the first 12 months of your Medicare coverage?  No    Healthy Habits:     In general, how would you rate your overall health?  Good    Frequency of exercise:  6-7 days/week    Duration of exercise:  Greater than 60 minutes    Do you usually eat at least 4 servings of fruit and vegetables a day, include whole grains    & fiber and avoid regularly eating high fat or \"junk\" foods?  Yes    Taking medications regularly:  Yes    Medication side effects:  None    Ability to successfully perform activities of daily living:  No assistance needed    Home Safety:  No safety concerns identified    Hearing Impairment:  Difficulty following a conversation in a noisy restaurant or crowded room and need to ask people to speak up or repeat themselves    In the past 6 months, have you been bothered by leaking of urine? Yes    In general, how would you rate your overall mental or emotional health?  Excellent    Additional concerns today:  Yes    New pt, establish care since previous PCP Dr. Moss retired.    Hx of R breast cancer Dx'd 2014 - underwent chemo and radiation. Followed by [facility] annually and on anastrozole until 5/2024. Had breast exam and mammogram in Sept which was normal. Has DEXA managed by them as well and is on fosamax.    Ongoing care with endocrinology for hx of subclinical hyperthyroidism. Reports had been losing weight - was 123 down to 110 and now is back up to 114. Reports her lipids and BS pre-diabetic so she changed her diet and really tried to cut out sugar - mainly eating fish and chicken so feels this could be culprit for her weight loss. Needs labs repeated per endo while here today.    Followed by urology for atrophic vaginitis with frequency and urgency. Uses topical estrogen. Was referred for sleep study to r/o JENNIFER due to nocturia - " "consult scheduled for Feb. Has also improved with vesicare and kegels. Now down from 3x per night to 1-2x nightly.    HTN with history of bicuspid aortic stenosis status post 23 mm St. Yordan mechanical aortic valve and tube conduit with reimplantation of her coronaries 2011. Hx of ascending aortic aneurysm and reports was advised to have echo for monitoring every 2-3 yrs - last completed 2021 showing   \"There is mild concentric left ventricular hypertrophy.  The visual ejection fraction is 55-60%.  The right ventricle is normal in structure, function and size.  There is moderate biatrial enlargement.  There is mild (1+) tricuspid regurgitation.  There is a mechanical aortic valve.  Mean gradient of 17 mm Hg which is upper limits of normal for this valve.  There is trace aortic regurgitation.\"  Was advised to see cardiology for recheck and pt wasn't aware of this  Denies any HF symptoms.    Reports BP had been higher during the day so med changed from lisinopril 10mg BID to 20mg in the AM. Tracks on home cuff - reports 111/69 yesterday. 122/80 yesterday at oncology.   Admits she always suffers a little with white coat syndrome     Will be in [place] 11/1-11/5.  Planning L TKA 11/13 - Dr. Jignesh Perry at [facility], will return for preop    Reports was seen in ER beginning of Oct after had flu and COVID vaccines - reports she had a significant reaction including vomiting and dizziness. Reports took about 1 week for HA and dizziness to resolve. Had normal head CT. Had labs and EKG done at this time.     Hx of colon polyps - done last in 9/2018 and repeat advised 5 yrs - due 2025    Would like to consider ENT consult for ongoing HL over the past 2 yrs. Bilateral.    The 10-year ASCVD risk score (Lasha QUEEN, et al., 2019) is: 9.7%    Values used to calculate the score:      Age: 68 years      Sex: Female      Is Non- : No      Diabetic: No      Tobacco smoker: No      Systolic Blood Pressure: 128 mmHg      " Is BP treated: Yes      HDL Cholesterol: 70 mg/dL      Total Cholesterol: 206 mg/dL    Today's PHQ-2 Score:       10/24/2023     5:25 PM   PHQ-2 ( 1999 Pf[employer])   Q1: Little interest or pleasure in doing things 0   Q2: Feeling down, depressed or hopeless 0   PHQ-2 Score 0   Q1: Little interest or pleasure in doing things Not at all   Q2: Feeling down, depressed or hopeless Not at all   PHQ-2 Score 0           Have you ever done Advance Care Planning? (For example, a Health Directive, POLST, or a discussion with a medical provider or your loved ones about your wishes): No, advance care planning information given to patient to review.  Patient declined advance care planning discussion at this time.       Fall risk  Fallen 2 or more times in the past year?: No  Any fall with injury in the past year?: No    Cognitive Screening   1) Repeat 3 items (Leader, Season, Table)    2) Clock draw: NORMAL  3) 3 item recall: Recalls 3 objects  Results: 3 items recalled: COGNITIVE IMPAIRMENT LESS LIKELY    Mini-CogTM Copyright AURELIANO Ch. Licensed by the author for use in [facility]; reprinted with permission (daniel@[facility]). All rights reserved.          Reviewed and updated as needed this visit by clinical staff    Allergies  Meds              Reviewed and updated as needed this visit by Provider                 Social History     Tobacco Use    Smoking status: Never    Smokeless tobacco: Never   Substance Use Topics    Alcohol use: Yes     Comment: rare             10/24/2023     5:23 PM   Alcohol Use   Prescreen: >3 drinks/day or >7 drinks/week? No     Do you have a current opioid prescription? No  Do you use any other controlled substances or medications that are not prescribed by a provider? None        Hypertension Follow-up    Do you check your blood pressure regularly outside of the clinic? Yes   Are you following a low salt diet? Yes  Are your blood pressures ever more than 140 on the top number (systolic) OR  more   than 90 on the bottom number (diastolic), for example 140/90? Yes    Current providers sharing in care for this patient include:   Patient Care Team:  Viky Tucker PA-C as PCP - General (Family Medicine)  Merle Baxter MD as Assigned Endocrinology Provider  Laura Washington PA-C as Physician Assistant  Karla Moss MD as Assigned PCP  Colin Miles MD as Assigned Musculoskeletal Provider  Colin Velasco DPM as Assigned Surgical Provider    The following health maintenance items are reviewed in Epic and correct as of today:  Health Maintenance   Topic Date Due    RSV VACCINE 60+ (1 - 1-dose 60+ series) Never done    ANNUAL REVIEW OF HM ORDERS  10/21/2023    DTAP/TDAP/TD IMMUNIZATION (5 - Td or Tdap) 10/01/2023    MEDICARE ANNUAL WELLNESS VISIT  10/21/2023    FALL RISK ASSESSMENT  10/30/2024    MAMMO SCREENING  09/13/2025    LIPID  10/21/2027    ADVANCE CARE PLANNING  10/25/2028    COLORECTAL CANCER SCREENING  09/18/2030    DEXA  12/22/2037    HEPATITIS C SCREENING  Completed    PHQ-2 (once per calendar year)  Completed    INFLUENZA VACCINE  Completed    Pneumococcal Vaccine: 65+ Years  Completed    ZOSTER IMMUNIZATION  Completed    COVID-19 Vaccine  Completed    IPV IMMUNIZATION  Aged Out    HPV IMMUNIZATION  Aged Out    MENINGITIS IMMUNIZATION  Aged Out    PAP  Discontinued     Patient Active Problem List   Diagnosis    Aortic valve replaced    Benign essential hypertension    Advanced directives, counseling/discussion    Long-term (current) use of anticoagulants [Z79.01]    Osteopenia of multiple sites    Hyperthyroidism    Adenomatous polyp of colon, unspecified part of colon    Personal history of malignant neoplasm of breast     Past Surgical History:   Procedure Laterality Date    BIOPSY NODE SENTINEL Right 09/10/2014    Procedure: BIOPSY NODE SENTINEL;  Surgeon: Ila Romano MD;  Location: [facility] OR    CARDIAC SURGERY  06/01/2011    aortic valve replacement     "ENT SURGERY      tonsillectomy    HRW PORT A CATH      INSERT PORT VASCULAR ACCESS Left 10/22/2014    Procedure: INSERT PORT VASCULAR ACCESS;  Surgeon: Ila Romano MD;  Location: [facility] OR    LUMPECTOMY BREAST WITH SEED LOCALIZATION Right 09/10/2014    Procedure: LUMPECTOMY BREAST WITH SEED LOCALIZATION;  Surgeon: Ila Romano MD;  Location: [facility] OR    ORTHOPEDIC SURGERY      shoulder, thumb surgery    REMOVE PORT VASCULAR ACCESS Left 04/08/2015    Procedure: REMOVE PORT VASCULAR ACCESS;  Surgeon: Ila Romano MD;  Location: [facility] OR    REPAIR ANEURYSM ASCENDING AORTA  06/23/2011    Procedure:REPAIR ANEURYSM ASCENDING AORTA; Medial Sternotomy, Aortic Valve Replacement, (St. Yordan Medical Masters HP Valved Graft, size 23 mm) on pump oxygenation, intraoperative transesophageal cardiogram; Surgeon:JOHN PAUL ULLOA; Location:[facility] OR    REPLACE VALVE AORTIC  06/23/2011    bicuspid AV with severe stenosis - 6/2011 mechanical AVR with 23 mm St Yordan AV conduit, composite graft placement       Social History     Tobacco Use    Smoking status: Never    Smokeless tobacco: Never   Substance Use Topics    Alcohol use: Yes     Comment: rare     Family History   Problem Relation Age of Onset    Hypertension Mother     Thyroid Disease Mother         \"Toxic thyroid\"    Prostate Cancer Father 70    Cancer Father 80        Lung cancer, Not caused from smoking    Cerebrovascular Disease Father 80    Hypertension Father     Cardiovascular Brother         half brother     Cardiovascular Son         Puentes-Parkinsons White Syndrome    Cardiovascular Daughter         Heart murmur    Breast Cancer Paternal Aunt 80    Cardiovascular Paternal Aunt     Arthritis Brother 40        RA - half brother     Cancer Maternal Grandfather     Colon Cancer No family hx of          Current Outpatient Medications   Medication Sig Dispense Refill    alendronate (FOSAMAX) 70 MG tablet Take 70 mg by mouth every 7 days      " anastrozole (ARIMIDEX) 1 MG tablet Take by mouth daily 30 tablet     ASPIRIN EC PO Take 81 mg by mouth daily      calcium citrate (CITRACAL) 950 (200 Ca) MG tablet Take 1 tablet by mouth 2 times daily      carvedilol (COREG) 12.5 MG tablet Take 1 tablet (12.5 mg) by mouth 2 times daily (with meals) 180 tablet 3    estradiol (ESTRACE) 0.1 MG/GM vaginal cream Apply blueberry sized amount to finger and rub inside vagina 3x/week at night. 40 g 3    lisinopril (ZESTRIL) 10 MG tablet Take 1 tablet (10 mg) by mouth 2 times daily 180 tablet 3    methimazole (TAPAZOLE) 5 MG tablet TAKE ONE-HALF (1/2) TABLET DAILY 45 tablet 3    solifenacin (VESICARE) 10 MG tablet Take 1 tablet (10 mg) by mouth daily 90 tablet 0    Vitamin D3 (CHOLECALCIFEROL) 25 mcg (1000 units) tablet Take by mouth daily      warfarin ANTICOAGULANT (COUMADIN) 5 MG tablet Take 5mg every Sun,Tues & Thur / Take 7.5mg all other days OR per INR clinic 110 tablet 1     Allergies   Allergen Reactions    Morphine Sulfate Nausea and Vomiting    Anesthesia S-I-40 [Propofol] Nausea and Vomiting     Mammogram Screening: Mammogram Screening - Alternate mammogram schedule due to breast cancer history followed by oncology annually        10/16/2022     5:52 PM 10/24/2023     5:28 PM   Breast CA Risk Assessment (FHS-7)   Do you have a family history of breast, colon, or ovarian cancer? Yes No / Unknown    No / Unknown       Pertinent mammograms are reviewed under the imaging tab.    Review of Systems   Constitutional:  Negative for chills and fever.   HENT:  Negative for congestion, ear pain, hearing loss and sore throat.    Eyes:  Negative for pain and visual disturbance.   Respiratory:  Negative for cough and shortness of breath.    Cardiovascular:  Negative for chest pain, palpitations and peripheral edema.   Gastrointestinal:  Negative for abdominal pain, constipation, diarrhea, heartburn, hematochezia and nausea.   Breasts:  Negative for tenderness, breast mass and  "discharge.   Genitourinary:  Positive for frequency and urgency. Negative for dysuria, genital sores, hematuria, pelvic pain, vaginal bleeding and vaginal discharge.   Musculoskeletal:  Positive for arthralgias and joint swelling. Negative for myalgias.   Skin:  Negative for rash.   Neurological:  Negative for dizziness, weakness, headaches and paresthesias.   Psychiatric/Behavioral:  Negative for mood changes. The patient is not nervous/anxious.          OBJECTIVE:   /86   Pulse 71   Temp (!) 96.7  F (35.9  C) (Tympanic)   Resp 16   Ht 1.6 m (5' 3\")   Wt 50.8 kg (112 lb)   LMP  (LMP Unknown)   SpO2 95%   BMI 19.84 kg/m   Estimated body mass index is 19.84 kg/m  as calculated from the following:    Height as of this encounter: 1.6 m (5' 3\").    Weight as of this encounter: 50.8 kg (112 lb).  Physical Exam  GENERAL: healthy, alert and no distress  EYES: Eyes grossly normal to inspection, PERRL and conjunctivae and sclerae normal  HENT: ear canals and TM's normal, nose and mouth without ulcers or lesions  NECK: no adenopathy, no asymmetry, masses, or scars and thyroid normal to palpation  RESP: lungs clear to auscultation - no rales, rhonchi or wheezes  BREAST: deferred as done by oncology  CV: regular rate and rhythm, crisp mechanical valve click noted best over RSB. No murmur or rub, no peripheral edema and peripheral pulses strong  ABDOMEN: soft, nontender, no hepatosplenomegaly, no masses and bowel sounds normal  MS: no gross musculoskeletal defects noted, no edema  SKIN: no suspicious lesions or rashes  NEURO: Normal strength and tone, mentation intact and speech normal  PSYCH: mentation appears normal, affect normal/bright    Diagnostic Test Results:  Labs reviewed in Epic    ASSESSMENT / PLAN:   Annabella was seen today for physical.    Diagnoses and all orders for this visit:    Encounter for Medicare annual wellness exam  -     New pt transfer care after previous PCP retired. Reviewed preventative " health recommendations for age.  - REVIEW OF HEALTH MAINTENANCE PROTOCOL ORDERS    Aortic valve replaced - history of bicuspid aortic stenosis status post 23 mm St. Yordan mechanical aortic valve and tube conduit with reimplantation of her coronaries 2011  Long-term (current) use of anticoagulants [Z79.01]  Benign essential hypertension  -     BP well controlled on current regimen. Due for repeat echo. Will also refer back to cardiology to re-establish care in light of upcoming surgery.  - carvedilol (COREG) 12.5 MG tablet; Take 1 tablet (12.5 mg) by mouth 2 times daily (with meals)  -     Echocardiogram Complete; Future  -     Adult Cardiology Eval  Referral; Future  -     lisinopril (ZESTRIL) 10 MG tablet; Take 2 tablets (20 mg) by mouth every morning  -     Albumin Random Urine Quantitative with Creat Ratio; Future    Subclinical hyperthyroidism  - Release labs from endocrine to recheck given reported weight loss. In hindsight, pt reports this may more likely be due to dietary changes after cutting out sugar when learned she was pre-DM. Will repeat given upcoming surgery for stability.    Elevated blood sugar  Screening for diabetes mellitus  -     Pt reports making positive dietary changes/following low carb/sugar diet. Repeat labs for monitoring.  - Glucose; Future  -     Hemoglobin A1c; Future  -     Glucose  -     Hemoglobin A1c    Personal history of malignant neoplasm of breast   -  R breast cancer Dx'd 2014 - underwent chemo and radiation. Followed by [facility] annually and on anastrozole until 5/2024. Had breast exam and mammogram in Sept which was normal. Has DEXA managed by them as well and is on fosamax.    Decreased hearing of both ears  -     Would like to consider ENT consult for ongoing HL over the past 2 yrs. Bilateral.  - Adult ENT  Referral; Future    Vaginal atrophy  Urinary frequency  Urinary urgency   -Followed by urology for atrophic vaginitis with frequency and urgency.  Uses topical estrogen. Was referred for sleep study to r/o JENNIFER due to nocturia - consult scheduled for Feb. Has also improved with vesicare and kegels. Now down from 3x per night to 1-2x nightly.    Lipid screening  -     Lipid panel reflex to direct LDL Fasting; Future  -     Lipid panel reflex to direct LDL Fasting    Other orders  -     PRIMARY CARE FOLLOW-UP SCHEDULING; Future        COUNSELING:  Reviewed preventive health counseling, as reflected in patient instructions       Regular exercise       Healthy diet/nutrition       Hearing screening       Bladder control       Fall risk prevention       Immunizations  Will obtain at the pharmacy           Osteoporosis prevention/bone health       Colon cancer screening        She reports that she has never smoked. She has never used smokeless tobacco.      Appropriate preventive services were discussed with this patient, including applicable screening as appropriate for fall prevention, nutrition, physical activity, Tobacco-use cessation, weight loss and cognition.  Checklist reviewing preventive services available has been given to the patient.    Reviewed patients plan of care and provided an AVS. The Complex Care Plan (for patients with higher acuity and needing more deliberate coordination of services) for Annabella meets the Care Plan requirement. This Care Plan has been established and reviewed with the Patient.          Viky Tucker PA-C  [facility] [place]    Identified Health Risks:  I have reviewed Opioid Use Disorder and Substance Use Disorder risk factors and made any needed referrals.   The patient was provided with written information regarding signs of hearing loss.  Information on urinary incontinence and treatment options given to patient.

## 2023-10-26 ENCOUNTER — HOSPITAL ENCOUNTER (OUTPATIENT)
Dept: CARDIOLOGY | Facility: CLINIC | Age: 68
Discharge: HOME | End: 2023-10-26
Attending: PHYSICIAN ASSISTANT | Admitting: PHYSICIAN ASSISTANT
Payer: COMMERCIAL

## 2023-10-26 DIAGNOSIS — I10 BENIGN ESSENTIAL HYPERTENSION: ICD-10-CM

## 2023-10-26 DIAGNOSIS — Z95.2 AORTIC VALVE REPLACED: ICD-10-CM

## 2023-10-26 LAB — LVEF ECHO: NORMAL

## 2023-10-26 PROCEDURE — 93306 TTE W/DOPPLER COMPLETE: CPT | Mod: 26 | Performed by: INTERNAL MEDICINE

## 2023-10-26 PROCEDURE — 93306 TTE W/DOPPLER COMPLETE: CPT

## 2023-10-27 ENCOUNTER — MYC MEDICAL ADVICE (OUTPATIENT)
Dept: UROLOGY | Facility: CLINIC | Age: 68
End: 2023-10-27
Payer: COMMERCIAL

## 2023-10-27 DIAGNOSIS — N39.41 URGE INCONTINENCE: ICD-10-CM

## 2023-10-27 NOTE — TELEPHONE ENCOUNTER
Patient calling stating she is going to [place] on 11/1 to 11/5.  She was unsure when she would need to start holding her warfarin but patient should be back in time to review hold and bridge plan.    YISSEL PeñaN, RN  An[facility] Clinic

## 2023-10-30 ENCOUNTER — OFFICE VISIT (OUTPATIENT)
Dept: FAMILY MEDICINE | Facility: CLINIC | Age: 68
End: 2023-10-30
Payer: COMMERCIAL

## 2023-10-30 ENCOUNTER — OFFICE VISIT (OUTPATIENT)
Dept: CARDIOLOGY | Facility: CLINIC | Age: 68
End: 2023-10-30
Attending: PHYSICIAN ASSISTANT
Payer: COMMERCIAL

## 2023-10-30 VITALS
HEART RATE: 68 BPM | WEIGHT: 113 LBS | RESPIRATION RATE: 16 BRPM | DIASTOLIC BLOOD PRESSURE: 68 MMHG | OXYGEN SATURATION: 97 % | SYSTOLIC BLOOD PRESSURE: 100 MMHG | HEIGHT: 63 IN | TEMPERATURE: 97.3 F | BODY MASS INDEX: 20.02 KG/M2

## 2023-10-30 VITALS
SYSTOLIC BLOOD PRESSURE: 130 MMHG | WEIGHT: 112 LBS | BODY MASS INDEX: 19.84 KG/M2 | DIASTOLIC BLOOD PRESSURE: 86 MMHG | HEIGHT: 63 IN | HEART RATE: 56 BPM

## 2023-10-30 DIAGNOSIS — Z95.2 AORTIC VALVE REPLACED: ICD-10-CM

## 2023-10-30 DIAGNOSIS — E05.90 SUBCLINICAL HYPERTHYROIDISM: ICD-10-CM

## 2023-10-30 DIAGNOSIS — R73.03 PRE-DIABETES: ICD-10-CM

## 2023-10-30 DIAGNOSIS — Z01.818 PREOP GENERAL PHYSICAL EXAM: Primary | ICD-10-CM

## 2023-10-30 DIAGNOSIS — I10 BENIGN ESSENTIAL HYPERTENSION: ICD-10-CM

## 2023-10-30 DIAGNOSIS — Z79.01 LONG TERM CURRENT USE OF ANTICOAGULANT THERAPY: ICD-10-CM

## 2023-10-30 DIAGNOSIS — M17.12 PRIMARY OSTEOARTHRITIS OF LEFT KNEE: ICD-10-CM

## 2023-10-30 DIAGNOSIS — E05.90 HYPERTHYROIDISM: ICD-10-CM

## 2023-10-30 PROCEDURE — 84443 ASSAY THYROID STIM HORMONE: CPT | Performed by: PHYSICIAN ASSISTANT

## 2023-10-30 PROCEDURE — 99215 OFFICE O/P EST HI 40 MIN: CPT | Performed by: PHYSICIAN ASSISTANT

## 2023-10-30 PROCEDURE — 84480 ASSAY TRIIODOTHYRONINE (T3): CPT | Performed by: PHYSICIAN ASSISTANT

## 2023-10-30 PROCEDURE — 36415 COLL VENOUS BLD VENIPUNCTURE: CPT | Performed by: PHYSICIAN ASSISTANT

## 2023-10-30 PROCEDURE — 99204 OFFICE O/P NEW MOD 45 MIN: CPT | Performed by: INTERNAL MEDICINE

## 2023-10-30 PROCEDURE — 84439 ASSAY OF FREE THYROXINE: CPT | Performed by: PHYSICIAN ASSISTANT

## 2023-10-30 RX ORDER — ZOLPIDEM TARTRATE 5 MG/1
5 TABLET ORAL
Status: CANCELLED | OUTPATIENT
Start: 2023-10-30

## 2023-10-30 RX ORDER — ZOLPIDEM TARTRATE 5 MG/1
5 TABLET ORAL
COMMUNITY
End: 2023-10-30

## 2023-10-30 RX ORDER — RESPIRATORY SYNCYTIAL VIRUS VACCINE 120MCG/0.5
0.5 KIT INTRAMUSCULAR ONCE
Qty: 1 EACH | Refills: 0 | Status: CANCELLED | OUTPATIENT
Start: 2023-10-30 | End: 2023-10-30

## 2023-10-30 NOTE — LETTER
10/30/2023    Viky Tucker PA-C  [address] [facility] [address]    RE: Annabella Bolanos       Dear Colleague,     I had the pleasure of seeing Annabella Bolanos in the [facility].  CARDIOLOGY CLINIC CONSULTATION    PRIMARY CARE PHYSICIAN:  Viky Tucker    Tests reviewed/interpreted independently in clinic today:   EKG: Sinus rhythm  Echocardiogram: Normal ejection fraction, normal gradient across the aortic prosthesis  Blood work: CBC BMP     The level of medical decision making during this visit was of moderate complexity.     HISTORY OF PRESENT ILLNESS:  Today, I had the pleasure of connecting with Annabella Bolanos.   She had aortic valve replacement with a 23 mm St. Yordan valve and a 23 mm tube conduit with reimplantation of her coronaries in 2011 for severe aortic  stenosis in the setting of bicuspid aortic valve.  She has previously been seen by one of my partnerswho is unable currently to see her because of scheduling issues.  The patient presents to the clinic for preoperative cardiovascular risk ratification before undergoing knee surgery.    She has been fairly active and does water aerobics on a very regular basis without any difficulty.  She is unable to walk understandably because of the knee issues but otherwise has been fairly active and does not report any cardiac symptoms on exerting herself.  This includes chest pain, shortness of breath, orthopnea, PND, lower extremity edema, presyncope or syncope.  Most recent lipid profile shows LDL cholesterol of 125 mg/dL.  She does not have known coronary artery disease.  Last echocardiogram as stated above shows normal BiV function and gradient across the aortic valve.    ASSESSMENT: Pertinent issues addressed/ reviewed during this cardiology visit    Bicuspid aortic stenosis status post 23 mm St. Yordan mechanical aortic valve and tube conduit with reimplantation of her coronaries.   Preoperative cardiovascular  stratification  Hyperlipidemia    RECOMMENDATIONS:  It was a pleasure to meet Ms. Bolanos today in clinic.  She is doing well from cardiac standpoint and does not have any major complaints.  From preoperative cardiovascular risk standpoint she is low risk for cardiovascular complication from an intermediate risk surgery and does not need any further cardiovascular work-up.    I note that she is on warfarin and the goal INR is between 2 and 3.  Because of her low overall thrombotic risk she does not need any bridging prior to knee replacement as long as the duration for which she needs to be off anticoagulation is small [3 to 4 days].  Anticoagulation should be restarted as soon as possible after surgery.  She should be started on a heparin drip and then bridged to Coumadin.  She is going to continue all her other current medications.  I am going to have her come back and see us in 1 year for follow-up visit.    No orders of the defined types were placed in this encounter.      PAST MEDICAL HISTORY:  Past Medical History:   Diagnosis Date    Adenomatous colon polyp 2015    Aortic stenosis 2011    bicuspid AV with severe stenosis - 2011 mechanical AVR with 23 mm St Yordan AV conduit, composite graft placement    Arthritis     knees and hands    Bicuspid aortic valve     Breast cancer (H) 2014    R breast    H/O aortic valve replacement     St Yordan    Heart murmur     Valve repalcement .    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    AAA repair with av23 mm tube graft    Palpitations     PONV (postoperative nausea and vomiting)     n/v morphine vs anesthesia, vomiting x24 hrs    Subclinical hyperthyroidism 2017    Thrombosis of leg     after  of daughter    Uncomplicated asthma        MEDICATIONS:  Current Outpatient Medications   Medication    alendronate  "(FOSAMAX) 70 MG tablet    anastrozole (ARIMIDEX) 1 MG tablet    ASPIRIN EC PO    calcium citrate (CITRACAL) 950 (200 Ca) MG tablet    carvedilol (COREG) 12.5 MG tablet    estradiol (ESTRACE) 0.1 MG/GM vaginal cream    lisinopril (ZESTRIL) 10 MG tablet    methimazole (TAPAZOLE) 5 MG tablet    Vitamin D3 (CHOLECALCIFEROL) 25 mcg (1000 units) tablet    warfarin ANTICOAGULANT (COUMADIN) 5 MG tablet    solifenacin (VESICARE) 10 MG tablet     No current facility-administered medications for this visit.       ALLERGIES:  Allergies   Allergen Reactions    Morphine Sulfate Nausea and Vomiting    Anesthesia S-I-40 [Propofol] Nausea and Vomiting       SOCIAL HISTORY:  I have reviewed this patient's social history and updated it with pertinent information if needed. Annabella Bolanos  reports that she has never smoked. She has never used smokeless tobacco. She reports current alcohol use. She reports that she does not use drugs.    FAMILY HISTORY:  I have reviewed this patient's family history and updated it with pertinent information if needed.   Family History   Problem Relation Age of Onset    Hypertension Mother     Thyroid Disease Mother         \"Toxic thyroid\"    Prostate Cancer Father 70    Cancer Father 80        Lung cancer, Not caused from smoking    Cerebrovascular Disease Father 80    Hypertension Father     Cardiovascular Brother         half brother     Cardiovascular Son         Puentes-Parkinsons White Syndrome    Cardiovascular Daughter         Heart murmur    Breast Cancer Paternal Aunt 80    Cardiovascular Paternal Aunt     Arthritis Brother 40        RA - half brother     Cancer Maternal Grandfather     Colon Cancer No family hx of        REVIEW OF SYSTEMS:  Skin:        Eyes:       ENT:       Respiratory:  Negative    Cardiovascular:  Negative    Gastroenterology:      Genitourinary:       Musculoskeletal:       Neurologic:       Psychiatric:       Heme/Lymph/Imm:  Positive for allergies  Endocrine:  Negative  "       PHYSICAL EXAM:      BP: 130/86 Pulse: 56            Vital Signs with Ranges  Temp:  [97.3  F (36.3  C)] 97.3  F (36.3  C)  Pulse:  [56-68] 68  Resp:  [16] 16  BP: (100-130)/(68-86) 100/68  SpO2:  [97 %] 97 %  112 lbs 0 oz    Constitutional: alert, no distress  Respiratory: Good bilateral air entry  Cardiovascular: s1 normal, S2 mechanical. Soft systolic murmur.   GI: nondistended  Neuropsychiatric: appropriate affact    It was a pleasure seeing this patient in clinic today. Please do not hesitate to contact me with any future questions.     Eduar ALVARADO, FACC, [facility]  Cardiology - [facility]  October 30, 2023    This note was completed in part using dictation via the Dragon voice recognition software. Some word and grammatical errors may occur and must be interpreted in the appropriate clinical context.  If there are any questions pertaining to this issue, please contact me for further clarification.    Thank you for allowing me to participate in the care of your patient.    Sincerely,     Eduar Ling MD   [facility]  cc:   Viky Tucker PA-C  [address] [place] [id number]

## 2023-10-30 NOTE — PROGRESS NOTES
[address] [address]  Phone: [phone]  Primary Provider: Elvin Tucker  Pre-op Performing Provider: ELVIN TUCKER      PREOPERATIVE EVALUATION:  Today's date: 10/30/2023    Annabella is a 68 year old female who presents for a preoperative evaluation.      10/30/2023     1:05 PM   Additional Questions   Roomed by Sophia TEMPLE CMA       Surgical Information:  Surgery/Procedure: Left Total Knee Arthroplasty   Surgery Location: [facility]   Surgeon: Jignesh Perry MD  Surgery Date: 11/13/2023  Time of Surgery: 7:20 AM  Where patient plans to recover: Other: at son's house for a few days   Fax number for surgical facility: Note does not need to be faxed, will be available electronically in Epic.    Assessment & Plan     The proposed surgical procedure is considered INTERMEDIATE risk.    Annabella was seen today for pre-op exam.    Diagnoses and all orders for this visit:    Preop general physical exam  Primary osteoarthritis of left knee   - chronic L knee pain due to OA for several years failing conservative management. Now pursuing replacement.     Benign essential hypertension   - Well controlled.    Aortic valve replaced - history of bicuspid aortic stenosis status post 23 mm [employer] mechanical aortic valve and tube conduit with reimplantation of her coronaries 2011  Long-term (current) use of anticoagulants [Z79.01]   - Last echo stable. Cleared by her cardiologist today to proceed with surgery.    Hyperthyroidism  Prediabetes   - Initially thought weight loss was attributed to this, but labs nl in July. In hindsight pt reports significantly changing diet with avoidance of sugar and feels weight loss secondary to this. Has normalized her eating habits without further weight loss. A1c is WNL.       Implanted Device:   - Type of device: 23 mm [employer] mechanical aortic valve  Patient advised to bring  device information on day of surgery.      Risks and Recommendations:  The patient has the following additional risks and recommendations for perioperative complications:   - No identified additional risk factors other than previously addressed    Antiplatelet or Anticoagulation Medication Instructions:   - aspirin: Discontinue aspirin 7 days prior to procedure to reduce bleeding risk. It should be resumed postoperatively.    - warfarin: Reviewed plan below with pt's cardiologist and will be orchestrated by INR clinic - see [facility] enc dated 10/10 for details.  Thromboembolic risk is moderate (4-10%/year). HOLD warfarin 5 days.  - Bridging therapy per INR clinic      Additional Medication Instructions:   - ACE/ARB: HOLD on day of surgery (minimum 11 hours for general anesthesia).   - Beta Blockers: Continue taking on the day of surgery.   - bisphosphonates (alendronate, ibandronate, risedronate): do not take day of surgery  Stop OTC vitamins 7 days before    RECOMMENDATION:  APPROVAL GIVEN to proceed with proposed procedure, without further diagnostic evaluation.      47 minutes spent by me on the date of the encounter doing chart review, history and exam, documentation and further activities per the note      Subjective       HPI related to upcoming procedure: chronic L knee pain due to OA for several years failing conservative management. Now pursuing replacement.         10/28/2023     6:46 PM   Preop Questions   1. Have you ever had a heart attack or stroke? No   2. Have you ever had surgery on your heart or blood vessels, such as a stent placement, a coronary artery bypass, or surgery on an artery in your head, neck, heart, or legs? YES - Aortic valve replaced - history of bicuspid aortic stenosis status post 23 mm [employer] mechanical aortic valve and tube conduit with reimplantation of her coronaries 2011    3. Do you have chest pain with activity? No   4. Do you have a history of  heart failure? No   5. Do you  "currently have a cold, bronchitis or symptoms of other infection? No   6. Do you have a cough, shortness of breath, or wheezing? No   7. Do you or anyone in your family have previous history of blood clots? YES - reports \"clot\" in her leg during pregnancy following childbirth so stayed in hospital extra day, but unsure if this was superficial or deep. Reports was not put on anticoagulation then and never had hx of VTE otherwise.   8. Do you or does anyone in your family have a serious bleeding problem such as prolonged bleeding following surgeries or cuts? No   9. Have you ever had problems with anemia or been told to take iron pills? No   10. Have you had any abnormal blood loss such as black, tarry or bloody stools, or abnormal vaginal bleeding? No   11. Have you ever had a blood transfusion? UNKNOWN - not that she is aware of   12. Are you willing to have a blood transfusion if it is medically needed before, during, or after your surgery? Yes   13. Have you or any of your relatives ever had problems with anesthesia? YES - vomited for >24 hours following her AVR surgery   14. Do you have sleep apnea, excessive snoring or daytime drowsiness? No   15. Do you have any artifical heart valves or other implanted medical devices like a pacemaker, defibrillator, or continuous glucose monitor? YES - AVR per above   15a. What type of device do you have? mechanical aortic heart valve   15b. Name of the clinic that manages your device:  from [facility]   16. Do you have artificial joints? No   17. Are you allergic to latex? No       Health Care Directive:  Patient does not have a Health Care Directive or Living Will: Discussed advance care planning with patient; however, patient declined at this time.  Has info at home and plans to complete one.    Preoperative Review of :   reviewed - no record of controlled substances prescribed.      Status of Chronic Conditions:  HYPERTENSION - Patient has longstanding history " of HTN , currently denies any symptoms referable to elevated blood pressure. Specifically denies chest pain, palpitations, dyspnea, orthopnea, PND or peripheral edema. Blood pressure readings have been in normal range. Current medication regimen is as listed below. Patient denies any side effects of medication.     Aortic valve replaced - history of bicuspid aortic stenosis status post 23 mm [employer] mechanical aortic valve and tube conduit with reimplantation of her coronaries 2011. Had echo repeated 10/26/23 showing the following:  Interpretation Summary  Left ventricular systolic function is normal.  The visual ejection fraction is 55-60%.  No regional wall motion abnormalities noted.  There is a mechanical aortic valve.  (23mm [employer] mechanical valve)  The gradient is normal for this prosthetic aortic valve.  The prosthetic aortic valve is not well visualized.  There is mild (1+) aortic regurgitation.  The study was technically difficult. Compared to the prior study dated 8/21,  there have been no changes.    Saw cardiology earlier today, cleared to proceed with surgery as planned.    Hx of intermittent insomnia - seems to go in streaks. Requests refill of ambien 2.5mg to keep on hand. In hindsight wonders if attributed to her arthritis as this goes in streaks as well so not sure if correlates to this. Has been in a good pattern recently and arthritis is less bothersome. Has used melatonin in lieu of ambien and seems to work just as good. #30 of 5mg has lasted her 3 yrs.     Review of Systems  Constitutional, neuro, ENT, endocrine, pulmonary, cardiac, gastrointestinal, genitourinary, musculoskeletal, integument and psychiatric systems are negative, except as otherwise noted.    Patient Active Problem List    Diagnosis Date Noted    Personal history of malignant neoplasm of breast 07/18/2019     Priority: Medium    Adenomatous polyp of colon, unspecified part of colon 07/26/2018     Priority: Medium     Hyperthyroidism 2017     Priority: Medium    Osteopenia of multiple sites 2016     Priority: Medium    Long-term (current) use of anticoagulants [Z79.01] 2016     Priority: Medium    Advanced directives, counseling/discussion 2015     Priority: Medium     Pt states she has the information at home and will bring a copy in when she has it completed. 6/2/15      Aortic valve replaced - history of bicuspid aortic stenosis status post 23 mm [employer] mechanical aortic valve and tube conduit with reimplantation of her coronaries       Priority: Medium     [facility]      Benign essential hypertension      Priority: Medium      Past Medical History:   Diagnosis Date    Adenomatous colon polyp 2015    Aortic stenosis 2011    bicuspid AV with severe stenosis - 2011 mechanical AVR with 23 mm [employer] AV conduit, composite graft placement    Arthritis     knees and hands    Bicuspid aortic valve     Breast cancer (H) 2014    R breast    H/O aortic valve replacement     [facility]    Heart murmur     Valve repalcement .    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    AAA repair with av23 mm tube graft    Palpitations     PONV (postoperative nausea and vomiting)     n/v morphine vs anesthesia, vomiting x24 hrs    Subclinical hyperthyroidism 2017    Thrombosis of leg     after  of daughter    Uncomplicated asthma      Past Surgical History:   Procedure Laterality Date    BIOPSY NODE SENTINEL Right 09/10/2014    Procedure: BIOPSY NODE SENTINEL;  Surgeon: Ila Romano MD;  Location:  OR    CARDIAC SURGERY  2011    aortic valve replacement    ENT SURGERY      tonsillectomy    HRW PORT A CATH      INSERT PORT VASCULAR ACCESS Left 10/22/2014    Procedure: INSERT PORT VASCULAR ACCESS;  Surgeon: Ila Romano MD;  Location:  OR     LUMPECTOMY BREAST WITH SEED LOCALIZATION Right 09/10/2014    Procedure: LUMPECTOMY BREAST WITH SEED LOCALIZATION;  Surgeon: Ila Romano MD;  Location: [facility] OR    ORTHOPEDIC SURGERY      shoulder, thumb surgery    REMOVE PORT VASCULAR ACCESS Left 04/08/2015    Procedure: REMOVE PORT VASCULAR ACCESS;  Surgeon: Ila Romano MD;  Location: [facility] OR    REPAIR ANEURYSM ASCENDING AORTA  06/23/2011    Procedure:REPAIR ANEURYSM ASCENDING AORTA; Medial Sternotomy, Aortic Valve Replacement, ([employer] HP Valved Graft, size 23 mm) on pump oxygenation, intraoperative transesophageal cardiogram; Surgeon:JOHN PAUL ULLOA; Location:[facility] OR    REPLACE VALVE AORTIC  06/23/2011    bicuspid AV with severe stenosis - 6/2011 mechanical AVR with 23 mm [facility] AV conduit, composite graft placement     Current Outpatient Medications   Medication Sig Dispense Refill    alendronate (FOSAMAX) 70 MG tablet Take 70 mg by mouth every 7 days      anastrozole (ARIMIDEX) 1 MG tablet Take by mouth daily 30 tablet     ASPIRIN EC PO Take 81 mg by mouth daily      calcium citrate (CITRACAL) 950 (200 Ca) MG tablet Take 1 tablet by mouth 2 times daily      carvedilol (COREG) 12.5 MG tablet Take 1 tablet (12.5 mg) by mouth 2 times daily (with meals) 180 tablet 3    estradiol (ESTRACE) 0.1 MG/GM vaginal cream Apply blueberry sized amount to finger and rub inside vagina 3x/week at night. 40 g 3    lisinopril (ZESTRIL) 10 MG tablet Take 2 tablets (20 mg) by mouth every morning 180 tablet 3    methimazole (TAPAZOLE) 5 MG tablet TAKE ONE-HALF (1/2) TABLET DAILY 45 tablet 3    solifenacin (VESICARE) 10 MG tablet Take 1 tablet (10 mg) by mouth daily 90 tablet 0    Vitamin D3 (CHOLECALCIFEROL) 25 mcg (1000 units) tablet Take by mouth daily      warfarin ANTICOAGULANT (COUMADIN) 5 MG tablet Take 5mg every Sun,Tues & Thur / Take 7.5mg all other days OR per INR clinic 110 tablet 1    zolpidem (AMBIEN) 5 MG tablet Take 5 mg by  "mouth nightly as needed for sleep         Allergies   Allergen Reactions    Morphine Sulfate Nausea and Vomiting    Anesthesia S-I-40 [Propofol] Nausea and Vomiting        Social History     Tobacco Use    Smoking status: Never    Smokeless tobacco: Never   Substance Use Topics    Alcohol use: Yes     Comment: rare       History   Drug Use No         Objective     /68   Pulse 68   Temp 97.3  F (36.3  C) (Tympanic)   Resp 16   Ht 1.6 m (5' 3\")   Wt 51.3 kg (113 lb)   LMP  (LMP Unknown)   SpO2 97%   BMI 20.02 kg/m      Physical Exam    GENERAL APPEARANCE: healthy, alert and no distress     EYES: EOMI, PERRL     HENT: ear canals and TM's normal and nose and mouth without ulcers or lesions     NECK: no adenopathy, no asymmetry, masses, or scars and thyroid normal to palpation     RESP: lungs clear to auscultation - no rales, rhonchi or wheezes     CV: regular rate and rhythm, crisp mechanical valve click noted best over RSB. No murmur or rub, no peripheral edema and peripheral pulses strong      ABDOMEN:  soft, nontender, no HSM or masses and bowel sounds normal     MS: extremities normal- no gross deformities noted, no evidence of inflammation in joints, FROM in all extremities.     SKIN: no suspicious lesions or rashes     NEURO: Normal strength and tone, sensory exam grossly normal, mentation intact and speech normal     PSYCH: mentation appears normal. and affect normal/bright     LYMPHATICS: No cervical adenopathy    Recent Labs   Lab Test 10/25/23  0911 10/10/23  1618 10/03/23  0909 11/14/22  1117 10/21/22  0913   HGB  --   --  13.4  --  14.2   PLT  --   --  202  --   --    INR  --  2.3* 2.29*   < >  --    NA  --   --  138  --  140   POTASSIUM  --   --  3.7  --  4.2   CR  --   --  0.63  --  0.64   A1C 5.3  --   --   --   --     < > = values in this interval not displayed.        Diagnostics:  Recent Results (from the past 720 hour(s))   Basic metabolic panel (BMP)    Collection Time: 10/03/23  9:09 " AM   Result Value Ref Range    Sodium 138 135 - 145 mmol/L    Potassium 3.7 3.4 - 5.3 mmol/L    Chloride 99 98 - 107 mmol/L    Carbon Dioxide (CO2) 28 22 - 29 mmol/L    Anion Gap 11 7 - 15 mmol/L    Urea Nitrogen 17.7 8.0 - 23.0 mg/dL    Creatinine 0.63 0.51 - 0.95 mg/dL    GFR Estimate >90 >60 mL/min/1.73m2    Calcium 9.3 8.8 - 10.2 mg/dL    Glucose 131 (H) 70 - 99 mg/dL   CBC with platelets and differential    Collection Time: 10/03/23  9:09 AM   Result Value Ref Range    WBC Count 4.2 4.0 - 11.0 10e3/uL    RBC Count 4.24 3.80 - 5.20 10e6/uL    Hemoglobin 13.4 11.7 - 15.7 g/dL    Hematocrit 40.1 35.0 - 47.0 %    MCV 95 78 - 100 fL    MCH 31.6 26.5 - 33.0 pg    MCHC 33.4 31.5 - 36.5 g/dL    RDW 11.8 10.0 - 15.0 %    Platelet Count 202 150 - 450 10e3/uL    % Neutrophils 71 %    % Lymphocytes 18 %    % Monocytes 10 %    Mids % (Monos, Eos, Basos)      % Eosinophils 0 %    % Basophils 1 %    % Immature Granulocytes 0 %    NRBCs per 100 WBC 0 <1 /100    Absolute Neutrophils 2.9 1.6 - 8.3 10e3/uL    Absolute Lymphocytes 0.8 0.8 - 5.3 10e3/uL    Absolute Monocytes 0.4 0.0 - 1.3 10e3/uL    Mids Abs (Monos, Eos, Basos)      Absolute Eosinophils 0.0 0.0 - 0.7 10e3/uL    Absolute Basophils 0.0 0.0 - 0.2 10e3/uL    Absolute Immature Granulocytes 0.0 <=0.4 10e3/uL    Absolute NRBCs 0.0 10e3/uL   Extra Blue Top Tube    Collection Time: 10/03/23  9:09 AM   Result Value Ref Range    Hold Specimen JIC    Extra Red Top Tube    Collection Time: 10/03/23  9:09 AM   Result Value Ref Range    Hold Specimen JIC    INR    Collection Time: 10/03/23  9:09 AM   Result Value Ref Range    INR 2.29 (H) 0.85 - 1.15   Hepatic function panel    Collection Time: 10/03/23  9:09 AM   Result Value Ref Range    Protein Total 6.4 6.4 - 8.3 g/dL    Albumin 4.1 3.5 - 5.2 g/dL    Bilirubin Total 0.5 <=1.2 mg/dL    Alkaline Phosphatase 63 35 - 104 U/L    AST 22 0 - 45 U/L    ALT 18 0 - 50 U/L    Bilirubin Direct <0.20 0.00 - 0.30 mg/dL   Troponin T, High  Sensitivity    Collection Time: 10/03/23  9:09 AM   Result Value Ref Range    Troponin T, High Sensitivity 13 <=14 ng/L   Symptomatic Influenza A/B, RSV, & SARS-CoV2 PCR (COVID-19) Nasopharyngeal    Collection Time: 10/03/23 10:45 AM    Specimen: Nasopharyngeal; Swab   Result Value Ref Range    Influenza A PCR Negative Negative    Influenza B PCR Negative Negative    RSV PCR Negative Negative    SARS CoV2 PCR Negative Negative   EKG 12-lead, tracing only    Collection Time: 10/03/23 11:44 AM   Result Value Ref Range    Systolic Blood Pressure  mmHg    Diastolic Blood Pressure  mmHg    Ventricular Rate 51 BPM    Atrial Rate 51 BPM    TN Interval 136 ms    QRS Duration 86 ms     ms    QTc 440 ms    P Axis 56 degrees    R AXIS 14 degrees    T Axis 51 degrees    Interpretation ECG       Sinus bradycardia with Premature atrial complexes  Otherwise normal ECG  When compared with ECG of 24-JUN-2011 03:58,  Premature atrial complexes are now Present  Vent. rate has decreased BY  25 BPM  ST no longer elevated in Anterior leads  T wave inversion no longer evident in Lateral leads  Confirmed by - EMERGENCY ROOM, PHYSICIAN ([id number]),  YOSEF EMERY ([id number]) on 10/3/2023 12:23:33 PM     UA with Microscopic reflex to Culture    Collection Time: 10/03/23  1:31 PM    Specimen: Urine, Clean Catch   Result Value Ref Range    Color Urine Light Yellow Colorless, Straw, Light Yellow, Yellow    Appearance Urine Clear Clear    Glucose Urine Negative Negative mg/dL    Bilirubin Urine Negative Negative    Ketones Urine 10 (A) Negative mg/dL    Specific Gravity Urine 1.014 1.003 - 1.035    Blood Urine Negative Negative    pH Urine 6.5 5.0 - 7.0    Protein Albumin Urine Negative Negative mg/dL    Urobilinogen Urine Normal Normal, 2.0 mg/dL    Nitrite Urine Negative Negative    Leukocyte Esterase Urine Negative Negative    Mucus Urine Present (A) None Seen /LPF    RBC Urine 1 <=2 /HPF    WBC Urine 2 <=5 /HPF    Squamous Epithelials  Urine 1 <=1 /HPF   Troponin T, High Sensitivity    Collection Time: 10/03/23  2:48 PM   Result Value Ref Range    Troponin T, High Sensitivity 10 <=14 ng/L   INR point of care    Collection Time: 10/10/23  4:18 PM   Result Value Ref Range    INR 2.3 (H) 0.9 - 1.1   Lipid panel reflex to direct LDL Fasting    Collection Time: 10/25/23  9:11 AM   Result Value Ref Range    Cholesterol 206 (H) <200 mg/dL    Triglycerides 75 <150 mg/dL    Direct Measure HDL 66 >=50 mg/dL    LDL Cholesterol Calculated 125 (H) <=100 mg/dL    Non HDL Cholesterol 140 (H) <130 mg/dL   Glucose    Collection Time: 10/25/23  9:11 AM   Result Value Ref Range    Glucose 103 (H) 70 - 99 mg/dL    Patient Fasting > 8hrs? Yes    Hemoglobin A1c    Collection Time: 10/25/23  9:11 AM   Result Value Ref Range    Hemoglobin A1C 5.3 0.0 - 5.6 %   Albumin Random Urine Quantitative with Creat Ratio    Collection Time: 10/25/23  9:20 AM   Result Value Ref Range    Creatinine Urine mg/dL 35.4 mg/dL    Albumin Urine mg/L <12.0 mg/L    Albumin Urine mg/g Cr     Echocardiogram Complete    Collection Time: 10/26/23 11:55 AM   Result Value Ref Range    LVEF  55-60%       No EKG this visit, completed in the last 90 days.    The 10-year ASCVD risk score (Lasha QUEEN, et al., 2019) is: 6.1%    Values used to calculate the score:      Age: 68 years      Sex: Female      Is Non- : No      Diabetic: No      Tobacco smoker: No      Systolic Blood Pressure: 100 mmHg      Is BP treated: Yes      HDL Cholesterol: 66 mg/dL      Total Cholesterol: 206 mg/dL      Revised Cardiac Risk Index (RCRI):  The patient has the following serious cardiovascular risks for perioperative complications:   - No serious cardiac risks = 0 points     RCRI Interpretation: 0 points: Class I (very low risk - 0.4% complication rate)         Signed Electronically by: Viky Tucker PA-C  Copy of this evaluation report is provided to requesting physician.

## 2023-10-30 NOTE — PROGRESS NOTES
CARDIOLOGY CLINIC CONSULTATION    PRIMARY CARE PHYSICIAN:  Viky Tucker    Tests reviewed/interpreted independently in clinic today:   EKG: Sinus rhythm  Echocardiogram: Normal ejection fraction, normal gradient across the aortic prosthesis  Blood work: CBC BMP     The level of medical decision making during this visit was of moderate complexity.     HISTORY OF PRESENT ILLNESS:  Today, I had the pleasure of connecting with Annabella Bolanos.   She had aortic valve replacement with a 23 mm St. Yordan valve and a 23 mm tube conduit with reimplantation of her coronaries in 2011 for severe aortic  stenosis in the setting of bicuspid aortic valve.  She has previously been seen by one of my partnerswho is unable currently to see her because of scheduling issues.  The patient presents to the clinic for preoperative cardiovascular risk ratification before undergoing knee surgery.    She has been fairly active and does water aerobics on a very regular basis without any difficulty.  She is unable to walk understandably because of the knee issues but otherwise has been fairly active and does not report any cardiac symptoms on exerting herself.  This includes chest pain, shortness of breath, orthopnea, PND, lower extremity edema, presyncope or syncope.  Most recent lipid profile shows LDL cholesterol of 125 mg/dL.  She does not have known coronary artery disease.  Last echocardiogram as stated above shows normal BiV function and gradient across the aortic valve.    ASSESSMENT: Pertinent issues addressed/ reviewed during this cardiology visit    Bicuspid aortic stenosis status post 23 mm St. Yordan mechanical aortic valve and tube conduit with reimplantation of her coronaries.   Preoperative cardiovascular stratification  Hyperlipidemia    RECOMMENDATIONS:  It was a pleasure to meet Ms. Bolanos today in clinic.  She is doing well from cardiac standpoint and does not have any major complaints.  From preoperative cardiovascular risk  standpoint she is low risk for cardiovascular complication from an intermediate risk surgery and does not need any further cardiovascular work-up.    I note that she is on warfarin and the goal INR is between 2 and 3.  Because of her low overall thrombotic risk she does not need any bridging prior to knee replacement as long as the duration for which she needs to be off anticoagulation is small [3 to 4 days].  Anticoagulation should be restarted as soon as possible after surgery.  She should be started on a heparin drip and then bridged to Coumadin.  She is going to continue all her other current medications.  I am going to have her come back and see us in 1 year for follow-up visit.    No orders of the defined types were placed in this encounter.      PAST MEDICAL HISTORY:  Past Medical History:   Diagnosis Date    Adenomatous colon polyp 2015    Aortic stenosis 2011    bicuspid AV with severe stenosis - 2011 mechanical AVR with 23 mm St Yordan AV conduit, composite graft placement    Arthritis     knees and hands    Bicuspid aortic valve     Breast cancer (H) 2014    R breast    H/O aortic valve replacement     St Yordan    Heart murmur     Valve repalcement .    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    AAA repair with av23 mm tube graft    Palpitations     PONV (postoperative nausea and vomiting)     n/v morphine vs anesthesia, vomiting x24 hrs    Subclinical hyperthyroidism 2017    Thrombosis of leg     after  of daughter    Uncomplicated asthma        MEDICATIONS:  Current Outpatient Medications   Medication    alendronate (FOSAMAX) 70 MG tablet    anastrozole (ARIMIDEX) 1 MG tablet    ASPIRIN EC PO    calcium citrate (CITRACAL) 950 (200 Ca) MG tablet    carvedilol (COREG) 12.5 MG tablet    estradiol (ESTRACE) 0.1 MG/GM vaginal cream    lisinopril  "(ZESTRIL) 10 MG tablet    methimazole (TAPAZOLE) 5 MG tablet    Vitamin D3 (CHOLECALCIFEROL) 25 mcg (1000 units) tablet    warfarin ANTICOAGULANT (COUMADIN) 5 MG tablet    solifenacin (VESICARE) 10 MG tablet     No current facility-administered medications for this visit.       ALLERGIES:  Allergies   Allergen Reactions    Morphine Sulfate Nausea and Vomiting    Anesthesia S-I-40 [Propofol] Nausea and Vomiting       SOCIAL HISTORY:  I have reviewed this patient's social history and updated it with pertinent information if needed. Annabella Bolanos  reports that she has never smoked. She has never used smokeless tobacco. She reports current alcohol use. She reports that she does not use drugs.    FAMILY HISTORY:  I have reviewed this patient's family history and updated it with pertinent information if needed.   Family History   Problem Relation Age of Onset    Hypertension Mother     Thyroid Disease Mother         \"Toxic thyroid\"    Prostate Cancer Father 70    Cancer Father 80        Lung cancer, Not caused from smoking    Cerebrovascular Disease Father 80    Hypertension Father     Cardiovascular Brother         half brother     Cardiovascular Son         Puentes-Parkinsons White Syndrome    Cardiovascular Daughter         Heart murmur    Breast Cancer Paternal Aunt 80    Cardiovascular Paternal Aunt     Arthritis Brother 40        RA - half brother     Cancer Maternal Grandfather     Colon Cancer No family hx of        REVIEW OF SYSTEMS:  Skin:        Eyes:       ENT:       Respiratory:  Negative    Cardiovascular:  Negative    Gastroenterology:      Genitourinary:       Musculoskeletal:       Neurologic:       Psychiatric:       Heme/Lymph/Imm:  Positive for allergies  Endocrine:  Negative        PHYSICAL EXAM:      BP: 130/86 Pulse: 56            Vital Signs with Ranges  Temp:  [97.3  F (36.3  C)] 97.3  F (36.3  C)  Pulse:  [56-68] 68  Resp:  [16] 16  BP: (100-130)/(68-86) 100/68  SpO2:  [97 %] 97 %  112 lbs 0 " oz    Constitutional: alert, no distress  Respiratory: Good bilateral air entry  Cardiovascular: s1 normal, S2 mechanical. Soft systolic murmur.   GI: nondistended  Neuropsychiatric: appropriate affact    It was a pleasure seeing this patient in clinic today. Please do not hesitate to contact me with any future questions.     Eduar ALVARADO, FACC, [facility], [facility]  Cardiology - [facility]  October 30, 2023    This note was completed in part using dictation via the Dragon voice recognition software. Some word and grammatical errors may occur and must be interpreted in the appropriate clinical context.  If there are any questions pertaining to this issue, please contact me for further clarification.

## 2023-10-31 LAB
T3 SERPL-MCNC: 92 NG/DL (ref 85–202)
T4 FREE SERPL-MCNC: 1.69 NG/DL (ref 0.9–1.7)
TSH SERPL DL<=0.005 MIU/L-ACNC: 0.53 UIU/ML (ref 0.3–4.2)

## 2023-10-31 RX ORDER — SOLIFENACIN SUCCINATE 10 MG/1
10 TABLET, FILM COATED ORAL DAILY
Qty: 90 TABLET | Refills: 1 | Status: SHIPPED | OUTPATIENT
Start: 2023-10-31

## 2023-11-01 ENCOUNTER — DOCUMENTATION ONLY (OUTPATIENT)
Dept: FAMILY MEDICINE | Facility: CLINIC | Age: 68
End: 2023-11-01
Payer: COMMERCIAL

## 2023-11-01 DIAGNOSIS — Z95.2 S/P AORTIC VALVE REPLACEMENT: ICD-10-CM

## 2023-11-01 DIAGNOSIS — Z95.2 AORTIC VALVE REPLACED: Primary | ICD-10-CM

## 2023-11-01 RX ORDER — WARFARIN SODIUM 5 MG/1
TABLET ORAL
Qty: 110 TABLET | Refills: 1 | Status: SHIPPED | OUTPATIENT
Start: 2023-11-01 | End: 2024-07-30

## 2023-11-01 NOTE — RESULT ENCOUNTER NOTE
Result(s) was/were reviewed in the clinic with patient at time of appointment.  Electronically Signed By: Viky Tucker PA-C

## 2023-11-01 NOTE — TELEPHONE ENCOUNTER
ANTICOAGULATION MANAGEMENT:  Medication Refill    Anticoagulation Summary  As of 10/10/2023      Warfarin maintenance plan:  7.5 mg (5 mg x 1.5) every Mon, Wed, Fri; 5 mg (5 mg x 1) all other days   Next INR check:  11/21/2023   Target end date:  Indefinite    Indications    Long-term (current) use of anticoagulants [Z79.01] [Z79.01]  Aortic valve replaced [Z95.2]                 Anticoagulation Care Providers       Provider Role Specialty Phone number    Brook Echavarria MD Referring Internal Medicine [phone]    AjayKarla nicole MD Referring Internal Medicine [phone]            Refill Criteria    Visit with referring provider/group: Meets criteria: office visit within referring provider group in the last 1 year on 10/25/23    ACC referral signed last signed: 05/23/2023; within last year: Yes; however, patient just established with new PCP so I will route referral renewal to new PCP.    Lab monitoring not exceeding 2 weeks overdue: Yes    Annabella meets all criteria for refill. Rx instructions and quantity supplied updated to match patient's current dosing plan. Warfarin 90 day supply with 1 refill granted per [facility] protocol     Marybeth Stock, RN  Anticoagulation Clinic

## 2023-11-01 NOTE — PROGRESS NOTES
ANTICOAGULATION CLINIC REFERRAL RENEWAL REQUEST       An annual renewal order is required for all patients referred to [facility].?  Please review and sign the pended referral order for Annabella WINDY Bolanos.       ANTICOAGULATION SUMMARY      Warfarin indication(s)   Mechanical AVR    Mechanical heart valve present?  Mechanical  AVR-Bileaflet       Current goal range   INR: 2.0-3.0     Goal appropriate for indication? Goal INR 2-3, standard for indication(s) above     Time in Therapeutic Range (TTR)  (Goal > 60%) 85%       Office visit with referring provider's group within last year yes on 10/30/23       Marybeth Stock RN  [facility]

## 2023-11-01 NOTE — RESULT ENCOUNTER NOTE
Result(s) was/were reviewed in the clinic with patient at time of appointment with cardiology.  Electronically Signed By: Viky Tucker PA-C

## 2023-11-03 NOTE — TELEPHONE ENCOUNTER
Forwarded to pt's cardiologist for review as different from their initial recommendations.    Viky Tucker PA-C

## 2023-11-03 NOTE — TELEPHONE ENCOUNTER
PERCY-PROCEDURAL ANTICOAGULATION  MANAGEMENT    ASSESSMENT     Warfarin interruption plan for left TKA on 11/13/23.    Indication for Anticoagulation: Mechanical AVR    Prior DVT  HTN  Dr. Ling, cardiology OV 10/30/23 -  note that she is on warfarin and the goal INR is between 2 and 3.  Because of her low overall thrombotic risk she does not need any bridging prior to knee replacement as long as the duration for which she needs to be off anticoagulation is small [3 to 4 days].  Anticoagulation should be restarted as soon as possible after surgery.  She should be started on a heparin drip and then bridged to Coumadin.   Expected stay is 1 night, therefore heparin bridging is not an option    Percy-Procedure Risk stratification for thromboembolism: moderate (2022 Chest guidelines)    AVR: 2020 AHA/ACC Management of Valvular Heart disease guidelines suggests bridging is reasonable on individual basis with risk of bleeding weighed against risks of clotting for mechanical AVR patients with risk factors for thrombosis (Afib, previous thromboembolism, hypercoagulable condition, LV systolic dysfunction, older generation valves, or > 1 valve)  AVR/MVR: 2022 Chest Perioperative Management guideline suggests no bridging for mechanical heart valves except select patients at high thromboembolic risk such as with an older-generation mechanical heart valve, MVR with one more more risk factors for thromboembolism, a recent (within 3 months) thromboembolic event, or with prior perioperative stroke    RECOMMENDATION     Pre-Procedure:  Hold warfarin for 5 days, until after procedure starting: Wednesday, November 8   Enoxaparin (Lovenox) 50 mg subq Q 12 hrs (1 mg/kg Q 12 hrs for CrCl >= 60 ml/min and BMI <= 40 kg/m2)   Start enoxaparin: Friday, November 10 in AM  Last dose of enoxaparin prior to procedure: Sunday, November 12 in AM  (~24 hours prior to procedure)    Post-Procedure:  Resume warfarin dose if okay with provider doing  "procedure on night of procedure, Monday, November 13 PM: take 15 mg x 1, then resume home dose  Resume enoxaparin (Lovenox) ~ 24 hrs post procedure when okay with provider doing procedure. Continue until INR >= 2.0  Recheck INR 5-7 days after resuming warfarin   ?     Plan routed to referring provider for approval  ?   Myriam Machuca [facility]    SUBJECTIVE/OBJECTIVE     Annabella Bolanos, a 68 year old female    Goal INR Range: 2.0-3.0     Patient bridged in past: Yes: last in August 2015; did not bridge for September 2020 colonoscopy      Wt Readings from Last 3 Encounters:   10/30/23 51.3 kg (113 lb)   10/30/23 50.8 kg (112 lb)   10/25/23 50.8 kg (112 lb)      Ideal body weight: 52.4 kg (115 lb 8.3 oz)     Estimated body mass index is 20.02 kg/m  as calculated from the following:    Height as of 10/30/23: 1.6 m (5' 3\").    Weight as of 10/30/23: 51.3 kg (113 lb).    Lab Results   Component Value Date    INR 2.3 (H) 10/10/2023    INR 2.29 (H) 10/03/2023    INR 2.5 (H) 09/13/2023     Lab Results   Component Value Date    HGB 13.4 10/03/2023    HCT 40.1 10/03/2023     10/03/2023     Lab Results   Component Value Date    CR 0.63 10/03/2023    CR 0.64 10/21/2022    CR 0.76 08/19/2021     Estimated Creatinine Clearance: 69.2 mL/min (based on SCr of 0.63 mg/dL).    "

## 2023-11-04 NOTE — TELEPHONE ENCOUNTER
Renaldo Miguel, reviewed with Dr. Ling who is ok with your plan below.  Eduar Ling MD  You10 hours ago (12:18 AM)     RR  Yes     You  Eduar Ling MD19 hours ago (3:27 PM)     [facility] Dr. Ling, please see recommendations from pharmacy. Heparin bridging not an option given expected overnight stay is only 1 night. Are you ok with 5-day warfarin hold with enoxaparin bridge? Thanks again for your input!  -Viky Tucker PA-C

## 2023-11-06 ENCOUNTER — TELEPHONE (OUTPATIENT)
Dept: FAMILY MEDICINE | Facility: CLINIC | Age: 68
End: 2023-11-06
Payer: COMMERCIAL

## 2023-11-06 ENCOUNTER — TELEPHONE (OUTPATIENT)
Dept: ANTICOAGULATION | Facility: CLINIC | Age: 68
End: 2023-11-06
Payer: COMMERCIAL

## 2023-11-06 RX ORDER — ENOXAPARIN SODIUM 100 MG/ML
50 INJECTION SUBCUTANEOUS EVERY 12 HOURS
Qty: 16.8 ML | Refills: 1 | Status: ON HOLD | OUTPATIENT
Start: 2023-11-06 | End: 2023-11-13

## 2023-11-06 NOTE — TELEPHONE ENCOUNTER
I spoke to patient, warfarin hold and resumption as well as lovenox bridging reviewed and patient repeated back correctly.  Patient reports she will stay in the hospital 1 night post-op.  Lovenox prescription e-faxed    Marybeth Stock RN  Anticoagulation Clinic

## 2023-11-06 NOTE — TELEPHONE ENCOUNTER
Reason for Call:  Other call back    Detailed comments: pt returning inr call    Phone Number Patient can be reached at: Home number on file [phone] (home)    Best Time: any    Can we leave a detailed message on this number? Not Applicable    Call taken on 11/6/2023 at 11:30 AM by Fina Haney

## 2023-11-06 NOTE — TELEPHONE ENCOUNTER
Left VM to call [phone] with transfer to [facility] OR [facility] prior karen Stock RN  Anticoagulation Clinic

## 2023-11-07 ENCOUNTER — TRANSFERRED RECORDS (OUTPATIENT)
Dept: HEALTH INFORMATION MANAGEMENT | Facility: CLINIC | Age: 68
End: 2023-11-07
Payer: COMMERCIAL

## 2023-11-08 NOTE — TELEPHONE ENCOUNTER
Central Prior Authorization Team  Phone: [phone]    Prior Authorization Not Needed per Insurance    Medication: ENOXAPARIN SODIUM 60 MG/0.6ML  SOSY  Insurance Company: [employer] Non-Specialty PA's - Phone [phone] Fax [phone]  Expected CoPay: $    Pharmacy Filling the Rx: [id number] [address]  Pharmacy Notified: yes    Patient Notified: PHARMACY WILL NOTIFY PT WHEN READY

## 2023-11-11 NOTE — PHARMACY-ADMISSION MEDICATION HISTORY
Medication history and patient interview completed by pre-admitting nurse. Reviewed by pharmacist. No further clarifications needed.    Owen Byrne [facility]  ------------------------------------------------------  Status Changed by Time of change   Nurse Raulito Soto RN Mon Oct 16, 2023  1:44 PM     Prior to Admission medications    Medication Sig Last Dose Taking? Auth Provider Long Term End Date   alendronate (FOSAMAX) 70 MG tablet Take 70 mg by mouth every 7 days  Yes Reported, Patient Yes    anastrozole (ARIMIDEX) 1 MG tablet Take 1 mg by mouth daily  Yes Brook Echavarria MD Yes    ASPIRIN EC PO Take 81 mg by mouth daily  Yes Reported, Patient No    calcium citrate (CITRACAL) 950 (200 Ca) MG tablet Take 1 tablet by mouth 2 times daily  Yes Reported, Patient     carvedilol (COREG) 12.5 MG tablet Take 1 tablet (12.5 mg) by mouth 2 times daily (with meals)  Yes Viky Tucker PA-C Yes    enoxaparin ANTICOAGULANT (LOVENOX) 60 MG/0.6ML syringe Inject 0.5 mLs (50 mg) Subcutaneous every 12 hours  Yes Viky Tucker PA-C     estradiol (ESTRACE) 0.1 MG/GM vaginal cream Apply blueberry sized amount to finger and rub inside vagina 3x/week at night.  Yes Laura Washington PA-C     lisinopril (ZESTRIL) 10 MG tablet Take 2 tablets (20 mg) by mouth every morning  Yes Viky Tucker PA-C Yes    methimazole (TAPAZOLE) 5 MG tablet TAKE ONE-HALF (1/2) TABLET DAILY  Yes Merle Baxter MD Yes    solifenacin (VESICARE) 10 MG tablet Take 1 tablet (10 mg) by mouth daily  Yes Laura Washington PA-C     Vitamin D3 (CHOLECALCIFEROL) 25 mcg (1000 units) tablet Take by mouth daily  Yes Reported, Patient     warfarin ANTICOAGULANT (COUMADIN) 5 MG tablet Take 7.5mg (1 1/2 tablets) every Mon,Wed & Fri / Take 5mg all other days OR per INR clinic  Yes Viky Tucker PA-C

## 2023-11-13 ENCOUNTER — ANESTHESIA (OUTPATIENT)
Dept: SURGERY | Facility: CLINIC | Age: 68
End: 2023-11-13
Payer: COMMERCIAL

## 2023-11-13 ENCOUNTER — APPOINTMENT (OUTPATIENT)
Dept: PHYSICAL THERAPY | Facility: CLINIC | Age: 68
End: 2023-11-13
Attending: PHYSICIAN ASSISTANT
Payer: COMMERCIAL

## 2023-11-13 ENCOUNTER — ANESTHESIA EVENT (OUTPATIENT)
Dept: SURGERY | Facility: CLINIC | Age: 68
End: 2023-11-13
Payer: COMMERCIAL

## 2023-11-13 ENCOUNTER — APPOINTMENT (OUTPATIENT)
Dept: GENERAL RADIOLOGY | Facility: CLINIC | Age: 68
End: 2023-11-13
Attending: PHYSICIAN ASSISTANT
Payer: COMMERCIAL

## 2023-11-13 ENCOUNTER — HOSPITAL ENCOUNTER (OUTPATIENT)
Facility: CLINIC | Age: 68
Discharge: HOME | End: 2023-11-14
Attending: ORTHOPAEDIC SURGERY | Admitting: ORTHOPAEDIC SURGERY
Payer: COMMERCIAL

## 2023-11-13 DIAGNOSIS — Z95.2 AORTIC VALVE REPLACED: ICD-10-CM

## 2023-11-13 DIAGNOSIS — Z96.652 S/P TOTAL KNEE ARTHROPLASTY, LEFT: Primary | ICD-10-CM

## 2023-11-13 PROBLEM — Z96.659 S/P TOTAL KNEE ARTHROPLASTY: Status: ACTIVE | Noted: 2023-11-13

## 2023-11-13 LAB — INR PPP: 1.14 (ref 0.85–1.15)

## 2023-11-13 PROCEDURE — 250N000013 HC RX MED GY IP 250 OP 250 PS 637: Performed by: PHYSICIAN ASSISTANT

## 2023-11-13 PROCEDURE — 258N000003 HC RX IP 258 OP 636: Performed by: ANESTHESIOLOGY

## 2023-11-13 PROCEDURE — 250N000011 HC RX IP 250 OP 636: Performed by: ANESTHESIOLOGY

## 2023-11-13 PROCEDURE — 360N000077 HC SURGERY LEVEL 4, PER MIN: Performed by: ORTHOPAEDIC SURGERY

## 2023-11-13 PROCEDURE — 250N000011 HC RX IP 250 OP 636: Performed by: PHYSICIAN ASSISTANT

## 2023-11-13 PROCEDURE — 97161 PT EVAL LOW COMPLEX 20 MIN: CPT | Mod: GP

## 2023-11-13 PROCEDURE — 250N000009 HC RX 250: Performed by: ANESTHESIOLOGY

## 2023-11-13 PROCEDURE — 258N000003 HC RX IP 258 OP 636: Performed by: PHYSICIAN ASSISTANT

## 2023-11-13 PROCEDURE — C1713 ANCHOR/SCREW BN/BN,TIS/BN: HCPCS | Performed by: ORTHOPAEDIC SURGERY

## 2023-11-13 PROCEDURE — 250N000011 HC RX IP 250 OP 636: Mod: JZ | Performed by: NURSE ANESTHETIST, CERTIFIED REGISTERED

## 2023-11-13 PROCEDURE — 96372 THER/PROPH/DIAG INJ SC/IM: CPT | Performed by: PHYSICIAN ASSISTANT

## 2023-11-13 PROCEDURE — 85610 PROTHROMBIN TIME: CPT | Performed by: PHYSICIAN ASSISTANT

## 2023-11-13 PROCEDURE — 999N000141 HC STATISTIC PRE-PROCEDURE NURSING ASSESSMENT: Performed by: ORTHOPAEDIC SURGERY

## 2023-11-13 PROCEDURE — 36415 COLL VENOUS BLD VENIPUNCTURE: CPT | Performed by: PHYSICIAN ASSISTANT

## 2023-11-13 PROCEDURE — 97116 GAIT TRAINING THERAPY: CPT | Mod: GP

## 2023-11-13 PROCEDURE — 272N000001 HC OR GENERAL SUPPLY STERILE: Performed by: ORTHOPAEDIC SURGERY

## 2023-11-13 PROCEDURE — 250N000009 HC RX 250: Performed by: PHYSICIAN ASSISTANT

## 2023-11-13 PROCEDURE — 250N000009 HC RX 250: Performed by: ORTHOPAEDIC SURGERY

## 2023-11-13 PROCEDURE — 250N000025 HC SEVOFLURANE, PER MIN: Performed by: ORTHOPAEDIC SURGERY

## 2023-11-13 PROCEDURE — 97110 THERAPEUTIC EXERCISES: CPT | Mod: GP

## 2023-11-13 PROCEDURE — 710N000009 HC RECOVERY PHASE 1, LEVEL 1, PER MIN: Performed by: ORTHOPAEDIC SURGERY

## 2023-11-13 PROCEDURE — 250N000011 HC RX IP 250 OP 636: Performed by: NURSE ANESTHETIST, CERTIFIED REGISTERED

## 2023-11-13 PROCEDURE — 999N000065 XR KNEE PORT LEFT 1/2 VIEWS: Mod: LT

## 2023-11-13 PROCEDURE — C1776 JOINT DEVICE (IMPLANTABLE): HCPCS | Performed by: ORTHOPAEDIC SURGERY

## 2023-11-13 PROCEDURE — 250N000011 HC RX IP 250 OP 636: Mod: JZ | Performed by: PHYSICIAN ASSISTANT

## 2023-11-13 PROCEDURE — 370N000017 HC ANESTHESIA TECHNICAL FEE, PER MIN: Performed by: ORTHOPAEDIC SURGERY

## 2023-11-13 DEVICE — IMPLANTABLE DEVICE
Type: IMPLANTABLE DEVICE | Site: KNEE | Status: FUNCTIONAL
Brand: PERSONA®

## 2023-11-13 DEVICE — IMPLANTABLE DEVICE
Type: IMPLANTABLE DEVICE | Site: KNEE | Status: FUNCTIONAL
Brand: PERSONA® VIVACIT-E®

## 2023-11-13 DEVICE — IMPLANTABLE DEVICE
Type: IMPLANTABLE DEVICE | Site: KNEE | Status: FUNCTIONAL
Brand: PERSONA® NATURAL TIBIA®

## 2023-11-13 DEVICE — FULL DOSE BONE CEMENT, 10 PACK CATALOG NUMBER IS 6191-1-010
Type: IMPLANTABLE DEVICE | Site: KNEE | Status: FUNCTIONAL
Brand: SIMPLEX

## 2023-11-13 RX ORDER — HYDROMORPHONE HCL IN WATER/PF 6 MG/30 ML
0.2 PATIENT CONTROLLED ANALGESIA SYRINGE INTRAVENOUS EVERY 5 MIN PRN
Status: DISCONTINUED | OUTPATIENT
Start: 2023-11-13 | End: 2023-11-13 | Stop reason: HOSPADM

## 2023-11-13 RX ORDER — OXYCODONE HYDROCHLORIDE 5 MG/1
5 TABLET ORAL EVERY 4 HOURS PRN
Status: DISCONTINUED | OUTPATIENT
Start: 2023-11-13 | End: 2023-11-14 | Stop reason: HOSPADM

## 2023-11-13 RX ORDER — SODIUM CHLORIDE, SODIUM LACTATE, POTASSIUM CHLORIDE, CALCIUM CHLORIDE 600; 310; 30; 20 MG/100ML; MG/100ML; MG/100ML; MG/100ML
INJECTION, SOLUTION INTRAVENOUS CONTINUOUS
Status: DISCONTINUED | OUTPATIENT
Start: 2023-11-13 | End: 2023-11-13 | Stop reason: HOSPADM

## 2023-11-13 RX ORDER — BUPIVACAINE HYDROCHLORIDE AND EPINEPHRINE 2.5; 5 MG/ML; UG/ML
INJECTION, SOLUTION INFILTRATION; PERINEURAL
Status: COMPLETED | OUTPATIENT
Start: 2023-11-13 | End: 2023-11-13

## 2023-11-13 RX ORDER — TRANEXAMIC ACID 650 MG/1
1950 TABLET ORAL ONCE
Status: COMPLETED | OUTPATIENT
Start: 2023-11-13 | End: 2023-11-13

## 2023-11-13 RX ORDER — FENTANYL CITRATE 50 UG/ML
50 INJECTION, SOLUTION INTRAMUSCULAR; INTRAVENOUS EVERY 5 MIN PRN
Status: DISCONTINUED | OUTPATIENT
Start: 2023-11-13 | End: 2023-11-13 | Stop reason: HOSPADM

## 2023-11-13 RX ORDER — FENTANYL CITRATE 50 UG/ML
25 INJECTION, SOLUTION INTRAMUSCULAR; INTRAVENOUS EVERY 5 MIN PRN
Status: DISCONTINUED | OUTPATIENT
Start: 2023-11-13 | End: 2023-11-13 | Stop reason: HOSPADM

## 2023-11-13 RX ORDER — CALCIUM CARBONATE 500 MG/1
500 TABLET, CHEWABLE ORAL 4 TIMES DAILY PRN
Status: DISCONTINUED | OUTPATIENT
Start: 2023-11-13 | End: 2023-11-14 | Stop reason: HOSPADM

## 2023-11-13 RX ORDER — METHIMAZOLE 5 MG/1
2.5 TABLET ORAL DAILY
Status: DISCONTINUED | OUTPATIENT
Start: 2023-11-14 | End: 2023-11-14 | Stop reason: HOSPADM

## 2023-11-13 RX ORDER — AMOXICILLIN 250 MG
1 CAPSULE ORAL 2 TIMES DAILY
Status: DISCONTINUED | OUTPATIENT
Start: 2023-11-13 | End: 2023-11-14 | Stop reason: HOSPADM

## 2023-11-13 RX ORDER — DEXAMETHASONE SODIUM PHOSPHATE 4 MG/ML
INJECTION, SOLUTION INTRA-ARTICULAR; INTRALESIONAL; INTRAMUSCULAR; INTRAVENOUS; SOFT TISSUE PRN
Status: DISCONTINUED | OUTPATIENT
Start: 2023-11-13 | End: 2023-11-13

## 2023-11-13 RX ORDER — CEFAZOLIN SODIUM/WATER 2 G/20 ML
2 SYRINGE (ML) INTRAVENOUS SEE ADMIN INSTRUCTIONS
Status: DISCONTINUED | OUTPATIENT
Start: 2023-11-13 | End: 2023-11-13 | Stop reason: HOSPADM

## 2023-11-13 RX ORDER — HYDROXYZINE HYDROCHLORIDE 10 MG/1
10 TABLET, FILM COATED ORAL EVERY 6 HOURS PRN
Status: DISCONTINUED | OUTPATIENT
Start: 2023-11-13 | End: 2023-11-14 | Stop reason: HOSPADM

## 2023-11-13 RX ORDER — HYDROMORPHONE HCL IN WATER/PF 6 MG/30 ML
0.4 PATIENT CONTROLLED ANALGESIA SYRINGE INTRAVENOUS EVERY 5 MIN PRN
Status: DISCONTINUED | OUTPATIENT
Start: 2023-11-13 | End: 2023-11-13 | Stop reason: HOSPADM

## 2023-11-13 RX ORDER — LIDOCAINE 40 MG/G
CREAM TOPICAL
Status: DISCONTINUED | OUTPATIENT
Start: 2023-11-13 | End: 2023-11-13 | Stop reason: HOSPADM

## 2023-11-13 RX ORDER — ONDANSETRON 2 MG/ML
INJECTION INTRAMUSCULAR; INTRAVENOUS PRN
Status: DISCONTINUED | OUTPATIENT
Start: 2023-11-13 | End: 2023-11-13

## 2023-11-13 RX ORDER — ANASTROZOLE 1 MG/1
1 TABLET ORAL DAILY
Status: DISCONTINUED | OUTPATIENT
Start: 2023-11-14 | End: 2023-11-14 | Stop reason: HOSPADM

## 2023-11-13 RX ORDER — NALOXONE HYDROCHLORIDE 0.4 MG/ML
0.4 INJECTION, SOLUTION INTRAMUSCULAR; INTRAVENOUS; SUBCUTANEOUS
Status: DISCONTINUED | OUTPATIENT
Start: 2023-11-13 | End: 2023-11-14 | Stop reason: HOSPADM

## 2023-11-13 RX ORDER — ACETAMINOPHEN 325 MG/1
975 TABLET ORAL EVERY 8 HOURS
Qty: 27 TABLET | Refills: 0 | Status: DISCONTINUED | OUTPATIENT
Start: 2023-11-13 | End: 2023-11-14 | Stop reason: HOSPADM

## 2023-11-13 RX ORDER — NALOXONE HYDROCHLORIDE 0.4 MG/ML
0.2 INJECTION, SOLUTION INTRAMUSCULAR; INTRAVENOUS; SUBCUTANEOUS
Status: DISCONTINUED | OUTPATIENT
Start: 2023-11-13 | End: 2023-11-14 | Stop reason: HOSPADM

## 2023-11-13 RX ORDER — HYDROMORPHONE HCL IN WATER/PF 6 MG/30 ML
0.4 PATIENT CONTROLLED ANALGESIA SYRINGE INTRAVENOUS
Status: DISCONTINUED | OUTPATIENT
Start: 2023-11-13 | End: 2023-11-14 | Stop reason: HOSPADM

## 2023-11-13 RX ORDER — BUPIVACAINE HYDROCHLORIDE 7.5 MG/ML
INJECTION, SOLUTION INTRASPINAL
Status: DISCONTINUED | OUTPATIENT
Start: 2023-11-13 | End: 2023-11-13

## 2023-11-13 RX ORDER — ONDANSETRON 2 MG/ML
4 INJECTION INTRAMUSCULAR; INTRAVENOUS EVERY 6 HOURS PRN
Status: DISCONTINUED | OUTPATIENT
Start: 2023-11-13 | End: 2023-11-14 | Stop reason: HOSPADM

## 2023-11-13 RX ORDER — ONDANSETRON 4 MG/1
4 TABLET, ORALLY DISINTEGRATING ORAL EVERY 30 MIN PRN
Status: DISCONTINUED | OUTPATIENT
Start: 2023-11-13 | End: 2023-11-13 | Stop reason: HOSPADM

## 2023-11-13 RX ORDER — HYDROXYZINE HYDROCHLORIDE 10 MG/1
10 TABLET, FILM COATED ORAL EVERY 6 HOURS PRN
Qty: 30 TABLET | Refills: 0 | Status: SHIPPED | OUTPATIENT
Start: 2023-11-13 | End: 2023-12-01

## 2023-11-13 RX ORDER — SODIUM CHLORIDE, SODIUM LACTATE, POTASSIUM CHLORIDE, CALCIUM CHLORIDE 600; 310; 30; 20 MG/100ML; MG/100ML; MG/100ML; MG/100ML
INJECTION, SOLUTION INTRAVENOUS CONTINUOUS
Status: DISCONTINUED | OUTPATIENT
Start: 2023-11-13 | End: 2023-11-14 | Stop reason: HOSPADM

## 2023-11-13 RX ORDER — LIDOCAINE 40 MG/G
CREAM TOPICAL
Status: DISCONTINUED | OUTPATIENT
Start: 2023-11-13 | End: 2023-11-14 | Stop reason: HOSPADM

## 2023-11-13 RX ORDER — ACETAMINOPHEN 325 MG/1
975 TABLET ORAL ONCE
Status: COMPLETED | OUTPATIENT
Start: 2023-11-13 | End: 2023-11-13

## 2023-11-13 RX ORDER — PROCHLORPERAZINE MALEATE 5 MG
5 TABLET ORAL EVERY 6 HOURS PRN
Status: DISCONTINUED | OUTPATIENT
Start: 2023-11-13 | End: 2023-11-14 | Stop reason: HOSPADM

## 2023-11-13 RX ORDER — FENTANYL CITRATE 50 UG/ML
INJECTION, SOLUTION INTRAMUSCULAR; INTRAVENOUS PRN
Status: DISCONTINUED | OUTPATIENT
Start: 2023-11-13 | End: 2023-11-13

## 2023-11-13 RX ORDER — POLYETHYLENE GLYCOL 3350 17 G/17G
1 POWDER, FOR SOLUTION ORAL DAILY
Qty: 10 PACKET | Refills: 0 | Status: SHIPPED | OUTPATIENT
Start: 2023-11-13 | End: 2023-12-01

## 2023-11-13 RX ORDER — ACETAMINOPHEN 325 MG/1
650 TABLET ORAL EVERY 4 HOURS PRN
Qty: 100 TABLET | Refills: 0 | Status: SHIPPED | OUTPATIENT
Start: 2023-11-13 | End: 2024-05-20

## 2023-11-13 RX ORDER — AMOXICILLIN 250 MG
1-2 CAPSULE ORAL 2 TIMES DAILY
Qty: 30 TABLET | Refills: 0 | Status: SHIPPED | OUTPATIENT
Start: 2023-11-13 | End: 2024-05-20

## 2023-11-13 RX ORDER — OXYCODONE HYDROCHLORIDE 5 MG/1
5-10 TABLET ORAL EVERY 4 HOURS PRN
Qty: 30 TABLET | Refills: 0 | Status: SHIPPED | OUTPATIENT
Start: 2023-11-13 | End: 2023-12-01

## 2023-11-13 RX ORDER — CEFAZOLIN SODIUM 1 G/3ML
1 INJECTION, POWDER, FOR SOLUTION INTRAMUSCULAR; INTRAVENOUS EVERY 8 HOURS
Qty: 10 ML | Refills: 0 | Status: COMPLETED | OUTPATIENT
Start: 2023-11-13 | End: 2023-11-14

## 2023-11-13 RX ORDER — CELECOXIB 100 MG/1
100 CAPSULE ORAL 2 TIMES DAILY
Status: DISCONTINUED | OUTPATIENT
Start: 2023-11-13 | End: 2023-11-14 | Stop reason: HOSPADM

## 2023-11-13 RX ORDER — OXYCODONE HYDROCHLORIDE 10 MG/1
10 TABLET ORAL EVERY 4 HOURS PRN
Status: DISCONTINUED | OUTPATIENT
Start: 2023-11-13 | End: 2023-11-14 | Stop reason: HOSPADM

## 2023-11-13 RX ORDER — ONDANSETRON 2 MG/ML
4 INJECTION INTRAMUSCULAR; INTRAVENOUS EVERY 30 MIN PRN
Status: DISCONTINUED | OUTPATIENT
Start: 2023-11-13 | End: 2023-11-13 | Stop reason: HOSPADM

## 2023-11-13 RX ORDER — CEFAZOLIN SODIUM/WATER 2 G/20 ML
2 SYRINGE (ML) INTRAVENOUS
Status: COMPLETED | OUTPATIENT
Start: 2023-11-13 | End: 2023-11-13

## 2023-11-13 RX ORDER — CARVEDILOL 12.5 MG/1
12.5 TABLET ORAL 2 TIMES DAILY WITH MEALS
Status: DISCONTINUED | OUTPATIENT
Start: 2023-11-14 | End: 2023-11-14 | Stop reason: HOSPADM

## 2023-11-13 RX ORDER — TRANEXAMIC ACID 10 MG/ML
1 INJECTION, SOLUTION INTRAVENOUS ONCE
Status: COMPLETED | OUTPATIENT
Start: 2023-11-13 | End: 2023-11-13

## 2023-11-13 RX ORDER — LISINOPRIL 20 MG/1
20 TABLET ORAL EVERY MORNING
Status: DISCONTINUED | OUTPATIENT
Start: 2023-11-14 | End: 2023-11-14 | Stop reason: HOSPADM

## 2023-11-13 RX ORDER — BISACODYL 10 MG
10 SUPPOSITORY, RECTAL RECTAL DAILY PRN
Status: DISCONTINUED | OUTPATIENT
Start: 2023-11-13 | End: 2023-11-14 | Stop reason: HOSPADM

## 2023-11-13 RX ORDER — POLYETHYLENE GLYCOL 3350 17 G/17G
17 POWDER, FOR SOLUTION ORAL DAILY
Status: DISCONTINUED | OUTPATIENT
Start: 2023-11-14 | End: 2023-11-14 | Stop reason: HOSPADM

## 2023-11-13 RX ORDER — HYDROMORPHONE HCL IN WATER/PF 6 MG/30 ML
0.2 PATIENT CONTROLLED ANALGESIA SYRINGE INTRAVENOUS
Status: DISCONTINUED | OUTPATIENT
Start: 2023-11-13 | End: 2023-11-14 | Stop reason: HOSPADM

## 2023-11-13 RX ORDER — WARFARIN SODIUM 5 MG/1
10 TABLET ORAL
Status: COMPLETED | OUTPATIENT
Start: 2023-11-13 | End: 2023-11-13

## 2023-11-13 RX ORDER — ENOXAPARIN SODIUM 100 MG/ML
50 INJECTION SUBCUTANEOUS EVERY 12 HOURS
Status: DISCONTINUED | OUTPATIENT
Start: 2023-11-13 | End: 2023-11-14 | Stop reason: HOSPADM

## 2023-11-13 RX ORDER — ONDANSETRON 4 MG/1
4 TABLET, ORALLY DISINTEGRATING ORAL EVERY 6 HOURS PRN
Status: DISCONTINUED | OUTPATIENT
Start: 2023-11-13 | End: 2023-11-14 | Stop reason: HOSPADM

## 2023-11-13 RX ORDER — PROPOFOL 10 MG/ML
INJECTION, EMULSION INTRAVENOUS CONTINUOUS PRN
Status: DISCONTINUED | OUTPATIENT
Start: 2023-11-13 | End: 2023-11-13

## 2023-11-13 RX ADMIN — PROPOFOL 100 MCG/KG/MIN: 10 INJECTION, EMULSION INTRAVENOUS at 07:25

## 2023-11-13 RX ADMIN — SENNOSIDES AND DOCUSATE SODIUM 1 TABLET: 8.6; 5 TABLET ORAL at 13:13

## 2023-11-13 RX ADMIN — SODIUM CHLORIDE, POTASSIUM CHLORIDE, SODIUM LACTATE AND CALCIUM CHLORIDE: 600; 310; 30; 20 INJECTION, SOLUTION INTRAVENOUS at 06:57

## 2023-11-13 RX ADMIN — FENTANYL CITRATE 25 MCG: 50 INJECTION INTRAMUSCULAR; INTRAVENOUS at 07:41

## 2023-11-13 RX ADMIN — WARFARIN SODIUM 10 MG: 5 TABLET ORAL at 18:34

## 2023-11-13 RX ADMIN — OXYCODONE HYDROCHLORIDE 5 MG: 5 TABLET ORAL at 23:37

## 2023-11-13 RX ADMIN — PROPOFOL 10 MG: 10 INJECTION, EMULSION INTRAVENOUS at 07:31

## 2023-11-13 RX ADMIN — FENTANYL CITRATE 25 MCG: 50 INJECTION, SOLUTION INTRAMUSCULAR; INTRAVENOUS at 09:23

## 2023-11-13 RX ADMIN — ONDANSETRON 4 MG: 2 INJECTION INTRAMUSCULAR; INTRAVENOUS at 08:33

## 2023-11-13 RX ADMIN — OXYCODONE HYDROCHLORIDE 10 MG: 10 TABLET ORAL at 15:02

## 2023-11-13 RX ADMIN — PROPOFOL 10 MG: 10 INJECTION, EMULSION INTRAVENOUS at 07:28

## 2023-11-13 RX ADMIN — FENTANYL CITRATE 25 MCG: 50 INJECTION INTRAMUSCULAR; INTRAVENOUS at 08:57

## 2023-11-13 RX ADMIN — FENTANYL CITRATE 25 MCG: 50 INJECTION INTRAMUSCULAR; INTRAVENOUS at 07:44

## 2023-11-13 RX ADMIN — ACETAMINOPHEN 975 MG: 325 TABLET, FILM COATED ORAL at 13:12

## 2023-11-13 RX ADMIN — BUPIVACAINE HYDROCHLORIDE IN DEXTROSE 1.6 ML: 7.5 INJECTION, SOLUTION SUBARACHNOID at 07:22

## 2023-11-13 RX ADMIN — Medication 2 G: at 07:13

## 2023-11-13 RX ADMIN — OXYCODONE HYDROCHLORIDE 5 MG: 5 TABLET ORAL at 09:29

## 2023-11-13 RX ADMIN — PROPOFOL 10 MG: 10 INJECTION, EMULSION INTRAVENOUS at 07:34

## 2023-11-13 RX ADMIN — SODIUM CHLORIDE, POTASSIUM CHLORIDE, SODIUM LACTATE AND CALCIUM CHLORIDE: 600; 310; 30; 20 INJECTION, SOLUTION INTRAVENOUS at 08:03

## 2023-11-13 RX ADMIN — CEFAZOLIN 1 G: 1 INJECTION, POWDER, FOR SOLUTION INTRAMUSCULAR; INTRAVENOUS at 14:51

## 2023-11-13 RX ADMIN — BUPIVACAINE HYDROCHLORIDE AND EPINEPHRINE BITARTRATE 20 ML: 2.5; .005 INJECTION, SOLUTION INFILTRATION; PERINEURAL at 06:56

## 2023-11-13 RX ADMIN — PROPOFOL 100 MG: 10 INJECTION, EMULSION INTRAVENOUS at 07:37

## 2023-11-13 RX ADMIN — ACETAMINOPHEN 975 MG: 325 TABLET, FILM COATED ORAL at 06:08

## 2023-11-13 RX ADMIN — SENNOSIDES AND DOCUSATE SODIUM 1 TABLET: 8.6; 5 TABLET ORAL at 20:38

## 2023-11-13 RX ADMIN — MIDAZOLAM 1 MG: 1 INJECTION INTRAMUSCULAR; INTRAVENOUS at 06:56

## 2023-11-13 RX ADMIN — HYDROXYZINE HYDROCHLORIDE 10 MG: 10 TABLET ORAL at 09:30

## 2023-11-13 RX ADMIN — SODIUM CHLORIDE, POTASSIUM CHLORIDE, SODIUM LACTATE AND CALCIUM CHLORIDE: 600; 310; 30; 20 INJECTION, SOLUTION INTRAVENOUS at 20:38

## 2023-11-13 RX ADMIN — TRANEXAMIC ACID 1 G: 10 INJECTION, SOLUTION INTRAVENOUS at 08:31

## 2023-11-13 RX ADMIN — FENTANYL CITRATE 50 MCG: 50 INJECTION, SOLUTION INTRAMUSCULAR; INTRAVENOUS at 09:05

## 2023-11-13 RX ADMIN — ACETAMINOPHEN 975 MG: 325 TABLET, FILM COATED ORAL at 22:12

## 2023-11-13 RX ADMIN — CEFAZOLIN 1 G: 1 INJECTION, POWDER, FOR SOLUTION INTRAMUSCULAR; INTRAVENOUS at 23:31

## 2023-11-13 RX ADMIN — FENTANYL CITRATE 25 MCG: 50 INJECTION INTRAMUSCULAR; INTRAVENOUS at 07:49

## 2023-11-13 RX ADMIN — ENOXAPARIN SODIUM 50 MG: 60 INJECTION SUBCUTANEOUS at 17:37

## 2023-11-13 RX ADMIN — TRANEXAMIC ACID 1950 MG: 650 TABLET ORAL at 06:07

## 2023-11-13 RX ADMIN — DEXAMETHASONE SODIUM PHOSPHATE 4 MG: 4 INJECTION, SOLUTION INTRA-ARTICULAR; INTRALESIONAL; INTRAMUSCULAR; INTRAVENOUS; SOFT TISSUE at 07:46

## 2023-11-13 ASSESSMENT — ACTIVITIES OF DAILY LIVING (ADL)
ADLS_ACUITY_SCORE: 30
ADLS_ACUITY_SCORE: 24
ADLS_ACUITY_SCORE: 24
ADLS_ACUITY_SCORE: 30

## 2023-11-13 NOTE — PHARMACY-ANTICOAGULATION SERVICE
Clinical Pharmacy - Warfarin Dosing Consult     Pharmacy has been consulted to manage this patient s warfarin therapy.  Indication: Mechanical Aortic Valve Replacement  Therapy Goal: INR 2-3  Warfarin Prior to Admission: Yes  Warfarin PTA Regimen: 7.5 mg every Mon, Wed, Fri; 5 mg all other days    INR   Date Value Ref Range Status   11/13/2023 1.14 0.85 - 1.15 Final   10/10/2023 2.3 (H) 0.9 - 1.1 Final       Recommend procedding with warfarin 10 mg today per MD.  Pharmacy will monitor Annabella Bolanos daily and order warfarin doses to achieve specified goal.      Please contact pharmacy as soon as possible if the warfarin needs to be held for a procedure or if the warfarin goals change.       Marlyn Stanley RPH on 11/13/2023 at 3:45 PM    Pt resting in bed - CXR to confirm PICC placement prior to Alteplase as PICC difficult to flush.  PICC dressing changed as was soiled.  Several scabs near PICC site.  Wound consult placed - need dressing protocol for surgical incision site.  Pt instructed to call before ambulation - bed alarm on - call bell in reach-pt items w/in reach - bed in lowest/locked position.   Pt has stiff boot from home for right foot/ambulation.  Pt told to call when needing medication for itching r/t rash.

## 2023-11-13 NOTE — ANESTHESIA PREPROCEDURE EVALUATION
Anesthesia Pre-Procedure Evaluation    Patient: Annabella Bolanos   MRN: [id number] : 1955        Procedure : Procedure(s):  Left total knee arthroplasty          Past Medical History:   Diagnosis Date    Adenomatous colon polyp 2015    Aortic stenosis 2011    bicuspid AV with severe stenosis - 2011 mechanical AVR with 23 mm St Yordan AV conduit, composite graft placement    Arthritis     knees and hands    Bicuspid aortic valve     Breast cancer (H) 2014    R breast    H/O aortic valve replacement     St Yordan    Heart murmur     Valve repalcement .    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    AAA repair with av23 mm tube graft    Palpitations     PONV (postoperative nausea and vomiting)     n/v morphine vs anesthesia, vomiting x24 hrs    Subclinical hyperthyroidism 2017    Thrombosis of leg     after  of daughter    Uncomplicated asthma       Past Surgical History:   Procedure Laterality Date    BIOPSY NODE SENTINEL Right 09/10/2014    Procedure: BIOPSY NODE SENTINEL;  Surgeon: Ila Romano MD;  Location: [facility] OR    CARDIAC SURGERY  2011    aortic valve replacement    ENT SURGERY      tonsillectomy    HRW PORT A CATH      INSERT PORT VASCULAR ACCESS Left 10/22/2014    Procedure: INSERT PORT VASCULAR ACCESS;  Surgeon: Ila Romano MD;  Location: [facility] OR    LUMPECTOMY BREAST WITH SEED LOCALIZATION Right 09/10/2014    Procedure: LUMPECTOMY BREAST WITH SEED LOCALIZATION;  Surgeon: Ila Romano MD;  Location:  OR    ORTHOPEDIC SURGERY      shoulder, thumb surgery    REMOVE PORT VASCULAR ACCESS Left 2015    Procedure: REMOVE PORT VASCULAR ACCESS;  Surgeon: Ila Romano MD;  Location:  OR    REPAIR ANEURYSM ASCENDING AORTA  2011    Procedure:REPAIR ANEURYSM ASCENDING AORTA; Medial Sternotomy, Aortic  Valve Replacement, (St. Yordan Medical Masters HP Valved Graft, size 23 mm) on pump oxygenation, intraoperative transesophageal cardiogram; Surgeon:JOHN PAUL ULLOA; Location:[facility] OR    REPLACE VALVE AORTIC  06/23/2011    bicuspid AV with severe stenosis - 6/2011 mechanical AVR with 23 mm St Yordan AV conduit, composite graft placement      Allergies   Allergen Reactions    Morphine Sulfate Nausea and Vomiting    Anesthesia S-I-40 [Propofol] Nausea and Vomiting      Social History     Tobacco Use    Smoking status: Never    Smokeless tobacco: Never   Substance Use Topics    Alcohol use: Yes     Comment: rare      Wt Readings from Last 1 Encounters:   11/13/23 51.2 kg (112 lb 15.7 oz)        Anesthesia Evaluation   Pt has had prior anesthetic. Type: General.    History of anesthetic complications  - PONV.      ROS/MED HX  ENT/Pulmonary:     (+)                    Intermittent, asthma                  Neurologic:  - neg neurologic ROS     Cardiovascular:     (+)  hypertension- -   -  - -                           valvular problems/murmurs (s/p avr 2011) type: AS          METS/Exercise Tolerance:     Hematologic:     (+) History of blood clots,               Musculoskeletal:   (+)  arthritis,             GI/Hepatic:  - neg GI/hepatic ROS     Renal/Genitourinary:  - neg Renal ROS     Endo:     (+)          thyroid problem,            Psychiatric/Substance Use:  - neg psychiatric ROS     Infectious Disease:  - neg infectious disease ROS     Malignancy:       Other:            Physical Exam    Airway        Mallampati: II   TM distance: > 3 FB   Neck ROM: full   Mouth opening: > 3 cm    Respiratory Devices and Support         Dental       (+) Minor Abnormalities - some fillings, tiny chips      Cardiovascular   cardiovascular exam normal          Pulmonary   pulmonary exam normal                OUTSIDE LABS:  CBC:   Lab Results   Component Value Date    WBC 4.2 10/03/2023    WBC 5.8 08/19/2021    HGB 13.4 10/03/2023     HGB 14.2 10/21/2022    HCT 40.1 10/03/2023    HCT 42.2 08/19/2021     10/03/2023     08/19/2021     BMP:   Lab Results   Component Value Date     10/03/2023     10/21/2022    POTASSIUM 3.7 10/03/2023    POTASSIUM 4.2 10/21/2022    CHLORIDE 99 10/03/2023    CHLORIDE 106 10/21/2022    CO2 28 10/03/2023    CO2 30 10/21/2022    BUN 17.7 10/03/2023    BUN 17 10/21/2022    CR 0.63 10/03/2023    CR 0.64 10/21/2022     (H) 10/25/2023     (H) 10/03/2023     COAGS:   Lab Results   Component Value Date    PTT 30 06/23/2011    INR 1.14 11/13/2023    FIBR 186 (L) 06/23/2011     POC:   Lab Results   Component Value Date     (H) 06/27/2011     HEPATIC:   Lab Results   Component Value Date    ALBUMIN 4.1 10/03/2023    PROTTOTAL 6.4 10/03/2023    ALT 18 10/03/2023    AST 22 10/03/2023    ALKPHOS 63 10/03/2023    BILITOTAL 0.5 10/03/2023     OTHER:   Lab Results   Component Value Date    PH 7.36 06/23/2011    A1C 5.3 10/25/2023    BHUMI 9.3 10/03/2023    PHOS 3.1 06/25/2011    MAG 2.1 06/27/2011    TSH 0.53 10/30/2023    T4 1.69 10/30/2023    T3 92 10/30/2023    CRP <2.9 07/23/2021    SED 9 12/30/2022       Anesthesia Plan    ASA Status:  3       Anesthesia Type: Spinal.      Maintenance: TIVA.        Consents    Anesthesia Plan(s) and associated risks, benefits, and realistic alternatives discussed. Questions answered and patient/representative(s) expressed understanding.     - Discussed:     - Discussed with:  Patient      - Extended Intubation/Ventilatory Support Discussed: No.      - Patient is DNR/DNI Status: No          Postoperative Care    Pain management: IV analgesics, Oral pain medications, Multi-modal analgesia, Peripheral nerve block (Single Shot).   PONV prophylaxis: Ondansetron (or other 5HT-3), Dexamethasone or Solumedrol     Comments:                Cj Nicholas MD

## 2023-11-13 NOTE — OP NOTE
PrePreoperative diagnosis:  Knee Osteoarthritis with contracture  Postoperative Diagnosis:  same  Procedure: Left Total Knee Arthroplasty  Surgeon: Jignesh Perry MD  Assistant: KOFI Vital  Anesthesia:  Choice (see [facility] notes)  EBL: 100 ml  TT: approximately 45 minutes  Drains: none  Specimens: none  Complication: none  Dictation of Operation:  The patient was taken to the operating room, where after TXA and antibiotic prophylaxis, the leg was prepped and draped. An anterior incision and medial arthrotomy were done with medial, lateral and later posterior osteophyte resection. Minimal medial release was done initially for exposure. An IM guide at 5 degrees with anterior referencing was used for the cuts at 5 degrees of ER from the PCA which appeared to best match the epicondylar axis. The PCL was retained. The NV structures were carefully protected and an extra medullary device was used to make a perpendicular cut of 1-2 mm from the more deficient tibial compartment. The knee was balanced in full extension, protecting the peroneal nerve.The tibia was sized then prepared with external rotation to match the junction of the medial and middle thirds of the tubercle. Nine to 10 mm was resected from the patella and sized and prepared. Gaps were equal. Simplex was used for the below components and dried in full extension. All excess cement was removed.  Balance and motion were assessed and acceptable. Extensive Betadine soak was done. A capsular closure was accomplished. After extensive lavage, the remaining layered anatomic closure was accomplished.    Implants:   Femur Henrique Persona CR size: 7 Narrow  Tibia: D  MC Vit E Insert(mm): 10  Patella: 29    Notable Findings and Technical Aspects of procedure: preop motion ; 2mm additional distal resection; I accepted 1+ medial laxity with solid medial structures as trade off given contracture  Releases: none  Abx cement:N  Vanco powder:N  Stability in  extension and 30 degrees: grade 1 plus medial  Flexion Stability: perceived to be stable coronal, grade 2 sagittal; PM loose with patella everted  ROM extension: full  ROM flex (gravity): 125

## 2023-11-13 NOTE — PROGRESS NOTES
Dr. Nicholas notified of elevated blood pressure of 168/82; 165/89--provider stated this was what she was when she arrived today and no intervention needed.

## 2023-11-13 NOTE — ANESTHESIA CARE TRANSFER NOTE
Patient: Annabella Bolanos    Procedure: Procedure(s):  Left total knee arthroplasty       Diagnosis: Degenerative arthritis of left knee [M17.12]  Diagnosis Additional Information: No value filed.    Anesthesia Type:   Spinal     Note:    Oropharynx: oropharynx clear of all foreign objects and spontaneously breathing  Level of Consciousness: drowsy  Oxygen Supplementation: face mask  Level of Supplemental Oxygen (L/min / FiO2): 8  Independent Airway: airway patency satisfactory and stable  Dentition: dentition unchanged  Vital Signs Stable: post-procedure vital signs reviewed and stable  Report to RN Given: handoff report given  Patient transferred to: PACU    Handoff Report: Identifed the Patient, Identified the Reponsible Provider, Reviewed the pertinent medical history, Discussed the surgical course, Reviewed Intra-OP anesthesia mangement and issues during anesthesia, Set expectations for post-procedure period and Allowed opportunity for questions and acknowledgement of understanding      Vitals:  Vitals Value Taken Time   /83 11/13/23 0900   Temp     Pulse 46 11/13/23 0901   Resp 15 11/13/23 0901   SpO2 100 % 11/13/23 0901   Vitals shown include unfiled device data.    Electronically Signed By: BARBARA Coulter CRNA  November 13, 2023  9:03 AM

## 2023-11-13 NOTE — ANESTHESIA PROCEDURE NOTES
Airway       Patient location during procedure: OR       Procedure Start/Stop Times: 11/13/2023 7:38 AM  Staff -        CRNA: Houston Aguilera APRN CRNA       Performed By: CRNA  Consent for Airway        Urgency: elective  Indications and Patient Condition       Indications for airway management: carlos manuel-procedural       Induction type:intravenous       Mask difficulty assessment: 1 - vent by mask    Final Airway Details       Final airway type: supraglottic airway    Supraglottic Airway Details        Type: LMA       Brand: I-Gel       LMA size: 4    Post intubation assessment        Placement verified by: capnometry, equal breath sounds and chest rise        Number of attempts at approach: 1       Number of other approaches attempted: 0       Secured with: commercial tube rasmussen       Ease of procedure: easy       Dentition: Intact and Unchanged    Medication(s) Administered   Medication Administration Time: 11/13/2023 7:38 AM    Additional Comments       Converted to general.   Patient was feeling incision even with sedation.

## 2023-11-13 NOTE — PROGRESS NOTES
11/13/23 1519   Appointment Info   Signing Clinician's Name / Credentials (PT) Phyllis Alvarado DPT   Quick Adds   Quick Adds Certification   Living Environment   People in Home child(guanaco), adult   Current Living Arrangements house   Home Accessibility stairs to enter home   Number of Stairs, Main Entrance 2   Stair Railings, Main Entrance railings safe and in good condition   Number of Stairs, Within Home, Primary greater than 10 stairs  (7 + landing + 8 stairs)   Stair Railings, Within Home, Primary railings safe and in good condition   Transportation Anticipated family or friend will provide   Living Environment Comments Pt reports she is discharging to son's home.   Self-Care   Usual Activity Tolerance good   Current Activity Tolerance fair   Equipment Currently Used at Home shower chair;walker, standard;raised toilet seat;walker, rolling;grab bar, tub/shower;tub bench   Fall history within last six months no   Activity/Exercise/Self-Care Comment Pt independent at baseline. Has access to FWW.   General Information   Onset of Illness/Injury or Date of Surgery 11/13/23   Referring Physician Ila Larkin PA-C   Patient/Family Therapy Goals Statement (PT) return home   Pertinent History of Current Problem (include personal factors and/or comorbidities that impact the POC) POD#0 s/p L TKA   Existing Precautions/Restrictions fall;weight bearing   Weight-Bearing Status - LLE weight-bearing as tolerated   Cognition   Affect/Mental Status (Cognition) WNL   Orientation Status (Cognition) oriented x 4   Follows Commands (Cognition) WNL   Pain Assessment   Patient Currently in Pain Yes, see Vital Sign flowsheet  (2/10 at rest, 5/10 with mobility)   Integumentary/Edema   Integumentary/Edema Comments incision covered   Posture    Posture Forward head position;Protracted shoulders   Range of Motion (ROM)   Range of Motion ROM deficits secondary to surgical procedure   Strength (Manual Muscle Testing)   Strength  (Manual Muscle Testing) Deficits observed during functional mobility   Bed Mobility   Comment, (Bed Mobility) SBA supine>sit   Transfers   Comment, (Transfers) SBA sit<>stand with FWW   Gait/Stairs (Locomotion)   Distance in Feet (Gait) 10' for eval   Comment, (Gait/Stairs) SBA ambulation with FWW   Balance   Balance Comments good sitting balance, fair standing balance with FWW   Sensory Examination   Sensory Perception patient reports no sensory changes   Clinical Impression   Criteria for Skilled Therapeutic Intervention Yes, treatment indicated   PT Diagnosis (PT) impaired mobility s/p L TKA   Influenced by the following impairments impaired balance, decreased activity tolerance, pain, WB status, decreased ROM, weakness   Functional limitations due to impairments impaired bed mobility, transfers, ambulation, stairs   Clinical Presentation (PT Evaluation Complexity) stable   Clinical Presentation Rationale clinical judgement, chart review   Clinical Decision Making (Complexity) low complexity   Planned Therapy Interventions (PT) balance training;bed mobility training;gait training;home exercise program;neuromuscular re-education;patient/family education;ROM (range of motion);stair training;strengthening;transfer training;home program guidelines;risk factor education;progressive activity/exercise   Risk & Benefits of therapy have been explained evaluation/treatment results reviewed;care plan/treatment goals reviewed;risks/benefits reviewed;current/potential barriers reviewed;participants voiced agreement with care plan;participants included;patient   PT Total Evaluation Time   PT Eval, Low Complexity Minutes (18517) 6   Therapy Certification   Start of care date 11/13/23   Certification date from 11/13/23   Certification date to 11/15/23   Medical Diagnosis s/p total knee arthroplasty   Physical Therapy Goals   PT Frequency Daily   PT Predicted Duration/Target Date for Goal Attainment 11/14/23   PT Goals Bed  Mobility;Transfers;Gait;Stairs   PT: Bed Mobility Supervision/stand-by assist;Supine to/from sit   PT: Transfers Supervision/stand-by assist;Sit to/from stand;Assistive device   PT: Gait Supervision/stand-by assist;150 feet;Rolling walker   PT: Stairs Supervision/stand-by assist;Greater than 10 stairs;Rail on right   Interventions   Interventions Quick Adds Gait Training;Therapeutic Activity;Therapeutic Procedure   Therapeutic Procedure/Exercise   Ther. Procedure: strength, endurance, ROM, flexibillity Minutes (93796) 10   Symptoms Noted During/After Treatment fatigue;increased pain   Treatment Detail/Skilled Intervention Pt cued for supine exercises for ROM and strength benefits including APs, quad sets, heel slides, and SLRs x10 each. Cues for slow and controlled movement. Edu on intensity and frequency of exercises, provided handout. Pt reports beginning OP PT this Friday 11/17   Therapeutic Activity   Therapeutic Activities: dynamic activities to improve functional performance Minutes (68838) 5   Symptoms Noted During/After Treatment Fatigue;Increased pain   Treatment Detail/Skilled Intervention Pt supine upon arrival, agreeable. Time spent for room set up. Edu on WBAT status, contracture prevention, correct walker height. After eval, pt completed another sit<>stand with SBA and FWW, cueing for walker proximity when backing up to chair. Left pt in bedside recliner with alarm on and needs in reach. Nurse updated   Gait Training   Gait Training Minutes (21341) 9   Symptoms Noted During/After Treatment (Gait Training) fatigue;increased pain   Treatment Detail/Skilled Intervention After eval, pt ambulated another 150' with FWW and CGA progressing to SBA. Cueing for upright posture, decreasing tension in upper traps to limit guarding. Demonstrating forward flexed posture, short step length, slow gait speed, step-through patterning. Pt demonstrating improvements in gait speed as bout progressed.   Distance in Feet  150'   PT Discharge Planning   PT Plan trial stairs, progress ambulation distance, review HEP   PT Discharge Recommendation (DC Rec)   (defer to ortho)   PT Rationale for DC Rec Pt below baseline level of mobility, IND at baseline, currently SBA with FWW. Pt reports she begins OP PT Friday. Anticipate with IP PT that pt will be appropriate to return to son's home with assist from son.   PT Brief overview of current status SBA with FWW   PT Equipment Needed at Discharge   (has FWW at home)   Total Session Time   Timed Code Treatment Minutes 24   Total Session Time (sum of timed and untimed services) 30     [facility]  OUTPATIENT PHYSICAL THERAPY EVALUATION  PLAN OF TREATMENT FOR OUTPATIENT REHABILITATION  (COMPLETE FOR INITIAL CLAIMS ONLY)  Patient's Last Name, First Name, M.I.  YOB: 1955  Annabella Bolanos                        Provider's Name  [facility] Medical Record No.  [id number]                             Onset Date:  11/13/23   Start of Care Date:  11/13/23   Type:     _X_PT   ___OT   ___SLP Medical Diagnosis:  s/p total knee arthroplasty              PT Diagnosis:  impaired mobility s/p L TKA Visits from SOC:  1     See note for plan of treatment, functional goals and certification details    I CERTIFY THE NEED FOR THESE SERVICES FURNISHED UNDER        THIS PLAN OF TREATMENT AND WHILE UNDER MY CARE     (Physician co-signature of this document indicates review and certification of the therapy plan).

## 2023-11-13 NOTE — BRIEF OP NOTE
TKR for Pre/Post OA knee  [place]  TT est 35 w  (see dictation for full)  Spec none  No anticipated complications

## 2023-11-13 NOTE — ANESTHESIA PROCEDURE NOTES
"Intrathecal injection Procedure Note    Pre-Procedure   Staff -        Anesthesiologist:  Cj Nicholas MD       Performed By: anesthesiologist       Referred By: [place]       Location: OR       Pre-Anesthestic Checklist: patient identified, IV checked, risks and benefits discussed, informed consent, monitors and equipment checked, pre-op evaluation, at physician/surgeon's request and post-op pain management  Timeout:       Correct Patient: Yes        Correct Procedure: Yes        Correct Site: Yes        Correct Position: Yes   Procedure Documentation  Procedure: intrathecal injection       Patient Position: sitting       Patient Prep/Sterile Barriers: sterile gloves       Skin prep: Betadine       Insertion Site: L3-4. (midline approach).       Needle Gauge: 24.        Needle Length (Inches): 3.5        Spinal Needle Type: Pencan       Introducer used       # of attempts: 1 and  # of redirects:  1    Assessment/Narrative         Paresthesias: No.       CSF fluid: clear.    Medication(s) Administered   0.75% Hyperbaric Bupivacaine (Intrathecal) - Intrathecal   1.6 mL - 11/13/2023 7:22:00 AM    FOR [facility] ([facility]) ONLY:   Pain Team Contact information: please page the Pain Team Via [employer]. Search \"Pain\". During daytime hours, please page the attending first. At night please page the resident first.      "

## 2023-11-13 NOTE — ANESTHESIA POSTPROCEDURE EVALUATION
Patient: Annabella Bolanos    Procedure: Procedure(s):  Left total knee arthroplasty       Anesthesia Type:  General    Note:  Disposition: Admission   Postop Pain Control: Uneventful            Sign Out: Well controlled pain   PONV: No   Neuro/Psych: Uneventful            Sign Out: Acceptable/Baseline neuro status   Airway/Respiratory: Uneventful            Sign Out: Acceptable/Baseline resp. status   CV/Hemodynamics: Uneventful            Sign Out: Acceptable CV status; No obvious hypovolemia; No obvious fluid overload   Other NRE: NONE   DID A NON-ROUTINE EVENT OCCUR? No           Last vitals:  Vitals Value Taken Time   /83 11/13/23 0937   Temp 97.9  F (36.6  C) 11/13/23 0952   Pulse 49 11/13/23 0957   Resp 11 11/13/23 0957   SpO2 99 % 11/13/23 0957   Vitals shown include unfiled device data.    Electronically Signed By: Cj Nicholas MD  November 13, 2023  9:59 AM

## 2023-11-13 NOTE — ANESTHESIA PROCEDURE NOTES
Adductor canal Procedure Note    Pre-Procedure   Staff -        Anesthesiologist:  Cj Nicholas MD       Performed By: anesthesiologist       Referred By: [place]       Location: pre-op       Procedure Start/Stop Times: 11/13/2023 6:56 AM and 11/13/2023 7:04 AM       Pre-Anesthestic Checklist: patient identified, IV checked, site marked, risks and benefits discussed, informed consent, monitors and equipment checked, pre-op evaluation, at physician/surgeon's request and post-op pain management  Timeout:       Correct Patient: Yes        Correct Procedure: Yes        Correct Site: Yes        Correct Position: Yes        Correct Laterality: Yes        Site Marked: Yes  Procedure Documentation  Procedure: Adductor canal       Laterality: left       Patient Position: supine       Patient Prep/Sterile Barriers: sterile gloves, mask       Skin prep: Betadine       Needle Type: insulated       Needle Gauge: 22.        Needle Length (Inches): 2        Ultrasound guided       1. Ultrasound was used to identify targeted nerve, plexus, vascular marker, or fascial plane and place a needle adjacent to it in real-time.       2. Ultrasound was used to visualize the spread of anesthetic in close proximity to the above referenced structure.    Assessment/Narrative         Bolus given via needle..        Secured via.        Insertion/Infusion Method: Single Shot       Complications: none       Injection made incrementally with aspirations every 5 mL.    Medication(s) Administered   Bupivacaine 0.25% w/ 1:200K Epi (Injection) - Injection   20 mL - 11/13/2023 6:56:00 AM  midazolam (VERSED) injection - Intravenous   1 mg - 11/13/2023 6:56:00 AM  Medication Administration Time: 11/13/2023 6:56 AM     Comments:  The surgeon has given a verbal order transferring care of this patient to me for the performance of a regional analgesia block for post-op pain control. It is requested of me because I am uniquely trained and qualified to  "perform this block and the surgeon is neither trained nor qualified to perform this procedure.    20 ml 0.25% bupivacaine with 1:200k epi        FOR [facility] ONLY:   Pain Team Contact information: please page the Pain Team Via [employer]. Search \"Pain\". During daytime hours, please page the attending first. At night please page the resident first.      "

## 2023-11-13 NOTE — PLAN OF CARE
Goal Outcome Evaluation:      Plan of Care Reviewed With: patient               Patient vital signs are at baseline: Yes back from surgery at 1045 on cart.   Patient able to ambulate as they were prior to admission or with assist devices provided by therapies during their stay:  No,  Reason:  PT this afternoon  Patient MUST void prior to discharge:  No,  Reason:  due to void.   Patient able to tolerate oral intake:  No,  Reason:  needs to order tray  Pain has adequate pain control using Oral analgesics:  Yes--denies pain and no pain meds given yet   Does patient have an identified :  Yes. Lives alone. Will go to [employer] tomorrow.   Has goal D/C date and time been discussed with patient:  Yes- tomorrow.     VS-stable afebrile.   Lung Sounds-clear, no cough, on capnography.   O2-on 2 LNC. Using IS upto 1000.   GI-+Bs. -flatus. Lbm was yesterday. Will order lunch. Denies nausea. No vomiting.   -due to void. Bladder scanned at 1445 for 900cc. Pt was incont large amount in brief and chux pad. Up to bsc and voided 300cc. Will pvr after the void.   IVF-at 75.   Dressings-ace bandage on L leg.    CMS-+pp, strong dorsi/planter flexion. Ice to knee.   Drain-none.   Activity-bedrest will have PT this afternoon. Up to bsc. With 2 pivot. Gb and walker. Tolerating activity well but increased pain.   Pain-denies at this time. Taking tylenol , oxycodone, ice . Pain upto a 5-6 when up. Sharp, achy at rest.   D/C Plan-going to [employer] tomorrow.

## 2023-11-13 NOTE — PLAN OF CARE
Goal Outcome Evaluation:                      Patient vital signs are at baseline: Yes  Patient able to ambulate as they were prior to admission or with assist devices provided by therapies during their stay:  No,  Reason:pt is A-1 with walker and gate belt. Ambulated in room.   Patient MUST void prior to discharge:  Yes, voided 250ml.    Patient able to tolerate oral intake:  Yes  Pain has adequate pain control using Oral analgesics:  Yes  Does patient have an identified :  Yes  Has goal D/C date and time been discussed with patient: pt will discharge home, tomorrow, 11/14/23 .

## 2023-11-13 NOTE — CONSULTS
[facility]    History and Physical - Hospitalist Service       Date of Admission:  11/13/2023    Assessment & Plan      Annabella Bolanos is a 68 year old female with PMH significant for severe aortic stenosis s/p mechanical aortic valve replacement with reimplantation of her coronaries (2011) on Warfarin, HLD, HTN, hyperthyroidism, and prediabetes who was admitted on 11/13/2023. She s/p left TKA.     #S/p left TKA: tolerated procedure without complication.   -pain control, therapy, and disposition per primary team     #Severe aortic stenosis s/p mechanical aortic valve replacement (2011): aortic valve replacement with 23 mm St Yordan valve and 23 mm tube conduit with reimplantation of coronaries in 2011 for severe aortic stenosis. Seen by Dr. Ling of cardiology on 10/30/2023 for preoperative cardiac clearance. She was cleared to stop her Warfarin for a small duration, 3-4 days, and to resume as soon as possible with Heparin gtt and bridge with Warfarin.   -INR of 1.14 on 11/13  -Warfarin restarted by orthopedic surgery, will place pharmacy consult to assist with warfarin dosing   -discussed with orthopedic surgery and will restart PTA Lovenox 50 mg BID that patient was on prior to surgery for bridging instead of Heparin gtt for easier transition upon hopeful discharge within next day     #HTN: BP slightly elevated  -resume PTA Carveilol and Lisinopril with parameters    #Hyperthyroidism: follows with Dr. Padilla of endocrinology, continue PTA Methimazole    #Right sided breast cancer: s/p lumpectomy in 2014  -continue PTA Anastrozole     #Prediabetes: most recent HgbA1c of 5.3, no need to check blood glucose levels while admitted       Diet: Advance Diet as Tolerated: Regular Diet Adult  Discharge Instruction - Regular Diet Adult    DVT Prophylaxis: Lovenox, Warfarin  Mohr Catheter: Not present  Lines: None     Cardiac Monitoring: None  Code Status: Full Code      Clinically Significant Risk  Factors Present on Admission               # Drug Induced Coagulation Defect: home medication list includes an anticoagulant medication  # Drug Induced Platelet Defect: home medication list includes an antiplatelet medication   # Hypertension: Noted on problem list                 Disposition Plan      Expected Discharge Date: 11/14/2023                The patient's care was discussed with the Attending Physician, Dr. Powell, Patient, and Orthopedic surgery PA .    Winsome King PA-C  Hospitalist Service  [facility]  Securely message with Minka (more info)  Text page via [facility] Paging/Directory   ______________________________________________________________________    Chief Complaint   S/p left TKA    History is obtained from the patient and chart review.     History of Present Illness   Annabella Bolanos is a 68 year old female who is s/p left TKA. Patient tolerated procedure well without complications. She notes pain is tolerable with nerve block.  Denies numbness or tingling into her left lower leg.  She denies nausea, vomiting, abdominal pain, chest pain, shortness of breath, lightheadedness, or dizziness since surgery.  She has not voided or passed flatus since surgery.    Per chart review bradycardic into 40s which has improved to 50s and I&Os reviewed with net output of +1.5 L.     Past Medical History    Past Medical History:   Diagnosis Date    Adenomatous colon polyp 08/2015    Aortic stenosis 06/23/2011    bicuspid AV with severe stenosis - 6/2011 mechanical AVR with 23 mm St Yordan AV conduit, composite graft placement    Arthritis     knees and hands    Bicuspid aortic valve     Breast cancer (H) 07/2014    R breast    H/O aortic valve replacement     St Yordan    Heart murmur     Valve repalcement 2011.    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     AAA repair with av23 mm tube graft    Palpitations     PONV (postoperative nausea and vomiting)     n/v morphine vs anesthesia, vomiting x24 hrs    Subclinical hyperthyroidism 2017    Thrombosis of leg     after  of daughter    Uncomplicated asthma      Past Surgical History   Past Surgical History:   Procedure Laterality Date    BIOPSY NODE SENTINEL Right 09/10/2014    Procedure: BIOPSY NODE SENTINEL;  Surgeon: Ila Romano MD;  Location: [facility] OR    CARDIAC SURGERY  2011    aortic valve replacement    ENT SURGERY      tonsillectomy    HRW PORT A CATH      INSERT PORT VASCULAR ACCESS Left 10/22/2014    Procedure: INSERT PORT VASCULAR ACCESS;  Surgeon: Ila Romano MD;  Location: [facility] OR    LUMPECTOMY BREAST WITH SEED LOCALIZATION Right 09/10/2014    Procedure: LUMPECTOMY BREAST WITH SEED LOCALIZATION;  Surgeon: Ila Romano MD;  Location: [facility] OR    ORTHOPEDIC SURGERY      shoulder, thumb surgery    REMOVE PORT VASCULAR ACCESS Left 2015    Procedure: REMOVE PORT VASCULAR ACCESS;  Surgeon: Ila Romano MD;  Location: [facility] OR    REPAIR ANEURYSM ASCENDING AORTA  2011    Procedure:REPAIR ANEURYSM ASCENDING AORTA; Medial Sternotomy, Aortic Valve Replacement, (St. Yordan Medical Masters HP Valved Graft, size 23 mm) on pump oxygenation, intraoperative transesophageal cardiogram; Surgeon:JOHN PAUL ULLOA; Location:[facility] OR    REPLACE VALVE AORTIC  2011    bicuspid AV with severe stenosis - 2011 mechanical AVR with 23 mm St Yordan AV conduit, composite graft placement       Prior to Admission Medications   Prior to Admission Medications   Prescriptions Last Dose Informant Patient Reported? Taking?   ASPIRIN EC PO 2023  Yes No   Sig: Take 81 mg by mouth daily   Vitamin D3 (CHOLECALCIFEROL) 25 mcg (1000 units) tablet 2023  Yes Yes   Sig: Take by mouth daily   alendronate (FOSAMAX) 70 MG tablet 2023  Yes Yes   Sig: Take 70 mg by mouth every  "7 days   anastrozole (ARIMIDEX) 1 MG tablet 11/13/2023  Yes Yes   Sig: Take 1 mg by mouth daily   calcium citrate (CITRACAL) 950 (200 Ca) MG tablet 11/6/2023  Yes Yes   Sig: Take 1 tablet by mouth 2 times daily   carvedilol (COREG) 12.5 MG tablet 11/13/2023  No Yes   Sig: Take 1 tablet (12.5 mg) by mouth 2 times daily (with meals)   enoxaparin ANTICOAGULANT (LOVENOX) 60 MG/0.6ML syringe 11/12/2023  No No   Sig: Inject 0.5 mLs (50 mg) Subcutaneous every 12 hours   estradiol (ESTRACE) 0.1 MG/GM vaginal cream 11/11/2023  No Yes   Sig: Apply blueberry sized amount to finger and rub inside vagina 3x/week at night.   lisinopril (ZESTRIL) 10 MG tablet 11/12/2023  No Yes   Sig: Take 2 tablets (20 mg) by mouth every morning   methimazole (TAPAZOLE) 5 MG tablet 11/13/2023  No Yes   Sig: TAKE ONE-HALF (1/2) TABLET DAILY   solifenacin (VESICARE) 10 MG tablet 11/12/2023  No Yes   Sig: Take 1 tablet (10 mg) by mouth daily   warfarin ANTICOAGULANT (COUMADIN) 5 MG tablet 11/7/2023  No Yes   Sig: Take 7.5mg (1 1/2 tablets) every Mon,Wed & Fri / Take 5mg all other days OR per INR clinic      Facility-Administered Medications: None      Review of Systems    The 10 point Review of Systems is negative other than noted in the HPI.    Family History   I have reviewed this patient's family history and updated it with pertinent information if needed.  Family History   Problem Relation Age of Onset    Hypertension Mother     Thyroid Disease Mother         \"Toxic thyroid\"    Prostate Cancer Father 70    Cancer Father 80        Lung cancer, Not caused from smoking    Cerebrovascular Disease Father 80    Hypertension Father     Cardiovascular Brother         half brother     Cardiovascular Son         Puentes-Parkinsons White Syndrome    Cardiovascular Daughter         Heart murmur    Breast Cancer Paternal Aunt 80    Cardiovascular Paternal Aunt     Arthritis Brother 40        RA - half brother     Cancer Maternal Grandfather     Colon Cancer No " family hx of         Physical Exam   Vital Signs: Temp: 97.3  F (36.3  C) Temp src: Temporal BP: (!) 147/77 Pulse: (!) 48   Resp: 12 SpO2: 99 % O2 Device: None (Room air) Oxygen Delivery: 8 LPM  Weight: 112 lbs 15.7 oz    General Appearance: Sitting up in bed, appears comfortable, A&Ox3, NAD  Respiratory: CTAB without wheezing or rales  Cardiovascular: RRR with S2 mechanical valve, soft systolic murmur  GI: soft, nontender, nondistended, normoactive bowel sounds   Skin: warm, dry  Ext: left LE with ACE wrap in place CMS intact     Medical Decision Making       45 MINUTES SPENT BY ME on the date of service doing chart review, history, exam, documentation & further activities per the note.      Data   ------------------------- PAST 24 HR DATA REVIEWED -----------------------------------------------

## 2023-11-14 ENCOUNTER — TELEPHONE (OUTPATIENT)
Dept: FAMILY MEDICINE | Facility: CLINIC | Age: 68
End: 2023-11-14
Payer: COMMERCIAL

## 2023-11-14 ENCOUNTER — APPOINTMENT (OUTPATIENT)
Dept: OCCUPATIONAL THERAPY | Facility: CLINIC | Age: 68
End: 2023-11-14
Attending: PHYSICIAN ASSISTANT
Payer: COMMERCIAL

## 2023-11-14 ENCOUNTER — DOCUMENTATION ONLY (OUTPATIENT)
Facility: CLINIC | Age: 68
End: 2023-11-14
Payer: COMMERCIAL

## 2023-11-14 ENCOUNTER — APPOINTMENT (OUTPATIENT)
Dept: PHYSICAL THERAPY | Facility: CLINIC | Age: 68
End: 2023-11-14
Attending: PHYSICIAN ASSISTANT
Payer: COMMERCIAL

## 2023-11-14 ENCOUNTER — TELEPHONE (OUTPATIENT)
Dept: ANTICOAGULATION | Facility: CLINIC | Age: 68
End: 2023-11-14
Payer: COMMERCIAL

## 2023-11-14 VITALS
OXYGEN SATURATION: 96 % | HEIGHT: 63 IN | TEMPERATURE: 99.6 F | RESPIRATION RATE: 16 BRPM | WEIGHT: 112.98 LBS | HEART RATE: 64 BPM | BODY MASS INDEX: 20.02 KG/M2 | DIASTOLIC BLOOD PRESSURE: 75 MMHG | SYSTOLIC BLOOD PRESSURE: 145 MMHG

## 2023-11-14 DIAGNOSIS — Z79.01 LONG TERM CURRENT USE OF ANTICOAGULANT THERAPY: Primary | ICD-10-CM

## 2023-11-14 DIAGNOSIS — Z95.2 AORTIC VALVE REPLACED: ICD-10-CM

## 2023-11-14 LAB
FASTING STATUS PATIENT QL REPORTED: YES
GLUCOSE SERPL-MCNC: 118 MG/DL (ref 70–99)
HGB BLD-MCNC: 11.5 G/DL (ref 11.7–15.7)
INR PPP: 1.25 (ref 0.85–1.15)

## 2023-11-14 PROCEDURE — 82947 ASSAY GLUCOSE BLOOD QUANT: CPT | Performed by: HOSPITALIST

## 2023-11-14 PROCEDURE — 250N000013 HC RX MED GY IP 250 OP 250 PS 637: Performed by: PHYSICIAN ASSISTANT

## 2023-11-14 PROCEDURE — 97535 SELF CARE MNGMENT TRAINING: CPT | Mod: GO | Performed by: OCCUPATIONAL THERAPIST

## 2023-11-14 PROCEDURE — 85018 HEMOGLOBIN: CPT | Performed by: PHYSICIAN ASSISTANT

## 2023-11-14 PROCEDURE — 250N000011 HC RX IP 250 OP 636: Mod: JZ | Performed by: PHYSICIAN ASSISTANT

## 2023-11-14 PROCEDURE — 97165 OT EVAL LOW COMPLEX 30 MIN: CPT | Mod: GO | Performed by: OCCUPATIONAL THERAPIST

## 2023-11-14 PROCEDURE — 97116 GAIT TRAINING THERAPY: CPT | Mod: GP

## 2023-11-14 PROCEDURE — 36415 COLL VENOUS BLD VENIPUNCTURE: CPT | Performed by: PHYSICIAN ASSISTANT

## 2023-11-14 PROCEDURE — 85610 PROTHROMBIN TIME: CPT | Performed by: PHYSICIAN ASSISTANT

## 2023-11-14 PROCEDURE — 96372 THER/PROPH/DIAG INJ SC/IM: CPT | Performed by: PHYSICIAN ASSISTANT

## 2023-11-14 PROCEDURE — 97110 THERAPEUTIC EXERCISES: CPT | Mod: GP

## 2023-11-14 RX ORDER — ASPIRIN 81 MG/1
81 TABLET ORAL DAILY
Start: 2023-11-14

## 2023-11-14 RX ORDER — ENOXAPARIN SODIUM 100 MG/ML
50 INJECTION SUBCUTANEOUS EVERY 12 HOURS
Qty: 4.2 ML | Refills: 0 | Status: SHIPPED | OUTPATIENT
Start: 2023-11-14 | End: 2023-11-18

## 2023-11-14 RX ADMIN — LISINOPRIL 20 MG: 20 TABLET ORAL at 08:12

## 2023-11-14 RX ADMIN — OXYCODONE HYDROCHLORIDE 5 MG: 5 TABLET ORAL at 06:45

## 2023-11-14 RX ADMIN — SENNOSIDES AND DOCUSATE SODIUM 1 TABLET: 8.6; 5 TABLET ORAL at 08:11

## 2023-11-14 RX ADMIN — ANASTROZOLE 1 MG: 1 TABLET, COATED ORAL at 08:12

## 2023-11-14 RX ADMIN — POLYETHYLENE GLYCOL 3350 17 G: 17 POWDER, FOR SOLUTION ORAL at 08:16

## 2023-11-14 RX ADMIN — ACETAMINOPHEN 975 MG: 325 TABLET, FILM COATED ORAL at 05:10

## 2023-11-14 RX ADMIN — ENOXAPARIN SODIUM 50 MG: 60 INJECTION SUBCUTANEOUS at 05:11

## 2023-11-14 RX ADMIN — CARVEDILOL 12.5 MG: 12.5 TABLET, FILM COATED ORAL at 08:11

## 2023-11-14 RX ADMIN — METHIMAZOLE 2.5 MG: 5 TABLET ORAL at 08:11

## 2023-11-14 ASSESSMENT — ACTIVITIES OF DAILY LIVING (ADL)
ADLS_ACUITY_SCORE: 28
PREVIOUS_RESPONSIBILITIES: MEAL PREP;HOUSEKEEPING;LAUNDRY;SHOPPING;YARDWORK;MEDICATION MANAGEMENT;FINANCES;DRIVING
ADLS_ACUITY_SCORE: 28

## 2023-11-14 NOTE — TELEPHONE ENCOUNTER
ANTICOAGULATION  MANAGEMENT: Discharge Review    Annabella Bolanos chart reviewed for anticoagulation continuity of care    Outpatient surgery/procedure on 10/13/23 for total knee arthroplasty.    Discharge disposition: Home    Results:    Recent labs: (last 7 days)     11/13/23  0603 11/14/23  0636   INR 1.14 1.25*     Anticoagulation inpatient management:     Boost dose of 10 mg given on 11/13, however, this boost dose was less than what was recommended on the initial warfarin hold plan.    Anticoagulation discharge instructions:     Warfarin dosing: home regimen continued   Bridging: bridging with enoxaparin (Lovenox)   INR goal change: No      Medication changes affecting anticoagulation: No    Additional factors affecting anticoagulation: No     PLAN     Recommend a boost dose today of 10 mg then continue with warfarin maintenance dose with next INR check on 11/17/23.    Left message for patient to call INR clinic to discuss result.    Anticoagulation Calendar updated    Erica Stinson RN

## 2023-11-14 NOTE — PROGRESS NOTES
"Orthopedic Surgery  11/14/2023  POD 1    S: Patient voices no unexpected ortho complaints today. Denies chest pain or shortness of breath. Did well overnight. Started bridging lovenox. States has INR check secheduled for next Monday.     O: Blood pressure 138/74, pulse 54, temperature 98.3  F (36.8  C), temperature source Temporal, resp. rate 13, height 1.6 m (5' 2.99\"), weight 51.2 kg (112 lb 15.7 oz), SpO2 96%, not currently breastfeeding.  Lab Results   Component Value Date    HGB 13.4 10/03/2023    HGB 13.5 08/18/2020     Lab Results   Component Value Date    INR 1.14 11/13/2023    INR 1.80 07/06/2021     I/O last 3 completed shifts:  In: 1866 [P.O.:356; I.V.:1510]  Out: 950 [Urine:900; Blood:50]  Distal extremity CMSI bilaterally.  Calves are negative bilaterally, both soft and nontender.  The dressing is C/D/I.      A: Ms. Bolanos is doing well status post Procedure(s):  Left total knee arthroplasty.    P: Continue physical therapy.  Anticipate discharge to home with family. Will continue bridging lovenox with baseline coumadin and ASA 81mg per plan of cardiology. Discussed INR check for Friday to check if therapuetic. If within range will stop Lovenox. Has outpatient PT set up.    Ila Larkin PA-C  [phone]   "

## 2023-11-14 NOTE — PHARMACY-ANTICOAGULATION SERVICE
Clinical Pharmacy- Warfarin Discharge Note  This patient is currently on warfarin for the treatment of Mechanical aortic valve replacement.  INR Goal= 2-3    Warfarin PTA Regimen: 7.5 mg every Mon, Wed, Fri; 5 mg all other days    Anticoagulation Dose History  More data exists         Latest Ref Rng & Units 8/7/2023 8/21/2023 9/13/2023 10/3/2023 10/10/2023 11/13/2023 11/14/2023   Recent Dosing and Labs   warfarin ANTICOAGULANT (COUMADIN) tablet 10 mg - - - - - - 10 mg, $Given -   INR 0.85 - 1.15 3.2  2.2  2.5  2.29  2.3  1.14  1.25        Vitamin K doses administered during the last 7 days: None  FFP administered during the last 7 days: None    Agree with recommend discharging the patient on home regimen.    The patient should have an INR checked  Will Need INR draw on Friday 11/17/23. Patient bridging with lovenox. Resumed home coumadin. May stop lovenox when INR in range 2-3 . In addition; INR Lab with [facility] scheduled 11/20/2023.

## 2023-11-14 NOTE — PLAN OF CARE
Physical Therapy Discharge Summary     Reason for therapy discharge:    All goals and outcomes met, no further needs identified.     Progress towards therapy goal(s). See goals on Care Plan in [facility] electronic health record for goal details.  Goals met     Therapy recommendation(s):    Continue home exercise program.  Patient reports OP PT is scheduled  Recommend use of walker with all mobility, supervision from family with stair negotiation.

## 2023-11-14 NOTE — PROGRESS NOTES
[facility]  OUTPATIENT OCCUPATIONAL THERAPY  EVALUATION  PLAN OF TREATMENT FOR OUTPATIENT REHABILITATION  (COMPLETE FOR INITIAL CLAIMS ONLY)  Patient's Last Name, First Name, M.I.  YOB: 1955  Annabella Bolanos                          Provider's Name  [facility] Medical Record No.  [id number]                             Onset Date:  11/13/23   Start of Care Date:      Type:     ___PT   _X_OT   ___SLP Medical Diagnosis:                       OT Diagnosis:  (P) Decline in ADL independence and safety Visits from SOC:  1     See note for plan of treatment, functional goals and certification details    I CERTIFY THE NEED FOR THESE SERVICES FURNISHED UNDER        THIS PLAN OF TREATMENT AND WHILE UNDER MY CARE     (Physician co-signature of this document indicates review and certification of the therapy plan).                              11/14/23 0900   Appointment Info   Signing Clinician's Name / Credentials (OT) URSZULA Jesus   Living Environment   People in Home child(guanaco), adult   Current Living Arrangements house   Home Accessibility stairs to enter home   Number of Stairs, Main Entrance 2   Stair Railings, Main Entrance railings safe and in good condition   Number of Stairs, Within Home, Primary greater than 10 stairs   Stair Railings, Within Home, Primary railings safe and in good condition   Transportation Anticipated family or friend will provide   Living Environment Comments Pt is discharging to son's home. Pt son has a walk in shower with a shower francisco j, standard toilets with a toilet riser and toilet safety frame if needed   Self-Care   Usual Activity Tolerance good   Current Activity Tolerance fair   Equipment Currently Used at Home shower chair;walker, standard;raised toilet seat;walker, rolling;grab bar, tub/shower;tub bench   Fall history within last six months no   Activity/Exercise/Self-Care Comment Patient is independent  with ADLs and mobility at baseline without AE   Instrumental Activities of Daily Living (IADL)   Previous Responsibilities meal prep;housekeeping;laundry;shopping;yardwork;medication management;finances;driving   IADL Comments Pt son is able to assist with IADLs at baseline   General Information   Onset of Illness/Injury or Date of Surgery 11/13/23   Referring Physician Ila Larkin PA-C   Patient/Family Therapy Goal Statement (OT) Patient would like to decrease pain and return to baseline   Additional Occupational Profile Info/Pertinent History of Current Problem Pt underwent LTKA on 11/13   Existing Precautions/Restrictions weight bearing   Cognitive Status Examination   Orientation Status orientation to person, place and time   Affect/Mental Status (Cognitive) WFL   Follows Commands WFL   Cognitive Status Comments Patient appears at her cognitive baseline   Visual Perception   Visual Impairment/Limitations corrective lenses full-time   Sensory   Sensory Comments Intact   Pain Assessment   Patient Currently in Pain Yes, see Vital Sign flowsheet   Posture   Posture not impaired   Range of Motion Comprehensive   Comment, General Range of Motion LLE limited due to surgical pain   Strength Comprehensive (MMT)   Comment, General Manual Muscle Testing (MMT) Assessment LLE limited due to surgical pain   Coordination   Coordination Comments LE decline   Bed Mobility   Comment (Bed Mobility) SBA   Transfers   Transfer Comments CGA   Balance   Balance Comments Intact with walker   Activities of Daily Living   BADL Assessment/Intervention bathing;lower body dressing;toileting   Bathing Assessment/Intervention   Comment, (Bathing) Min assist per clinical reasoning   Lower Body Dressing Assessment/Training   Comment, (Lower Body Dressing) Mod assist   Toileting   Comment, (Toileting) CGA, limited tolerance for task due to pain   Clinical Impression   Criteria for Skilled Therapeutic Interventions Met (OT) Yes,  treatment indicated   OT Diagnosis Decline in ADL independence and safety   Influenced by the following impairments LTKA   OT Problem List-Impairments impacting ADL problems related to;activity tolerance impaired;range of motion (ROM);strength;pain;post-surgical precautions   Assessment of Occupational Performance 1-3 Performance Deficits   Identified Performance Deficits Impaired LE dressing and bathing, poor tolerance for toileting   Planned Therapy Interventions (OT) ADL retraining   Clinical Decision Making Complexity (OT) problem focused assessment/low complexity   Risk & Benefits of therapy have been explained evaluation/treatment results reviewed;care plan/treatment goals reviewed;risks/benefits reviewed;current/potential barriers reviewed;participants voiced agreement with care plan;participants included;patient;son   OT Total Evaluation Time   OT Eval, Low Complexity Minutes (21500) 8   OT Goals   Therapy Frequency (OT) One time eval and treatment   OT Predicted Duration/Target Date for Goal Attainment 11/14/23   OT Goals Lower Body Dressing;Lower Body Bathing;Toilet Transfer/Toileting   OT: Lower Body Dressing Goal Met  (SBA for underwear and pants, mod assist for socks and shoes)   OT: Lower Body Bathing Goal Met  (Pt will verbalize understanding of shower transfer techniques to decrease fall risk and improve independence.)   OT: Toilet Transfer/Toileting Modified independent;toilet transfer;cleaning and garment management;using adaptive equipment;within precautions;Goal Met   Self-Care/Home Management   Self-Care/Home Mgmt/ADL, Compensatory, Meal Prep Minutes (77767) 24   Symptoms Noted During/After Treatment (Meal Preparation/Planning Training) increased pain   Treatment Detail/Skilled Intervention Ot; Pt agreeable to therapy. Pt educated on LE dressing techniques and AE to improve dressing independence, after education pt donned underwear and pants with SBA and socks/shoes with mod assist (son is able  to assist with socks and shoes, pt prefers to don underwear and pants independently). Pt educated on shower techinque to improve ADL independence and safety. Ptverbalized understanding. sit to stand with CGA andcues for hand placement on walker. Pt educated on LE positioning nad AE during toilet transfer to improve independence, toilet transfer with SBA after education. Pt reports no further OT concerns, owns all necessary AE.   OT Discharge Planning   OT Plan DC   OT Discharge Recommendation (DC Rec)   (Defer to ortho MD)   OT Rationale for DC Rec Patient appears safe and able to discharge to son's home with intermittent assist for dressing and IADLs. Pt owns all necessary equipment.Defer to ortho MD for further therapy recommendations   OT Brief overview of current status SBA sit to stand with walker   OT Equipment Needed at Discharge   (N/A)

## 2023-11-14 NOTE — PROGRESS NOTES
Prior Authorization Approval    Medication: ENOXAPARIN SODIUM 60 MG/0.6ML IJ SOSY  Authorization Effective Date: 10/15/2023  Authorization Expiration Date: 11/13/2024  Approved Dose/Quantity: 38ml for 30 days  Reference #: [id number]   Insurance Company: [employer]-Specialty PA's - Phone [phone] Fax [phone]  Expected CoPay: $    CoPay Card Available:      Financial Assistance Needed:   Which Pharmacy is filling the prescription:    Pharmacy Notified:   Patient Notified:

## 2023-11-14 NOTE — CONSULTS
Care Management Discharge Note    Discharge Date: 11/14/2023       Discharge Disposition:  home with outpatient therapies      Discharge Transportation: family or friend will provide    Private pay costs discussed: Not applicable    Education Provided on the Discharge Plan:      Patient/Family in Agreement with the Plan:      Handoff Referral Completed: Yes    Additional Information:  SW/CM consulted for discharge planning.  Per chart review, patient is going home with assist, no discharge needs identified.  Please contact Swer if needs arise.    ALESHIA Neal, [facility]  Inpatient Care Coordination  [facility]  [phone]      JOSEPHINE MAN

## 2023-11-14 NOTE — PLAN OF CARE
Occupational Therapy Discharge Summary    Reason for therapy discharge:    All goals and outcomes met, no further needs identified.    Progress towards therapy goal(s). See goals on Care Plan in [facility] electronic health record for goal details.  Goals met    Therapy recommendation(s):    Defer to ortho MD for further therapy recommendations.

## 2023-11-14 NOTE — TELEPHONE ENCOUNTER
ANTICOAGULATION  MANAGEMENT: Discharge Review       PLAN     Recommended warfarin 10 mg today, then resume warfarin maintenance dose with recheck on 11/17/23.  Continue Lovenox.    Consulted [facility] for warfarin dosing plan.    Spoke with Annabella    Anticoagulation Calendar updated    Erica Stinson RN

## 2023-11-14 NOTE — PLAN OF CARE
Goal Outcome Evaluation:      Plan of Care Reviewed With: patient    Overall Patient Progress: improving    Patient vital signs are at baseline: Yes  Patient able to ambulate as they were prior to admission or with assist devices provided by therapies during their stay:  Yes  Patient MUST void prior to discharge:  Yes  Patient able to tolerate oral intake:  Yes  Pain has adequate pain control using Oral analgesics:  Yes  Does patient have an identified :  Yes  Has goal D/C date and time been discussed with patient:  Yes      A&O x 4. Ambulating with 1A, GB, walker. CMS intact. Voiding adequate amts. Tolerating regular diet. No N/V. Pain managed with scheduled Tylenol and PRN Oxycodone. Ace wrap removed POD 1. Aqual cell with small amt of drainage.Pt plans to discharge to son's house. VSS on RA. Plan is to discharge today.

## 2023-11-14 NOTE — TELEPHONE ENCOUNTER
General Call      Reason for Call:  INR    What are your questions or concerns:  retuning call    Date of last appointment with provider: 11-14    Could we send this information to you in [employer] or would you prefer to receive a phone call?:   Patient would prefer a phone call   Okay to leave a detailed message?: Yes at Cell number on file:    Telephone Information:   Mobile [phone]

## 2023-11-14 NOTE — PROGRESS NOTES
Patient vital signs are at baseline: Yes  Patient able to ambulate as they were prior to admission or with assist devices provided by therapies during their stay:  Yes-Ax1, using gait belt, and walker.   Patient MUST void prior to discharge:  Yes  Patient able to tolerate oral intake:  Yes-regular diet.   Pain has adequate pain control using Oral analgesics:  Yes-PO oxycodone and scheduled tylenol.  Does patient have an identified :  Yes-son.  Has goal D/C date and time been discussed with patient:  Yes     Pt A&O x4. CMS intact. Dressing: small amt of dried drainage. Incision iced. TANNER stockings placed.     Reviewed discharge instructions with patient and son. Questions answered. Patient discharged to home via son with filled medications, discharge instructions, and belongings.

## 2023-11-17 ENCOUNTER — LAB (OUTPATIENT)
Dept: LAB | Facility: CLINIC | Age: 68
End: 2023-11-17
Payer: COMMERCIAL

## 2023-11-17 ENCOUNTER — ANTICOAGULATION THERAPY VISIT (OUTPATIENT)
Dept: ANTICOAGULATION | Facility: CLINIC | Age: 68
End: 2023-11-17

## 2023-11-17 DIAGNOSIS — Z95.2 AORTIC VALVE REPLACED: ICD-10-CM

## 2023-11-17 LAB — INR BLD: 3.2 (ref 0.9–1.1)

## 2023-11-17 PROCEDURE — 85610 PROTHROMBIN TIME: CPT

## 2023-11-17 PROCEDURE — 36416 COLLJ CAPILLARY BLOOD SPEC: CPT

## 2023-11-17 NOTE — PROGRESS NOTES
ANTICOAGULATION MANAGEMENT     Annabella Bolanos 68 year old female is on warfarin with supratherapeutic INR result. (Goal INR 2.0-3.0)    Recent labs: (last 7 days)     11/17/23  0841   INR 3.2*       ASSESSMENT     Warfarin Lab Questionnaire    Warfarin Doses Last 7 Days      11/16/2023     9:51 AM   Dose in Tablet or Mg   TAB or MG? milligram (mg)     Pt Rptd Dose SUNDAY MONDAY TUESDAY WED THURS FRIDAY SATURDAY 11/16/2023   9:51 AM 0 10 10 7.5 5 0 0         11/16/2023   Warfarin Lab Questionnaire   Missed doses within past 14 days? Yes   If yes; please list when: Stopped warfarin last Tuesday b4 surgery   Changes in diet or alcohol within past 14 days? Yes   Please explain:  No alcohol at all   Medication changes since last result? Yes   Please list: Acetaminophen,  oxycodone, stool softener, polyethylene glycol   Injuries or illness since last result? No   New shortness of breath, severe headaches or sudden changes in vision since last result? No   Abnormal bleeding since last result? No   Upcoming surgery, procedure? No   Best number to call with results? [phone]     Previous result: Subtherapeutic  Additional findings Pt had a left TKA on 11/13/23. Pt reports that she is recovering well. Pt has had a decrease in activity due to the surgery. Pt no longer on narcotic pain medication. Pt states that she will continue to eat her daily greens.    Pt advised to stop lovenox now.       PLAN     Recommended plan for ongoing change(s) affecting INR     Dosing Instructions: partial hold then decrease your warfarin dose (5.9% change) with next INR in 7-10 days       Summary  As of 11/17/2023      Full warfarin instructions:  11/17: 2.5 mg; Otherwise 7.5 mg every Mon, Fri; 5 mg all other days   Next INR check:  11/27/2023               Telephone call with Annabella who agrees to plan and repeated back plan correctly    Lab visit scheduled    Education provided:   Please call back if any changes to your diet, medications or  how you've been taking warfarin  Contact [phone]  with any changes, questions or concerns.     Plan made per [facility] anticoagulation protocol    Nellie Valentin RN  Anticoagulation Clinic  11/17/2023    _______________________________________________________________________     Anticoagulation Episode Summary       Current INR goal:  2.0-3.0   TTR:  77.3% (1 y)   Target end date:  Indefinite   Send INR reminders to:  [facility]    Indications    Long-term (current) use of anticoagulants [Z79.01] [Z79.01]  Aortic valve replaced - history of bicuspid aortic stenosis status post 23 mm St. Yordan mechanical aortic valve and tube conduit with reimplantation of her coronaries 2011 [Z95.2]             Comments:               Anticoagulation Care Providers       Provider Role Specialty Phone number    Brook Echavarria MD Referring Internal Medicine [phone]    Karla Moss MD Referring Internal Medicine [phone]    Viky Tucker PA-C Referring Family Medicine [phone]    Ila Larkin PA-C Referring Orthopaedic Surgery [phone]

## 2023-11-27 ENCOUNTER — LAB (OUTPATIENT)
Dept: LAB | Facility: CLINIC | Age: 68
End: 2023-11-27
Payer: COMMERCIAL

## 2023-11-27 ENCOUNTER — TRANSFERRED RECORDS (OUTPATIENT)
Dept: HEALTH INFORMATION MANAGEMENT | Facility: CLINIC | Age: 68
End: 2023-11-27

## 2023-11-27 ENCOUNTER — TELEPHONE (OUTPATIENT)
Dept: FAMILY MEDICINE | Facility: CLINIC | Age: 68
End: 2023-11-27

## 2023-11-27 ENCOUNTER — ANTICOAGULATION THERAPY VISIT (OUTPATIENT)
Dept: ANTICOAGULATION | Facility: CLINIC | Age: 68
End: 2023-11-27

## 2023-11-27 DIAGNOSIS — Z95.2 AORTIC VALVE REPLACED: ICD-10-CM

## 2023-11-27 DIAGNOSIS — Z79.01 LONG TERM CURRENT USE OF ANTICOAGULANT THERAPY: Primary | ICD-10-CM

## 2023-11-27 LAB — INR BLD: 3.2 (ref 0.9–1.1)

## 2023-11-27 PROCEDURE — 36416 COLLJ CAPILLARY BLOOD SPEC: CPT

## 2023-11-27 PROCEDURE — 85610 PROTHROMBIN TIME: CPT

## 2023-11-27 NOTE — PROGRESS NOTES
ANTICOAGULATION MANAGEMENT     Annabella Bolanos 68 year old female is on warfarin with supratherapeutic INR result. (Goal INR 2.0-3.0)    Recent labs: (last 7 days)     11/27/23  1222   INR 3.2*       ASSESSMENT     Warfarin Lab Questionnaire    Warfarin Doses Last 7 Days      11/26/2023     8:07 PM   Dose in Tablet or Mg   TAB or MG? milligram (mg)     Pt Rptd Dose SUNDAY MONDAY TUESDAY WED THURS FRIDAY SATURDAY 11/26/2023   8:07 PM 5 7.5 5 5 5 7.5 5         11/26/2023   Warfarin Lab Questionnaire   Missed doses within past 14 days? No   Changes in diet or alcohol within past 14 days? Yes  Patient reports she will increase greens a little to try to get INR under 3.0 instead of decreasing warfarin dose again.    Please explain:  no alcohol   Medication changes since last result? Yes   Please list: no longer taking oxycodone   Injuries or illness since last result? No Patient reports continues to have inflammation from knee surgery 11/13/23. Patient reports inflammation is improving but thigh and knee continue to be swollen   New shortness of breath, severe headaches or sudden changes in vision since last result? No   Abnormal bleeding since last result? No   Upcoming surgery, procedure? No   Best number to call with results? [phone]     Previous result: Supratherapeutic warfarin dose was decreased 5.9% on 11/17/23  Additional findings: None       PLAN     Recommended plan for temporary change(s) affecting INR     Dosing Instructions: Continue your current warfarin dose with next INR in 1 week       Summary  As of 11/27/2023      Full warfarin instructions:  7.5 mg every Mon, Fri; 5 mg all other days   Next INR check:  12/4/2023               Telephone call with Annabella who verbalizes understanding and agrees to plan    Lab visit scheduled    Education provided:   Please call back if any changes to your diet, medications or how you've been taking warfarin  Contact [phone]  with any changes, questions or  concerns.     Plan made per [facility] anticoagulation protocol    Deborah Aguilera RN  Anticoagulation Clinic  11/27/2023    _______________________________________________________________________     Anticoagulation Episode Summary       Current INR goal:  2.0-3.0   TTR:  74.5% (1 y)   Target end date:  Indefinite   Send INR reminders to:  [facility]    Indications    Long-term (current) use of anticoagulants [Z79.01] [Z79.01]  Aortic valve replaced - history of bicuspid aortic stenosis status post 23 mm St. Yordan mechanical aortic valve and tube conduit with reimplantation of her coronaries 2011 [Z95.2]             Comments:               Anticoagulation Care Providers       Provider Role Specialty Phone number    Brook Echavarria MD Referring Internal Medicine [phone]    Karla Moss MD Referring Internal Medicine [phone]    Viky Tucker PA-C Referring Family Medicine [phone]    Ila Larkin PA-C Referring Orthopaedic Surgery [phone]

## 2023-11-27 NOTE — TELEPHONE ENCOUNTER
Patient returning call to INR nurse but no answer at number listed.  Tried twice.  Please call patient back at [phone]

## 2023-11-27 NOTE — TELEPHONE ENCOUNTER
Call returned to patient. See 11/27/23 Anticoagulation Encounter for warfarin dosing instruction.  Deborah Aguilera RN, BSN  Anticoagulation Clinic

## 2023-12-01 ENCOUNTER — OFFICE VISIT (OUTPATIENT)
Dept: FAMILY MEDICINE | Facility: CLINIC | Age: 68
End: 2023-12-01
Payer: COMMERCIAL

## 2023-12-01 VITALS
HEIGHT: 63 IN | DIASTOLIC BLOOD PRESSURE: 78 MMHG | HEART RATE: 68 BPM | RESPIRATION RATE: 18 BRPM | BODY MASS INDEX: 19.53 KG/M2 | OXYGEN SATURATION: 100 % | SYSTOLIC BLOOD PRESSURE: 102 MMHG | TEMPERATURE: 98.2 F | WEIGHT: 110.2 LBS

## 2023-12-01 DIAGNOSIS — R35.1 NOCTURIA: ICD-10-CM

## 2023-12-01 DIAGNOSIS — Z96.652 S/P TOTAL KNEE ARTHROPLASTY, LEFT: Primary | ICD-10-CM

## 2023-12-01 PROCEDURE — 99213 OFFICE O/P EST LOW 20 MIN: CPT | Performed by: PHYSICIAN ASSISTANT

## 2023-12-01 RX ORDER — RESPIRATORY SYNCYTIAL VIRUS VACCINE 120MCG/0.5
0.5 KIT INTRAMUSCULAR ONCE
Qty: 1 EACH | Refills: 0 | Status: CANCELLED | OUTPATIENT
Start: 2023-12-01 | End: 2023-12-01

## 2023-12-01 ASSESSMENT — ENCOUNTER SYMPTOMS
DIFFICULTY URINATING: 0
PALPITATIONS: 0
ABDOMINAL PAIN: 0
HEMATOCHEZIA: 0
HEMATURIA: 0
LIGHT-HEADEDNESS: 0
SORE THROAT: 0
DIARRHEA: 0
DIZZINESS: 0
FREQUENCY: 0
NAUSEA: 0
VOMITING: 0
CONSTIPATION: 1
CHILLS: 0
WEAKNESS: 1
FEVER: 0

## 2023-12-01 NOTE — PROGRESS NOTES
Assessment & Plan     S/P total knee arthroplasty, left   Doing well s/p L knee replacement without evidence for secondary infection or other concerns. Continue PT and follow-up per orthopedics as planned.    Nocturia  Reports no improvement with vesicare. Has follow-up with urology in Jan. Pt feels has low suspicion for JENNIFER, but does reports post-op RN told her to breathe deeply a couple of occasions after an alarm went off. Encouraged to follow-up for sleep study to rule out JENNIFER and [place] back to urology as planned. Patient in agreement with plan.          MED REC REQUIRED  Post Medication Reconciliation Status:  Discharge medications reconciled, continue medications without change    This patient was seen alongside a student physician assistant, CARLITOS Salguero. The history, physical exam, review of systems, objective data and assessment/plan were completed by myself.    Viky Tucker PA-C  [facility] HALLE Winchester is a 68 year old, presenting for the following health issues:  Hospital F/U        12/1/2023    11:04 AM   Additional Questions   Roomed by Ana Lilia       [facility]       Hospital Follow-up Visit:    Procedure went well no complications, 1 overnight at [facility]  Doing PT at home 3x/day and going into PT 2x/week - until end of Dec.  Sleeping ok. Gets to sleep, waking multiple times, sleeping until intended  Taking tylenol 500mg X2 at night and sometimes before PT  Icing and heating daily  Mildly constipated, taking senna at bedtime. No nausea, vomiting, abd pain  Drinking lots of water, appetite has returned  Ortho 6w follow-up scheduled for 12/26    Wonders if she should discontinue vesicare for nocturia - hasn't made a difference. Has urology scheduled for Jan. Awaiting sleep study, but pt has low suspicion she has this. Has traveled with daughter and reports she's never noticed she snores or has apnea - reports post-op though RN stuck their head in the door and  "told her to breathe deeply a couple of occasions after an alarm went off.     Hospital/Nursing Home/IP Rehab Facility: [facility]  Date of Admission: 11/13/2023  Date of Discharge: 11/14/2023  Reason(s) for Admission: Knee replacement    Was your hospitalization related to COVID-19? No   Problems taking medications regularly:  None  Medication changes since discharge: Stop taking Oxycodone  Problems adhering to non-medication therapy:  None    Summary of hospitalization:  [facility] discharge summary reviewed  Diagnostic Tests/Treatments reviewed.  Follow up needed: none  Other Healthcare Providers Involved in Patient s Care:         Specialist appointment - Urology, Surgical follow-up appointment - 12/26, and Physical Therapy  Update since discharge: improved.         Plan of care communicated with patient           BP Readings from Last 2 Encounters:   12/01/23 102/78   11/14/23 (!) 145/75     Hemoglobin A1C (%)   Date Value   10/25/2023 5.3   11/10/2017 5.3   06/21/2011 5.3     LDL Cholesterol Calculated (mg/dL)   Date Value   10/25/2023 125 (H)   10/21/2022 120 (H)   08/18/2020 131 (H)   07/18/2019 128 (H)       Review of Systems   Constitutional:  Negative for chills and fever.   HENT:  Negative for sore throat.    Eyes:  Negative for visual disturbance.   Cardiovascular:  Negative for chest pain and palpitations.   Gastrointestinal:  Positive for constipation. Negative for abdominal pain, diarrhea, hematochezia, nausea and vomiting.   Genitourinary:  Negative for difficulty urinating, frequency, hematuria and urgency.   Skin: Negative.    Neurological:  Positive for weakness. Negative for dizziness and light-headedness.          Objective    /78   Pulse 68   Temp 98.2  F (36.8  C) (Tympanic)   Resp 18   Ht 1.6 m (5' 3\")   Wt 50 kg (110 lb 3.2 oz)   LMP  (LMP Unknown)   SpO2 100%   BMI 19.52 kg/m    Body mass index is 19.52 kg/m .  Physical Exam   GENERAL: " healthy, alert and no distress  HENT: ear canals and TM's normal, nose and mouth without ulcers or lesions  NECK: no adenopathy, no asymmetry, masses, or scars and thyroid normal to palpation  RESP: lungs clear to auscultation - no rales, rhonchi or wheezes  CV: regular rate and rhythm, crisp mechanical valve click noted best over RSB. No murmur or rub   ABDOMEN: soft, nontender, no hepatosplenomegaly, no masses and bowel sounds normal  SKIN: L anterior knee replacement incision noted and appears to be healing nicely without redness, warmth or pustular discharge.  PSYCH: mentation appears normal, affect normal/bright

## 2023-12-04 ENCOUNTER — ANTICOAGULATION THERAPY VISIT (OUTPATIENT)
Dept: ANTICOAGULATION | Facility: CLINIC | Age: 68
End: 2023-12-04

## 2023-12-04 ENCOUNTER — LAB (OUTPATIENT)
Dept: LAB | Facility: CLINIC | Age: 68
End: 2023-12-04
Payer: COMMERCIAL

## 2023-12-04 DIAGNOSIS — Z95.2 AORTIC VALVE REPLACED: ICD-10-CM

## 2023-12-04 DIAGNOSIS — Z79.01 LONG TERM CURRENT USE OF ANTICOAGULANT THERAPY: Primary | ICD-10-CM

## 2023-12-04 LAB — INR BLD: 3 (ref 0.9–1.1)

## 2023-12-04 PROCEDURE — 36416 COLLJ CAPILLARY BLOOD SPEC: CPT

## 2023-12-04 PROCEDURE — 85610 PROTHROMBIN TIME: CPT

## 2023-12-04 NOTE — PROGRESS NOTES
ANTICOAGULATION MANAGEMENT     Annabella Bolanos 68 year old female is on warfarin with therapeutic INR result. (Goal INR 2.0-3.0)    Recent labs: (last 7 days)     12/04/23  1217   INR 3.0*       ASSESSMENT     Warfarin Lab Questionnaire    Warfarin Doses Last 7 Days      12/3/2023     3:55 PM   Dose in Tablet or Mg   TAB or MG? milligram (mg)     Pt Rptd Dose PABLO MONDAY TUESDAY WED THURS FRIDAY SATURDAY   12/3/2023   3:55 PM 5 7.5 5 5 5 7.5 5         12/3/2023   Warfarin Lab Questionnaire   Missed doses within past 14 days? No   Changes in diet or alcohol within past 14 days? No, patient reports she is keeping her vitamin K intake consistent.   Medication changes since last result? No   Injuries or illness since last result? No, Patient reports pain and inflammation post TKA are improving.   New shortness of breath, severe headaches or sudden changes in vision since last result? No   Abnormal bleeding since last result? No   Upcoming surgery, procedure? No   Best number to call with results? [phone]     Previous result: Supratherapeutic  Additional findings: Warfarin maintenance dose was reduced 6% on 11/17/23.    Office visit with PCP on 12/1/23 for blood pressure check, hospital [id number] change in care plan.         PLAN     Recommended plan for no diet, medication or health factor changes affecting INR     Dosing Instructions: Continue your current warfarin dose with next INR in 10 days       Summary  As of 12/4/2023      Full warfarin instructions:  7.5 mg every Mon, Fri; 5 mg all other days   Next INR check:  12/14/2023               Telephone call with Annabella who verbalizes understanding and agrees to plan    Lab visit scheduled    Education provided:   Dietary considerations: importance of consistent vitamin K intake    Plan made per [facility]agulation protocol    Marybeth Stock, RN  Anticoagulation Clinic  12/4/2023    _______________________________________________________________________      Anticoagulation Episode Summary       Current INR goal:  2.0-3.0   TTR:  72.6% (1 y)   Target end date:  Indefinite   Send INR reminders to:  [facility]    Indications    Long-term (current) use of anticoagulants [Z79.01] [Z79.01]  Aortic valve replaced - history of bicuspid aortic stenosis status post 23 mm St. Yordan mechanical aortic valve and tube conduit with reimplantation of her coronaries 2011 [Z95.2]             Comments:               Anticoagulation Care Providers       Provider Role Specialty Phone number    Brook Echavarria MD Referring Internal Medicine [phone]    Karla Moss MD Referring Internal Medicine [phone]    Viky Tucker PA-C Referring Family Medicine [phone]    Ila Larkin PA-C Referring Orthopaedic Surgery [phone]

## 2023-12-14 ENCOUNTER — LAB (OUTPATIENT)
Dept: LAB | Facility: CLINIC | Age: 68
End: 2023-12-14
Payer: COMMERCIAL

## 2023-12-14 ENCOUNTER — ANTICOAGULATION THERAPY VISIT (OUTPATIENT)
Dept: ANTICOAGULATION | Facility: CLINIC | Age: 68
End: 2023-12-14

## 2023-12-14 DIAGNOSIS — Z95.2 AORTIC VALVE REPLACED: ICD-10-CM

## 2023-12-14 DIAGNOSIS — Z79.01 LONG TERM CURRENT USE OF ANTICOAGULANT THERAPY: Primary | ICD-10-CM

## 2023-12-14 LAB — INR BLD: 3 (ref 0.9–1.1)

## 2023-12-14 PROCEDURE — 85610 PROTHROMBIN TIME: CPT

## 2023-12-14 PROCEDURE — 36415 COLL VENOUS BLD VENIPUNCTURE: CPT

## 2023-12-14 NOTE — PROGRESS NOTES
ANTICOAGULATION MANAGEMENT     Annabella Bolanos 68 year old female is on warfarin with therapeutic INR result. (Goal INR 2.0-3.0)    Recent labs: (last 7 days)     12/14/23  1126   INR 3.0*       ASSESSMENT     Warfarin Lab Questionnaire    Warfarin Doses Last 7 Days      12/13/2023     8:16 PM   Dose in Tablet or Mg   TAB or MG? milligram (mg)     Pt Rptd Dose SUNDAY MONDAY TUESDAY WED THURS FRIDAY SATURDAY 12/13/2023   8:16 PM 5 7.5 5 5 5 7.5 5         12/13/2023   Warfarin Lab Questionnaire   Missed doses within past 14 days? No   Changes in diet or alcohol within past 14 days? No   Medication changes since last result? No   Injuries or illness since last result? No   New shortness of breath, severe headaches or sudden changes in vision since last result? No   Abnormal bleeding since last result? No   Upcoming surgery, procedure? No   Best number to call with results? [phone]     Previous result: Therapeutic last visit; previously outside of goal range  Additional findings: None       PLAN     Recommended plan for no diet, medication or health factor changes affecting INR     Dosing Instructions: Continue your current warfarin dose with next INR in 2 weeks       Summary  As of 12/14/2023      Full warfarin instructions:  7.5 mg every Mon, Fri; 5 mg all other days   Next INR check:  12/28/2023               Telephone call with Annabella who agrees to plan and repeated back plan correctly    Lab visit scheduled    Education provided:   Please call back if any changes to your diet, medications or how you've been taking warfarin    Plan made per [facility] protocol    Erica Stinson RN  Anticoagulation Clinic  12/14/2023    _______________________________________________________________________     Anticoagulation Episode Summary       Current INR goal:  2.0-3.0   TTR:  72.6% (1 y)   Target end date:  Indefinite   Send INR reminders to:  [facility]    Indications    Long-term (current) use of  anticoagulants [Z79.01] [Z79.01]  Aortic valve replaced - history of bicuspid aortic stenosis status post 23 mm St. Yordan mechanical aortic valve and tube conduit with reimplantation of her coronaries 2011 [Z95.2]             Comments:               Anticoagulation Care Providers       Provider Role Specialty Phone number    Brook Echavarria MD Referring Internal Medicine [phone]    Karla Moss MD Referring Internal Medicine [phone]    Viky Tucker PA-C Referring Family Medicine [phone]    Ila Larkin PA-C Referring Orthopaedic Surgery [phone]

## 2023-12-28 ENCOUNTER — ANTICOAGULATION THERAPY VISIT (OUTPATIENT)
Dept: ANTICOAGULATION | Facility: CLINIC | Age: 68
End: 2023-12-28

## 2023-12-28 ENCOUNTER — LAB (OUTPATIENT)
Dept: LAB | Facility: CLINIC | Age: 68
End: 2023-12-28
Payer: COMMERCIAL

## 2023-12-28 ENCOUNTER — TRANSFERRED RECORDS (OUTPATIENT)
Dept: HEALTH INFORMATION MANAGEMENT | Facility: CLINIC | Age: 68
End: 2023-12-28

## 2023-12-28 DIAGNOSIS — Z95.2 AORTIC VALVE REPLACED: ICD-10-CM

## 2023-12-28 DIAGNOSIS — Z79.01 LONG TERM CURRENT USE OF ANTICOAGULANT THERAPY: Primary | ICD-10-CM

## 2023-12-28 LAB — INR BLD: 3 (ref 0.9–1.1)

## 2023-12-28 PROCEDURE — 36416 COLLJ CAPILLARY BLOOD SPEC: CPT

## 2023-12-28 PROCEDURE — 85610 PROTHROMBIN TIME: CPT

## 2023-12-28 NOTE — PROGRESS NOTES
ANTICOAGULATION MANAGEMENT     Annabella Bolanos 68 year old female is on warfarin with therapeutic INR result. (Goal INR 2.0-3.0)    Recent labs: (last 7 days)     12/28/23  1022   INR 3.0*       ASSESSMENT     Warfarin Lab Questionnaire    Warfarin Doses Last 7 Days      12/27/2023    11:44 AM   Dose in Tablet or Mg   TAB or MG? milligram (mg)     Pt Rptd Dose SUNDAY MONDAY TUESDAY WED THURS FRIDAY SATURDAY 12/27/2023  11:44 AM 5 7.5 5 5 5 7.5 5         12/27/2023   Warfarin Lab Questionnaire   Missed doses within past 14 days? No   Changes in diet or alcohol within past 14 days? Yes   Please explain:  a little more alcohol   Medication changes since last result? No   Injuries or illness since last result? Yes   If yes, please explain: diarrhea for 1 day, Dec. 26   New shortness of breath, severe headaches or sudden changes in vision since last result? No   Abnormal bleeding since last result? No   Upcoming surgery, procedure? No   Best number to call with results? [phone]     Previous result: Therapeutic last 2(+) visits  Additional findings:  continues with decreased activity has not returned to [facility] yet since knee surgery, continues with PT, plans to go back to [facility] next week        PLAN     Recommended plan for temporary change(s) affecting INR     Dosing Instructions: Continue your current warfarin dose with next INR in 3 weeks       Summary  As of 12/28/2023      Full warfarin instructions:  7.5 mg every Mon, Fri; 5 mg all other days   Next INR check:  1/18/2024               Telephone call with Annabella who verbalizes understanding and agrees to plan    Lab visit scheduled    Education provided:   Please call back if any changes to your diet, medications or how you've been taking warfarin  Contact [phone]  with any changes, questions or concerns.     Plan made per [facility]agulation protocol    Deborah Aguilera RN  Anticoagulation  Clinic  12/28/2023    _______________________________________________________________________     Anticoagulation Episode Summary       Current INR goal:  2.0-3.0   TTR:  72.6% (1 y)   Target end date:  Indefinite   Send INR reminders to:  [facility]    Indications    Long-term (current) use of anticoagulants [Z79.01] [Z79.01]  Aortic valve replaced - history of bicuspid aortic stenosis status post 23 mm St. Yordan mechanical aortic valve and tube conduit with reimplantation of her coronaries 2011 [Z95.2]             Comments:               Anticoagulation Care Providers       Provider Role Specialty Phone number    Brook Echavarria MD Referring Internal Medicine [phone]    Karla Moss MD Referring Internal Medicine [phone]    Viky Tucker PA-C Referring Family Medicine [phone]    Ila Larkin PA-C Referring Orthopaedic Surgery [phone]

## 2024-01-10 ENCOUNTER — PATIENT OUTREACH (OUTPATIENT)
Dept: GASTROENTEROLOGY | Facility: CLINIC | Age: 69
End: 2024-01-10
Payer: COMMERCIAL

## 2024-01-18 ENCOUNTER — ANTICOAGULATION THERAPY VISIT (OUTPATIENT)
Dept: ANTICOAGULATION | Facility: CLINIC | Age: 69
End: 2024-01-18

## 2024-01-18 ENCOUNTER — LAB (OUTPATIENT)
Dept: LAB | Facility: CLINIC | Age: 69
End: 2024-01-18
Payer: COMMERCIAL

## 2024-01-18 DIAGNOSIS — Z95.2 AORTIC VALVE REPLACED: ICD-10-CM

## 2024-01-18 DIAGNOSIS — Z79.01 LONG TERM CURRENT USE OF ANTICOAGULANT THERAPY: Primary | ICD-10-CM

## 2024-01-18 LAB — INR BLD: 2.7 (ref 0.9–1.1)

## 2024-01-18 PROCEDURE — 85610 PROTHROMBIN TIME: CPT

## 2024-01-18 PROCEDURE — 36416 COLLJ CAPILLARY BLOOD SPEC: CPT

## 2024-01-18 NOTE — PROGRESS NOTES
ANTICOAGULATION MANAGEMENT     Annabella Bolanos 68 year old female is on warfarin with therapeutic INR result. (Goal INR 2.0-3.0)    Recent labs: (last 7 days)     01/18/24  1129   INR 2.7*       ASSESSMENT     Warfarin Lab Questionnaire    Warfarin Doses Last 7 Days      1/17/2024     7:23 PM   Dose in Tablet or Mg   TAB or MG? milligram (mg)     Pt Rptd Dose SUNDAY MONDAY TUESDAY WED THURS FRIDAY SATURDAY 1/17/2024   7:23 PM 5 7.5 5 5 5 7.5 5         1/17/2024   Warfarin Lab Questionnaire   Missed doses within past 14 days? No   Changes in diet or alcohol within past 14 days? No   Medication changes since last result? No   Injuries or illness since last result? No   New shortness of breath, severe headaches or sudden changes in vision since last result? No   Abnormal bleeding since last result? No   Upcoming surgery, procedure? No   Best number to call with results? [phone]     Previous result: Therapeutic last 2(+) visits  Additional findings: None, Patient reports knee is improving, still has some pain, swelling is decreasing. Patient reports she completed PT and did return to [facility].        PLAN     Recommended plan for no diet, medication or health factor changes affecting INR     Dosing Instructions: Continue your current warfarin dose with next INR in 4 weeks       Summary  As of 1/18/2024      Full warfarin instructions:  7.5 mg every Mon, Fri; 5 mg all other days   Next INR check:  2/14/2024               Telephone call with Annabella who verbalizes understanding and agrees to plan    Lab visit scheduled    Education provided:   Please call back if any changes to your diet, medications or how you've been taking warfarin  Contact [phone]  with any changes, questions or concerns.     Plan made per [facility]agulation protocol    Deborah Aguilera RN  Anticoagulation Clinic  1/18/2024    _______________________________________________________________________     Anticoagulation Episode Summary        Current INR goal:  2.0-3.0   TTR:  72.6% (1 y)   Target end date:  Indefinite   Send INR reminders to:  [facility]    Indications    Long-term (current) use of anticoagulants [Z79.01] [Z79.01]  Aortic valve replaced - history of bicuspid aortic stenosis status post 23 mm St. Yordan mechanical aortic valve and tube conduit with reimplantation of her coronaries 2011 [Z95.2]             Comments:               Anticoagulation Care Providers       Provider Role Specialty Phone number    Brook Echavarria MD Referring Internal Medicine [phone]    Krala Moss MD Referring Internal Medicine [phone]    Viky Tucker PA-C Referring Family Medicine [phone]    Ila Larkin PA-C Referring Orthopaedic Surgery [phone]

## 2024-01-25 ENCOUNTER — TRANSFERRED RECORDS (OUTPATIENT)
Dept: HEALTH INFORMATION MANAGEMENT | Facility: CLINIC | Age: 69
End: 2024-01-25
Payer: COMMERCIAL

## 2024-02-14 ENCOUNTER — LAB (OUTPATIENT)
Dept: LAB | Facility: CLINIC | Age: 69
End: 2024-02-14
Payer: COMMERCIAL

## 2024-02-14 ENCOUNTER — ANTICOAGULATION THERAPY VISIT (OUTPATIENT)
Dept: ANTICOAGULATION | Facility: CLINIC | Age: 69
End: 2024-02-14

## 2024-02-14 DIAGNOSIS — Z95.2 AORTIC VALVE REPLACED: ICD-10-CM

## 2024-02-14 DIAGNOSIS — Z79.01 LONG TERM CURRENT USE OF ANTICOAGULANT THERAPY: Primary | ICD-10-CM

## 2024-02-14 LAB — INR BLD: 2.4 (ref 0.9–1.1)

## 2024-02-14 PROCEDURE — 36416 COLLJ CAPILLARY BLOOD SPEC: CPT

## 2024-02-14 PROCEDURE — 85610 PROTHROMBIN TIME: CPT

## 2024-02-14 NOTE — PROGRESS NOTES
ANTICOAGULATION MANAGEMENT     Annabella Bolanos 68 year old female is on warfarin with therapeutic INR result. (Goal INR 2.0-3.0)    Recent labs: (last 7 days)     02/14/24  1119   INR 2.4*       ASSESSMENT     Warfarin Lab Questionnaire    Warfarin Doses Last 7 Days      2/13/2024     1:38 PM   Dose in Tablet or Mg   TAB or MG? milligram (mg)     Pt Rptd Dose SUNDAY MONDAY TUESDAY WED THURS FRIDAY SATURDAY 2/13/2024   1:38 PM 5 7.5 5 5 5 7.5 5         2/13/2024   Warfarin Lab Questionnaire   Missed doses within past 14 days? No   Changes in diet or alcohol within past 14 days? No   Medication changes since last result? No   Injuries or illness since last result? No   New shortness of breath, severe headaches or sudden changes in vision since last result? No   Abnormal bleeding since last result? No   Upcoming surgery, procedure? No   Best number to call with results? [phone]     Previous result: Therapeutic last 2(+) visits  Additional findings: None       PLAN     Recommended plan for no diet, medication or health factor changes affecting INR     Dosing Instructions: Continue your current warfarin dose with next INR in 5 weeks       Summary  As of 2/14/2024      Full warfarin instructions:  7.5 mg every Mon, Fri; 5 mg all other days   Next INR check:  3/21/2024               Telephone call with Annabella who verbalizes understanding and agrees to plan    Lab visit scheduled    Education provided:   None required    Plan made per [facility] anticoagulation protocol    Marybeth Stock RN  Anticoagulation Clinic  2/14/2024    _______________________________________________________________________     Anticoagulation Episode Summary       Current INR goal:  2.0-3.0   TTR:  75.5% (1 y)   Target end date:  Indefinite   Send INR reminders to:  [facility]    Indications    Long-term (current) use of anticoagulants [Z79.01] [Z79.01]  Aortic valve replaced - history of bicuspid aortic stenosis status post 23 mm St[employer] Yordan  mechanical aortic valve and tube conduit with reimplantation of her coronaries 2011 [Z95.2]             Comments:               Anticoagulation Care Providers       Provider Role Specialty Phone number    Brook Echavarria MD Referring Internal Medicine [phone]    Karla Moss MD Referring Internal Medicine [phone]    Viky Tucker PA-C Referring Family Medicine [phone]    Ila Larkin PA-C Referring Orthopaedic Surgery [phone]

## 2024-03-21 ENCOUNTER — ANTICOAGULATION THERAPY VISIT (OUTPATIENT)
Dept: ANTICOAGULATION | Facility: CLINIC | Age: 69
End: 2024-03-21

## 2024-03-21 ENCOUNTER — LAB (OUTPATIENT)
Dept: LAB | Facility: CLINIC | Age: 69
End: 2024-03-21
Payer: COMMERCIAL

## 2024-03-21 DIAGNOSIS — Z95.2 AORTIC VALVE REPLACED: ICD-10-CM

## 2024-03-21 DIAGNOSIS — Z79.01 LONG TERM CURRENT USE OF ANTICOAGULANT THERAPY: Primary | ICD-10-CM

## 2024-03-21 LAB — INR BLD: 2.2 (ref 0.9–1.1)

## 2024-03-21 PROCEDURE — 85610 PROTHROMBIN TIME: CPT

## 2024-03-21 PROCEDURE — 36416 COLLJ CAPILLARY BLOOD SPEC: CPT

## 2024-03-21 NOTE — PROGRESS NOTES
ANTICOAGULATION MANAGEMENT     Annabella Bolanos 68 year old female is on warfarin with therapeutic INR result. (Goal INR 2.0-3.0)    Recent labs: (last 7 days)     03/21/24  1137   INR 2.2*       ASSESSMENT     Warfarin Lab Questionnaire    Warfarin Doses Last 7 Days      3/20/2024     3:07 PM   Dose in Tablet or Mg   TAB or MG? milligram (mg)     Pt Rptd Dose PABLO MONDAY TUESDAY WED THURS FRIDAY SATURDAY   3/20/2024   3:07 PM 5 7.5 5 5 5 7.5 5         3/20/2024   Warfarin Lab Questionnaire   Missed doses within past 14 days? No   Changes in diet or alcohol within past 14 days? No   Medication changes since last result? No   Injuries or illness since last result? No   New shortness of breath, severe headaches or sudden changes in vision since last result? No   Abnormal bleeding since last result? No   Upcoming surgery, procedure? No   Best number to call with results? [phone]     Previous result: Therapeutic last 2(+) visits  Additional findings: None       PLAN     Recommended plan for no diet, medication or health factor changes affecting INR     Dosing Instructions: Continue your current warfarin dose with next INR in 6 weeks       Summary  As of 3/21/2024      Full warfarin instructions:  7.5 mg every Mon, Fri; 5 mg all other days   Next INR check:  5/2/2024               Telephone call with Annabella who verbalizes understanding and agrees to plan    Lab visit scheduled    Education provided:   Please call back if any changes to your diet, medications or how you've been taking warfarin    Plan made per [facility] anticoagulation protocol    Mikael Pappas RN  Anticoagulation Clinic  3/21/2024    _______________________________________________________________________     Anticoagulation Episode Summary       Current INR goal:  2.0-3.0   TTR:  75.5% (1 y)   Target end date:  Indefinite   Send INR reminders to:  [facility]    Indications    Long-term (current) use of anticoagulants [Z79.01]  [Z79.01]  Aortic valve replaced - history of bicuspid aortic stenosis status post 23 mm St. Yordan mechanical aortic valve and tube conduit with reimplantation of her coronaries 2011 [Z95.2]             Comments:               Anticoagulation Care Providers       Provider Role Specialty Phone number    Viky Tucker PA-C Referring [facility] [phone]

## 2024-05-02 ENCOUNTER — ANTICOAGULATION THERAPY VISIT (OUTPATIENT)
Dept: ANTICOAGULATION | Facility: CLINIC | Age: 69
End: 2024-05-02

## 2024-05-02 ENCOUNTER — LAB (OUTPATIENT)
Dept: LAB | Facility: CLINIC | Age: 69
End: 2024-05-02
Payer: COMMERCIAL

## 2024-05-02 DIAGNOSIS — Z95.2 AORTIC VALVE REPLACED: ICD-10-CM

## 2024-05-02 DIAGNOSIS — Z79.01 LONG TERM CURRENT USE OF ANTICOAGULANT THERAPY: Primary | ICD-10-CM

## 2024-05-02 LAB — INR BLD: 1.7 (ref 0.9–1.1)

## 2024-05-02 PROCEDURE — 85610 PROTHROMBIN TIME: CPT

## 2024-05-02 PROCEDURE — 36416 COLLJ CAPILLARY BLOOD SPEC: CPT

## 2024-05-02 NOTE — PROGRESS NOTES
ANTICOAGULATION MANAGEMENT     Annabella Bloanos 68 year old female is on warfarin with subtherapeutic INR result. (Goal INR 2.0-3.0)    Recent labs: (last 7 days)     05/02/24  1117   INR 1.7*       ASSESSMENT     Warfarin Lab Questionnaire    Warfarin Doses Last 7 Days      5/1/2024    12:56 PM   Dose in Tablet or Mg   TAB or MG? milligram (mg)     Pt Rptd Dose SUNDAY MONDAY TUESDAY WED THURS FRIDAY SATURDAY 5/1/2024  12:56 PM 5 7.5 5 5 5 7.5 5         5/1/2024   Warfarin Lab Questionnaire   Missed doses within past 14 days? No   Changes in diet or alcohol within past 14 days? No Patient reports she had a little less green veggies than usual   Medication changes since last result? Yes   Please list: stool softener   Injuries or illness since last result? No   New shortness of breath, severe headaches or sudden changes in vision since last result? No   Abnormal bleeding since last result? No   Upcoming surgery, procedure? No   Best number to call with results? [phone]     Previous result: Therapeutic last 2(+) visits, INR has been trending downward so warfarin maintenance dose was increased today 6.2%  Additional findings:  Patient reports she is more active, feels she is back to activity as she was prior to knee surgery        PLAN     Recommended plan for temporary change(s) and ongoing change(s) affecting INR     Dosing Instructions: booster dose then Increase your warfarin dose (6.2% change) with next INR in 1-2 weeks       Summary  As of 5/2/2024      Full warfarin instructions:  5/2: 7.5 mg; Otherwise 7.5 mg every Mon, Wed, Fri; 5 mg all other days   Next INR check:  5/14/2024               Telephone call with Annabella who verbalizes understanding and agrees to plan    Lab visit scheduled    Education provided:   Please call back if any changes to your diet, medications or how you've been taking warfarin  Contact [phone]  with any changes, questions or concerns.     Plan made per [facility] anticoagulation  protocol    Deborah Aguilera RN  Anticoagulation Clinic  5/2/2024    _______________________________________________________________________     Anticoagulation Episode Summary       Current INR goal:  2.0-3.0   TTR:  68.6% (1 y)   Target end date:  Indefinite   Send INR reminders to:  [facility]    Indications    Long-term (current) use of anticoagulants [Z79.01] [Z79.01]  Aortic valve replaced - history of bicuspid aortic stenosis status post 23 mm St. Yordan mechanical aortic valve and tube conduit with reimplantation of her coronaries 2011 [Z95.2]             Comments:               Anticoagulation Care Providers       Provider Role Specialty Phone number    Viky Tucker PA-C Referring [facility] [phone]

## 2024-05-14 ENCOUNTER — LAB (OUTPATIENT)
Dept: LAB | Facility: CLINIC | Age: 69
End: 2024-05-14
Payer: COMMERCIAL

## 2024-05-14 ENCOUNTER — ANTICOAGULATION THERAPY VISIT (OUTPATIENT)
Dept: ANTICOAGULATION | Facility: CLINIC | Age: 69
End: 2024-05-14

## 2024-05-14 DIAGNOSIS — Z95.2 AORTIC VALVE REPLACED: ICD-10-CM

## 2024-05-14 DIAGNOSIS — Z79.01 LONG TERM CURRENT USE OF ANTICOAGULANT THERAPY: Primary | ICD-10-CM

## 2024-05-14 LAB — INR BLD: 2.2 (ref 0.9–1.1)

## 2024-05-14 PROCEDURE — 85610 PROTHROMBIN TIME: CPT

## 2024-05-14 PROCEDURE — 36416 COLLJ CAPILLARY BLOOD SPEC: CPT

## 2024-05-14 NOTE — PROGRESS NOTES
ANTICOAGULATION MANAGEMENT     Annabella Bolanos 68 year old female is on warfarin with therapeutic INR result. (Goal INR   2.0-3.0)    Recent labs: (last 7 days)     05/14/24  1126   INR 2.2*       ASSESSMENT     Warfarin Lab Questionnaire    Warfarin Doses Last 7 Days      5/13/2024     6:17 PM   Dose in Tablet or Mg   TAB or MG? milligram (mg)     Pt Rptd Dose SUNDAY MONDAY TUESDAY WED THURS FRIDAY SATURDAY 5/13/2024   6:17 PM 5 7.5 5 7.5 5 7.5 5         5/13/2024   Warfarin Lab Questionnaire   Missed doses within past 14 days? No   Changes in diet or alcohol within past 14 days? No   Medication changes since last result? No   Injuries or illness since last result? No   New shortness of breath, severe headaches or sudden changes in vision since last result? No   Abnormal bleeding since last result? No   Upcoming surgery, procedure? No   Best number to call with results? [phone]     Previous result: Subtherapeutic  Additional findings: None       PLAN     Recommended plan for no diet, medication or health factor changes affecting INR     Dosing Instructions: Continue your current warfarin dose with next INR in 3 weeks       Summary  As of 5/14/2024      Full warfarin instructions:  7.5 mg every Mon, Wed, Fri; 5 mg all other days   Next INR check:  6/4/2024               Telephone call with Annabella who agrees to plan and repeated back plan correctly    Lab visit scheduled    Education provided:   Please call back if any changes to your diet, medications or how you've been taking warfarin  Contact [phone]  with any changes, questions or concerns.     Plan made per [facility] anticoagulation protocol    Deborah Aguilera RN  Anticoagulation Clinic  5/14/2024    _______________________________________________________________________     Anticoagulation Episode Summary       Current INR goal:  2.0-3.0   TTR:  66.7% (1 y)   Target end date:  Indefinite   Send INR reminders to:  [facility]    Indications    Long-term  (current) use of anticoagulants [Z79.01] [Z79.01]  Aortic valve replaced - history of bicuspid aortic stenosis status post 23 mm St[employer] Yordan mechanical aortic valve and tube conduit with reimplantation of her coronaries 2011 [Z95.2]             Comments:               Anticoagulation Care Providers       Provider Role Specialty Phone number    Viky Tucker PA-C Referring [facility] [phone]

## 2024-05-23 ENCOUNTER — TELEPHONE (OUTPATIENT)
Dept: FAMILY MEDICINE | Facility: CLINIC | Age: 69
End: 2024-05-23
Payer: COMMERCIAL

## 2024-05-23 NOTE — TELEPHONE ENCOUNTER
Called and spoke with patient.  She will be having a tooth extracted on 6/11/24.  INR will need to be between 2.0-3.0 for her procedure.  INR has been stable recently so assisted to schedule next INR check on 6/10 as requested.

## 2024-05-23 NOTE — TELEPHONE ENCOUNTER
General Call      Reason for Call:  patient called and would like to change INR appt till Gabriella 10 due to a procedure dental she is having an appt then on June 11.    Wants to make sure okay?    Please contact patient.      What are your questions or concerns:  no    Date of last appointment with provider: no    Could we send this information to you in [employer] or would you prefer to receive a phone call?:   Patient would like to be contacted via [employer]

## 2024-06-10 ENCOUNTER — LAB (OUTPATIENT)
Dept: LAB | Facility: CLINIC | Age: 69
End: 2024-06-10
Payer: COMMERCIAL

## 2024-06-10 ENCOUNTER — ANTICOAGULATION THERAPY VISIT (OUTPATIENT)
Dept: ANTICOAGULATION | Facility: CLINIC | Age: 69
End: 2024-06-10

## 2024-06-10 DIAGNOSIS — Z95.2 AORTIC VALVE REPLACED: ICD-10-CM

## 2024-06-10 DIAGNOSIS — Z79.01 LONG TERM CURRENT USE OF ANTICOAGULANT THERAPY: Primary | ICD-10-CM

## 2024-06-10 LAB — INR BLD: 2.2 (ref 0.9–1.1)

## 2024-06-10 PROCEDURE — 85610 PROTHROMBIN TIME: CPT

## 2024-06-10 PROCEDURE — 36416 COLLJ CAPILLARY BLOOD SPEC: CPT

## 2024-06-10 NOTE — PROGRESS NOTES
ANTICOAGULATION MANAGEMENT     Annabella Bolanos 68 year old female is on warfarin with therapeutic INR result. (Goal INR 2.0-3.0)    Recent labs: (last 7 days)     06/10/24  1054   INR 2.2*       ASSESSMENT     Warfarin Lab Questionnaire    Warfarin Doses Last 7 Days      6/9/2024     9:36 PM   Dose in Tablet or Mg   TAB or MG? milligram (mg)     Pt Rptd Dose SUNDAY MONDAY TUESDAY WED THURS FRIDAY 6/9/2024   9:36 PM 5 7.5 5 7.5 5 7.5         6/9/2024   Warfarin Lab Questionnaire   Missed doses within past 14 days? No   Changes in diet or alcohol within past 14 days? Yes   Please explain:  1 drink each day this Friday, Saturday and Sunday- that's more than usual   Medication changes since last result? No   Injuries or illness since last result? Yes   If yes, please explain: a slight cold   New shortness of breath, severe headaches or sudden changes in vision since last result? No   Abnormal bleeding since last result? No   Upcoming surgery, procedure? Yes   Please explain, date scheduled? oral surgery June 11   Best number to call with results? [phone]     Previous result: Therapeutic last visit; previously outside of goal range  Additional findings: Upcoming surgery/procedure per patient, INR needs to be between 2.0 - 3.0, patient is requesting a fax of today's INR result to be sent to her oral surgeon Marty Celis, [facility], [phone]       PLAN     Recommended plan for temporary change(s) affecting INR     Dosing Instructions: Continue your current warfarin dose with next INR in 4 weeks       Summary  As of 6/10/2024      Full warfarin instructions:  7.5 mg every Mon, Wed, Fri; 5 mg all other days   Next INR check:  7/8/2024               Telephone call with Annabella who verbalizes understanding and agrees to plan    Lab visit scheduled    Education provided:   Please call back if any changes to your diet, medications or how you've been taking warfarin  Contact [phone]  with any changes,  questions or concerns.     Plan made per [facility] anticoagulation protocol    Deborah Aguilera, RN  Anticoagulation Clinic  6/10/2024    _______________________________________________________________________     Anticoagulation Episode Summary       Current INR goal:  2.0-3.0   TTR:  66.7% (1 y)   Target end date:  Indefinite   Send INR reminders to:  [facility]    Indications    Long-term (current) use of anticoagulants [Z79.01] [Z79.01]  Aortic valve replaced - history of bicuspid aortic stenosis status post 23 mm St. Yordan mechanical aortic valve and tube conduit with reimplantation of her coronaries 2011 [Z95.2]             Comments:               Anticoagulation Care Providers       Provider Role Specialty Phone number    Viky Tucker PA-C Referring [facility] Medicine [phone]

## 2024-06-11 ENCOUNTER — TELEPHONE (OUTPATIENT)
Dept: ANTICOAGULATION | Facility: CLINIC | Age: 69
End: 2024-06-11
Payer: COMMERCIAL

## 2024-06-11 NOTE — TELEPHONE ENCOUNTER
RICHY left at [facility] from [facility] requesting fax of pt INR result from 6/10/24. Pt is currently in office awaiting a tooth extraction.   Returned call to [facility] who requested the INR value be faxed to their office at [phone], fax completed per request.     Guanaco Diaz RN

## 2024-07-11 ENCOUNTER — ANTICOAGULATION THERAPY VISIT (OUTPATIENT)
Dept: ANTICOAGULATION | Facility: CLINIC | Age: 69
End: 2024-07-11

## 2024-07-11 ENCOUNTER — LAB (OUTPATIENT)
Dept: LAB | Facility: CLINIC | Age: 69
End: 2024-07-11
Payer: COMMERCIAL

## 2024-07-11 DIAGNOSIS — I10 BENIGN ESSENTIAL HYPERTENSION: ICD-10-CM

## 2024-07-11 DIAGNOSIS — Z79.01 LONG TERM CURRENT USE OF ANTICOAGULANT THERAPY: Primary | ICD-10-CM

## 2024-07-11 DIAGNOSIS — Z95.2 AORTIC VALVE REPLACED: ICD-10-CM

## 2024-07-11 LAB — INR BLD: 2.4 (ref 0.9–1.1)

## 2024-07-11 PROCEDURE — 36415 COLL VENOUS BLD VENIPUNCTURE: CPT

## 2024-07-11 PROCEDURE — 80053 COMPREHEN METABOLIC PANEL: CPT

## 2024-07-11 PROCEDURE — 85610 PROTHROMBIN TIME: CPT

## 2024-07-11 NOTE — PROGRESS NOTES
ANTICOAGULATION MANAGEMENT     Annabella Bolanos 68 year old female is on warfarin with therapeutic INR result. (Goal INR 2.0-3.0)    Recent labs: (last 7 days)     07/11/24  1122   INR 2.4*       ASSESSMENT     Warfarin Lab Questionnaire    Warfarin Doses Last 7 Days      7/10/2024     9:46 PM   Dose in Tablet or Mg   TAB or MG? milligram (mg)     Pt Rptd Dose PABLO MONDAY TUESDAY WED THURS FRIDAY SATURDAY   7/10/2024   9:46 PM 5 7.5 5 7.5 5 7.5 5         7/10/2024   Warfarin Lab Questionnaire   Missed doses within past 14 days? No   Changes in diet or alcohol within past 14 days? Yes   Please explain:  More alcohol than usual 2 weekends ago   Medication changes since last result? No   Injuries or illness since last result? No   New shortness of breath, severe headaches or sudden changes in vision since last result? No   Abnormal bleeding since last result? No   Upcoming surgery, procedure? No   Best number to call with results? [phone]        Previous result: Therapeutic last 2(+) visits  Additional findings: None       PLAN     Recommended plan for no diet, medication or health factor changes affecting INR     Dosing Instructions: Continue your current warfarin dose with next INR in 5 weeks       Summary  As of 7/11/2024      Full warfarin instructions:  7.5 mg every Mon, Wed, Fri; 5 mg all other days   Next INR check:  8/15/2024               Telephone call with Annabella who verbalizes understanding and agrees to plan    Lab visit scheduled    Education provided: Please call back if any changes to your diet, medications or how you've been taking warfarin  Contact [phone] with any changes, questions or concerns.     Plan made per [facility] anticoagulation protocol    Deborah Aguilera RN  Anticoagulation Clinic  7/11/2024    _______________________________________________________________________     Anticoagulation Episode Summary       Current INR goal:  2.0-3.0   TTR:  70.9% (1 y)   Target end date:  Indefinite    Send INR reminders to:  [facility]    Indications    Long-term (current) use of anticoagulants [Z79.01] [Z79.01]  Aortic valve replaced - history of bicuspid aortic stenosis status post 23 mm St. Yordan mechanical aortic valve and tube conduit with reimplantation of her coronaries 2011 [Z95.2]             Comments:               Anticoagulation Care Providers       Provider Role Specialty Phone number    Viky Tucker PA-C Referring [facility] [phone]

## 2024-07-12 LAB
ALBUMIN SERPL BCG-MCNC: 4 G/DL (ref 3.5–5.2)
ALP SERPL-CCNC: 72 U/L (ref 40–150)
ALT SERPL W P-5'-P-CCNC: 18 U/L (ref 0–50)
ANION GAP SERPL CALCULATED.3IONS-SCNC: 9 MMOL/L (ref 7–15)
AST SERPL W P-5'-P-CCNC: 21 U/L (ref 0–45)
BILIRUB SERPL-MCNC: 0.5 MG/DL
BUN SERPL-MCNC: 22.8 MG/DL (ref 8–23)
CALCIUM SERPL-MCNC: 9.6 MG/DL (ref 8.8–10.2)
CHLORIDE SERPL-SCNC: 104 MMOL/L (ref 98–107)
CREAT SERPL-MCNC: 0.61 MG/DL (ref 0.51–0.95)
DEPRECATED HCO3 PLAS-SCNC: 28 MMOL/L (ref 22–29)
EGFRCR SERPLBLD CKD-EPI 2021: >90 ML/MIN/1.73M2
GLUCOSE SERPL-MCNC: 96 MG/DL (ref 70–99)
POTASSIUM SERPL-SCNC: 4.7 MMOL/L (ref 3.4–5.3)
PROT SERPL-MCNC: 6.9 G/DL (ref 6.4–8.3)
SODIUM SERPL-SCNC: 141 MMOL/L (ref 135–145)

## 2024-07-30 DIAGNOSIS — Z79.01 LONG TERM CURRENT USE OF ANTICOAGULANT THERAPY: Primary | ICD-10-CM

## 2024-07-30 DIAGNOSIS — Z95.2 AORTIC VALVE REPLACED: ICD-10-CM

## 2024-07-30 DIAGNOSIS — Z95.2 S/P AORTIC VALVE REPLACEMENT: ICD-10-CM

## 2024-07-30 RX ORDER — WARFARIN SODIUM 5 MG/1
TABLET ORAL
Qty: 115 TABLET | Refills: 1 | Status: SHIPPED | OUTPATIENT
Start: 2024-07-30

## 2024-07-30 NOTE — TELEPHONE ENCOUNTER
ANTICOAGULATION MANAGEMENT:  Medication Refill    Anticoagulation Summary  As of 7/11/2024      Warfarin maintenance plan:  7.5 mg (5 mg x 1.5) every Mon, Wed, Fri; 5 mg (5 mg x 1) all other days   Next INR check:  8/15/2024   Target end date:  Indefinite    Indications    Long-term (current) use of anticoagulants [Z79.01] [Z79.01]  Aortic valve replaced - history of bicuspid aortic stenosis status post 23 mm St. Yordan mechanical aortic valve and tube conduit with reimplantation of her coronaries 2011 [Z95.2]                 Anticoagulation Care Providers       Provider Role Specialty Phone number    Viky Tucker PA-C Referring Family Medicine [phone]            Refill Criteria    Visit with referring provider/group: Meets criteria: office visit within referring provider group in the last 1 year on 12/1/23    [facility] referral last signed: 11/14/2023; within last year: Yes    Lab monitoring not exceeding 2 weeks overdue: Yes    Annabella meets all criteria for refill. Rx instructions and quantity supplied updated to match patient's current dosing plan. Warfarin 90 day supply with 1 refill granted per [facility] protocol     Kusum Aguilera RN  Anticoagulation Clinic

## 2024-08-15 ENCOUNTER — ANTICOAGULATION THERAPY VISIT (OUTPATIENT)
Dept: ANTICOAGULATION | Facility: CLINIC | Age: 69
End: 2024-08-15

## 2024-08-15 ENCOUNTER — LAB (OUTPATIENT)
Dept: LAB | Facility: CLINIC | Age: 69
End: 2024-08-15
Payer: COMMERCIAL

## 2024-08-15 DIAGNOSIS — Z79.01 LONG TERM CURRENT USE OF ANTICOAGULANT THERAPY: Primary | ICD-10-CM

## 2024-08-15 DIAGNOSIS — Z95.2 AORTIC VALVE REPLACED: ICD-10-CM

## 2024-08-15 LAB — INR BLD: 2.3 (ref 0.9–1.1)

## 2024-08-15 PROCEDURE — 85610 PROTHROMBIN TIME: CPT

## 2024-08-15 PROCEDURE — 36415 COLL VENOUS BLD VENIPUNCTURE: CPT

## 2024-08-15 NOTE — PROGRESS NOTES
ANTICOAGULATION MANAGEMENT     Annabella Bolanos 68 year old female is on warfarin with therapeutic INR result. (Goal INR 2.0-3.0)    Recent labs: (last 7 days)     08/15/24  1126   INR 2.3*       ASSESSMENT     Warfarin Lab Questionnaire    Warfarin Doses Last 7 Days      8/14/2024    10:49 PM   Dose in Tablet or Mg   TAB or MG? milligram (mg)     Pt Rptd Dose SUNDAY MONDAY TUESDAY WED THURS FRIDAY SATURDAY 8/14/2024  10:49 PM 5 7.5 5 7.5 5 7.5 5         8/14/2024   Warfarin Lab Questionnaire   Missed doses within past 14 days? No   Changes in diet or alcohol within past 14 days? No   Medication changes since last result? No   Injuries or illness since last result? No   New shortness of breath, severe headaches or sudden changes in vision since last result? No   Abnormal bleeding since last result? No   Upcoming surgery, procedure? Yes   Please explain, date scheduled? dental implant, Sept. 19th   Best number to call with results? [phone]        Previous result: Therapeutic last 2(+) visits  Additional findings: Upcoming surgery/procedure Dental implant 9/18/24, needs INR checked 9/18/24 with result faxed to dental provider. Patient will have fax number when she is called to discuss 9/18/24 INR result       PLAN     Recommended plan for no diet, medication or health factor changes affecting INR     Dosing Instructions: Continue your current warfarin dose with next INR in 5 weeks       Summary  As of 8/15/2024      Full warfarin instructions:  7.5 mg every Mon, Wed, Fri; 5 mg all other days   Next INR check:  9/18/2024               Telephone call with Annabella who verbalizes understanding and agrees to plan    Lab visit scheduled    Education provided: Please call back if any changes to your diet, medications or how you've been taking warfarin  Contact [phone] with any changes, questions or concerns.     Plan made per [facility] anticoagulation protocol    Deborah Aguilera RN  Anticoagulation  Clinic  8/15/2024    _______________________________________________________________________     Anticoagulation Episode Summary       Current INR goal:  2.0-3.0   TTR:  78.9% (1 y)   Target end date:  Indefinite   Send INR reminders to:  [facility]    Indications    Long-term (current) use of anticoagulants [Z79.01] [Z79.01]  Aortic valve replaced - history of bicuspid aortic stenosis status post 23 mm St. Yordan mechanical aortic valve and tube conduit with reimplantation of her coronaries 2011 [Z95.2]             Comments:               Anticoagulation Care Providers       Provider Role Specialty Phone number    Viky Tucker PA-C Referring Family Medicine [phone]

## 2024-09-03 ENCOUNTER — DOCUMENTATION ONLY (OUTPATIENT)
Dept: LAB | Facility: CLINIC | Age: 69
End: 2024-09-03
Payer: COMMERCIAL

## 2024-09-03 DIAGNOSIS — E05.90 SUBCLINICAL HYPERTHYROIDISM: ICD-10-CM

## 2024-09-03 DIAGNOSIS — Z79.01 LONG TERM CURRENT USE OF ANTICOAGULANT THERAPY: Primary | ICD-10-CM

## 2024-09-03 RX ORDER — METHIMAZOLE 5 MG/1
TABLET ORAL
Qty: 15 TABLET | Refills: 0 | Status: SHIPPED | OUTPATIENT
Start: 2024-09-03 | End: 2024-09-24

## 2024-09-03 NOTE — PROGRESS NOTES
Annabella Bolanos has an upcoming lab appointment and is eligible to have due Health Maintenance labs drawn per Health Maintenance Protocol Orders (HMPO) process.    Before it can be drawn, a diagnosis is needed for the following lab: CBC    Please review and enter diagnosis as appropriate.     Annamaria Ann

## 2024-09-03 NOTE — TELEPHONE ENCOUNTER
Follow up 9/24/24. Limited supply sent.    Requested Prescriptions   Pending Prescriptions Disp Refills    methimazole (TAPAZOLE) 5 MG tablet [Pharmacy Med Name: methIMAzole Oral Tablet 5 MG] 45 tablet 0     Sig: TAKE ONE-HALF TABLET BY MOUTH ONCE DAILY       Anti-Thyroid Agents Protocol Passed - 9/3/2024  8:52 AM        Passed - Medication is active on the patient's med list        Passed - Recent (12 month) or future (90 days) visit with authorizing provider s specialty     The patient must have completed an in-person or virtual visit within the past 12 months or has a future visit scheduled within the next 90 days with the authorizing provider s specialty.  Urgent care and e-visits do not quality as an office visit for this protocol.          Passed - Medication indicated for associated diagnosis     The medication is prescribed for one or more of the following conditions:        Hyperthyroidism       Disorder of thyroid gland       Thyrotoxicosis       Thyroid storm       Thyroid eye disease            Passed - Patient is age 18 or older        Passed - Normal TSH in past 12 months     Recent Labs   Lab Test 10/30/23  1435   TSH 0.53              Passed - No active pregnancy on record        Passed - No positive pregnancy test in past 12 months

## 2024-09-17 ENCOUNTER — HOSPITAL ENCOUNTER (OUTPATIENT)
Dept: MAMMOGRAPHY | Facility: CLINIC | Age: 69
Discharge: HOME | End: 2024-09-17
Admitting: INTERNAL MEDICINE
Payer: COMMERCIAL

## 2024-09-17 DIAGNOSIS — Z12.31 VISIT FOR SCREENING MAMMOGRAM: ICD-10-CM

## 2024-09-17 PROCEDURE — 77063 BREAST TOMOSYNTHESIS BI: CPT

## 2024-09-18 ENCOUNTER — ANTICOAGULATION THERAPY VISIT (OUTPATIENT)
Dept: ANTICOAGULATION | Facility: CLINIC | Age: 69
End: 2024-09-18

## 2024-09-18 ENCOUNTER — LAB (OUTPATIENT)
Dept: LAB | Facility: CLINIC | Age: 69
End: 2024-09-18
Payer: COMMERCIAL

## 2024-09-18 DIAGNOSIS — Z95.2 AORTIC VALVE REPLACED: ICD-10-CM

## 2024-09-18 DIAGNOSIS — E05.90 SUBCLINICAL HYPERTHYROIDISM: ICD-10-CM

## 2024-09-18 DIAGNOSIS — Z79.01 LONG TERM CURRENT USE OF ANTICOAGULANT THERAPY: ICD-10-CM

## 2024-09-18 DIAGNOSIS — Z79.01 LONG TERM CURRENT USE OF ANTICOAGULANT THERAPY: Primary | ICD-10-CM

## 2024-09-18 LAB
ERYTHROCYTE [DISTWIDTH] IN BLOOD BY AUTOMATED COUNT: 11.7 % (ref 10–15)
HCT VFR BLD AUTO: 38.2 % (ref 35–47)
HGB BLD-MCNC: 12.8 G/DL (ref 11.7–15.7)
INR BLD: 2.2 (ref 0.9–1.1)
MCH RBC QN AUTO: 31.3 PG (ref 26.5–33)
MCHC RBC AUTO-ENTMCNC: 33.5 G/DL (ref 31.5–36.5)
MCV RBC AUTO: 93 FL (ref 78–100)
PLATELET # BLD AUTO: 242 10E3/UL (ref 150–450)
RBC # BLD AUTO: 4.09 10E6/UL (ref 3.8–5.2)
WBC # BLD AUTO: 5.5 10E3/UL (ref 4–11)

## 2024-09-18 PROCEDURE — 84480 ASSAY TRIIODOTHYRONINE (T3): CPT

## 2024-09-18 PROCEDURE — 36415 COLL VENOUS BLD VENIPUNCTURE: CPT

## 2024-09-18 PROCEDURE — 85027 COMPLETE CBC AUTOMATED: CPT

## 2024-09-18 PROCEDURE — 84439 ASSAY OF FREE THYROXINE: CPT

## 2024-09-18 PROCEDURE — 85610 PROTHROMBIN TIME: CPT

## 2024-09-18 PROCEDURE — 84443 ASSAY THYROID STIM HORMONE: CPT

## 2024-09-18 NOTE — RESULT ENCOUNTER NOTE
Dear Annabella,     -Normal red blood cell (hgb) levels, normal white blood cell count and normal platelet levels.      Thank you,     Siobhan Marino, BARBARA, CNP  [facility]

## 2024-09-18 NOTE — PROGRESS NOTES
"ANTICOAGULATION MANAGEMENT     Annabella Bolanos 69 year old female is on warfarin with therapeutic INR result. (Goal INR 2.0-3.0)    Recent labs: (last 7 days)     09/18/24  0919   INR 2.2*       ASSESSMENT     Source(s): Chart Review and Patient/Caregiver Call     Warfarin doses taken: Warfarin taken as instructed  Diet: No new diet changes identified  Medication/supplement changes:  Patient reports she might start Biotin, will wait to start until a couple weeks prior to next INR check  New illness, injury, or hospitalization: No  Signs or symptoms of bleeding or clotting: No  Previous result: Therapeutic last 2(+) visits  Additional findings: Upcoming surgery/procedure Oral surgeon appointment 9/19/24, will have \"screw\" placed for cap, INR result faxed to Dr Celis, TDS, [phone] per patient request.       PLAN     Recommended plan for no diet, medication or health factor changes affecting INR     Dosing Instructions: Continue your current warfarin dose with next INR in 6 weeks       Summary  As of 9/18/2024      Full warfarin instructions:  7.5 mg every Mon, Wed, Fri; 5 mg all other days   Next INR check:  10/30/2024               Telephone call with Annabella who verbalizes understanding and agrees to plan    Check at provider office visit 10/30/24    Education provided: Please call back if any changes to your diet, medications or how you've been taking warfarin  Contact [phone] with any changes, questions or concerns.     Plan made per [facility] anticoagulation protocol    Deborah Aguilera RN  9/18/2024  Anticoagulation Clinic  [facility] for routing messages: p [facility] [place]  ACC patient phone line: [phone]        _______________________________________________________________________     Anticoagulation Episode Summary       Current INR goal:  2.0-3.0   TTR:  78.9% (1 y)   Target end date:  Indefinite   Send INR reminders to:  [facility] [place]    Indications    Long-term (current) use of " anticoagulants [Z79.01] [Z79.01]  Aortic valve replaced - history of bicuspid aortic stenosis status post 23 mm St. Yordan mechanical aortic valve and tube conduit with reimplantation of her coronaries 2011 [Z95.2]             Comments:               Anticoagulation Care Providers       Provider Role Specialty Phone number    Viky Tucker PA-C Referring [facility] [phone]

## 2024-09-19 LAB
T3 SERPL-MCNC: 105 NG/DL (ref 85–202)
T4 FREE SERPL-MCNC: 1.36 NG/DL (ref 0.9–1.7)
TSH SERPL DL<=0.005 MIU/L-ACNC: 0.63 UIU/ML (ref 0.3–4.2)

## 2024-09-23 ENCOUNTER — TELEPHONE (OUTPATIENT)
Dept: FAMILY MEDICINE | Facility: CLINIC | Age: 69
End: 2024-09-23
Payer: COMMERCIAL

## 2024-09-23 DIAGNOSIS — Z95.2 AORTIC VALVE REPLACED: ICD-10-CM

## 2024-09-23 DIAGNOSIS — Z79.01 LONG TERM CURRENT USE OF ANTICOAGULANT THERAPY: Primary | ICD-10-CM

## 2024-09-23 NOTE — TELEPHONE ENCOUNTER
General Call    Contacts       Contact Date/Time Type Contact Phone/Fax    09/23/2024 11:32 AM CDT Phone (Incoming) Annabella Bolanos (Self) [phone] (M)          Reason for Call:  Questions for INR RN    What are your questions or concerns:  Related to a new medication pt has started - penicillin z potassium 500mg    Date of last appointment with provider: na    Could we send this information to you in [facility] or would you prefer to receive a phone call?:   Patient would prefer a phone call   Okay to leave a detailed message?: Yes at Cell number on file:    Telephone Information:   Mobile [phone]

## 2024-09-23 NOTE — TELEPHONE ENCOUNTER
Spoke with Annabella. She started the penicillin on 9/20 and will complete course tomorrow, 9/24. Huddled with [facility], as penicillin Z + potassium is not listed in Uptodate or Natural Medicines database. Recommended recheck within 5 days. INR scheduled for tomorrow, 9/24.

## 2024-09-24 ENCOUNTER — VIRTUAL VISIT (OUTPATIENT)
Dept: ENDOCRINOLOGY | Facility: CLINIC | Age: 69
End: 2024-09-24
Payer: COMMERCIAL

## 2024-09-24 ENCOUNTER — ANTICOAGULATION THERAPY VISIT (OUTPATIENT)
Dept: ANTICOAGULATION | Facility: CLINIC | Age: 69
End: 2024-09-24

## 2024-09-24 ENCOUNTER — LAB (OUTPATIENT)
Dept: LAB | Facility: CLINIC | Age: 69
End: 2024-09-24
Payer: COMMERCIAL

## 2024-09-24 VITALS — WEIGHT: 115 LBS | BODY MASS INDEX: 20.38 KG/M2 | HEIGHT: 63 IN

## 2024-09-24 DIAGNOSIS — Z95.2 AORTIC VALVE REPLACED: ICD-10-CM

## 2024-09-24 DIAGNOSIS — Z79.01 LONG TERM CURRENT USE OF ANTICOAGULANT THERAPY: Primary | ICD-10-CM

## 2024-09-24 DIAGNOSIS — E05.90 HYPERTHYROIDISM: Primary | ICD-10-CM

## 2024-09-24 LAB — INR BLD: 2.7 (ref 0.9–1.1)

## 2024-09-24 PROCEDURE — 85610 PROTHROMBIN TIME: CPT

## 2024-09-24 PROCEDURE — 99214 OFFICE O/P EST MOD 30 MIN: CPT | Mod: 95 | Performed by: INTERNAL MEDICINE

## 2024-09-24 PROCEDURE — G2211 COMPLEX E/M VISIT ADD ON: HCPCS | Mod: 95 | Performed by: INTERNAL MEDICINE

## 2024-09-24 PROCEDURE — 36416 COLLJ CAPILLARY BLOOD SPEC: CPT

## 2024-09-24 RX ORDER — METHIMAZOLE 5 MG/1
TABLET ORAL
Qty: 45 TABLET | Refills: 3 | Status: SHIPPED | OUTPATIENT
Start: 2024-09-24

## 2024-09-24 ASSESSMENT — PAIN SCALES - GENERAL: PAINLEVEL: NO PAIN (0)

## 2024-09-24 NOTE — PROGRESS NOTES
ANTICOAGULATION MANAGEMENT     Annabella Bolanos 69 year old female is on warfarin with therapeutic INR result. (Goal INR 2.0-3.0)    Recent labs: (last 7 days)     09/24/24  1336   INR 2.7*       ASSESSMENT     Warfarin Lab Questionnaire    Warfarin Doses Last 7 Days      9/23/2024     9:40 PM   Dose in Tablet or Mg   TAB or MG? milligram (mg)     Pt Rptd Dose SUNDAY MONDAY TUESDAY WED THURS FRIDAY SATURDAY 9/23/2024   9:40 PM 5 7.5 5 7.5 5 7.5 5         9/23/2024   Warfarin Lab Questionnaire   Missed doses within past 14 days? No   Changes in diet or alcohol within past 14 days? No   Medication changes since last result? No patient reports she completed penicillin Z + potassium today   Injuries or illness since last result? No   New shortness of breath, severe headaches or sudden changes in vision since last result? No   Abnormal bleeding since last result? No   Upcoming surgery, procedure? No   Best number to call with results? [phone]        Previous result: Therapeutic last 2(+) visits  Additional findings: None       PLAN     Recommended plan for no diet, medication or health factor changes affecting INR     Dosing Instructions: Continue your current warfarin dose with next INR in 5 weeks       Summary  As of 9/24/2024      Full warfarin instructions:  7.5 mg every Mon, Wed, Fri; 5 mg all other days   Next INR check:  10/30/2024               Telephone call with Annabella who verbalizes understanding and agrees to plan    Lab visit scheduled    Education provided: Please call back if any changes to your diet, medications or how you've been taking warfarin  Contact [phone] with any changes, questions or concerns.     Plan made per [facility] anticoagulation protocol    Deborah Aguilera RN  9/24/2024  Anticoagulation Clinic  [facility] [place] for routing messages: [facility]el [facility] patient phone line: [phone]        _______________________________________________________________________     Anticoagulation  Episode Summary       Current INR goal:  2.0-3.0   TTR:  78.9% (1 y)   Target end date:  Indefinite   Send INR reminders to:  [facility] [place]    Indications    Long-term (current) use of anticoagulants [Z79.01] [Z79.01]  Aortic valve replaced - history of bicuspid aortic stenosis status post 23 mm St. Yordan mechanical aortic valve and tube conduit with reimplantation of her coronaries 2011 [Z95.2]             Comments:               Anticoagulation Care Providers       Provider Role Specialty Phone number    Viky Tucker PA-C Referring [facility] [phone]

## 2024-09-24 NOTE — LETTER
"9/24/2024      Annabella Bolanos  [address] [place] [address]      Dear Colleague,    Thank you for referring your patient, Annabella Bolanos, to the [facility]. Please see a copy of my visit note below.    THIS IS A VIDEO VISIT:    Phone call visit/virtual visit encounter:    Name of patient: Annabella Bolanos    Date of encounter: 9/24/2024    Time of start of video visit: 3:00    Video started: 3:12    Video ended: 3:20    Provider location: working from home/ [facility]    Patient location: patients home.    Mode of transmission: [employer] video/ [employer]    Verbal consent: obtained before starting visit. Pt is agreeable.      The patient has been notified of following:      \"This VIDEO visit will be conducted via a call between you and your physician/provider. We have found that certain health care needs can be provided without the need for a physical exam.  This service lets us provide the care you need with a short phone conversation.  If a prescription is necessary we can send it directly to your pharmacy.  If lab work is needed we can place an order for that and you can then stop by our lab to have the test done at a later time.     With new updates with corona virus patient might be billed as clinic visit.     If during the course of the call the physician/provider feels a telephone visit is not appropriate, you will not be charged for this service.\"      Past medical history, social history, family history, allergy and medications were reviewed and updated as appropriate.  Reviewed pertinent labs, notes, imaging studies personally.    Name: Annabella Bolanos  Seen for follow-up of subclinical hyperthyroidism.    HPI:  Annabella Bolanos is a 69 year old female who presents for the evaluation of subclinical Hyperthyroidism.  H/o breast cancer and h/o aortic valve replacement and is on long term anticoagulation.  Breast cancer diagnosed in 2014 s/p lumpectomy and chemo and radiation. " Took radiation 5 days a week for 1 month.  DEXA 2018 scan showing osteopenia and appears stable.  She is not on medication for osteoporosis/osteopenia at this time.  She is also on aromatase inhibitor as a part of treatment for breast cancer.     Subclinical hyperthyroidism noted since 7/2016. Plan was for continue to monitor.  No h/o arrhythmia  No h/o osteoporosis-- has osteopenia based on DEXA 2020. On Fosamax.  TSI neg  US thyroid ( h/o radiation)- no discrete nodules.  As there was further decline in TSH along with risk for low bone density with aromatase inhibitor (with with prior history of osteopenia) as well as complex cardiac history she was started on methimazole 5 mg in 7/2018.    Currently taking methimazole 2.5 mg/day  X 5/2021- dose was increased at that time)  9/2024 labs are in range.  LFT and CBC stable.    Since last clinic visit she was started on Fosamax by Dr. Felder for osteoporosis/osteopenia.  She is taking Fosamax on a weekly basis since February 2019.    Feeling good.  Has good energy.  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  H/o arthritis.  Weight is mostly stable.  Eating healthy.    History of radiation exposure: YES. As above.  History of thyroid dysfunction: not to her knowledge. No FH of thyroid cancer.  Palpitations: No  Changes to hair or skin: No. Fingernails are better.  Diarrhea/Constipation:No  Changes in menses: s/p menopause  Changes in vision:No  Diplopia/Blurryness:No  Dysphagia or Shortness of breath:No  Tremors:No  Difficulty sleeping:Yes: most of the time  Changes in weight: No  Lithium/Amiodarone: No  URI: No  CT Scans:No  Mother had hyperthyroidism s/p ODE and was on levothyroxine.  Weight is mostly stable.  Wt Readings from Last 2 Encounters:   09/24/24 52.2 kg (115 lb)   12/01/23 50 kg (110 lb 3.2 oz)     PMH/PSH:  Past Medical History:   Diagnosis Date     Adenomatous colon polyp 08/2015     Aortic stenosis 06/23/2011    bicuspid AV with severe stenosis - 6/2011 mechanical AVR with 23 mm St  Yordan AV conduit, composite graft placement     Arthritis     knees and hands     Bicuspid aortic valve      Breast cancer (H) 2014    R breast     H/O aortic valve replacement     St Yordan     Heart murmur     Valve repalcement .     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    AAA repair with av23 mm tube graft     Palpitations      PONV (postoperative nausea and vomiting)     n/v morphine vs anesthesia, vomiting x24 hrs     Subclinical hyperthyroidism 2017     Thrombosis of leg     after  of daughter     Uncomplicated asthma      Past Surgical History:   Procedure Laterality Date     ARTHROPLASTY KNEE Left 2023    Procedure: Left total knee arthroplasty;  Surgeon: Jignesh Perry MD;  Location: [facility] OR     BIOPSY NODE SENTINEL Right 09/10/2014    Procedure: BIOPSY NODE SENTINEL;  Surgeon: Ila Romano MD;  Location:  OR     CARDIAC SURGERY  2011    aortic valve replacement     ENT SURGERY      tonsillectomy     HRW PORT A CATH       INSERT PORT VASCULAR ACCESS Left 10/22/2014    Procedure: INSERT PORT VASCULAR ACCESS;  Surgeon: Ila Romano MD;  Location: [facility] OR     LUMPECTOMY BREAST WITH SEED LOCALIZATION Right 09/10/2014    Procedure: LUMPECTOMY BREAST WITH SEED LOCALIZATION;  Surgeon: Ila Romano MD;  Location:  OR     ORTHOPEDIC SURGERY      shoulder, thumb surgery     REMOVE PORT VASCULAR ACCESS Left 2015    Procedure: REMOVE PORT VASCULAR ACCESS;  Surgeon: Ila Romano MD;  Location:  OR     REPAIR ANEURYSM ASCENDING AORTA  2011    Procedure:REPAIR ANEURYSM ASCENDING AORTA; Medial Sternotomy, Aortic Valve Replacement, (St. Yordan Medical Masters HP Valved Graft, size 23 mm) on pump oxygenation, intraoperative transesophageal cardiogram; Surgeon:JOHN PAUL ULLOA; Location: OR  "    REPLACE VALVE AORTIC  06/23/2011    bicuspid AV with severe stenosis - 6/2011 mechanical AVR with 23 mm St Yordan AV conduit, composite graft placement     Family Hx:  Family History   Problem Relation Age of Onset     Hypertension Mother      Thyroid Disease Mother         \"Toxic thyroid\"     Prostate Cancer Father 70     Cancer Father 80        Lung cancer, Not caused from smoking     Cerebrovascular Disease Father 80     Hypertension Father      Cardiovascular Brother         half brother      Cardiovascular Son         Puentes-Parkinsons White Syndrome     Cardiovascular Daughter         Heart murmur     Breast Cancer Paternal Aunt 80     Cardiovascular Paternal Aunt      Arthritis Brother 40        RA - half brother      Cancer Maternal Grandfather      Colon Cancer No family hx of      Thyroid disease: as above          Social Hx:  Social History     Socioeconomic History     Marital status:      Spouse name: Not on file     Number of children: Not on file     Years of education: Not on file     Highest education level: Not on file   Occupational History     Not on file   Tobacco Use     Smoking status: Never     Smokeless tobacco: Never   Vaping Use     Vaping status: Never Used   Substance and Sexual Activity     Alcohol use: Yes     Comment: rare     Drug use: No     Sexual activity: Not Currently   Other Topics Concern     Parent/sibling w/ CABG, MI or angioplasty before 65F 55M? Not Asked      Service Not Asked     Blood Transfusions Not Asked     Caffeine Concern Yes     Comment: 1-2 cups caffeine per day     Occupational Exposure Not Asked     Hobby Hazards Not Asked     Sleep Concern No     Stress Concern No     Weight Concern No     Special Diet Yes     Comment: low fat daily , low red meats     Back Care No     Exercise Yes     Comment: walks daily/ 3x a week exercise class     Bike Helmet Not Asked     Seat Belt Not Asked     Self-Exams Not Asked   Social History Narrative     Not on " "file     Social Determinants of Health     Financial Resource Strain: Low Risk  (11/30/2023)    Financial Resource Strain      Within the past 12 months, have you or your family members you live with been unable to get utilities (heat, electricity) when it was really needed?: No   Food Insecurity: Low Risk  (11/30/2023)    Food Insecurity      Within the past 12 months, did you worry that your food would run out before you got money to buy more?: No      Within the past 12 months, did the food you bought just not last and you didn t have money to get more?: No   Transportation Needs: Low Risk  (11/30/2023)    Transportation Needs      Within the past 12 months, has lack of transportation kept you from medical appointments, getting your medicines, non-medical meetings or appointments, work, or from getting things that you need?: No   Physical Activity: Not on file   Stress: Not on file   Social Connections: Not on file   Interpersonal Safety: Low Risk  (10/25/2023)    Interpersonal Safety      Do you feel physically and emotionally safe where you currently live?: Yes      Within the past 12 months, have you been hit, slapped, kicked or otherwise physically hurt by someone?: No      Within the past 12 months, have you been humiliated or emotionally abused in other ways by your partner or ex-partner?: No   Housing Stability: Low Risk  (11/30/2023)    Housing Stability      Do you have housing? : Yes      Are you worried about losing your housing?: No          MEDICATIONS:  has a current medication list which includes the following prescription(s): alendronate, anastrozole, aspirin, calcium citrate, carvedilol, estradiol, lisinopril, methimazole, solifenacin, vitamin d3, and warfarin anticoagulant.    ROS   ROS: 10 point ROS neg other than the symptoms noted above in the HPI.    Physical Exam   VS: Ht 1.6 m (5' 3\")   Wt 52.2 kg (115 lb)   LMP  (LMP Unknown)   BMI 20.37 kg/m    GENERAL: healthy, alert and no " distress  EYES: Eyes grossly normal to inspection, conjunctivae and sclerae normal  ENT: no nose swelling, nasal discharge.  Thyroid: no apparent thyroid nodules  RESP: no audible wheeze, cough, or visible cyanosis.  No visible retractions or increased work of breathing.  Able to speak fully in complete sentences.  ABDO: not evaluated.  EXTREMITIES: no hand tremors.  NEURO: Cranial nerves grossly intact, mentation intact and speech normal  SKIN: No apparent skin lesions, rash or edema seen   PSYCH: mentation appears normal, affect normal/bright, judgement and insight intact, normal speech and appearance well-groomed      LABS:  TFTs:   Latest Ref Rng 9/18/2024  9:19 AM   ENDO THYROID LABS-[facility]     TSH 0.30 - 4.20 uIU/mL 0.63    Free T3 2.0 - 4.4 pg/mL    Triiodothyronine (T3) 85 - 202 ng/dL 105    FREE T4 0.90 - 1.70 ng/dL 1.36      CBC:  WBC   Date Value Ref Range Status   06/30/2021 6.0 4.0 - 11.0 10e9/L Final     WBC Count   Date Value Ref Range Status   09/18/2024 5.5 4.0 - 11.0 10e3/uL Final     LFTs:  Liver Function Studies -   Recent Labs   Lab Test 07/11/24  1122   PROTTOTAL 6.9   ALBUMIN 4.0   BILITOTAL 0.5   ALKPHOS 72   AST 21   ALT 18       ULTRASOUND THYROID December 15, 2017 9:24 AM   HISTORY: Subclinical hyperthyroidism.   FINDINGS: Thyroid ultrasound demonstrates an enlarged thyroid gland. The right lobe measures 5.4 x 2.0 x 1.8 cm. The left lobe measures 3.8 x 1.6 x 1.3 cm. The isthmus is normal in thickness. Thyroid parenchyma is heterogeneous in echotexture. Increased color Doppler flow is noted throughout the thyroid gland. Thyroid nodules as follows: Right Lobe: None. Isthmus: None. Left Lobe: None.   IMPRESSION: Enlarged heterogeneous thyroid gland without evidence of a discrete thyroid nodule. Increased color Doppler flow suggests underlying thyroiditis. Correlate with thyroid function test and nuclear medicine thyroid scan as needed.    All pertinent notes, labs, and images personally reviewed  by me.     A/P  Ms.Carol WINDY Bolanos is a 69 year old here for the evaluation of hyperthyroidism.    Subclinical hyperthyroidism (TSI neg):   TSH remained suppressed since 2016 and along with normal free T4  No h/o arrhythmia  No h/o osteoporosis-- has osteopenia. Not on treatment.  She is on aromatase inhibitor for breast cancer.  TSI neg  US thyroid ( h/o radiation)- no discrete nodules.  Clinically looks euthyroid.  No major complaints.  As there is further decline in TSH along with risk for low bone density with aromatase inhibitor (with with prior history of osteopenia) was started on methimazole 5 mg 7/2018  Currently  taking methimazole to 2.5/day (5/20/2021)  F/u labs in range.  Plan:  Discussed diagnosis, pathophysiology, management and treatment options of condition with pt.  Discussed possible decreasing dose but at this time she would like to wait as she has upcoming surgery.  Based on 9/2024 labs recommend to continue methimazole 2.5 mg/day  Thyroid labs are in acceptable range.  Continue  continue methimazole 2.5 mg/day.  Labs and follow up in 1 year.  Please make a lab appointment for blood work and follow up clinic appointment in 1 week after that to discuss results    Plan: methimazole (TAPAZOLE) 5 MG tablet, T3 Free, T4        free, TSH, WBC count, Hepatic function panel          Discussed s/s of hypothyroidism and hyperthyroidism to watch for.  The patient indicates understanding of these issues and agrees with the plan.    Off note she is on anticoagulant and may need dose adjustment to get INR at target levels ( h/o aortic valve replacement)  Baseline LFT and WBC normal.  Discussed diagnosis, pathophysiology, management and treatment options of condition with pt.    Discussed indications, risks and benefits of all medications prescribed, and answered questions to patient's satisfaction.  The patient indicates understanding of these issues and agrees with the plan.    Discussed possible side effects  of methimazole including liver dysfunction and agranulocytosis. Discussed to watch for s/s like jaundice, abdominal pain and skin rash. In case of prolonged unresolving fever, sore throat and infections, advise to seek medical care.    Call if:     Signs or symptoms of infection. These include a fever of 100.5 degrees or higher, chills, severe sore throat, ear or sinus pain, cough, increased sputum or change in color, painful urination, mouth sores.   Severe nausea or vomiting.   Joint pain or swelling.   Not able to eat.   Unusual bruising or bleeding.   Dark urine or yellow skin or eyes.      Discussed s/s of hypothyroidism and hyperthyroidism to watch for.    Discussed indications, risks and benefits of all medications prescribed, and answered questions to patient's satisfaction.  The longitudinal plan of care for the diagnosis(es)/condition(s) as documented were addressed during this visit. Due to the added complexity in care, I will continue to support Annabella in the subsequent management and with ongoing continuity of care.  All questions were answered.  The patient indicates understanding of the above issues and agrees with the plan set forth.    Follow-up:  As noted in AVS    Merle Baxter MD  Endocrinology  [facility]  CC: Brook Echavarria        Addendum to above note and clinic visit:    Labs reviewed.    See result note/telephone encounter.      Again, thank you for allowing me to participate in the care of your patient.        Sincerely,        Merle Baxter MD

## 2024-09-24 NOTE — NURSING NOTE
Current patient location: [address] [place] [address]   [place] [address]    Is the patient currently in the state of [place]? YES    Visit mode:VIDEO    If the visit is dropped, the patient can be reconnected by: VIDEO VISIT: Text to cell phone:   Telephone Information:   Mobile [phone]       Will anyone else be joining the visit? NO  (If patient encounters technical issues they should call [phone])    How would you like to obtain your AVS? MyChart    Are changes needed to the allergy or medication list? No    Are refills needed on medications prescribed by this physician? NO    Rooming Documentation:  Questionnaire(s) completed    Reason for visit: RECHECK    Brenda QUIJANOF

## 2024-09-24 NOTE — PATIENT INSTRUCTIONS
[facility]  Dr Baxter, Endocrinology Department    [place] [address] [facility]  [place] [address]  Appointment [phone]  Fax: [phone]  [place]: Monday - Thursday      Thyroid labs are in acceptable range.  Continue methimazole 2.5 mg/day  Labs and follow up in 1 year.  Please make a lab appointment for blood work and follow up clinic appointment in 1 week after that to discuss results. (Recommend in person visit)    Discussed possible side effects of methimazole including liver dysfunction and agranulocytosis. Discussed to watch for s/s like jaundice, abdominal pain and skin rash. In case of prolonged unresolving fever, sore throat and infections, advise to seek medical care.    Call if:    Signs or symptoms of infection. These include a fever of 100.5 degrees or higher, chills, severe sore throat, ear or sinus pain, cough, increased sputum or change in color, painful urination, mouth sores.  Severe nausea or vomiting.  Joint pain or swelling.  Not able to eat.  Unusual bruising or bleeding.  Dark urine or yellow skin or eyes.

## 2024-09-24 NOTE — PROGRESS NOTES
"THIS IS A VIDEO VISIT:    Phone call visit/virtual visit encounter:    Name of patient: Annabella Bolanos    Date of encounter: 9/24/2024    Time of start of video visit: 3:00    Video started: 3:12    Video ended: 3:20    Provider location: working from home/ [facility]    Patient location: patients home.    Mode of transmission: Zoomph video/ BULX    Verbal consent: obtained before starting visit. Pt is agreeable.      The patient has been notified of following:      \"This VIDEO visit will be conducted via a call between you and your physician/provider. We have found that certain health care needs can be provided without the need for a physical exam.  This service lets us provide the care you need with a short phone conversation.  If a prescription is necessary we can send it directly to your pharmacy.  If lab work is needed we can place an order for that and you can then stop by our lab to have the test done at a later time.     With new updates with corona virus patient might be billed as clinic visit.     If during the course of the call the physician/provider feels a telephone visit is not appropriate, you will not be charged for this service.\"      Past medical history, social history, family history, allergy and medications were reviewed and updated as appropriate.  Reviewed pertinent labs, notes, imaging studies personally.    Name: Annabella Bolanos  Seen for follow-up of subclinical hyperthyroidism.    HPI:  Annabella Bolanos is a 69 year old female who presents for the evaluation of subclinical Hyperthyroidism.  H/o breast cancer and h/o aortic valve replacement and is on long term anticoagulation.  Breast cancer diagnosed in 2014 s/p lumpectomy and chemo and radiation. Took radiation 5 days a week for 1 month.  DEXA 2018 scan showing osteopenia and appears stable.  She is not on medication for osteoporosis/osteopenia at this time.  She is also on aromatase inhibitor as a part of treatment for breast cancer. "     Subclinical hyperthyroidism noted since 7/2016. Plan was for continue to monitor.  No h/o arrhythmia  No h/o osteoporosis-- has osteopenia based on DEXA 2020. On Fosamax.  TSI neg  US thyroid ( h/o radiation)- no discrete nodules.  As there was further decline in TSH along with risk for low bone density with aromatase inhibitor (with with prior history of osteopenia) as well as complex cardiac history she was started on methimazole 5 mg in 7/2018.    Currently taking methimazole 2.5 mg/day  X 5/2021- dose was increased at that time)  9/2024 labs are in range.  LFT and CBC stable.    Since last clinic visit she was started on Fosamax by Dr. Felder for osteoporosis/osteopenia.  She is taking Fosamax on a weekly basis since February 2019.    Feeling good.  Has good energy.  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  H/o arthritis.  Weight is mostly stable.  Eating healthy.    History of radiation exposure: YES. As above.  History of thyroid dysfunction: not to her knowledge. No FH of thyroid cancer.  Palpitations: No  Changes to hair or skin: No. Fingernails are better.  Diarrhea/Constipation:No  Changes in menses: s/p menopause  Changes in vision:No  Diplopia/Blurryness:No  Dysphagia or Shortness of breath:No  Tremors:No  Difficulty sleeping:Yes: most of the time  Changes in weight: No  Lithium/Amiodarone: No  URI: No  CT Scans:No  Mother had hyperthyroidism s/p DOE and was on levothyroxine.  Weight is mostly stable.  Wt Readings from Last 2 Encounters:   09/24/24 52.2 kg (115 lb)   12/01/23 50 kg (110 lb 3.2 oz)     PMH/PSH:  Past Medical History:   Diagnosis Date    Adenomatous colon polyp 08/2015    Aortic stenosis 06/23/2011    bicuspid AV with severe stenosis - 6/2011 mechanical AVR with 23 mm St Yordan AV conduit, composite graft placement    Arthritis     knees and hands    Bicuspid aortic valve     Breast cancer (H) 07/2014    R breast    H/O aortic valve replacement     St Yordan    Heart murmur     Valve repalcement 2011.    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    AAA repair with av23 mm tube graft    Palpitations     PONV (postoperative nausea and vomiting)     n/v morphine vs anesthesia, vomiting x24 hrs    Subclinical hyperthyroidism 2017    Thrombosis of leg     after  of daughter    Uncomplicated asthma      Past Surgical History:   Procedure Laterality Date    ARTHROPLASTY KNEE Left 2023    Procedure: Left total knee arthroplasty;  Surgeon: Jignesh Perry MD;  Location: [facility] OR    BIOPSY NODE SENTINEL Right 09/10/2014    Procedure: BIOPSY NODE SENTINEL;  Surgeon: Ila Romano MD;  Location: [facility] OR    CARDIAC SURGERY  2011    aortic valve replacement    ENT SURGERY      tonsillectomy    HRW PORT A CATH      INSERT PORT VASCULAR ACCESS Left 10/22/2014    Procedure: INSERT PORT VASCULAR ACCESS;  Surgeon: Ila Romano MD;  Location: [facility] OR    LUMPECTOMY BREAST WITH SEED LOCALIZATION Right 09/10/2014    Procedure: LUMPECTOMY BREAST WITH SEED LOCALIZATION;  Surgeon: Ila Romano MD;  Location: [facility] OR    ORTHOPEDIC SURGERY      shoulder, thumb surgery    REMOVE PORT VASCULAR ACCESS Left 2015    Procedure: REMOVE PORT VASCULAR ACCESS;  Surgeon: Ila Romano MD;  Location: [facility] OR    REPAIR ANEURYSM ASCENDING AORTA  2011    Procedure:REPAIR ANEURYSM ASCENDING AORTA; Medial Sternotomy, Aortic Valve Replacement, (St. Yordan Medical Masters HP Valved Graft, size 23 mm) on pump oxygenation, intraoperative transesophageal cardiogram; Surgeon:JOHN PAUL ULLOA; Location:[facility] OR    REPLACE VALVE AORTIC  2011    bicuspid AV with severe stenosis - 2011 mechanical AVR with 23 mm St Yordan AV conduit, composite graft placement     Family Hx:  Family History   Problem Relation Age of Onset    Hypertension Mother     Thyroid Disease Mother   "       \"Toxic thyroid\"    Prostate Cancer Father 70    Cancer Father 80        Lung cancer, Not caused from smoking    Cerebrovascular Disease Father 80    Hypertension Father     Cardiovascular Brother         half brother     Cardiovascular Son         Puentes-Parkinsons White Syndrome    Cardiovascular Daughter         Heart murmur    Breast Cancer Paternal Aunt 80    Cardiovascular Paternal Aunt     Arthritis Brother 40        RA - half brother     Cancer Maternal Grandfather     Colon Cancer No family hx of      Thyroid disease: as above          Social Hx:  Social History     Socioeconomic History    Marital status:      Spouse name: Not on file    Number of children: Not on file    Years of education: Not on file    Highest education level: Not on file   Occupational History    Not on file   Tobacco Use    Smoking status: Never    Smokeless tobacco: Never   Vaping Use    Vaping status: Never Used   Substance and Sexual Activity    Alcohol use: Yes     Comment: rare    Drug use: No    Sexual activity: Not Currently   Other Topics Concern    Parent/sibling w/ CABG, MI or angioplasty before 65F 55M? Not Asked     Service Not Asked    Blood Transfusions Not Asked    Caffeine Concern Yes     Comment: 1-2 cups caffeine per day    Occupational Exposure Not Asked    Hobby Hazards Not Asked    Sleep Concern No    Stress Concern No    Weight Concern No    Special Diet Yes     Comment: low fat daily , low red meats    Back Care No    Exercise Yes     Comment: walks daily/ 3x a week exercise class    Bike Helmet Not Asked    Seat Belt Not Asked    Self-Exams Not Asked   Social History Narrative    Not on file     Social Determinants of Health     Financial Resource Strain: Low Risk  (11/30/2023)    Financial Resource Strain     Within the past 12 months, have you or your family members you live with been unable to get utilities (heat, electricity) when it was really needed?: No   Food Insecurity: Low Risk  " "(11/30/2023)    Food Insecurity     Within the past 12 months, did you worry that your food would run out before you got money to buy more?: No     Within the past 12 months, did the food you bought just not last and you didn t have money to get more?: No   Transportation Needs: Low Risk  (11/30/2023)    Transportation Needs     Within the past 12 months, has lack of transportation kept you from medical appointments, getting your medicines, non-medical meetings or appointments, work, or from getting things that you need?: No   Physical Activity: Not on file   Stress: Not on file   Social Connections: Not on file   Interpersonal Safety: Low Risk  (10/25/2023)    Interpersonal Safety     Do you feel physically and emotionally safe where you currently live?: Yes     Within the past 12 months, have you been hit, slapped, kicked or otherwise physically hurt by someone?: No     Within the past 12 months, have you been humiliated or emotionally abused in other ways by your partner or ex-partner?: No   Housing Stability: Low Risk  (11/30/2023)    Housing Stability     Do you have housing? : Yes     Are you worried about losing your housing?: No          MEDICATIONS:  has a current medication list which includes the following prescription(s): alendronate, anastrozole, aspirin, calcium citrate, carvedilol, estradiol, lisinopril, methimazole, solifenacin, vitamin d3, and warfarin anticoagulant.    ROS   ROS: 10 point ROS neg other than the symptoms noted above in the HPI.    Physical Exam   VS: Ht 1.6 m (5' 3\")   Wt 52.2 kg (115 lb)   LMP  (LMP Unknown)   BMI 20.37 kg/m    GENERAL: healthy, alert and no distress  EYES: Eyes grossly normal to inspection, conjunctivae and sclerae normal  ENT: no nose swelling, nasal discharge.  Thyroid: no apparent thyroid nodules  RESP: no audible wheeze, cough, or visible cyanosis.  No visible retractions or increased work of breathing.  Able to speak fully in complete sentences.  ABDO: not " evaluated.  EXTREMITIES: no hand tremors.  NEURO: Cranial nerves grossly intact, mentation intact and speech normal  SKIN: No apparent skin lesions, rash or edema seen   PSYCH: mentation appears normal, affect normal/bright, judgement and insight intact, normal speech and appearance well-groomed      LABS:  TFTs:   Latest Ref Rng 9/18/2024  9:19 AM   ENDO THYROID LABS-[facility]     TSH 0.30 - 4.20 uIU/mL 0.63    Free T3 2.0 - 4.4 pg/mL    Triiodothyronine (T3) 85 - 202 ng/dL 105    FREE T4 0.90 - 1.70 ng/dL 1.36      CBC:  WBC   Date Value Ref Range Status   06/30/2021 6.0 4.0 - 11.0 10e9/L Final     WBC Count   Date Value Ref Range Status   09/18/2024 5.5 4.0 - 11.0 10e3/uL Final     LFTs:  Liver Function Studies -   Recent Labs   Lab Test 07/11/24  1122   PROTTOTAL 6.9   ALBUMIN 4.0   BILITOTAL 0.5   ALKPHOS 72   AST 21   ALT 18       ULTRASOUND THYROID December 15, 2017 9:24 AM   HISTORY: Subclinical hyperthyroidism.   FINDINGS: Thyroid ultrasound demonstrates an enlarged thyroid gland. The right lobe measures 5.4 x 2.0 x 1.8 cm. The left lobe measures 3.8 x 1.6 x 1.3 cm. The isthmus is normal in thickness. Thyroid parenchyma is heterogeneous in echotexture. Increased color Doppler flow is noted throughout the thyroid gland. Thyroid nodules as follows: Right Lobe: None. Isthmus: None. Left Lobe: None.   IMPRESSION: Enlarged heterogeneous thyroid gland without evidence of a discrete thyroid nodule. Increased color Doppler flow suggests underlying thyroiditis. Correlate with thyroid function test and nuclear medicine thyroid scan as needed.    All pertinent notes, labs, and images personally reviewed by me.     A/P  Ms.Carol WINDY Bolanos is a 69 year old here for the evaluation of hyperthyroidism.    Subclinical hyperthyroidism (TSI neg):   TSH remained suppressed since 2016 and along with normal free T4  No h/o arrhythmia  No h/o osteoporosis-- has osteopenia. Not on treatment.  She is on aromatase inhibitor for breast  cancer.  TSI neg  US thyroid ( h/o radiation)- no discrete nodules.  Clinically looks euthyroid.  No major complaints.  As there is further decline in TSH along with risk for low bone density with aromatase inhibitor (with with prior history of osteopenia) was started on methimazole 5 mg 7/2018  Currently  taking methimazole to 2.5/day (5/20/2021)  F/u labs in range.  Plan:  Discussed diagnosis, pathophysiology, management and treatment options of condition with pt.  Discussed possible decreasing dose but at this time she would like to wait as she has upcoming surgery.  Based on 9/2024 labs recommend to continue methimazole 2.5 mg/day  Thyroid labs are in acceptable range.  Continue  continue methimazole 2.5 mg/day.  Labs and follow up in 1 year.  Please make a lab appointment for blood work and follow up clinic appointment in 1 week after that to discuss results    Plan: methimazole (TAPAZOLE) 5 MG tablet, T3 Free, T4        free, TSH, WBC count, Hepatic function panel          Discussed s/s of hypothyroidism and hyperthyroidism to watch for.  The patient indicates understanding of these issues and agrees with the plan.    Off note she is on anticoagulant and may need dose adjustment to get INR at target levels ( h/o aortic valve replacement)  Baseline LFT and WBC normal.  Discussed diagnosis, pathophysiology, management and treatment options of condition with pt.    Discussed indications, risks and benefits of all medications prescribed, and answered questions to patient's satisfaction.  The patient indicates understanding of these issues and agrees with the plan.    Discussed possible side effects of methimazole including liver dysfunction and agranulocytosis. Discussed to watch for s/s like jaundice, abdominal pain and skin rash. In case of prolonged unresolving fever, sore throat and infections, advise to seek medical care.    Call if:    Signs or symptoms of infection. These include a fever of 100.5 degrees or  higher, chills, severe sore throat, ear or sinus pain, cough, increased sputum or change in color, painful urination, mouth sores.  Severe nausea or vomiting.  Joint pain or swelling.  Not able to eat.  Unusual bruising or bleeding.  Dark urine or yellow skin or eyes.      Discussed s/s of hypothyroidism and hyperthyroidism to watch for.    Discussed indications, risks and benefits of all medications prescribed, and answered questions to patient's satisfaction.  The longitudinal plan of care for the diagnosis(es)/condition(s) as documented were addressed during this visit. Due to the added complexity in care, I will continue to support Annabella in the subsequent management and with ongoing continuity of care.  All questions were answered.  The patient indicates understanding of the above issues and agrees with the plan set forth.    Follow-up:  As noted in AVS    Merle Baxter MD  Endocrinology  [facility]  CC: Brook Echavarria        Addendum to above note and clinic visit:    Labs reviewed.    See result note/telephone encounter.

## 2024-10-03 ENCOUNTER — DOCUMENTATION ONLY (OUTPATIENT)
Dept: ANTICOAGULATION | Facility: CLINIC | Age: 69
End: 2024-10-03
Payer: COMMERCIAL

## 2024-10-03 DIAGNOSIS — Z95.2 AORTIC VALVE REPLACED: Primary | ICD-10-CM

## 2024-10-03 NOTE — PROGRESS NOTES
ANTICOAGULATION CLINIC REFERRAL RENEWAL REQUEST       An annual renewal order is required for all patients referred to [facility].?  Please review and sign the pended referral order for Annabella WINDY Bolanos.       ANTICOAGULATION SUMMARY      Warfarin indication(s)   Mechanical AVR    Mechanical heart valve present?  Mechanical  AVR-Bileaflet       Current goal range   INR: 2.0-3.0     Goal appropriate for indication? Goal INR 2-3, standard for indication(s) above     Time in Therapeutic Range (TTR)  (Goal > 60%) 78.9%       Office visit with referring provider's group within last year yes on 10/25/23       Deborah Aguilera RN  [facility]

## 2024-10-27 SDOH — HEALTH STABILITY: PHYSICAL HEALTH: ON AVERAGE, HOW MANY DAYS PER WEEK DO YOU ENGAGE IN MODERATE TO STRENUOUS EXERCISE (LIKE A BRISK WALK)?: 4 DAYS

## 2024-10-27 SDOH — HEALTH STABILITY: PHYSICAL HEALTH: ON AVERAGE, HOW MANY MINUTES DO YOU ENGAGE IN EXERCISE AT THIS LEVEL?: 60 MIN

## 2024-10-27 ASSESSMENT — SOCIAL DETERMINANTS OF HEALTH (SDOH): HOW OFTEN DO YOU GET TOGETHER WITH FRIENDS OR RELATIVES?: MORE THAN THREE TIMES A WEEK

## 2024-10-30 ENCOUNTER — ANTICOAGULATION THERAPY VISIT (OUTPATIENT)
Dept: ANTICOAGULATION | Facility: CLINIC | Age: 69
End: 2024-10-30

## 2024-10-30 ENCOUNTER — MYC MEDICAL ADVICE (OUTPATIENT)
Dept: ANTICOAGULATION | Facility: CLINIC | Age: 69
End: 2024-10-30

## 2024-10-30 ENCOUNTER — OFFICE VISIT (OUTPATIENT)
Dept: FAMILY MEDICINE | Facility: CLINIC | Age: 69
End: 2024-10-30
Attending: PHYSICIAN ASSISTANT
Payer: COMMERCIAL

## 2024-10-30 VITALS
HEIGHT: 63 IN | DIASTOLIC BLOOD PRESSURE: 80 MMHG | SYSTOLIC BLOOD PRESSURE: 112 MMHG | WEIGHT: 115.6 LBS | TEMPERATURE: 97.5 F | OXYGEN SATURATION: 98 % | BODY MASS INDEX: 20.48 KG/M2 | HEART RATE: 62 BPM | RESPIRATION RATE: 16 BRPM

## 2024-10-30 DIAGNOSIS — I10 BENIGN ESSENTIAL HYPERTENSION: ICD-10-CM

## 2024-10-30 DIAGNOSIS — Z95.2 AORTIC VALVE REPLACED: ICD-10-CM

## 2024-10-30 DIAGNOSIS — Z00.00 ENCOUNTER FOR MEDICARE ANNUAL WELLNESS EXAM: Primary | ICD-10-CM

## 2024-10-30 DIAGNOSIS — Z79.01 LONG TERM CURRENT USE OF ANTICOAGULANT THERAPY: Primary | ICD-10-CM

## 2024-10-30 DIAGNOSIS — Q23.1 CONGENITAL INSUFFICIENCY OF AORTIC VALVE: ICD-10-CM

## 2024-10-30 DIAGNOSIS — Z13.220 SCREENING FOR LIPID DISORDERS: ICD-10-CM

## 2024-10-30 DIAGNOSIS — B00.9 HSV (HERPES SIMPLEX VIRUS) INFECTION: ICD-10-CM

## 2024-10-30 LAB
ALBUMIN SERPL BCG-MCNC: 3.9 G/DL (ref 3.5–5.2)
ALP SERPL-CCNC: 69 U/L (ref 40–150)
ALT SERPL W P-5'-P-CCNC: 16 U/L (ref 0–50)
ANION GAP SERPL CALCULATED.3IONS-SCNC: 9 MMOL/L (ref 7–15)
AST SERPL W P-5'-P-CCNC: 22 U/L (ref 0–45)
BILIRUB SERPL-MCNC: 0.6 MG/DL
BUN SERPL-MCNC: 23.4 MG/DL (ref 8–23)
CALCIUM SERPL-MCNC: 9.2 MG/DL (ref 8.8–10.4)
CHLORIDE SERPL-SCNC: 104 MMOL/L (ref 98–107)
CHOLEST SERPL-MCNC: 206 MG/DL
CREAT SERPL-MCNC: 0.63 MG/DL (ref 0.51–0.95)
EGFRCR SERPLBLD CKD-EPI 2021: >90 ML/MIN/1.73M2
FASTING STATUS PATIENT QL REPORTED: YES
FASTING STATUS PATIENT QL REPORTED: YES
GLUCOSE SERPL-MCNC: 99 MG/DL (ref 70–99)
HCO3 SERPL-SCNC: 28 MMOL/L (ref 22–29)
HDLC SERPL-MCNC: 66 MG/DL
INR BLD: 2.1 (ref 0.9–1.1)
LDLC SERPL CALC-MCNC: 126 MG/DL
NONHDLC SERPL-MCNC: 140 MG/DL
POTASSIUM SERPL-SCNC: 4.2 MMOL/L (ref 3.4–5.3)
PROT SERPL-MCNC: 6.6 G/DL (ref 6.4–8.3)
SODIUM SERPL-SCNC: 141 MMOL/L (ref 135–145)
TRIGL SERPL-MCNC: 72 MG/DL

## 2024-10-30 PROCEDURE — 80061 LIPID PANEL: CPT | Performed by: NURSE PRACTITIONER

## 2024-10-30 PROCEDURE — 82043 UR ALBUMIN QUANTITATIVE: CPT | Performed by: NURSE PRACTITIONER

## 2024-10-30 PROCEDURE — 99214 OFFICE O/P EST MOD 30 MIN: CPT | Mod: 25 | Performed by: NURSE PRACTITIONER

## 2024-10-30 PROCEDURE — 80053 COMPREHEN METABOLIC PANEL: CPT | Performed by: NURSE PRACTITIONER

## 2024-10-30 PROCEDURE — 36415 COLL VENOUS BLD VENIPUNCTURE: CPT | Performed by: NURSE PRACTITIONER

## 2024-10-30 PROCEDURE — 82570 ASSAY OF URINE CREATININE: CPT | Performed by: NURSE PRACTITIONER

## 2024-10-30 PROCEDURE — G0439 PPPS, SUBSEQ VISIT: HCPCS | Performed by: NURSE PRACTITIONER

## 2024-10-30 PROCEDURE — 85610 PROTHROMBIN TIME: CPT | Performed by: NURSE PRACTITIONER

## 2024-10-30 RX ORDER — VALACYCLOVIR HYDROCHLORIDE 500 MG/1
500 TABLET, FILM COATED ORAL 2 TIMES DAILY
Qty: 24 TABLET | Refills: 4 | Status: SHIPPED | OUTPATIENT
Start: 2024-10-30 | End: 2024-11-02

## 2024-10-30 RX ORDER — CARVEDILOL 12.5 MG/1
12.5 TABLET ORAL 2 TIMES DAILY WITH MEALS
Qty: 180 TABLET | Refills: 3 | Status: SHIPPED | OUTPATIENT
Start: 2024-10-30

## 2024-10-30 RX ORDER — LISINOPRIL 10 MG/1
20 TABLET ORAL EVERY MORNING
Qty: 180 TABLET | Refills: 3 | Status: SHIPPED | OUTPATIENT
Start: 2024-10-30

## 2024-10-30 NOTE — PROGRESS NOTES
ANTICOAGULATION MANAGEMENT     Annabella Bolanos 69 year old female is on warfarin with therapeutic INR result. (Goal INR 2.0-3.0)    Recent labs: (last 7 days)     10/30/24  0833   INR 2.1*       ASSESSMENT     Source(s): Chart Review  Previous INR was Therapeutic last 2(+) visits  Diet, health changes since last INR chart reviewed; none identified  PCP appointment today.  Medication list updated.  Discontinued Fosamax, Arimidex, Estradiol cream, and Vesicare.  None of these should interact with warfarin per Up to Date.  Full notes from PCP visit were not available at time of chart review.         PLAN     Recommended plan for no diet, medication or health factor changes affecting INR     Dosing Instructions: Continue your current warfarin dose with next INR in 6 weeks       Summary  As of 10/30/2024      Full warfarin instructions:  7.5 mg every Mon, Wed, Fri; 5 mg all other days   Next INR check:  12/11/2024               Detailed voice message left for Annabella with dosing instructions and follow up date.   Sent [facility] message with dosing and follow up instructions    Contact [phone] to schedule and with any changes, questions or concerns.     Education provided: Please call back if any changes to your diet, medications or how you've been taking warfarin    Plan made per [facility] anticoagulation protocol    Erica Stinson RN  10/30/2024  Anticoagulation Clinic  [facility] for routing messages: p [facility] [place]  [facility] patient phone line: [phone]        _______________________________________________________________________     Anticoagulation Episode Summary       Current INR goal:  2.0-3.0   TTR:  82.1% (1 y)   Target end date:  Indefinite   Send INR reminders to:  [facility] [place]    Indications    Long-term (current) use of anticoagulants [Z79.01] [Z79.01]  Aortic valve replaced - history of bicuspid aortic stenosis status post 23 mm St. Yordan mechanical aortic valve and tube conduit with reimplantation of  her coronaries 2011 [Z95.2]             Comments:  --             Anticoagulation Care Providers       Provider Role Specialty Phone number    Viky Tucker PA-C Referring Family Medicine [phone]    Glenys Best CNP Referring Nurse Practitioner - Family [phone]

## 2024-10-30 NOTE — PROGRESS NOTES
Preventive Care Visit  [facility] [place]  BARBARA Mancini CNP, Family Medicine  Oct 30, 2024        Assessment & Plan     Annabella was seen today for medicare visit.    Diagnoses and all orders for this visit:    Encounter for Medicare annual wellness exam  -     PRIMARY CARE FOLLOW-UP SCHEDULING  -     REVIEW OF HEALTH MAINTENANCE PROTOCOL ORDERS  -     PRIMARY CARE FOLLOW-UP SCHEDULING; Future    Screening for lipid disorders  -     Lipid panel reflex to direct LDL Fasting    Aortic valve replaced - history of bicuspid aortic stenosis status post 23 mm St. Yordan mechanical aortic valve and tube conduit with reimplantation of her coronaries 2011  Congenital insufficiency of aortic valve  Currently on chronic anticoagulation with warfarin with goal INR between 2-3.    Last seen by cardiology in October 2023 with recommendation for 1 year follow-up.     -     carvedilol (COREG) 12.5 MG tablet; Take 1 tablet (12.5 mg) by mouth 2 times daily (with meals).  -     lisinopril (ZESTRIL) 10 MG tablet; Take 2 tablets (20 mg) by mouth every morning.  -     INR point of care    Benign essential hypertension  Currently stable BP on Coreg 12.5 mg two times daily and Lisinopril 20 mg daily.    -     Albumin Random Urine Quantitative with Creat Ratio; Future  -     Comprehensive metabolic panel (BMP + Alb, Alk Phos, ALT, AST, Total. Bili, TP); Future  -     carvedilol (COREG) 12.5 MG tablet; Take 1 tablet (12.5 mg) by mouth 2 times daily (with meals).  -     lisinopril (ZESTRIL) 10 MG tablet; Take 2 tablets (20 mg) by mouth every morning.  -     Albumin Random Urine Quantitative with Creat Ratio  -     Comprehensive metabolic panel (BMP + Alb, Alk Phos, ALT, AST, Total. Bili, TP)    HSV (herpes simplex virus) infection  Intermittent nasal HSV, well controlled with intermittent use of Valtrex.    -     valACYclovir (VALTREX) 500 MG tablet; Take 1 tablet (500 mg) by mouth 2 times daily for 3 days. (May use as  needed for outbreaks)          Counseling  Appropriate preventive services were addressed with this patient.  Checklist reviewing preventive services available has been given to the patient.    Return in about 1 year (around 10/30/2025) for Medicare Wellness Visit + Med Check.        Marcelino Winchester is a 69 year old, presenting for the following:  Medicare Visit        10/30/2024     7:39 AM   Additional Questions   Roomed by Aliya SZYMANSKI    Social:  two children (son and daughter), three grandchildren  Moved here from [facility] 11 years ago.    .    Has 1 rescue dog.    Goes to aqua workouts 4-5 times per week.     Patient is fasting.    Last seen by cardiology in October 2023.  Previously wasn't seeing cardiology.    Bicuspid aortic stenosis status post 23 mm St. Yordan mechanical aortic valve and tube conduit with reimplantation of her coronaries in June 2011.     Follows with endocrine for hyperthyroidism.     History of prolapsed bladder, +urinary frequency and urgency.  Consultation with urology.  Pelvic floor PT completed as well.    Was on Vesicare for 2 year and then stopped due to concerns regarding cognitive side effects.     History of cold sores - in nose.  Uses Valtrex as needed.    No labial cold sores.      Osteopenia  Last bone scan completed 12/22/2022  Fosamax treatment initiated by Dr. Felder, started in 2014.  Planning to discontinue when prescription runs out.    Stopped Arimidex, started in 2015.      Breast cancer  Followed by [facility].  Diagnosed in July 2014.    Was recently discharged from oncology.      Hypertension Follow-up    Do you check your blood pressure regularly outside of the clinic? No   Are you following a low salt diet? Yes  Are your blood pressures ever more than 140 on the top number (systolic) OR more   than 90 on the bottom number (diastolic), for example 140/90? No      Health Care Directive  Patient does not have a Health Care Directive:        10/27/2024   General Health   How would you rate your overall physical health? Good   Feel stress (tense, anxious, or unable to sleep) Not at all            10/27/2024   Nutrition   Diet: Low salt    Low fat/cholesterol       Multiple values from one day are sorted in reverse-chronological order         10/27/2024   Exercise   Days per week of moderate/strenous exercise 4 days   Average minutes spent exercising at this level 60 min            10/27/2024   Social Factors   Frequency of gathering with friends or relatives More than three times a week   Worry food won't last until get money to buy more No   Food not last or not have enough money for food? No   Do you have housing? (Housing is defined as stable permanent housing and does not include staying ouside in a car, in a tent, in an abandoned building, in an overnight shelter, or couch-surfing.) Yes   Are you worried about losing your housing? No   Lack of transportation? No   Unable to get utilities (heat,electricity)? No            10/30/2024   Fall Risk   Gait Speed Test (Document in seconds) 3.91   Gait Speed Test Interpretation Less than or equal to 5.00 seconds - PASS             10/27/2024   Activities of Daily Living- Home Safety   Needs help with the following daily activites None of the above   Safety concerns in the home None of the above            10/27/2024   Dental   Dentist two times every year? Yes            10/27/2024   Hearing Screening   Hearing concerns? (!) I NEED TO ASK PEOPLE TO SPEAK UP OR REPEAT THEMSELVES.    (!) TROUBLE UNDERSTANDING SOFT OR WHISPERED SPEECH.       Multiple values from one day are sorted in reverse-chronological order         10/27/2024   Driving Risk Screening   Patient/family members have concerns about driving No            10/27/2024   General Alertness/Fatigue Screening   Have you been more tired than usual lately? No            10/27/2024   Urinary Incontinence Screening   Bothered by leaking urine in past 6  months Yes            10/27/2024   TB Screening   Were you born outside of the [place]? No            Today's PHQ-2 Score:       10/29/2024     5:09 PM   PHQ-2 ( 1999 Pf[employer])   Q1: Little interest or pleasure in doing things 0    Q2: Feeling down, depressed or hopeless 0    PHQ-2 Score 0    Q1: Little interest or pleasure in doing things Not at all   Q2: Feeling down, depressed or hopeless Not at all   PHQ-2 Score 0       Patient-reported           10/27/2024   Substance Use   Alcohol more than 3/day or more than 7/wk No   Do you have a current opioid prescription? No   How severe/bad is pain from 1 to 10? 2/10   Do you use any other substances recreationally? No        Social History     Tobacco Use    Smoking status: Never    Smokeless tobacco: Never   Vaping Use    Vaping status: Never Used   Substance Use Topics    Alcohol use: Yes     Comment: 1 or 2 drinks a week    Drug use: No         9/17/2024   LAST FHS-7 RESULTS   1st degree relative breast or ovarian cancer No        Mammogram Screening - Mammogram every 1-2 years updated in Health Maintenance based on mutual decision making      History of abnormal Pap smear: No - age 65 or older with adequate negative prior screening test results (3 consecutive negative cytology results, 2 consecutive negative cotesting results, or 2 consecutive negative HrHPV test results within 10 years, with the most recent test occurring within the recommended screening interval for the test used)        Latest Ref Rng & Units 7/18/2019     9:02 AM 7/18/2019     8:50 AM 7/14/2016     8:30 AM   PAP / HPV   PAP (Historical)  NIL   NIL    HPV 16 DNA NEG^Negative  Negative  Negative    HPV 18 DNA NEG^Negative  Negative  Negative    Other HR HPV NEG^Negative  Negative  Negative      ASCVD Risk   The 10-year ASCVD risk score (Lasha QUEEN, et al., 2019) is: 8.5%    Values used to calculate the score:      Age: 69 years      Sex: Female      Is Non- : No       Diabetic: No      Tobacco smoker: No      Systolic Blood Pressure: 112 mmHg      Is BP treated: Yes      HDL Cholesterol: 66 mg/dL      Total Cholesterol: 206 mg/dL    Reviewed and updated as needed this visit by Provider     Meds  Problems   Surg Hx             BP Readings from Last 3 Encounters:   10/30/24 112/80   12/01/23 102/78   11/14/23 (!) 145/75    Wt Readings from Last 3 Encounters:   10/30/24 52.4 kg (115 lb 9.6 oz)   09/24/24 52.2 kg (115 lb)   12/01/23 50 kg (110 lb 3.2 oz)                  Patient Active Problem List   Diagnosis    Aortic valve replaced - history of bicuspid aortic stenosis status post 23 mm St. Yordan mechanical aortic valve and tube conduit with reimplantation of her coronaries 2011    Benign essential hypertension    Long-term (current) use of anticoagulants [Z79.01]    Osteopenia of multiple sites    Hyperthyroidism    Adenomatous polyp of colon, unspecified part of colon    Personal history of malignant neoplasm of breast    Pre-diabetes    S/P total knee arthroplasty    Congenital insufficiency of aortic valve     Past Surgical History:   Procedure Laterality Date    ARTHROPLASTY KNEE Left 11/13/2023    Procedure: Left total knee arthroplasty;  Surgeon: Jignesh Perry MD;  Location: [facility] OR    BIOPSY NODE SENTINEL Right 09/10/2014    Procedure: BIOPSY NODE SENTINEL;  Surgeon: Ila Romano MD;  Location: [facility] OR    BREAST SURGERY  9/2014    CARDIAC SURGERY  06/01/2011    aortic valve replacement    COLONOSCOPY  8/2020    ENT SURGERY      tonsillectomy    HRW PORT A CATH      INSERT PORT VASCULAR ACCESS Left 10/22/2014    Procedure: INSERT PORT VASCULAR ACCESS;  Surgeon: Ila Romano MD;  Location: [facility] OR    LUMPECTOMY BREAST WITH SEED LOCALIZATION Right 09/10/2014    Procedure: LUMPECTOMY BREAST WITH SEED LOCALIZATION;  Surgeon: Ila Romano MD;  Location: [facility] OR    ORTHOPEDIC SURGERY      shoulder, thumb surgery    REMOVE PORT VASCULAR ACCESS  "Left 04/08/2015    Procedure: REMOVE PORT VASCULAR ACCESS;  Surgeon: Ila Romano MD;  Location: [facility] OR    REPAIR ANEURYSM ASCENDING AORTA  06/23/2011    Procedure:REPAIR ANEURYSM ASCENDING AORTA; Medial Sternotomy, Aortic Valve Replacement, (St. Yordan Medical Masters HP Valved Graft, size 23 mm) on pump oxygenation, intraoperative transesophageal cardiogram; Surgeon:JOHN PAUL ULLOA; Location:[facility] OR    REPLACE VALVE AORTIC  06/23/2011    bicuspid AV with severe stenosis - 6/2011 mechanical AVR with 23 mm St Yordan AV conduit, composite graft placement    SOFT TISSUE SURGERY  1983    tendons in wrists       Social History     Tobacco Use    Smoking status: Never    Smokeless tobacco: Never   Substance Use Topics    Alcohol use: Yes     Comment: 1 or 2 drinks a week     Family History   Problem Relation Age of Onset    Hypertension Mother     Thyroid Disease Mother         \"Toxic thyroid\"    Other Cancer Mother         skin cancers    Osteoporosis Mother     Prostate Cancer Father 70    Cancer Father 80        Lung cancer, Not caused from smoking    Cerebrovascular Disease Father 80    Hypertension Father     Cardiovascular Brother         half brother     Cardiovascular Son         Puentes-Parkinsons White Syndrome    Cardiovascular Daughter         Heart murmur    Breast Cancer Paternal Aunt 80    Cardiovascular Paternal Aunt     Arthritis Brother 40        RA - half brother     Cancer Maternal Grandfather     Other Cancer Maternal Grandfather         throat cancer    Breast Cancer Other         paternal aunt    Colon Cancer No family hx of          Current Outpatient Medications   Medication Sig Dispense Refill    aspirin 81 MG EC tablet Take 1 tablet (81 mg) by mouth daily      calcium citrate (CITRACAL) 950 (200 Ca) MG tablet Take 1 tablet by mouth 2 times daily      carvedilol (COREG) 12.5 MG tablet Take 1 tablet (12.5 mg) by mouth 2 times daily (with meals). 180 tablet 3    lisinopril (ZESTRIL) 10 " MG tablet Take 2 tablets (20 mg) by mouth every morning. 180 tablet 3    methimazole (TAPAZOLE) 5 MG tablet TAKE ONE-HALF TABLET BY MOUTH ONCE DAILY 45 tablet 3    valACYclovir (VALTREX) 500 MG tablet Take 1 tablet (500 mg) by mouth 2 times daily for 3 days. (May use as needed for outbreaks) 24 tablet 4    Vitamin D3 (CHOLECALCIFEROL) 25 mcg (1000 units) tablet Take by mouth daily      warfarin ANTICOAGULANT (COUMADIN) 5 MG tablet Take 7.5mg (1.5 tablets) every Mon, Wed, Fri. Take 5mg (1 tablet) all other days OR per INR clinic 115 tablet 1     Current providers sharing in care for this patient include:  Patient Care Team:  Siobhan Marino APRN CNP as PCP - General (Family Medicine)  Merle Baxter MD as Assigned Endocrinology Provider  Laura Washington PA-C as Physician Assistant  Colin Miles MD as Assigned Musculoskeletal Provider  Eduar Ling MD as Assigned Heart and Vascular Provider  Laura Washington PA-C as Assigned Surgical Provider  Brook Echavarria MD as Assigned PCP    The following health maintenance items are reviewed in Epic and correct as of today:  Health Maintenance   Topic Date Due    RSV VACCINE (1 - Risk 60-74 years 1-dose series) Never done    DTAP/TDAP/TD IMMUNIZATION (5 - Td or Tdap) 10/01/2023    INFLUENZA VACCINE (1) 09/01/2024    COVID-19 Vaccine (7 - 2024-25 season) 09/01/2024    MAMMO SCREENING  09/17/2025    CBC  09/18/2025    COLORECTAL CANCER SCREENING  09/18/2025    MEDICARE ANNUAL WELLNESS VISIT  10/30/2025    BMP  10/30/2025    CMP  10/30/2025    MICROALBUMIN  10/30/2025    ANNUAL REVIEW OF HM ORDERS  10/30/2025    FALL RISK ASSESSMENT  10/30/2025    GLUCOSE  10/30/2027    ADVANCE CARE PLANNING  11/01/2028    LIPID  10/30/2029    DEXA  12/22/2037    HEPATITIS C SCREENING  Completed    PHQ-2 (once per calendar year)  Completed    Pneumococcal Vaccine: 65+ Years  Completed    ZOSTER IMMUNIZATION  Completed    HPV IMMUNIZATION  Aged Out     "MENINGITIS IMMUNIZATION  Aged Out    RSV MONOCLONAL ANTIBODY  Aged Out    PAP  Discontinued         Review of Systems  Constitutional, HEENT, cardiovascular, pulmonary, gi and gu systems are negative, except as otherwise noted.     Objective    Exam  /80   Pulse 62   Temp 97.5  F (36.4  C) (Tympanic)   Resp 16   Ht 1.6 m (5' 3\")   Wt 52.4 kg (115 lb 9.6 oz)   LMP  (LMP Unknown)   SpO2 98%   BMI 20.48 kg/m     Estimated body mass index is 20.48 kg/m  as calculated from the following:    Height as of this encounter: 1.6 m (5' 3\").    Weight as of this encounter: 52.4 kg (115 lb 9.6 oz).    Physical Exam    GENERAL: alert and no distress  EYES: Eyes grossly normal to inspection  HENT: ear canals and TM's normal, nose and mouth without ulcers or lesions  NECK: no adenopathy, no asymmetry, masses, or scars  RESP: lungs clear to auscultation - no rales, rhonchi or wheezes  CV: regular rate and rhythm, normal S1 S2, no S3 or S4, no murmur, click or rub, no peripheral edema  ABDOMEN: soft, nontender, no hepatosplenomegaly, no masses and bowel sounds normal  MS: no gross musculoskeletal defects noted, no edema  SKIN: no suspicious lesions or rashes  NEURO: Normal strength and tone, mentation intact and speech normal  PSYCH: mentation appears normal, affect normal/bright        10/30/2024   Mini Cog   Clock Draw Score 2 Normal   3 Item Recall 3 objects recalled   Mini Cog Total Score 5               Signed Electronically by: BARBARA Mancini CNP    "

## 2024-10-30 NOTE — PATIENT INSTRUCTIONS
Patient Education   Preventive Care Advice   This is general advice given by our system to help you stay healthy. However, your care team may have specific advice just for you. Please talk to your care team about your preventive care needs.  Nutrition  Eat 5 or more servings of fruits and vegetables each day.  Try wheat bread, brown rice and whole grain pasta (instead of white bread, rice, and pasta).  Get enough calcium and vitamin D. Check the label on foods and aim for 100% of the RDA (recommended daily allowance).  Lifestyle  Exercise at least 150 minutes each week  (30 minutes a day, 5 days a week).  Do muscle strengthening activities 2 days a week. These help control your weight and prevent disease.  No smoking.  Wear sunscreen to prevent skin cancer.  Have a dental exam and cleaning every 6 months.  Yearly exams  See your health care team every year to talk about:  Any changes in your health.  Any medicines your care team has prescribed.  Preventive care, family planning, and ways to prevent chronic diseases.  Shots (vaccines)   HPV shots (up to age 26), if you've never had them before.  Hepatitis B shots (up to age 59), if you've never had them before.  COVID-19 shot: Get this shot when it's due.  Flu shot: Get a flu shot every year.  Tetanus shot: Get a tetanus shot every 10 years.  Pneumococcal, hepatitis A, and RSV shots: Ask your care team if you need these based on your risk.  Shingles shot (for age 50 and up)  General health tests  Diabetes screening:  Starting at age 35, Get screened for diabetes at least every 3 years.  If you are younger than age 35, ask your care team if you should be screened for diabetes.  Cholesterol test: At age 39, start having a cholesterol test every 5 years, or more often if advised.  Bone density scan (DEXA): At age 50, ask your care team if you should have this scan for osteoporosis (brittle bones).  Hepatitis C: Get tested at least once in your life.  STIs (sexually  transmitted infections)  Before age 24: Ask your care team if you should be screened for STIs.  After age 24: Get screened for STIs if you're at risk. You are at risk for STIs (including HIV) if:  You are sexually active with more than one person.  You don't use condoms every time.  You or a partner was diagnosed with a sexually transmitted infection.  If you are at risk for HIV, ask about PrEP medicine to prevent HIV.  Get tested for HIV at least once in your life, whether you are at risk for HIV or not.  Cancer screening tests  Cervical cancer screening: If you have a cervix, begin getting regular cervical cancer screening tests starting at age 21.  Breast cancer scan (mammogram): If you've ever had breasts, begin having regular mammograms starting at age 40. This is a scan to check for breast cancer.  Colon cancer screening: It is important to start screening for colon cancer at age 45.  Have a colonoscopy test every 10 years (or more often if you're at risk) Or, ask your provider about stool tests like a FIT test every year or Cologuard test every 3 years.  To learn more about your testing options, visit:   .  For help making a decision, visit:   https://bit.ly/hz29570.  Prostate cancer screening test: If you have a prostate, ask your care team if a prostate cancer screening test (PSA) at age 55 is right for you.  Lung cancer screening: If you are a current or former smoker ages 50 to 80, ask your care team if ongoing lung cancer screenings are right for you.  For informational purposes only. Not to replace the advice of your health care provider. Copyright   2023 [employer]. All rights reserved. Clinically reviewed by the [facility] Transitions Program. [employer] [id number] - REV 01/24.

## 2024-10-31 LAB
CREAT UR-MCNC: 69.4 MG/DL
MICROALBUMIN UR-MCNC: <12 MG/L
MICROALBUMIN/CREAT UR: NORMAL MG/G{CREAT}

## 2024-11-04 NOTE — RESULT ENCOUNTER NOTE
Dear Annabella,     -LDL(bad) cholesterol level is slightly elevated.  Your triglycerides and HDL are normal.  A diet high in fat and simple carbohydrates, genetics and being overweight can contribute to this. ADVISE: exercising 150 minutes of aerobic exercise per week (30 minutes for 5 days per week or 50 minutes for 3 days per week are options) and eating a low saturated fat/low carbohydrate diet are helpful to improve this. In 12 months, you should recheck your fasting cholesterol panel.  -Liver and gallbladder tests are normal (ALT,AST, Alk phos, bilirubin), kidney function is normal (Cr, GFR), sodium is normal, potassium is normal, calcium is normal, glucose is normal.  -Microalbumin (urine protein) test is normal.  ADVISE: rechecking this annually.      Thank you,     Siobhan Marino, BARBARA, [facility]    If you have further questions about the interpretation of your labs, labtestsonline.org is a good website to check out for further information.

## 2024-11-05 PROBLEM — Q23.1 CONGENITAL INSUFFICIENCY OF AORTIC VALVE: Status: ACTIVE | Noted: 2024-11-05

## 2024-12-11 ENCOUNTER — ANTICOAGULATION THERAPY VISIT (OUTPATIENT)
Dept: ANTICOAGULATION | Facility: CLINIC | Age: 69
End: 2024-12-11

## 2024-12-11 ENCOUNTER — LAB (OUTPATIENT)
Dept: LAB | Facility: CLINIC | Age: 69
End: 2024-12-11
Payer: COMMERCIAL

## 2024-12-11 DIAGNOSIS — Z95.2 AORTIC VALVE REPLACED: ICD-10-CM

## 2024-12-11 DIAGNOSIS — Z79.01 LONG TERM CURRENT USE OF ANTICOAGULANT THERAPY: Primary | ICD-10-CM

## 2024-12-11 LAB — INR BLD: 2.3 (ref 0.9–1.1)

## 2024-12-11 PROCEDURE — 85610 PROTHROMBIN TIME: CPT

## 2024-12-11 PROCEDURE — 36416 COLLJ CAPILLARY BLOOD SPEC: CPT

## 2024-12-11 NOTE — PROGRESS NOTES
ANTICOAGULATION MANAGEMENT     Annabella Bolanos 69 year old female is on warfarin with therapeutic INR result. (Goal INR 2.0-3.0)    Recent labs: (last 7 days)     12/11/24  1346   INR 2.3*       ASSESSMENT     Warfarin Lab Questionnaire    Warfarin Doses Last 7 Days      12/10/2024     2:43 PM   Dose in Tablet or Mg   TAB or MG? milligram (mg)     Pt Rptd Dose PABLO MONDAY TUESDAY WED THURS FRIDAY SATURDAY   12/10/2024   2:43 PM 5 7.5 5 7.5 5 7.5 5         12/10/2024   Warfarin Lab Questionnaire   Missed doses within past 14 days? No   Changes in diet or alcohol within past 14 days? No   Medication changes since last result? No   Injuries or illness since last result? No   New shortness of breath, severe headaches or sudden changes in vision since last result? No   Abnormal bleeding since last result? No   Upcoming surgery, procedure? No   Best number to call with results? [phone]        Previous result: Therapeutic last 2(+) visits  Additional findings: None       PLAN     Recommended plan for no diet, medication or health factor changes affecting INR     Dosing Instructions: Continue your current warfarin dose with next INR in 6 weeks       Summary  As of 12/11/2024      Full warfarin instructions:  7.5 mg every Mon, Wed, Fri; 5 mg all other days   Next INR check:  1/22/2025               Telephone call with Annabella who agrees to plan and repeated back plan correctly    Lab visit scheduled    Education provided: Please call back if any changes to your diet, medications or how you've been taking warfarin    Plan made per [facility] anticoagulation protocol    Erica Stinson RN  12/11/2024  Anticoagulation Clinic  [facility] for routing messages: patel MILAN [facility] patient phone line: [phone]        _______________________________________________________________________     Anticoagulation Episode Summary       Current INR goal:  2.0-3.0   TTR:  91.1% (1 y)   Target end date:  Indefinite   Send INR reminders to:   [facility] [place]    Indications    Long-term (current) use of anticoagulants [Z79.01] [Z79.01]  Aortic valve replaced - history of bicuspid aortic stenosis status post 23 mm St. Yordan mechanical aortic valve and tube conduit with reimplantation of her coronaries 2011 [Z95.2]             Comments:  --             Anticoagulation Care Providers       Provider Role Specialty Phone number    iVky Tucker PA-C Referring [facility] Medicine [phone]    Glenys Best CNP Referring Nurse Practitioner - Family [phone]

## 2025-01-23 ENCOUNTER — LAB (OUTPATIENT)
Dept: LAB | Facility: CLINIC | Age: 70
End: 2025-01-23
Payer: COMMERCIAL

## 2025-01-23 ENCOUNTER — ANTICOAGULATION THERAPY VISIT (OUTPATIENT)
Dept: ANTICOAGULATION | Facility: CLINIC | Age: 70
End: 2025-01-23

## 2025-01-23 DIAGNOSIS — Z95.2 AORTIC VALVE REPLACED: ICD-10-CM

## 2025-01-23 DIAGNOSIS — Z79.01 LONG TERM CURRENT USE OF ANTICOAGULANT THERAPY: Primary | ICD-10-CM

## 2025-01-23 LAB — INR BLD: 2.4 (ref 0.9–1.1)

## 2025-01-23 NOTE — PROGRESS NOTES
ANTICOAGULATION MANAGEMENT     Annabella Bolanos 69 year old female is on warfarin with therapeutic INR result. (Goal INR 2.0-3.0)    Recent labs: (last 7 days)     01/23/25  1114   INR 2.4*       ASSESSMENT     Warfarin Lab Questionnaire    Warfarin Doses Last 7 Days      1/22/2025     4:33 PM   Dose in Tablet or Mg   TAB or MG? milligram (mg)     Pt Rptd Dose SUNDAY MONDAY TUESDAY WED THURS FRIDAY SATURDAY 1/22/2025   4:33 PM 5 7.5 5 7.5 5 7.5 5         1/22/2025   Warfarin Lab Questionnaire   Missed doses within past 14 days? No   Changes in diet or alcohol within past 14 days? No   Medication changes since last result? No   Injuries or illness since last result? No   New shortness of breath, severe headaches or sudden changes in vision since last result? No   Abnormal bleeding since last result? No   Upcoming surgery, procedure? Yes   Please explain, date scheduled? tooth crown put in on an implant, Feb. 11th   Best number to call with results? [phone]     Previous result: Therapeutic last 2(+) visits  Additional findings:  writer informed that she does not need to hold warfarin for dental crown or implant       PLAN     Recommended plan for no diet, medication or health factor changes affecting INR     Dosing Instructions: Continue your current warfarin dose with next INR in 6 weeks       Summary  As of 1/23/2025      Full warfarin instructions:  7.5 mg every Mon, Wed, Fri; 5 mg all other days   Next INR check:  3/6/2025               Telephone call with Annabella who verbalizes understanding and agrees to plan    Lab visit scheduled    Education provided: None required    Plan made per [facility] anticoagulation protocol    Marybeth Stock RN  1/23/2025  Anticoagulation Clinic  [facility] for routing messages: [facility] patient phone line: [phone]        _______________________________________________________________________     Anticoagulation Episode Summary       Current INR goal:  2.0-3.0   TTR:   91.1% (1 y)   Target end date:  Indefinite   Send INR reminders to:  [facility] [place]    Indications    Long-term (current) use of anticoagulants [Z79.01] [Z79.01]  Aortic valve replaced - history of bicuspid aortic stenosis status post 23 mm St. Yordan mechanical aortic valve and tube conduit with reimplantation of her coronaries 2011 [Z95.2]             Comments:  --             Anticoagulation Care Providers       Provider Role Specialty Phone number    Viky Tucker PA-C Referring Family Medicine [phone]    Glenys Best CNP Referring Nurse Practitioner - Family [phone]

## 2025-03-06 ENCOUNTER — ANTICOAGULATION THERAPY VISIT (OUTPATIENT)
Dept: ANTICOAGULATION | Facility: CLINIC | Age: 70
End: 2025-03-06

## 2025-03-06 ENCOUNTER — LAB (OUTPATIENT)
Dept: LAB | Facility: CLINIC | Age: 70
End: 2025-03-06
Payer: COMMERCIAL

## 2025-03-06 DIAGNOSIS — Z95.2 AORTIC VALVE REPLACED: ICD-10-CM

## 2025-03-06 DIAGNOSIS — Z79.01 LONG TERM CURRENT USE OF ANTICOAGULANT THERAPY: Primary | ICD-10-CM

## 2025-03-06 LAB — INR BLD: 2.3 (ref 0.9–1.1)

## 2025-03-06 NOTE — PROGRESS NOTES
ANTICOAGULATION MANAGEMENT     Annabella Bolanos 69 year old female is on warfarin with therapeutic INR result. (Goal INR 2.0-3.0)    Recent labs: (last 7 days)     03/06/25  1121   INR 2.3*       ASSESSMENT     Warfarin Lab Questionnaire    Warfarin Doses Last 7 Days      3/5/2025     1:45 PM   Dose in Tablet or Mg   TAB or MG? milligram (mg)     Pt Rptd Dose PABLO MONDAY TUESDAY WED THURS FRIDAY SATURDAY   3/5/2025   1:45 PM 5 7.5 5 7.5 5 7.5 5         3/5/2025   Warfarin Lab Questionnaire   Missed doses within past 14 days? No   Changes in diet or alcohol within past 14 days? No   Medication changes since last result? No   Injuries or illness since last result? Yes   If yes, please explain: Right knee problem- pain, stiffness, felt like it was out of joint. Then 12 hours later it made a very loud crack and it felt like it went back in joint.  Calf pain for several days, weakness in kneecap.  Now better. Patient reports resolution of calf pain, she denies red, warm skin and no swelling in calf. Patient reports right knee is swollen, she may be facing surgery on the future.   New shortness of breath, severe headaches or sudden changes in vision since last result? No   Abnormal bleeding since last result? No   Upcoming surgery, procedure? No   Best number to call with results? [phone]     Previous result: Therapeutic last 2(+) visits  Additional findings: None       PLAN     Recommended plan for temporary change(s) affecting INR     Dosing Instructions: Continue your current warfarin dose with next INR in 6 weeks       Summary  As of 3/6/2025      Full warfarin instructions:  7.5 mg every Mon, Wed, Fri; 5 mg all other days   Next INR check:  4/17/2025               Telephone call with Annabella who verbalizes understanding and agrees to plan    Lab visit scheduled    Education provided: Importance of notifying anticoagulation clinic for: upcoming surgeries and procedures 2 weeks in advance    Plan made per [facility]  anticoagulation protocol    Marybeth Stock, RN  3/6/2025  Anticoagulation Clinic  [facility] for routing messages: [facility] [place]  [facility] patient phone line: [phone]        _______________________________________________________________________     Anticoagulation Episode Summary       Current INR goal:  2.0-3.0   TTR:  91.1% (1 y)   Target end date:  Indefinite   Send INR reminders to:  [facility] [place]    Indications    Long-term (current) use of anticoagulants [Z79.01] [Z79.01]  Aortic valve replaced - history of bicuspid aortic stenosis status post 23 mm St. Yordan mechanical aortic valve and tube conduit with reimplantation of her coronaries 2011 [Z95.2]             Comments:  --             Anticoagulation Care Providers       Provider Role Specialty Phone number    Viky Tucker PA-C Referring Family Medicine [phone]    Glenys Best CNP Referring Nurse Practitioner - Family [phone]

## 2025-04-17 ENCOUNTER — LAB (OUTPATIENT)
Dept: LAB | Facility: CLINIC | Age: 70
End: 2025-04-17
Payer: COMMERCIAL

## 2025-04-17 ENCOUNTER — ANTICOAGULATION THERAPY VISIT (OUTPATIENT)
Dept: ANTICOAGULATION | Facility: CLINIC | Age: 70
End: 2025-04-17

## 2025-04-17 DIAGNOSIS — Z95.2 AORTIC VALVE REPLACED: ICD-10-CM

## 2025-04-17 DIAGNOSIS — Z79.01 LONG TERM CURRENT USE OF ANTICOAGULANT THERAPY: Primary | ICD-10-CM

## 2025-04-17 LAB — INR BLD: 3.1 (ref 0.9–1.1)

## 2025-04-17 NOTE — PROGRESS NOTES
ANTICOAGULATION MANAGEMENT     Annabella Bolanos 69 year old female is on warfarin with supratherapeutic INR result. (Goal INR 2.0-3.0)    Recent labs: (last 7 days)     04/17/25  1124   INR 3.1*       ASSESSMENT     Warfarin Lab Questionnaire    Warfarin Doses Last 7 Days      4/16/2025     8:04 PM   Dose in Tablet or Mg   TAB or MG? milligram (mg)     Pt Rptd Dose SUNDAY MONDAY TUESDAY WED THURS FRIDAY SATURDAY 4/16/2025   8:04 PM 5 7.5 5 7.5 5 7.5 5         4/16/2025   Warfarin Lab Questionnaire   Missed doses within past 14 days? No   Changes in diet or alcohol within past 14 days? No.  May have had a little less green veggies than usual.  Plans to resume her usual diet.   Medication changes since last result? No   Injuries or illness since last result? No   New shortness of breath, severe headaches or sudden changes in vision since last result? No   Abnormal bleeding since last result? No   Upcoming surgery, procedure? No   Best number to call with results? [phone]     Previous result: Therapeutic last 2(+) visits  Additional findings: Leaving on a trip to [facility] on 5/14 for about 12 days.       PLAN     Recommended plan for temporary change(s) affecting INR     Dosing Instructions: Continue your current warfarin dose with next INR in 3 weeks       Summary  As of 4/17/2025      Full warfarin instructions:  7.5 mg every Mon, Wed, Fri; 5 mg all other days   Next INR check:  5/7/2025               Telephone call with Annabella who agrees to plan and repeated back plan correctly    Lab visit scheduled    Education provided: Please call back if any changes to your diet, medications or how you've been taking warfarin  Goal range and lab monitoring: goal range and significance of current result  Symptom monitoring: monitoring for bleeding signs and symptoms and travel related clotting risk and prevention    Plan made per [facility] anticoagulation protocol    Erica Stinson RN  4/17/2025  Anticoagulation Clinic  [facility]  for routing messages: [facility] [place]  [facility] patient phone line: [phone]        _______________________________________________________________________     Anticoagulation Episode Summary       Current INR goal:  2.0-3.0   TTR:  92.4% (1 y)   Target end date:  Indefinite   Send INR reminders to:  [facility] [place]    Indications    Long-term (current) use of anticoagulants [Z79.01] [Z79.01]  Aortic valve replaced - history of bicuspid aortic stenosis status post 23 mm St. Yordan mechanical aortic valve and tube conduit with reimplantation of her coronaries 2011 [Z95.2]             Comments:  --             Anticoagulation Care Providers       Provider Role Specialty Phone number    Viky Tucker PA-C Referring Family Medicine [phone]    Glenys Best CNP Referring Nurse Practitioner - Family [phone]

## 2025-05-06 ENCOUNTER — TELEPHONE (OUTPATIENT)
Dept: FAMILY MEDICINE | Facility: CLINIC | Age: 70
End: 2025-05-06

## 2025-05-06 ENCOUNTER — LAB (OUTPATIENT)
Dept: LAB | Facility: CLINIC | Age: 70
End: 2025-05-06
Payer: COMMERCIAL

## 2025-05-06 ENCOUNTER — ANTICOAGULATION THERAPY VISIT (OUTPATIENT)
Dept: ANTICOAGULATION | Facility: CLINIC | Age: 70
End: 2025-05-06

## 2025-05-06 DIAGNOSIS — Z95.2 AORTIC VALVE REPLACED: ICD-10-CM

## 2025-05-06 DIAGNOSIS — Z79.01 LONG TERM CURRENT USE OF ANTICOAGULANT THERAPY: Primary | ICD-10-CM

## 2025-05-06 LAB — INR BLD: 2.6 (ref 0.9–1.1)

## 2025-05-06 PROCEDURE — 85610 PROTHROMBIN TIME: CPT

## 2025-05-06 PROCEDURE — 36416 COLLJ CAPILLARY BLOOD SPEC: CPT

## 2025-05-06 NOTE — PROGRESS NOTES
ANTICOAGULATION MANAGEMENT     Annabella Bolanos 69 year old female is on warfarin with therapeutic INR result. (Goal INR 2.0-3.0)    Recent labs: (last 7 days)     05/06/25  1120   INR 2.6*       ASSESSMENT     Warfarin Lab Questionnaire    Warfarin Doses Last 7 Days      5/5/2025     9:38 PM   Dose in Tablet or Mg   TAB or MG? milligram (mg)     Pt Rptd Dose SUNDAY MONDAY TUESDAY WED THURS FRIDAY SATURDAY 5/5/2025   9:38 PM 5 7.5 5 7.5 5 7.5 5         5/5/2025   Warfarin Lab Questionnaire   Missed doses within past 14 days? No   Changes in diet or alcohol within past 14 days? No   Medication changes since last result? No   Injuries or illness since last result? No   New shortness of breath, severe headaches or sudden changes in vision since last result? No   Abnormal bleeding since last result? No   Upcoming surgery, procedure? No   Best number to call with results? [phone]     Previous result: Supratherapeutic  Additional findings: None       PLAN     Recommended plan for no diet, medication or health factor changes affecting INR     Dosing Instructions: Continue your current warfarin dose with next INR in 4 weeks       Summary  As of 5/6/2025      Full warfarin instructions:  7.5 mg every Mon, Wed, Fri; 5 mg all other days   Next INR check:  6/3/2025               Detailed voice message left for Annabella with dosing instructions and follow up date.     Contact [phone] to schedule and with any changes, questions or concerns.     Education provided: Please call back if any changes to your diet, medications or how you've been taking warfarin  Contact [phone] with any changes, questions or concerns.     Plan made per [facility] anticoagulation protocol    Deborah Aguilera, RN  5/6/2025  Anticoagulation Clinic  [facility] for routing messages: [facility] [place] [facility] patient phone line: [phone]        _______________________________________________________________________     Anticoagulation Episode Summary        Current INR goal:  2.0-3.0   TTR:  96.5% (1 y)   Target end date:  Indefinite   Send INR reminders to:  [facility] [place]    Indications    Long-term (current) use of anticoagulants [Z79.01] [Z79.01]  Aortic valve replaced - history of bicuspid aortic stenosis status post 23 mm St. Yordan mechanical aortic valve and tube conduit with reimplantation of her coronaries 2011 [Z95.2]             Comments:  --             Anticoagulation Care Providers       Provider Role Specialty Phone number    Viky Tucker PA-C Referring Family Medicine [phone]    Glenys Best CNP Referring Nurse Practitioner - Family [phone]

## 2025-05-06 NOTE — TELEPHONE ENCOUNTER
General Call    Contacts       Contact Date/Time Type Contact Phone/Fax    05/06/2025 12:31 PM CDT Phone (Incoming) Annabella Bolanos (Self) [phone] (M)          Reason for Call:  ANTICOAGULATION    What are your questions or concerns:  Patient attempted to return phone call     Date of last appointment with provider: Last INR 5/6/25    Could we send this information to you in [facility] or would you prefer to receive a phone call?:   Patient would prefer a phone call   Okay to leave a detailed message?: Yes at Cell number on file:    Telephone Information:   Mobile [phone]

## 2025-05-06 NOTE — TELEPHONE ENCOUNTER
Call returned to patient. Patient reports she will be going on a vacation in 2 weeks to [place] and it was recommended to take Vit C prior to vacation. Per UpToDate: Ascorbic Acid may decrease anticoagulant effects of Warfarin. Ascorbic Acid may decrease serum concentration of Warfarin. Patient advised of this finding in UpToDate, she is on the fence if she will take Vit C or not.  Deborah Aguilera RN, BSN  Anticoagulation Clinic

## 2025-06-03 ENCOUNTER — ANTICOAGULATION THERAPY VISIT (OUTPATIENT)
Dept: ANTICOAGULATION | Facility: CLINIC | Age: 70
End: 2025-06-03

## 2025-06-03 ENCOUNTER — RESULTS FOLLOW-UP (OUTPATIENT)
Dept: ANTICOAGULATION | Facility: CLINIC | Age: 70
End: 2025-06-03

## 2025-06-03 ENCOUNTER — LAB (OUTPATIENT)
Dept: LAB | Facility: CLINIC | Age: 70
End: 2025-06-03
Payer: COMMERCIAL

## 2025-06-03 DIAGNOSIS — Z79.01 LONG TERM CURRENT USE OF ANTICOAGULANT THERAPY: Primary | ICD-10-CM

## 2025-06-03 DIAGNOSIS — Z95.2 AORTIC VALVE REPLACED: ICD-10-CM

## 2025-06-03 LAB — INR BLD: 2.4 (ref 0.9–1.1)

## 2025-06-03 PROCEDURE — 85610 PROTHROMBIN TIME: CPT

## 2025-06-03 PROCEDURE — 36416 COLLJ CAPILLARY BLOOD SPEC: CPT

## 2025-06-03 NOTE — PROGRESS NOTES
ANTICOAGULATION MANAGEMENT     Annabella Bolanos 69 year old female is on warfarin with therapeutic INR result. (Goal INR 2.0-3.0)    Recent labs: (last 7 days)     06/03/25  1118   INR 2.4*       ASSESSMENT     Warfarin Lab Questionnaire    Warfarin Doses Last 7 Days      6/2/2025     4:32 PM   Dose in Tablet or Mg   TAB or MG? milligram (mg)     Pt Rptd Dose SUNDAY MONDAY TUESDAY WED THURS FRIDAY SATURDAY 6/2/2025   4:32 PM 5 7.5 5 7.5 5 7.5 5         6/2/2025   Warfarin Lab Questionnaire   Missed doses within past 14 days? No   Changes in diet or alcohol within past 14 days? Yes   Please explain:  More alcohol on trip - was traveling internationally and had a bit more alcohol than usual but has been home for over a week at this point   Medication changes since last result? Yes   Please list: Vitamin C   Injuries or illness since last result? No   New shortness of breath, severe headaches or sudden changes in vision since last result? No   Abnormal bleeding since last result? No   Upcoming surgery, procedure? No   Best number to call with results? [phone]     Previous result: Therapeutic last visit; previously outside of goal range  Additional findings: None       PLAN     Recommended plan for no diet, medication or health factor changes affecting INR     Dosing Instructions: Continue your current warfarin dose with next INR in 6 weeks       Summary  As of 6/3/2025      Full warfarin instructions:  7.5 mg every Mon, Wed, Fri; 5 mg all other days   Next INR check:  7/15/2025               Telephone call with Annabella who verbalizes understanding and agrees to plan    Lab visit scheduled    Education provided: Please call back if any changes to your diet, medications or how you've been taking warfarin    Plan made per [facility] anticoagulation protocol    Kusum Aguilera, RN  6/3/2025  Anticoagulation Clinic  [facility] for routing messages: [facility] [place] [facility] patient phone line:  [phone]        _______________________________________________________________________     Anticoagulation Episode Summary       Current INR goal:  2.0-3.0   TTR:  97.5% (1 y)   Target end date:  Indefinite   Send INR reminders to:  [facility] [place]    Indications    Long-term (current) use of anticoagulants [Z79.01] [Z79.01]  Aortic valve replaced - history of bicuspid aortic stenosis status post 23 mm St. Yordan mechanical aortic valve and tube conduit with reimplantation of her coronaries 2011 [Z95.2]             Comments:  --             Anticoagulation Care Providers       Provider Role Specialty Phone number    Viky Tucker PA-C Referring [facility] [phone]    Glenys Best CNP Referring Nurse Practitioner - Family [phone]

## 2025-06-16 ENCOUNTER — VIRTUAL VISIT (OUTPATIENT)
Dept: FAMILY MEDICINE | Facility: CLINIC | Age: 70
End: 2025-06-16
Payer: COMMERCIAL

## 2025-06-16 ENCOUNTER — NURSE TRIAGE (OUTPATIENT)
Dept: FAMILY MEDICINE | Facility: CLINIC | Age: 70
End: 2025-06-16

## 2025-06-16 DIAGNOSIS — J01.10 ACUTE NON-RECURRENT FRONTAL SINUSITIS: Primary | ICD-10-CM

## 2025-06-16 PROCEDURE — 98005 SYNCH AUDIO-VIDEO EST LOW 20: CPT | Performed by: NURSE PRACTITIONER

## 2025-06-16 RX ORDER — DOXYCYCLINE 100 MG/1
100 CAPSULE ORAL 2 TIMES DAILY
Qty: 14 CAPSULE | Refills: 0 | Status: SHIPPED | OUTPATIENT
Start: 2025-06-16 | End: 2025-06-23

## 2025-06-16 ASSESSMENT — ENCOUNTER SYMPTOMS: COUGH: 1

## 2025-06-16 NOTE — TELEPHONE ENCOUNTER
Huddled with Glenys banda to keep virtual.     Attempt #1  Called Phone # [phone]    Called and left a detailed voicemail to keep today's appointment virtual. 330pm call back @ [phone] if you have further questions.

## 2025-06-16 NOTE — PROGRESS NOTES
Annabella is a 69 year old who is being evaluated via a billable video visit.    How would you like to obtain your AVS? MyChart  If the video visit is dropped, the invitation should be resent by: Send to e-mail at: [email or web address]  Will anyone else be joining your video visit? No      Assessment & Plan     Acute non-recurrent frontal sinusitis  - doxycycline monohydrate (MONODOX) 100 MG capsule  Dispense: 14 capsule; Refill: 0        Subjective   Annabella is a 69 year old, presenting for the following health issues:  Cough and Sinus Problem (Started about 10 days ago with a cough and sore throat , second day chills since then coughing up yellow/green phlegm also blowing nose.Covid test negative   )        6/16/2025     3:17 PM   Additional Questions   Roomed by Bharat   Accompanied by Self     Video Start Time: 3:40 PM    Cough    Sinus Problem   Associated symptoms include cough.         10 days ago cough and sinus symptoms. Short time had a fever at the beginning. Fatigue, headache and now productive cough. Ears full and popping at times if she blows her     Covid test negative at home.     Constitutional, HEENT, cardiovascular, pulmonary, GI, , musculoskeletal, neuro, skin, endocrine and psych systems are negative, except as otherwise noted.        Objective           Vitals:  No vitals were obtained today due to virtual visit.    Physical Exam   GENERAL: alert and no distress  EYES: Eyes grossly normal to inspection.  No discharge or erythema, or obvious scleral/conjunctival abnormalities.  RESP: No audible wheeze, cough, or visible cyanosis.    SKIN: Visible skin clear. No significant rash, abnormal pigmentation or lesions.  NEURO: Cranial nerves grossly intact.  Mentation and speech appropriate for age.  PSYCH: Appropriate affect, tone, and pace of words          Video-Visit Details    Type of service:  Video Visit   Video End Time:3:49 PM  Originating Location (pt. Location): Home    Distant Location (provider  location):  On-site  Platform used for Video Visit: Kesha  Signed Electronically by: Glenys Best CNP

## 2025-06-16 NOTE — TELEPHONE ENCOUNTER
"Situation   Called patient to triage per provider to evaluate if patient should be seen in person or ok to stay virtual.   Virtual appointment states respiratory infection.     Assessment   The patient states she think she has bronchitis  Coughing for 10 days   Coughing up \"lots\" of yellow and green phlegm.      Patient states she also has lots of nasal drainage     Denied SOB     Patient states she had chills the first day but not since and denied fever.     Patient states she really has to get off the phone for a meeting.   Patient states she can come in for an in person visit but would not be able to make it here until 330pm     Recommendations   Explained will  call back and leave a detailed voicemail with a message to keep virtual or ok to be see in person        Reason for Disposition   SEVERE coughing spells (e.g., whooping sound after coughing, vomiting after coughing)    Additional Information   Negative: Bluish (or gray) lips or face   Negative: SEVERE difficulty breathing (e.g., struggling for each breath, speaks in single words)   Negative: Rapid onset of cough and has hives   Negative: Coughing started suddenly after medicine, an allergic food or bee sting   Negative: Difficulty breathing after exposure to flames, smoke, or fumes   Negative: Sounds like a life-threatening emergency to the triager   Negative: Previous asthma attacks and this feels like asthma attack   Negative: Dry cough (non-productive; no sputum or minimal clear sputum) and within 14 days of COVID-19 Exposure   Negative: MODERATE difficulty breathing (e.g., speaks in phrases, SOB even at rest, pulse 100-120) and still present when not coughing   Negative: Chest pain present when not coughing   Negative: Passed out (e.g., fainted, lost consciousness, blacked out and was not responding)   Negative: Patient sounds very sick or weak to the triager   Negative: MILD difficulty breathing (e.g., minimal/no SOB at rest, SOB with walking, pulse " <100) and still present when not coughing   Negative: Coughed up > 1 tablespoon (15 ml) blood (Exception: Blood-tinged sputum.)   Negative: Fever > 103 F (39.4 C)   Negative: Fever > 100 F (37.8 C) and has diabetes mellitus or a weak immune system (e.g., HIV positive, cancer chemotherapy, organ transplant, splenectomy, chronic steroids)   Negative: Fever > 100 F (37.8 C) and bedridden (e.g., CVA, chronic illness, recovering from surgery)   Negative: Fever > 101 F (38.3 C) and over 60 years of age    Protocols used: Cough-A-[place]

## 2025-06-18 ENCOUNTER — TELEPHONE (OUTPATIENT)
Dept: FAMILY MEDICINE | Facility: CLINIC | Age: 70
End: 2025-06-18
Payer: COMMERCIAL

## 2025-06-18 NOTE — TELEPHONE ENCOUNTER
General Call    Contacts       Contact Date/Time Type Contact Phone/Fax    06/18/2025 01:21 PM CDT Phone (Incoming) Annabella Bolanos (Self) [phone] (M)          Reason for Call:  ANTICOAGULATION    What are your questions or concerns:  Patient just started taking antibiotic and she wants to make sure it won't affect her INR    Date of last appointment with provider: Last INR Remi 3    Could we send this information to you in [employer] or would you prefer to receive a phone call?:   Patient would prefer a phone call   Okay to leave a detailed message?: Yes at Cell number on file:    Telephone Information:   Mobile [phone]

## 2025-06-18 NOTE — TELEPHONE ENCOUNTER
Call returned to patient. Patient reports she began taking Doxycycline 6/16/25 for 7 days. Per UpToDate: Tetracyclines may increase anticoagulant effects of Vitamin K Antagonists. Assisted patient in scheduling a lab INR appointment 6/19/25.  Deborah Aguilera RN, BSN  Anticoagulation Clinic

## 2025-06-19 ENCOUNTER — LAB (OUTPATIENT)
Dept: LAB | Facility: CLINIC | Age: 70
End: 2025-06-19
Payer: COMMERCIAL

## 2025-06-19 ENCOUNTER — ANTICOAGULATION THERAPY VISIT (OUTPATIENT)
Dept: ANTICOAGULATION | Facility: CLINIC | Age: 70
End: 2025-06-19

## 2025-06-19 DIAGNOSIS — Z95.2 AORTIC VALVE REPLACED: ICD-10-CM

## 2025-06-19 DIAGNOSIS — Z79.01 LONG TERM CURRENT USE OF ANTICOAGULANT THERAPY: Primary | ICD-10-CM

## 2025-06-19 LAB — INR BLD: 2.6 (ref 0.9–1.1)

## 2025-06-19 NOTE — PROGRESS NOTES
ANTICOAGULATION MANAGEMENT     Annabella Bolanos 69 year old female is on warfarin with therapeutic INR result. (Goal INR 2.0-3.0)    Recent labs: (last 7 days)     06/19/25  1423   INR 2.6*       ASSESSMENT     Source(s): Chart Review and Patient/Caregiver Call     Warfarin doses taken: Warfarin taken as instructed  Diet: No new diet changes identified  Medication/supplement changes: doxycycline started 6/16/25 x 7 days - does not seem to be impacting INR  New illness, injury, or hospitalization: No  Signs or symptoms of bleeding or clotting: No  Previous result: Therapeutic last 2(+) visits  Additional findings: None       PLAN     Recommended plan for no diet, medication or health factor changes affecting INR     Dosing Instructions: Continue your current warfarin dose with next INR in 6 weeks       Summary  As of 6/19/2025      Full warfarin instructions:  7.5 mg every Mon, Wed, Fri; 5 mg all other days   Next INR check:  7/31/2025               Telephone call with Annabella who verbalizes understanding and agrees to plan    Lab visit scheduled    Education provided: Please call back if any changes to your diet, medications or how you've been taking warfarin    Plan made per [facility] anticoagulation protocol    Mikael Pappas RN  6/19/2025  Anticoagulation Clinic  [facility] for routing messages: p [facility] [place]  [facility] patient phone line: [phone]        _______________________________________________________________________     Anticoagulation Episode Summary       Current INR goal:  2.0-3.0   TTR:  97.5% (1 y)   Target end date:  Indefinite   Send INR reminders to:  [facility] [place]    Indications    Long-term (current) use of anticoagulants [Z79.01] [Z79.01]  Aortic valve replaced - history of bicuspid aortic stenosis status post 23 mm St. Yordan mechanical aortic valve and tube conduit with reimplantation of her coronaries 2011 [Z95.2]             Comments:  --             Anticoagulation Care Providers        Provider Role Specialty Phone number    Glenys Best, CNP Referring Nurse Practitioner - Family [phone]

## 2025-06-23 ENCOUNTER — PATIENT OUTREACH (OUTPATIENT)
Dept: GASTROENTEROLOGY | Facility: CLINIC | Age: 70
End: 2025-06-23
Payer: COMMERCIAL

## 2025-06-23 DIAGNOSIS — Z12.11 SPECIAL SCREENING FOR MALIGNANT NEOPLASMS, COLON: Primary | ICD-10-CM

## 2025-06-23 NOTE — PROGRESS NOTES
"[facility] Screening Colonoscopy Referral Review    Patient meets the inclusion criteria for screening colonoscopy standing order.    Ordering/Referring Provider:  Siobhan Marino      BMI: Estimated body mass index is 20.48 kg/m  as calculated from the following:    Height as of 10/30/24: 1.6 m (5' 3\").    Weight as of 10/30/24: 52.4 kg (115 lb 9.6 oz).     Sedation:  Does patient have any of the following conditions affecting sedation?  No medical conditions affecting sedation.    Previous Scopes:  Any previous recommendations or follow up needs based on previous scope?  na / No recommendations.    Medical Concerns to Postpone Order:  Does patient have any of the following medical concerns that should postpone/delay colonoscopy referral?  No medical conditions affecting colonoscopy referral.    Final Referral Details:  Based on patient's medical history patient is appropriate for referral order with moderate sedation. If patient's BMI > 50 do not schedule in [facility].  "
no

## 2025-06-30 ENCOUNTER — TELEPHONE (OUTPATIENT)
Dept: GASTROENTEROLOGY | Facility: CLINIC | Age: 70
End: 2025-06-30
Payer: COMMERCIAL

## 2025-06-30 NOTE — TELEPHONE ENCOUNTER
"Endoscopy Scheduling Screen    Caller: patient and mother    Have you had any respiratory illness or flu-like symptoms in the last 10 days?  No    Patient is ACTIVE on [employer].  Inform patient that all appointment instructions will be sent via [employer].    Review patient's insurance for any non participating payor.    Ordering/Referring Provider:   HAMZAH ABBASI        (If ordering provider performs procedure, schedule with ordering provider unless otherwise instructed. )    BMI: Estimated body mass index is 20.48 kg/m  as calculated from the following:    Height as of 10/30/24: 1.6 m (5' 3\").    Weight as of 10/30/24: 52.4 kg (115 lb 9.6 oz).     Sedation Ordered  moderate sedation.   If patient BMI > 50 do not schedule in [facility].    If patient BMI > 45 do not schedule at [facility].    Are you taking methadone or Suboxone?  NO, No RN review required.    Have you been diagnosed and are being treated for severe PTSD or severe anxiety?  NO, No RN review required.    Are you taking any prescription medications for pain 3 or more times per week?   NO, No RN review required.    Do you have a history of malignant hyperthermia?  No    (Females) Are you currently pregnant?   No     Have you been diagnosed or told you have pulmonary hypertension?   No    Do you have an LVAD?  No    Have you been told you have moderate to severe sleep apnea?  No.    Have you been told you have COPD, asthma, or any other lung disease?  No    Has your doctor ordered any cardiac tests like echo, angiogram, stress test, ablation, or EKG, that you have not completed yet?  No    Do you  have a history of any heart conditions?  Yes     Have you had any hospitalizations  in the last year for heart related issues, for example a stent placement, heart attack, or cardiomyopathy?  No    Do you have any implantable devices in your body (pacemaker, ICD)?  No    Do you take nitroglycerine?  No    Have you ever had or are you waiting for an organ transplant?  No. " "Continue scheduling, no site restrictions.    Have you had a stroke or transient ischemic attack (TIA aka \"mini stroke\") in the last 2 years?   No.    Have you been diagnosed with or been told you have cirrhosis of the liver?   No.    Are you currently on dialysis?   No    Do you need assistance transferring?   No    BMI: Estimated body mass index is 20.48 kg/m  as calculated from the following:    Height as of 10/30/24: 1.6 m (5' 3\").    Weight as of 10/30/24: 52.4 kg (115 lb 9.6 oz).     Is patients BMI > 40 and scheduling location [facility]?  No    Do you take an injectable or oral medication for weight loss or diabetes (excluding insulin)?  No    Do you take the medication Naltrexone?  No    Do you take blood thinners?  Yes, you must contact your prescribing provider for directions on holding or bridging with a different medication. WARFARIN      Prep   Are you currently on dialysis or do you have chronic kidney disease?  No    Do you have a diagnosis of diabetes?  No    Do you have a diagnosis of cystic fibrosis (CF)?  No    On a regular basis do you go 3 -5 days between bowel movements?  No    BMI > 40?  No    Preferred Pharmacy:    [facility] [address] [place] [id number]  Phone: [phone] Fax: [phone]    Final Scheduling Details     Procedure scheduled  Colonoscopy    Surgeon:  CRYSTAL     Date of procedure:  09/29/2025     Pre-OP / PAC:   No - Not required for this site.    Location  [facility] - Patient preference.    Sedation   Moderate Sedation - Per order.      Patient Reminders:   You will receive a call from a Nurse to review instructions and health history.  This assessment must be completed prior to your procedure.  Failure to complete the Nurse assessment may result in the procedure being cancelled.      On the day of your procedure, please designate an adult(s) who can drive you home stay with you for the next 24 hours. The medicines used in " the exam will make you sleepy. You will not be able to drive.      You cannot take public transportation, ride share services, or non-medical taxi service without a responsible caregiver.  Medical transport services are allowed with the requirement that a responsible caregiver will receive you at your destination.  We require that drivers and caregivers are confirmed prior to your procedure.

## 2025-07-13 ENCOUNTER — HEALTH MAINTENANCE LETTER (OUTPATIENT)
Age: 70
End: 2025-07-13

## 2025-07-31 ENCOUNTER — ANTICOAGULATION THERAPY VISIT (OUTPATIENT)
Dept: ANTICOAGULATION | Facility: CLINIC | Age: 70
End: 2025-07-31

## 2025-07-31 ENCOUNTER — LAB (OUTPATIENT)
Dept: LAB | Facility: CLINIC | Age: 70
End: 2025-07-31
Payer: COMMERCIAL

## 2025-07-31 DIAGNOSIS — I10 BENIGN ESSENTIAL HYPERTENSION: ICD-10-CM

## 2025-07-31 DIAGNOSIS — Z95.2 S/P AORTIC VALVE REPLACEMENT: ICD-10-CM

## 2025-07-31 DIAGNOSIS — Z95.2 AORTIC VALVE REPLACED: ICD-10-CM

## 2025-07-31 DIAGNOSIS — Z79.01 LONG TERM CURRENT USE OF ANTICOAGULANT THERAPY: Primary | ICD-10-CM

## 2025-07-31 LAB — INR BLD: 2.7 (ref 0.9–1.1)

## 2025-07-31 RX ORDER — WARFARIN SODIUM 5 MG/1
TABLET ORAL
Qty: 115 TABLET | Refills: 1 | Status: SHIPPED | OUTPATIENT
Start: 2025-07-31

## 2025-07-31 RX ORDER — CARVEDILOL 12.5 MG/1
12.5 TABLET ORAL 2 TIMES DAILY WITH MEALS
Qty: 180 TABLET | Refills: 0 | OUTPATIENT
Start: 2025-07-31

## 2025-07-31 RX ORDER — LISINOPRIL 10 MG/1
20 TABLET ORAL EVERY MORNING
Qty: 180 TABLET | Refills: 0 | OUTPATIENT
Start: 2025-07-31

## 2025-07-31 NOTE — TELEPHONE ENCOUNTER
ANTICOAGULATION MANAGEMENT:  Medication Refill    Anticoagulation Summary  As of 7/31/2025      Warfarin maintenance plan:  7.5 mg (5 mg x 1.5) every Mon, Wed, Fri; 5 mg (5 mg x 1) all other days   Next INR check:  9/11/2025   Target end date:  Indefinite    Indications    Long-term (current) use of anticoagulants [Z79.01] [Z79.01]  Aortic valve replaced - history of bicuspid aortic stenosis status post 23 mm [employer]ordan mechanical aortic valve and tube conduit with reimplantation of her coronaries 2011 [Z95.2]                 Anticoagulation Care Providers       Provider Role Specialty Phone number    Glenys Best CNP Referring Nurse Practitioner - Family [phone]            Refill Criteria    Visit with referring provider/group: Meets criteria: visit within referring provider group in the last 15 months on 10/30/24    [facility] referral last signed: 10/03/2024; within last year:  Yes    Lab monitoring is up to date (not exceeding 2 weeks overdue): Yes    Annabella meets all criteria for refill. Rx instructions and quantity supplied updated to match patient's current dosing plan. Warfarin 90 day supply with 1 refill granted per [facility] protocol     Genevieve Villagran, RN  Anticoagulation Clinic

## 2025-07-31 NOTE — PROGRESS NOTES
ANTICOAGULATION MANAGEMENT     Annabella Bolanos 69 year old female is on warfarin with therapeutic INR result. (Goal INR 2.0-3.0)    Recent labs: (last 7 days)     07/31/25  1115   INR 2.7*       ASSESSMENT     Warfarin Lab Questionnaire    Warfarin Doses Last 7 Days      7/31/2025    10:40 AM   Dose in Tablet or Mg   TAB or MG? milligram (mg)     Pt Rptd Dose SUNDAY MONDAY TUESDAY WED THURS FRIDAY SATURDAY 7/31/2025  10:40 AM 5 7.5 5 7.5 5 7.5 5         7/31/2025   Warfarin Lab Questionnaire   Missed doses within past 14 days? No   Changes in diet or alcohol within past 14 days? No   Medication changes since last result? No   Injuries or illness since last result? No   New shortness of breath, severe headaches or sudden changes in vision since last result? No   Abnormal bleeding since last result? No   Upcoming surgery, procedure? No     Previous result: Therapeutic last 2(+) visits  Additional findings: Upcoming surgery/procedure Colonoscopy scheduled for 9/29/25 - TE started but postponed to 6 weeks (8/18/25) prior to procedure in order to route to [facility] team.       PLAN     Recommended plan for no diet, medication or health factor changes affecting INR     Dosing Instructions: Continue your current warfarin dose with next INR in 6 weeks       Summary  As of 7/31/2025      Full warfarin instructions:  7.5 mg every Mon, Wed, Fri; 5 mg all other days   Next INR check:  9/11/2025               Telephone call with Annabella who verbalizes understanding and agrees to plan    Lab visit scheduled    Education provided: Please call back if any changes to your diet, medications or how you've been taking warfarin  Symptom monitoring: monitoring for bleeding signs and symptoms and monitoring for clotting signs and symptoms    Plan made per [facility] anticoagulation protocol    Genevieve Villagran, RN  7/31/2025  Anticoagulation Clinic  [facility] for routing messages: [facility]el [facility] [place], [facility] patient phone line:  [phone]        _______________________________________________________________________     Anticoagulation Episode Summary       Current INR goal:  2.0-3.0   TTR:  97.5% (1 y)   Target end date:  Indefinite   Send INR reminders to:  [facility]    Indications    Long-term (current) use of anticoagulants [Z79.01] [Z79.01]  Aortic valve replaced - history of bicuspid aortic stenosis status post 23 mm [employer] Yordan mechanical aortic valve and tube conduit with reimplantation of her coronaries 2011 [Z95.2]             Comments:  --             Anticoagulation Care Providers       Provider Role Specialty Phone number    Glenys Best, CNP Referring Nurse Practitioner - Family [phone]

## 2025-08-16 ENCOUNTER — NURSE TRIAGE (OUTPATIENT)
Dept: NURSING | Facility: CLINIC | Age: 70
End: 2025-08-16
Payer: COMMERCIAL

## 2025-08-18 ENCOUNTER — TELEPHONE (OUTPATIENT)
Dept: FAMILY MEDICINE | Facility: CLINIC | Age: 70
End: 2025-08-18
Payer: COMMERCIAL

## 2025-08-18 ENCOUNTER — PATIENT OUTREACH (OUTPATIENT)
Dept: CARE COORDINATION | Facility: CLINIC | Age: 70
End: 2025-08-18
Payer: COMMERCIAL

## 2025-08-19 ENCOUNTER — PATIENT OUTREACH (OUTPATIENT)
Dept: CARE COORDINATION | Facility: CLINIC | Age: 70
End: 2025-08-19
Payer: COMMERCIAL

## (undated) DEVICE — SET HANDPIECE INTERPULSE W/COAXIAL FAN SPRAY TIP 0210118000

## (undated) DEVICE — BLADE SAW SAGITTAL STRK 25X90X1.27MM HD SYS 6 6125-127-090

## (undated) DEVICE — DECANTER VIAL 2006S

## (undated) DEVICE — GLOVE BIOGEL PI SZ 7.0 40870

## (undated) DEVICE — GLOVE BIOGEL PI MICRO SZ 8.5 48585

## (undated) DEVICE — NDL BLUNT 18GA 1" W/O FILTER 305181

## (undated) DEVICE — CAST PADDING 6" STERILE 9046S

## (undated) DEVICE — SU ETHIBOND 0 CT-1 CR 8X18" CX21D

## (undated) DEVICE — SOLUTION WOUND CLEANSING 3/4OZ 10% PVP EA-L3011FB-50

## (undated) DEVICE — TOURNIQUET SGL  BLADDER 30"X4" BLUE 5921030135

## (undated) DEVICE — GLOVE PROTEXIS W/NEU-THERA 7.0  2D73TE70

## (undated) DEVICE — SYR 03ML LL W/O NDL 309657

## (undated) DEVICE — GLOVE BIOGEL PI SZ 8.5 40885

## (undated) DEVICE — GLOVE BIOGEL PI MICRO INDICATOR UNDERGLOVE SZ 8.5 48985

## (undated) DEVICE — BLADE SAW SAGITTAL STRK 13X90X1.27MM HD SYS 6 6113-127-090

## (undated) DEVICE — SOL NACL 0.9% IRRIG 3000ML BAG 2B7477

## (undated) DEVICE — DRSG AQUACEL AG HYDROFIBER  3.5X10" 422605

## (undated) DEVICE — SUCTION MANIFOLD NEPTUNE 2 SYS 4 PORT 0702-020-000

## (undated) DEVICE — SU VICRYL 2-0 CT-1 27" UND J259H

## (undated) DEVICE — LINEN ORTHO ACL PACK 5447

## (undated) DEVICE — BONE CEMENT MIXEVAC III HI VAC KIT  0206-015-000

## (undated) DEVICE — LINEN FULL SHEET 5511

## (undated) DEVICE — BAG CLEAR TRASH 1.3M 39X33" P4040C

## (undated) DEVICE — GLOVE BIOGEL PI MICRO INDICATOR UNDERGLOVE SZ 7.0 48970

## (undated) DEVICE — PREP CHLORAPREP 26ML TINTED HI-LITE ORANGE 930815

## (undated) DEVICE — PACK TOTAL KNEE BOXED LATEX FREE PO15TKFCT

## (undated) DEVICE — SU VICRYL 1 CT-1 27" J341H

## (undated) RX ORDER — TRANEXAMIC ACID 650 MG/1
TABLET ORAL
Status: DISPENSED
Start: 2023-11-13

## (undated) RX ORDER — PROPOFOL 10 MG/ML
INJECTION, EMULSION INTRAVENOUS
Status: DISPENSED
Start: 2023-11-13

## (undated) RX ORDER — FENTANYL CITRATE 50 UG/ML
INJECTION, SOLUTION INTRAMUSCULAR; INTRAVENOUS
Status: DISPENSED
Start: 2023-11-13

## (undated) RX ORDER — TRANEXAMIC ACID 10 MG/ML
INJECTION, SOLUTION INTRAVENOUS
Status: DISPENSED
Start: 2023-11-13

## (undated) RX ORDER — ONDANSETRON 2 MG/ML
INJECTION INTRAMUSCULAR; INTRAVENOUS
Status: DISPENSED
Start: 2023-11-13

## (undated) RX ORDER — DEXAMETHASONE SODIUM PHOSPHATE 4 MG/ML
INJECTION, SOLUTION INTRA-ARTICULAR; INTRALESIONAL; INTRAMUSCULAR; INTRAVENOUS; SOFT TISSUE
Status: DISPENSED
Start: 2023-11-13

## (undated) RX ORDER — OXYCODONE HYDROCHLORIDE 5 MG/1
TABLET ORAL
Status: DISPENSED
Start: 2023-11-13

## (undated) RX ORDER — CEFAZOLIN SODIUM/WATER 2 G/20 ML
SYRINGE (ML) INTRAVENOUS
Status: DISPENSED
Start: 2023-11-13

## (undated) RX ORDER — HYDROXYZINE HYDROCHLORIDE 10 MG/1
TABLET, FILM COATED ORAL
Status: DISPENSED
Start: 2023-11-13

## (undated) RX ORDER — ACETAMINOPHEN 325 MG/1
TABLET ORAL
Status: DISPENSED
Start: 2023-11-13